# Patient Record
Sex: FEMALE | Race: WHITE | NOT HISPANIC OR LATINO | Employment: OTHER | URBAN - METROPOLITAN AREA
[De-identification: names, ages, dates, MRNs, and addresses within clinical notes are randomized per-mention and may not be internally consistent; named-entity substitution may affect disease eponyms.]

---

## 2016-12-26 ASSESSMENT — ENCOUNTER SYMPTOMS
PALPITATIONS: 1
ORTHOPNEA: 0
EXERCISE INTOLERANCE: 1
SYNCOPE: 0
HYPERTENSION: 0
POLYPHAGIA: 0
JOINT SWELLING: 1
INCREASED ENERGY: 0
NIGHT SWEATS: 1
WEIGHT GAIN: 1
FEVER: 0
DECREASED APPETITE: 1
LIGHT-HEADEDNESS: 1
FATIGUE: 1
POLYDIPSIA: 0
INSOMNIA: 1
TACHYCARDIA: 1
PANIC: 1
LEG SWELLING: 1
HALLUCINATIONS: 0
SLEEP DISTURBANCES DUE TO BREATHING: 0
ALTERED TEMPERATURE REGULATION: 1
NERVOUS/ANXIOUS: 1
BACK PAIN: 1
CLAUDICATION: 0
DECREASED CONCENTRATION: 0
WEIGHT LOSS: 1
DEPRESSION: 0
STIFFNESS: 1
NECK PAIN: 1
MUSCLE CRAMPS: 0
LEG PAIN: 1
HYPOTENSION: 0
ARTHRALGIAS: 1
CHILLS: 1
MUSCLE WEAKNESS: 1
MYALGIAS: 1

## 2017-01-02 DIAGNOSIS — G47.9 DISTURBANCE IN SLEEP BEHAVIOR: Primary | ICD-10-CM

## 2017-01-03 NOTE — TELEPHONE ENCOUNTER
Alprazolam      Last Written Prescription Date: 12/9/16  Last Fill Quantity: 40,  # refills: 0   Last Office Visit with [facility], [facility] or [facility] prescribing provider: 11/7/16

## 2017-01-04 ENCOUNTER — OFFICE VISIT (OUTPATIENT)
Dept: PULMONOLOGY | Facility: CLINIC | Age: 42
End: 2017-01-04
Attending: INTERNAL MEDICINE
Payer: COMMERCIAL

## 2017-01-04 VITALS
OXYGEN SATURATION: 99 % | RESPIRATION RATE: 16 BRPM | BODY MASS INDEX: 19.75 KG/M2 | WEIGHT: 108 LBS | DIASTOLIC BLOOD PRESSURE: 71 MMHG | SYSTOLIC BLOOD PRESSURE: 109 MMHG | HEART RATE: 85 BPM

## 2017-01-04 DIAGNOSIS — J84.9 ILD (INTERSTITIAL LUNG DISEASE) (H): Primary | ICD-10-CM

## 2017-01-04 PROCEDURE — 99212 OFFICE O/P EST SF 10 MIN: CPT | Mod: ZF

## 2017-01-04 RX ORDER — ALPRAZOLAM 0.5 MG
TABLET ORAL
Qty: 40 TABLET | Refills: 0 | Status: SHIPPED | OUTPATIENT
Start: 2017-01-04 | End: 2017-01-16

## 2017-01-04 RX ORDER — TIOTROPIUM BROMIDE 18 UG/1
CAPSULE ORAL; RESPIRATORY (INHALATION)
Qty: 30 CAPSULE | Refills: 1 | Status: SHIPPED | OUTPATIENT
Start: 2017-01-04 | End: 2017-03-10

## 2017-01-04 ASSESSMENT — PAIN SCALES - GENERAL: PAINLEVEL: MODERATE PAIN (5)

## 2017-01-04 NOTE — NURSING NOTE
Chief Complaint   Patient presents with     Interstitial Lung Disease (ILD)     Patient is axel seen for ILD consultation      Leda Leija CMA at 11:12 AM on 1/4/2017
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
## 2017-01-04 NOTE — MR AVS SNAPSHOT
After Visit Summary   1/4/2017    Ellen Loya    MRN: [id number]           Patient Information     Date Of Birth          1975        Visit Information        Provider Department      1/4/2017 11:30 AM Dawson Upton MD [facility]        Today's Diagnoses     ILD (interstitial lung disease) (H)    -  1        Follow-ups after your visit        Additional Services     PULMONARY REHAB REFERRAL                 Your next 10 appointments already scheduled     Jan 16, 2017  6:30 AM   Lab with UC LAB   [facility] ([facility])    [address] [place] [address]   [phone]            Jan 16, 2017  7:00 AM   (Arrive by 6:45 AM)   RETURN PRIMARY PULMONARY with Callie Ledesma MD   [facility] ([facility])    [address] [place] [address]   [phone]            Jan 30, 2017 10:00 AM   Lab with UC LAB   [facility] ([facility])    [address] [place] [address]   [phone]            Jan 30, 2017 10:30 AM   Six Minute Walk with UC PFL 6 MINUTE WALK 1   [facility] Pulmonary Function Testing ([facility])    [address] [place] [address]   [phone]            Jan 30, 2017 11:00 AM   (Arrive by 10:45 AM)   RETURN PRIMARY PULMONARY with Callie Ledesma MD   [facility] ([facility])    [address] [place] [address]   [phone]            Apr 05, 2017  8:30 AM   PFT VISIT with  PFL A   [facility] Function Testing ([facility])    [address] [place] [address]   [phone]            Apr 05, 2017  9:00 AM   (Arrive by 8:45 AM)   Return Interstitial Lung with Dawson Upton MD   [facility]  [facility] ([facility])    [address]  3rd Floor  [facility] [address] [phone]              Future tests that were ordered for you today     Open Future Orders        Priority Expected Expires Ordered    PULMONARY REHAB REFERRAL Routine  1/4/2018 1/4/2017            Who to contact     If you have questions or need follow up information about today's clinic visit or your schedule please contact [facility] directly at [phone].  Normal or non-critical lab and imaging results will be communicated to you by [employer], letter or phone within 4 business days after the clinic has received the results. If you do not hear from us within 7 days, please contact the clinic through [employer] or phone. If you have a critical or abnormal lab result, we will notify you by phone as soon as possible.  Submit refill requests through [employer] or call your pharmacy and they will forward the refill request to us. Please allow 3 business days for your refill to be completed.          Additional Information About Your Visit        [employer] Information     [employer] gives you secure access to your electronic health record. If you see a primary care provider, you can also send messages to your care team and make appointments. If you have questions, please call your primary care clinic.  If you do not have a primary care provider, please call [phone] and they will assist you.        Care EveryWhere ID     This is your Care EveryWhere ID. This could be used by other organizations to access your [facility] medical records  [id number]        Your Vitals Were     Pulse Respirations Pulse Oximetry Last Period          85 16 99% (LMP Unknown)         Blood Pressure from Last 3 Encounters:   01/04/17 109/71   12/29/16 108/69   12/19/16 134/90    Weight from Last 3 Encounters:   01/04/17 48.988 kg (108 lb)   12/29/16 49.17 kg (108 lb 6.4 oz)   12/19/16 54.069 kg (119 lb 3.2  oz)                 Today's Medication Changes          These changes are accurate as of: 1/4/17 12:11 PM.  If you have any questions, ask your nurse or doctor.               Start taking these medicines.        Dose/Directions    tiotropium 18 MCG capsule   Commonly known as:  SPIRIVA HANDIHALER   Used for:  ILD (interstitial lung disease) (H)   Started by:  Dawson Upton MD        Inhale contents of one capsule daily.   Quantity:  30 capsule   Refills:  1            Where to get your medicines      These medications were sent to [facility] [place] - [address]   [address] , [facility] [address]     Phone:  [phone]    - tiotropium 18 MCG capsule             Primary Care Provider Office Phone # Fax #    Ignacio Loya [phone]       [place] [id number] [facility]   [place] [place] [address]        Thank you!     Thank you for choosing [facility]  for your care. Our goal is always to provide you with excellent care. Hearing back from our patients is one way we can continue to improve our services. Please take a few minutes to complete the written survey that you may receive in the mail after your visit with us. Thank you!             Your Updated Medication List - Protect others around you: Learn how to safely use, store and throw away your medicines at www.disposemymeds.org.          This list is accurate as of: 1/4/17 12:11 PM.  Always use your most recent med list.                   Brand Name Dispense Instructions for use    * albuterol 108 (90 BASE) MCG/ACT Inhaler    albuterol    1 Inhaler    Inhale 1-2 puffs into the lungs every 4 hours as needed for shortness of breath / dyspnea       * albuterol (2.5 MG/3ML) 0.083% neb solution     60 vial    Use every 4-6 hours as needed for shortness of breath       ALPRAZolam 0.5 MG tablet    XANAX    40 tablet    TAKE 1 TABLET BY MOUTH EVERY SIX HOURS AS NEEDED FOR  ANXIETY.       digoxin 125 MCG tablet    LANOXIN    90 tablet    Take 1 tablet (125 mcg) by mouth daily       fluticasone 50 MCG/ACT spray    FLONASE    1 Bottle    Spray 2 sprays into both nostrils daily       furosemide 20 MG tablet    LASIX    90 tablet    Take 1 tablet (20 mg) by mouth daily       ibuprofen 800 MG tablet    ADVIL/MOTRIN    30 tablet    Take 1 tablet (800 mg) by mouth 3 times daily (with meals)       morphine 15 MG 12 hr tablet    MS CONTIN    60 tablet    Take 1 tablet (15 mg) by mouth 2 times daily       * order for DME     1 Device    Oxygen: Patient requires supplemental Oxygen 2 LPM via nasal canula with activity. Please provide patient with portability capability. Okay to spot check patient on oxygen device, to keep sats above 90%. Oxygen will be for a lifetime.       * order for DME     1 Device    Equipment being ordered: personal O2 oximeter.       * order for DME     1 Device    Equipment being ordered: Oxygen  Please provide patient with a home-fill system for portability.       * order for DME     2 Device    Please provide patient with 2  liquid oxygen tanks for portability. Spot check patient on liquid oxygen. Titrate oxygen to maintain saturations above 88%.       * order for DME     1 Device    Equipment being ordered: Oxygen oximeter and mask       * order for DME     1 Device    Equipment being ordered: Oximeter       * order for DME     1 Device    Equipment being ordered: Nebulizer. Verbal order from        oxyCODONE-acetaminophen  MG per tablet    PERCOCET    40 tablet    Take 1 tablet by mouth every 6 hours as needed for moderate to severe pain       tiotropium 18 MCG capsule    SPIRIVA HANDIHALER    30 capsule    Inhale contents of one capsule daily.       * Notice:  This list has 9 medication(s) that are the same as other medications prescribed for you. Read the directions carefully, and ask your doctor or other care provider to review them with you.
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
## 2017-01-05 DIAGNOSIS — R07.81 PLEURITIC CHEST PAIN: Primary | ICD-10-CM

## 2017-01-06 ENCOUNTER — TELEPHONE (OUTPATIENT)
Dept: CARDIOLOGY | Facility: CLINIC | Age: 42
End: 2017-01-06

## 2017-01-06 DIAGNOSIS — I27.20 PULMONARY HYPERTENSION (H): Primary | ICD-10-CM

## 2017-01-06 DIAGNOSIS — R12 HEART BURN: Primary | ICD-10-CM

## 2017-01-06 RX ORDER — TADALAFIL 20 MG/1
20 TABLET ORAL DAILY
Qty: 60 TABLET | Refills: 11 | Status: SHIPPED | OUTPATIENT
Start: 2017-01-06 | End: 2017-01-16

## 2017-01-06 RX ORDER — OXYCODONE AND ACETAMINOPHEN 10; 325 MG/1; MG/1
1 TABLET ORAL EVERY 6 HOURS PRN
Qty: 40 TABLET | Refills: 0 | Status: SHIPPED | OUTPATIENT
Start: 2017-01-06 | End: 2017-01-16

## 2017-01-06 NOTE — TELEPHONE ENCOUNTER
Prior Authorization Specialty Medication Request    Medication/Dose: Tyvaso  Frequency: QID    Route: Inhaled  Tyvaso (treprostinil) - Start with 3 QID with weekly increases of 1-2 breaths to a goal of 9  Diagnosis and ICD: PAH out of proportion to ILD WHO Group: 1 NYHA FC: 3   New/Renewal/Insurance Change PA: New   Previously Tried and Failed Therapies:   N/A   BCBS:   Member ID: [id number]   Group: [id number]

## 2017-01-06 NOTE — TELEPHONE ENCOUNTER
Patient calling will be out of this med this week-end, patient would like to see if another provider could ok this med, please call and advise

## 2017-01-06 NOTE — TELEPHONE ENCOUNTER
[facility] Prior Authorization Team   Phone: [phone]  Fax: [phone]    PA Initiation    Medication: Adcirca  Insurance Company: [employer]  Fax Number: [phone]    Phone Number: [phone]  Pharmacy Filling the Rx: [facility] [address]  Filling Pharmacy Phone: [phone]  Filling Pharmacy Fax: [phone]  Start Date: 1/6/2017     Urgent request has been manually faxed. PA request included clinical documentation and letter of medical necessity.

## 2017-01-06 NOTE — Clinical Note
[address] [facility]  [address] [place] [address]  Phone [phone]   Fax [phone]     2017  Re:  Ellen Loya  :  1975  ID #: [id number]    To Whom It May Concern,   I am writing in reference to a mutual patient, Ellen Loya, 1975.  It has been brought to my attention that the patient is in need of a prior authorization to initiate therapy for her Pulmonary Hypertension on the medication Adcirca (tadalafil).  Mrs. Loya has been followed at the [facility] for her Pulmonary Hypertension since 2016.  Mrs. Loya was diagnosed with Pulmonary Arterial Hypertension out of proportion to Interstitial Lung Disease by the [facility] (ICD-10 code I27.0) which was most recently re-confirmed via Right Heart Catheterization.  On 16, Mrs. Loya underwent a right heart catheterization at the [facility] which demonstrated severe pulmonary arterial hypertension, (RA 15/20, PA 60/38, mean 42, PVR 10.5, PWP 15, CI 1.6).  Mrs. Loya underwent a vasodilator challenge and did not respond with greater than 20% reduction in her mean pulmonary artery pressure.  Therefore, this right heart catheterization demonstrated that her pulmonary arterial hypertension is out of proportion to Interstitial Lung Disease.     Unfortunately, Mrs. Loya has experienced increased fatigue, shortness of breath, dyspnea and increased limitations with activity due to her Pulmonary Hypertension.  Therefore, I have prescribed and am requesting you approve Mrs. Loya to receive Adcirca, as part of her Pulmonary Hypertension care plan.  If it is necessary to deny this drug, it will lead to consistent heart failure and deterioration in regards to her Pulmonary Hypertension as well as unstable hemodynamics, which will be detrimental to her health.       Please note that Mrs. Loya is a World Health Organization Group 1  and NYHA Class 3.  If you have further questions or concerns please contact my Clinical Nurse Coordinator Naty Lau RN at [phone].  Graciously,       Dileep Falk MD   of Medicine  Pulmonary Hypertension   [facility]  Cardiovascular Division

## 2017-01-06 NOTE — TELEPHONE ENCOUNTER
Prior Authorization Specialty Medication Request    Medication/Dose: Adcirca  Frequency: Daily    Route: PO  Adcirca (tadalafil) 20 mg tablets - take 1 tab by mouth Daily for 30 days then increase to 2 tab daily Quantity 60 tablets/30 days  Diagnosis and ICD: PAH out of proportion to ILD   WHO Group: 1 NYHA FC: 3  New/Renewal/Insurance Change PA: New  Previously Tried and Failed Therapies:   N/A    BCBS:  Member ID: [id number]  Group: [id number]

## 2017-01-06 NOTE — TELEPHONE ENCOUNTER
Percocet      Last Written Prescription Date: 12/28/16  Last Fill Quantity: 40,  # refills: 0   Last Office Visit with [facility], [facility] or [facility] prescribing provider: 11/07/2016    Sandra Rodriguez [facility]- Float Dept.

## 2017-01-09 NOTE — TELEPHONE ENCOUNTER
Prior Authorization Approval    Authorization Effective Date: 1/1/2017  Authorization Expiration Date: 1/1/2018  Medication: Adcirca - APPROVED  Approved Dose/Quantity:   Reference #:     Insurance Company: [employer]  Expected CoPay: $     CoPay Card Available:      Foundation Assistance Needed:    Which Pharmacy is filling the prescription (Not needed for infusion/clinic administered): [facility] [address]      [facility] was having computer problems so could not check if the medication is processing, they were going to work on it and contact the patient.

## 2017-01-11 DIAGNOSIS — M19.90 INFLAMMATORY ARTHRITIS: Primary | ICD-10-CM

## 2017-01-12 LAB
DLCOCOR-%PRED-PRE: 29 %
DLCOCOR-PRE: 6.76 ML/MIN/MMHG
DLCOUNC-%PRED-PRE: 30 %
DLCOUNC-PRE: 7.02 ML/MIN/MMHG
DLCOUNC-PRED: 22.91 ML/MIN/MMHG
ERV-%PRED-PRE: 88 %
ERV-PRE: 1.11 L
ERV-PRED: 1.25 L
EXPTIME-PRE: 6.41 SEC
FEF2575-%PRED-PRE: 78 %
FEF2575-PRE: 2.36 L/SEC
FEF2575-PRED: 3 L/SEC
FEFMAX-%PRED-PRE: 105 %
FEFMAX-PRE: 7 L/SEC
FEFMAX-PRED: 6.65 L/SEC
FEV1-%PRED-PRE: 82 %
FEV1-PRE: 2.3 L
FEV1FEV6-PRE: 83 %
FEV1FEV6-PRED: 84 %
FEV1FVC-PRE: 83 %
FEV1FVC-PRED: 82 %
FEV1SVC-PRE: 86 %
FEV1SVC-PRED: 82 %
FIFMAX-PRE: 5.71 L/SEC
FRCPLETH-%PRED-PRE: 108 %
FRCPLETH-PRE: 2.79 L
FRCPLETH-PRED: 2.57 L
FVC-%PRED-PRE: 81 %
FVC-PRE: 2.77 L
FVC-PRED: 3.41 L
IC-%PRED-PRE: 71 %
IC-PRE: 1.55 L
IC-PRED: 2.16 L
RVPLETH-%PRED-PRE: 111 %
RVPLETH-PRE: 1.68 L
RVPLETH-PRED: 1.51 L
TLCPLETH-%PRED-PRE: 94 %
TLCPLETH-PRE: 4.34 L
TLCPLETH-PRED: 4.6 L
VA-%PRED-PRE: 81 %
VA-PRE: 3.86 L
VC-%PRED-PRE: 78 %
VC-PRE: 2.67 L
VC-PRED: 3.41 L

## 2017-01-12 RX ORDER — MORPHINE SULFATE 15 MG/1
15 TABLET, FILM COATED, EXTENDED RELEASE ORAL 2 TIMES DAILY
Qty: 60 TABLET | Refills: 0 | Status: SHIPPED | OUTPATIENT
Start: 2017-01-12 | End: 2017-02-08

## 2017-01-12 NOTE — TELEPHONE ENCOUNTER
MS Contin 15mg      Last Written Prescription Date: 12/14/2016  Last Fill Quantity: 60,  # refills: 0   Last Office Visit with G, P or [facility] prescribing provider: 10/28/2016    Amber Rushing, Pharmacy Technician  [facility]  [phone]

## 2017-01-13 ENCOUNTER — PRE VISIT (OUTPATIENT)
Dept: CARDIOLOGY | Facility: CLINIC | Age: 42
End: 2017-01-13

## 2017-01-13 ENCOUNTER — HOSPITAL ENCOUNTER (OUTPATIENT)
Dept: CARDIAC REHAB | Facility: CLINIC | Age: 42
End: 2017-01-13
Attending: INTERNAL MEDICINE
Payer: COMMERCIAL

## 2017-01-13 VITALS — HEIGHT: 62 IN | WEIGHT: 107 LBS | BODY MASS INDEX: 19.69 KG/M2

## 2017-01-13 DIAGNOSIS — I27.20 PULMONARY HYPERTENSION (H): Primary | ICD-10-CM

## 2017-01-13 DIAGNOSIS — R06.09 DYSPNEA ON EXERTION: ICD-10-CM

## 2017-01-13 PROCEDURE — 40000244 ZZH STATISTIC VISIT PULM REHAB

## 2017-01-13 PROCEDURE — G0238 OTH RESP PROC, INDIV: HCPCS

## 2017-01-13 PROCEDURE — G0237 THERAPEUTIC PROCD STRG ENDUR: HCPCS

## 2017-01-13 ASSESSMENT — PULMONARY FUNCTION TESTS
FVC: 81
FEV1: 82
FVC_PERCENT_PREDICTED: 82

## 2017-01-13 ASSESSMENT — 6 MINUTE WALK TEST (6MWT)
GENDER SELECTION: FEMALE
FEMALE CALC: 650.97
TOTAL DISTANCE WALKED: 220.98
MALE CALC: 591.86

## 2017-01-13 ASSESSMENT — DUKE ACTIVITY SCORE INDEX (DASI)
VO2_PEAK: 21.94
DASI METS SCORE: 6.27

## 2017-01-13 ASSESSMENT — ACTIVITIES OF DAILY LIVING (ADL): ADL_LIMITATIONS: STAIR CLIMBING

## 2017-01-13 NOTE — PROGRESS NOTES
01/13/17 1000   Session   Session Initial Evaluation and Exercise Prescription   Certified through this date 02/11/17   Type Initial     Assessment   Assessment Ellen is a pleasant 41 year old who presents to pulmonary rehab to increase her strengh and endurance after being diagnoised with intersitial pulmonary fibrosis. She also has a history of inflammatory arthritis, interstitial lung disease, and pulmonary hypertension and was admitted from pulmonary hypertension clinic for volume overload last month. Ellen attended rehab in the summer of 2015 briefly and understands the importance of regular exercise and attending pulmonary rehab since she was recently referred for a lung transplant.  The patient's history and clinical status including hemodynamics  were evaluated.  The patient was assessed to be stable and appropriate to begin exercise.   The patient's functional capacity and exercise prescription were determined by the completion of the 6 minute walk test.  See results below.  The patient was oriented to the program and equipment. Goals and objectives were discussed. CV response was WNL. No symptoms, complaints or pain were reported. Good prognosis for reaching above goals. Skilled therapy is necessary in order to monitor cardiopulmonary response to exercise, to provide education and behavior change counseling needed to achieve patient's goals.      General Information  Ellen A Vincenzo  1975     Treatment Diagnosis Interstitial Pulmonary Fibrosis   Onset Date 01/01/11   Hospital Location [facility] [place]   Outpatient Pulmonary Rehab Start Date 01/13/17   Primary Physician Dr. Loya   Pulmonolgist Dr. Upton   General Information Comments Past Medical History   Diagnosis Date     Varicella without mention of complication      Acute bronchitis 06/05/07     Admit. Discharged 06/05/07     ASCUS favor benign 5/15/14     neg HPV     ILD (interstitial lung disease) (H)       "seen on chest CT 5-2011; methotrexate stopped 6-2011     Lung nodule      KM nodule; EBUS 12/2014 of lymph nodes was negative; CT-guided bx 1-2015 hamartoma/chondroma     Arthritis      h/o \"seronegative rheumatoid arthritis\" since age 30, however no evidence of active arthritis by Rheum eval [id number]     Prematurity      born 6-7 weeks early     Anxiety      Pulmonary hypertension (H)      RHC 2/2016 mean PA 25        Untoward Events/Exacerbations/Hospitalizations   Untoward Events/Exacerbations/Hospitalizations Cardiac   Cardiac Edema   Edema Date 12/15/16   Sputum   Sputum Production Amount None   Tobacco History   Tobacco Former smoker   Tobacco Habit Cigarettes   Years Smoked 20   Average Packs Per Day 1/2   Quit Date or Planned Quit Date 12/14/16   Interventions Planned Check in Regularly, Offer Support to Reduce Risk of Relapse   Medications   Short-Acting Beta Agonist Prescribed, taking as prescribed   Long-Acting Anticholinergic Prescribed, taking as prescribed   Preventative Vaccinations   Influenza Vaccination No   Pneumonia Vaccination No   Pain   Patient Currently in Pain No   Falls Screen   Have you fallen two or more times in the past year? Yes   Have you fallen and had an injury in the past year? No   Referral initiated to physical therapy No   Living and Work Status   Living Arrangements house   Support System Live with an adult   Environmental Factors Pets;Plants;Air quality;No concerns   ADL Limitations Stair climbing   Initial Duke Activity Status Index (DASI) score. A measure of functional capacity. The goal is to have a pre-program raw score of 9.95 (~4 METs) or above 28.7   Initial DASI VO2 Peak (ml*kg-1*min-1) 21.94   Initial DASI MET Level 6.27   Occupation , cook   Return to Employment Not employed  (Disabled)   Physical Assessments   Incisions Not applicable   Edema +1 Trace   Left Lung Sounds normal   Right Lung Sounds normal   Pulmonary Function Test (PFTs)   PFT Results " "Available   Date Completed 01/04/17   FVC Actual 81   % Predicted FVC 82   FEV1 Actual 82   Individualized Treatment Plan   Sessions Scheduled 30   Sessions Attended 1   Type Aerobic exercise;Resistance training;Flexibility training   Oxygen Use   Supplemental Oxygen Needed Yes   Delivery Device Nasal Cannula   Liter Flow at Rest 2   Liter Flow with ADLS 2   Liter Flow During Exercise 2   Liter Flow With Sleep 2   Evaluated on Home System? Pulse/Demand   Interventions Recommended Continue to monitor SpO2 at rest and with exercise on current O2 settings   Exercise Prescription   Mode Treadmill;Nustep;Arm Ergometer/UBE;Weights   Frequency 2 days/week   Duration/Time 15-30 min   THR (85% of age predicted max heart rate)  152.15   Effort Rating (0-10) 4-6   Progression of Exercise Increase duration of exercise 3-5 mins per week   Oxygen Titration with Exercise > 88% with exercise   Comments pt is deconditioned   Exercise Assessment   6 Minute Walk Predicted - Gender Selection Female   6 Minute Walk Predicted (Female) 650.97   6 Minute Walk Predicted (Male) 591.86   6 Minute Walk Distance (Initial) 220.98 Meters   Resting HR 69   Exercise HR 83   Resting /82   Exercise /80   SpO2 98   Exercise SpO2 91   Current MET level 2.1   Exercise Tolerance poor   Normal Limits Discussed Yes   Current Symptoms at Home Dyspnea;Fatigue   Current Symptoms in Rehab Denies symptoms   Limitations Arthritis   Nutrition Management   Age 41   Height 1.575 m (5' 2.01\")   Weight 48.535 kg (107 lb)   BMI (Calculated) 19.61   Interventions Planned NA   Psychosocial   Initial Patient Health Questionnaire -9 (PHQ-9) for depression. To notify physician if pre-score >9. 11   Initial Shortness of Breath Questionnaire (SOBQ) score. The goal is to reduce the score pre to post program. 40   Intervention Planned Patient to identify 2-3 coping mechanisms to decrease stress and anxiety;Patient to attend appropriate education class;Introduce " relaxation techniques to assist with decreased anxiety   Psychosocial Comments PT reports she has anxiety which she takes medication for which is situation based.    Stages of Change   Aerobic Exercise Preparation   Physical Activity Preparation   Recommended diet Action   Stress Contemplation   Smoking Cessation Action   Oxygen Usage Maintenance   Current Home Exercise   Type of Exercise None   Recommended Home Exercise Prescription   Type of Exercise Walking   Frequency (Days per week) 2-3   Duration (minutes per session) 15-30 min   Effort Rating Recommended (0-10) Scale  4-6/10   Learning Assessment   Learner Patient   Primary Language English   Preferred Learning Style Listening;Reading;Demonstration;Pictures/Video   Barriers to Learning No barriers noted   Patient Education/Referrals   Education Recommended Activities of Daily Living;Breathing Techniques;Community Resources/Exacerbation Management;Emotional Aspects of CLD;Energy Conservation;Exercise Principles;Medication Overview;Nutrition;Panic and Anxiety   Follow-up/On-going Support   Provider follow-up needed on the following No follow-up needed   Pulmonary Rehab Goals   Pulmonary Rehab Goals 1;2   Goal 1   Goal Pt will achieve 30 minutes of continoue aerobic exercise, by using the treadmill and Nustep in addition to 10 reps of 1-2#  of 6 exercise.   Target Date 02/27/17   Progress Towards Goal Pt wants to increase her strengh and endurance so that she is strong enough to havea double lung transplant.   Goal 2   Goal Pt will move from luis action phase to maintenance phase in refererance to smoking.   Target Date 02/27/17   Progress Towards Goal Pt will remain smoke free.            I have reviewed initial evaluation outcomes, and agree with exercise prescription for respiratory therapy for this patient.    Physician Signature: _______________________  Date: ________ Time: _________

## 2017-01-16 ENCOUNTER — OFFICE VISIT (OUTPATIENT)
Dept: CARDIOLOGY | Facility: CLINIC | Age: 42
End: 2017-01-16
Attending: INTERNAL MEDICINE
Payer: COMMERCIAL

## 2017-01-16 VITALS
HEART RATE: 58 BPM | BODY MASS INDEX: 20.08 KG/M2 | HEIGHT: 62 IN | OXYGEN SATURATION: 96 % | DIASTOLIC BLOOD PRESSURE: 67 MMHG | WEIGHT: 109.1 LBS | SYSTOLIC BLOOD PRESSURE: 106 MMHG

## 2017-01-16 DIAGNOSIS — R07.81 PLEURITIC CHEST PAIN: Primary | ICD-10-CM

## 2017-01-16 DIAGNOSIS — I27.20 PULMONARY HYPERTENSION (H): Primary | ICD-10-CM

## 2017-01-16 DIAGNOSIS — G47.9 DISTURBANCE IN SLEEP BEHAVIOR: ICD-10-CM

## 2017-01-16 DIAGNOSIS — I27.20 PULMONARY HYPERTENSION (H): ICD-10-CM

## 2017-01-16 DIAGNOSIS — R06.09 DYSPNEA ON EXERTION: ICD-10-CM

## 2017-01-16 LAB
ALBUMIN SERPL-MCNC: 3.6 G/DL (ref 3.4–5)
ALP SERPL-CCNC: 95 U/L (ref 40–150)
ALT SERPL W P-5'-P-CCNC: 18 U/L (ref 0–50)
ANION GAP SERPL CALCULATED.3IONS-SCNC: 6 MMOL/L (ref 3–14)
AST SERPL W P-5'-P-CCNC: 20 U/L (ref 0–45)
BILIRUB SERPL-MCNC: 0.4 MG/DL (ref 0.2–1.3)
BUN SERPL-MCNC: 19 MG/DL (ref 7–30)
CALCIUM SERPL-MCNC: 8.8 MG/DL (ref 8.5–10.1)
CHLORIDE SERPL-SCNC: 101 MMOL/L (ref 94–109)
CO2 SERPL-SCNC: 31 MMOL/L (ref 20–32)
CREAT SERPL-MCNC: 0.77 MG/DL (ref 0.52–1.04)
ERYTHROCYTE [DISTWIDTH] IN BLOOD BY AUTOMATED COUNT: 16.3 % (ref 10–15)
GFR SERPL CREATININE-BSD FRML MDRD: 82 ML/MIN/1.7M2
GLUCOSE SERPL-MCNC: 62 MG/DL (ref 70–99)
HCT VFR BLD AUTO: 43.2 % (ref 35–47)
HGB BLD-MCNC: 13.8 G/DL (ref 11.7–15.7)
MCH RBC QN AUTO: 29.5 PG (ref 26.5–33)
MCHC RBC AUTO-ENTMCNC: 31.9 G/DL (ref 31.5–36.5)
MCV RBC AUTO: 92 FL (ref 78–100)
NT-PROBNP SERPL-MCNC: 258 PG/ML (ref 0–125)
PLATELET # BLD AUTO: 251 10E9/L (ref 150–450)
POTASSIUM SERPL-SCNC: 3.7 MMOL/L (ref 3.4–5.3)
PROT SERPL-MCNC: 7.8 G/DL (ref 6.8–8.8)
RBC # BLD AUTO: 4.68 10E12/L (ref 3.8–5.2)
SODIUM SERPL-SCNC: 138 MMOL/L (ref 133–144)
WBC # BLD AUTO: 9.1 10E9/L (ref 4–11)

## 2017-01-16 PROCEDURE — 83880 ASSAY OF NATRIURETIC PEPTIDE: CPT | Performed by: INTERNAL MEDICINE

## 2017-01-16 PROCEDURE — 99214 OFFICE O/P EST MOD 30 MIN: CPT | Mod: ZP | Performed by: INTERNAL MEDICINE

## 2017-01-16 PROCEDURE — 85027 COMPLETE CBC AUTOMATED: CPT | Performed by: INTERNAL MEDICINE

## 2017-01-16 PROCEDURE — 80053 COMPREHEN METABOLIC PANEL: CPT | Performed by: INTERNAL MEDICINE

## 2017-01-16 PROCEDURE — 36415 COLL VENOUS BLD VENIPUNCTURE: CPT | Performed by: INTERNAL MEDICINE

## 2017-01-16 PROCEDURE — 99213 OFFICE O/P EST LOW 20 MIN: CPT | Mod: ZF

## 2017-01-16 RX ORDER — ALPRAZOLAM 0.5 MG
TABLET ORAL
Qty: 40 TABLET | Refills: 0 | Status: SHIPPED | OUTPATIENT
Start: 2017-01-16 | End: 2017-01-28

## 2017-01-16 RX ORDER — TADALAFIL 20 MG/1
40 TABLET ORAL DAILY
Qty: 60 TABLET | Refills: 11 | Status: SHIPPED | OUTPATIENT
Start: 2017-01-16 | End: 2018-01-09

## 2017-01-16 RX ORDER — OXYCODONE AND ACETAMINOPHEN 10; 325 MG/1; MG/1
1 TABLET ORAL EVERY 6 HOURS PRN
Qty: 40 TABLET | Refills: 0 | Status: SHIPPED | OUTPATIENT
Start: 2017-01-16 | End: 2017-01-25

## 2017-01-16 RX ORDER — FUROSEMIDE 20 MG
40 TABLET ORAL DAILY
Qty: 180 TABLET | Refills: 3 | Status: SHIPPED | OUTPATIENT
Start: 2017-01-16 | End: 2018-01-23

## 2017-01-16 ASSESSMENT — PAIN SCALES - GENERAL: PAINLEVEL: NO PAIN (0)

## 2017-01-16 NOTE — PROGRESS NOTES
2017             Dawson Upton MD   [facility] Pulmonary    [address] [place] [address]      RE: Ellen Loya   MRN: [id number]   : 1975      Dear Dr. Upton:      We had the pleasure of seeing Ms. Ellen Loya for followup in our Pulmonary Hypertension Clinic at the [facility].  As you know, she is a 41-year-old female with pulmonary arterial hypertension in the setting of combined pulmonary fibrosis, emphysema and inflammatory arthritis.  She is currently on Adcirca therapy.      Ms. Loya returns for her 2 week followup.  Her Adcirca has been approved, and she is taking it at 20 mg daily.  She has not had any further worsening of her symptoms.  Her shortness of breath is gradually improving.  She continues to feel better.  She is on 2 L of supplemental oxygen.  She increases it as needed.  She has not had any further worsening lower extremity swelling or abdominal distention.  Her weight has been stable.  She has no chest pain or pressure.  She is able to do her basic activities at home by herself.  She can walk up to a block now without stopping.  I would currently characterize her as NYHA functional class III.  She has not had any worsening oxygenation on Adcirca therapy.      She has been taking Lasix 40 mg daily, as she has gained some weight.      REVIEW OF SYSTEMS:  A detailed 10 point review of systems was obtained as described in the History of Present Illness.  All other systems were reviewed and are negative.       MEDICATIONS:   Current Outpatient Prescriptions   Medication Sig     tadalafil, PAH, (ADCIRCA) 20 MG TABS Take 2 tablets (40 mg) by mouth daily     furosemide (LASIX) 20 MG tablet Take 2 tablets (40 mg) by mouth daily     morphine (MS CONTIN) 15 MG 12 hr tablet Take 1 tablet (15 mg) by mouth 2 times daily     tiotropium (SPIRIVA HANDIHALER) 18 MCG capsule Inhale contents of one capsule daily.     fluticasone (FLONASE) 50 MCG/ACT  spray Spray 2 sprays into both nostrils daily     albuterol (2.5 MG/3ML) 0.083% nebulizer solution Use every 4-6 hours as needed for shortness of breath     order for DME Equipment being ordered: Nebulizer. Verbal order from      digoxin (LANOXIN) 125 MCG tablet Take 1 tablet (125 mcg) by mouth daily     ibuprofen (ADVIL,MOTRIN) 800 MG tablet Take 1 tablet (800 mg) by mouth 3 times daily (with meals)     order for DME Equipment being ordered: Oximeter     order for DME Equipment being ordered: Oxygen oximeter and mask     order for DME Please provide patient with 2  liquid oxygen tanks for portability. Spot check patient on liquid oxygen. Titrate oxygen to maintain saturations above 88%.     order for DME Equipment being ordered: Oxygen    Please provide patient with a home-fill system for portability.     albuterol (ALBUTEROL) 108 (90 BASE) MCG/ACT inhaler Inhale 1-2 puffs into the lungs every 4 hours as needed for shortness of breath / dyspnea     order for DME Equipment being ordered: personal O2 oximeter.     order for DME Oxygen: Patient requires supplemental Oxygen 2 LPM via nasal canula with activity. Please provide patient with portability capability. Okay to spot check patient on oxygen device, to keep sats above 90%. Oxygen will be for a lifetime.     oxyCODONE-acetaminophen (PERCOCET)  MG per tablet Take 1 tablet by mouth every 6 hours as needed for moderate to severe pain     ALPRAZolam (XANAX) 0.5 MG tablet TAKE 1 TABLET BY MOUTH EVERY SIX HOURS AS NEEDED FOR ANXIETY.     No current facility-administered medications for this visit.     PHYSICAL EXAMINATION:  She was comfortable.  She was in no apparent distress.  Her blood pressure was 106/67, pulse rate was 58, and respiratory rate was 20.  She was saturating 96% on 2 L.  Her weight was slightly up to 109 pounds.  She had no pallor, cyanosis or jaundice.  Her neck exam revealed no JVD.  She had no lower extremity edema.  Her extremities  were warm and well perfused.  Cardiac auscultation revealed normal S1 and a loud P2.  No murmur, rub or gallop.  Auscultation of the lungs revealed equal air entry on both sides with no crepitations or wheezing.  Her abdomen was soft with normal bowel sounds; no tenderness, no rigidity, no guarding.      Her NT-proBNP is 258, and it continues to improve.  Her liver function tests and renal function tests are unremarkable.  Her CBC is within normal limits.      Ms. Ellen Loya is a pleasant, 41-year-old female with a past medical history significant for combined emphysema and pulmonary fibrosis in the setting of inflammatory arthritis and pulmonary arterial hypertension.      Pulmonary arterial hypertension:  Ms. Loya is feeling much better on Adcirca.  She has noticed improvement in her exertional shortness of breath, and also she no longer has lower extremity edema.  She is taking a higher dose of Lasix for the last week.  I will increase her Adcirca to 40 mg daily.  I recommended her to continue to take Lasix 40 mg daily.  She will continue on digoxin.        She has severe RV dysfunction.  In view of this, I think she would benefit from a second therapy.  It appears that her inhaled Tyvaso was denied by her insurance company.  We will appeal the denial.  Hopefully, this will be approved so that her right ventricular failure continues to improve, and she feels better.        She has been evaluated for a lung transplantation.  She is currently going through the workup.  She quit smoking in December.  Hopefully, she will be a candidate and can be listed in mid June.      I recommended her to return to clinic in a month.  At that time we will make sure that she is tolerating the higher dose of Adcirca, and hopefully her Tyvaso would be approved and she is initiated on that.  She will call our office in the interim if you have any further questions.  I also encouraged her to make an appointment with Dr. Martínez  with Rheumatology to reassess the need for therapy for inflammatory arthritis.  She is starting pulmonary rehab, and she feels better, which I encouraged her to continue.        It was a pleasure meeting Ms. Ellen Loya in our Pulmonary Hypertension Clinic at the [facility].  We thank you for involving us in her care.  Please do not hesitate to call us in the interim if you have any further questions.      Sincerely,   Callie Ledesma MD   [facility]ension  Heart Failure, Transplant, and Mechanical Circulatory Support Cardiology   Cardiovascular Division  [facility]   [phone]               D: 2017 11:28   T: 2017 14:07   MT: [email or web address]      Name:     ELLEN LOYA   MRN:      [id number]        Account:      [id number]   :      1975           Service Date: 2017      Document: [id number]

## 2017-01-16 NOTE — NURSING NOTE
Med Reconcile: Reviewed and verified all current medications with the patient. The updated medication list was printed and given to the patient.  Return Appointment: Patient given instructions regarding scheduling next clinic visit. Patient demonstrated understanding of this information and agreed to call with further questions or concerns.  Medication Change: Patient was educated regarding prescribed medication change, including discussion of the indication, administration, side effects, and when to report to MD or RN. Patient demonstrated understanding of this information and agreed to call with further questions or concerns.  Patient stated she understood all health information given and agreed to call with further questions or concerns.     Medication Changes:   Increase Lasix to 40 mg Daily   Increase Adcirca (tadalafil) to 40 mg Daily   Patient Instructions:   Follow up Appointment Information:   1 month follow up with Labs   Rheumatology appt

## 2017-01-16 NOTE — NURSING NOTE
"Chief Complaint   Patient presents with     Follow Up For     Return for PH F/U with Labs   NOTE- Patient stated - she is feeing \"well today\"  "

## 2017-01-16 NOTE — Clinical Note
2017      RE: Ellen Loya  [address]  [facility] [address]       2017             Dawson Upton MD   P Pulmonary    [address] [place] [address]      RE: Ellen Loya   MRN: [id number]   : 1975      Dear Dr. Upton:      We had the pleasure of seeing Ms. Ellen Loya for followup in our Pulmonary Hypertension Clinic at the [facility].  As you know, she is a 41-year-old female with pulmonary arterial hypertension in the setting of combined pulmonary fibrosis, emphysema and inflammatory arthritis.  She is currently on Adcirca therapy.      Ms. Loya returns for her 2 week followup.  Her Adcirca has been approved, and she is taking it at 20 mg daily.  She has not had any further worsening of her symptoms.  Her shortness of breath is gradually improving.  She continues to feel better.  She is on 2 L of supplemental oxygen.  She increases it as needed.  She has not had any further worsening lower extremity swelling or abdominal distention.  Her weight has been stable.  She has no chest pain or pressure.  She is able to do her basic activities at home by herself.  She can walk up to a block now without stopping.  I would currently characterize her as NYHA functional class III.  She has not had any worsening oxygenation on Adcirca therapy.      She has been taking Lasix 40 mg daily, as she has gained some weight.      REVIEW OF SYSTEMS:  A detailed 10 point review of systems was obtained as described in the History of Present Illness.  All other systems were reviewed and are negative.       MEDICATIONS:   Current Outpatient Prescriptions   Medication Sig     tadalafil, PAH, (ADCIRCA) 20 MG TABS Take 2 tablets (40 mg) by mouth daily     furosemide (LASIX) 20 MG tablet Take 2 tablets (40 mg) by mouth daily     morphine (MS CONTIN) 15 MG 12 hr tablet Take 1 tablet (15 mg) by mouth 2 times daily     tiotropium (SPIRIVA HANDIHALER) 18 MCG  capsule Inhale contents of one capsule daily.     fluticasone (FLONASE) 50 MCG/ACT spray Spray 2 sprays into both nostrils daily     albuterol (2.5 MG/3ML) 0.083% nebulizer solution Use every 4-6 hours as needed for shortness of breath     order for DME Equipment being ordered: Nebulizer. Verbal order from      digoxin (LANOXIN) 125 MCG tablet Take 1 tablet (125 mcg) by mouth daily     ibuprofen (ADVIL,MOTRIN) 800 MG tablet Take 1 tablet (800 mg) by mouth 3 times daily (with meals)     order for DME Equipment being ordered: Oximeter     order for DME Equipment being ordered: Oxygen oximeter and mask     order for DME Please provide patient with 2  liquid oxygen tanks for portability. Spot check patient on liquid oxygen. Titrate oxygen to maintain saturations above 88%.     order for DME Equipment being ordered: Oxygen    Please provide patient with a home-fill system for portability.     albuterol (ALBUTEROL) 108 (90 BASE) MCG/ACT inhaler Inhale 1-2 puffs into the lungs every 4 hours as needed for shortness of breath / dyspnea     order for DME Equipment being ordered: personal O2 oximeter.     order for DME Oxygen: Patient requires supplemental Oxygen 2 LPM via nasal canula with activity. Please provide patient with portability capability. Okay to spot check patient on oxygen device, to keep sats above 90%. Oxygen will be for a lifetime.     oxyCODONE-acetaminophen (PERCOCET)  MG per tablet Take 1 tablet by mouth every 6 hours as needed for moderate to severe pain     ALPRAZolam (XANAX) 0.5 MG tablet TAKE 1 TABLET BY MOUTH EVERY SIX HOURS AS NEEDED FOR ANXIETY.     No current facility-administered medications for this visit.     PHYSICAL EXAMINATION:  She was comfortable.  She was in no apparent distress.  Her blood pressure was 106/67, pulse rate was 58, and respiratory rate was 20.  She was saturating 96% on 2 L.  Her weight was slightly up to 109 pounds.  She had no pallor, cyanosis or jaundice.   Her neck exam revealed no JVD.  She had no lower extremity edema.  Her extremities were warm and well perfused.  Cardiac auscultation revealed normal S1 and a loud P2.  No murmur, rub or gallop.  Auscultation of the lungs revealed equal air entry on both sides with no crepitations or wheezing.  Her abdomen was soft with normal bowel sounds; no tenderness, no rigidity, no guarding.      Her NT-proBNP is 258, and it continues to improve.  Her liver function tests and renal function tests are unremarkable.  Her CBC is within normal limits.      Ms. Ellen Loya is a pleasant, 41-year-old female with a past medical history significant for combined emphysema and pulmonary fibrosis in the setting of inflammatory arthritis and pulmonary arterial hypertension.      Pulmonary arterial hypertension:  Ms. Loya is feeling much better on Adcirca.  She has noticed improvement in her exertional shortness of breath, and also she no longer has lower extremity edema.  She is taking a higher dose of Lasix for the last week.  I will increase her Adcirca to 40 mg daily.  I recommended her to continue to take Lasix 40 mg daily.  She will continue on digoxin.        She has severe RV dysfunction.  In view of this, I think she would benefit from a second therapy.  It appears that her inhaled Tyvaso was denied by her insurance company.  We will appeal the denial.  Hopefully, this will be approved so that her right ventricular failure continues to improve, and she feels better.        She has been evaluated for a lung transplantation.  She is currently going through the workup.  She quit smoking in December.  Hopefully, she will be a candidate and can be listed in mid June.      I recommended her to return to clinic in a month.  At that time we will make sure that she is tolerating the higher dose of Adcirca, and hopefully her Tyvaso would be approved and she is initiated on that.  She will call our office in the interim if you have any  further questions.  I also encouraged her to make an appointment with Dr. Martínez with Rheumatology to reassess the need for therapy for inflammatory arthritis.  She is starting pulmonary rehab, and she feels better, which I encouraged her to continue.        It was a pleasure meeting Ms. Ellen Loya in our Pulmonary Hypertension Clinic at the [facility].  We thank you for involving us in her care.  Please do not hesitate to call us in the interim if you have any further questions.      Sincerely,   Callie Ledesma MD   Center for Pulmonary Hypertension  Heart Failure, Transplant, and Mechanical Circulatory Support Cardiology   Cardiovascular Division  [facility]   [phone]               D: 2017 11:28   T: 2017 14:07   MT: [email or web address]      Name:     ELLEN LOYA   MRN:      [id number]        Account:      [id number]   :      1975           Service Date: 2017      Document: [id number]      Callie Ledesma MD

## 2017-01-16 NOTE — MR AVS SNAPSHOT
After Visit Summary   1/16/2017    Ellen Loya    MRN: [id number]           Patient Information     Date Of Birth          1975        Visit Information        Provider Department      1/16/2017 7:00 AM Callie Ledesma MD [facility]        Today's Diagnoses     Pulmonary hypertension (H)    -  1       Care Instructions    Medication Changes:  Increase Lasix to 40 mg Daily  Increase Adcirca (tadalafil) to 40 mg Daily    Patient Instructions:      Follow up Appointment Information:  1 month follow up with Labs  Rheumatology appt    We are located on the third floor of the [facility] ([facility] on the [facility].  Our address is     [address] [place] [address] [place] [address]rd [place] [address]      Thank you for allowing us to be a part of your care here at the [facility]    If you have questions or concerns please contact us at:    Naty Lau RN      Nurse Coordinator       Pulmonary Hypertension     [facility]   [phone])[phone]                ** Please note that you will NOT receive a reminder call regarding your scheduled testing, reminder calls are for provider appointments only.  If you are scheduled for testing within the [employer] system you may receive a call regarding pre-registration for billing purposes only.**     Remember to weigh yourself daily after voiding and before you consume any food or beverages and log the numbers.  If you have gained/lost 2 pounds overnight or 5 pounds in a week contact us immediately for medication adjustments or further instructions.        Follow-ups after your visit        Follow-up notes from your care team     Return in about 1 month (around 2/16/2017) for with Callie, Return PH, with, Labs.      Your next 10 appointments already scheduled     Jan 20, 2017  9:30 AM   (Arrive by 9:15 AM)   Return Visit with Clarita Martínez MD   [facility]  Rheumatology ([facility])    [address] [place] [address]   [phone]            Feb 27, 2017 10:00 AM   Lab with  LAB   [facility] ([facility])    [address] [place] [address]   [phone]            Feb 27, 2017 10:30 AM   Six Minute Walk with UC PFL 6 MINUTE WALK 1   [facility] Pulmonary Function Testing ([facility])    [address] [place] [address]   [phone]            Feb 27, 2017 11:00 AM   (Arrive by 10:45 AM)   RETURN PRIMARY PULMONARY with Callie Ledesma MD   [facility] ([facility])    [address] [place] [address]   [phone]            Apr 05, 2017  8:30 AM   PFT VISIT with  PFL A   [facility] Pulmonary Function Testing ([facility])    [address] [place] [id number] [phone]            Apr 05, 2017  9:00 AM   (Arrive by 8:45 AM)   Return Interstitial Lung with Dawson Upton MD   [facility] ([facility])    [address] [place] [id number] [phone]              Who to contact     If you have questions or need follow up information about today's clinic visit or your schedule please contact [facility] directly at [phone].  Normal or non-critical lab and imaging results will be communicated to you by MyChart, letter or phone within 4 business days after the clinic has received the results. If you do not hear from us within 7 days, please contact the clinic through MyChart or phone. If you have a critical or abnormal lab result, we will notify you by phone as soon as possible.  Submit refill requests through [employer] or call your pharmacy and they will forward the refill request to us. Please allow 3 business  "days for your refill to be completed.          Additional Information About Your Visit        [employer] Information     [employer] gives you secure access to your electronic health record. If you see a primary care provider, you can also send messages to your care team and make appointments. If you have questions, please call your primary care clinic.  If you do not have a primary care provider, please call [phone] and they will assist you.        Care EveryWhere ID     This is your Care EveryWhere ID. This could be used by other organizations to access your [facility] medical records  [id number]        Your Vitals Were     Pulse Height BMI (Body Mass Index) Pulse Oximetry Last Period       58 1.575 m (5' 2\") 19.95 kg/m2 96% (LMP Unknown)        Blood Pressure from Last 3 Encounters:   01/16/17 106/67   01/04/17 109/71   12/29/16 108/69    Weight from Last 3 Encounters:   01/16/17 49.487 kg (109 lb 1.6 oz)   01/13/17 48.535 kg (107 lb)   01/10/17 49.442 kg (109 lb)              Today, you had the following     No orders found for display         Today's Medication Changes          These changes are accurate as of: 1/16/17  7:55 AM.  If you have any questions, ask your nurse or doctor.               These medicines have changed or have updated prescriptions.        Dose/Directions    furosemide 20 MG tablet   Commonly known as:  LASIX   This may have changed:  how much to take   Used for:  Pulmonary hypertension (H)   Changed by:  Callie Ledesma MD        Dose:  40 mg   Take 2 tablets (40 mg) by mouth daily   Quantity:  180 tablet   Refills:  3       tadalafil (PAH) 20 MG Tabs   Commonly known as:  ADCIRCA   This may have changed:  how much to take   Used for:  Pulmonary hypertension (H)   Changed by:  Callie Ledesma MD        Dose:  40 mg   Take 2 tablets (40 mg) by mouth daily   Quantity:  60 tablet   Refills:  11            Where to get your medicines      These medications were sent to [facility] [place], [place] - [address]   [address] , [facility] [address]     Phone:  [phone]    - furosemide 20 MG tablet  - tadalafil (PAH) 20 MG Tabs             Primary Care Provider Office Phone # Fax #    Ignacio Loya [phone]       [facility] [address]   [place] [address]        Thank you!     Thank you for choosing [facility]  for your care. Our goal is always to provide you with excellent care. Hearing back from our patients is one way we can continue to improve our services. Please take a few minutes to complete the written survey that you may receive in the mail after your visit with us. Thank you!             Your Updated Medication List - Protect others around you: Learn how to safely use, store and throw away your medicines at www.disposemymeds.org.          This list is accurate as of: 1/16/17  7:55 AM.  Always use your most recent med list.                   Brand Name Dispense Instructions for use    * albuterol 108 (90 BASE) MCG/ACT Inhaler    albuterol    1 Inhaler    Inhale 1-2 puffs into the lungs every 4 hours as needed for shortness of breath / dyspnea       * albuterol (2.5 MG/3ML) 0.083% neb solution     60 vial    Use every 4-6 hours as needed for shortness of breath       ALPRAZolam 0.5 MG tablet    XANAX    40 tablet    TAKE 1 TABLET BY MOUTH EVERY SIX HOURS AS NEEDED FOR ANXIETY.       digoxin 125 MCG tablet    LANOXIN    90 tablet    Take 1 tablet (125 mcg) by mouth daily       fluticasone 50 MCG/ACT spray    FLONASE    1 Bottle    Spray 2 sprays into both nostrils daily       furosemide 20 MG tablet    LASIX    180 tablet    Take 2 tablets (40 mg) by mouth daily       ibuprofen 800 MG tablet    ADVIL/MOTRIN    30 tablet    Take 1 tablet (800 mg) by mouth 3 times daily (with meals)       morphine 15 MG 12 hr tablet    MS CONTIN    60 tablet    Take 1 tablet (15 mg) by mouth 2 times daily       * order  for DME     1 Device    Oxygen: Patient requires supplemental Oxygen 2 LPM via nasal canula with activity. Please provide patient with portability capability. Okay to spot check patient on oxygen device, to keep sats above 90%. Oxygen will be for a lifetime.       * order for DME     1 Device    Equipment being ordered: personal O2 oximeter.       * order for DME     1 Device    Equipment being ordered: Oxygen  Please provide patient with a home-fill system for portability.       * order for DME     2 Device    Please provide patient with 2  liquid oxygen tanks for portability. Spot check patient on liquid oxygen. Titrate oxygen to maintain saturations above 88%.       * order for DME     1 Device    Equipment being ordered: Oxygen oximeter and mask       * order for DME     1 Device    Equipment being ordered: Oximeter       * order for DME     1 Device    Equipment being ordered: Nebulizer. Verbal order from        oxyCODONE-acetaminophen  MG per tablet    PERCOCET    40 tablet    Take 1 tablet by mouth every 6 hours as needed for moderate to severe pain       tadalafil (PAH) 20 MG Tabs    ADCIRCA    60 tablet    Take 2 tablets (40 mg) by mouth daily       tiotropium 18 MCG capsule    SPIRIVA HANDIHALER    30 capsule    Inhale contents of one capsule daily.       * Notice:  This list has 9 medication(s) that are the same as other medications prescribed for you. Read the directions carefully, and ask your doctor or other care provider to review them with you.

## 2017-01-16 NOTE — PATIENT INSTRUCTIONS
Medication Changes:  Increase Lasix to 40 mg Daily  Increase Adcirca (tadalafil) to 40 mg Daily    Patient Instructions:      Follow up Appointment Information:  1 month follow up with Labs  Rheumatology appt    We are located on the third floor of the [facility] ([facility] on the [facility].  Our address is     [address] [place] [address] on 3rd [place] [address]      Thank you for allowing us to be a part of your care here at the [facility]    If you have questions or concerns please contact us at:    Naty Lau RN      Nurse Coordinator       Pulmonary Hypertension     [facility]   [phone])[phone]                ** Please note that you will NOT receive a reminder call regarding your scheduled testing, reminder calls are for provider appointments only.  If you are scheduled for testing within the [employer] system you may receive a call regarding pre-registration for billing purposes only.**     Remember to weigh yourself daily after voiding and before you consume any food or beverages and log the numbers.  If you have gained/lost 2 pounds overnight or 5 pounds in a week contact us immediately for medication adjustments or further instructions.

## 2017-01-16 NOTE — TELEPHONE ENCOUNTER
Xanax 0.5mg      Last Written Prescription Date: 01/04/2017  Last Fill Quantity: 40,  # refills: 0   Last Office Visit with G, P or  Health prescribing provider: 11/07/2016    Percocet 10-325mg      Last Written Prescription Date: 01/06/2017  Last Fill Quantity: 40,  # refills: 0   Last Office Visit with G, UMP or  Health prescribing provider: 11/07/2016    Joy Fuentes CPhT  [facility]  [phone]

## 2017-01-17 ENCOUNTER — HOSPITAL ENCOUNTER (OUTPATIENT)
Dept: CARDIAC REHAB | Facility: CLINIC | Age: 42
End: 2017-01-17
Attending: INTERNAL MEDICINE
Payer: COMMERCIAL

## 2017-01-17 PROCEDURE — G0239 OTH RESP PROC, GROUP: HCPCS | Performed by: REHABILITATION PRACTITIONER

## 2017-01-17 PROCEDURE — 40000244 ZZH STATISTIC VISIT PULM REHAB: Performed by: REHABILITATION PRACTITIONER

## 2017-01-19 ENCOUNTER — HOSPITAL ENCOUNTER (OUTPATIENT)
Dept: CARDIAC REHAB | Facility: CLINIC | Age: 42
End: 2017-01-19
Attending: INTERNAL MEDICINE
Payer: COMMERCIAL

## 2017-01-19 VITALS — WEIGHT: 107 LBS | BODY MASS INDEX: 19.57 KG/M2

## 2017-01-19 PROCEDURE — 40000244 ZZH STATISTIC VISIT PULM REHAB: Performed by: REHABILITATION PRACTITIONER

## 2017-01-19 PROCEDURE — G0239 OTH RESP PROC, GROUP: HCPCS | Performed by: REHABILITATION PRACTITIONER

## 2017-01-20 ENCOUNTER — OFFICE VISIT (OUTPATIENT)
Dept: RHEUMATOLOGY | Facility: CLINIC | Age: 42
End: 2017-01-20
Attending: INTERNAL MEDICINE
Payer: COMMERCIAL

## 2017-01-20 ENCOUNTER — HOSPITAL ENCOUNTER (OUTPATIENT)
Dept: BONE DENSITY | Facility: CLINIC | Age: 42
Discharge: HOME | End: 2017-01-20
Attending: INTERNAL MEDICINE | Admitting: INTERNAL MEDICINE
Payer: COMMERCIAL

## 2017-01-20 VITALS
WEIGHT: 111 LBS | BODY MASS INDEX: 20.43 KG/M2 | OXYGEN SATURATION: 96 % | HEIGHT: 62 IN | SYSTOLIC BLOOD PRESSURE: 105 MMHG | DIASTOLIC BLOOD PRESSURE: 71 MMHG | HEART RATE: 63 BPM

## 2017-01-20 DIAGNOSIS — J84.9 ILD (INTERSTITIAL LUNG DISEASE) (H): ICD-10-CM

## 2017-01-20 DIAGNOSIS — J84.9 ILD (INTERSTITIAL LUNG DISEASE) (H): Primary | ICD-10-CM

## 2017-01-20 LAB
CRP SERPL-MCNC: 16.8 MG/L (ref 0–8)
ERYTHROCYTE [SEDIMENTATION RATE] IN BLOOD BY WESTERGREN METHOD: 26 MM/H (ref 0–20)
MISCELLANEOUS TEST: NORMAL

## 2017-01-20 PROCEDURE — 86038 ANTINUCLEAR ANTIBODIES: CPT | Performed by: INTERNAL MEDICINE

## 2017-01-20 PROCEDURE — 90686 IIV4 VACC NO PRSV 0.5 ML IM: CPT | Mod: ZF

## 2017-01-20 PROCEDURE — 86200 CCP ANTIBODY: CPT | Performed by: INTERNAL MEDICINE

## 2017-01-20 PROCEDURE — 86140 C-REACTIVE PROTEIN: CPT | Performed by: INTERNAL MEDICINE

## 2017-01-20 PROCEDURE — 86431 RHEUMATOID FACTOR QUANT: CPT | Performed by: INTERNAL MEDICINE

## 2017-01-20 PROCEDURE — 25000128 H RX IP 250 OP 636: Mod: ZF

## 2017-01-20 PROCEDURE — 77080 DXA BONE DENSITY AXIAL: CPT

## 2017-01-20 PROCEDURE — 82595 ASSAY OF CRYOGLOBULIN: CPT | Performed by: INTERNAL MEDICINE

## 2017-01-20 PROCEDURE — 86235 NUCLEAR ANTIGEN ANTIBODY: CPT | Performed by: INTERNAL MEDICINE

## 2017-01-20 PROCEDURE — 86160 COMPLEMENT ANTIGEN: CPT | Performed by: INTERNAL MEDICINE

## 2017-01-20 PROCEDURE — 36415 COLL VENOUS BLD VENIPUNCTURE: CPT | Performed by: INTERNAL MEDICINE

## 2017-01-20 PROCEDURE — 99212 OFFICE O/P EST SF 10 MIN: CPT | Mod: 25,ZF

## 2017-01-20 PROCEDURE — G0008 ADMIN INFLUENZA VIRUS VAC: HCPCS | Mod: ZF

## 2017-01-20 PROCEDURE — 85652 RBC SED RATE AUTOMATED: CPT | Performed by: INTERNAL MEDICINE

## 2017-01-20 PROCEDURE — 86225 DNA ANTIBODY NATIVE: CPT | Performed by: INTERNAL MEDICINE

## 2017-01-20 PROCEDURE — 86039 ANTINUCLEAR ANTIBODIES (ANA): CPT | Performed by: INTERNAL MEDICINE

## 2017-01-20 ASSESSMENT — PAIN SCALES - GENERAL: PAINLEVEL: NO PAIN (0)

## 2017-01-20 NOTE — MR AVS SNAPSHOT
After Visit Summary   1/20/2017    Ellen Loya    MRN: [id number]           Patient Information     Date Of Birth          1975        Visit Information        Provider Department      1/20/2017 9:30 AM Clarita Martínez MD [facility] Rheumatology        Today's Diagnoses     ILD (interstitial lung disease) (H)    -  1       Care Instructions    Labs today  DEXA bone density  Consider prednisone for treatment of arthritis, will get back to you about test results  Flu shot today  F/u in 6 months        Follow-ups after your visit        Follow-up notes from your care team     Return in about 6 months (around 7/20/2017).      Your next 10 appointments already scheduled     Jan 24, 2017  1:00 PM   Treatment 60 with ELIZA Gutierrez   [facility] ([facility])    [address] [place] [address]   [phone]            Jan 26, 2017  1:00 PM   Treatment 60 with ELIZA Gutierrez   [facility] ([facility])    [address] [place] [address]   [phone]            Jan 31, 2017  1:00 PM   Treatment 60 with ELIZA Gutierrez   [facility] ([facility])    [address] [place] [address] [address] [place] [address]   [phone]            Feb 02, 2017  1:00 PM   Treatment 60 with ELIZA Gutierrez   [facility] ([facility])    [address] [place] [address]   [phone]            Feb 07, 2017  1:00 PM   Treatment 60 with ELIZA Gutierrez   [facility] ([facility])    [address] [place] [address]   [phone]            Feb 09, 2017  1:00 PM   Treatment 60 with ELIZA Gutierrez   [facility] ([facility])    [address] [place] [address]   [phone]            Feb 14, 2017  1:00 PM   Treatment 60 with ELIZA Gutierrez   [facility] ([facility])    [address] [place] [address]   [phone]            Feb 16, 2017  1:00 PM   Treatment 60 with Elle Hernandez, ELIZA   [facility] ([facility])    [address] [place] [address]   [phone]            Feb 21, 2017  1:00 PM   Treatment 60 with Martha Jenkins, ELIZA   [facility] ([facility])    [address] [place] [address]   [phone]            Feb 23, 2017  1:00 PM   Treatment 60 with Martha Jenkins, ELIZA   [facility] ([facility])    [address] [place] [address]   [phone]              Future tests that were ordered for you today     Open Future Orders        Priority Expected Expires Ordered    Dexa hip/pelvis/spine Routine  8/18/2017 1/20/2017    Rheumatoid factor Routine  1/20/2018 1/20/2017    Cyclic Citrullinated Peptide Antibody IgG Routine  1/20/2018 1/20/2017    Complement C3 Routine  1/20/2018 1/20/2017    Complement C4 Routine  1/20/2018 1/20/2017    CRP inflammation Routine  1/20/2018 1/20/2017    Erythrocyte sedimentation rate auto Routine  1/20/2018 1/20/2017    DNA double stranded antibodies Routine  1/20/2018 1/20/2017    MILANA Panel (RNP, SMITH, Scleroderma, SSAB, SSBB); MILANA, Antibodies, Panel Routine  1/20/2018 1/20/2017    Cryoglobulin quantitative Routine  1/20/2018 1/20/2017    Centromere Antibody IgG Routine  1/20/2018 1/20/2017    Antinuclear antibody screen by EIA Routine  1/20/2018 1/20/2017    NATALIA by IFA: Laboratory Miscellaneous Order Routine  1/20/2018 1/20/2017    Jaylene 1 Antibody IgG Routine  1/20/2018 1/20/2017            Who to contact     If you have questions or need follow up information about today's clinic visit or your schedule please contact [facility] RHEUMATOLOGY directly at [phone].  Normal or non-critical lab and imaging results will be communicated to you  "by MyChart, letter or phone within 4 business days after the clinic has received the results. If you do not hear from us within 7 days, please contact the clinic through [employer] or phone. If you have a critical or abnormal lab result, we will notify you by phone as soon as possible.  Submit refill requests through [employer] or call your pharmacy and they will forward the refill request to us. Please allow 3 business days for your refill to be completed.          Additional Information About Your Visit        [employer] Information     [employer] gives you secure access to your electronic health record. If you see a primary care provider, you can also send messages to your care team and make appointments. If you have questions, please call your primary care clinic.  If you do not have a primary care provider, please call [phone] and they will assist you.        Care EveryWhere ID     This is your Care EveryWhere ID. This could be used by other organizations to access your [facility] medical records  [id number]        Your Vitals Were     Pulse Height BMI (Body Mass Index) Pulse Oximetry Last Period       63 1.575 m (5' 2\") 20.30 kg/m2 96% (LMP Unknown)        Blood Pressure from Last 3 Encounters:   01/20/17 105/71   01/16/17 106/67   01/04/17 109/71    Weight from Last 3 Encounters:   01/20/17 50.349 kg (111 lb)   01/19/17 48.535 kg (107 lb)   01/16/17 49.487 kg (109 lb 1.6 oz)               Primary Care Provider Office Phone # Fax #    Ignacio Loya [phone]       [facility] [address] [place] [address]        Thank you!     Thank you for choosing [facility] RHEUMATOLOG[facility]  for your care. Our goal is always to provide you with excellent care. Hearing back from our patients is one way we can continue to improve our services. Please take a few minutes to complete the written survey that you may receive in the mail after your visit with us. Thank you!             Your " Updated Medication List - Protect others around you: Learn how to safely use, store and throw away your medicines at www.disposemymeds.org.          This list is accurate as of: 1/20/17 10:30 AM.  Always use your most recent med list.                   Brand Name Dispense Instructions for use    * albuterol 108 (90 BASE) MCG/ACT Inhaler    albuterol    1 Inhaler    Inhale 1-2 puffs into the lungs every 4 hours as needed for shortness of breath / dyspnea       * albuterol (2.5 MG/3ML) 0.083% neb solution     60 vial    Use every 4-6 hours as needed for shortness of breath       ALPRAZolam 0.5 MG tablet    XANAX    40 tablet    TAKE 1 TABLET BY MOUTH EVERY SIX HOURS AS NEEDED FOR ANXIETY.       digoxin 125 MCG tablet    LANOXIN    90 tablet    Take 1 tablet (125 mcg) by mouth daily       fluticasone 50 MCG/ACT spray    FLONASE    1 Bottle    Spray 2 sprays into both nostrils daily       furosemide 20 MG tablet    LASIX    180 tablet    Take 2 tablets (40 mg) by mouth daily       ibuprofen 800 MG tablet    ADVIL/MOTRIN    30 tablet    Take 1 tablet (800 mg) by mouth 3 times daily (with meals)       morphine 15 MG 12 hr tablet    MS CONTIN    60 tablet    Take 1 tablet (15 mg) by mouth 2 times daily       * order for DME     1 Device    Oxygen: Patient requires supplemental Oxygen 2 LPM via nasal canula with activity. Please provide patient with portability capability. Okay to spot check patient on oxygen device, to keep sats above 90%. Oxygen will be for a lifetime.       * order for DME     1 Device    Equipment being ordered: personal O2 oximeter.       * order for DME     1 Device    Equipment being ordered: Oxygen  Please provide patient with a home-fill system for portability.       * order for DME     2 Device    Please provide patient with 2  liquid oxygen tanks for portability. Spot check patient on liquid oxygen. Titrate oxygen to maintain saturations above 88%.       * order for DME     1 Device    Equipment  being ordered: Oxygen oximeter and mask       * order for DME     1 Device    Equipment being ordered: Oximeter       * order for DME     1 Device    Equipment being ordered: Nebulizer. Verbal order from        oxyCODONE-acetaminophen  MG per tablet    PERCOCET    40 tablet    Take 1 tablet by mouth every 6 hours as needed for moderate to severe pain       tadalafil (PAH) 20 MG Tabs    ADCIRCA    60 tablet    Take 2 tablets (40 mg) by mouth daily       tiotropium 18 MCG capsule    SPIRIVA HANDIHALER    30 capsule    Inhale contents of one capsule daily.       * Notice:  This list has 9 medication(s) that are the same as other medications prescribed for you. Read the directions carefully, and ask your doctor or other care provider to review them with you.

## 2017-01-20 NOTE — NURSING NOTE
"Chief Complaint   Patient presents with     RECHECK     Follow up for Inflammatory arthritis/ILD (interstitial lung disease) (H)       Initial /71 mmHg  Pulse 63  Ht 1.575 m (5' 2\")  Wt 50.349 kg (111 lb)  BMI 20.30 kg/m2  SpO2 96%  LMP  (LMP Unknown) Estimated body mass index is 20.3 kg/(m^2) as calculated from the following:    Height as of this encounter: 1.575 m (5' 2\").    Weight as of this encounter: 50.349 kg (111 lb).  BP completed using cuff size: regular  Emma JORGE Davissor CMA    "

## 2017-01-20 NOTE — NURSING NOTE
Ellen Loya      1.  Has the patient received the information for the influenza vaccine? YES    2.  Does the patient have any of the following contraindications?     Allergy to eggs? No     Allergic reaction to previous influenza vaccines? No     Any other problems to previous influenza vaccines? No     Paralyzed by Guillain-[place] syndrome? No     Currently pregnant? NO     Current moderate or severe illness? No     Allergy to contact lens solution? No    3.  The vaccine has been administered in the usual fashion and the patient was instructed to wait 20 minutes before leaving the building in the event of an allergic reaction: YES    Vaccination given by Emma Canchola CMA.  Recorded by Emma Canchola

## 2017-01-20 NOTE — LETTER
Patient:  Ellen Loya  :   1975  MRN:     [id number]        Ms.Jodee CLARISSA Loya  [address]  [facility] [address]        2017    Dear ,    We are writing to inform you of your test results. Rheumatology work up is negative except high inflammation markers and slightly elevated cryo (not new). Did prednisone help you with your joint pain?      Resulted Orders   Complement C3   Result Value Ref Range    Complement C3 145 76 - 169 mg/dL   Complement C4   Result Value Ref Range    Complement C4 16 15 - 50 mg/dL   CRP inflammation   Result Value Ref Range    CRP Inflammation 16.8 (H) 0.0 - 8.0 mg/L   Erythrocyte sedimentation rate auto   Result Value Ref Range    Sed Rate 26 (H) 0 - 20 mm/h   DNA double stranded antibodies   Result Value Ref Range    DNA-ds 1 <10 IU/mL      Comment:      Negative   MILANA Panel (RNP, SMITH, Scleroderma, SSAB, SSBB); MILANA, Antibodies, Panel   Result Value Ref Range    RNP Antibody IgG  0.0 - 0.9 AI     <0.2  Negative   Antibody index (AI) values reflect qualitative changes in antibody   concentration that cannot be directly associated with clinical condition or   disease state.      Smith MILANA Antibody IgG  0.0 - 0.9 AI     <0.2  Negative   Antibody index (AI) values reflect qualitative changes in antibody   concentration that cannot be directly associated with clinical condition or   disease state.      SSA (Ro) (MILANA) Antibody, IgG  0.0 - 0.9 AI     <0.2  Negative   Antibody index (AI) values reflect qualitative changes in antibody   concentration that cannot be directly associated with clinical condition or   disease state.      SSB (La) (MILANA) Antibody, IgG  0.0 - 0.9 AI     <0.2  Negative   Antibody index (AI) values reflect qualitative changes in antibody   concentration that cannot be directly associated with clinical condition or   disease state.      Scleroderma Antibody Scl-70 MILANA IgG  0.0 - 0.9 AI     <0.2  Negative   Antibody index (AI) values  reflect qualitative changes in antibody   concentration that cannot be directly associated with clinical condition or   disease state.     Cryoglobulin quantitative   Result Value Ref Range    Cryoglobulin (A) NEG %     Trace   This test was developed and its performance characteristics determined by the   [facility],  [facility] Laboratory. It has   not been cleared or approved by the FDA. The laboratory is regulated under CLIA   as qualified to perform high-complexity testing. This test is used for clinical   purposes. It should not be regarded as investigational or for research.     Centromere Antibody IgG   Result Value Ref Range    Centromere Antibody IgG  0.0 - 0.9 AI     <0.2  Negative   Antibody index (AI) values reflect qualitative changes in antibody   concentration that cannot be directly associated with clinical condition or   disease state.     Antinuclear antibody screen by EIA   Result Value Ref Range    NATALIA Screen by EIA <1.0  Interpretation:  Negative   <1.0   NATALIA by IFA: Laboratory Miscellaneous Order   Result Value Ref Range    Miscellaneous Test       Specimen Received, Reordered and sent to Performing laboratory - Report to   follow upon completion.     Jaylene 1 Antibody IgG   Result Value Ref Range    Jaylene 1 Antibody IgG  0.0 - 0.9 AI     <0.2  Negative   Antibody index (AI) values reflect qualitative changes in antibody   concentration that cannot be directly associated with clinical condition or   disease state.     Rheumatoid factor   Result Value Ref Range    Rheumatoid Factor <20 <20 IU/mL   Cyclic Citrullinated Peptide Antibody IgG   Result Value Ref Range    Cyclic Citrullinated Peptide Antibody, IgG 2 <7 U/mL      Comment:      Negative   [facility] Miscellaneous Test   Result Value Ref Range    Result       SEE NOTE  (Note)  Test name                    Result Flag  Units  RefIntvl  ------------------------------------------------------------  Anti-Nuclear  Antibody (NATALIA), IgG by IFA                               <1:40             <1:40  <1:40  INTERPRETIVE INFORMATION: NAATLIA by IFA, IgG  Anti-nuclear antibodies (NATALIA) are seen in a variety of  systemic rheumatic diseases and are determined by indirect  fluorescence assay (IFA) using HEp-2 substrate with an  IgG-specific conjugate. NATALIA titers less than or equal to  1:80 have variable relevance while titers greater than or  equal to 1:160 are considered clinically significant. These  antibodies may precede clinical disease onset; however,  healthy individuals and those with advanced age have been  reported to be positive for NATALIA. When observed, one of the  five basic patterns is reported: homogeneous,  peripheral/rim, speckled, centromere, or nucleolar. If  cytoplasmic fluorescence is observed, it is noted. IFA  methodology is subjective and h as occasionally been shown  to lack sensitivity for anti-SSA/Ro antibodies.  Negative results do not necessarily rule out the presence  of SSc. If clinical suspicion remains, consider further  testing for U3-RNP, PM/Scl, or Th/To antibodies associated  with SSc.  Performed by [facility],  [address] [place] [id number] [phone]  [email or web address], Darrion Ronquillo MD, Lab. Director      Test Name NUCLEAR ANTIBODY NATALIA BY IFA, IGG     Send Outs Misc Test Code 63945     Send Outs Misc Test Specimen Serum        Clarita Martínez MD
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
## 2017-01-20 NOTE — Clinical Note
Dr. Upton, her rheum work up is neg and i don't recommend cellcept, especially it would not help with her joint pain. You mentioned antibiotic tx for her lungs? Is that the next plan?

## 2017-01-20 NOTE — LETTER
Patient:  Ellen Loya  :   1975  MRN:     [id number]        Ms.Jodee CLARISSA Loya  [address] [facility] [address]        2017    Dear ,    We are writing to inform you of your test results. Mild bone loss, no osteoporosis.        Resulted Orders   Dexa hip/pelvis/spine    Narrative    DXA BONE MINERAL DENSITY SCAN  2017  1:29 PM    TECHNIQUE: The lumbar spine and bilateral hips were scanned with DXA  technique performed using a Trusera scanner. DXA results are  reported according to T-score. The T-score is the standard deviation  from the peak bone mass in a normal young adult patient. A T-score of  -1.0 to -2.5 correlates with osteopenia. A T-score of less than -2.5  correlates with osteoporosis.    In accordance with the ISCD (International Society of Clinical  Densitometry) the lowest BMD between the total hip and femoral neck  will be used.     INDICATION: 41-year-old premenopausal female with unspecified  interstitial pulmonary disease but no reported long-term use of  steroids.    COMPARISON: None    FINDINGS:   Lumbar spine: The T-score is -1.2 between L1 and L3. L4 was not  included due to mild degenerative sclerosis.    Hips: The right hip T-score is 0.0. The left hip T-score is -0.1. Bone  mineral density in the worst hip is 1.018 gm/cm2.       Impression    IMPRESSION: Mild osteopenia with increased fracture risk based upon  the lumbar spine measurements.    MD Clarita ALANIS MD

## 2017-01-20 NOTE — PATIENT INSTRUCTIONS
Labs today  DEXA bone density  Consider prednisone for treatment of arthritis, will get back to you about test results  Flu shot today  F/u in 6 months

## 2017-01-22 LAB
CCP AB SER IA-ACNC: 2 U/ML
DSDNA AB SER-ACNC: 1 IU/ML
RESULT: NORMAL
SEND OUTS MISC TEST CODE: NORMAL
SEND OUTS MISC TEST SPECIMEN: NORMAL
TEST NAME: NORMAL

## 2017-01-23 LAB
ANA SER QL IA: NORMAL
C3 SERPL-MCNC: 145 MG/DL (ref 76–169)
C4 SERPL-MCNC: 16 MG/DL (ref 15–50)
CENTROMERE IGG SER-ACNC: NORMAL AI (ref 0–0.9)
ENA JO1 IGG SER-ACNC: NORMAL AI (ref 0–0.9)
ENA RNP IGG SER IA-ACNC: NORMAL AI (ref 0–0.9)
ENA SCL70 IGG SER IA-ACNC: NORMAL AI (ref 0–0.9)
ENA SM IGG SER-ACNC: NORMAL AI (ref 0–0.9)
ENA SS-A IGG SER IA-ACNC: NORMAL AI (ref 0–0.9)
ENA SS-B IGG SER IA-ACNC: NORMAL AI (ref 0–0.9)
RHEUMATOID FACT SER NEPH-ACNC: <20 IU/ML (ref 0–20)

## 2017-01-24 ENCOUNTER — HOSPITAL ENCOUNTER (OUTPATIENT)
Dept: CARDIAC REHAB | Facility: CLINIC | Age: 42
End: 2017-01-24
Attending: INTERNAL MEDICINE
Payer: COMMERCIAL

## 2017-01-24 VITALS — WEIGHT: 106 LBS | BODY MASS INDEX: 19.38 KG/M2

## 2017-01-24 PROCEDURE — G0239 OTH RESP PROC, GROUP: HCPCS | Performed by: REHABILITATION PRACTITIONER

## 2017-01-24 PROCEDURE — 40000244 ZZH STATISTIC VISIT PULM REHAB: Performed by: REHABILITATION PRACTITIONER

## 2017-01-25 DIAGNOSIS — R07.81 PLEURITIC CHEST PAIN: Primary | ICD-10-CM

## 2017-01-25 NOTE — TELEPHONE ENCOUNTER
Percocet refill       Last Written Prescription Date: pharmacy fill date : 01/17/2017  Last Fill Quantity: 40,  # refills: 0   Last Office Visit with [facility], [facility] or [facility] prescribing provider: 11/07/2016    Thanks  Rosalie Bolanos [facility]   [phone]

## 2017-01-26 ENCOUNTER — TELEPHONE (OUTPATIENT)
Dept: FAMILY MEDICINE | Facility: CLINIC | Age: 42
End: 2017-01-26

## 2017-01-26 ENCOUNTER — HOSPITAL ENCOUNTER (OUTPATIENT)
Dept: CARDIAC REHAB | Facility: CLINIC | Age: 42
End: 2017-01-26
Attending: INTERNAL MEDICINE
Payer: COMMERCIAL

## 2017-01-26 VITALS — WEIGHT: 113.6 LBS | BODY MASS INDEX: 20.77 KG/M2

## 2017-01-26 PROCEDURE — 40000244 ZZH STATISTIC VISIT PULM REHAB: Performed by: REHABILITATION PRACTITIONER

## 2017-01-26 PROCEDURE — G0239 OTH RESP PROC, GROUP: HCPCS | Performed by: REHABILITATION PRACTITIONER

## 2017-01-26 RX ORDER — OXYCODONE AND ACETAMINOPHEN 10; 325 MG/1; MG/1
1 TABLET ORAL EVERY 6 HOURS PRN
Qty: 40 TABLET | Refills: 0 | Status: SHIPPED | OUTPATIENT
Start: 2017-01-26 | End: 2017-02-03

## 2017-01-26 NOTE — TELEPHONE ENCOUNTER
----- Message from Clarita Martínez MD sent at 1/22/2017 12:24 AM CST -----  Mild bone loss, no osteoporosis.

## 2017-01-26 NOTE — TELEPHONE ENCOUNTER
Dr. Loya did not order this test and patient should contact Dr. Martínez's office. Patient notified KB/RODY

## 2017-01-27 LAB — CRYOGLOB SER QL: ABNORMAL %

## 2017-01-28 DIAGNOSIS — G47.9 DISTURBANCE IN SLEEP BEHAVIOR: Primary | ICD-10-CM

## 2017-01-28 NOTE — TELEPHONE ENCOUNTER
Alprazolam       Last Written Prescription Date: 01/16/2017  Last Fill Quantity: 40,  # refills: 0   Last Office Visit with [facility], [facility] or [facility] prescribing provider: 11/07/2016    Thanks  Rosalie Bolanos [facility]   [phone]

## 2017-01-30 ENCOUNTER — TELEPHONE (OUTPATIENT)
Dept: RHEUMATOLOGY | Facility: CLINIC | Age: 42
End: 2017-01-30

## 2017-01-30 DIAGNOSIS — M06.00 SERONEGATIVE RHEUMATOID ARTHRITIS (H): Primary | ICD-10-CM

## 2017-01-30 RX ORDER — ALPRAZOLAM 0.5 MG
TABLET ORAL
Qty: 40 TABLET | Refills: 0 | Status: SHIPPED | OUTPATIENT
Start: 2017-01-30 | End: 2017-02-08

## 2017-01-30 NOTE — TELEPHONE ENCOUNTER
Pt called re: prednisone. Saw DEXA results, Dr. Martínez had told her would Rx prednisone. Would like to know if still the plan. Please advise. Can be reached at [phone]. Detailed VM fine. Message sent to rheum pool.

## 2017-01-30 NOTE — TELEPHONE ENCOUNTER
Last OFV:  1/20/17 for Seronegative Inflammatory arthritis  Notes: Prior Tx: She did not respond to Plaquenil, did not like sulfasalazine, and had a negative reaction (fevers, chills, myalgias) to methotrexate (as well as potential lung toxicity).      She was initially seen in 4/2015 but now is back with complaint of increased joint pain along with AM stiffness of 30 min (concerning for IA), is scheduled for 2D-Echo today. Defer m/o ILD to pulmonary. MMF with start dose of 500 mg po bid could be considered for ILD Tx if needed which could help with joint sx as well. Was advised to quit smoking.    Vit D level was low in 2/2015 and was repalced. Recommend vit D 2000 units qd.    No orders of the defined types were placed in this encounter.      1/20/17 DEXA report: FINDINGS:    Lumbar spine: The T-score is -1.2 between L1 and L3. L4 was not  included due to mild degenerative sclerosis.     Hips: The right hip T-score is 0.0. The left hip T-score is -0.1. Bone  mineral density in the worst hip is 1.018 gm/cm2.                                                                         IMPRESSION: Mild osteopenia with increased fracture risk based upon  the lumbar spine measurements.

## 2017-01-31 ENCOUNTER — HOSPITAL ENCOUNTER (OUTPATIENT)
Dept: CARDIAC REHAB | Facility: CLINIC | Age: 42
End: 2017-01-31
Attending: INTERNAL MEDICINE
Payer: COMMERCIAL

## 2017-01-31 VITALS — BODY MASS INDEX: 21.1 KG/M2 | WEIGHT: 115.4 LBS

## 2017-01-31 PROCEDURE — G0239 OTH RESP PROC, GROUP: HCPCS | Performed by: REHABILITATION PRACTITIONER

## 2017-01-31 PROCEDURE — 40000244 ZZH STATISTIC VISIT PULM REHAB: Performed by: REHABILITATION PRACTITIONER

## 2017-02-01 RX ORDER — PREDNISONE 5 MG/1
TABLET ORAL
Qty: 50 TABLET | Refills: 0 | Status: SHIPPED
Start: 2017-02-01 | End: 2017-04-10

## 2017-02-01 NOTE — TELEPHONE ENCOUNTER
Pt called and informed of the providers response and instructions as seen below.  Patient stated understanding and all questions were answered.  Pharmacy was verified and Rx queued up for providers approval.

## 2017-02-01 NOTE — TELEPHONE ENCOUNTER
Recommend prednisone: 4tab=20 mg qd x 5 days, 3tab=15 mg qd x 5 days, 2tab=10 mg qd x 5 days, 1 tab=5 mg qd x 5 days then stop    Till I discuss her with pulmonary and come up with long term plan.

## 2017-02-02 ENCOUNTER — HOSPITAL ENCOUNTER (OUTPATIENT)
Dept: CARDIAC REHAB | Facility: CLINIC | Age: 42
End: 2017-02-02
Attending: INTERNAL MEDICINE
Payer: COMMERCIAL

## 2017-02-02 VITALS — BODY MASS INDEX: 20.85 KG/M2 | WEIGHT: 114 LBS

## 2017-02-02 PROCEDURE — 40000244 ZZH STATISTIC VISIT PULM REHAB: Performed by: REHABILITATION PRACTITIONER

## 2017-02-02 PROCEDURE — G0239 OTH RESP PROC, GROUP: HCPCS | Performed by: REHABILITATION PRACTITIONER

## 2017-02-03 DIAGNOSIS — R07.81 PLEURITIC CHEST PAIN: Primary | ICD-10-CM

## 2017-02-03 RX ORDER — OXYCODONE AND ACETAMINOPHEN 10; 325 MG/1; MG/1
1 TABLET ORAL EVERY 6 HOURS PRN
Qty: 40 TABLET | Refills: 0 | Status: SHIPPED | OUTPATIENT
Start: 2017-02-05 | End: 2017-02-10

## 2017-02-03 NOTE — TELEPHONE ENCOUNTER
Percocet 10-325mg      Last Written Prescription Date: 01/26/2017  Last Fill Quantity: 40,  # refills: 0   Last Office Visit with [facility], P or [facility] prescribing provider: 11/07/2016    Amber Rushing, Pharmacy Technician  [facility]  [phone]

## 2017-02-08 DIAGNOSIS — R06.02 SOB (SHORTNESS OF BREATH): Primary | ICD-10-CM

## 2017-02-08 DIAGNOSIS — M19.90 INFLAMMATORY ARTHRITIS: ICD-10-CM

## 2017-02-08 DIAGNOSIS — G47.9 DISTURBANCE IN SLEEP BEHAVIOR: Primary | ICD-10-CM

## 2017-02-08 RX ORDER — MORPHINE SULFATE 15 MG/1
15 TABLET, FILM COATED, EXTENDED RELEASE ORAL 2 TIMES DAILY
Qty: 60 TABLET | Refills: 0 | Status: SHIPPED | OUTPATIENT
Start: 2017-02-08 | End: 2017-03-06

## 2017-02-08 RX ORDER — ALPRAZOLAM 0.5 MG
TABLET ORAL
Qty: 40 TABLET | Refills: 0 | Status: SHIPPED | OUTPATIENT
Start: 2017-02-08 | End: 2017-02-16

## 2017-02-08 NOTE — TELEPHONE ENCOUNTER
Alprazolam 0.5mg      Last Written Prescription Date: 1/30/2017  Last Fill Quantity: 40,  # refills: 0   Last Office Visit with [facility] or [facility] prescribing provider: 11/7/2016                                               Morphine ER 15mg      Last Written Prescription Date: 1/12/2017  Last Fill Quantity: 60,  # refills: 0   Last Office Visit with [facility], [facility] or [facility] prescribing provider: 11/7/2016                                               Please bring signed prescription to [facility] if approved.    Thank you,  Lisa Monk, [facility]

## 2017-02-09 ENCOUNTER — HOSPITAL ENCOUNTER (OUTPATIENT)
Dept: CARDIAC REHAB | Facility: CLINIC | Age: 42
End: 2017-02-09
Attending: INTERNAL MEDICINE
Payer: COMMERCIAL

## 2017-02-09 VITALS — WEIGHT: 114 LBS | BODY MASS INDEX: 20.98 KG/M2 | HEIGHT: 62 IN

## 2017-02-09 PROCEDURE — 40000244 ZZH STATISTIC VISIT PULM REHAB

## 2017-02-09 PROCEDURE — G0239 OTH RESP PROC, GROUP: HCPCS

## 2017-02-09 ASSESSMENT — 6 MINUTE WALK TEST (6MWT)
FEMALE CALC: 643.68
MALE CALC: 586.26
TOTAL DISTANCE WALKED: 220.98
GENDER SELECTION: FEMALE

## 2017-02-09 ASSESSMENT — ACTIVITIES OF DAILY LIVING (ADL): ADL_LIMITATIONS: STAIR CLIMBING

## 2017-02-09 ASSESSMENT — PULMONARY FUNCTION TESTS
FEV1: 82
FVC_PERCENT_PREDICTED: 82
FVC: 81

## 2017-02-09 ASSESSMENT — DUKE ACTIVITY SCORE INDEX (DASI)
VO2_PEAK: 21.94
DASI METS SCORE: 6.27

## 2017-02-09 NOTE — PROGRESS NOTES
Ellen Loya  41 year old   02/09/17 1600   Session   Session 30 Day Individualized Treatment Plan   Certified through this date 03/10/17   Type Reassessment   General Information   Treatment Diagnosis Interstitial Pulmonary Fibrosis   Onset Date 01/01/11   Hospital Location [facility] [place]   Outpatient Pulmonary Rehab Start Date 01/13/17   Primary Physician Dr. Loya   Pulmonolgist Dr. Upton   General Information Comments .pmh   Untoward Events/Exacerbations/Hospitalizations   Untoward Events/Exacerbations/Hospitalizations Cardiac   Cardiac Edema   Edema Date 12/15/16   Sputum   Sputum Production Amount None   Tobacco History   Tobacco Former smoker   Tobacco Habit Cigarettes   Years Smoked 20   Average Packs Per Day 1/2   Quit Date or Planned Quit Date 12/14/16   Interventions Planned Check in Regularly, Offer Support to Reduce Risk of Relapse   Interventions Completed Checked in regularly, offered support as needed   Medications   Short-Acting Beta Agonist Prescribed, taking as prescribed   Long-Acting Anticholinergic Prescribed, taking as prescribed   Pain   Patient Currently in Pain Yes   Pain Location hip   Pain Rating 3/10   Pain Comments Is on prednisone with relief for joints   Falls Screen   Have you fallen two or more times in the past year? Yes   Have you fallen and had an injury in the past year? No   Referral initiated to physical therapy No   Living and Work Status   Living Arrangements house   Support System Live with an adult   Environmental Factors Pets;Plants;Air quality;No concerns   ADL Limitations Stair climbing   Initial Duke Activity Status Index (DASI) score. A measure of functional capacity. The goal is to have a pre-program raw score of 9.95 (~4 METs) or above 28.7   Initial DASI VO2 Peak (ml*kg-1*min-1) 21.94   Initial DASI MET Level 6.27   Occupation , cook   Return to Employment Not employed  (Disabled)   Physical Assessments   Incisions Not  "applicable   Edema +1 Trace   Left Lung Sounds normal   Right Lung Sounds normal   Pulmonary Function Test (PFTs)   PFT Results Available   Date Completed 01/04/17   FVC Actual 81   % Predicted FVC 82   FEV1 Actual 82   Individualized Treatment Plan   Sessions Scheduled 30   Sessions Attended 8   Type Aerobic exercise;Resistance training;Flexibility training   Oxygen Use   Supplemental Oxygen Needed Yes   Delivery Device Nasal Cannula   Liter Flow at Rest 2   Liter Flow with ADLS 2   Liter Flow During Exercise 3   Liter Flow With Sleep 2   Evaluated on Home System? Pulse/Demand   Interventions Recommended Continue to monitor SpO2 at rest and with exercise on current O2 settings   Exercise Prescription   Mode Treadmill;Nustep;Arm Ergometer/UBE;Weights   Frequency 2 days/week   Duration/Time 30-45 min   THR (85% of age predicted max heart rate)  152.15   Effort Rating (0-10) 4-6   Progression of Exercise Increase duration of exercise 3-5 mins per week   Oxygen Titration with Exercise > 88% with exercise   Comments SaO2 has dropped to 84 during last ex. session- patient asymptomatic.   Exercise Assessment   6 Minute Walk Predicted - Gender Selection Female   6 Minute Walk Predicted (Female) 643.68   6 Minute Walk Predicted (Male) 586.26   6 Minute Walk Distance (Initial) 220.98 Meters   Resting HR 89   Exercise    Resting /66   Exercise /58   SpO2 96   Exercise SpO2 84-93   Current MET level 2.4   Exercise Tolerance poor   Normal Limits Discussed Yes   Current Symptoms at Home Dyspnea;Fatigue   Current Symptoms in Rehab Denies symptoms   Limitations Arthritis   Nutrition Management   Age 41   Height 1.575 m (5' 2.01\")   Weight 51.71 kg (114 lb)   BMI (Calculated) 20.89   Interventions Planned NA   Psychosocial   Initial Patient Health Questionnaire -9 (PHQ-9) for depression. To notify physician if pre-score >9. 11   Initial Shortness of Breath Questionnaire (SOBQ) score. The goal is to reduce the " score pre to post program. 40   Intervention Planned Patient to identify 2-3 coping mechanisms to decrease stress and anxiety;Patient to attend appropriate education class;Introduce relaxation techniques to assist with decreased anxiety   Psychosocial Comments PT reports she has anxiety which she takes medication for which is situation based.    Stages of Change   Aerobic Exercise Preparation   Physical Activity Preparation   Recommended diet Action   Stress Contemplation   Smoking Cessation Action   Oxygen Usage Maintenance   Current Home Exercise   Type of Exercise None   Recommended Home Exercise Prescription   Type of Exercise Walking   Frequency (Days per week) 2-3   Duration (minutes per session) 15-30 min   Effort Rating Recommended (0-10) Scale  4-6/10   30 Day Exercise Plan Increase daily activity and  walk 10 min 2x/day on nonPR days.   Learning Assessment   Learner Patient   Primary Language English   Preferred Learning Style Listening;Reading;Demonstration;Pictures/Video   Barriers to Learning No barriers noted   Patient Education/Referrals   Education Recommended Activities of Daily Living;Breathing Techniques;Community Resources/Exacerbation Management;Emotional Aspects of CLD;Energy Conservation;Exercise Principles;Medication Overview;Nutrition;Panic and Anxiety   Education Attended Panic and Anxiety;Community Resources/Exacerbation Management   Follow-up/On-going Support   Provider follow-up needed on the following No follow-up needed   Pulmonary Rehab Goals   Pulmonary Rehab Goals 1;2   Goal 1   Goal Pt will achieve 30 minutes of continoue aerobic exercise, by using the treadmill and Nustep in addition to 10 reps of 1-2#  of 6 exercise.   Target Date 02/27/17   Progress Towards Goal Pt wants to increase her strengh and endurance so that she is strong enough to havea double lung transplant. 2/9: Patient walking 20-25 min on TDM and 20 min on Nustep, she is lifting 3# free wts 12 reps 6 ex.   Goal 2    Goal Pt will move from luis action phase to maintenance phase in refererance to smoking.   Target Date 02/27/17   Progress Towards Goal Pt will remain smoke free. 2/9: Patient is not smoking.   Assessment   Assessment Ellen has attended 8 VT exercise sessions and has attended 2 education classes.  She has increased her endurance from 20-30 min to 30-45 min at 2.4 METS, she is using 3# free wts for U/E and L/E exercises.  Symptoms in VT have included fatigue, dyspnea, headache, hip/hand/joint discomfort.  She also has had SaO2 drop to 84% with walking on TDM during last ex. session which returns to 93% with rest; Ellen is not short of breath with the SaO2 drop and states she feels fine.  But has had episodes at home last week where she had SaO2 drop into the 40s and had bluish lips.  She was at home walking around and then again at [facility] - she says she was short of breath during those times.  She did not go to MD, but has been put on prednisone with joint relief, so she does more activity and has SaO2 decrease.  If stressed or anxious during this she uses deep breathing/pursed lip breathing/ and meditation.  She has not started home exercise program, but is much more active than previously around the house.  Skilled services necessary to monitor CV response to exercise, provide educaiton and provide support to achieve goals.   I have reviewed initial evaluation outcomes, and agree with exercise prescription for respiratory therapy for this patient

## 2017-02-09 NOTE — TELEPHONE ENCOUNTER
Ventolin        Last Written Prescription Date: 11/13/2015  Last Fill Quantity: 18, # refills: 3    Last Office Visit with [facility], [facility] or [facility] prescribing provider:  11/07/2016   Future Office Visit:       Date of Last Asthma Action Plan Letter:   There are no preventive care reminders to display for this patient.   Asthma Control Test: No flowsheet data found.    Date of Last Spirometry Test:   No results found for this or any previous visit.      Amber Rushing, Pharmacy Technician  [facility]  [phone]

## 2017-02-10 DIAGNOSIS — R07.81 PLEURITIC CHEST PAIN: Primary | ICD-10-CM

## 2017-02-10 RX ORDER — OXYCODONE AND ACETAMINOPHEN 10; 325 MG/1; MG/1
1 TABLET ORAL EVERY 6 HOURS PRN
Qty: 60 TABLET | Refills: 0 | Status: SHIPPED | OUTPATIENT
Start: 2017-02-12 | End: 2017-02-23

## 2017-02-10 NOTE — TELEPHONE ENCOUNTER
Oxycodone/APAP 10/325mg      Last Written Prescription Date: 2/5/17  Last Fill Quantity: 40,  # refills: 0   Last Office Visit with [facility], [facility] or [facility] prescribing provider: 11/7/2016                                               Please bring signed prescription to [facility] if approved.    Thank you,  Lisa Monk, [facility]

## 2017-02-14 ENCOUNTER — HOSPITAL ENCOUNTER (OUTPATIENT)
Dept: CARDIAC REHAB | Facility: CLINIC | Age: 42
End: 2017-02-14
Attending: INTERNAL MEDICINE
Payer: COMMERCIAL

## 2017-02-14 VITALS — WEIGHT: 118 LBS | BODY MASS INDEX: 21.58 KG/M2

## 2017-02-14 PROCEDURE — 40000244 ZZH STATISTIC VISIT PULM REHAB: Performed by: REHABILITATION PRACTITIONER

## 2017-02-14 PROCEDURE — G0239 OTH RESP PROC, GROUP: HCPCS | Performed by: REHABILITATION PRACTITIONER

## 2017-02-14 RX ORDER — ALBUTEROL SULFATE 90 UG/1
1-2 AEROSOL, METERED RESPIRATORY (INHALATION) EVERY 4 HOURS PRN
Qty: 1 INHALER | Refills: 8 | Status: SHIPPED | OUTPATIENT
Start: 2017-02-14 | End: 2018-01-09

## 2017-02-14 NOTE — TELEPHONE ENCOUNTER
Prescription approved per [facility] Refill Protocol............NAREN Schuler

## 2017-02-16 ENCOUNTER — HOSPITAL ENCOUNTER (OUTPATIENT)
Dept: CARDIAC REHAB | Facility: CLINIC | Age: 42
End: 2017-02-16
Attending: INTERNAL MEDICINE
Payer: COMMERCIAL

## 2017-02-16 VITALS — WEIGHT: 118 LBS | BODY MASS INDEX: 21.58 KG/M2

## 2017-02-16 DIAGNOSIS — G47.9 DISTURBANCE IN SLEEP BEHAVIOR: ICD-10-CM

## 2017-02-16 PROCEDURE — 40000244 ZZH STATISTIC VISIT PULM REHAB

## 2017-02-16 PROCEDURE — G0239 OTH RESP PROC, GROUP: HCPCS

## 2017-02-16 NOTE — TELEPHONE ENCOUNTER
Xanax 0.5mg      Last Written Prescription Date: 02/08/2017  Last Fill Quantity: 40,  # refills: 0   Last Office Visit with G, P or [facility] prescribing provider: 03/27/2015    Amber Rushing, Pharmacy Technician  [facility]  [phone]

## 2017-02-20 DIAGNOSIS — R07.81 PLEURITIC CHEST PAIN: ICD-10-CM

## 2017-02-20 RX ORDER — OXYCODONE AND ACETAMINOPHEN 10; 325 MG/1; MG/1
1 TABLET ORAL EVERY 6 HOURS PRN
Qty: 60 TABLET | Refills: 0 | OUTPATIENT
Start: 2017-02-20

## 2017-02-20 RX ORDER — ALPRAZOLAM 0.5 MG
TABLET ORAL
Qty: 40 TABLET | Refills: 0 | Status: SHIPPED | OUTPATIENT
Start: 2017-02-20 | End: 2017-02-28

## 2017-02-20 NOTE — TELEPHONE ENCOUNTER
Pt is calling stating she ran out of this yesterday, can a covering provider address this in Vincenzo's absence?

## 2017-02-20 NOTE — TELEPHONE ENCOUNTER
Percocet      Last Written Prescription Date: 2/12/16  Last Fill Quantity: 60,  # refills: 0   Last Office Visit with [facility], [facility] or [facility] prescribing provider: 1/20/17

## 2017-02-20 NOTE — TELEPHONE ENCOUNTER
Pt called to fu on RX as she is currently out of this medication, please advise if a covering provider is able to approve today?  Thank you,  Karen Littlejohn  Patient Representative

## 2017-02-21 ENCOUNTER — HOSPITAL ENCOUNTER (OUTPATIENT)
Dept: CARDIAC REHAB | Facility: CLINIC | Age: 42
End: 2017-02-21
Attending: INTERNAL MEDICINE
Payer: COMMERCIAL

## 2017-02-21 VITALS — WEIGHT: 118.8 LBS | BODY MASS INDEX: 21.72 KG/M2

## 2017-02-21 PROCEDURE — G0239 OTH RESP PROC, GROUP: HCPCS | Performed by: REHABILITATION PRACTITIONER

## 2017-02-21 PROCEDURE — 40000244 ZZH STATISTIC VISIT PULM REHAB: Performed by: REHABILITATION PRACTITIONER

## 2017-02-22 ENCOUNTER — PRE VISIT (OUTPATIENT)
Dept: CARDIOLOGY | Facility: CLINIC | Age: 42
End: 2017-02-22

## 2017-02-22 DIAGNOSIS — I27.20 PULMONARY HYPERTENSION (H): Primary | ICD-10-CM

## 2017-02-22 DIAGNOSIS — R06.09 DYSPNEA ON EXERTION: ICD-10-CM

## 2017-02-23 ENCOUNTER — HOSPITAL ENCOUNTER (OUTPATIENT)
Dept: CARDIAC REHAB | Facility: CLINIC | Age: 42
End: 2017-02-23
Attending: INTERNAL MEDICINE
Payer: COMMERCIAL

## 2017-02-23 VITALS — BODY MASS INDEX: 21.94 KG/M2 | WEIGHT: 120 LBS

## 2017-02-23 PROCEDURE — 40000244 ZZH STATISTIC VISIT PULM REHAB: Performed by: REHABILITATION PRACTITIONER

## 2017-02-23 PROCEDURE — G0239 OTH RESP PROC, GROUP: HCPCS | Performed by: REHABILITATION PRACTITIONER

## 2017-02-23 RX ORDER — OXYCODONE AND ACETAMINOPHEN 10; 325 MG/1; MG/1
1 TABLET ORAL EVERY 6 HOURS PRN
Qty: 60 TABLET | Refills: 0 | Status: SHIPPED | OUTPATIENT
Start: 2017-02-26 | End: 2017-03-10

## 2017-02-23 NOTE — TELEPHONE ENCOUNTER
Pt is calling on this, she will run out this weekend, can a covering provider address this in Vincenzo's absence? Please call pt and advise.

## 2017-02-24 NOTE — TELEPHONE ENCOUNTER
Received call from Janusz from [facility] [employer] at [phone] stating that the prior authorization for Tyvaso (treprostinil) has been approved until 2/22/2018 and a letter will be sent in the mail.

## 2017-02-26 NOTE — PROGRESS NOTES
Rheumatology work up is negative except high inflammation markers and slightly elevated cryo (not new). Did prednisone help you with your joint pain?

## 2017-02-27 ENCOUNTER — OFFICE VISIT (OUTPATIENT)
Dept: CARDIOLOGY | Facility: CLINIC | Age: 42
End: 2017-02-27
Attending: INTERNAL MEDICINE
Payer: COMMERCIAL

## 2017-02-27 VITALS
HEART RATE: 66 BPM | HEIGHT: 62 IN | DIASTOLIC BLOOD PRESSURE: 64 MMHG | BODY MASS INDEX: 22.31 KG/M2 | SYSTOLIC BLOOD PRESSURE: 107 MMHG | WEIGHT: 121.25 LBS | OXYGEN SATURATION: 99 %

## 2017-02-27 DIAGNOSIS — I27.20 PULMONARY HYPERTENSION (H): ICD-10-CM

## 2017-02-27 DIAGNOSIS — J84.9 ILD (INTERSTITIAL LUNG DISEASE) (H): ICD-10-CM

## 2017-02-27 DIAGNOSIS — R06.09 DYSPNEA ON EXERTION: ICD-10-CM

## 2017-02-27 DIAGNOSIS — I27.20 PULMONARY HYPERTENSION (H): Primary | ICD-10-CM

## 2017-02-27 LAB
6 MIN WALK (FT): 950 FT
6 MIN WALK (M): 290 M
ANION GAP SERPL CALCULATED.3IONS-SCNC: 9 MMOL/L (ref 3–14)
BUN SERPL-MCNC: 18 MG/DL (ref 7–30)
CALCIUM SERPL-MCNC: 8.7 MG/DL (ref 8.5–10.1)
CHLORIDE SERPL-SCNC: 99 MMOL/L (ref 94–109)
CO2 SERPL-SCNC: 29 MMOL/L (ref 20–32)
CREAT SERPL-MCNC: 0.83 MG/DL (ref 0.52–1.04)
ERYTHROCYTE [DISTWIDTH] IN BLOOD BY AUTOMATED COUNT: 15.3 % (ref 10–15)
GFR SERPL CREATININE-BSD FRML MDRD: 76 ML/MIN/1.7M2
GLUCOSE SERPL-MCNC: 77 MG/DL (ref 70–99)
HCT VFR BLD AUTO: 40.1 % (ref 35–47)
HGB BLD-MCNC: 12.7 G/DL (ref 11.7–15.7)
MCH RBC QN AUTO: 30.1 PG (ref 26.5–33)
MCHC RBC AUTO-ENTMCNC: 31.7 G/DL (ref 31.5–36.5)
MCV RBC AUTO: 95 FL (ref 78–100)
NT-PROBNP SERPL-MCNC: 275 PG/ML (ref 0–125)
PLATELET # BLD AUTO: 298 10E9/L (ref 150–450)
POTASSIUM SERPL-SCNC: 3.7 MMOL/L (ref 3.4–5.3)
RBC # BLD AUTO: 4.22 10E12/L (ref 3.8–5.2)
SODIUM SERPL-SCNC: 137 MMOL/L (ref 133–144)
WBC # BLD AUTO: 10.6 10E9/L (ref 4–11)

## 2017-02-27 PROCEDURE — 80048 BASIC METABOLIC PNL TOTAL CA: CPT | Performed by: INTERNAL MEDICINE

## 2017-02-27 PROCEDURE — 83880 ASSAY OF NATRIURETIC PEPTIDE: CPT | Performed by: INTERNAL MEDICINE

## 2017-02-27 PROCEDURE — 99214 OFFICE O/P EST MOD 30 MIN: CPT | Mod: GC | Performed by: INTERNAL MEDICINE

## 2017-02-27 PROCEDURE — 36415 COLL VENOUS BLD VENIPUNCTURE: CPT | Performed by: INTERNAL MEDICINE

## 2017-02-27 PROCEDURE — 85027 COMPLETE CBC AUTOMATED: CPT | Performed by: INTERNAL MEDICINE

## 2017-02-27 PROCEDURE — 99212 OFFICE O/P EST SF 10 MIN: CPT

## 2017-02-27 ASSESSMENT — PAIN SCALES - GENERAL: PAINLEVEL: NO PAIN (0)

## 2017-02-27 NOTE — PATIENT INSTRUCTIONS
Medication Changes:  Start Tyvaso (treprostinil) at Home  Increase your Oxygen to 4 LPM     Patient Instructions:      Follow up Appointment Information:  4 weeks with 6 muinute walk test and Labs    We are located on the third floor of [facility] the [facility].  Our address is     [address] [place] [address] on 3rd Floor   [place] [phone]      Thank you for allowing us to be a part of your care here at the [facility]    If you have questions or concerns please contact us at:    Naty Lau RN      Nurse Coordinator       Pulmonary Hypertension     [facility]   [phone])[phone]                ** Please note that you will NOT receive a reminder call regarding your scheduled testing, reminder calls are for provider appointments only.  If you are scheduled for testing within the [employer] system you may receive a call regarding pre-registration for billing purposes only.**     Remember to weigh yourself daily after voiding and before you consume any food or beverages and log the numbers.  If you have gained/lost 2 pounds overnight or 5 pounds in a week contact us immediately for medication adjustments or further instructions.

## 2017-02-27 NOTE — LETTER
"2017      RE: Ellen Loya  [address]  [facility] [address]       Dear Colleague,    Thank you for the opportunity to participate in the care of your patient, Ellen Loya, at the [facility] HEART [facility]. Please see a copy of my visit note below.    2017        Dawson Upton MD   [facility] Pulmonary   [address] [place] [address]       RE: Ellen Loya   MRN: [id number]   : 1975       Dear Dr. Upton:       We had the pleasure of seeing Ms. Ellen Loya for followup in our Pulmonary Hypertension Clinic at the [facility]. As you know, she is a 41-year-old female with pulmonary arterial hypertension in the setting of combined pulmonary fibrosis, emphysema and inflammatory arthritis. She is currently on Adcirca therapy.     Since her last visit, she is feeling better.  She has not had problems with fluid retention.  She does continue to have dyspnea and struggles walking further than a block.  She will have occasional chest pain when exerting herself.      PAST MEDICAL HISTORY:  Past Medical History   Diagnosis Date     Acute bronchitis 07     Admit. Discharged 07     Anxiety      Arthritis      h/o \"seronegative rheumatoid arthritis\" since age 30, however no evidence of active arthritis by Rheum eval [id number]     ASCUS favor benign 5/15/14     neg HPV     ILD (interstitial lung disease) (H)      seen on chest CT -; methotrexate stopped      Lung nodule      KM nodule; EBUS 2014 of lymph nodes was negative; CT-guided bx  hamartoma/chondroma     Prematurity      born 6-7 weeks early     Pulmonary hypertension (H)      RHC 2016 mean PA 25     Varicella without mention of complication        FAMILY HISTORY:  Family History   Problem Relation Age of Onset     Arthritis Mother      psoriatic arthritis     Lipids Father      HEART DISEASE Father      recent " angioplasty for 3 blocked arteries.     Scleroderma Paternal Uncle      Had scleroderma ILD     Other Cancer Paternal Grandmother      lung cancer     Substance Abuse Father      Substance Abuse Brother      Asthma Brother      Depression Mother      Depression Brother      DIABETES Mother      DIABETES Maternal Grandfather      adult onset     Hypertension Father      CEREBROVASCULAR DISEASE Father      Thyroid Disease Mother      Obesity Mother      Hyperlipidemia Mother      Hyperlipidemia Father      Hyperlipidemia Maternal Grandfather      Coronary Artery Disease Father      LUNG DISEASE Father          SOCIAL HISTORY:  Social History     Social History     Marital status: Single     Spouse name: N/A     Number of children: 3     Years of education: 13     Occupational History     homemaker      Social History Main Topics     Smoking status: Former Smoker     Packs/day: 0.50     Years: 25.00     Types: Cigarettes     Start date: 12/3/1992     Quit date: 12/14/2016     Smokeless tobacco: Former User     Quit date: 12/14/2016     Alcohol use No      Comment: 5 per month or less     Drug use: No     Sexual activity: Yes     Partners: Male     Birth control/ protection: Female Surgical      Comment: Had post-partum tubal ligation.     Other Topics Concern      Service No     Blood Transfusions No     Caffeine Concern No     pop: 2c/d     Occupational Exposure No     Hobby Hazards No     Sleep Concern No     Stress Concern No     Weight Concern No     Special Diet No     Back Care No     Exercise No     Bike Helmet No     Seat Belt Yes     Self-Exams Yes     Parent/Sibling W/ Cabg, Mi Or Angioplasty Before 65f 55m? 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 Bought [place] house in 2010; it was wet and full of mold.  She remodelled the house, did not wear a mask.       CURRENT MEDICATIONS:  Current Outpatient Prescriptions   Medication Sig Dispense  Refill     oxyCODONE-acetaminophen (PERCOCET)  MG per tablet Take 1 tablet by mouth every 6 hours as needed for moderate to severe pain 60 tablet 0     ALPRAZolam (XANAX) 0.5 MG tablet TAKE 1 TABLET BY MOUTH EVERY SIX HOURS AS NEEDED FOR ANXIETY. 40 tablet 0     albuterol (ALBUTEROL) 108 (90 BASE) MCG/ACT Inhaler Inhale 1-2 puffs into the lungs every 4 hours as needed for shortness of breath / dyspnea 1 Inhaler 8     morphine (MS CONTIN) 15 MG 12 hr tablet Take 1 tablet (15 mg) by mouth 2 times daily 60 tablet 0     predniSONE (DELTASONE) 5 MG tablet Take 4 tablets (20mg) daily x 5 days, then take 3 tablets (15mg) daily x5 days, then take 2 tablets (10mg) daily x 5 days, then 1 tablet (5mg) daily x5 days, then stop 50 tablet 0     tadalafil, PAH, (ADCIRCA) 20 MG TABS Take 2 tablets (40 mg) by mouth daily 60 tablet 11     furosemide (LASIX) 20 MG tablet Take 2 tablets (40 mg) by mouth daily 180 tablet 3     tiotropium (SPIRIVA HANDIHALER) 18 MCG capsule Inhale contents of one capsule daily. 30 capsule 1     fluticasone (FLONASE) 50 MCG/ACT spray Spray 2 sprays into both nostrils daily 1 Bottle 1     albuterol (2.5 MG/3ML) 0.083% nebulizer solution Use every 4-6 hours as needed for shortness of breath 60 vial 3     order for DME Equipment being ordered: Nebulizer. Verbal order from  1 Device 0     digoxin (LANOXIN) 125 MCG tablet Take 1 tablet (125 mcg) by mouth daily 90 tablet 3     ibuprofen (ADVIL,MOTRIN) 800 MG tablet Take 1 tablet (800 mg) by mouth 3 times daily (with meals) 30 tablet 5     order for DME Equipment being ordered: Oximeter 1 Device 0     order for DME Equipment being ordered: Oxygen oximeter and mask 1 Device 0     order for DME Please provide patient with 2  liquid oxygen tanks for portability. Spot check patient on liquid oxygen. Titrate oxygen to maintain saturations above 88%. 2 Device 0     order for DME Equipment being ordered: Oxygen    Please provide patient with a home-fill  "system for portability. 1 Device 99     order for DME Equipment being ordered: personal O2 oximeter. 1 Device 0     order for DME Oxygen: Patient requires supplemental Oxygen 2 LPM via nasal canula with activity. Please provide patient with portability capability. Okay to spot check patient on oxygen device, to keep sats above 90%. Oxygen will be for a lifetime. 1 Device 0       ROS:   10 point ROS negative except HPI    EXAM:  /64 (BP Location: Right arm, Patient Position: Chair, Cuff Size: Adult Regular)  Pulse 66  Ht 1.575 m (5' 2\")  Wt 55 kg (121 lb 4.1 oz)  SpO2 99%  BMI 22.18 kg/m2  General: appears comfortable, alert and articulate  Head: normocephalic, atraumatic  Eyes: anicteric sclera, EOMI  Neck: no adenopathy  Orophyarynx: moist mucosa, no lesions, dentition intact  Heart: regular, S1/S2, no murmur, gallop, rub, estimated JVP 6 cm  Lungs: Inspiratory crackles at the bases bilaterally  GI: soft, non-tender, bowel sounds present, no hepatosplenomegaly  Extremities: no clubbing, cyanosis or edema  Neurological: normal speech and affect, no gross motor deficits    Labs:  CBC RESULTS:  Lab Results   Component Value Date    WBC 10.6 02/27/2017    RBC 4.22 02/27/2017    HGB 12.7 02/27/2017    HCT 40.1 02/27/2017    MCV 95 02/27/2017    MCH 30.1 02/27/2017    MCHC 31.7 02/27/2017    RDW 15.3 (H) 02/27/2017     02/27/2017       CMP RESULTS:  Lab Results   Component Value Date     02/27/2017    POTASSIUM 3.7 02/27/2017    CHLORIDE 99 02/27/2017    CO2 29 02/27/2017    ANIONGAP 9 02/27/2017    GLC 77 02/27/2017    BUN 18 02/27/2017    CR 0.83 02/27/2017    GFRESTIMATED 76 02/27/2017    GFRESTBLACK >90   GFR Calc   02/27/2017    MARCIN 8.7 02/27/2017    BILITOTAL 0.4 01/16/2017    ALBUMIN 3.6 01/16/2017    ALKPHOS 95 01/16/2017    ALT 18 01/16/2017    AST 20 01/16/2017        INR RESULTS:  Lab Results   Component Value Date    INR 1.04 01/23/2015       No components found for: CK  Lab " Results   Component Value Date    MAG 2.4 (H) 12/19/2016     Lab Results   Component Value Date    NTBNPI 4168 (H) 11/30/2016       Echocardiogram 10/3/2016:  Paradoxical septal motion consistent with right ventricular pressure and volume overload is present.  Moderate to severe right ventricular dilation is present.  Global right ventricular function is mildly to moderately reduced.  Moderate to severe tricuspid insufficiency is present.  Right ventricular systolic pressure is 56mmHg above the right atrial pressure.  The inferior vena cava is normal.  No pericardial effusion is present.    RHC 11/30/2016  RA:15  RV: 60/16  PA: 60/38 (42)  PCWP: 15  CO/CI (TD): 2.6/1.6  PVR: 10.4    Inhaled nitric oxide  RA: 16  PA: 50/20 (35)  PCWP: 16  CO/CI (TD): 2.4/1.5    6MWT 2/27/2017: 290 meters with lowest saturation of 90% on 4L of supplemental oxygen    Assessment and Plan: Ms. 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    1.  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 Loya is still WHO FC 3.  She is on Adcirca therapy and has gotten approval for inhaled Tyvaso.  We are going to pursue dual therapy because she has severe RV dysfunction, lower cardiac output, and very high PVR.  She will start inhaled Tyvaso at 3 breathes QID for one week, then increase to 6 breathes QID for another week, and finally 9 breathes QID.  During this uptitration, she will need to monitor her O2 saturation which we discussed and she expressed understanding.  She does need to continue with 4L of supplemental oxygen as her oxygen saturations dropped with exercise.  She is also on lasix 40 mg daily and digoxin for volume control and RV dysfunction.    It was a pleasure seeing Ms. Loya at the [facility] Pulmonary Hypertension program.  Please contact us with any questions or concerns that you may have.    She will RTC in a month to make sure  that she is tolerating her Tyvaso.     Sincerely,    Carlo Frost MD, PhD  Cardiology Fellow    I have personally seen and examined the patient, and then discussed with Dr. Frost, and agree with the findings and plan in this note. I have reviewed today's vital signs, medications, labs, and imaging.     Sincerely,    Callie Ledesma MD   Center for Pulmonary Hypertension  Section of Advanced Heart Failure   Cardiovascular Division  [facility]   [phone]

## 2017-02-27 NOTE — LETTER
"2017      RE: Ellen Loya  [address]  [facility] [address]       2017        Dawson Upton MD   P Pulmonary   [address] [place] [address]       RE: Ellen Loya   MRN: [id number]   : 1975       Dear Dr. Upton:       We had the pleasure of seeing Ms. Ellen Loya for followup in our Pulmonary Hypertension Clinic at the [facility]. As you know, she is a 41-year-old female with pulmonary arterial hypertension in the setting of combined pulmonary fibrosis, emphysema and inflammatory arthritis. She is currently on Adcirca therapy.     Since her last visit, she is feeling better.  She has not had problems with fluid retention.  She does continue to have dyspnea and struggles walking further than a block.  She will have occasional chest pain when exerting herself.      PAST MEDICAL HISTORY:  Past Medical History   Diagnosis Date     Acute bronchitis 07     Admit. Discharged 07     Anxiety      Arthritis      h/o \"seronegative rheumatoid arthritis\" since age 30, however no evidence of active arthritis by Rheum eval [id number]     ASCUS favor benign 5/15/14     neg HPV     ILD (interstitial lung disease) (H)      seen on chest CT ; methotrexate stopped      Lung nodule      KM nodule; EBUS 2014 of lymph nodes was negative; CT-guided bx  hamartoma/chondroma     Prematurity      born 6-7 weeks early     Pulmonary hypertension (H)      RHC 2016 mean PA 25     Varicella without mention of complication        FAMILY HISTORY:  Family History   Problem Relation Age of Onset     Arthritis Mother      psoriatic arthritis     Lipids Father      HEART DISEASE Father      recent angioplasty for 3 blocked arteries.     Scleroderma Paternal Uncle      Had scleroderma ILD     Other Cancer Paternal Grandmother      lung cancer     Substance Abuse Father      Substance Abuse Brother      Asthma Brother      Depression " Mother      Depression Brother      DIABETES Mother      DIABETES Maternal Grandfather      adult onset     Hypertension Father      CEREBROVASCULAR DISEASE Father      Thyroid Disease Mother      Obesity Mother      Hyperlipidemia Mother      Hyperlipidemia Father      Hyperlipidemia Maternal Grandfather      Coronary Artery Disease Father      LUNG DISEASE Father          SOCIAL HISTORY:  Social History     Social History     Marital status: Single     Spouse name: N/A     Number of children: 3     Years of education: 13     Occupational History     homemaker      Social History Main Topics     Smoking status: Former Smoker     Packs/day: 0.50     Years: 25.00     Types: Cigarettes     Start date: 12/3/1992     Quit date: 12/14/2016     Smokeless tobacco: Former User     Quit date: 12/14/2016     Alcohol use No      Comment: 5 per month or less     Drug use: No     Sexual activity: Yes     Partners: Male     Birth control/ protection: Female Surgical      Comment: Had post-partum tubal ligation.     Other Topics Concern      Service No     Blood Transfusions No     Caffeine Concern No     pop: 2c/d     Occupational Exposure No     Hobby Hazards No     Sleep Concern No     Stress Concern No     Weight Concern No     Special Diet No     Back Care No     Exercise No     Bike Helmet No     Seat Belt Yes     Self-Exams Yes     Parent/Sibling W/ Cabg, Mi Or Angioplasty Before 65f 55m? 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 Bought [place] house in 2010; it was wet and full of mold.  She remodelled the house, did not wear a mask.       CURRENT MEDICATIONS:  Current Outpatient Prescriptions   Medication Sig Dispense Refill     oxyCODONE-acetaminophen (PERCOCET)  MG per tablet Take 1 tablet by mouth every 6 hours as needed for moderate to severe pain 60 tablet 0     ALPRAZolam (XANAX) 0.5 MG tablet TAKE 1 TABLET BY MOUTH EVERY SIX HOURS AS NEEDED  FOR ANXIETY. 40 tablet 0     albuterol (ALBUTEROL) 108 (90 BASE) MCG/ACT Inhaler Inhale 1-2 puffs into the lungs every 4 hours as needed for shortness of breath / dyspnea 1 Inhaler 8     morphine (MS CONTIN) 15 MG 12 hr tablet Take 1 tablet (15 mg) by mouth 2 times daily 60 tablet 0     predniSONE (DELTASONE) 5 MG tablet Take 4 tablets (20mg) daily x 5 days, then take 3 tablets (15mg) daily x5 days, then take 2 tablets (10mg) daily x 5 days, then 1 tablet (5mg) daily x5 days, then stop 50 tablet 0     tadalafil, PAH, (ADCIRCA) 20 MG TABS Take 2 tablets (40 mg) by mouth daily 60 tablet 11     furosemide (LASIX) 20 MG tablet Take 2 tablets (40 mg) by mouth daily 180 tablet 3     tiotropium (SPIRIVA HANDIHALER) 18 MCG capsule Inhale contents of one capsule daily. 30 capsule 1     fluticasone (FLONASE) 50 MCG/ACT spray Spray 2 sprays into both nostrils daily 1 Bottle 1     albuterol (2.5 MG/3ML) 0.083% nebulizer solution Use every 4-6 hours as needed for shortness of breath 60 vial 3     order for DME Equipment being ordered: Nebulizer. Verbal order from  1 Device 0     digoxin (LANOXIN) 125 MCG tablet Take 1 tablet (125 mcg) by mouth daily 90 tablet 3     ibuprofen (ADVIL,MOTRIN) 800 MG tablet Take 1 tablet (800 mg) by mouth 3 times daily (with meals) 30 tablet 5     order for DME Equipment being ordered: Oximeter 1 Device 0     order for DME Equipment being ordered: Oxygen oximeter and mask 1 Device 0     order for DME Please provide patient with 2  liquid oxygen tanks for portability. Spot check patient on liquid oxygen. Titrate oxygen to maintain saturations above 88%. 2 Device 0     order for DME Equipment being ordered: Oxygen    Please provide patient with a home-fill system for portability. 1 Device 99     order for DME Equipment being ordered: personal O2 oximeter. 1 Device 0     order for DME Oxygen: Patient requires supplemental Oxygen 2 LPM via nasal canula with activity. Please provide patient  "with portability capability. Okay to spot check patient on oxygen device, to keep sats above 90%. Oxygen will be for a lifetime. 1 Device 0       ROS:   10 point ROS negative except HPI    EXAM:  /64 (BP Location: Right arm, Patient Position: Chair, Cuff Size: Adult Regular)  Pulse 66  Ht 1.575 m (5' 2\")  Wt 55 kg (121 lb 4.1 oz)  SpO2 99%  BMI 22.18 kg/m2  General: appears comfortable, alert and articulate  Head: normocephalic, atraumatic  Eyes: anicteric sclera, EOMI  Neck: no adenopathy  Orophyarynx: moist mucosa, no lesions, dentition intact  Heart: regular, S1/S2, no murmur, gallop, rub, estimated JVP 6 cm  Lungs: Inspiratory crackles at the bases bilaterally  GI: soft, non-tender, bowel sounds present, no hepatosplenomegaly  Extremities: no clubbing, cyanosis or edema  Neurological: normal speech and affect, no gross motor deficits    Labs:  CBC RESULTS:  Lab Results   Component Value Date    WBC 10.6 02/27/2017    RBC 4.22 02/27/2017    HGB 12.7 02/27/2017    HCT 40.1 02/27/2017    MCV 95 02/27/2017    MCH 30.1 02/27/2017    MCHC 31.7 02/27/2017    RDW 15.3 (H) 02/27/2017     02/27/2017       CMP RESULTS:  Lab Results   Component Value Date     02/27/2017    POTASSIUM 3.7 02/27/2017    CHLORIDE 99 02/27/2017    CO2 29 02/27/2017    ANIONGAP 9 02/27/2017    GLC 77 02/27/2017    BUN 18 02/27/2017    CR 0.83 02/27/2017    GFRESTIMATED 76 02/27/2017    GFRESTBLACK >90   GFR Calc   02/27/2017    MARCIN 8.7 02/27/2017    BILITOTAL 0.4 01/16/2017    ALBUMIN 3.6 01/16/2017    ALKPHOS 95 01/16/2017    ALT 18 01/16/2017    AST 20 01/16/2017        INR RESULTS:  Lab Results   Component Value Date    INR 1.04 01/23/2015       No components found for: CK  Lab Results   Component Value Date    MAG 2.4 (H) 12/19/2016     Lab Results   Component Value Date    NTBNPI 4168 (H) 11/30/2016       Echocardiogram 10/3/2016:  Paradoxical septal motion consistent with right ventricular pressure and " volume overload is present.  Moderate to severe right ventricular dilation is present.  Global right ventricular function is mildly to moderately reduced.  Moderate to severe tricuspid insufficiency is present.  Right ventricular systolic pressure is 56mmHg above the right atrial pressure.  The inferior vena cava is normal.  No pericardial effusion is present.    RHC 11/30/2016  RA:15  RV: 60/16  PA: 60/38 (42)  PCWP: 15  CO/CI (TD): 2.6/1.6  PVR: 10.4    Inhaled nitric oxide  RA: 16  PA: 50/20 (35)  PCWP: 16  CO/CI (TD): 2.4/1.5    6MWT 2/27/2017: 290 meters with lowest saturation of 90% on 4L of supplemental oxygen    Assessment and Plan: Ms. 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    1.  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 Loya is still WHO FC 3.  She is on Adcirca therapy and has gotten approval for inhaled Tyvaso.  We are going to pursue dual therapy because she has severe RV dysfunction, lower cardiac output, and very high PVR.  She will start inhaled Tyvaso at 3 breathes QID for one week, then increase to 6 breathes QID for another week, and finally 9 breathes QID.  During this uptitration, she will need to monitor her O2 saturation which we discussed and she expressed understanding.  She does need to continue with 4L of supplemental oxygen as her oxygen saturations dropped with exercise.  She is also on lasix 40 mg daily and digoxin for volume control and RV dysfunction.    It was a pleasure seeing Ms. Loya at the [facility] Pulmonary Hypertension program.  Please contact us with any questions or concerns that you may have.    She will RTC in a month to make sure that she is tolerating her Tyvaso.     Sincerely,    Carlo Frost MD, PhD  Cardiology Fellow    I have personally seen and examined the patient, and then discussed with Dr. Frost, and agree with the findings and plan in this note. I  have reviewed today's vital signs, medications, labs, and imaging.     Sincerely,    Callie Ledesma MD   Center for Pulmonary Hypertension  Section of Advanced Heart Failure   Cardiovascular Division  [facility]   [phone]              Callie Ledesma MD

## 2017-02-27 NOTE — MR AVS SNAPSHOT
After Visit Summary   2/27/2017    Ellen Loya    MRN: [id number]           Patient Information     Date Of Birth          1975        Visit Information        Provider Department      2/27/2017 11:00 AM Callie Ledesma MD [facility]        Care Instructions    Medication Changes:  Start Tyvaso (treprostinil) at Home  Increase your Oxygen to 4 LPM     Patient Instructions:      Follow up Appointment Information:  4 weeks with 6 muinute walk test and Labs    We are located on the third floor of the [facility] on the [facility].  Our address is     [address] [place] [address] on [address]rd [place] [address]      Thank you for allowing us to be a part of your care here at the [facility]    If you have questions or concerns please contact us at:    Naty Lau, NAREN      Nurse Coordinator       Pulmonary Hypertension     [facility]   [phone])[phone]                ** Please note that you will NOT receive a reminder call regarding your scheduled testing, reminder calls are for provider appointments only.  If you are scheduled for testing within the [place] system you may receive a call regarding pre-registration for billing purposes only.**     Remember to weigh yourself daily after voiding and before you consume any food or beverages and log the numbers.  If you have gained/lost 2 pounds overnight or 5 pounds in a week contact us immediately for medication adjustments or further instructions.        Follow-ups after your visit        Follow-up notes from your care team     Return in about 3 weeks (around 3/20/2017) for 8AM with Callie, Return PH, with, Labs, 6MWT.      Your next 10 appointments already scheduled     Feb 28, 2017 11:00 AM CST   Treatment 60 with Asuncion Celis, [facility] ([facility])    [address] [place]  [place] [address]   [phone]            Mar 02, 2017 11:00 AM CST   Treatment 60 with ELIZA Gutierrez   [facility] ([facility])    [address] [place] [address]   [phone]            Mar 16, 2017 10:00 AM CDT   Lab with UC LAB    [facility] ([facility])    [address] [address]   [phone]            Mar 16, 2017 10:30 AM CDT   Six Minute Walk with UC PFL 6 MINUTE WALK 2   [facility] Pulmonary Function Testing ([facility])    [address] [place] [id number] [phone]            Mar 20, 2017  8:00 AM CDT   (Arrive by 7:45 AM)   RETURN PRIMARY PULMONARY with Callie Ledesma MD   [facility] ([facility])    [address] [place] [id number] [phone]            Apr 12, 2017  8:30 AM CDT   PFT VISIT with  PFL A   [facility] ([facility])    [address] [place] [id number] [phone]            Apr 12, 2017  9:00 AM CDT   (Arrive by 8:45 AM)   Return Interstitial Lung with Dawson Upton MD   [facility] ([facility])    [address] [place] [id number] [phone]            Aug 25, 2017 10:30 AM CDT   (Arrive by 10:15 AM)   Return Visit with Clarita Martínez MD   [facility]umatology ([facility])    [address] [place] [id number] [phone]              Who to contact     If you have questions or need follow up information about today's clinic visit or your schedule please contact [facility] directly at [phone].  Normal or non-critical lab and imaging results will be communicated to you by MyChart, letter or phone within 4 business days after the  "clinic has received the results. If you do not hear from us within 7 days, please contact the clinic through [employer] or phone. If you have a critical or abnormal lab result, we will notify you by phone as soon as possible.  Submit refill requests through [employer] or call your pharmacy and they will forward the refill request to us. Please allow 3 business days for your refill to be completed.          Additional Information About Your Visit        [employer] Information     [employer] gives you secure access to your electronic health record. If you see a primary care provider, you can also send messages to your care team and make appointments. If you have questions, please call your primary care clinic.  If you do not have a primary care provider, please call [phone] and they will assist you.        Care EveryWhere ID     This is your Care EveryWhere ID. This could be used by other organizations to access your [facility] medical records  [id number]        Your Vitals Were     Pulse Height Pulse Oximetry BMI (Body Mass Index)          66 1.575 m (5' 2\") 99% 22.18 kg/m2         Blood Pressure from Last 3 Encounters:   02/27/17 107/64   01/20/17 105/71   01/16/17 106/67    Weight from Last 3 Encounters:   02/27/17 55 kg (121 lb 4.1 oz)   02/23/17 54.4 kg (120 lb)   02/21/17 53.9 kg (118 lb 12.8 oz)              Today, you had the following     No orders found for display       Primary Care Provider Office Phone # Fax #    Ignacio Loya [phone]       [facility] [address] [place] [address]        Thank you!     Thank you for choosing [facility]  for your care. Our goal is always to provide you with excellent care. Hearing back from our patients is one way we can continue to improve our services. Please take a few minutes to complete the written survey that you may receive in the mail after your visit with us. Thank you!             Your Updated Medication List " - Protect others around you: Learn how to safely use, store and throw away your medicines at www.disposemymeds.org.          This list is accurate as of: 2/27/17 12:13 PM.  Always use your most recent med list.                   Brand Name Dispense Instructions for use    * albuterol (2.5 MG/3ML) 0.083% neb solution     60 vial    Use every 4-6 hours as needed for shortness of breath       * albuterol 108 (90 BASE) MCG/ACT Inhaler    albuterol    1 Inhaler    Inhale 1-2 puffs into the lungs every 4 hours as needed for shortness of breath / dyspnea       ALPRAZolam 0.5 MG tablet    XANAX    40 tablet    TAKE 1 TABLET BY MOUTH EVERY SIX HOURS AS NEEDED FOR ANXIETY.       digoxin 125 MCG tablet    LANOXIN    90 tablet    Take 1 tablet (125 mcg) by mouth daily       fluticasone 50 MCG/ACT spray    FLONASE    1 Bottle    Spray 2 sprays into both nostrils daily       furosemide 20 MG tablet    LASIX    180 tablet    Take 2 tablets (40 mg) by mouth daily       ibuprofen 800 MG tablet    ADVIL/MOTRIN    30 tablet    Take 1 tablet (800 mg) by mouth 3 times daily (with meals)       morphine 15 MG 12 hr tablet    MS CONTIN    60 tablet    Take 1 tablet (15 mg) by mouth 2 times daily       * order for DME     1 Device    Oxygen: Patient requires supplemental Oxygen 2 LPM via nasal canula with activity. Please provide patient with portability capability. Okay to spot check patient on oxygen device, to keep sats above 90%. Oxygen will be for a lifetime.       * order for DME     1 Device    Equipment being ordered: personal O2 oximeter.       * order for DME     1 Device    Equipment being ordered: Oxygen  Please provide patient with a home-fill system for portability.       * order for DME     2 Device    Please provide patient with 2  liquid oxygen tanks for portability. Spot check patient on liquid oxygen. Titrate oxygen to maintain saturations above 88%.       * order for DME     1 Device    Equipment being ordered: Oxygen  oximeter and mask       * order for DME     1 Device    Equipment being ordered: Oximeter       * order for DME     1 Device    Equipment being ordered: Nebulizer. Verbal order from        oxyCODONE-acetaminophen  MG per tablet    PERCOCET    60 tablet    Take 1 tablet by mouth every 6 hours as needed for moderate to severe pain       predniSONE 5 MG tablet    DELTASONE    50 tablet    Take 4 tablets (20mg) daily x 5 days, then take 3 tablets (15mg) daily x5 days, then take 2 tablets (10mg) daily x 5 days, then 1 tablet (5mg) daily x5 days, then stop       tadalafil (PAH) 20 MG Tabs    ADCIRCA    60 tablet    Take 2 tablets (40 mg) by mouth daily       tiotropium 18 MCG capsule    SPIRIVA HANDIHALER    30 capsule    Inhale contents of one capsule daily.       * Notice:  This list has 9 medication(s) that are the same as other medications prescribed for you. Read the directions carefully, and ask your doctor or other care provider to review them with you.

## 2017-02-27 NOTE — PROGRESS NOTES
"2017        Dawson Upton MD   [facility]   [address] [place] [address]       RE: Ellen Loya   MRN: [id number]   : 1975       Dear Dr. Upton:       We had the pleasure of seeing Ms. Ellen Loya for followup in our Pulmonary Hypertension Clinic at the [facility]. As you know, she is a 41-year-old female with pulmonary arterial hypertension in the setting of combined pulmonary fibrosis, emphysema and inflammatory arthritis. She is currently on Adcirca therapy.     Since her last visit, she is feeling better.  She has not had problems with fluid retention.  She does continue to have dyspnea and struggles walking further than a block.  She will have occasional chest pain when exerting herself.      PAST MEDICAL HISTORY:  Past Medical History   Diagnosis Date     Acute bronchitis 07     Admit. Discharged 07     Anxiety      Arthritis      h/o \"seronegative rheumatoid arthritis\" since age 30, however no evidence of active arthritis by Rheum eval [id number]     ASCUS favor benign 5/15/14     neg HPV     ILD (interstitial lung disease) (H)      seen on chest CT ; methotrexate stopped      Lung nodule      KM nodule; EBUS 2014 of lymph nodes was negative; CT-guided bx  hamartoma/chondroma     Prematurity      born 6-7 weeks early     Pulmonary hypertension (H)      RHC 2016 mean PA 25     Varicella without mention of complication        FAMILY HISTORY:  Family History   Problem Relation Age of Onset     Arthritis Mother      psoriatic arthritis     Lipids Father      HEART DISEASE Father      recent angioplasty for 3 blocked arteries.     Scleroderma Paternal Uncle      Had scleroderma ILD     Other Cancer Paternal Grandmother      lung cancer     Substance Abuse Father      Substance Abuse Brother      Asthma Brother      Depression Mother      Depression Brother      DIABETES Mother      DIABETES Maternal " Grandfather      adult onset     Hypertension Father      CEREBROVASCULAR DISEASE Father      Thyroid Disease Mother      Obesity Mother      Hyperlipidemia Mother      Hyperlipidemia Father      Hyperlipidemia Maternal Grandfather      Coronary Artery Disease Father      LUNG DISEASE Father          SOCIAL HISTORY:  Social History     Social History     Marital status: Single     Spouse name: N/A     Number of children: 3     Years of education: 13     Occupational History     homemaker      Social History Main Topics     Smoking status: Former Smoker     Packs/day: 0.50     Years: 25.00     Types: Cigarettes     Start date: 12/3/1992     Quit date: 12/14/2016     Smokeless tobacco: Former User     Quit date: 12/14/2016     Alcohol use No      Comment: 5 per month or less     Drug use: No     Sexual activity: Yes     Partners: Male     Birth control/ protection: Female Surgical      Comment: Had post-partum tubal ligation.     Other Topics Concern      Service No     Blood Transfusions No     Caffeine Concern No     pop: 2c/d     Occupational Exposure No     Hobby Hazards No     Sleep Concern No     Stress Concern No     Weight Concern No     Special Diet No     Back Care No     Exercise No     Bike Helmet No     Seat Belt Yes     Self-Exams Yes     Parent/Sibling W/ Cabg, Mi Or Angioplasty Before 65f 55m? 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 Bought [employer] house in 2010; it was wet and full of mold.  She remodelled the house, did not wear a mask.       CURRENT MEDICATIONS:  Current Outpatient Prescriptions   Medication Sig Dispense Refill     oxyCODONE-acetaminophen (PERCOCET)  MG per tablet Take 1 tablet by mouth every 6 hours as needed for moderate to severe pain 60 tablet 0     ALPRAZolam (XANAX) 0.5 MG tablet TAKE 1 TABLET BY MOUTH EVERY SIX HOURS AS NEEDED FOR ANXIETY. 40 tablet 0     albuterol (ALBUTEROL) 108 (90 BASE) MCG/ACT  Inhaler Inhale 1-2 puffs into the lungs every 4 hours as needed for shortness of breath / dyspnea 1 Inhaler 8     morphine (MS CONTIN) 15 MG 12 hr tablet Take 1 tablet (15 mg) by mouth 2 times daily 60 tablet 0     predniSONE (DELTASONE) 5 MG tablet Take 4 tablets (20mg) daily x 5 days, then take 3 tablets (15mg) daily x5 days, then take 2 tablets (10mg) daily x 5 days, then 1 tablet (5mg) daily x5 days, then stop 50 tablet 0     tadalafil, PAH, (ADCIRCA) 20 MG TABS Take 2 tablets (40 mg) by mouth daily 60 tablet 11     furosemide (LASIX) 20 MG tablet Take 2 tablets (40 mg) by mouth daily 180 tablet 3     tiotropium (SPIRIVA HANDIHALER) 18 MCG capsule Inhale contents of one capsule daily. 30 capsule 1     fluticasone (FLONASE) 50 MCG/ACT spray Spray 2 sprays into both nostrils daily 1 Bottle 1     albuterol (2.5 MG/3ML) 0.083% nebulizer solution Use every 4-6 hours as needed for shortness of breath 60 vial 3     order for DME Equipment being ordered: Nebulizer. Verbal order from  1 Device 0     digoxin (LANOXIN) 125 MCG tablet Take 1 tablet (125 mcg) by mouth daily 90 tablet 3     ibuprofen (ADVIL,MOTRIN) 800 MG tablet Take 1 tablet (800 mg) by mouth 3 times daily (with meals) 30 tablet 5     order for DME Equipment being ordered: Oximeter 1 Device 0     order for DME Equipment being ordered: Oxygen oximeter and mask 1 Device 0     order for DME Please provide patient with 2  liquid oxygen tanks for portability. Spot check patient on liquid oxygen. Titrate oxygen to maintain saturations above 88%. 2 Device 0     order for DME Equipment being ordered: Oxygen    Please provide patient with a home-fill system for portability. 1 Device 99     order for DME Equipment being ordered: personal O2 oximeter. 1 Device 0     order for DME Oxygen: Patient requires supplemental Oxygen 2 LPM via nasal canula with activity. Please provide patient with portability capability. Okay to spot check patient on oxygen device, to  "keep sats above 90%. Oxygen will be for a lifetime. 1 Device 0       ROS:   10 point ROS negative except HPI    EXAM:  /64 (BP Location: Right arm, Patient Position: Chair, Cuff Size: Adult Regular)  Pulse 66  Ht 1.575 m (5' 2\")  Wt 55 kg (121 lb 4.1 oz)  SpO2 99%  BMI 22.18 kg/m2  General: appears comfortable, alert and articulate  Head: normocephalic, atraumatic  Eyes: anicteric sclera, EOMI  Neck: no adenopathy  Orophyarynx: moist mucosa, no lesions, dentition intact  Heart: regular, S1/S2, no murmur, gallop, rub, estimated JVP 6 cm  Lungs: Inspiratory crackles at the bases bilaterally  GI: soft, non-tender, bowel sounds present, no hepatosplenomegaly  Extremities: no clubbing, cyanosis or edema  Neurological: normal speech and affect, no gross motor deficits    Labs:  CBC RESULTS:  Lab Results   Component Value Date    WBC 10.6 02/27/2017    RBC 4.22 02/27/2017    HGB 12.7 02/27/2017    HCT 40.1 02/27/2017    MCV 95 02/27/2017    MCH 30.1 02/27/2017    MCHC 31.7 02/27/2017    RDW 15.3 (H) 02/27/2017     02/27/2017       CMP RESULTS:  Lab Results   Component Value Date     02/27/2017    POTASSIUM 3.7 02/27/2017    CHLORIDE 99 02/27/2017    CO2 29 02/27/2017    ANIONGAP 9 02/27/2017    GLC 77 02/27/2017    BUN 18 02/27/2017    CR 0.83 02/27/2017    GFRESTIMATED 76 02/27/2017    GFRESTBLACK >90   GFR Calc   02/27/2017    MARCIN 8.7 02/27/2017    BILITOTAL 0.4 01/16/2017    ALBUMIN 3.6 01/16/2017    ALKPHOS 95 01/16/2017    ALT 18 01/16/2017    AST 20 01/16/2017        INR RESULTS:  Lab Results   Component Value Date    INR 1.04 01/23/2015       No components found for: CK  Lab Results   Component Value Date    MAG 2.4 (H) 12/19/2016     Lab Results   Component Value Date    NTBNPI 4168 (H) 11/30/2016       Echocardiogram 10/3/2016:  Paradoxical septal motion consistent with right ventricular pressure and volume overload is present.  Moderate to severe right ventricular dilation is " present.  Global right ventricular function is mildly to moderately reduced.  Moderate to severe tricuspid insufficiency is present.  Right ventricular systolic pressure is 56mmHg above the right atrial pressure.  The inferior vena cava is normal.  No pericardial effusion is present.    RHC 11/30/2016  RA:15  RV: 60/16  PA: 60/38 (42)  PCWP: 15  CO/CI (TD): 2.6/1.6  PVR: 10.4    Inhaled nitric oxide  RA: 16  PA: 50/20 (35)  PCWP: 16  CO/CI (TD): 2.4/1.5    6MWT 2/27/2017: 290 meters with lowest saturation of 90% on 4L of supplemental oxygen    Assessment and Plan: Ms. 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    1.  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 Loya is still WHO FC 3.  She is on Adcirca therapy and has gotten approval for inhaled Tyvaso.  We are going to pursue dual therapy because she has severe RV dysfunction, lower cardiac output, and very high PVR.  She will start inhaled Tyvaso at 3 breathes QID for one week, then increase to 6 breathes QID for another week, and finally 9 breathes QID.  During this uptitration, she will need to monitor her O2 saturation which we discussed and she expressed understanding.  She does need to continue with 4L of supplemental oxygen as her oxygen saturations dropped with exercise.  She is also on lasix 40 mg daily and digoxin for volume control and RV dysfunction.    It was a pleasure seeing Ms. Loya at the [facility] Pulmonary Hypertension program.  Please contact us with any questions or concerns that you may have.    She will RTC in a month to make sure that she is tolerating her Tyvaso.     Sincerely,    Carlo Frost MD, PhD  Cardiology Fellow    I have personally seen and examined the patient, and then discussed with Dr. Frost, and agree with the findings and plan in this note. I have reviewed today's vital signs, medications, labs, and imaging.      Sincerely,    Callie Ledesma MD   Center for Pulmonary Hypertension  Section of Advanced Heart Failure   Cardiovascular Division  [facility]   [phone]

## 2017-02-27 NOTE — NURSING NOTE
Chief Complaint   Patient presents with     Follow Up For     Return PH for 1 month follow up with labs and 6MWT prior.

## 2017-02-28 ENCOUNTER — HOSPITAL ENCOUNTER (OUTPATIENT)
Dept: CARDIAC REHAB | Facility: CLINIC | Age: 42
End: 2017-02-28
Attending: INTERNAL MEDICINE
Payer: COMMERCIAL

## 2017-02-28 DIAGNOSIS — G47.9 DISTURBANCE IN SLEEP BEHAVIOR: ICD-10-CM

## 2017-02-28 PROCEDURE — 40000244 ZZH STATISTIC VISIT PULM REHAB: Performed by: REHABILITATION PRACTITIONER

## 2017-02-28 PROCEDURE — G0239 OTH RESP PROC, GROUP: HCPCS | Performed by: REHABILITATION PRACTITIONER

## 2017-02-28 NOTE — TELEPHONE ENCOUNTER
Alprazolam 0.5mg      Last Written Prescription Date: 2/20/2017  Last Fill Quantity: 40,  # refills: 0   Last Office Visit with [facility], [facility] or [facility] prescribing provider: 11/7/2016                                             Please fax or bring signed prescription to [facility].    Thank you,  Lisa Monk [facility]

## 2017-03-01 RX ORDER — ALPRAZOLAM 0.5 MG
TABLET ORAL
Qty: 40 TABLET | Refills: 0 | Status: SHIPPED | OUTPATIENT
Start: 2017-03-01 | End: 2017-03-08

## 2017-03-06 DIAGNOSIS — M19.90 INFLAMMATORY ARTHRITIS: ICD-10-CM

## 2017-03-06 NOTE — TELEPHONE ENCOUNTER
mscontin  Last Written Prescription Date: 2/8/17  Last Fill Quantity: 60,  # refills: 0   Last Office Visit with G, P or [facility] prescribing provider: 2/27/17

## 2017-03-08 DIAGNOSIS — G47.9 DISTURBANCE IN SLEEP BEHAVIOR: ICD-10-CM

## 2017-03-09 ENCOUNTER — HOSPITAL ENCOUNTER (OUTPATIENT)
Dept: CARDIAC REHAB | Facility: CLINIC | Age: 42
End: 2017-03-09
Attending: INTERNAL MEDICINE
Payer: COMMERCIAL

## 2017-03-09 VITALS — HEIGHT: 62 IN | WEIGHT: 124 LBS | BODY MASS INDEX: 22.82 KG/M2

## 2017-03-09 PROCEDURE — G0239 OTH RESP PROC, GROUP: HCPCS | Performed by: REHABILITATION PRACTITIONER

## 2017-03-09 PROCEDURE — 40000244 ZZH STATISTIC VISIT PULM REHAB: Performed by: REHABILITATION PRACTITIONER

## 2017-03-09 RX ORDER — MORPHINE SULFATE 15 MG/1
15 TABLET, FILM COATED, EXTENDED RELEASE ORAL 2 TIMES DAILY
Qty: 60 TABLET | Refills: 0 | Status: SHIPPED | OUTPATIENT
Start: 2017-03-09 | End: 2017-04-05

## 2017-03-09 ASSESSMENT — ACTIVITIES OF DAILY LIVING (ADL): ADL_LIMITATIONS: STAIR CLIMBING

## 2017-03-09 ASSESSMENT — PULMONARY FUNCTION TESTS
FEV1: 82
FVC_PERCENT_PREDICTED: 82
FVC: 81

## 2017-03-09 ASSESSMENT — 6 MINUTE WALK TEST (6MWT)
TOTAL DISTANCE WALKED: 220.98
GENDER SELECTION: FEMALE
FEMALE CALC: 633.27
MALE CALC: 578.26

## 2017-03-09 ASSESSMENT — DUKE ACTIVITY SCORE INDEX (DASI)
VO2_PEAK: 21.94
DASI METS SCORE: 6.27

## 2017-03-09 NOTE — PROGRESS NOTES
03/09/17 1100   Session   Session 60 Day Individualized Treatment Plan   Certified through this date 04/07/17   Type Reassessment   General Information   Treatment Diagnosis Interstitial Pulmonary Fibrosis   Onset Date 01/01/11   Hospital Location [facility], [place]   Outpatient Pulmonary Rehab Start Date 01/13/17   Primary Physician Dr. Loya   Pulmonolgist Dr. Upton   General Information Comments .pmh   Untoward Events/Exacerbations/Hospitalizations   Untoward Events/Exacerbations/Hospitalizations Cardiac   Cardiac Edema   Edema Date 12/15/16   Sputum   Sputum Production Amount None   Sputum Production Color Yellow   Sputum Production Consistency Thick   Tobacco History   Tobacco Former smoker   Tobacco Habit Cigarettes   Years Smoked 20   Average Packs Per Day 1/2   Quit Date or Planned Quit Date 12/14/16   Interventions Planned Check in Regularly, Offer Support to Reduce Risk of Relapse   Interventions Completed Checked in regularly, offered support as needed   Medications   Short-Acting Beta Agonist Prescribed, taking as prescribed   Long-Acting Anticholinergic Prescribed, taking as prescribed   Pain   Patient Currently in Pain No   Pain Comments Is on prednisone with relief for joints   Falls Screen   Have you fallen two or more times in the past year? Yes   Have you fallen and had an injury in the past year? No   Referral initiated to physical therapy No   Living and Work Status   Living Arrangements house   Support System Live with an adult   Environmental Factors Pets;Plants;Air quality;No concerns   ADL Limitations Stair climbing   Initial Duke Activity Status Index (DASI) score. A measure of functional capacity. The goal is to have a pre-program raw score of 9.95 (~4 METs) or above 28.7   Initial DASI VO2 Peak (ml*kg-1*min-1) 21.94   Initial DASI MET Level 6.27   Occupation , cook   Return to Employment Not employed  (Disabled)   Physical Assessments   Incisions Not  "applicable   Edema Not assessed   Left Lung Sounds not assessed   Right Lung Sounds not assessed   Pulmonary Function Test (PFTs)   PFT Results Available   Date Completed 01/04/17   FVC Actual 81   % Predicted FVC 82   FEV1 Actual 82   Individualized Treatment Plan   Sessions Scheduled 30   Sessions Attended 14   Oxygen Use   Supplemental Oxygen Needed Yes   Delivery Device Nasal Cannula   Liter Flow at Rest 4   Liter Flow with ADLS 4   Liter Flow During Exercise 4   Liter Flow With Sleep 4   Evaluated on Home System? Continuous flow   Interventions Recommended Continue to monitor SpO2 at rest and with exercise on current O2 settings   Exercise Prescription   Mode Treadmill;Nustep;Arm Ergometer/UBE;Weights   Frequency 2 days/week   Duration/Time 30-45 min   THR (85% of age predicted max heart rate)  152.15   Effort Rating (0-10) 4-6   Progression of Exercise Increase speed on treadmill by 0.1 mph per week   Oxygen Titration with Exercise > 88% with exercise   Comments SaO2 has dropped to 84 during last ex. session- patient asymptomatic.   Exercise Assessment   6 Minute Walk Predicted - Gender Selection Female   6 Minute Walk Predicted (Female) 633.27   6 Minute Walk Predicted (Male) 578.26   6 Minute Walk Distance (Initial) 220.98 Meters   Resting HR 70   Exercise HR 88   SpO2 96   Exercise SpO2 88   Current MET level 2.4   Exercise Tolerance poor   Normal Limits Discussed Yes   Current Symptoms at Home Dyspnea;Fatigue   Current Symptoms in Rehab Denies symptoms   Limitations Arthritis   Nutrition Management   Age 41   Height 1.575 m (5' 2.01\")   Weight 56.2 kg (124 lb)   BMI (Calculated) 22.72   Interventions Planned NA   Psychosocial   Initial Patient Health Questionnaire -9 (PHQ-9) for depression. To notify physician if pre-score >9. 11   Initial Shortness of Breath Questionnaire (SOBQ) score. The goal is to reduce the score pre to post program. 40   Intervention Planned Patient to identify 2-3 coping mechanisms " to decrease stress and anxiety;Patient to attend appropriate education class;Introduce relaxation techniques to assist with decreased anxiety   Psychosocial Comments PT reports she has anxiety which she takes medication for which is situation based.    Stages of Change   Aerobic Exercise Preparation   Physical Activity Preparation   Recommended diet Action   Stress Contemplation   Smoking Cessation Action   Oxygen Usage Maintenance   Current Home Exercise   Type of Exercise None   Frequency (days per week) 0   Duration (minutes per session) 0   Recommended Home Exercise Prescription   Type of Exercise Walking   Frequency (Days per week) 2-3   Duration (minutes per session) 15-30 min   Effort Rating Recommended (0-10) Scale  4-6/10   30 Day Exercise Plan Increase daily activity and  walk 10 min 2x/day on nonPR days.   Learning Assessment   Learner Patient   Primary Language English   Preferred Learning Style Listening;Reading;Demonstration;Pictures/Video   Barriers to Learning No barriers noted   Patient Education/Referrals   Education Recommended Activities of Daily Living;Breathing Techniques;Community Resources/Exacerbation Management;Emotional Aspects of CLD;Energy Conservation;Exercise Principles;Medication Overview;Nutrition;Panic and Anxiety   Education Attended Panic and Anxiety;Community Resources/Exacerbation Management   Follow-up/On-going Support   Provider follow-up needed on the following No follow-up needed   Pulmonary Rehab Goals   Pulmonary Rehab Goals 1;2;3   Goal 1   Goal Patient will increase treadmill speed to 1.9-2 mph    Target Date 04/09/17   Progress Towards Goal Pt wants to increase her strengh and endurance so that she is strong enough to havea double lung transplant. 2/9: Patient walking 20-25 min on TDM and 20 min on Nustep, she is lifting 3# free wts 12 reps 6 ex.3/9 currently able to maintain saturations at 1.6 mph, patient would like to be able to walk increased distances with less  dyspnea   Goal 2   Goal Pt will move from the action phase to maintenance phase in refererance to smoking.   Target Date 04/14/17   Progress Towards Goal Pt will remain smoke free. 2/9: Patient is not smoking. 3/9 remains smoke free, will have 6 months without cigarette on 4/14/17   Goal 3   Goal Patient will learn techniques to reduce dyspnea while pulling weeks   Target Date 05/09/17   Progress Towards Goal 3/9 Will attend ADL education and discuss specific techniques with staff.   Assessment   Assessment Ellen continues to make slow progress in pulmonary rehabilitation over the past month. She has been instructed by her pulmonologist & cardiologist to increase oxygen flow to 4 lpm. She has started a new pulmonary medication in hopes of improvement in lung function. This was started yesterday. She is hoping to increase lung function as she feels this is limiting her exercise capacity, she feels her body could perform better if this were to improve. She would like to increase her speed on the treadmill and learn adaptions to reduce dyspnea with ADL's, especially gardening activities. She is anticipating lung transplant, with the possibility of being eligible on April 2017.      I have reviewed and agree with this patient s individual treatment plan and exercise prescription for respiratory therapy.  Please see    individual treatment plan for details of progress and plan.

## 2017-03-09 NOTE — TELEPHONE ENCOUNTER
Xanax 0.5mg      Last Written Prescription Date: 03/01/2017  Last Fill Quantity: 40,  # refills: 0   Last Office Visit with [facility] or [facility] prescribing provider: 10/28/2016    Amber Rushing, Pharmacy Technician  [facility]  [phone]

## 2017-03-09 NOTE — TELEPHONE ENCOUNTER
Patient is calling requesting another provider to address this in Dr. Loya's absence. Please advise

## 2017-03-10 DIAGNOSIS — J84.9 ILD (INTERSTITIAL LUNG DISEASE) (H): ICD-10-CM

## 2017-03-10 DIAGNOSIS — R07.81 PLEURITIC CHEST PAIN: ICD-10-CM

## 2017-03-10 RX ORDER — ALPRAZOLAM 0.5 MG
TABLET ORAL
Qty: 40 TABLET | Refills: 0 | Status: SHIPPED | OUTPATIENT
Start: 2017-03-10 | End: 2017-03-17

## 2017-03-10 RX ORDER — TIOTROPIUM BROMIDE 18 UG/1
CAPSULE ORAL; RESPIRATORY (INHALATION)
Qty: 30 CAPSULE | Refills: 1 | Status: SHIPPED | OUTPATIENT
Start: 2017-03-10 | End: 2017-07-11

## 2017-03-10 NOTE — TELEPHONE ENCOUNTER
Oxycodone/APAP 10-325mg      Last Written Prescription Date: 2/26/2017  Last Fill Quantity: 60,  # refills: 0   Last Office Visit with [facility], [facility] or [facility] prescribing provider: 1/20/2017                                             Please bring signed prescription to [facility] if approved.    Thank you,  Lisa Monk, [facility]

## 2017-03-10 NOTE — TELEPHONE ENCOUNTER
Spiriva       Last Written Prescription Date: 01/04/201  Last Fill Quantity: one box,   refills: 1  Last Office Visit with [facility], [facility] or [facility] prescribing provider  01/04/2017    Thanks  Rosalie Bolanos [facility]   [phone]

## 2017-03-13 RX ORDER — OXYCODONE AND ACETAMINOPHEN 10; 325 MG/1; MG/1
1 TABLET ORAL EVERY 6 HOURS PRN
Qty: 60 TABLET | Refills: 0 | Status: SHIPPED | OUTPATIENT
Start: 2017-03-13 | End: 2017-03-23

## 2017-03-14 ENCOUNTER — HOSPITAL ENCOUNTER (OUTPATIENT)
Dept: CARDIAC REHAB | Facility: CLINIC | Age: 42
End: 2017-03-14
Attending: INTERNAL MEDICINE
Payer: COMMERCIAL

## 2017-03-14 VITALS — BODY MASS INDEX: 22.67 KG/M2 | WEIGHT: 124 LBS

## 2017-03-14 LAB — FIO2-PRE: 21 %

## 2017-03-14 PROCEDURE — 40000244 ZZH STATISTIC VISIT PULM REHAB

## 2017-03-14 PROCEDURE — G0239 OTH RESP PROC, GROUP: HCPCS

## 2017-03-16 ENCOUNTER — HOSPITAL ENCOUNTER (OUTPATIENT)
Dept: CARDIAC REHAB | Facility: CLINIC | Age: 42
End: 2017-03-16
Attending: INTERNAL MEDICINE
Payer: COMMERCIAL

## 2017-03-16 ENCOUNTER — HOSPITAL ENCOUNTER (OUTPATIENT)
Dept: CARDIAC REHAB | Facility: CLINIC | Age: 42
Discharge: HOME | End: 2017-03-16
Attending: INTERNAL MEDICINE | Admitting: INTERNAL MEDICINE
Payer: COMMERCIAL

## 2017-03-16 VITALS — WEIGHT: 124 LBS | BODY MASS INDEX: 22.67 KG/M2

## 2017-03-16 DIAGNOSIS — R05.9 COUGH: Primary | ICD-10-CM

## 2017-03-16 PROCEDURE — 40000244 ZZH STATISTIC VISIT PULM REHAB: Performed by: REHABILITATION PRACTITIONER

## 2017-03-16 PROCEDURE — G0239 OTH RESP PROC, GROUP: HCPCS | Performed by: REHABILITATION PRACTITIONER

## 2017-03-16 PROCEDURE — 94620 ZZHC PULMONARY STRESS TEST, SIMPLE: CPT | Performed by: REHABILITATION PRACTITIONER

## 2017-03-16 RX ORDER — AZITHROMYCIN 250 MG/1
TABLET, FILM COATED ORAL
Qty: 6 TABLET | Refills: 0 | Status: SHIPPED | OUTPATIENT
Start: 2017-03-16 | End: 2017-04-10

## 2017-03-16 NOTE — PROGRESS NOTES
"SIX MINUTE WALK TEST    3/16/2017    Ellen Loya MRN# [id number]   YOB: 1975 Age: 41 year old     Height: 62\"  Weight (lbs): 124#     Supplemental oxygen during the test: Yes, flow 4-6 L/min    Oxygen Appliance: Nasal Cannula    Oximetry: earlobe probe    Gait Aid: None     Pre-test Immediate post-test 5 minutes post-test   Time 5 6 5   Blood Pressure (mm Hg) 106/62     Heart rate (bpm) 84 105 84   SpO2 (%) 96% on 4lpm 85% at 1 minutes on 4lpm    84% at 2 minutes on 4lpm, earlobe probe readjusted while patient took seated rest, reading still 84%, FlO2 increaesd to 6lpm    88% at 4 minutes on 6lpm    88% at 5 minutes on 6lpm    82% at completion of 6 minutes on 6lpm 94% on 6lpm   Rated Perceived Dyspnea (Mariel Scale)   7 -- Severe shortness of breath      Rated Perceived Exertion (Mariel Scale)  6 -- Stronger        Total distance walked in 6 minutes: 542 feet, 165 meters    Paused during test?Yes  Number of pauses: 2  Total Time stopped: 2 minutes    Stopped test before 6 minutes? No      Did the patient experience any pain or discomfort during the test? No    Comments: Patient demonstrated good knowledge and cooperation during walk procedure. She is also able to demonstrate appropriate pursed lip breathing technique during exercise. Dyspnea and desaturation limited walk distance.    Oxygen Titration Required: Yes  Resting oxygen requirement: 4 Liters on Nasal Cannula  Exercise oxygen requirement: 6 Liters on Nasal Cannula    Performing Staff: ELIZA Gutierrez          "

## 2017-03-16 NOTE — TELEPHONE ENCOUNTER
Reason for Call:  Same Day Appointment, Requested Provider:  Ignacio Loya M.D.    PCP: Ignacio Loya    Reason for visit: poss sinus inf    Duration of symptoms: 1 wk    Have you been treated for this in the past? No    Additional comments: pt is currently downstairs at [facility] for the next 15 minutes. If [facility] cant see pt now is he able to call in a rx? [facility].    Can we leave a detailed message on this number? YES    Phone number patient can be reached at:     Best Time:     Call taken on 3/16/2017 at 11:41 AM by Gardenia Mccurdy

## 2017-03-17 ENCOUNTER — PRE VISIT (OUTPATIENT)
Dept: CARDIOLOGY | Facility: CLINIC | Age: 42
End: 2017-03-17

## 2017-03-17 DIAGNOSIS — I27.20 PULMONARY HYPERTENSION (H): Primary | ICD-10-CM

## 2017-03-17 DIAGNOSIS — G47.9 DISTURBANCE IN SLEEP BEHAVIOR: ICD-10-CM

## 2017-03-17 DIAGNOSIS — R06.02 SOB (SHORTNESS OF BREATH): ICD-10-CM

## 2017-03-17 RX ORDER — ALPRAZOLAM 0.5 MG
TABLET ORAL
Qty: 40 TABLET | Refills: 0 | Status: SHIPPED | OUTPATIENT
Start: 2017-03-17 | End: 2017-03-28

## 2017-03-17 NOTE — TELEPHONE ENCOUNTER
Xanax 0.5mg      Last Written Prescription Date: 03/10/2017  Last Fill Quantity: 40,  # refills: 0   Last Office Visit with [facility], [facility] or [facility] prescribing provider: 11/07/2016    Joy Fuentes CPhT  [facility] [place]  [phone]

## 2017-03-20 ENCOUNTER — OFFICE VISIT (OUTPATIENT)
Dept: CARDIOLOGY | Facility: CLINIC | Age: 42
End: 2017-03-20
Attending: INTERNAL MEDICINE
Payer: COMMERCIAL

## 2017-03-20 VITALS
HEIGHT: 62 IN | DIASTOLIC BLOOD PRESSURE: 69 MMHG | HEART RATE: 69 BPM | SYSTOLIC BLOOD PRESSURE: 102 MMHG | OXYGEN SATURATION: 99 % | WEIGHT: 123.9 LBS | BODY MASS INDEX: 22.8 KG/M2

## 2017-03-20 DIAGNOSIS — I27.20 PULMONARY HYPERTENSION (H): Primary | ICD-10-CM

## 2017-03-20 DIAGNOSIS — I27.20 PULMONARY HYPERTENSION (H): ICD-10-CM

## 2017-03-20 DIAGNOSIS — R06.02 SOB (SHORTNESS OF BREATH): ICD-10-CM

## 2017-03-20 LAB
ANION GAP SERPL CALCULATED.3IONS-SCNC: 9 MMOL/L (ref 3–14)
BUN SERPL-MCNC: 9 MG/DL (ref 7–30)
CALCIUM SERPL-MCNC: 9 MG/DL (ref 8.5–10.1)
CHLORIDE SERPL-SCNC: 102 MMOL/L (ref 94–109)
CO2 SERPL-SCNC: 30 MMOL/L (ref 20–32)
CREAT SERPL-MCNC: 0.69 MG/DL (ref 0.52–1.04)
ERYTHROCYTE [DISTWIDTH] IN BLOOD BY AUTOMATED COUNT: 13.9 % (ref 10–15)
GFR SERPL CREATININE-BSD FRML MDRD: NORMAL ML/MIN/1.7M2
GLUCOSE SERPL-MCNC: 88 MG/DL (ref 70–99)
HCT VFR BLD AUTO: 42 % (ref 35–47)
HGB BLD-MCNC: 13.2 G/DL (ref 11.7–15.7)
MCH RBC QN AUTO: 30.5 PG (ref 26.5–33)
MCHC RBC AUTO-ENTMCNC: 31.4 G/DL (ref 31.5–36.5)
MCV RBC AUTO: 97 FL (ref 78–100)
NT-PROBNP SERPL-MCNC: 288 PG/ML (ref 0–125)
PLATELET # BLD AUTO: 393 10E9/L (ref 150–450)
POTASSIUM SERPL-SCNC: 4 MMOL/L (ref 3.4–5.3)
RBC # BLD AUTO: 4.33 10E12/L (ref 3.8–5.2)
SODIUM SERPL-SCNC: 141 MMOL/L (ref 133–144)
WBC # BLD AUTO: 10 10E9/L (ref 4–11)

## 2017-03-20 PROCEDURE — 36415 COLL VENOUS BLD VENIPUNCTURE: CPT | Performed by: INTERNAL MEDICINE

## 2017-03-20 PROCEDURE — 83880 ASSAY OF NATRIURETIC PEPTIDE: CPT | Performed by: INTERNAL MEDICINE

## 2017-03-20 PROCEDURE — 99214 OFFICE O/P EST MOD 30 MIN: CPT | Mod: GC | Performed by: INTERNAL MEDICINE

## 2017-03-20 PROCEDURE — 80048 BASIC METABOLIC PNL TOTAL CA: CPT | Performed by: INTERNAL MEDICINE

## 2017-03-20 PROCEDURE — 85027 COMPLETE CBC AUTOMATED: CPT | Performed by: INTERNAL MEDICINE

## 2017-03-20 PROCEDURE — 99213 OFFICE O/P EST LOW 20 MIN: CPT | Mod: ZF

## 2017-03-20 ASSESSMENT — PAIN SCALES - GENERAL: PAINLEVEL: NO PAIN (0)

## 2017-03-20 NOTE — NURSING NOTE
Chief Complaint   Patient presents with     Follow Up For     Return PH for 3 week follow up s/p Tyvaso initiation with labs prior.

## 2017-03-20 NOTE — NURSING NOTE
Cardiac Testing: Patient given instructions regarding  Six minute walk test. Discussed purpose, preparation, procedure and when to expect results reported back to the patient. Patient demonstrated understanding of this information and agreed to call with further questions or concerns.  Med Reconcile: Reviewed and verified all current medications with the patient. The updated medication list was printed and given to the patient.  Return Appointment: Patient given instructions regarding scheduling next clinic visit. Patient demonstrated understanding of this information and agreed to call with further questions or concerns.  Patient stated she understood all health information given and agreed to call with further questions or concerns.    Medication Changes:  No medication changes at this time. Please continue current medication regiment.    Patient Instructions:  Check-In  Time Check-In Location Estimated Length Procedure   Name        [facility]   3rd Floor 30 minutes Six Minute Walk Test**   Procedure Preparations & Instructions     This is a non-invasive procedure and does NOT require any preparation       Follow up Appointment Information:  Follow up in 2-3 weeks with six minute walk test

## 2017-03-20 NOTE — PROGRESS NOTES
"2017        Dawson Upton MD   [facility]   [address] [place] [address]       RE: Ellen Loya   MRN: [id number]   : 1975       Dear Dr. Upton:       We had the pleasure of seeing Ms. Ellen Loya for followup in our Pulmonary Hypertension Clinic at the [facility]. As you know, she is a 41-year-old female with pulmonary arterial hypertension in the setting of combined pulmonary fibrosis, emphysema and inflammatory arthritis. She is currently on Adcirca therapy.     Since her last visit, she is feeling better.  She has not had problems with fluid retention.  She does continue to have dyspnea and struggles walking further than a block.  She will have occasional chest pain when exerting herself.  She has started her inhaled Tyvaso and has noticed some increased energy but not much change in her dyspnea.  The increase of Tyvaso dose from 3 to 6 breathes a day was difficult for her and she felt more dyspneic for a few days but that has subsided.  She was also recently treated for a sinus infection for which she is now better.     PAST MEDICAL HISTORY:  Past Medical History   Diagnosis Date     Acute bronchitis 07     Admit. Discharged 07     Anxiety      Arthritis      h/o \"seronegative rheumatoid arthritis\" since age 30, however no evidence of active arthritis by Rheum eval [id number]     ASCUS favor benign 5/15/14     neg HPV     ILD (interstitial lung disease) (H)      seen on chest CT ; methotrexate stopped      Lung nodule      KM nodule; EBUS 2014 of lymph nodes was negative; CT-guided bx  hamartoma/chondroma     Prematurity      born 6-7 weeks early     Pulmonary hypertension (H)      RHC 2016 mean PA 25     Varicella without mention of complication        FAMILY HISTORY:  Family History   Problem Relation Age of Onset     Arthritis Mother      psoriatic arthritis     Lipids Father      HEART DISEASE Father      " recent angioplasty for 3 blocked arteries.     Scleroderma Paternal Uncle      Had scleroderma ILD     Other Cancer Paternal Grandmother      lung cancer     Substance Abuse Father      Substance Abuse Brother      Asthma Brother      Depression Mother      Depression Brother      DIABETES Mother      DIABETES Maternal Grandfather      adult onset     Hypertension Father      CEREBROVASCULAR DISEASE Father      Thyroid Disease Mother      Obesity Mother      Hyperlipidemia Mother      Hyperlipidemia Father      Hyperlipidemia Maternal Grandfather      Coronary Artery Disease Father      LUNG DISEASE Father          SOCIAL HISTORY:  Social History     Social History     Marital status: Single     Spouse name: N/A     Number of children: 3     Years of education: 13     Occupational History     homemaker      Social History Main Topics     Smoking status: Former Smoker     Packs/day: 0.50     Years: 25.00     Types: Cigarettes     Start date: 12/3/1992     Quit date: 12/14/2016     Smokeless tobacco: Former User     Quit date: 12/14/2016     Alcohol use No      Comment: 5 per month or less     Drug use: No     Sexual activity: Yes     Partners: Male     Birth control/ protection: Female Surgical      Comment: Had post-partum tubal ligation.     Other Topics Concern      Service No     Blood Transfusions No     Caffeine Concern No     pop: 2c/d     Occupational Exposure No     Hobby Hazards No     Sleep Concern No     Stress Concern No     Weight Concern No     Special Diet No     Back Care No     Exercise No     Bike Helmet No     Seat Belt Yes     Self-Exams Yes     Parent/Sibling W/ Cabg, Mi Or Angioplasty Before 65f 55m? 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 Bought [employer] house in 2010; it was wet and full of mold.  She remodelled the house, did not wear a mask.       CURRENT MEDICATIONS:  Current Outpatient Prescriptions   Medication Sig  Dispense Refill     ALPRAZolam (XANAX) 0.5 MG tablet TAKE 1 TABLET BY MOUTH EVERY SIX HOURS AS NEEDED FOR ANXIETY. 40 tablet 0     azithromycin (ZITHROMAX) 250 MG tablet Two tablets first day, then one tablet daily for four days. 6 tablet 0     oxyCODONE-acetaminophen (PERCOCET)  MG per tablet Take 1 tablet by mouth every 6 hours as needed for moderate to severe pain 60 tablet 0     tiotropium (SPIRIVA HANDIHALER) 18 MCG capsule Inhale contents of one capsule daily. 30 capsule 1     morphine (MS CONTIN) 15 MG 12 hr tablet Take 1 tablet (15 mg) by mouth 2 times daily 60 tablet 0     LANsoprazole (PREVACID SOLUTAB) 30 MG ODT tab Take 1 tablet (30 mg) by mouth daily 90 tablet 3     albuterol (ALBUTEROL) 108 (90 BASE) MCG/ACT Inhaler Inhale 1-2 puffs into the lungs every 4 hours as needed for shortness of breath / dyspnea 1 Inhaler 8     predniSONE (DELTASONE) 5 MG tablet Take 4 tablets (20mg) daily x 5 days, then take 3 tablets (15mg) daily x5 days, then take 2 tablets (10mg) daily x 5 days, then 1 tablet (5mg) daily x5 days, then stop 50 tablet 0     tadalafil, PAH, (ADCIRCA) 20 MG TABS Take 2 tablets (40 mg) by mouth daily 60 tablet 11     furosemide (LASIX) 20 MG tablet Take 2 tablets (40 mg) by mouth daily 180 tablet 3     fluticasone (FLONASE) 50 MCG/ACT spray Spray 2 sprays into both nostrils daily 1 Bottle 1     albuterol (2.5 MG/3ML) 0.083% nebulizer solution Use every 4-6 hours as needed for shortness of breath 60 vial 3     order for DME Equipment being ordered: Nebulizer. Verbal order from  1 Device 0     digoxin (LANOXIN) 125 MCG tablet Take 1 tablet (125 mcg) by mouth daily 90 tablet 3     ibuprofen (ADVIL,MOTRIN) 800 MG tablet Take 1 tablet (800 mg) by mouth 3 times daily (with meals) 30 tablet 5     order for DME Equipment being ordered: Oximeter 1 Device 0     order for DME Equipment being ordered: Oxygen oximeter and mask 1 Device 0     order for DME Please provide patient with 2   "liquid oxygen tanks for portability. Spot check patient on liquid oxygen. Titrate oxygen to maintain saturations above 88%. 2 Device 0     order for DME Equipment being ordered: Oxygen    Please provide patient with a home-fill system for portability. 1 Device 99     order for DME Equipment being ordered: personal O2 oximeter. 1 Device 0     order for DME Oxygen: Patient requires supplemental Oxygen 2 LPM via nasal canula with activity. Please provide patient with portability capability. Okay to spot check patient on oxygen device, to keep sats above 90%. Oxygen will be for a lifetime. 1 Device 0       ROS:   10 point ROS negative except HPI    EXAM:  /69 (BP Location: Right arm, Patient Position: Chair, Cuff Size: Adult Regular)  Pulse 69  Ht 1.575 m (5' 2\")  Wt 56.2 kg (123 lb 14.4 oz)  SpO2 99%  BMI 22.66 kg/m2  General: appears comfortable, alert and articulate  Head: normocephalic, atraumatic  Eyes: anicteric sclera, EOMI  Neck: no adenopathy  Orophyarynx: moist mucosa, no lesions, dentition intact  Heart: regular, S1/S2, no murmur, gallop, rub, estimated JVP 6 cm  Lungs: Inspiratory crackles at the bases bilaterally  GI: soft, non-tender, bowel sounds present, no hepatosplenomegaly  Extremities: no clubbing, cyanosis or edema  Neurological: normal speech and affect, no gross motor deficits    Labs:  CBC RESULTS:  Lab Results   Component Value Date    WBC 10.6 02/27/2017    RBC 4.22 02/27/2017    HGB 12.7 02/27/2017    HCT 40.1 02/27/2017    MCV 95 02/27/2017    MCH 30.1 02/27/2017    MCHC 31.7 02/27/2017    RDW 15.3 (H) 02/27/2017     02/27/2017       CMP RESULTS:  Lab Results   Component Value Date     02/27/2017    POTASSIUM 3.7 02/27/2017    CHLORIDE 99 02/27/2017    CO2 29 02/27/2017    ANIONGAP 9 02/27/2017    GLC 77 02/27/2017    BUN 18 02/27/2017    CR 0.83 02/27/2017    GFRESTIMATED 76 02/27/2017    GFRESTBLACK >90   GFR Calc   02/27/2017    MARCIN 8.7 02/27/2017    " BILITOTAL 0.4 01/16/2017    ALBUMIN 3.6 01/16/2017    ALKPHOS 95 01/16/2017    ALT 18 01/16/2017    AST 20 01/16/2017        INR RESULTS:  Lab Results   Component Value Date    INR 1.04 01/23/2015       No components found for: CK  Lab Results   Component Value Date    MAG 2.4 (H) 12/19/2016     Lab Results   Component Value Date    NTBNPI 4168 (H) 11/30/2016       Echocardiogram 10/3/2016:  Paradoxical septal motion consistent with right ventricular pressure and volume overload is present.  Moderate to severe right ventricular dilation is present.  Global right ventricular function is mildly to moderately reduced.  Moderate to severe tricuspid insufficiency is present.  Right ventricular systolic pressure is 56mmHg above the right atrial pressure.  The inferior vena cava is normal.  No pericardial effusion is present.    RHC 11/30/2016  RA:15  RV: 60/16  PA: 60/38 (42)  PCWP: 15  CO/CI (TD): 2.6/1.6  PVR: 10.4    Inhaled nitric oxide  RA: 16  PA: 50/20 (35)  PCWP: 16  CO/CI (TD): 2.4/1.5    6MWT 2/27/2017: 290 meters with lowest saturation of 90% on 4L of supplemental oxygen    6MWT 3/20/2017: 259 meters with lowest saturation of 86% of 4L of supplemental oxygen.      Assessment and Plan: Ms. 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    1.  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 Loya is still WHO FC 3.  She is on Adcirca therapy and inhaled Tyvaso.  She has tolerated the addition of inhaled Tyvaso, but we can slow uptitration as she is having more exercise-induced hypoxemia.  We will cut her back to 4 breathes QID.  She needs a repeat 6MWT to determine if she is having exercise-induced hypoxemia prior to any dose changes.  She does need to continue with 4L of supplemental oxygen as her oxygen saturations dropped with exercise.  She is also on lasix 40 mg daily and digoxin for volume control and RV dysfunction  respectively.    It was a pleasure seeing Ms. Loya at the [facility] Pulmonary Hypertension program.  Please contact us with any questions or concerns that you may have.       Sincerely,    Carlo Frost MD, PhD  Cardiology Fellow      I have personally seen and examined the patient, and then discussed with Dr. Frost, and agree with the findings and plan in this note. I have reviewed today's vital signs, medications, labs, and imaging.     Feeling worse on the higher dose of Tyvaso (6 breaths QID). More exertional hypoxia. Will cut down to 4 breaths QID and repeat 6MWT in a week.     Sincerely,    Callie Ledesma MD   Center for Pulmonary Hypertension  Section of Advanced Heart Failure   Cardiovascular Division  [facility]   [phone]

## 2017-03-20 NOTE — LETTER
"3/20/2017      RE: Ellen Loya  [address]  [facility] [address]       Dear Colleague,    Thank you for the opportunity to participate in the care of your patient, Ellen Loya, at the [facility]. Please see a copy of my visit note below.    2017        Dawson Upton MD   [facility]   [address] [place] [address]       RE: Ellen Loya   MRN: [id number]   : 1975       Dear Dr. Upton:       We had the pleasure of seeing Ms. Ellen Loya for followup in our Pulmonary Hypertension Clinic at the [facility]. As you know, she is a 41-year-old female with pulmonary arterial hypertension in the setting of combined pulmonary fibrosis, emphysema and inflammatory arthritis. She is currently on Adcirca therapy.     Since her last visit, she is feeling better.  She has not had problems with fluid retention.  She does continue to have dyspnea and struggles walking further than a block.  She will have occasional chest pain when exerting herself.  She has started her inhaled Tyvaso and has noticed some increased energy but not much change in her dyspnea.  The increase of Tyvaso dose from 3 to 6 breathes a day was difficult for her and she felt more dyspneic for a few days but that has subsided.  She was also recently treated for a sinus infection for which she is now better.     PAST MEDICAL HISTORY:  Past Medical History   Diagnosis Date     Acute bronchitis 07     Admit. Discharged 07     Anxiety      Arthritis      h/o \"seronegative rheumatoid arthritis\" since age 30, however no evidence of active arthritis by Rheum eval [id number]     ASCUS favor benign 5/15/14     neg HPV     ILD (interstitial lung disease) (H)      seen on chest CT ; methotrexate stopped      Lung nodule      KM nodule; EBUS 2014 of lymph nodes was negative; CT-guided bx  " hamartoma/chondroma     Prematurity      born 6-7 weeks early     Pulmonary hypertension (H)      RHC 2/2016 mean PA 25     Varicella without mention of complication        FAMILY HISTORY:  Family History   Problem Relation Age of Onset     Arthritis Mother      psoriatic arthritis     Lipids Father      HEART DISEASE Father      recent angioplasty for 3 blocked arteries.     Scleroderma Paternal Uncle      Had scleroderma ILD     Other Cancer Paternal Grandmother      lung cancer     Substance Abuse Father      Substance Abuse Brother      Asthma Brother      Depression Mother      Depression Brother      DIABETES Mother      DIABETES Maternal Grandfather      adult onset     Hypertension Father      CEREBROVASCULAR DISEASE Father      Thyroid Disease Mother      Obesity Mother      Hyperlipidemia Mother      Hyperlipidemia Father      Hyperlipidemia Maternal Grandfather      Coronary Artery Disease Father      LUNG DISEASE Father          SOCIAL HISTORY:  Social History     Social History     Marital status: Single     Spouse name: N/A     Number of children: 3     Years of education: 13     Occupational History     homemaker      Social History Main Topics     Smoking status: Former Smoker     Packs/day: 0.50     Years: 25.00     Types: Cigarettes     Start date: 12/3/1992     Quit date: 12/14/2016     Smokeless tobacco: Former User     Quit date: 12/14/2016     Alcohol use No      Comment: 5 per month or less     Drug use: No     Sexual activity: Yes     Partners: Male     Birth control/ protection: Female Surgical      Comment: Had post-partum tubal ligation.     Other Topics Concern      Service No     Blood Transfusions No     Caffeine Concern No     pop: 2c/d     Occupational Exposure No     Hobby Hazards No     Sleep Concern No     Stress Concern No     Weight Concern No     Special Diet No     Back Care No     Exercise No     Bike Helmet No     Seat Belt Yes     Self-Exams Yes     Parent/Sibling W/  Cabg, Mi Or Angioplasty Before 65f 55m? 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 Bought [place] house in 2010; it was wet and full of mold.  She remodelled the house, did not wear a mask.       CURRENT MEDICATIONS:  Current Outpatient Prescriptions   Medication Sig Dispense Refill     ALPRAZolam (XANAX) 0.5 MG tablet TAKE 1 TABLET BY MOUTH EVERY SIX HOURS AS NEEDED FOR ANXIETY. 40 tablet 0     azithromycin (ZITHROMAX) 250 MG tablet Two tablets first day, then one tablet daily for four days. 6 tablet 0     oxyCODONE-acetaminophen (PERCOCET)  MG per tablet Take 1 tablet by mouth every 6 hours as needed for moderate to severe pain 60 tablet 0     tiotropium (SPIRIVA HANDIHALER) 18 MCG capsule Inhale contents of one capsule daily. 30 capsule 1     morphine (MS CONTIN) 15 MG 12 hr tablet Take 1 tablet (15 mg) by mouth 2 times daily 60 tablet 0     LANsoprazole (PREVACID SOLUTAB) 30 MG ODT tab Take 1 tablet (30 mg) by mouth daily 90 tablet 3     albuterol (ALBUTEROL) 108 (90 BASE) MCG/ACT Inhaler Inhale 1-2 puffs into the lungs every 4 hours as needed for shortness of breath / dyspnea 1 Inhaler 8     predniSONE (DELTASONE) 5 MG tablet Take 4 tablets (20mg) daily x 5 days, then take 3 tablets (15mg) daily x5 days, then take 2 tablets (10mg) daily x 5 days, then 1 tablet (5mg) daily x5 days, then stop 50 tablet 0     tadalafil, PAH, (ADCIRCA) 20 MG TABS Take 2 tablets (40 mg) by mouth daily 60 tablet 11     furosemide (LASIX) 20 MG tablet Take 2 tablets (40 mg) by mouth daily 180 tablet 3     fluticasone (FLONASE) 50 MCG/ACT spray Spray 2 sprays into both nostrils daily 1 Bottle 1     albuterol (2.5 MG/3ML) 0.083% nebulizer solution Use every 4-6 hours as needed for shortness of breath 60 vial 3     order for DME Equipment being ordered: Nebulizer. Verbal order from  1 Device 0     digoxin (LANOXIN) 125 MCG tablet Take 1 tablet (125 mcg) by mouth  "daily 90 tablet 3     ibuprofen (ADVIL,MOTRIN) 800 MG tablet Take 1 tablet (800 mg) by mouth 3 times daily (with meals) 30 tablet 5     order for DME Equipment being ordered: Oximeter 1 Device 0     order for DME Equipment being ordered: Oxygen oximeter and mask 1 Device 0     order for DME Please provide patient with 2  liquid oxygen tanks for portability. Spot check patient on liquid oxygen. Titrate oxygen to maintain saturations above 88%. 2 Device 0     order for DME Equipment being ordered: Oxygen    Please provide patient with a home-fill system for portability. 1 Device 99     order for DME Equipment being ordered: personal O2 oximeter. 1 Device 0     order for DME Oxygen: Patient requires supplemental Oxygen 2 LPM via nasal canula with activity. Please provide patient with portability capability. Okay to spot check patient on oxygen device, to keep sats above 90%. Oxygen will be for a lifetime. 1 Device 0       ROS:   10 point ROS negative except HPI    EXAM:  /69 (BP Location: Right arm, Patient Position: Chair, Cuff Size: Adult Regular)  Pulse 69  Ht 1.575 m (5' 2\")  Wt 56.2 kg (123 lb 14.4 oz)  SpO2 99%  BMI 22.66 kg/m2  General: appears comfortable, alert and articulate  Head: normocephalic, atraumatic  Eyes: anicteric sclera, EOMI  Neck: no adenopathy  Orophyarynx: moist mucosa, no lesions, dentition intact  Heart: regular, S1/S2, no murmur, gallop, rub, estimated JVP 6 cm  Lungs: Inspiratory crackles at the bases bilaterally  GI: soft, non-tender, bowel sounds present, no hepatosplenomegaly  Extremities: no clubbing, cyanosis or edema  Neurological: normal speech and affect, no gross motor deficits    Labs:  CBC RESULTS:  Lab Results   Component Value Date    WBC 10.6 02/27/2017    RBC 4.22 02/27/2017    HGB 12.7 02/27/2017    HCT 40.1 02/27/2017    MCV 95 02/27/2017    MCH 30.1 02/27/2017    MCHC 31.7 02/27/2017    RDW 15.3 (H) 02/27/2017     02/27/2017       CMP RESULTS:  Lab Results "   Component Value Date     02/27/2017    POTASSIUM 3.7 02/27/2017    CHLORIDE 99 02/27/2017    CO2 29 02/27/2017    ANIONGAP 9 02/27/2017    GLC 77 02/27/2017    BUN 18 02/27/2017    CR 0.83 02/27/2017    GFRESTIMATED 76 02/27/2017    GFRESTBLACK >90   GFR Calc   02/27/2017    MARCIN 8.7 02/27/2017    BILITOTAL 0.4 01/16/2017    ALBUMIN 3.6 01/16/2017    ALKPHOS 95 01/16/2017    ALT 18 01/16/2017    AST 20 01/16/2017        INR RESULTS:  Lab Results   Component Value Date    INR 1.04 01/23/2015       No components found for: CK  Lab Results   Component Value Date    MAG 2.4 (H) 12/19/2016     Lab Results   Component Value Date    NTBNPI 4168 (H) 11/30/2016       Echocardiogram 10/3/2016:  Paradoxical septal motion consistent with right ventricular pressure and volume overload is present.  Moderate to severe right ventricular dilation is present.  Global right ventricular function is mildly to moderately reduced.  Moderate to severe tricuspid insufficiency is present.  Right ventricular systolic pressure is 56mmHg above the right atrial pressure.  The inferior vena cava is normal.  No pericardial effusion is present.    RHC 11/30/2016  RA:15  RV: 60/16  PA: 60/38 (42)  PCWP: 15  CO/CI (TD): 2.6/1.6  PVR: 10.4    Inhaled nitric oxide  RA: 16  PA: 50/20 (35)  PCWP: 16  CO/CI (TD): 2.4/1.5    6MWT 2/27/2017: 290 meters with lowest saturation of 90% on 4L of supplemental oxygen    6MWT 3/20/2017: 259 meters with lowest saturation of 86% of 4L of supplemental oxygen.      Assessment and Plan: Ms. 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    1.  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 Loya is still WHO FC 3.  She is on Adcirca therapy and inhaled Tyvaso.  She has tolerated the addition of inhaled Tyvaso, but we can slow uptitration as she is having more exercise-induced hypoxemia.  We will cut her back to 4  breathes QID.  She needs a repeat 6MWT to determine if she is having exercise-induced hypoxemia prior to any dose changes.  She does need to continue with 4L of supplemental oxygen as her oxygen saturations dropped with exercise.  She is also on lasix 40 mg daily and digoxin for volume control and RV dysfunction respectively.    It was a pleasure seeing Ms. Loya at the [facility] Pulmonary Hypertension program.  Please contact us with any questions or concerns that you may have.       Sincerely,    Carlo Frost MD, PhD  Cardiology Fellow      I have personally seen and examined the patient, and then discussed with Dr. Frost, and agree with the findings and plan in this note. I have reviewed today's vital signs, medications, labs, and imaging.     Feeling worse on the higher dose of Tyvaso (6 breaths QID). More exertional hypoxia. Will cut down to 4 breaths QID and repeat 6MWT in a week.     Sincerely,    Callie Ledesma MD   Center for Pulmonary Hypertension  Section of Advanced Heart Failure   Cardiovascular Division  [facility]   [phone]

## 2017-03-20 NOTE — MR AVS SNAPSHOT
After Visit Summary   3/20/2017    Ellen Loya    MRN: [id number]           Patient Information     Date Of Birth          1975        Visit Information        Provider Department      3/20/2017 8:30 AM Callie Ledesma MD [facility]        Care Instructions    Medication Changes:  No medication changes at this time. Please continue current medication regiment.    Patient Instructions:  Check-In  Time Check-In Location Estimated Length Procedure   Name        [facility]   3rd Floor 30 minutes Six Minute Walk Test**   Procedure Preparations & Instructions     This is a non-invasive procedure and does NOT require any preparation       Follow up Appointment Information:  Follow up in 2-3 weeks with six minute walk test    We are located on the third floor of [facility]  Our address is     [address] [place] [address] on [address] [place] [address]    Thank you for allowing us to be a part of your care here at the [facility]    If you have questions or concerns please contact us at:    Naty Lau RN, BSN    [facility] ([facility]  Nurse Coordinator     Clinic   Pulmonary Hypertension   Pulmonary Hypertension  [facility]  (P)[phone]    (P) [phone]        (F)[phone]    ** Please note that you will NOT receive a reminder call regarding your scheduled testing, reminder calls are for provider appointments only.  If you are scheduled for testing within the [facility] system you may receive a call regarding pre-registration for billing purposes only.**     Remember to weigh yourself daily after voiding and before you consume any food or beverages and log the numbers.  If you have gained/lost 2 pounds overnight or 5 pounds in a week contact us immediately for medication adjustments or  further instructions.             Follow-ups after your visit        Follow-up notes from your care team     Return in about 3 weeks (around 4/10/2017) for with Callie, Return PH, with, 6MWT, Labs.      Your next 10 appointments already scheduled     Mar 20, 2017 10:30 AM CDT   Six Minute Walk with UC PFL 6 MINUTE WALK 1   [facility] ([facility])    [address]  3rd Floor  [facility] [address] [phone]            Mar 21, 2017 11:00 AM CDT   Treatment 60 with Asuncion Celis [facility] ([facility])    [address] [place] [address]   [phone]            Mar 23, 2017 11:00 AM CDT   Treatment 60 with ELIZA Gutierrez   [facility]iac Re[facility] ([facility])    [address] [place] [address]   [phone]            Mar 28, 2017 11:00 AM CDT   Treatment 60 with MARISSA Varner   [facility] ([facility])    [address] [place] [address]   [phone]            Mar 30, 2017 11:00 AM CDT   Treatment 60 with ELIZA Gutierrez   [facility] ([facility])    [address] [place] [address]   [phone]            Apr 10, 2017  7:30 AM CDT   LAB with  LAB    Health Lab ([facility])    [address]  [address] [facility] [address]   [phone]           Patient must bring picture ID.  Patient should be prepared to give a urine specimen  Please do not eat 10-12 hours before your appointment if you are coming in fasting for labs on lipids, cholesterol, or glucose (sugar).  Pregnant women should follow their Care Team instructions. Water with medications is okay. Do not drink coffee or other fluids.   If you have concerns about taking  your medications, please ask at office or if scheduling via [facility], send a  message by clicking on Secure Messaging, Message Your Care Team.            Apr 10, 2017  8:00 AM CDT   (Arrive by 7:45 AM)   RETURN PRIMARY PULMONARY with Callie Ledesma MD   [facility] ([facility])    [address] [place] [address] [phone]            Apr 10, 2017  8:30 AM CDT   Six Minute Walk with UC PFL 6 MINUTE WALK 1   [facility] Pulmonary Function Testing ([facility])    [address] [place] [id number] [phone]            Apr 12, 2017  8:30 AM CDT   PFT VISIT with UC PFL A   [facility] Pulmonary Function Testing ([facility])    [address] [place] [address] [phone]            Apr 12, 2017  9:00 AM CDT   (Arrive by 8:45 AM)   Return Interstitial Lung with Dawson Upton MD   [facility] ([facility])    [address] [place] [id number] [phone]              Who to contact     If you have questions or need follow up information about today's clinic visit or your schedule please contact [facility] directly at [phone].  Normal or non-critical lab and imaging results will be communicated to you by AppGeekhart, letter or phone within 4 business days after the clinic has received the results. If you do not hear from us within 7 days, please contact the clinic through MyChart or phone. If you have a critical or abnormal lab result, we will notify you by phone as soon as possible.  Submit refill requests through CoSchedule or call your pharmacy and they will forward the refill request to us. Please allow 3 business days for your refill to be completed.          Additional Information About Your Visit        CoSchedule Information     CoSchedule gives you secure access to your electronic health record. If you see a primary care provider, you can  "also send messages to your care team and make appointments. If you have questions, please call your primary care clinic.  If you do not have a primary care provider, please call [phone] and they will assist you.        Care EveryWhere ID     This is your Care EveryWhere ID. This could be used by other organizations to access your [facility] medical records  [id number]        Your Vitals Were     Pulse Height Pulse Oximetry BMI (Body Mass Index)          69 1.575 m (5' 2\") 99% 22.66 kg/m2         Blood Pressure from Last 3 Encounters:   03/20/17 102/69   02/27/17 107/64   01/20/17 105/71    Weight from Last 3 Encounters:   03/20/17 56.2 kg (123 lb 14.4 oz)   03/16/17 56.2 kg (124 lb)   03/14/17 56.2 kg (124 lb)              Today, you had the following     No orders found for display       Primary Care Provider Office Phone # Fax #    Ignacio Loya [phone]       [facility] [address] [place] [address]        Thank you!     Thank you for choosing [facility]  for your care. Our goal is always to provide you with excellent care. Hearing back from our patients is one way we can continue to improve our services. Please take a few minutes to complete the written survey that you may receive in the mail after your visit with us. Thank you!             Your Updated Medication List - Protect others around you: Learn how to safely use, store and throw away your medicines at www.disposemymeds.org.          This list is accurate as of: 3/20/17  9:18 AM.  Always use your most recent med list.                   Brand Name Dispense Instructions for use    * albuterol (2.5 MG/3ML) 0.083% neb solution     60 vial    Use every 4-6 hours as needed for shortness of breath       * albuterol 108 (90 BASE) MCG/ACT Inhaler    albuterol    1 Inhaler    Inhale 1-2 puffs into the lungs every 4 hours as needed for shortness of breath / dyspnea       ALPRAZolam 0.5 MG tablet    " XANAX    40 tablet    TAKE 1 TABLET BY MOUTH EVERY SIX HOURS AS NEEDED FOR ANXIETY.       azithromycin 250 MG tablet    ZITHROMAX    6 tablet    Two tablets first day, then one tablet daily for four days.       digoxin 125 MCG tablet    LANOXIN    90 tablet    Take 1 tablet (125 mcg) by mouth daily       fluticasone 50 MCG/ACT spray    FLONASE    1 Bottle    Spray 2 sprays into both nostrils daily       furosemide 20 MG tablet    LASIX    180 tablet    Take 2 tablets (40 mg) by mouth daily       ibuprofen 800 MG tablet    ADVIL/MOTRIN    30 tablet    Take 1 tablet (800 mg) by mouth 3 times daily (with meals)       LANsoprazole 30 MG ODT tab    PREVACID SOLUTAB    90 tablet    Take 1 tablet (30 mg) by mouth daily       morphine 15 MG 12 hr tablet    MS CONTIN    60 tablet    Take 1 tablet (15 mg) by mouth 2 times daily       * order for DME     1 Device    Oxygen: Patient requires supplemental Oxygen 2 LPM via nasal canula with activity. Please provide patient with portability capability. Okay to spot check patient on oxygen device, to keep sats above 90%. Oxygen will be for a lifetime.       * order for DME     1 Device    Equipment being ordered: personal O2 oximeter.       * order for DME     1 Device    Equipment being ordered: Oxygen  Please provide patient with a home-fill system for portability.       * order for DME     2 Device    Please provide patient with 2  liquid oxygen tanks for portability. Spot check patient on liquid oxygen. Titrate oxygen to maintain saturations above 88%.       * order for DME     1 Device    Equipment being ordered: Oxygen oximeter and mask       * order for DME     1 Device    Equipment being ordered: Oximeter       * order for DME     1 Device    Equipment being ordered: Nebulizer. Verbal order from        oxyCODONE-acetaminophen  MG per tablet    PERCOCET    60 tablet    Take 1 tablet by mouth every 6 hours as needed for moderate to severe pain       predniSONE  5 MG tablet    DELTASONE    50 tablet    Take 4 tablets (20mg) daily x 5 days, then take 3 tablets (15mg) daily x5 days, then take 2 tablets (10mg) daily x 5 days, then 1 tablet (5mg) daily x5 days, then stop       tadalafil (PAH) 20 MG Tabs    ADCIRCA    60 tablet    Take 2 tablets (40 mg) by mouth daily       tiotropium 18 MCG capsule    SPIRIVA HANDIHALER    30 capsule    Inhale contents of one capsule daily.       TYVASO STARTER 0.6 MG/ML Soln neb solution   Generic drug:  treprostinil      Inhale into the lungs every 4 hours (while awake for Tyvaso)       * Notice:  This list has 9 medication(s) that are the same as other medications prescribed for you. Read the directions carefully, and ask your doctor or other care provider to review them with you.

## 2017-03-20 NOTE — LETTER
"3/20/2017      RE: Ellen Loya  [address]  [facility] [address]       2017        Dawson Upton MD   P Pulmonary   [address] [place] [address]       RE: Ellen Loya   MRN: [id number]   : 1975       Dear Dr. Upton:       We had the pleasure of seeing Ms. Ellen Loya for followup in our Pulmonary Hypertension Clinic at the [facility]. As you know, she is a 41-year-old female with pulmonary arterial hypertension in the setting of combined pulmonary fibrosis, emphysema and inflammatory arthritis. She is currently on Adcirca therapy.     Since her last visit, she is feeling better.  She has not had problems with fluid retention.  She does continue to have dyspnea and struggles walking further than a block.  She will have occasional chest pain when exerting herself.  She has started her inhaled Tyvaso and has noticed some increased energy but not much change in her dyspnea.  The increase of Tyvaso dose from 3 to 6 breathes a day was difficult for her and she felt more dyspneic for a few days but that has subsided.  She was also recently treated for a sinus infection for which she is now better.     PAST MEDICAL HISTORY:  Past Medical History   Diagnosis Date     Acute bronchitis 07     Admit. Discharged 07     Anxiety      Arthritis      h/o \"seronegative rheumatoid arthritis\" since age 30, however no evidence of active arthritis by Rheum eval [id number]     ASCUS favor benign 5/15/14     neg HPV     ILD (interstitial lung disease) (H)      seen on chest CT ; methotrexate stopped      Lung nodule      KM nodule; EBUS 2014 of lymph nodes was negative; CT-guided bx  hamartoma/chondroma     Prematurity      born 6-7 weeks early     Pulmonary hypertension (H)      RHC 2016 mean PA 25     Varicella without mention of complication        FAMILY HISTORY:  Family History   Problem Relation Age of Onset     Arthritis " Mother      psoriatic arthritis     Lipids Father      HEART DISEASE Father      recent angioplasty for 3 blocked arteries.     Scleroderma Paternal Uncle      Had scleroderma ILD     Other Cancer Paternal Grandmother      lung cancer     Substance Abuse Father      Substance Abuse Brother      Asthma Brother      Depression Mother      Depression Brother      DIABETES Mother      DIABETES Maternal Grandfather      adult onset     Hypertension Father      CEREBROVASCULAR DISEASE Father      Thyroid Disease Mother      Obesity Mother      Hyperlipidemia Mother      Hyperlipidemia Father      Hyperlipidemia Maternal Grandfather      Coronary Artery Disease Father      LUNG DISEASE Father          SOCIAL HISTORY:  Social History     Social History     Marital status: Single     Spouse name: N/A     Number of children: 3     Years of education: 13     Occupational History     homemaker      Social History Main Topics     Smoking status: Former Smoker     Packs/day: 0.50     Years: 25.00     Types: Cigarettes     Start date: 12/3/1992     Quit date: 12/14/2016     Smokeless tobacco: Former User     Quit date: 12/14/2016     Alcohol use No      Comment: 5 per month or less     Drug use: No     Sexual activity: Yes     Partners: Male     Birth control/ protection: Female Surgical      Comment: Had post-partum tubal ligation.     Other Topics Concern      Service No     Blood Transfusions No     Caffeine Concern No     pop: 2c/d     Occupational Exposure No     Hobby Hazards No     Sleep Concern No     Stress Concern No     Weight Concern No     Special Diet No     Back Care No     Exercise No     Bike Helmet No     Seat Belt Yes     Self-Exams Yes     Parent/Sibling W/ Cabg, Mi Or Angioplasty Before 65f 55m? 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 Bought [place] house in 2010; it was wet and full of mold.  She remodelled the house, did not wear a mask.        CURRENT MEDICATIONS:  Current Outpatient Prescriptions   Medication Sig Dispense Refill     ALPRAZolam (XANAX) 0.5 MG tablet TAKE 1 TABLET BY MOUTH EVERY SIX HOURS AS NEEDED FOR ANXIETY. 40 tablet 0     azithromycin (ZITHROMAX) 250 MG tablet Two tablets first day, then one tablet daily for four days. 6 tablet 0     oxyCODONE-acetaminophen (PERCOCET)  MG per tablet Take 1 tablet by mouth every 6 hours as needed for moderate to severe pain 60 tablet 0     tiotropium (SPIRIVA HANDIHALER) 18 MCG capsule Inhale contents of one capsule daily. 30 capsule 1     morphine (MS CONTIN) 15 MG 12 hr tablet Take 1 tablet (15 mg) by mouth 2 times daily 60 tablet 0     LANsoprazole (PREVACID SOLUTAB) 30 MG ODT tab Take 1 tablet (30 mg) by mouth daily 90 tablet 3     albuterol (ALBUTEROL) 108 (90 BASE) MCG/ACT Inhaler Inhale 1-2 puffs into the lungs every 4 hours as needed for shortness of breath / dyspnea 1 Inhaler 8     predniSONE (DELTASONE) 5 MG tablet Take 4 tablets (20mg) daily x 5 days, then take 3 tablets (15mg) daily x5 days, then take 2 tablets (10mg) daily x 5 days, then 1 tablet (5mg) daily x5 days, then stop 50 tablet 0     tadalafil, PAH, (ADCIRCA) 20 MG TABS Take 2 tablets (40 mg) by mouth daily 60 tablet 11     furosemide (LASIX) 20 MG tablet Take 2 tablets (40 mg) by mouth daily 180 tablet 3     fluticasone (FLONASE) 50 MCG/ACT spray Spray 2 sprays into both nostrils daily 1 Bottle 1     albuterol (2.5 MG/3ML) 0.083% nebulizer solution Use every 4-6 hours as needed for shortness of breath 60 vial 3     order for DME Equipment being ordered: Nebulizer. Verbal order from  1 Device 0     digoxin (LANOXIN) 125 MCG tablet Take 1 tablet (125 mcg) by mouth daily 90 tablet 3     ibuprofen (ADVIL,MOTRIN) 800 MG tablet Take 1 tablet (800 mg) by mouth 3 times daily (with meals) 30 tablet 5     order for DME Equipment being ordered: Oximeter 1 Device 0     order for DME Equipment being ordered: Oxygen  "oximeter and mask 1 Device 0     order for DME Please provide patient with 2  liquid oxygen tanks for portability. Spot check patient on liquid oxygen. Titrate oxygen to maintain saturations above 88%. 2 Device 0     order for DME Equipment being ordered: Oxygen    Please provide patient with a home-fill system for portability. 1 Device 99     order for DME Equipment being ordered: personal O2 oximeter. 1 Device 0     order for DME Oxygen: Patient requires supplemental Oxygen 2 LPM via nasal canula with activity. Please provide patient with portability capability. Okay to spot check patient on oxygen device, to keep sats above 90%. Oxygen will be for a lifetime. 1 Device 0       ROS:   10 point ROS negative except HPI    EXAM:  /69 (BP Location: Right arm, Patient Position: Chair, Cuff Size: Adult Regular)  Pulse 69  Ht 1.575 m (5' 2\")  Wt 56.2 kg (123 lb 14.4 oz)  SpO2 99%  BMI 22.66 kg/m2  General: appears comfortable, alert and articulate  Head: normocephalic, atraumatic  Eyes: anicteric sclera, EOMI  Neck: no adenopathy  Orophyarynx: moist mucosa, no lesions, dentition intact  Heart: regular, S1/S2, no murmur, gallop, rub, estimated JVP 6 cm  Lungs: Inspiratory crackles at the bases bilaterally  GI: soft, non-tender, bowel sounds present, no hepatosplenomegaly  Extremities: no clubbing, cyanosis or edema  Neurological: normal speech and affect, no gross motor deficits    Labs:  CBC RESULTS:  Lab Results   Component Value Date    WBC 10.6 02/27/2017    RBC 4.22 02/27/2017    HGB 12.7 02/27/2017    HCT 40.1 02/27/2017    MCV 95 02/27/2017    MCH 30.1 02/27/2017    MCHC 31.7 02/27/2017    RDW 15.3 (H) 02/27/2017     02/27/2017       CMP RESULTS:  Lab Results   Component Value Date     02/27/2017    POTASSIUM 3.7 02/27/2017    CHLORIDE 99 02/27/2017    CO2 29 02/27/2017    ANIONGAP 9 02/27/2017    GLC 77 02/27/2017    BUN 18 02/27/2017    CR 0.83 02/27/2017    GFRESTIMATED 76 02/27/2017    " GFRESTBLACK >90   GFR Calc   02/27/2017    MARCIN 8.7 02/27/2017    BILITOTAL 0.4 01/16/2017    ALBUMIN 3.6 01/16/2017    ALKPHOS 95 01/16/2017    ALT 18 01/16/2017    AST 20 01/16/2017        INR RESULTS:  Lab Results   Component Value Date    INR 1.04 01/23/2015       No components found for: CK  Lab Results   Component Value Date    MAG 2.4 (H) 12/19/2016     Lab Results   Component Value Date    NTBNPI 4168 (H) 11/30/2016       Echocardiogram 10/3/2016:  Paradoxical septal motion consistent with right ventricular pressure and volume overload is present.  Moderate to severe right ventricular dilation is present.  Global right ventricular function is mildly to moderately reduced.  Moderate to severe tricuspid insufficiency is present.  Right ventricular systolic pressure is 56mmHg above the right atrial pressure.  The inferior vena cava is normal.  No pericardial effusion is present.    RHC 11/30/2016  RA:15  RV: 60/16  PA: 60/38 (42)  PCWP: 15  CO/CI (TD): 2.6/1.6  PVR: 10.4    Inhaled nitric oxide  RA: 16  PA: 50/20 (35)  PCWP: 16  CO/CI (TD): 2.4/1.5    6MWT 2/27/2017: 290 meters with lowest saturation of 90% on 4L of supplemental oxygen    6MWT 3/20/2017: 259 meters with lowest saturation of 86% of 4L of supplemental oxygen.      Assessment and Plan: Ms. 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    1.  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 Loya is still WHO FC 3.  She is on Adcirca therapy and inhaled Tyvaso.  She has tolerated the addition of inhaled Tyvaso, but we can slow uptitration as she is having more exercise-induced hypoxemia.  We will cut her back to 4 breathes QID.  She needs a repeat 6MWT to determine if she is having exercise-induced hypoxemia prior to any dose changes.  She does need to continue with 4L of supplemental oxygen as her oxygen saturations dropped with exercise.  She is  also on lasix 40 mg daily and digoxin for volume control and RV dysfunction respectively.    It was a pleasure seeing Ms. Loya at the [facility]pertension program.  Please contact us with any questions or concerns that you may have.       Sincerely,    Carlo Frost MD, PhD  Cardiology Fellow      I have personally seen and examined the patient, and then discussed with Dr. Frost, and agree with the findings and plan in this note. I have reviewed today's vital signs, medications, labs, and imaging.     Feeling worse on the higher dose of Tyvaso (6 breaths QID). More exertional hypoxia. Will cut down to 4 breaths QID and repeat 6MWT in a week.     Sincerely,    Callie Ledesma MD   Center for Pulmonary Hypertension  Section of Advanced Heart Failure   Cardiovascular Division  [facility]   [phone]              Callie Ledesma MD

## 2017-03-21 ENCOUNTER — HOSPITAL ENCOUNTER (OUTPATIENT)
Dept: CARDIAC REHAB | Facility: CLINIC | Age: 42
End: 2017-03-21
Attending: INTERNAL MEDICINE
Payer: COMMERCIAL

## 2017-03-21 VITALS — WEIGHT: 123 LBS | BODY MASS INDEX: 22.5 KG/M2

## 2017-03-21 PROCEDURE — 40000244 ZZH STATISTIC VISIT PULM REHAB: Performed by: REHABILITATION PRACTITIONER

## 2017-03-21 PROCEDURE — G0239 OTH RESP PROC, GROUP: HCPCS | Performed by: REHABILITATION PRACTITIONER

## 2017-03-23 ENCOUNTER — HOSPITAL ENCOUNTER (OUTPATIENT)
Dept: CARDIAC REHAB | Facility: CLINIC | Age: 42
End: 2017-03-23
Attending: INTERNAL MEDICINE
Payer: COMMERCIAL

## 2017-03-23 VITALS — WEIGHT: 123.4 LBS | BODY MASS INDEX: 22.57 KG/M2

## 2017-03-23 DIAGNOSIS — R07.81 PLEURITIC CHEST PAIN: ICD-10-CM

## 2017-03-23 PROCEDURE — G0239 OTH RESP PROC, GROUP: HCPCS | Performed by: REHABILITATION PRACTITIONER

## 2017-03-23 PROCEDURE — 40000244 ZZH STATISTIC VISIT PULM REHAB: Performed by: REHABILITATION PRACTITIONER

## 2017-03-24 DIAGNOSIS — I27.20 PULMONARY HYPERTENSION (H): Primary | ICD-10-CM

## 2017-03-24 RX ORDER — OXYCODONE AND ACETAMINOPHEN 10; 325 MG/1; MG/1
1 TABLET ORAL EVERY 6 HOURS PRN
Qty: 60 TABLET | Refills: 0 | Status: SHIPPED | OUTPATIENT
Start: 2017-03-27 | End: 2017-04-06

## 2017-03-24 NOTE — TELEPHONE ENCOUNTER
Oxycodone/APAP 10-325mg      Last Written Prescription Date: 3/13/2017  Last Fill Quantity: 60,  # refills: 0   Last Office Visit with [facility], [facility] or [facility] prescribing provider: 11/7/2016                                             .Please bring signed prescription to [facility] if approved.    Thank you,  Lisa Monk, [facility]

## 2017-03-24 NOTE — PROGRESS NOTES
Date: 3/24/2017    Time of Call: 2:35 PM     Diagnosis:  PH     [ TORB ] Ordering provider: Callie Ledesma MD  Order:   Pt to decrease Tyvaso (treprostinil) to 4 breaths QID.  Pt to get a 6 muinute walk test next week, if O2 Sats are below 88% pt to stay at 4 breaths.  It pt does not desat, increase tyvaso to 5 breath QID     Order received by: Naty Lau RN     Follow-up/additional notes: Called pt and updated her and scheduled the 6 muinute walk test.  Also called [facility] and updated the Tyvaso (treprostinil) script.

## 2017-03-28 DIAGNOSIS — G47.9 DISTURBANCE IN SLEEP BEHAVIOR: ICD-10-CM

## 2017-03-28 NOTE — TELEPHONE ENCOUNTER
Alprazolam       Last Written Prescription Date: 03/17/2017  Last Fill Quantity: 40,  # refills: 0   Last Office Visit with [facility], [facility] or  [facility] prescribing provider: could not tell last office vist    Thanks  Rosalie Bolanos [facility]   [phone]

## 2017-03-29 RX ORDER — ALPRAZOLAM 0.5 MG
TABLET ORAL
Qty: 40 TABLET | Refills: 0 | Status: SHIPPED | OUTPATIENT
Start: 2017-03-29 | End: 2017-04-06

## 2017-03-30 ENCOUNTER — HOSPITAL ENCOUNTER (OUTPATIENT)
Dept: CARDIAC REHAB | Facility: CLINIC | Age: 42
End: 2017-03-30
Attending: INTERNAL MEDICINE
Payer: COMMERCIAL

## 2017-03-30 VITALS — WEIGHT: 122 LBS | BODY MASS INDEX: 22.31 KG/M2

## 2017-03-30 PROCEDURE — 40000244 ZZH STATISTIC VISIT PULM REHAB: Performed by: REHABILITATION PRACTITIONER

## 2017-03-30 PROCEDURE — G0239 OTH RESP PROC, GROUP: HCPCS | Performed by: REHABILITATION PRACTITIONER

## 2017-03-31 ENCOUNTER — CARE COORDINATION (OUTPATIENT)
Dept: CARDIOLOGY | Facility: CLINIC | Age: 42
End: 2017-03-31

## 2017-03-31 DIAGNOSIS — I27.20 PULMONARY HYPERTENSION (H): ICD-10-CM

## 2017-03-31 DIAGNOSIS — R09.81 NASAL CONGESTION: ICD-10-CM

## 2017-03-31 LAB
6 MIN WALK (FT): 780 FT
6 MIN WALK (M): 238 M

## 2017-03-31 NOTE — PROGRESS NOTES
Date: 3/31/2017    Time of Call: 1:01 PM     Diagnosis:  Pulmonary hypertension, SOB     [ TORB ] Ordering provider: Dr. Ledesma  Order: Stop Tyvaso today until clinic appointment on 4/10. Also labs and a 6MWT prior to appointment.     Order received by: Mattie Glez RN     Follow-up/additional notes: 6MWT is scheduled for 4/10. Called Ms. Loya and updated her on above plan of care. Pt states that she felt the test went well, probably better than her previous tests. She was hesitant to stop the Tyvaso but will call immediately if she is symptomatic off of this medication.   All questions and concerns were addressed. Continue to monitor closely.

## 2017-03-31 NOTE — PROGRESS NOTES
"Date: 3/31/2017    Time of Call: 2:04 PM     Diagnosis:  Worsening 6MWT, updated plan     [ TORB ] Ordering provider: Dr. Ledesma  Order: Decreased your Tyvaso dose down to 3 breaths, four times per day. Follow up as previously scheduled on 4/10 with a 6MWT.     Order received by: Mattie Glze RN     Follow-up/additional notes: Pt had called back stating hesitation with stopping her Tyvaso. Pt said \"I've been feeling the best I've felt in a long time. I am nervous to just stop my medication. I thought the test went well.\"     I educated Ms. Loya that despite her feeling great during the 6MWT, that her oxygen saturations and distance were lower than her previous tests.     Updated Dr. Ledesma of this and it was decided to decreased her Tyvaso to 3 breaths and follow up as scheduled. Pt verbalized and understanding.    Will continue to monitor closely.       "

## 2017-04-04 RX ORDER — FLUTICASONE PROPIONATE 50 MCG
2 SPRAY, SUSPENSION (ML) NASAL DAILY
Qty: 1 BOTTLE | Refills: 3 | Status: SHIPPED | OUTPATIENT
Start: 2017-04-04 | End: 2017-10-11

## 2017-04-04 NOTE — TELEPHONE ENCOUNTER
Flonase      Last Written Prescription Date: 12/19/16  Last Fill Quantity: 1 bottle,  # refills: 1   Last Office Visit with G, P or [place] prescribing provider: 11/2016                                             Rx refilled per RN protocol.  Dayami Cutler RN

## 2017-04-05 DIAGNOSIS — M19.90 INFLAMMATORY ARTHRITIS: ICD-10-CM

## 2017-04-05 NOTE — TELEPHONE ENCOUNTER
Morphine      Last Written Prescription Date: 3/9/17  Last Fill Quantity: 60,  # refills: 0   Last Office Visit with G, P or [facility] prescribing provider: 3/20/17

## 2017-04-06 DIAGNOSIS — R07.81 PLEURITIC CHEST PAIN: ICD-10-CM

## 2017-04-06 DIAGNOSIS — G47.9 DISTURBANCE IN SLEEP BEHAVIOR: ICD-10-CM

## 2017-04-06 RX ORDER — MORPHINE SULFATE 15 MG/1
15 TABLET, FILM COATED, EXTENDED RELEASE ORAL 2 TIMES DAILY
Qty: 60 TABLET | Refills: 0 | Status: SHIPPED | OUTPATIENT
Start: 2017-04-06 | End: 2017-05-03

## 2017-04-06 RX ORDER — OXYCODONE AND ACETAMINOPHEN 10; 325 MG/1; MG/1
1 TABLET ORAL EVERY 6 HOURS PRN
Qty: 60 TABLET | Refills: 0 | Status: SHIPPED | OUTPATIENT
Start: 2017-04-10 | End: 2017-04-20

## 2017-04-06 RX ORDER — ALPRAZOLAM 0.5 MG
TABLET ORAL
Qty: 40 TABLET | Refills: 0 | Status: SHIPPED | OUTPATIENT
Start: 2017-04-08 | End: 2017-04-17

## 2017-04-06 NOTE — TELEPHONE ENCOUNTER
Percocet       Last Written Prescription Date: 03/24/2017  Last Fill Quantity: 60,  # refills: 0     Alprazolam      Last Written Prescription Date: 03/29/2017  Last Fill Quantity: 40,  # refills: 0   Last Office Visit with [facility], [facility] or [facility] prescribing provider: unknown    Thanks  Rosalie Bolanos [facility]   [phone]

## 2017-04-07 ENCOUNTER — PRE VISIT (OUTPATIENT)
Dept: CARDIOLOGY | Facility: CLINIC | Age: 42
End: 2017-04-07

## 2017-04-07 DIAGNOSIS — I27.20 PULMONARY HYPERTENSION (H): ICD-10-CM

## 2017-04-10 ENCOUNTER — CARE COORDINATION (OUTPATIENT)
Dept: CARDIOLOGY | Facility: CLINIC | Age: 42
End: 2017-04-10

## 2017-04-10 ENCOUNTER — OFFICE VISIT (OUTPATIENT)
Dept: CARDIOLOGY | Facility: CLINIC | Age: 42
End: 2017-04-10
Attending: INTERNAL MEDICINE
Payer: COMMERCIAL

## 2017-04-10 VITALS
WEIGHT: 123.4 LBS | OXYGEN SATURATION: 95 % | DIASTOLIC BLOOD PRESSURE: 76 MMHG | BODY MASS INDEX: 22.71 KG/M2 | SYSTOLIC BLOOD PRESSURE: 108 MMHG | HEIGHT: 62 IN | HEART RATE: 82 BPM

## 2017-04-10 DIAGNOSIS — I27.20 PULMONARY HYPERTENSION (H): ICD-10-CM

## 2017-04-10 DIAGNOSIS — I27.20 PULMONARY HYPERTENSION (H): Primary | ICD-10-CM

## 2017-04-10 LAB
6 MIN WALK (FT): 797 FT
6 MIN WALK (M): 243 M
ANION GAP SERPL CALCULATED.3IONS-SCNC: 8 MMOL/L (ref 3–14)
BUN SERPL-MCNC: 9 MG/DL (ref 7–30)
CALCIUM SERPL-MCNC: 8.7 MG/DL (ref 8.5–10.1)
CHLORIDE SERPL-SCNC: 104 MMOL/L (ref 94–109)
CO2 SERPL-SCNC: 28 MMOL/L (ref 20–32)
CREAT SERPL-MCNC: 0.59 MG/DL (ref 0.52–1.04)
ERYTHROCYTE [DISTWIDTH] IN BLOOD BY AUTOMATED COUNT: 12.5 % (ref 10–15)
GFR SERPL CREATININE-BSD FRML MDRD: ABNORMAL ML/MIN/1.7M2
GLUCOSE SERPL-MCNC: 116 MG/DL (ref 70–99)
HCT VFR BLD AUTO: 39.6 % (ref 35–47)
HGB BLD-MCNC: 12.5 G/DL (ref 11.7–15.7)
MCH RBC QN AUTO: 30.7 PG (ref 26.5–33)
MCHC RBC AUTO-ENTMCNC: 31.6 G/DL (ref 31.5–36.5)
MCV RBC AUTO: 97 FL (ref 78–100)
NT-PROBNP SERPL-MCNC: 764 PG/ML (ref 0–125)
PLATELET # BLD AUTO: 340 10E9/L (ref 150–450)
POTASSIUM SERPL-SCNC: 3.2 MMOL/L (ref 3.4–5.3)
RBC # BLD AUTO: 4.07 10E12/L (ref 3.8–5.2)
SODIUM SERPL-SCNC: 140 MMOL/L (ref 133–144)
WBC # BLD AUTO: 7.6 10E9/L (ref 4–11)

## 2017-04-10 PROCEDURE — 99213 OFFICE O/P EST LOW 20 MIN: CPT | Mod: ZF

## 2017-04-10 PROCEDURE — 83880 ASSAY OF NATRIURETIC PEPTIDE: CPT | Performed by: INTERNAL MEDICINE

## 2017-04-10 PROCEDURE — 85027 COMPLETE CBC AUTOMATED: CPT | Performed by: INTERNAL MEDICINE

## 2017-04-10 PROCEDURE — 36415 COLL VENOUS BLD VENIPUNCTURE: CPT | Performed by: INTERNAL MEDICINE

## 2017-04-10 PROCEDURE — 99215 OFFICE O/P EST HI 40 MIN: CPT | Mod: GC | Performed by: INTERNAL MEDICINE

## 2017-04-10 PROCEDURE — 80048 BASIC METABOLIC PNL TOTAL CA: CPT | Performed by: INTERNAL MEDICINE

## 2017-04-10 RX ORDER — LIDOCAINE 40 MG/G
CREAM TOPICAL
Status: CANCELLED | OUTPATIENT
Start: 2017-04-10

## 2017-04-10 ASSESSMENT — PAIN SCALES - GENERAL: PAINLEVEL: NO PAIN (0)

## 2017-04-10 NOTE — LETTER
"4/10/2017      RE: Ellen Loya  [address]  [facility] [address]       April 10, 2017        Dawson Upton MD   P Pulmonary   [address] [place] [address]       RE: Ellen Loya   MRN: [id number]   : 1975       Dear Dr. Upton:       We had the pleasure of seeing Ms. Ellen Loya for followup in our Pulmonary Hypertension Clinic at the [facility]. As you know, she is a 41-year-old female with pulmonary arterial hypertension in the setting of combined pulmonary fibrosis, emphysema and inflammatory arthritis. She is currently on Adcirca therapy.     Since her last visit, she is feeling better.  She has not had problems with fluid retention.  She actually is having more energy and doing things she hasn't done in a long time such as weeding her garden and going on her bike.  She has checked her oxygen after these events and it goes down into the 80s.  She is still on Tyvaso 4 breathes QID.    Finally, Ellen did some some cigarettes this past week because she is afraid of lung transplantation.    PAST MEDICAL HISTORY:  Past Medical History:   Diagnosis Date     Acute bronchitis 07    Admit. Discharged 07     Anxiety      Arthritis     h/o \"seronegative rheumatoid arthritis\" since age 30, however no evidence of active arthritis by Rheum eval [id number]     ASCUS favor benign 5/15/14    neg HPV     ILD (interstitial lung disease) (H)     seen on chest CT ; methotrexate stopped      Lung nodule     KM nodule; EBUS 2014 of lymph nodes was negative; CT-guided bx  hamartoma/chondroma     Prematurity     born 6-7 weeks early     Pulmonary hypertension (H)     RHC 2016 mean PA 25     Varicella without mention of complication        FAMILY HISTORY:  Family History   Problem Relation Age of Onset     Arthritis Mother      psoriatic arthritis     Lipids Father      HEART DISEASE Father      recent angioplasty for 3 blocked arteries. "     Scleroderma Paternal Uncle      Had scleroderma ILD     Other Cancer Paternal Grandmother      lung cancer     Substance Abuse Father      Substance Abuse Brother      Asthma Brother      Depression Mother      Depression Brother      DIABETES Mother      DIABETES Maternal Grandfather      adult onset     Hypertension Father      CEREBROVASCULAR DISEASE Father      Thyroid Disease Mother      Obesity Mother      Hyperlipidemia Mother      Hyperlipidemia Father      Hyperlipidemia Maternal Grandfather      Coronary Artery Disease Father      LUNG DISEASE Father          SOCIAL HISTORY:  Social History     Social History     Marital status: Single     Spouse name: N/A     Number of children: 3     Years of education: 13     Occupational History     homemaker      Social History Main Topics     Smoking status: Former Smoker     Packs/day: 0.50     Years: 25.00     Types: Cigarettes     Start date: 12/3/1992     Quit date: 12/14/2016     Smokeless tobacco: Former User     Quit date: 12/14/2016     Alcohol use No      Comment: 5 per month or less     Drug use: No     Sexual activity: Yes     Partners: Male     Birth control/ protection: Female Surgical      Comment: Had post-partum tubal ligation.     Other Topics Concern      Service No     Blood Transfusions No     Caffeine Concern No     pop: 2c/d     Occupational Exposure No     Hobby Hazards No     Sleep Concern No     Stress Concern No     Weight Concern No     Special Diet No     Back Care No     Exercise No     Bike Helmet No     Seat Belt Yes     Self-Exams Yes     Parent/Sibling W/ Cabg, Mi Or Angioplasty Before 65f 55m? 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 Bought [employer] house in 2010; it was wet and full of mold.  She remodelled the house, did not wear a mask.       CURRENT MEDICATIONS:  Current Outpatient Prescriptions   Medication Sig Dispense Refill     morphine (MS CONTIN) 15 MG  12 hr tablet Take 1 tablet (15 mg) by mouth 2 times daily 60 tablet 0     ALPRAZolam (XANAX) 0.5 MG tablet TAKE 1 TABLET BY MOUTH EVERY SIX HOURS AS NEEDED FOR ANXIETY. 40 tablet 0     oxyCODONE-acetaminophen (PERCOCET)  MG per tablet Take 1 tablet by mouth every 6 hours as needed for moderate to severe pain 60 tablet 0     fluticasone (FLONASE) 50 MCG/ACT spray Spray 2 sprays into both nostrils daily 1 Bottle 3     treprostinil (TYVASO STARTER) 0.6 MG/ML SOLN neb solution Inhale into the lungs every 4 hours (while awake for Tyvaso)        azithromycin (ZITHROMAX) 250 MG tablet Two tablets first day, then one tablet daily for four days. 6 tablet 0     tiotropium (SPIRIVA HANDIHALER) 18 MCG capsule Inhale contents of one capsule daily. 30 capsule 1     LANsoprazole (PREVACID SOLUTAB) 30 MG ODT tab Take 1 tablet (30 mg) by mouth daily 90 tablet 3     albuterol (ALBUTEROL) 108 (90 BASE) MCG/ACT Inhaler Inhale 1-2 puffs into the lungs every 4 hours as needed for shortness of breath / dyspnea 1 Inhaler 8     predniSONE (DELTASONE) 5 MG tablet Take 4 tablets (20mg) daily x 5 days, then take 3 tablets (15mg) daily x5 days, then take 2 tablets (10mg) daily x 5 days, then 1 tablet (5mg) daily x5 days, then stop 50 tablet 0     tadalafil, PAH, (ADCIRCA) 20 MG TABS Take 2 tablets (40 mg) by mouth daily 60 tablet 11     furosemide (LASIX) 20 MG tablet Take 2 tablets (40 mg) by mouth daily 180 tablet 3     albuterol (2.5 MG/3ML) 0.083% nebulizer solution Use every 4-6 hours as needed for shortness of breath 60 vial 3     order for DME Equipment being ordered: Nebulizer. Verbal order from  1 Device 0     digoxin (LANOXIN) 125 MCG tablet Take 1 tablet (125 mcg) by mouth daily 90 tablet 3     ibuprofen (ADVIL,MOTRIN) 800 MG tablet Take 1 tablet (800 mg) by mouth 3 times daily (with meals) 30 tablet 5     order for DME Equipment being ordered: Oximeter 1 Device 0     order for DME Equipment being ordered: Oxygen  "oximeter and mask 1 Device 0     order for DME Please provide patient with 2  liquid oxygen tanks for portability. Spot check patient on liquid oxygen. Titrate oxygen to maintain saturations above 88%. 2 Device 0     order for DME Equipment being ordered: Oxygen    Please provide patient with a home-fill system for portability. 1 Device 99     order for DME Equipment being ordered: personal O2 oximeter. 1 Device 0     order for DME Oxygen: Patient requires supplemental Oxygen 2 LPM via nasal canula with activity. Please provide patient with portability capability. Okay to spot check patient on oxygen device, to keep sats above 90%. Oxygen will be for a lifetime. 1 Device 0       ROS:   10 point ROS negative except HPI    EXAM:  /76 (BP Location: Right arm, Patient Position: Chair, Cuff Size: Adult Regular)  Pulse 82  Ht 1.575 m (5' 2\")  Wt 56 kg (123 lb 6.4 oz)  SpO2 95%  BMI 22.57 kg/m2  General: appears comfortable, alert and articulate  Head: normocephalic, atraumatic  Eyes: anicteric sclera, EOMI  Neck: no adenopathy  Orophyarynx: moist mucosa, no lesions, dentition intact  Heart: regular, S1/S2, no murmur, gallop, rub, estimated JVP 6 cm  Lungs: Inspiratory crackles at the bases bilaterally  GI: soft, non-tender, bowel sounds present, no hepatosplenomegaly  Extremities: no clubbing, cyanosis or edema  Neurological: normal speech and affect, no gross motor deficits    Labs:  CBC RESULTS:  Lab Results   Component Value Date    WBC 10.0 03/20/2017    RBC 4.33 03/20/2017    HGB 13.2 03/20/2017    HCT 42.0 03/20/2017    MCV 97 03/20/2017    MCH 30.5 03/20/2017    MCHC 31.4 (L) 03/20/2017    RDW 13.9 03/20/2017     03/20/2017       CMP RESULTS:  Lab Results   Component Value Date     03/20/2017    POTASSIUM 4.0 03/20/2017    CHLORIDE 102 03/20/2017    CO2 30 03/20/2017    ANIONGAP 9 03/20/2017    GLC 88 03/20/2017    BUN 9 03/20/2017    CR 0.69 03/20/2017    GFRESTIMATED >90  Non  " American GFR Calc   03/20/2017    GFRESTBLACK >90   GFR Calc   03/20/2017    MARCIN 9.0 03/20/2017    BILITOTAL 0.4 01/16/2017    ALBUMIN 3.6 01/16/2017    ALKPHOS 95 01/16/2017    ALT 18 01/16/2017    AST 20 01/16/2017        INR RESULTS:  Lab Results   Component Value Date    INR 1.04 01/23/2015       No components found for: CK  Lab Results   Component Value Date    MAG 2.4 (H) 12/19/2016     Lab Results   Component Value Date    NTBNPI 4168 (H) 11/30/2016       Echocardiogram 10/3/2016:  Paradoxical septal motion consistent with right ventricular pressure and volume overload is present.  Moderate to severe right ventricular dilation is present.  Global right ventricular function is mildly to moderately reduced.  Moderate to severe tricuspid insufficiency is present.  Right ventricular systolic pressure is 56mmHg above the right atrial pressure.  The inferior vena cava is normal.  No pericardial effusion is present.    RHC 11/30/2016  RA:15  RV: 60/16  PA: 60/38 (42)  PCWP: 15  CO/CI (TD): 2.6/1.6  PVR: 10.4    Inhaled nitric oxide  RA: 16  PA: 50/20 (35)  PCWP: 16  CO/CI (TD): 2.4/1.5    6MWT 2/27/2017: 290 meters with lowest saturation of 90% on 4L of supplemental oxygen    6MWT 3/31/2017: 238 meters with lowest saturation of 82% of 4L of supplemental oxygen.      Assessment and Plan: Ms. 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    1.  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 Loya is still WHO FC 3.  She is on Adcirca therapy and inhaled Tyvaso.  She has had problems with worsening hypoxemia since the addition of Tyvaso but she is having symptomatic improvements.  However, our objective tests show worsening exercise capacity.  We need another 6MWT today to see if she is having desaturations and if she continues to desaturate, we will need to stop the Tyvaso.  She is also on lasix 40 mg daily and  digoxin for volume control and RV dysfunction respectively.    At this point, we are unfortunately running out of options.  If she can not tolerate inhaled Tyvaso due to worsening V/Q mismatch and hypoxemia, we will need to refer her for lung transplant as ERA are contra-indicated in ILD due to increased risk of mortality from the RISE-IIP and MANGO studies and systemic prostacyclin will worsening hypoxemia.  We also need a repeat RHC to assess her cardiac output and pulmonary pressures to help us decide if she needs lung transplantation.  She did smoke this past week and this will affect her eligibility for lung transplant with regards to timing.    It was a pleasure seeing Ms. Loya at the [facility] Pulmonary Hypertension program.  Please contact us with any questions or concerns that you may have.       Sincerely,    Carlo Frost MD, PhD  Cardiology Fellow    I have personally seen and examined the patient, and then discussed with Dr. Frost, and agree with the findings and plan in this note. I have reviewed today's vital signs, medications, labs, and imaging.     Sincerely,    Callie Ledesma MD   Center for Pulmonary Hypertension  Section of Advanced Heart Failure   Cardiovascular Division  [facility]   [phone]                Callie Ledesma MD

## 2017-04-10 NOTE — PROGRESS NOTES
"April 10, 2017        Dawson Upton MD   [facility]   [address] [place] [address]       RE: Ellen Loya   MRN: [id number]   : 1975       Dear Dr. Upton:       We had the pleasure of seeing Ms. Ellen Loya for followup in our Pulmonary Hypertension Clinic at the [facility]. As you know, she is a 41-year-old female with pulmonary arterial hypertension in the setting of combined pulmonary fibrosis, emphysema and inflammatory arthritis. She is currently on Adcirca therapy.     Since her last visit, she is feeling better.  She has not had problems with fluid retention.  She actually is having more energy and doing things she hasn't done in a long time such as weeding her garden and going on her bike.  She has checked her oxygen after these events and it goes down into the 80s.  She is still on Tyvaso 4 breathes QID.    Finally, Ellen did some some cigarettes this past week because she is afraid of lung transplantation.    PAST MEDICAL HISTORY:  Past Medical History:   Diagnosis Date     Acute bronchitis 07    Admit. Discharged 07     Anxiety      Arthritis     h/o \"seronegative rheumatoid arthritis\" since age 30, however no evidence of active arthritis by Rheum eval [id number]     ASCUS favor benign 5/15/14    neg HPV     ILD (interstitial lung disease) (H)     seen on chest CT ; methotrexate stopped      Lung nodule     KM nodule; EBUS 2014 of lymph nodes was negative; CT-guided bx  hamartoma/chondroma     Prematurity     born 6-7 weeks early     Pulmonary hypertension (H)     RHC 2016 mean PA 25     Varicella without mention of complication        FAMILY HISTORY:  Family History   Problem Relation Age of Onset     Arthritis Mother      psoriatic arthritis     Lipids Father      HEART DISEASE Father      recent angioplasty for 3 blocked arteries.     Scleroderma Paternal Uncle      Had scleroderma ILD     Other Cancer " Paternal Grandmother      lung cancer     Substance Abuse Father      Substance Abuse Brother      Asthma Brother      Depression Mother      Depression Brother      DIABETES Mother      DIABETES Maternal Grandfather      adult onset     Hypertension Father      CEREBROVASCULAR DISEASE Father      Thyroid Disease Mother      Obesity Mother      Hyperlipidemia Mother      Hyperlipidemia Father      Hyperlipidemia Maternal Grandfather      Coronary Artery Disease Father      LUNG DISEASE Father          SOCIAL HISTORY:  Social History     Social History     Marital status: Single     Spouse name: N/A     Number of children: 3     Years of education: 13     Occupational History     homemaker      Social History Main Topics     Smoking status: Former Smoker     Packs/day: 0.50     Years: 25.00     Types: Cigarettes     Start date: 12/3/1992     Quit date: 12/14/2016     Smokeless tobacco: Former User     Quit date: 12/14/2016     Alcohol use No      Comment: 5 per month or less     Drug use: No     Sexual activity: Yes     Partners: Male     Birth control/ protection: Female Surgical      Comment: Had post-partum tubal ligation.     Other Topics Concern      Service No     Blood Transfusions No     Caffeine Concern No     pop: 2c/d     Occupational Exposure No     Hobby Hazards No     Sleep Concern No     Stress Concern No     Weight Concern No     Special Diet No     Back Care No     Exercise No     Bike Helmet No     Seat Belt Yes     Self-Exams Yes     Parent/Sibling W/ Cabg, Mi Or Angioplasty Before 65f 55m? 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 Bought [employer] [place] house in 2010; it was wet and full of mold.  She remodelled the house, did not wear a mask.       CURRENT MEDICATIONS:  Current Outpatient Prescriptions   Medication Sig Dispense Refill     morphine (MS CONTIN) 15 MG 12 hr tablet Take 1 tablet (15 mg) by mouth 2 times daily 60 tablet 0      ALPRAZolam (XANAX) 0.5 MG tablet TAKE 1 TABLET BY MOUTH EVERY SIX HOURS AS NEEDED FOR ANXIETY. 40 tablet 0     oxyCODONE-acetaminophen (PERCOCET)  MG per tablet Take 1 tablet by mouth every 6 hours as needed for moderate to severe pain 60 tablet 0     fluticasone (FLONASE) 50 MCG/ACT spray Spray 2 sprays into both nostrils daily 1 Bottle 3     treprostinil (TYVASO STARTER) 0.6 MG/ML SOLN neb solution Inhale into the lungs every 4 hours (while awake for Tyvaso)        azithromycin (ZITHROMAX) 250 MG tablet Two tablets first day, then one tablet daily for four days. 6 tablet 0     tiotropium (SPIRIVA HANDIHALER) 18 MCG capsule Inhale contents of one capsule daily. 30 capsule 1     LANsoprazole (PREVACID SOLUTAB) 30 MG ODT tab Take 1 tablet (30 mg) by mouth daily 90 tablet 3     albuterol (ALBUTEROL) 108 (90 BASE) MCG/ACT Inhaler Inhale 1-2 puffs into the lungs every 4 hours as needed for shortness of breath / dyspnea 1 Inhaler 8     predniSONE (DELTASONE) 5 MG tablet Take 4 tablets (20mg) daily x 5 days, then take 3 tablets (15mg) daily x5 days, then take 2 tablets (10mg) daily x 5 days, then 1 tablet (5mg) daily x5 days, then stop 50 tablet 0     tadalafil, PAH, (ADCIRCA) 20 MG TABS Take 2 tablets (40 mg) by mouth daily 60 tablet 11     furosemide (LASIX) 20 MG tablet Take 2 tablets (40 mg) by mouth daily 180 tablet 3     albuterol (2.5 MG/3ML) 0.083% nebulizer solution Use every 4-6 hours as needed for shortness of breath 60 vial 3     order for DME Equipment being ordered: Nebulizer. Verbal order from  1 Device 0     digoxin (LANOXIN) 125 MCG tablet Take 1 tablet (125 mcg) by mouth daily 90 tablet 3     ibuprofen (ADVIL,MOTRIN) 800 MG tablet Take 1 tablet (800 mg) by mouth 3 times daily (with meals) 30 tablet 5     order for DME Equipment being ordered: Oximeter 1 Device 0     order for DME Equipment being ordered: Oxygen oximeter and mask 1 Device 0     order for DME Please provide patient with 2   "liquid oxygen tanks for portability. Spot check patient on liquid oxygen. Titrate oxygen to maintain saturations above 88%. 2 Device 0     order for DME Equipment being ordered: Oxygen    Please provide patient with a home-fill system for portability. 1 Device 99     order for DME Equipment being ordered: personal O2 oximeter. 1 Device 0     order for DME Oxygen: Patient requires supplemental Oxygen 2 LPM via nasal canula with activity. Please provide patient with portability capability. Okay to spot check patient on oxygen device, to keep sats above 90%. Oxygen will be for a lifetime. 1 Device 0       ROS:   10 point ROS negative except HPI    EXAM:  /76 (BP Location: Right arm, Patient Position: Chair, Cuff Size: Adult Regular)  Pulse 82  Ht 1.575 m (5' 2\")  Wt 56 kg (123 lb 6.4 oz)  SpO2 95%  BMI 22.57 kg/m2  General: appears comfortable, alert and articulate  Head: normocephalic, atraumatic  Eyes: anicteric sclera, EOMI  Neck: no adenopathy  Orophyarynx: moist mucosa, no lesions, dentition intact  Heart: regular, S1/S2, no murmur, gallop, rub, estimated JVP 6 cm  Lungs: Inspiratory crackles at the bases bilaterally  GI: soft, non-tender, bowel sounds present, no hepatosplenomegaly  Extremities: no clubbing, cyanosis or edema  Neurological: normal speech and affect, no gross motor deficits    Labs:  CBC RESULTS:  Lab Results   Component Value Date    WBC 10.0 03/20/2017    RBC 4.33 03/20/2017    HGB 13.2 03/20/2017    HCT 42.0 03/20/2017    MCV 97 03/20/2017    MCH 30.5 03/20/2017    MCHC 31.4 (L) 03/20/2017    RDW 13.9 03/20/2017     03/20/2017       CMP RESULTS:  Lab Results   Component Value Date     03/20/2017    POTASSIUM 4.0 03/20/2017    CHLORIDE 102 03/20/2017    CO2 30 03/20/2017    ANIONGAP 9 03/20/2017    GLC 88 03/20/2017    BUN 9 03/20/2017    CR 0.69 03/20/2017    GFRESTIMATED >90  Non  GFR Calc   03/20/2017    GFRESTBLACK >90   GFR Calc   " 03/20/2017    MARCIN 9.0 03/20/2017    BILITOTAL 0.4 01/16/2017    ALBUMIN 3.6 01/16/2017    ALKPHOS 95 01/16/2017    ALT 18 01/16/2017    AST 20 01/16/2017        INR RESULTS:  Lab Results   Component Value Date    INR 1.04 01/23/2015       No components found for: CK  Lab Results   Component Value Date    MAG 2.4 (H) 12/19/2016     Lab Results   Component Value Date    NTBNPI 4168 (H) 11/30/2016       Echocardiogram 10/3/2016:  Paradoxical septal motion consistent with right ventricular pressure and volume overload is present.  Moderate to severe right ventricular dilation is present.  Global right ventricular function is mildly to moderately reduced.  Moderate to severe tricuspid insufficiency is present.  Right ventricular systolic pressure is 56mmHg above the right atrial pressure.  The inferior vena cava is normal.  No pericardial effusion is present.    RHC 11/30/2016  RA:15  RV: 60/16  PA: 60/38 (42)  PCWP: 15  CO/CI (TD): 2.6/1.6  PVR: 10.4    Inhaled nitric oxide  RA: 16  PA: 50/20 (35)  PCWP: 16  CO/CI (TD): 2.4/1.5    6MWT 2/27/2017: 290 meters with lowest saturation of 90% on 4L of supplemental oxygen    6MWT 3/31/2017: 238 meters with lowest saturation of 82% of 4L of supplemental oxygen.      Assessment and Plan: Ms. 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    1.  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 Loya is still WHO FC 3.  She is on Adcirca therapy and inhaled Tyvaso.  She has had problems with worsening hypoxemia since the addition of Tyvaso but she is having symptomatic improvements.  However, our objective tests show worsening exercise capacity.  We need another 6MWT today to see if she is having desaturations and if she continues to desaturate, we will need to stop the Tyvaso.  She is also on lasix 40 mg daily and digoxin for volume control and RV dysfunction respectively.    At this point, we are  unfortunately running out of options.  If she can not tolerate inhaled Tyvaso due to worsening V/Q mismatch and hypoxemia, we will need to refer her for lung transplant as ERA are contra-indicated in ILD due to increased risk of mortality from the RISE-IIP and MANGO studies and systemic prostacyclin will worsening hypoxemia.  We also need a repeat RHC to assess her cardiac output and pulmonary pressures to help us decide if she needs lung transplantation.  She did smoke this past week and this will affect her eligibility for lung transplant with regards to timing.    It was a pleasure seeing Ms. Loya at the [facility] Pulmonary Hypertension program.  Please contact us with any questions or concerns that you may have.       Sincerely,    Carlo Frost MD, PhD  Cardiology Fellow    I have personally seen and examined the patient, and then discussed with Dr. Frost, and agree with the findings and plan in this note. I have reviewed today's vital signs, medications, labs, and imaging.     Sincerely,    Callie Ledesma MD   Center for Pulmonary Hypertension  Section of Advanced Heart Failure   Cardiovascular Division  [facility]   [phone]

## 2017-04-10 NOTE — NURSING NOTE
Chief Complaint   Patient presents with     Follow Up For     Return for PH F/U with Labs and 6mwt prior

## 2017-04-10 NOTE — NURSING NOTE
Cardiac Testing: Patient given instructions regarding  echocardiogram . Discussed purpose, preparation, procedure and when to expect results reported back to the patient. Patient demonstrated understanding of this information and agreed to call with further questions or concerns.  Med Reconcile: Reviewed and verified all current medications with the patient. The updated medication list was printed and given to the patient.  Return Appointment: Patient given instructions regarding scheduling next clinic visit. Patient demonstrated understanding of this information and agreed to call with further questions or concerns.  Right Heart Catheterization: Patient was instructed regarding right heart catheterization, including discussion of the procedure, preparation, intra-procedural steps, and recovery at home. Patient demonstrated understanding of this information and agreed to call with further questions or concerns.  Patient stated she understood all health information given and agreed to call with further questions or concerns.     Medication Changes:  No medication changes at this time. Please continue current medication regiment.     Patient Instructions:    Check-In  Time Check-In Location Estimated Length Procedure   Name        [facility]  waiting room 60-90 minutes Right Heart Catheterization**     Procedure Preparations & Instructions     This is an invasive procedure that DOES require preparation:    - Nothing to eat for 6 hours   - You may have clear liquids up until the time of your procedure  - A ride should be arranged for you in the instance you are unable to drive home, however you should be able to function as you normally would after the procedure     *For Patients with Diabetes: ? DO NOT take any oral diabetic medication, short-acting diabetes medications/insulin, humalog or regular insulin the morning of your test  ? Take   dose of long-acting insulin (Lantus, Levemir) the day of your test  ? Remember to   bring your glucometer and insulin with you to take after your test if needed     *For Patients on anticoagulants: ? Your Coumadin Clinic will give you instructions on medication adjustments or bridging prior to the procedure        Check-In  Time Check-In Location Estimated Length Procedure   Name        [facility]   3rd Floor 60 minutes 6 muinute walk test**   Procedure Preparations & Instructions     This is a non-invasive procedure and does NOT require any preparation        Check-In  Time Check-In Location Estimated Length Procedure   Name         60 minutes Echocardiogram (Echo)**   Procedure Preparations & Instructions     This is a non-invasive procedure and does NOT require any preparation         Follow up Appointment Information:  4 weeks

## 2017-04-10 NOTE — PATIENT INSTRUCTIONS
Medication Changes:  No medication changes at this time. Please continue current medication regiment.     Patient Instructions:    Check-In  Time Check-In Location Estimated Length Procedure   Name        [facility]  waiting room 60-90 minutes Right Heart Catheterization**     Procedure Preparations & Instructions     This is an invasive procedure that DOES require preparation:    - Nothing to eat for 6 hours   - You may have clear liquids up until the time of your procedure  - A ride should be arranged for you in the instance you are unable to drive home, however you should be able to function as you normally would after the procedure     *For Patients with Diabetes: ? DO NOT take any oral diabetic medication, short-acting diabetes medications/insulin, humalog or regular insulin the morning of your test  ? Take   dose of long-acting insulin (Lantus, Levemir) the day of your test  ? Remember to  bring your glucometer and insulin with you to take after your test if needed     *For Patients on anticoagulants: ? Your Coumadin Clinic will give you instructions on medication adjustments or bridging prior to the procedure        Check-In  Time Check-In Location Estimated Length Procedure   Name        [facility]   3rd Floor 60 minutes 6 muinute walk test**   Procedure Preparations & Instructions     This is a non-invasive procedure and does NOT require any preparation        Check-In  Time Check-In Location Estimated Length Procedure   Name         60 minutes Echocardiogram (Echo)**   Procedure Preparations & Instructions     This is a non-invasive procedure and does NOT require any preparation         Follow up Appointment Information:  4 weeks    We are located on the third floor [facility] on the [facility].  Our address is     [address] [place] [address] on [address] [phone]      Thank you for allowing us to be a part of your care here at the [facility]  [facility]    If you have questions or concerns please contact us at:    Naty Lau RN, BSN    Louis Jack (Schedule,P.A.)  Nurse Coordinator     Clinic   Pulmonary Hypertension   Pulmonary Hypertension  [facility]  (P)[phone]    (P) [phone]        (F)[phone]                ** Please note that you will NOT receive a reminder call regarding your scheduled testing, reminder calls are for provider appointments only.  If you are scheduled for testing within the [facility] system you may receive a call regarding pre-registration for billing purposes only.**     Remember to weigh yourself daily after voiding and before you consume any food or beverages and log the numbers.  If you have gained/lost 2 pounds overnight or 5 pounds in a week contact us immediately for medication adjustments or further instructions.  **Please call us immediately if you have any syncope, chest pain, edema, or decline in your functional status.

## 2017-04-10 NOTE — LETTER
"4/10/2017      RE: Ellen Loya  [address]  [facility] [address]       Dear Colleague,    Thank you for the opportunity to participate in the care of your patient, Ellen Loya, at the [facility] HEART [facility]. Please see a copy of my visit note below.    April 10, 2017        Dawson Upton MD   [facility]   [address] [place] [address]       RE: Ellen Loya   MRN: [id number]   : 1975       Dear Dr. Upton:       We had the pleasure of seeing Ms. Ellen Loya for followup in our Pulmonary Hypertension Clinic at the [facility]. As you know, she is a 41-year-old female with pulmonary arterial hypertension in the setting of combined pulmonary fibrosis, emphysema and inflammatory arthritis. She is currently on Adcirca therapy.     Since her last visit, she is feeling better.  She has not had problems with fluid retention.  She actually is having more energy and doing things she hasn't done in a long time such as weeding her garden and going on her bike.  She has checked her oxygen after these events and it goes down into the 80s.  She is still on Tyvaso 4 breathes QID.    Finally, Ellen did some some cigarettes this past week because she is afraid of lung transplantation.    PAST MEDICAL HISTORY:  Past Medical History:   Diagnosis Date     Acute bronchitis 07    Admit. Discharged 07     Anxiety      Arthritis     h/o \"seronegative rheumatoid arthritis\" since age 30, however no evidence of active arthritis by Rheum eval [id number]     ASCUS favor benign 5/15/14    neg HPV     ILD (interstitial lung disease) (H)     seen on chest CT ; methotrexate stopped      Lung nodule     KM nodule; EBUS 2014 of lymph nodes was negative; CT-guided bx - hamartoma/chondroma     Prematurity     born 6-7 weeks early     Pulmonary hypertension (H)     RHC 2016 mean PA 25     Varicella " without mention of complication        FAMILY HISTORY:  Family History   Problem Relation Age of Onset     Arthritis Mother      psoriatic arthritis     Lipids Father      HEART DISEASE Father      recent angioplasty for 3 blocked arteries.     Scleroderma Paternal Uncle      Had scleroderma ILD     Other Cancer Paternal Grandmother      lung cancer     Substance Abuse Father      Substance Abuse Brother      Asthma Brother      Depression Mother      Depression Brother      DIABETES Mother      DIABETES Maternal Grandfather      adult onset     Hypertension Father      CEREBROVASCULAR DISEASE Father      Thyroid Disease Mother      Obesity Mother      Hyperlipidemia Mother      Hyperlipidemia Father      Hyperlipidemia Maternal Grandfather      Coronary Artery Disease Father      LUNG DISEASE Father          SOCIAL HISTORY:  Social History     Social History     Marital status: Single     Spouse name: N/A     Number of children: 3     Years of education: 13     Occupational History     homemaker      Social History Main Topics     Smoking status: Former Smoker     Packs/day: 0.50     Years: 25.00     Types: Cigarettes     Start date: 12/3/1992     Quit date: 12/14/2016     Smokeless tobacco: Former User     Quit date: 12/14/2016     Alcohol use No      Comment: 5 per month or less     Drug use: No     Sexual activity: Yes     Partners: Male     Birth control/ protection: Female Surgical      Comment: Had post-partum tubal ligation.     Other Topics Concern      Service No     Blood Transfusions No     Caffeine Concern No     pop: 2c/d     Occupational Exposure No     Hobby Hazards No     Sleep Concern No     Stress Concern No     Weight Concern No     Special Diet No     Back Care No     Exercise No     Bike Helmet No     Seat Belt Yes     Self-Exams Yes     Parent/Sibling W/ Cabg, Mi Or Angioplasty Before 65f 55m? 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  Lives with her  mother-in-law. Bought Mochila in 2010; it was wet and full of mold.  She remodelled the house, did not wear a mask.       CURRENT MEDICATIONS:  Current Outpatient Prescriptions   Medication Sig Dispense Refill     morphine (MS CONTIN) 15 MG 12 hr tablet Take 1 tablet (15 mg) by mouth 2 times daily 60 tablet 0     ALPRAZolam (XANAX) 0.5 MG tablet TAKE 1 TABLET BY MOUTH EVERY SIX HOURS AS NEEDED FOR ANXIETY. 40 tablet 0     oxyCODONE-acetaminophen (PERCOCET)  MG per tablet Take 1 tablet by mouth every 6 hours as needed for moderate to severe pain 60 tablet 0     fluticasone (FLONASE) 50 MCG/ACT spray Spray 2 sprays into both nostrils daily 1 Bottle 3     treprostinil (TYVASO STARTER) 0.6 MG/ML SOLN neb solution Inhale into the lungs every 4 hours (while awake for Tyvaso)        azithromycin (ZITHROMAX) 250 MG tablet Two tablets first day, then one tablet daily for four days. 6 tablet 0     tiotropium (SPIRIVA HANDIHALER) 18 MCG capsule Inhale contents of one capsule daily. 30 capsule 1     LANsoprazole (PREVACID SOLUTAB) 30 MG ODT tab Take 1 tablet (30 mg) by mouth daily 90 tablet 3     albuterol (ALBUTEROL) 108 (90 BASE) MCG/ACT Inhaler Inhale 1-2 puffs into the lungs every 4 hours as needed for shortness of breath / dyspnea 1 Inhaler 8     predniSONE (DELTASONE) 5 MG tablet Take 4 tablets (20mg) daily x 5 days, then take 3 tablets (15mg) daily x5 days, then take 2 tablets (10mg) daily x 5 days, then 1 tablet (5mg) daily x5 days, then stop 50 tablet 0     tadalafil, PAH, (ADCIRCA) 20 MG TABS Take 2 tablets (40 mg) by mouth daily 60 tablet 11     furosemide (LASIX) 20 MG tablet Take 2 tablets (40 mg) by mouth daily 180 tablet 3     albuterol (2.5 MG/3ML) 0.083% nebulizer solution Use every 4-6 hours as needed for shortness of breath 60 vial 3     order for DME Equipment being ordered: Nebulizer. Verbal order from  1 Device 0     digoxin (LANOXIN) 125 MCG tablet Take 1 tablet (125 mcg) by mouth  "daily 90 tablet 3     ibuprofen (ADVIL,MOTRIN) 800 MG tablet Take 1 tablet (800 mg) by mouth 3 times daily (with meals) 30 tablet 5     order for DME Equipment being ordered: Oximeter 1 Device 0     order for DME Equipment being ordered: Oxygen oximeter and mask 1 Device 0     order for DME Please provide patient with 2  liquid oxygen tanks for portability. Spot check patient on liquid oxygen. Titrate oxygen to maintain saturations above 88%. 2 Device 0     order for DME Equipment being ordered: Oxygen    Please provide patient with a home-fill system for portability. 1 Device 99     order for DME Equipment being ordered: personal O2 oximeter. 1 Device 0     order for DME Oxygen: Patient requires supplemental Oxygen 2 LPM via nasal canula with activity. Please provide patient with portability capability. Okay to spot check patient on oxygen device, to keep sats above 90%. Oxygen will be for a lifetime. 1 Device 0       ROS:   10 point ROS negative except HPI    EXAM:  /76 (BP Location: Right arm, Patient Position: Chair, Cuff Size: Adult Regular)  Pulse 82  Ht 1.575 m (5' 2\")  Wt 56 kg (123 lb 6.4 oz)  SpO2 95%  BMI 22.57 kg/m2  General: appears comfortable, alert and articulate  Head: normocephalic, atraumatic  Eyes: anicteric sclera, EOMI  Neck: no adenopathy  Orophyarynx: moist mucosa, no lesions, dentition intact  Heart: regular, S1/S2, no murmur, gallop, rub, estimated JVP 6 cm  Lungs: Inspiratory crackles at the bases bilaterally  GI: soft, non-tender, bowel sounds present, no hepatosplenomegaly  Extremities: no clubbing, cyanosis or edema  Neurological: normal speech and affect, no gross motor deficits    Labs:  CBC RESULTS:  Lab Results   Component Value Date    WBC 10.0 03/20/2017    RBC 4.33 03/20/2017    HGB 13.2 03/20/2017    HCT 42.0 03/20/2017    MCV 97 03/20/2017    MCH 30.5 03/20/2017    MCHC 31.4 (L) 03/20/2017    RDW 13.9 03/20/2017     03/20/2017       CMP RESULTS:  Lab Results "   Component Value Date     03/20/2017    POTASSIUM 4.0 03/20/2017    CHLORIDE 102 03/20/2017    CO2 30 03/20/2017    ANIONGAP 9 03/20/2017    GLC 88 03/20/2017    BUN 9 03/20/2017    CR 0.69 03/20/2017    GFRESTIMATED >90  Non  GFR Calc   03/20/2017    GFRESTBLACK >90   GFR Calc   03/20/2017    MARCIN 9.0 03/20/2017    BILITOTAL 0.4 01/16/2017    ALBUMIN 3.6 01/16/2017    ALKPHOS 95 01/16/2017    ALT 18 01/16/2017    AST 20 01/16/2017        INR RESULTS:  Lab Results   Component Value Date    INR 1.04 01/23/2015       No components found for: CK  Lab Results   Component Value Date    MAG 2.4 (H) 12/19/2016     Lab Results   Component Value Date    NTBNPI 4168 (H) 11/30/2016       Echocardiogram 10/3/2016:  Paradoxical septal motion consistent with right ventricular pressure and volume overload is present.  Moderate to severe right ventricular dilation is present.  Global right ventricular function is mildly to moderately reduced.  Moderate to severe tricuspid insufficiency is present.  Right ventricular systolic pressure is 56mmHg above the right atrial pressure.  The inferior vena cava is normal.  No pericardial effusion is present.    RHC 11/30/2016  RA:15  RV: 60/16  PA: 60/38 (42)  PCWP: 15  CO/CI (TD): 2.6/1.6  PVR: 10.4    Inhaled nitric oxide  RA: 16  PA: 50/20 (35)  PCWP: 16  CO/CI (TD): 2.4/1.5    6MWT 2/27/2017: 290 meters with lowest saturation of 90% on 4L of supplemental oxygen    6MWT 3/31/2017: 238 meters with lowest saturation of 82% of 4L of supplemental oxygen.      Assessment and Plan: Ms. 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    1.  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 Loya is still WHO FC 3.  She is on Adcirca therapy and inhaled Tyvaso.  She has had problems with worsening hypoxemia since the addition of Tyvaso but she is having symptomatic improvements.   However, our objective tests show worsening exercise capacity.  We need another 6MWT today to see if she is having desaturations and if she continues to desaturate, we will need to stop the Tyvaso.  She is also on lasix 40 mg daily and digoxin for volume control and RV dysfunction respectively.    At this point, we are unfortunately running out of options.  If she can not tolerate inhaled Tyvaso due to worsening V/Q mismatch and hypoxemia, we will need to refer her for lung transplant as ERA are contra-indicated in ILD due to increased risk of mortality from the RISE-IIP and MANGO studies and systemic prostacyclin will worsening hypoxemia.  We also need a repeat RHC to assess her cardiac output and pulmonary pressures to help us decide if she needs lung transplantation.  She did smoke this past week and this will affect her eligibility for lung transplant with regards to timing.    It was a pleasure seeing Ms. Loya at the [facility] Pulmonary Hypertension program.  Please contact us with any questions or concerns that you may have.       Sincerely,    Carlo Frost MD, PhD  Cardiology Fellow    I have personally seen and examined the patient, and then discussed with Dr. Frost, and agree with the findings and plan in this note. I have reviewed today's vital signs, medications, labs, and imaging.     Sincerely,    Callie Ledesma MD   Center for Pulmonary Hypertension  Section of Advanced Heart Failure   Cardiovascular Division  [facility]   [phone]

## 2017-04-10 NOTE — MR AVS SNAPSHOT
After Visit Summary   4/10/2017    Ellen Loya    MRN: [id number]           Patient Information     Date Of Birth          1975        Visit Information        Provider Department      4/10/2017 8:00 AM Callie Ledesma MD [facility]        Today's Diagnoses     Pulmonary hypertension (H)    -  1      Care Instructions    Medication Changes:  No medication changes at this time. Please continue current medication regiment.     Patient Instructions:    Check-In  Time Check-In Location Estimated Length Procedure   Name        [facility]  waiting room 60-90 minutes Right Heart Catheterization**     Procedure Preparations & Instructions     This is an invasive procedure that DOES require preparation:    - Nothing to eat for 6 hours   - You may have clear liquids up until the time of your procedure  - A ride should be arranged for you in the instance you are unable to drive home, however you should be able to function as you normally would after the procedure     *For Patients with Diabetes: ? DO NOT take any oral diabetic medication, short-acting diabetes medications/insulin, humalog or regular insulin the morning of your test  ? Take   dose of long-acting insulin (Lantus, Levemir) the day of your test  ? Remember to  bring your glucometer and insulin with you to take after your test if needed     *For Patients on anticoagulants: ? Your Coumadin Clinic will give you instructions on medication adjustments or bridging prior to the procedure        Check-In  Time Check-In Location Estimated Length Procedure   [place]        [facility]   [facility] 60 minutes 6 muinute walk test**   Procedure Preparations & Instructions     This is a non-invasive procedure and does NOT require any preparation        Check-In  Time Check-In Location Estimated Length Procedure   Name         60 minutes Echocardiogram (Echo)**   Procedure Preparations & Instructions     This is a non-invasive procedure and does NOT require  any preparation         Follow up Appointment Information:  4 weeks    We are located on the third floor of the [facility] on the [facility].  Our address is     [address] [place] [address] [place] [address]rd [place] [address]      Thank you for allowing us to be a part of your care here at the [facility]    If you have questions or concerns please contact us at:    Naty Lau RN, BSN    Louis Jack (Schedule,P.A.)  Nurse Coordinator     Clinic   Pulmonary Hypertension   Pulmonary Hypertension  [facility]  (P)[phone]    (P) [phone]        (F)[phone]                ** Please note that you will NOT receive a reminder call regarding your scheduled testing, reminder calls are for provider appointments only.  If you are scheduled for testing within the [facility] system you may receive a call regarding pre-registration for billing purposes only.**     Remember to weigh yourself daily after voiding and before you consume any food or beverages and log the numbers.  If you have gained/lost 2 pounds overnight or 5 pounds in a week contact us immediately for medication adjustments or further instructions.  **Please call us immediately if you have any syncope, chest pain, edema, or decline in your functional status.        Follow-ups after your visit        Follow-up notes from your care team     Return in about 4 weeks (around 5/8/2017) for with Callie, Return PH, with, Labs.      Your next 10 appointments already scheduled     Apr 11, 2017 11:00 AM CDT   Treatment 60 with Asuncion Celis, [facility] ([facility])    [address] [place] [address]   [phone]            Apr 12, 2017  8:30 AM CDT   PFT VISIT with  YORDAN ROMO   [facility] ([facility]  [place])    [address] [address]   [phone]            Apr 12, 2017  9:00 AM CDT   (Arrive by 8:45 AM)   Return Interstitial Lung with Dawson Upton MD   [facility] ([facility])    [address] [place] [address]   [phone]            Apr 12, 2017 10:30 AM CDT   Ech Complete with UCECHCR1   [facility] Echo ([facility])    [address] [address]   [phone]           1.  Please bring or wear a comfortable two-piece outfit. 2.  You may eat, drink and take your normal medicines. 3.  For any questions that cannot be answered, please contact the ordering physician            Apr 13, 2017 11:00 AM CDT   Treatment 60 with ELIZA Gutierrez   [facility] ([facility])    [address] [place] [address]   [phone]            Apr 18, 2017 11:00 AM CDT   Treatment 60 with MARISSA Varner   [facility] ([facility])    [address] [place] [address]   [phone]            Apr 20, 2017 11:00 AM CDT   Treatment 60 with ELIZA Gutierrez   [facility] ([facility])    [address] [place] [address]   [phone]            Apr 25, 2017 11:00 AM CDT   Treatment 60 with MARISSA Varner   [facility] ([facility])    [address] [place] [address]   [phone]            Apr 26, 2017  7:00 AM CDT   Procedure - 2.5 hour with U2A ROOM 17   [address] [facility] ([facility])    [address]  [place] [address]               Apr 26, 2017  8:00 AM CDT   Heart Cath Right Heart Cath with UUHCVR3   [facility] ([facility])    [address] [address]   [phone]              Future tests that were ordered for you today     Open Future Orders        Priority Expected Expires Ordered    Heart Cath Right heart cath Routine  4/10/2018 4/10/2017    6 minute walk Routine  10/7/2017 4/10/2017    Echocardiogram Complete Routine  4/10/2018 4/10/2017            Who to contact     If you have questions or need follow up information about today's clinic visit or your schedule please contact [facility] directly at [phone].  Normal or non-critical lab and imaging results will be communicated to you by [employer], letter or phone within 4 business days after the clinic has received the results. If you do not hear from us within 7 days, please contact the clinic through [employer] or phone. If you have a critical or abnormal lab result, we will notify you by phone as soon as possible.  Submit refill requests through [employer] or call your pharmacy and they will forward the refill request to us. Please allow 3 business days for your refill to be completed.          Additional Information About Your Visit        [employer] Information     [employer] gives you secure access to your electronic health record. If you see a primary care provider, you can also send messages to your care team and make appointments. If you have questions, please call your primary care clinic.  If you do not have a primary care provider, please call [phone] and they will assist you.        Care EveryWhere ID     This is your Care EveryWhere ID. This could be used by other organizations to access your [facility] medical records  [id number]        Your Vitals Were     Pulse Height Pulse Oximetry BMI (Body Mass Index)          82 1.575 m (5' 2\") 95% 22.57 kg/m2         Blood Pressure from Last 3 Encounters:   04/10/17 108/76   03/20/17 102/69   02/27/17 107/64    Weight from Last 3 Encounters:   04/10/17 56 kg (123 lb 6.4 oz)   03/30/17 55.3 kg (122 lb)   03/23/17 56 kg " (123 lb 6.4 oz)                 Today's Medication Changes          These changes are accurate as of: 4/10/17  9:46 AM.  If you have any questions, ask your nurse or doctor.               Stop taking these medicines if you haven't already. Please contact your care team if you have questions.     azithromycin 250 MG tablet   Commonly known as:  ZITHROMAX   Stopped by:  Callie Ledesma MD           ibuprofen 800 MG tablet   Commonly known as:  ADVIL/MOTRIN   Stopped by:  Callie Ledesma MD           predniSONE 5 MG tablet   Commonly known as:  DELTASONE   Stopped by:  Callie Ledesma MD                    Primary Care Provider Office Phone # Fax #    Ignacio Loya [phone]       [facility] [address] [place] [address]        Thank you!     Thank you for choosing [facility]  for your care. Our goal is always to provide you with excellent care. Hearing back from our patients is one way we can continue to improve our services. Please take a few minutes to complete the written survey that you may receive in the mail after your visit with us. Thank you!             Your Updated Medication List - Protect others around you: Learn how to safely use, store and throw away your medicines at www.disposemymeds.org.          This list is accurate as of: 4/10/17  9:46 AM.  Always use your most recent med list.                   Brand Name Dispense Instructions for use    * albuterol (2.5 MG/3ML) 0.083% neb solution     60 vial    Use every 4-6 hours as needed for shortness of breath       * albuterol 108 (90 BASE) MCG/ACT Inhaler    albuterol    1 Inhaler    Inhale 1-2 puffs into the lungs every 4 hours as needed for shortness of breath / dyspnea       ALPRAZolam 0.5 MG tablet    XANAX    40 tablet    TAKE 1 TABLET BY MOUTH EVERY SIX HOURS AS NEEDED FOR ANXIETY.       digoxin 125 MCG tablet    LANOXIN    90 tablet    Take 1 tablet (125 mcg) by mouth daily        fluticasone 50 MCG/ACT spray    FLONASE    1 Bottle    Spray 2 sprays into both nostrils daily       furosemide 20 MG tablet    LASIX    180 tablet    Take 2 tablets (40 mg) by mouth daily       LANsoprazole 30 MG ODT tab    PREVACID SOLUTAB    90 tablet    Take 1 tablet (30 mg) by mouth daily       morphine 15 MG 12 hr tablet    MS CONTIN    60 tablet    Take 1 tablet (15 mg) by mouth 2 times daily       NEXIUM PO      Take 40 mg by mouth every morning (before breakfast)       * order for DME     1 Device    Oxygen: Patient requires supplemental Oxygen 2 LPM via nasal canula with activity. Please provide patient with portability capability. Okay to spot check patient on oxygen device, to keep sats above 90%. Oxygen will be for a lifetime.       * order for DME     1 Device    Equipment being ordered: personal O2 oximeter.       * order for DME     1 Device    Equipment being ordered: Oxygen  Please provide patient with a home-fill system for portability.       * order for DME     2 Device    Please provide patient with 2  liquid oxygen tanks for portability. Spot check patient on liquid oxygen. Titrate oxygen to maintain saturations above 88%.       * order for DME     1 Device    Equipment being ordered: Oxygen oximeter and mask       * order for DME     1 Device    Equipment being ordered: Oximeter       * order for DME     1 Device    Equipment being ordered: Nebulizer. Verbal order from        oxyCODONE-acetaminophen  MG per tablet    PERCOCET    60 tablet    Take 1 tablet by mouth every 6 hours as needed for moderate to severe pain       tadalafil (PAH) 20 MG Tabs    ADCIRCA    60 tablet    Take 2 tablets (40 mg) by mouth daily       tiotropium 18 MCG capsule    SPIRIVA HANDIHALER    30 capsule    Inhale contents of one capsule daily.       TYVASO STARTER 0.6 MG/ML Soln neb solution   Generic drug:  treprostinil      Inhale into the lungs every 4 hours (while awake for Tyvaso)       *  Notice:  This list has 9 medication(s) that are the same as other medications prescribed for you. Read the directions carefully, and ask your doctor or other care provider to review them with you.

## 2017-04-11 ENCOUNTER — HOSPITAL ENCOUNTER (OUTPATIENT)
Dept: CARDIAC REHAB | Facility: CLINIC | Age: 42
End: 2017-04-11
Attending: INTERNAL MEDICINE
Payer: COMMERCIAL

## 2017-04-11 VITALS — BODY MASS INDEX: 22.82 KG/M2 | HEIGHT: 62 IN | WEIGHT: 124 LBS

## 2017-04-11 LAB — FIO2-PRE: 36 %

## 2017-04-11 PROCEDURE — 40000244 ZZH STATISTIC VISIT PULM REHAB: Performed by: REHABILITATION PRACTITIONER

## 2017-04-11 PROCEDURE — G0239 OTH RESP PROC, GROUP: HCPCS | Performed by: REHABILITATION PRACTITIONER

## 2017-04-11 ASSESSMENT — 6 MINUTE WALK TEST (6MWT)
TOTAL DISTANCE WALKED: 220.98
MALE CALC: 578.26
FEMALE CALC: 633.27
GENDER SELECTION: FEMALE

## 2017-04-11 ASSESSMENT — DUKE ACTIVITY SCORE INDEX (DASI)
DASI METS SCORE: 6.27
VO2_PEAK: 21.94

## 2017-04-11 ASSESSMENT — PULMONARY FUNCTION TESTS
FEV1: 82
FVC_PERCENT_PREDICTED: 82
FVC: 81

## 2017-04-11 NOTE — PROGRESS NOTES
04/11/17 1300   Session   Session 90 Day Individualized Treatment Plan   Certified through this date 05/10/17   Type Reassessment   General Information   Treatment Diagnosis Interstitial Pulmonary Fibrosis   Onset Date 01/01/11   Hospital Location [facility]   Current Signs and Symptoms SOB   Outpatient Pulmonary Rehab Start Date 01/13/17   Primary Physician Dr. Loya   Pulmonolgist Dr. Upton   General Information Comments .pmh   Untoward Events/Exacerbations/Hospitalizations   Untoward Events/Exacerbations/Hospitalizations None   Sputum   Sputum Production Amount None   Sputum Production Color Yellow   Sputum Production Consistency Thick   Tobacco History   Tobacco Former smoker   Tobacco Habit Cigarettes   Years Smoked 20   Average Packs Per Day 1/2   Quit Date or Planned Quit Date 12/14/16   Interventions Planned Check in Regularly, Offer Support to Reduce Risk of Relapse   Interventions Completed Checked in regularly, offered support as needed   Medications   Short-Acting Beta Agonist Prescribed, taking as prescribed   Long-Acting Anticholinergic Prescribed, taking as prescribed   Pain   Patient Currently in Pain No   Pain Location hip   Pain Rating 5/10   Pain Comments Is on prednisone with relief for joints   Falls Screen   Have you fallen two or more times in the past year? Yes   Have you fallen and had an injury in the past year? No   Referral initiated to physical therapy No   Living and Work Status   Living Arrangements house   Support System Live with an adult   Environmental Factors No concerns   ADL Limitations None   Initial Duke Activity Status Index (DASI) score. A measure of functional capacity. The goal is to have a pre-program raw score of 9.95 (~4 METs) or above 28.7   Initial DASI VO2 Peak (ml*kg-1*min-1) 21.94   Initial DASI MET Level 6.27   Occupation , cook   Return to Employment Not employed  (Disabled)   Physical Assessments   Incisions Not  "applicable   Edema Not assessed   Left Lung Sounds not assessed   Right Lung Sounds not assessed   Pulmonary Function Test (PFTs)   PFT Results Available   Date Completed 01/04/17   FVC Actual 81   % Predicted FVC 82   FEV1 Actual 82   Individualized Treatment Plan   Sessions Scheduled 30   Sessions Attended 20   Type Aerobic exercise;Resistance training   Oxygen Use   Supplemental Oxygen Needed Yes   Delivery Device Nasal Cannula   Liter Flow at Rest 4   Liter Flow with ADLS 4   Liter Flow During Exercise 4   Liter Flow With Sleep 4   Evaluated on Home System? Continuous flow   Interventions Recommended Continue to monitor SpO2 at rest and with exercise on current O2 settings   Exercise Prescription   Mode Treadmill;Nustep;Arm Ergometer/UBE;Weights   Frequency 2 days/week   Duration/Time 30-45 min   THR (85% of age predicted max heart rate)  152.15   Effort Rating (0-10) 4-6   Progression of Exercise Increase speed on treadmill by 0.1 mph per week   Oxygen Titration with Exercise > 88% with exercise   Exercise Assessment   6 Minute Walk Predicted - Gender Selection Female   6 Minute Walk Predicted (Female) 633.27   6 Minute Walk Predicted (Male) 578.26   6 Minute Walk Distance (Initial) 220.98 Meters   Resting HR 72   Exercise HR 95   SpO2 95   Exercise SpO2 90   Current MET level 2.9   Exercise Tolerance fair   Normal Limits Discussed Yes   Current Symptoms at Home Dyspnea;Fatigue   Current Symptoms in Rehab Dyspnea   Limitations Arthritis   Nutrition Management   Age 41   Height 1.575 m (5' 2.01\")   Weight 56.2 kg (124 lb)   BMI (Calculated) 22.72   Interventions Planned NA   Psychosocial   Initial Patient Health Questionnaire -9 (PHQ-9) for depression. To notify physician if pre-score >9. 11   Initial Shortness of Breath Questionnaire (SOBQ) score. The goal is to reduce the score pre to post program. 40   Intervention Planned Patient to identify 2-3 coping mechanisms to decrease stress and anxiety;Patient to " attend appropriate education class;Introduce relaxation techniques to assist with decreased anxiety   Interventions Completed Attended stress management class   Psychosocial Comments PT reports she has anxiety which she takes medication for which is situation based.    Stages of Change   Aerobic Exercise Preparation   Physical Activity Preparation   Recommended diet Action   Stress Contemplation   Smoking Cessation Action   Oxygen Usage Maintenance   Current Home Exercise   Type of Exercise None   Frequency (days per week) 0   Duration (minutes per session) 0   Recommended Home Exercise Prescription   Type of Exercise Walking;Bike   Frequency (Days per week) 2-3   Duration (minutes per session) 15-30 min   Effort Rating Recommended (0-10) Scale  4-6/10   30 Day Exercise Plan Increase daily activity and  walk 10 min 2x/day on nonPR days. Start using bicycle at home as energy levels improve.    Learning Assessment   Learner Patient   Primary Language English   Preferred Learning Style Listening;Reading;Demonstration;Pictures/Video   Barriers to Learning No barriers noted   Patient Education/Referrals   Education Recommended Activities of Daily Living;Breathing Techniques;Community Resources/Exacerbation Management;Emotional Aspects of CLD;Energy Conservation;Exercise Principles;Medication Overview;Nutrition;Panic and Anxiety   Education Attended Panic and Anxiety;Community Resources/Exacerbation Management;Air Quality   Follow-up/On-going Support   Provider follow-up needed on the following No follow-up needed   Pulmonary Rehab Goals   Pulmonary Rehab Goals 1;2;3   Goal 1   Goal Patient will increase treadmill speed to 1.9-2 mph    Target Date 04/09/17   Progress Towards Goal 4/11 Patient reports that her physician would like her to be able to achieve greater distance on 6 minute walk test, she would like this too because she feels this reduces the likelihood of lung transplant, recent set back with illness, plan to  start increasing exercise workloads 0.1 mph/week and encourage walking at home as well as use of bicycle.   Goal 2   Goal Pt will move from the action phase to maintenance phase in refererance to smoking.   Target Date 04/14/17   Progress Towards Goal 4/11 Patient admits to smoking over this past weekend, states it was 'self sabotage' because she was scared from going off of medication and that may mean she will get a transplant faster. She discussed with physician and is relieved of this fear and has re-committed to not smoking.   Goal 3   Goal Patient will learn techniques to reduce dyspnea while pulling weeks   Target Date 05/09/17   Progress Towards Goal 4/11 Missed education due to illness, will give to patient 1:1. Patient did report doing some raking over weekend with minimal dyspnea   Assessment   Assessment Ellen had continued to make good progress over the past month. She had an initial setback after starting Tyvaso, with headaches and fatigue, this resolved with adjustments to her dosing. She also cam down with a cold last week and was unable to attend due to increased dyspnea and mucus production. Despite that she has been able to maintain oxygenation better with exercise after starting Tyvaso. She has zan out her bicycle for home exercise and is anticipating increasing her treadmill speed. She will continue to benefit from skilled services to work towards goals as listed above. Ellen and I are anticipating discharge from pulmonary rehabilitation within 1 month. We will wait until after her right heart cath on 4/26/17 and reassess then.      I have reviewed and agree with this patient s individual treatment plan and exercise prescription for respiratory therapy.  Please see    individual treatment plan for details of progress and plan.

## 2017-04-12 ENCOUNTER — OFFICE VISIT (OUTPATIENT)
Dept: PULMONOLOGY | Facility: CLINIC | Age: 42
End: 2017-04-12
Attending: INTERNAL MEDICINE
Payer: COMMERCIAL

## 2017-04-12 ENCOUNTER — RADIANT APPOINTMENT (OUTPATIENT)
Dept: CARDIOLOGY | Facility: CLINIC | Age: 42
End: 2017-04-12

## 2017-04-12 VITALS
BODY MASS INDEX: 23.65 KG/M2 | DIASTOLIC BLOOD PRESSURE: 67 MMHG | RESPIRATION RATE: 16 BRPM | SYSTOLIC BLOOD PRESSURE: 109 MMHG | HEART RATE: 81 BPM | HEIGHT: 62 IN | WEIGHT: 128.5 LBS

## 2017-04-12 DIAGNOSIS — I27.20 PULMONARY HYPERTENSION (H): ICD-10-CM

## 2017-04-12 DIAGNOSIS — R09.02 HYPOXIA: ICD-10-CM

## 2017-04-12 DIAGNOSIS — J84.9 ILD (INTERSTITIAL LUNG DISEASE) (H): Primary | ICD-10-CM

## 2017-04-12 DIAGNOSIS — J84.9 ILD (INTERSTITIAL LUNG DISEASE) (H): ICD-10-CM

## 2017-04-12 PROCEDURE — 99212 OFFICE O/P EST SF 10 MIN: CPT | Mod: ZF

## 2017-04-12 ASSESSMENT — PAIN SCALES - GENERAL: PAINLEVEL: NO PAIN (0)
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
## 2017-04-12 NOTE — PATIENT INSTRUCTIONS
"Patient is instructed to call if there is any changes in symptoms.    Pulmonary Rehab:  Continue     Oxygen use:  Recommended 4 liters at rest, 5 liters continuous with activity      5' 2\" 128 lbs 8 oz Body mass index is 23.5 kg/(m^2).  Goal BMI greater than 18, less than 30 to be eligible for lung transplant    Medication changes: no change    Follow-up plans: 6 months.  If you decide to change oxygen provider or oxygen type, please notify your coordinator.    Thank-you for allowing us to participate in your care.    If your condition should change please contact your transplant coordinator.     Thoracic Transplant Office phone [phone], fax [phone]  Office Hours 8:30 - 5:00       "

## 2017-04-12 NOTE — NURSING NOTE
Chief Complaint   Patient presents with     Interstitial Lung Disease (ILD)     Patient is being seen for ILD follow up      Leda Leija CMA at 9:07 AM on 4/12/2017

## 2017-04-12 NOTE — NURSING NOTE
Updated oxygen script provided to patient who plans to obtain e-tanks from her oxygen company today.  Current portable system goes to 2 liters continuous, up to 5 liters pulsed.  Recommended that patient contact coordinator if she would prefer to try a different oxygen system.  Brief discussion regarding lung transplant.  Ellen feels like she is improving on current therapy and hopeful transplant will not be necessary.  She successfully quit smoking in December, does not use nicotine products, but does have some exposure to second hand smoke.  Family member (mother) smokes outside.

## 2017-04-12 NOTE — MR AVS SNAPSHOT
"              After Visit Summary   4/12/2017    Ellen Loya    MRN: [id number]           Patient Information     Date Of Birth          1975        Visit Information        Provider Department      4/12/2017 9:00 AM Dawson Upton MD [facility]        Today's Diagnoses     ILD (interstitial lung disease) (H)    -  1    Hypoxia          Care Instructions    Patient is instructed to call if there is any changes in symptoms.    Pulmonary Rehab:  Continue     Oxygen use:  Recommended 4 liters at rest, 5 liters continuous with activity      5' 2\" 128 lbs 8 oz Body mass index is 23.5 kg/(m^2).  Goal BMI greater than 18, less than 30 to be eligible for lung transplant    Medication changes: no change    Follow-up plans: 6 months.  If you decide to change oxygen provider or oxygen type, please notify your coordinator.    Thank-you for allowing us to participate in your care.    If your condition should change please contact your transplant coordinator.     Thoracic Transplant Office phone [phone], fax [phone]  Office Hours 8:30 - 5:00             Follow-ups after your visit        Follow-up notes from your care team     Return in about 6 weeks (around 5/24/2017).      Your next 10 appointments already scheduled     Apr 13, 2017 11:00 AM CDT   Treatment 60 with ELIZA Gutierrez   [facility] ([facility])    [address] [place] [address] Dr [place] [address]   [phone]            Apr 18, 2017 11:00 AM CDT   Treatment 60 with MARISSA Varner   [facility] ([facility])    [address] [place] [address] Dr [place]as [place] [address]   [phone]            Apr 20, 2017 11:00 AM CDT   Treatment 60 with ELIZA Gutierrez   [facility] ([facility])    [address] [place] [address] Dr Rosas [place] [address]   [phone]            Apr 25, 2017 11:00 AM CDT   Treatment 60 with MARISSA Varner "   [facility] ([facility])    [address] [place] [address]   [phone]            Apr 26, 2017  7:00 AM CDT   Procedure - 2.5 hour with U2A ROOM 17   [address] [facility] ([facility])    [address] [address]               Apr 26, 2017  8:00 AM CDT   Heart Cath Right Heart Cath with [id number]   [facility]    [address] [address]   [phone]            Apr 27, 2017 11:00 AM CDT   Treatment 60 with ELIZA Gutierrez   [facility] ([facility])    [address] [place] [address]   [phone]            May 02, 2017 11:00 AM CDT   Treatment 60 with MARISSA Varner   [facility] ([facility])    [address] [place] [address] Dr Rosas [place] [address]   [phone]            May 04, 2017 11:00 AM CDT   Treatment 60 with ELIZA Gutierrez   [facility] ([facility])    [address] [place] [address] Dr Rosas [place] [address]   [phone]            May 09, 2017 11:00 AM CDT   Treatment 60 with MARISSA Varner   [facility] ([facility])    [address] [place] [address] Dr Rosas [place] [address]   [phone]              Future tests that were ordered for you today     Open Future Orders        Priority Expected Expires Ordered    General PFT Lab (Please always keep checked) Routine  4/12/2018 4/12/2017    Pulmonary Function Test Routine  4/12/2018 4/12/2017            Who to contact     If you have questions or need follow up information about today's clinic visit or your schedule please contact [facility] directly at [phone].  Normal or non-critical lab and imaging results will be communicated to you by MyChart, letter or phone within 4  "business days after the clinic has received the results. If you do not hear from us within 7 days, please contact the clinic through Rezdy or phone. If you have a critical or abnormal lab result, we will notify you by phone as soon as possible.  Submit refill requests through Rezdy or call your pharmacy and they will forward the refill request to us. Please allow 3 business days for your refill to be completed.          Additional Information About Your Visit        Rezdy Information     Rezdy gives you secure access to your electronic health record. If you see a primary care provider, you can also send messages to your care team and make appointments. If you have questions, please call your primary care clinic.  If you do not have a primary care provider, please call [phone] and they will assist you.        Care EveryWhere ID     This is your Care EveryWhere ID. This could be used by other organizations to access your [facility] medical records  [id number]        Your Vitals Were     Pulse Respirations Height BMI (Body Mass Index)          81 16 1.575 m (5' 2\") 23.5 kg/m2         Blood Pressure from Last 3 Encounters:   04/12/17 109/67   04/10/17 108/76   03/20/17 102/69    Weight from Last 3 Encounters:   04/12/17 58.3 kg (128 lb 8 oz)   04/11/17 56.2 kg (124 lb)   04/10/17 56 kg (123 lb 6.4 oz)                 Today's Medication Changes          These changes are accurate as of: 4/12/17 10:54 AM.  If you have any questions, ask your nurse or doctor.               These medicines have changed or have updated prescriptions.        Dose/Directions    * order for DME   This may have changed:  Another medication with the same name was added. Make sure you understand how and when to take each.   Used for:  Hypoxia   Changed by:  Eleanor More MD        Oxygen: Patient requires supplemental Oxygen 2 LPM via nasal canula with activity. Please provide patient with portability capability. Okay to spot check " patient on oxygen device, to keep sats above 90%. Oxygen will be for a lifetime.   Quantity:  1 Device   Refills:  0       * order for DME   This may have changed:  Another medication with the same name was added. Make sure you understand how and when to take each.   Used for:  Hypoxia   Changed by:  Ignacio Loya MD        Equipment being ordered: personal O2 oximeter.   Quantity:  1 Device   Refills:  0       * order for DME   This may have changed:  Another medication with the same name was added. Make sure you understand how and when to take each.   Used for:  ILD (interstitial lung disease) (H), Hypoxia   Changed by:  Dawson Upton MD        Equipment being ordered: Oxygen  Please provide patient with a home-fill system for portability.   Quantity:  1 Device   Refills:  99       * order for DME   This may have changed:  Another medication with the same name was added. Make sure you understand how and when to take each.   Used for:  ILD (interstitial lung disease) (H)   Changed by:  Perlman, David Morris, MD        Please provide patient with 2  liquid oxygen tanks for portability. Spot check patient on liquid oxygen. Titrate oxygen to maintain saturations above 88%.   Quantity:  2 Device   Refills:  0       * order for DME   This may have changed:  Another medication with the same name was added. Make sure you understand how and when to take each.   Used for:  ILD (interstitial lung disease) (H), Lung nodule, Fibrosis of lung (H), Pulmonary hypertension (H)   Changed by:  Ignacio Loya MD        Equipment being ordered: Oxygen oximeter and mask   Quantity:  1 Device   Refills:  0       * order for DME   This may have changed:  Another medication with the same name was added. Make sure you understand how and when to take each.   Used for:  ILD (interstitial lung disease) (H)   Changed by:  Ignacio Loya MD        Equipment being ordered: Oximeter   Quantity:  1 Device   Refills:  0       * order  for DME   This may have changed:  Another medication with the same name was added. Make sure you understand how and when to take each.   Used for:  Acute bronchitis, unspecified organism   Changed by:  Ignacio Loya MD        Equipment being ordered: Nebulizer. Verbal order from    Quantity:  1 Device   Refills:  0       * order for DME   This may have changed:  You were already taking a medication with the same name, and this prescription was added. Make sure you understand how and when to take each.   Used for:  ILD (interstitial lung disease) (H), Hypoxia   Changed by:  Dawson Upton MD        Equipment being ordered: Oxygen 4 liters continuous at rest and 5 liters continuous with activity. Needs portability.  Please provide concentrator that goes to 10 liters.   Quantity:  1 Device   Refills:  prn       * Notice:  This list has 8 medication(s) that are the same as other medications prescribed for you. Read the directions carefully, and ask your doctor or other care provider to review them with you.         Where to get your medicines      Some of these will need a paper prescription and others can be bought over the counter.  Ask your nurse if you have questions.     Bring a paper prescription for each of these medications     order for DME                Primary Care Provider Office Phone # Fax #    Ignacio Loya [phone]       [place] [address] [facility] [address] [place] [address]        Thank you!     Thank you for choosing [facility]  for your care. Our goal is always to provide you with excellent care. Hearing back from our patients is one way we can continue to improve our services. Please take a few minutes to complete the written survey that you may receive in the mail after your visit with us. Thank you!             Your Updated Medication List - Protect others around you: Learn how to safely use, store and throw away  your medicines at www.disposemymeds.org.          This list is accurate as of: 4/12/17 10:54 AM.  Always use your most recent med list.                   Brand Name Dispense Instructions for use    * albuterol (2.5 MG/3ML) 0.083% neb solution     60 vial    Use every 4-6 hours as needed for shortness of breath       * albuterol 108 (90 BASE) MCG/ACT Inhaler    albuterol    1 Inhaler    Inhale 1-2 puffs into the lungs every 4 hours as needed for shortness of breath / dyspnea       ALPRAZolam 0.5 MG tablet    XANAX    40 tablet    TAKE 1 TABLET BY MOUTH EVERY SIX HOURS AS NEEDED FOR ANXIETY.       digoxin 125 MCG tablet    LANOXIN    90 tablet    Take 1 tablet (125 mcg) by mouth daily       fluticasone 50 MCG/ACT spray    FLONASE    1 Bottle    Spray 2 sprays into both nostrils daily       furosemide 20 MG tablet    LASIX    180 tablet    Take 2 tablets (40 mg) by mouth daily       LANsoprazole 30 MG ODT tab    PREVACID SOLUTAB    90 tablet    Take 1 tablet (30 mg) by mouth daily       morphine 15 MG 12 hr tablet    MS CONTIN    60 tablet    Take 1 tablet (15 mg) by mouth 2 times daily       NEXIUM PO      Take 40 mg by mouth every morning (before breakfast)       * order for DME     1 Device    Oxygen: Patient requires supplemental Oxygen 2 LPM via nasal canula with activity. Please provide patient with portability capability. Okay to spot check patient on oxygen device, to keep sats above 90%. Oxygen will be for a lifetime.       * order for DME     1 Device    Equipment being ordered: personal O2 oximeter.       * order for DME     1 Device    Equipment being ordered: Oxygen  Please provide patient with a home-fill system for portability.       * order for DME     2 Device    Please provide patient with 2  liquid oxygen tanks for portability. Spot check patient on liquid oxygen. Titrate oxygen to maintain saturations above 88%.       * order for DME     1 Device    Equipment being ordered: Oxygen oximeter and mask        * order for DME     1 Device    Equipment being ordered: Oximeter       * order for DME     1 Device    Equipment being ordered: Nebulizer. Verbal order from        * order for DME     1 Device    Equipment being ordered: Oxygen 4 liters continuous at rest and 5 liters continuous with activity. Needs portability.  Please provide concentrator that goes to 10 liters.       oxyCODONE-acetaminophen  MG per tablet    PERCOCET    60 tablet    Take 1 tablet by mouth every 6 hours as needed for moderate to severe pain       tadalafil (PAH) 20 MG Tabs    ADCIRCA    60 tablet    Take 2 tablets (40 mg) by mouth daily       tiotropium 18 MCG capsule    SPIRIVA HANDIHALER    30 capsule    Inhale contents of one capsule daily.       TYVASO STARTER 0.6 MG/ML Soln neb solution   Generic drug:  treprostinil      Inhale into the lungs every 4 hours (while awake for Tyvaso)       * Notice:  This list has 10 medication(s) that are the same as other medications prescribed for you. Read the directions carefully, and ask your doctor or other care provider to review them with you.
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
## 2017-04-17 DIAGNOSIS — G47.9 DISTURBANCE IN SLEEP BEHAVIOR: ICD-10-CM

## 2017-04-17 RX ORDER — ALPRAZOLAM 0.5 MG
TABLET ORAL
Qty: 40 TABLET | Refills: 0 | Status: SHIPPED | OUTPATIENT
Start: 2017-04-17 | End: 2017-04-24

## 2017-04-18 ENCOUNTER — HOSPITAL ENCOUNTER (OUTPATIENT)
Dept: CARDIAC REHAB | Facility: CLINIC | Age: 42
End: 2017-04-18
Attending: INTERNAL MEDICINE
Payer: COMMERCIAL

## 2017-04-18 PROCEDURE — 40000244 ZZH STATISTIC VISIT PULM REHAB: Performed by: REHABILITATION PRACTITIONER

## 2017-04-18 PROCEDURE — G0239 OTH RESP PROC, GROUP: HCPCS | Performed by: REHABILITATION PRACTITIONER

## 2017-04-20 DIAGNOSIS — R07.81 PLEURITIC CHEST PAIN: ICD-10-CM

## 2017-04-20 RX ORDER — OXYCODONE AND ACETAMINOPHEN 10; 325 MG/1; MG/1
1 TABLET ORAL EVERY 6 HOURS PRN
Qty: 60 TABLET | Refills: 0 | Status: SHIPPED | OUTPATIENT
Start: 2017-04-20 | End: 2017-05-04

## 2017-04-20 NOTE — TELEPHONE ENCOUNTER
Percocet  Last Written Prescription Date: 04/10/2017  Last Fill Quantity: 60,  # refills: 0   Last Office Visit with [facility], [facility] or [facility] prescribing provider: 11/7/16    Sandra Rodriguze [facility]  [facility]

## 2017-04-24 DIAGNOSIS — G47.9 DISTURBANCE IN SLEEP BEHAVIOR: ICD-10-CM

## 2017-04-24 NOTE — TELEPHONE ENCOUNTER
Xanax 0.5mg      Last Written Prescription Date: 04/17/2017  Last Fill Quantity: 40,  # refills: 0   Last Office Visit with [facility], [facility] or [facility] prescribing provider: 11/07/2016    Joy Fuentes CPhT  [facility] [place]  [phone]

## 2017-04-25 LAB
FIO2-PRE: 36 %
FIO2-PRE: 36 %

## 2017-04-26 ENCOUNTER — APPOINTMENT (OUTPATIENT)
Dept: MEDSURG UNIT | Facility: CLINIC | Age: 42
End: 2017-04-26
Attending: INTERNAL MEDICINE
Payer: COMMERCIAL

## 2017-04-26 ENCOUNTER — APPOINTMENT (OUTPATIENT)
Dept: CARDIOLOGY | Facility: CLINIC | Age: 42
End: 2017-04-26
Attending: INTERNAL MEDICINE
Payer: COMMERCIAL

## 2017-04-26 ENCOUNTER — HOSPITAL ENCOUNTER (OUTPATIENT)
Facility: CLINIC | Age: 42
Discharge: HOME | End: 2017-04-26
Attending: INTERNAL MEDICINE | Admitting: INTERNAL MEDICINE
Payer: COMMERCIAL

## 2017-04-26 VITALS
BODY MASS INDEX: 22.63 KG/M2 | WEIGHT: 123 LBS | RESPIRATION RATE: 16 BRPM | TEMPERATURE: 97.8 F | DIASTOLIC BLOOD PRESSURE: 59 MMHG | HEART RATE: 90 BPM | HEIGHT: 62 IN | OXYGEN SATURATION: 100 % | SYSTOLIC BLOOD PRESSURE: 108 MMHG

## 2017-04-26 DIAGNOSIS — I27.20 PULMONARY HYPERTENSION (H): ICD-10-CM

## 2017-04-26 DIAGNOSIS — R06.09 DYSPNEA ON EXERTION: Primary | ICD-10-CM

## 2017-04-26 DIAGNOSIS — I27.20 PULMONARY HYPERTENSION (H): Primary | ICD-10-CM

## 2017-04-26 LAB
ANION GAP SERPL CALCULATED.3IONS-SCNC: 6 MMOL/L (ref 3–14)
BUN SERPL-MCNC: 8 MG/DL (ref 7–30)
CALCIUM SERPL-MCNC: 9.1 MG/DL (ref 8.5–10.1)
CHLORIDE SERPL-SCNC: 103 MMOL/L (ref 94–109)
CO2 SERPL-SCNC: 28 MMOL/L (ref 20–32)
CREAT SERPL-MCNC: 0.6 MG/DL (ref 0.52–1.04)
GFR SERPL CREATININE-BSD FRML MDRD: NORMAL ML/MIN/1.7M2
GLUCOSE SERPL-MCNC: 82 MG/DL (ref 70–99)
POTASSIUM SERPL-SCNC: 3.5 MMOL/L (ref 3.4–5.3)
SODIUM SERPL-SCNC: 138 MMOL/L (ref 133–144)

## 2017-04-26 PROCEDURE — 40000166 ZZH STATISTIC PP CARE STAGE 1

## 2017-04-26 PROCEDURE — 27210806 ZZH SHEATH CR5

## 2017-04-26 PROCEDURE — 93451 RIGHT HEART CATH: CPT

## 2017-04-26 PROCEDURE — 27210982 ZZH KIT RT HC TOTES DISP CR7

## 2017-04-26 PROCEDURE — 4A023N6 MEASUREMENT OF CARDIAC SAMPLING AND PRESSURE, RIGHT HEART, PERCUTANEOUS APPROACH: ICD-10-PCS | Performed by: INTERNAL MEDICINE

## 2017-04-26 PROCEDURE — 27210788 ZZH MANIFOLD CR3

## 2017-04-26 PROCEDURE — 80048 BASIC METABOLIC PNL TOTAL CA: CPT | Performed by: INTERNAL MEDICINE

## 2017-04-26 PROCEDURE — 27211181 ZZH BALLOON TIP PRESSURE CR5

## 2017-04-26 PROCEDURE — 93451 RIGHT HEART CATH: CPT | Mod: 26 | Performed by: INTERNAL MEDICINE

## 2017-04-26 RX ORDER — ALPRAZOLAM 0.5 MG
TABLET ORAL
Qty: 40 TABLET | Refills: 0 | Status: SHIPPED | OUTPATIENT
Start: 2017-04-26 | End: 2017-05-04

## 2017-04-26 RX ORDER — LIDOCAINE 40 MG/G
CREAM TOPICAL
Status: COMPLETED | OUTPATIENT
Start: 2017-04-26 | End: 2017-04-26

## 2017-04-26 RX ADMIN — LIDOCAINE: 40 CREAM TOPICAL at 07:16

## 2017-04-26 NOTE — PROGRESS NOTES
Pt back from CCL s/p RHC.  VSS.  Pt alert and oriented x4. Pt continues on O2 NC @ 4L, this is her baseline.  Pt c/o slight discomfort at the right neck site.  Rt neck F/D/I.  Pt's family at the bedside.  0920.  Pt tolerated po well.  D/C instructions went over with and given to pt by previous nurse, Octavia CEDILLO RN-pt has no questions.  Dr Ledesma in to see pt. 0924-Pt D/C'ed to home with her family.

## 2017-04-26 NOTE — IP AVS SNAPSHOT
[address] [address]                                       After Visit Summary   4/26/2017    Ellen Loya    MRN: [id number]           After Visit Summary Signature Page     I have received my discharge instructions, and my questions have been answered. I have discussed any challenges I see with this plan with the nurse or doctor.    ..........................................................................................................................................  Patient/Patient Representative Signature      ..........................................................................................................................................  Patient Representative Print Name and Relationship to Patient    ..................................................               ................................................  Date                                            Time    ..........................................................................................................................................  Reviewed by Signature/Title    ...................................................              ..............................................  Date                                                            Time

## 2017-04-26 NOTE — IP AVS SNAPSHOT
MRN:[id number]                      After Visit Summary   4/26/2017    Ellen Loya    MRN: [id number]           Visit Information        Department      4/26/2017  6:51 AM [address] [facility]          Review of your medicines      UNREVIEWED medicines. Ask your doctor about these medicines        Dose / Directions    * albuterol (2.5 MG/3ML) 0.083% neb solution   Used for:  Pleuritic chest pain, Acute bronchitis, unspecified organism        Use every 4-6 hours as needed for shortness of breath   Quantity:  60 vial   Refills:  3       * albuterol 108 (90 BASE) MCG/ACT Inhaler   Commonly known as:  albuterol   Used for:  SOB (shortness of breath)        Dose:  1-2 puff   Inhale 1-2 puffs into the lungs every 4 hours as needed for shortness of breath / dyspnea   Quantity:  1 Inhaler   Refills:  8       ALPRAZolam 0.5 MG tablet   Commonly known as:  XANAX   Used for:  Disturbance in sleep behavior        TAKE 1 TABLET BY MOUTH EVERY SIX HOURS AS NEEDED FOR ANXIETY.   Quantity:  40 tablet   Refills:  0       digoxin 125 MCG tablet   Commonly known as:  LANOXIN   Used for:  Pulmonary hypertension (H)        Dose:  125 mcg   Take 1 tablet (125 mcg) by mouth daily   Quantity:  90 tablet   Refills:  3       fluticasone 50 MCG/ACT spray   Commonly known as:  FLONASE   Used for:  Nasal congestion        Dose:  2 spray   Spray 2 sprays into both nostrils daily   Quantity:  1 Bottle   Refills:  3       furosemide 20 MG tablet   Commonly known as:  LASIX   Used for:  Pulmonary hypertension (H)        Dose:  40 mg   Take 2 tablets (40 mg) by mouth daily   Quantity:  180 tablet   Refills:  3       LANsoprazole 30 MG ODT tab   Commonly known as:  PREVACID SOLUTAB   Used for:  Heart burn        Dose:  30 mg   Take 1 tablet (30 mg) by mouth daily   Quantity:  90 tablet   Refills:  3       morphine 15 MG 12 hr tablet   Commonly known as:  MS CONTIN   Used for:  Inflammatory arthritis        Dose:  15 mg   Take  1 tablet (15 mg) by mouth 2 times daily   Quantity:  60 tablet   Refills:  0       NEXIUM PO   Used for:  Pulmonary hypertension (H)        Dose:  40 mg   Take 40 mg by mouth every morning (before breakfast)   Refills:  0       oxyCODONE-acetaminophen  MG per tablet   Commonly known as:  PERCOCET   Used for:  Pleuritic chest pain        Dose:  1 tablet   Take 1 tablet by mouth every 6 hours as needed for moderate to severe pain   Quantity:  60 tablet   Refills:  0       tadalafil (PAH) 20 MG Tabs   Commonly known as:  ADCIRCA   Used for:  Pulmonary hypertension (H)        Dose:  40 mg   Take 2 tablets (40 mg) by mouth daily   Quantity:  60 tablet   Refills:  11       tiotropium 18 MCG capsule   Commonly known as:  SPIRIVA HANDIHALER   Used for:  ILD (interstitial lung disease) (H)        Inhale contents of one capsule daily.   Quantity:  30 capsule   Refills:  1       TYVASO STARTER 0.6 MG/ML Soln neb solution   Generic drug:  treprostinil        Inhale into the lungs every 4 hours (while awake for Tyvaso)   Refills:  0       * Notice:  This list has 2 medication(s) that are the same as other medications prescribed for you. Read the directions carefully, and ask your doctor or other care provider to review them with you.      CONTINUE these medicines which have NOT CHANGED        Dose / Directions    * order for DME   Used for:  Hypoxia        Oxygen: Patient requires supplemental Oxygen 2 LPM via nasal canula with activity. Please provide patient with portability capability. Okay to spot check patient on oxygen device, to keep sats above 90%. Oxygen will be for a lifetime.   Quantity:  1 Device   Refills:  0       * order for DME   Used for:  Hypoxia        Equipment being ordered: personal O2 oximeter.   Quantity:  1 Device   Refills:  0       * order for DME   Used for:  ILD (interstitial lung disease) (H), Hypoxia        Equipment being ordered: Oxygen  Please provide patient with a home-fill system for  portability.   Quantity:  1 Device   Refills:  99       * order for DME   Used for:  ILD (interstitial lung disease) (H)        Please provide patient with 2  liquid oxygen tanks for portability. Spot check patient on liquid oxygen. Titrate oxygen to maintain saturations above 88%.   Quantity:  2 Device   Refills:  0       * order for DME   Used for:  ILD (interstitial lung disease) (H), Lung nodule, Fibrosis of lung (H), Pulmonary hypertension (H)        Equipment being ordered: Oxygen oximeter and mask   Quantity:  1 Device   Refills:  0       * order for DME   Used for:  ILD (interstitial lung disease) (H)        Equipment being ordered: Oximeter   Quantity:  1 Device   Refills:  0       * order for DME   Used for:  Acute bronchitis, unspecified organism        Equipment being ordered: Nebulizer. Verbal order from    Quantity:  1 Device   Refills:  0       * order for DME   Used for:  ILD (interstitial lung disease) (H), Hypoxia        Equipment being ordered: Oxygen 4 liters continuous at rest and 5 liters continuous with activity. Needs portability.  Please provide concentrator that goes to 10 liters.   Quantity:  1 Device   Refills:  prn       * Notice:  This list has 8 medication(s) that are the same as other medications prescribed for you. Read the directions carefully, and ask your doctor or other care provider to review them with you.             Protect others around you: Learn how to safely use, store and throw away your medicines at www.disposemymeds.org.         Follow-ups after your visit        Your next 10 appointments already scheduled     Apr 26, 2017  8:00 AM CDT   Heart Cath Right Heart [facility] with [id number]   [facility] ([facility])    [address] [address]   [phone]            May 02, 2017 11:00 AM CDT   Treatment 60 with MARISSA Varner   [facility] ([facility]    [address] [place] [address]   [phone]            May 04, 2017 11:00 AM CDT   Treatment 60 with ELIZA Gutierrez   [facility]iac [facility]hab ([facility])    [address] [place] [address]   [phone]            May 09, 2017 11:00 AM CDT   Treatment 60 with MARISSA Varner   [facility] ([facility])    [address] [place] [address]   [phone]            May 11, 2017 11:00 AM CDT   Treatment 60 with ELIZA Gutierrez   [facility] ([facility])    [address] [place] [address]   [phone]            May 22, 2017  8:00 AM CDT   Lab with  LAB   [facility] Lab ([facility])    [address] [address]   [phone]            May 22, 2017  8:30 AM CDT   (Arrive by 8:15 AM)   RETURN PRIMARY PULMONARY with Callie Ledesma MD   [facility] ([facility])    [address] [facility] [address] [phone]            Aug 25, 2017 10:30 AM CDT   (Arrive by 10:15 AM)   Return Visit with Clarita Martínez MD   [facility] Rheumatology ([facility])    [address] [facility] [address] [phone]               Care Instructions        Further instructions from your care team       [facility]                        Interventional Cardiology  Discharge Instructions   Post Right Heart Cath      AFTER YOU GO HOME:    DO drink plenty of fluids    DO resume your regular diet and medications unless otherwise instructed by your Primary Physician    Do Not scrub the procedure site vigorously    No lotion or powder to the puncture site for 3 days    CALL YOUR PRIMARY PHYSICIAN IF: You may resume all normal activity.  Monitor neck site for bleeding, swelling, or voice changes.  If you notice bleeding or swelling immediately apply pressure to the site and call number below to speak with Cardiology Fellow.  If you experience any changes in your breathing you should call your doctor immediately or come to the closest Emergency Department.  Do not drive yourself.    ADDITIONAL INSTRUCTIONS: Medications: You are to resume all home medications including anticoagulation therapy unless otherwise advised by your primary cardiologist or nurse coordinator.    Follow Up: Per your primary cardiology team    If you have any questions or concerns regarding your procedure site please call [phone] at anytime and ask for Cardiology Fellow on call.  They are available 24 hours a day.  You may also contact the Cardiology Clinic after hours number at [phone].                                                       Telephone Numbers [phone] Monday-Friday 8:00 am to 4:30 pm    [phone] After 4:30 pm Monday-Friday, Weekends & Holidays  Ask for Interventional Cardiologist on call. Someone is on call 24 hours/day   [facility] toll free number [phone] Monday-Friday 8:00 am to 4:30 pm   [facility] Emergency Dept [phone]                    Additional Information About Your Visit        [employer] Information     [employer] gives you secure access to your electronic health record. If you see a primary care provider, you can also send messages to your care team and make appointments. If you have questions, please call your primary care clinic.  If you do not have a primary care provider, please call [phone] and they will assist you.        Care EveryWhere ID     This is your Care EveryWhere ID. This could be used by other organizations to access your [facility] medical records  [id number]         Primary Care Provider Office Phone # Fax #    Ignacio Loya [phone]      Thank you!     Thank you for choosing [facility] for your care. Our goal is always to provide you  with excellent care. Hearing back from our patients is one way we can continue to improve our services. Please take a few minutes to complete the written survey that you may receive in the mail after you visit with us. Thank you!             Medication List: This is a list of all your medications and when to take them. Check marks below indicate your daily home schedule. Keep this list as a reference.      Medications           Morning Afternoon Evening Bedtime As Needed    * albuterol (2.5 MG/3ML) 0.083% neb solution   Use every 4-6 hours as needed for shortness of breath                                * albuterol 108 (90 BASE) MCG/ACT Inhaler   Commonly known as:  albuterol   Inhale 1-2 puffs into the lungs every 4 hours as needed for shortness of breath / dyspnea                                ALPRAZolam 0.5 MG tablet   Commonly known as:  XANAX   TAKE 1 TABLET BY MOUTH EVERY SIX HOURS AS NEEDED FOR ANXIETY.                                digoxin 125 MCG tablet   Commonly known as:  LANOXIN   Take 1 tablet (125 mcg) by mouth daily                                fluticasone 50 MCG/ACT spray   Commonly known as:  FLONASE   Spray 2 sprays into both nostrils daily                                furosemide 20 MG tablet   Commonly known as:  LASIX   Take 2 tablets (40 mg) by mouth daily                                LANsoprazole 30 MG ODT tab   Commonly known as:  PREVACID SOLUTAB   Take 1 tablet (30 mg) by mouth daily                                morphine 15 MG 12 hr tablet   Commonly known as:  MS CONTIN   Take 1 tablet (15 mg) by mouth 2 times daily                                NEXIUM PO   Take 40 mg by mouth every morning (before breakfast)                                * order for DME   Oxygen: Patient requires supplemental Oxygen 2 LPM via nasal canula with activity. Please provide patient with portability capability. Okay to spot check patient on oxygen device, to keep sats above 90%. Oxygen will be for a  lifetime.                                * order for DME   Equipment being ordered: personal O2 oximeter.                                * order for DME   Equipment being ordered: Oxygen  Please provide patient with a home-fill system for portability.                                * order for DME   Please provide patient with 2  liquid oxygen tanks for portability. Spot check patient on liquid oxygen. Titrate oxygen to maintain saturations above 88%.                                * order for DME   Equipment being ordered: Oxygen oximeter and mask                                * order for DME   Equipment being ordered: Oximeter                                * order for DME   Equipment being ordered: Nebulizer. Verbal order from                                 * order for DME   Equipment being ordered: Oxygen 4 liters continuous at rest and 5 liters continuous with activity. Needs portability.  Please provide concentrator that goes to 10 liters.                                oxyCODONE-acetaminophen  MG per tablet   Commonly known as:  PERCOCET   Take 1 tablet by mouth every 6 hours as needed for moderate to severe pain                                tadalafil (PAH) 20 MG Tabs   Commonly known as:  ADCIRCA   Take 2 tablets (40 mg) by mouth daily                                tiotropium 18 MCG capsule   Commonly known as:  SPIRIVA HANDIHALER   Inhale contents of one capsule daily.                                TYVASO STARTER 0.6 MG/ML Soln neb solution   Inhale into the lungs every 4 hours (while awake for Tyvaso)   Generic drug:  treprostinil                                * Notice:  This list has 10 medication(s) that are the same as other medications prescribed for you. Read the directions carefully, and ask your doctor or other care provider to review them with you.

## 2017-04-26 NOTE — PROGRESS NOTES
0720  Prep is complete for procedure.  H&P is current.  No labs were ordered.  Awaiting consent.  Ellen is here for RHC.  She is on O2 at 4L/NC.  Denies any pain now.  She does take pain medication for generalized joint pain along with low back pain and lung pain.  Family members are here with her.  CTRN

## 2017-04-26 NOTE — DISCHARGE INSTRUCTIONS
[facility]                        Interventional Cardiology  Discharge Instructions   Post Right Heart Cath      AFTER YOU GO HOME:    DO drink plenty of fluids    DO resume your regular diet and medications unless otherwise instructed by your Primary Physician    Do Not scrub the procedure site vigorously    No lotion or powder to the puncture site for 3 days    CALL YOUR PRIMARY PHYSICIAN IF: You may resume all normal activity.  Monitor neck site for bleeding, swelling, or voice changes. If you notice bleeding or swelling immediately apply pressure to the site and call number below to speak with Cardiology Fellow.  If you experience any changes in your breathing you should call your doctor immediately or come to the closest Emergency Department.  Do not drive yourself.    ADDITIONAL INSTRUCTIONS: Medications: You are to resume all home medications including anticoagulation therapy unless otherwise advised by your primary cardiologist or nurse coordinator.    Follow Up: Per your primary cardiology team    If you have any questions or concerns regarding your procedure site please call [phone] at anytime and ask for Cardiology Fellow on call.  They are available 24 hours a day.  You may also contact the Cardiology Clinic after hours number at [phone].                                                       Telephone Numbers [phone] Monday-Friday 8:00 am to 4:30 pm    [phone] After 4:30 pm Monday-Friday, Weekends & Holidays  Ask for Interventional Cardiologist on call. Someone is on call 24 hours/day   [facility] toll free number [phone] Monday-Friday 8:00 am to 4:30 pm   [facility] Emergency Dept [phone]

## 2017-04-26 NOTE — OR NURSING
D: Pt arrived in cath lab for Right Heart Catheterization  I: Right heart catheterization completed per MD.  7fr sheath removed from RIJ  site, manual pressure applied until hemostasis achieved.  A: RIJ site clean, dry and intact. Soft, no hematoma.  P: Pt to transfer to  for discharge.  Pt educated on watching neck site for bleeding, hematoma, pressure on airway and voice changes.

## 2017-04-26 NOTE — PROCEDURES
PRELIMINARY CARDIAC CATH REPORT:     PROCEDURES PERFORMED:   Right Heart Catheterization    PHYSICIANS:  Attending Physician: Dr. Ledesma  Cardiology Fellow: Elinor Villareal    INDICATION:  Ellen Loya is a 41 year old female with Group 3 pulmonary hypertension in the setting of pulmonary fibrosis, emphysema and inflammatory arthritis who follows with Dr. Ledesma for her PH. She has been on inhaled Tyvaso and Adcirca. She has had increased oxygen requirements and presents today for RHC as part of her evaluation.    DESCRIPTION:  1. Consent obtained with discussion of risks.  All questions were answered.  2. Sterile prep and procedure.  3. Location with Sheaths:   Rt IJ  7 Fr 10 cm [short]  4. Access: Local anesthetic with lidocaine.  A standard 18 guage needle with ultrasound guidance was used to establish vascular access using a modified Seldinger technique.  5. Diagnostic Catheters:   7 Fr Lima Geovanny  6. Estimated blood loss: < 5 ml    MEDICATIONS:  The procedure was performed under no conscious sedation.   Heart rate, BP, respiration, oxygen saturation and patient responses were monitored throughout the procedure with the assistance of the RN under my supervision.    Procedures:    HEMODYNAMICS:  BSA 1.6  1. HR 55 bpm  2. /82/61 mmHg  3. RA 4/8/4   4. RV 60/8  5. PA 60/20/32   6. PCW 10   7. PA sat 66.4% (on oxygen), 62.3% (off oxygen)  8. PCW sat --%  9. Hgb 13.1 g/dL   10. Hernán CO 4.8   11. Hernán CI 3.1   12. TD CO 5.1   13. TD CI 3.2   14. PVR 4.58     Sheath Removal:  The 7 Fr RIJ sheath was manually removed in the cardiac catheterization laboratory.    Contrast: Isovue, 0 ml     Fluoroscopy Time: 0.5 min    COMPLICATIONS:  1. None    SUMMARY:   >> Normal right sided filling pressures.  >> Normal left sided filling pressures.  >> Mild pulmonary hypertension (group 3)  >> Normal cardiac output, 4.8 L/min with index 3.1 L/min/m2    PLAN:   >> Results discussed with Dr. Ledesma. At this time no changes  planned for the PH therapy. With normal filling pressures, stable to mildly improved pulmonary pressures and normal cardiac output, recent hypoxemia issues may be more related to a ventilatory issue.   >>. Discharge today per protocol    The attending interventional cardiologist was present and supervised all critical aspects the procedure.    Findings discussed with Dr. Ledesma.    See CVIS report for final draft.    Elinor Villareal   Cardiology Fellow    Dr. Ledesma   Cardiology Staff

## 2017-04-30 LAB
DLCOCOR-%PRED-PRE: 25 %
DLCOCOR-PRE: 5.77 ML/MIN/MMHG
DLCOUNC-%PRED-PRE: 24 %
DLCOUNC-PRE: 5.61 ML/MIN/MMHG
DLCOUNC-PRED: 22.88 ML/MIN/MMHG
ERV-%PRED-PRE: 59 %
ERV-PRE: 0.63 L
ERV-PRED: 1.06 L
EXPTIME-PRE: 6.2 SEC
FEF2575-%PRED-PRE: 89 %
FEF2575-PRE: 2.68 L/SEC
FEF2575-PRED: 2.99 L/SEC
FEFMAX-%PRED-PRE: 104 %
FEFMAX-PRE: 6.97 L/SEC
FEFMAX-PRED: 6.65 L/SEC
FEV1-%PRED-PRE: 71 %
FEV1-PRE: 1.98 L
FEV1FEV6-PRE: 89 %
FEV1FEV6-PRED: 84 %
FEV1FVC-PRE: 89 %
FEV1FVC-PRED: 82 %
FEV1SVC-PRE: 86 %
FEV1SVC-PRED: 82 %
FIFMAX-PRE: 6.1 L/SEC
FVC-%PRED-PRE: 65 %
FVC-PRE: 2.23 L
FVC-PRED: 3.4 L
IC-%PRED-PRE: 71 %
IC-PRE: 1.69 L
IC-PRED: 2.35 L
VA-%PRED-PRE: 76 %
VA-PRE: 3.6 L
VC-%PRED-PRE: 67 %
VC-PRE: 2.31 L
VC-PRED: 3.41 L

## 2017-05-02 ENCOUNTER — TELEPHONE (OUTPATIENT)
Dept: CARDIOLOGY | Facility: CLINIC | Age: 42
End: 2017-05-02

## 2017-05-02 NOTE — TELEPHONE ENCOUNTER
Type of call: Medication Question      Medication Question  Medication: Tyvaso    Notes: Ellen states that Dr. Gaffney had mentioned that at some point he wanted her to stop the Tyvaso medication.  She was following up on this and wondering when she should stop.  Patients call back number:   This message will be routed to the PH coordinator.

## 2017-05-02 NOTE — TELEPHONE ENCOUNTER
Date: 5/2/2017    Time of Call: 2:57 PM     Diagnosis:  PH,     [ TORB ] Ordering provider: Callie Ledesma MD   Order: Pt to stay at 4 breath of Tyvaso (treprostinil) QID     Order received by: Naty Lau RN      Follow-up/additional notes: Called pt and updated her on the above order.  All questions and concerns answered.

## 2017-05-03 DIAGNOSIS — M19.90 INFLAMMATORY ARTHRITIS: ICD-10-CM

## 2017-05-03 NOTE — TELEPHONE ENCOUNTER
Morphine      Last Written Prescription Date: 4/6/17  Last Fill Quantity: 60,  # refills: 0   Last Office Visit with G, P or [facility] prescribing provider: 4/10/17

## 2017-05-04 ENCOUNTER — HOSPITAL ENCOUNTER (OUTPATIENT)
Dept: CARDIAC REHAB | Facility: CLINIC | Age: 42
End: 2017-05-04
Attending: INTERNAL MEDICINE
Payer: COMMERCIAL

## 2017-05-04 VITALS — WEIGHT: 118 LBS | BODY MASS INDEX: 21.58 KG/M2

## 2017-05-04 DIAGNOSIS — G47.9 DISTURBANCE IN SLEEP BEHAVIOR: ICD-10-CM

## 2017-05-04 DIAGNOSIS — M19.90 INFLAMMATORY ARTHRITIS: ICD-10-CM

## 2017-05-04 DIAGNOSIS — R07.81 PLEURITIC CHEST PAIN: ICD-10-CM

## 2017-05-04 PROCEDURE — G0239 OTH RESP PROC, GROUP: HCPCS | Performed by: REHABILITATION PRACTITIONER

## 2017-05-04 PROCEDURE — 40000244 ZZH STATISTIC VISIT PULM REHAB: Performed by: REHABILITATION PRACTITIONER

## 2017-05-04 RX ORDER — MORPHINE SULFATE 15 MG/1
15 TABLET, FILM COATED, EXTENDED RELEASE ORAL 2 TIMES DAILY
Qty: 60 TABLET | Refills: 0 | Status: SHIPPED | OUTPATIENT
Start: 2017-05-04 | End: 2017-05-31

## 2017-05-04 RX ORDER — OXYCODONE AND ACETAMINOPHEN 10; 325 MG/1; MG/1
1 TABLET ORAL EVERY 6 HOURS PRN
Qty: 60 TABLET | Refills: 0 | Status: SHIPPED | OUTPATIENT
Start: 2017-05-08 | End: 2017-05-18

## 2017-05-04 RX ORDER — MORPHINE SULFATE 15 MG/1
15 TABLET, FILM COATED, EXTENDED RELEASE ORAL 2 TIMES DAILY
Qty: 60 TABLET | Refills: 0 | OUTPATIENT
Start: 2017-05-04

## 2017-05-04 RX ORDER — ALPRAZOLAM 0.5 MG
TABLET ORAL
Qty: 40 TABLET | Refills: 0 | Status: SHIPPED | OUTPATIENT
Start: 2017-05-06 | End: 2017-05-15

## 2017-05-04 NOTE — TELEPHONE ENCOUNTER
Xanax 0.5mg      Last Written Prescription Date: 04/26/2017  Last Fill Quantity: 40,  # refills: 0   Last Office Visit with G, P or [facility] prescribing provider: 04/12/2017    Amber Rushing, Pharmacy Technician  [facility]  [phone]

## 2017-05-04 NOTE — TELEPHONE ENCOUNTER
Percocet       Last Written Prescription Date: 04/20/2017  Last Fill Quantity: 60,  # refills: 0     Morphine      Last Written Prescription Date: 05/04/2017  Last Fill Quantity: 60,  # refills: 0   Last Office Visit with [facility], [facility] or [facility] prescribing provider: 11/07/2016    Thanks[facility]les Bolanos [facility]   [phone]

## 2017-05-09 ENCOUNTER — CARE COORDINATION (OUTPATIENT)
Dept: PULMONOLOGY | Facility: CLINIC | Age: 42
End: 2017-05-09

## 2017-05-09 ENCOUNTER — HOSPITAL ENCOUNTER (OUTPATIENT)
Dept: CARDIAC REHAB | Facility: CLINIC | Age: 42
End: 2017-05-09
Attending: INTERNAL MEDICINE
Payer: COMMERCIAL

## 2017-05-09 ENCOUNTER — PRE VISIT (OUTPATIENT)
Dept: CARDIOLOGY | Facility: CLINIC | Age: 42
End: 2017-05-09

## 2017-05-09 VITALS — BODY MASS INDEX: 22.26 KG/M2 | WEIGHT: 121 LBS | HEIGHT: 62 IN

## 2017-05-09 DIAGNOSIS — R09.02 HYPOXIA: ICD-10-CM

## 2017-05-09 DIAGNOSIS — I27.20 PULMONARY HYPERTENSION (H): Primary | ICD-10-CM

## 2017-05-09 DIAGNOSIS — R06.09 DYSPNEA ON EXERTION: ICD-10-CM

## 2017-05-09 PROCEDURE — G0239 OTH RESP PROC, GROUP: HCPCS

## 2017-05-09 PROCEDURE — 40000244 ZZH STATISTIC VISIT PULM REHAB

## 2017-05-09 ASSESSMENT — PULMONARY FUNCTION TESTS
FVC: 81
FEV1: 82
FVC_PERCENT_PREDICTED: 82

## 2017-05-09 ASSESSMENT — DUKE ACTIVITY SCORE INDEX (DASI)
VO2_PEAK: 21.94
DASI METS SCORE: 6.27

## 2017-05-09 ASSESSMENT — 6 MINUTE WALK TEST (6MWT)
TOTAL DISTANCE WALKED: 220.98
GENDER SELECTION: FEMALE
FEMALE CALC: 636.4
MALE CALC: 580.66

## 2017-05-09 NOTE — PROGRESS NOTES
Need new order sent to [employer], currently using tanks on pulse dose, wants to continue. She stated she is getting new tanks today but needs a new order to get larger concentrator. Spoke with [employer], order and last visit note faxed.

## 2017-05-09 NOTE — PROGRESS NOTES
05/09/17 1100   Session   Session 120 Day Individualized Treatment Plan   Certified through this date 06/07/17   Type Reassessment   General Information   Treatment Diagnosis Interstitial Pulmonary Fibrosis   Onset Date 01/01/11   Hospital Location [facility], [place]   Outpatient Pulmonary Rehab Start Date 01/13/17   Primary Physician Dr. Loya   Pulmonolgist Dr. Upton   General Information Comments .pmh   Untoward Events/Exacerbations/Hospitalizations   Untoward Events/Exacerbations/Hospitalizations None   Sputum   Sputum Production Amount None   Tobacco History   Tobacco Former smoker   Tobacco Habit Cigarettes   Years Smoked 20   Average Packs Per Day 1/2   Quit Date or Planned Quit Date 12/14/16  (had 1-2 cigarettes in March/April)   Interventions Planned Check in Regularly, Offer Support to Reduce Risk of Relapse   Interventions Completed Checked in regularly, offered support as needed   Medications   Short-Acting Beta Agonist Prescribed, taking as prescribed   Long-Acting Anticholinergic Prescribed, taking as prescribed   Pain   Patient Currently in Pain No   Pain Location hip   Pain Rating 5/10   Pain Comments Is on prednisone with relief for joints   Falls Screen   Have you fallen two or more times in the past year? Yes   Have you fallen and had an injury in the past year? No   Referral initiated to physical therapy No   Living and Work Status   Living Arrangements house   Support System Live with an adult   Environmental Factors No concerns   ADL Limitations None   Initial Duke Activity Status Index (DASI) score. A measure of functional capacity. The goal is to have a pre-program raw score of 9.95 (~4 METs) or above 28.7   Initial DASI VO2 Peak (ml*kg-1*min-1) 21.94   Initial DASI MET Level 6.27   Occupation , cook   Return to Employment Not employed  (Disabled)   Physical Assessments   Incisions Not applicable   Edema Not assessed   Left Lung Sounds not assessed  "  Right Lung Sounds not assessed   Pulmonary Function Test (PFTs)   PFT Results Available   Date Completed 01/04/17   FVC Actual 81   % Predicted FVC 82   FEV1 Actual 82   Individualized Treatment Plan   Sessions Scheduled 36   Sessions Attended 22   Type Aerobic exercise;Resistance training   Oxygen Use   Supplemental Oxygen Needed Yes   Delivery Device Nasal Cannula   Liter Flow at Rest 4   Liter Flow with ADLS 4   Liter Flow During Exercise 4-6   Liter Flow With Sleep 4   Evaluated on Home System? Continuous flow;Pulse/Demand   Interventions Recommended Continue to monitor SpO2 at rest and with exercise on current O2 settings;Titrate O2 with exercise   Exercise Prescription   Mode Treadmill;Nustep;Weights   Frequency 2 days/week   Duration/Time 30-45 min   THR (85% of age predicted max heart rate)  152.15   Effort Rating (0-10) 4-6   Progression of Exercise Increase speed on treadmill by 0.1 mph per week; up to speed of 1.8 mph. Try increasing exercise endurance up to 40 minutes of continuous aerobic exercise.   Oxygen Titration with Exercise > 88% with exercise   Exercise Assessment   6 Minute Walk Predicted - Gender Selection Female   6 Minute Walk Predicted (Female) 636.4   6 Minute Walk Predicted (Male) 580.66   6 Minute Walk Distance (Initial) 220.98 Meters   Resting HR 70   Exercise HR 80   Resting /70   SpO2 92  (4 lpm)   Exercise SpO2 99  (6lpm)   Current MET level 2.6   Exercise Tolerance fair;poor   Normal Limits Discussed Yes   Current Symptoms at Home Dyspnea;Fatigue   Current Symptoms in Rehab Dyspnea   Limitations Arthritis   Nutrition Management   Age 41   Height 1.575 m (5' 2.01\")   Weight 54.9 kg (121 lb)   BMI (Calculated) 22.17   Assessment Normal   Interventions Planned NA   Psychosocial   Initial Patient Health Questionnaire -9 (PHQ-9) for depression. To notify physician if pre-score >9. 11   Initial Shortness of Breath Questionnaire (SOBQ) score. The goal is to reduce the score pre to " post program. 40   Intervention Planned Patient to identify 2-3 coping mechanisms to decrease stress and anxiety;Patient to attend appropriate education class;Introduce relaxation techniques to assist with decreased anxiety   Interventions Completed Attended stress management class   Psychosocial Comments PT reports she has anxiety which she takes medication for which is situation based.    Stages of Change   Aerobic Exercise Preparation   Physical Activity Preparation   Recommended diet Action   Stress Contemplation   Smoking Cessation Action   Oxygen Usage Maintenance   Current Home Exercise   Type of Exercise None   Frequency (days per week) 0   Duration (minutes per session) 0   Recommended Home Exercise Prescription   Type of Exercise Walking;Bike   Frequency (Days per week) 2-3   Duration (minutes per session) 15-30 min   Effort Rating Recommended (0-10) Scale  4-6/10   30 Day Exercise Plan Increase daily activity and  walk 10 min 2x/day on nonPR days. Start using bicycle at home as energy levels improve.    Learning Assessment   Learner Patient   Primary Language English   Preferred Learning Style Listening;Reading;Demonstration;Pictures/Video   Barriers to Learning No barriers noted   Patient Education/Referrals   Education Recommended Activities of Daily Living;Breathing Techniques;Community Resources/Exacerbation Management;Emotional Aspects of CLD;Energy Conservation;Exercise Principles;Medication Overview;Nutrition;Panic and Anxiety   Education Attended Panic and Anxiety;Community Resources/Exacerbation Management;Air Quality;Exercise Principles   Follow-up/On-going Support   Provider follow-up needed on the following No follow-up needed   Pulmonary Rehab Goals   Pulmonary Rehab Goals 1;2;3;4   Goal 1   Goal Patient will increase treadmill speed to 1.9-2 mph    Target Date 06/09/17   Progress Towards Goal 4/11 Patient reports that her physician would like her to be able to achieve greater distance on 6  minute walk test, she would like this too because she feels this reduces the likelihood of lung transplant, recent set back with illness, plan to start increasing exercise workloads 0.1 mph/week and encourage walking at home as well as use of bicycle. 5/9 another setback over past month, will first attempt to resume 25-30 minutes ambulation then increase speed by 0.1 mph per week   Goal 2   Goal Pt will move from the action phase to maintenance phase in refererance to smoking.   Target Date 09/09/17   Progress Towards Goal 4/11 Patient admits to smoking over this past weekend, states it was 'self sabotage' because she was scared from going off of medication and that may mean she will get a transplant faster. She discussed with physician and is relieved of this fear and has re-committed to not smoking. 5/9 Patient has remianed smoke free over past month   Goal 3   Goal Patient will be able to complete home chores, including light housework, and shopping with dyspnea rated 4-5/10 OMNI scale   Target Date 06/09/17   Progress Towards Goal 5/9 goal adjusted today. patient will attend pulmonary rehab and initiate a home exercise program to increase activity tolerance and incoporate breathing techniques to reduce dyspnea. home oxygen is assessing which system will fit patient needs best to reduce desaturations, this will be completed next week.   Goal 4   Goal Patient will seek emotional support/resources as she learns more about the lung transplant process.   Target Date 06/20/17   Progress Towards Goal 5/9 Patient was encouraged to attend the education classes offered at [facility] for lung transplant patients, which includes information and support emotionally. Also given resources for behavioral health locally for assitance.    Assessment   Assessment Ellen, unfortunately, has suffered a setback in her rehab progress over the past month. He right heart cath at the end of April showed an improvement in her right heart  pressures, but unfortunately she is requiring more oxygen to maintian saturations. Her physician suspect this may indicate a decrease in her lung function. This will be tested again next month. They continue to titrate her Tyvaso. Ellen continues to be reluctant to proceed with lung transplant and has shared these feelings with her physicians. Today I have encouraged her to use the resources available to her through [facility] transplant center as well as locally to assist with her deicision making process. She requests to come an additional month to try to build her exercise tolerance back up to levels prior to right heart cath. She feels she has lost muscle and endurence since the beginning of May, she was not able to attend for 2 weeks due to medical procedures and testing. She will continue to benefit from skilled services for exercise training and behavioral change to achieve goals as listed above.      I have reviewed and agree with this patient s individual treatment plan and exercise prescription for respiratory therapy.  Please see    individual treatment plan for details of progress and plan.

## 2017-05-15 DIAGNOSIS — G47.9 DISTURBANCE IN SLEEP BEHAVIOR: ICD-10-CM

## 2017-05-15 RX ORDER — ALPRAZOLAM 0.5 MG
TABLET ORAL
Qty: 40 TABLET | Refills: 0 | Status: SHIPPED | OUTPATIENT
Start: 2017-05-15 | End: 2017-05-24

## 2017-05-15 NOTE — TELEPHONE ENCOUNTER
Xanax 0.5mg      Last Written Prescription Date: 05/04/2017  Last Fill Quantity: 40,  # refills: 0   Last Office Visit with [facility], [facility] or [facility] prescribing provider: 11/07/2016    Joy Fuentes CPhT  [facility] [place]  [phone]

## 2017-05-16 ENCOUNTER — HOSPITAL ENCOUNTER (OUTPATIENT)
Dept: CARDIAC REHAB | Facility: CLINIC | Age: 42
End: 2017-05-16
Attending: INTERNAL MEDICINE
Payer: COMMERCIAL

## 2017-05-16 VITALS — BODY MASS INDEX: 21.76 KG/M2 | WEIGHT: 119 LBS

## 2017-05-16 PROCEDURE — G0239 OTH RESP PROC, GROUP: HCPCS

## 2017-05-16 PROCEDURE — 40000244 ZZH STATISTIC VISIT PULM REHAB

## 2017-05-18 ENCOUNTER — HOSPITAL ENCOUNTER (OUTPATIENT)
Dept: CARDIAC REHAB | Facility: CLINIC | Age: 42
End: 2017-05-18
Attending: INTERNAL MEDICINE
Payer: COMMERCIAL

## 2017-05-18 VITALS — WEIGHT: 122 LBS | BODY MASS INDEX: 22.31 KG/M2

## 2017-05-18 DIAGNOSIS — R07.81 PLEURITIC CHEST PAIN: ICD-10-CM

## 2017-05-18 PROCEDURE — 40000244 ZZH STATISTIC VISIT PULM REHAB: Performed by: REHABILITATION PRACTITIONER

## 2017-05-18 PROCEDURE — G0239 OTH RESP PROC, GROUP: HCPCS | Performed by: REHABILITATION PRACTITIONER

## 2017-05-18 NOTE — TELEPHONE ENCOUNTER
Percocet 10-325mg      Last Written Prescription Date: 05/08/2017  Last Fill Quantity: 60,  # refills: 0   Last Office Visit with G, P or [facility] prescribing provider: 4/12/2017    Amber Rushing, Pharmacy Technician  [facility]  [phone]

## 2017-05-19 RX ORDER — OXYCODONE AND ACETAMINOPHEN 10; 325 MG/1; MG/1
1 TABLET ORAL EVERY 6 HOURS PRN
Qty: 60 TABLET | Refills: 0 | Status: SHIPPED | OUTPATIENT
Start: 2017-05-19 | End: 2017-06-03

## 2017-05-22 ENCOUNTER — OFFICE VISIT (OUTPATIENT)
Dept: CARDIOLOGY | Facility: CLINIC | Age: 42
End: 2017-05-22
Attending: INTERNAL MEDICINE
Payer: COMMERCIAL

## 2017-05-22 VITALS
DIASTOLIC BLOOD PRESSURE: 56 MMHG | HEIGHT: 62 IN | BODY MASS INDEX: 23.65 KG/M2 | SYSTOLIC BLOOD PRESSURE: 104 MMHG | WEIGHT: 128.5 LBS | OXYGEN SATURATION: 97 % | HEART RATE: 52 BPM

## 2017-05-22 DIAGNOSIS — I27.20 PULMONARY HYPERTENSION (H): ICD-10-CM

## 2017-05-22 DIAGNOSIS — R06.09 DYSPNEA ON EXERTION: Primary | ICD-10-CM

## 2017-05-22 DIAGNOSIS — J84.9 ILD (INTERSTITIAL LUNG DISEASE) (H): Primary | ICD-10-CM

## 2017-05-22 DIAGNOSIS — R06.09 DYSPNEA ON EXERTION: ICD-10-CM

## 2017-05-22 LAB
6 MIN WALK (FT): 500 FT
6 MIN WALK (M): 152 M
ANION GAP SERPL CALCULATED.3IONS-SCNC: 8 MMOL/L (ref 3–14)
BUN SERPL-MCNC: 11 MG/DL (ref 7–30)
CALCIUM SERPL-MCNC: 9.1 MG/DL (ref 8.5–10.1)
CHLORIDE SERPL-SCNC: 102 MMOL/L (ref 94–109)
CO2 SERPL-SCNC: 29 MMOL/L (ref 20–32)
CREAT SERPL-MCNC: 0.62 MG/DL (ref 0.52–1.04)
ERYTHROCYTE [DISTWIDTH] IN BLOOD BY AUTOMATED COUNT: 12.3 % (ref 10–15)
FIO2-PRE: 44 %
GFR SERPL CREATININE-BSD FRML MDRD: ABNORMAL ML/MIN/1.7M2
GLUCOSE SERPL-MCNC: 120 MG/DL (ref 70–99)
HCT VFR BLD AUTO: 41.5 % (ref 35–47)
HGB BLD-MCNC: 13.1 G/DL (ref 11.7–15.7)
MCH RBC QN AUTO: 29.1 PG (ref 26.5–33)
MCHC RBC AUTO-ENTMCNC: 31.6 G/DL (ref 31.5–36.5)
MCV RBC AUTO: 92 FL (ref 78–100)
NT-PROBNP SERPL-MCNC: 1204 PG/ML (ref 0–125)
PLATELET # BLD AUTO: 381 10E9/L (ref 150–450)
POTASSIUM SERPL-SCNC: 4.2 MMOL/L (ref 3.4–5.3)
RBC # BLD AUTO: 4.5 10E12/L (ref 3.8–5.2)
SODIUM SERPL-SCNC: 138 MMOL/L (ref 133–144)
WBC # BLD AUTO: 13.2 10E9/L (ref 4–11)

## 2017-05-22 PROCEDURE — 85027 COMPLETE CBC AUTOMATED: CPT | Performed by: INTERNAL MEDICINE

## 2017-05-22 PROCEDURE — 80048 BASIC METABOLIC PNL TOTAL CA: CPT | Performed by: INTERNAL MEDICINE

## 2017-05-22 PROCEDURE — 36415 COLL VENOUS BLD VENIPUNCTURE: CPT | Performed by: INTERNAL MEDICINE

## 2017-05-22 PROCEDURE — 83880 ASSAY OF NATRIURETIC PEPTIDE: CPT | Performed by: INTERNAL MEDICINE

## 2017-05-22 PROCEDURE — 99212 OFFICE O/P EST SF 10 MIN: CPT | Mod: ZF

## 2017-05-22 PROCEDURE — 99215 OFFICE O/P EST HI 40 MIN: CPT | Mod: GC | Performed by: INTERNAL MEDICINE

## 2017-05-22 ASSESSMENT — PAIN SCALES - GENERAL: PAINLEVEL: NO PAIN (0)

## 2017-05-22 NOTE — PATIENT INSTRUCTIONS
Medication Changes:  No medication changes at this time. Please continue current medication regiment.     Patient Instructions:  Check-In  Time Check-In Location Estimated Length Procedure   Name     TODAY, 5/22   [facility]   3rd Floor 30 minutes Six Minute Walk Test**   Procedure Preparations & Instructions     This is a non-invasive procedure and does NOT require any preparation       PAH Support Group Information provided. It is one Saturday a month.     We will speak with Dr. Upton's RN regarding titrating up your oxygen (and using a different device).    Results:  Component      Latest Ref Rng & Units 5/22/2017   Sodium      133 - 144 mmol/L 138   Potassium      3.4 - 5.3 mmol/L 4.2   Chloride      94 - 109 mmol/L 102   Carbon Dioxide      20 - 32 mmol/L 29   Anion Gap      3 - 14 mmol/L 8   Glucose      70 - 99 mg/dL 120 (H)   Urea Nitrogen      7 - 30 mg/dL 11   Creatinine      0.52 - 1.04 mg/dL 0.62   GFR Estimate      >60 mL/min/1.7m2 >90 . . .   GFR Estimate If Black      >60 mL/min/1.7m2 >90 . . .   Calcium      8.5 - 10.1 mg/dL 9.1   WBC      4.0 - 11.0 10e9/L 13.2 (H)   RBC Count      3.8 - 5.2 10e12/L 4.50   Hemoglobin      11.7 - 15.7 g/dL 13.1   Hematocrit      35.0 - 47.0 % 41.5   MCV      78 - 100 fl 92   MCH      26.5 - 33.0 pg 29.1   MCHC      31.5 - 36.5 g/dL 31.6   RDW      10.0 - 15.0 % 12.3   Platelet Count      150 - 450 10e9/L 381   N-Terminal Pro Bnp      0 - 125 pg/mL 1204 (H)       Follow up Appointment Information:    8 weeks with labs, 6MWT   [address]  3rd Floor Routine Clinic   Follow Up Visit Callie Ledesma MD   Appointment Preparations & Instructions  We ask that you plan accordingly due to traffic and parking that may delay you. We look forward to seeing you!       We are located on the third floor of the [facility] ([facility]) on the [facility].  Our address is     [address] [place] [address] on [address] [place]  [address]    Thank you for allowing us to be a part of your care here at the [facility]    If you have questions or concerns please contact us at:    Naty Lau, RN, BSN    Louis Jack (Schedule,P.A.)  Nurse Coordinator     Clinic   Pulmonary Hypertension   Pulmonary Hypertension  [facility]  (P)[phone]    (P) [phone]        (F)[phone]    ** Please note that you will NOT receive a reminder call regarding your scheduled testing, reminder calls are for provider appointments only.  If you are scheduled for testing within the [facility] system you may receive a call regarding pre-registration for billing purposes only.**     Remember to weigh yourself daily after voiding and before you consume any food or beverages and log the numbers.  If you have gained/lost 2 pounds overnight or 5 pounds in a week contact us immediately for medication adjustments or further instructions.

## 2017-05-22 NOTE — NURSING NOTE
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     1. 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 Loya is still WHO FC 3. She is on Adcirca therapy and inhaled Tyvaso. She had improvements in her hemodynamics on repeat RHC but she continues to be hypoxemic and has worsening exercise capacity. We did another 6MWT today and he she had significant desaturations and only walked 152 meters. We will stop her inhaled Tyvaso. She needs to be on higher      At this point, we are unfortunately running out of options. We did encourage her to complete the lung transplant work-up. Using an ERA in ILD is suboptimal due to increased risk of mortality from the RISE-IIP and MANGO studies and systemic prostacyclin will worsening hypoxemia.     Assessment: Pt had a routine clinic follow up with Dr. Ledesma. 6MWT showed worsening results. Based on the above results, Dr. Ledesma ordered a Chest CT-PE Protocol and to stop taking Tyvaso.     Intervention: Writer called Ellen and updated her on above results. Ellen stated that she will do her chest CT PE protocol test in [place] tomorrow after her Pulmonary Rehab.     Response: All questions and concerns were addressed and support was provided as needed.     Continue to monitor closely.

## 2017-05-22 NOTE — LETTER
"2017      RE: Ellen Loya  [address]  [address]  [facility] [id number]       April 10, 2017        Dawson Upton MD   [facility]   [address] [place] [address]       RE: Ellen Loya   MRN: [id number]   : 1975       Dear Dr. Upton:       We had the pleasure of seeing Ms. Ellen Loya for followup in our Pulmonary Hypertension Clinic at the [facility]. As you know, she is a 41-year-old female with pulmonary arterial hypertension in the setting of combined pulmonary fibrosis, emphysema and inflammatory arthritis. She is currently on Adcirca therapy.     Since her last visit, she is feeling better.  She has not had problems with fluid retention.  She actually is having more energy and less problems with chest pain.  However, she had to cut back her Tyvaso to twice a day because she       PAST MEDICAL HISTORY:  Past Medical History:   Diagnosis Date     Acute bronchitis 07    Admit. Discharged 07     Anxiety      Arthritis     h/o \"seronegative rheumatoid arthritis\" since age 30, however no evidence of active arthritis by Rheum eval [id number]     ASCUS favor benign 5/15/14    neg HPV     ILD (interstitial lung disease) (H)     seen on chest CT ; methotrexate stopped      Lung nodule     KM nodule; EBUS 2014 of lymph nodes was negative; CT-guided bx  hamartoma/chondroma     Prematurity     born 6-7 weeks early     Pulmonary hypertension (H)     RHC 2016 mean PA 25     Varicella without mention of complication        FAMILY HISTORY:  Family History   Problem Relation Age of Onset     Arthritis Mother      psoriatic arthritis     Depression Mother      DIABETES Mother      Thyroid Disease Mother      Obesity Mother      Hyperlipidemia Mother      Lipids Father      HEART DISEASE Father      recent angioplasty for 3 blocked arteries.     Substance Abuse Father      Hypertension Father      CEREBROVASCULAR DISEASE Father  "     Hyperlipidemia Father      Coronary Artery Disease Father      LUNG DISEASE Father      Scleroderma Paternal Uncle      Had scleroderma ILD     Other Cancer Paternal Grandmother      lung cancer     Substance Abuse Brother      Depression Brother      Asthma Brother      DIABETES Maternal Grandfather      adult onset     Hyperlipidemia Maternal Grandfather          SOCIAL HISTORY:  Social History     Social History     Marital status: Single     Spouse name: N/A     Number of children: 3     Years of education: 13     Occupational History     homemaker      Social History Main Topics     Smoking status: Former Smoker     Packs/day: 0.50     Years: 25.00     Types: Cigarettes     Start date: 12/3/1992     Quit date: 12/14/2016     Smokeless tobacco: Former User     Quit date: 12/14/2016     Alcohol use No      Comment: 5 per month or less     Drug use: No     Sexual activity: Yes     Partners: Male     Birth control/ protection: Female Surgical      Comment: Had post-partum tubal ligation.     Other Topics Concern      Service No     Blood Transfusions No     Caffeine Concern No     pop: 2c/d     Occupational Exposure No     Hobby Hazards No     Sleep Concern No     Stress Concern No     Weight Concern No     Special Diet No     Back Care No     Exercise No     Bike Helmet No     Seat Belt Yes     Self-Exams Yes     Parent/Sibling W/ Cabg, Mi Or Angioplasty Before 65f 55m? 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 Bought [employer] house in 2010; it was wet and full of mold.  She remodelled the house, did not wear a mask.       CURRENT MEDICATIONS:  Current Outpatient Prescriptions   Medication Sig Dispense Refill     oxyCODONE-acetaminophen (PERCOCET)  MG per tablet Take 1 tablet by mouth every 6 hours as needed for moderate to severe pain 60 tablet 0     ALPRAZolam (XANAX) 0.5 MG tablet TAKE 1 TABLET BY MOUTH EVERY SIX HOURS AS NEEDED FOR  ANXIETY. 40 tablet 0     order for DME Oxygen: Patient requires supplemental Oxygen 4 LPM via nasal canula at rest and 6 LPM with activity. Please provide patient with portability capability. Okay to spot check patient on oxygen device, to keep sats above 90%. Please provider with home concentrator that can go up to 10 LPM. Oxygen will be for a lifetime. 1 Device 0     morphine (MS CONTIN) 15 MG 12 hr tablet Take 1 tablet (15 mg) by mouth 2 times daily 60 tablet 0     order for DME Equipment being ordered: Oxygen 4 liters continuous at rest and 5 liters continuous with activity. Needs portability.  Please provide concentrator that goes to 10 liters. 1 Device prn     Esomeprazole Magnesium (NEXIUM PO) Take 40 mg by mouth every morning (before breakfast)       fluticasone (FLONASE) 50 MCG/ACT spray Spray 2 sprays into both nostrils daily 1 Bottle 3     treprostinil (TYVASO STARTER) 0.6 MG/ML SOLN neb solution Inhale into the lungs every 4 hours (while awake for Tyvaso)        tiotropium (SPIRIVA HANDIHALER) 18 MCG capsule Inhale contents of one capsule daily. 30 capsule 1     albuterol (ALBUTEROL) 108 (90 BASE) MCG/ACT Inhaler Inhale 1-2 puffs into the lungs every 4 hours as needed for shortness of breath / dyspnea 1 Inhaler 8     tadalafil, PAH, (ADCIRCA) 20 MG TABS Take 2 tablets (40 mg) by mouth daily 60 tablet 11     furosemide (LASIX) 20 MG tablet Take 2 tablets (40 mg) by mouth daily 180 tablet 3     albuterol (2.5 MG/3ML) 0.083% nebulizer solution Use every 4-6 hours as needed for shortness of breath 60 vial 3     order for DME Equipment being ordered: Nebulizer. Verbal order from  1 Device 0     digoxin (LANOXIN) 125 MCG tablet Take 1 tablet (125 mcg) by mouth daily 90 tablet 3     order for DME Equipment being ordered: Oximeter 1 Device 0     order for DME Equipment being ordered: Oxygen oximeter and mask 1 Device 0     order for DME Please provide patient with 2  liquid oxygen tanks for  "portability. Spot check patient on liquid oxygen. Titrate oxygen to maintain saturations above 88%. 2 Device 0     order for DME Equipment being ordered: Oxygen    Please provide patient with a home-fill system for portability. 1 Device 99     order for DME Equipment being ordered: personal O2 oximeter. 1 Device 0       ROS:   10 point ROS negative except HPI    EXAM:  /56 (BP Location: Right arm, Patient Position: Chair, Cuff Size: Adult Regular)  Pulse 52  Ht 1.575 m (5' 2\")  Wt 58.3 kg (128 lb 8 oz)  SpO2 97%  BMI 23.5 kg/m2  General: appears comfortable, alert and articulate  Head: normocephalic, atraumatic  Eyes: anicteric sclera, EOMI  Neck: no adenopathy  Orophyarynx: moist mucosa, no lesions, dentition intact  Heart: regular, S1/S2, no murmur, gallop, rub, estimated JVP 6 cm  Lungs: Inspiratory crackles at the bases bilaterally  GI: soft, non-tender, bowel sounds present, no hepatosplenomegaly  Extremities: no clubbing, cyanosis or edema  Neurological: normal speech and affect, no gross motor deficits    Labs:  CBC RESULTS:  Lab Results   Component Value Date    WBC 13.2 (H) 05/22/2017    RBC 4.50 05/22/2017    HGB 13.1 05/22/2017    HCT 41.5 05/22/2017    MCV 92 05/22/2017    MCH 29.1 05/22/2017    MCHC 31.6 05/22/2017    RDW 12.3 05/22/2017     05/22/2017       CMP RESULTS:  Lab Results   Component Value Date     05/22/2017    POTASSIUM 4.2 05/22/2017    CHLORIDE 102 05/22/2017    CO2 29 05/22/2017    ANIONGAP 8 05/22/2017     (H) 05/22/2017    BUN 11 05/22/2017    CR 0.62 05/22/2017    GFRESTIMATED >90  Non  GFR Calc   05/22/2017    GFRESTBLACK >90   GFR Calc   05/22/2017    MARCIN 9.1 05/22/2017    BILITOTAL 0.4 01/16/2017    ALBUMIN 3.6 01/16/2017    ALKPHOS 95 01/16/2017    ALT 18 01/16/2017    AST 20 01/16/2017        INR RESULTS:  Lab Results   Component Value Date    INR 1.04 01/23/2015       No components found for: CK  Lab Results "   Component Value Date    MAG 2.4 (H) 12/19/2016     Lab Results   Component Value Date    NTBNPI 4168 (H) 11/30/2016       Echocardiogram 4/12/2017:  Global and regional left ventricular function is normal with an EF of 55-60%. Traced at 55%.  Global right ventricular function is mildly reduced with mild right ventricular dilation is present. [TAPSE 1.8cm; S' 9cm/s]  Pulmonary hypertension with right ventricular systolic pressure 43mmHg above the right atrial pressure.  The inferior vena cava is normal. Estimated mean right atrial pressure is <3 mmHg.  No pericardial effusion is present.  Compared to the previous study dated 10/3/16, there may be interval improvement in RV size.    RHC 11/30/2016  RA:15  RV: 60/16  PA: 60/38 (42)  PCWP: 15  CO/CI (TD): 2.6/1.6  PVR: 10.4    Inhaled nitric oxide  RA: 16  PA: 50/20 (35)  PCWP: 16  CO/CI (TD): 2.4/1.5    RHC 4/26/2017  RA:4  RV: 60/8  PA: 60/20 (32)  PCWP: 10  CO/CI (TD): 5.1/3.1  PVR: 4.3    6MWT 2/27/2017: 290 meters with lowest saturation of 90% on 4L of supplemental oxygen    6MWT 3/31/2017: 238 meters with lowest saturation of 82% of 4L of supplemental oxygen.      6MWT 5/22/2017: 152 meters with lowest saturation of 83% of 6L of supplemental oxygen.    Assessment and Plan: Ms. 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    1.  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 Loya is still WHO FC 3.  She is on Adcirca therapy and inhaled Tyvaso.  She had improvements in her hemodynamics on repeat RHC but she continues to be hypoxemic and has worsening exercise capacity.  We did another 6MWT today and he she had significant desaturations and only walked 152 meters.  We will stop her inhaled Tyvaso.  She needs to be on higher oxygen at home so we will work with her to get this figured out.    At this point, we are unfortunately running out of options.  We did encourage her to  complete the lung transplant work-up.  Using an ERA in ILD is suboptimal due to increased risk of mortality from the RISE-IIP and MANGO studies and systemic prostacyclin will also likely worsen hypoxemia.      It was a pleasure seeing Ms. Loya at the [facility] Pulmonary Hypertension program.  Please contact us with any questions or concerns that you may have.       Sincerely,    Carlo Frost MD, PhD  Cardiology Fellow    I have personally seen and examined the patient, and then discussed with Dr. Frost, and agree with the findings and plan in this note. I have reviewed today's vital signs, medications, labs, and imaging.     Significant decrease in her six minute walk distance with desaturation. This is very concerning. CT chest showed no evidence of pulmonary embolism. ? Increase in ILD. Will D/W Dr. Utpon. Will stop Tyvaso as it could cause worsening V/Q mismatch. Increase her supplemental oxygen to 6-8 liters with activity for now. RTC in 4 weeks with repeat walk test off of Tyvaso. Call sooner if she has worsening symptoms. She is currently being evaluated for lung transplant. She has not completed the six month abstinence on smoking.     Sincerely,    Callie Ledesma MD   Center for Pulmonary Hypertension  Section of Advanced Heart Failure   Cardiovascular Division  [facility]   [phone]                Callie Ledesma MD

## 2017-05-22 NOTE — PROGRESS NOTES
"April 10, 2017        Dawson Upton MD   [facility]   [address] [place] [address]       RE: Ellen Loya   MRN: [id number]   : 1975       Dear Dr. Upton:       We had the pleasure of seeing Ms. Ellen Loya for followup in our Pulmonary Hypertension Clinic at the [facility]. As you know, she is a 41-year-old female with pulmonary arterial hypertension in the setting of combined pulmonary fibrosis, emphysema and inflammatory arthritis. She is currently on Adcirca therapy.     Since her last visit, she is feeling better.  She has not had problems with fluid retention.  She actually is having more energy and less problems with chest pain.  However, she had to cut back her Tyvaso to twice a day because she       PAST MEDICAL HISTORY:  Past Medical History:   Diagnosis Date     Acute bronchitis 07    Admit. Discharged 07     Anxiety      Arthritis     h/o \"seronegative rheumatoid arthritis\" since age 30, however no evidence of active arthritis by Rheum eval [id number]     ASCUS favor benign 5/15/14    neg HPV     ILD (interstitial lung disease) (H)     seen on chest CT ; methotrexate stopped      Lung nodule     KM nodule; EBUS 2014 of lymph nodes was negative; CT-guided bx  hamartoma/chondroma     Prematurity     born 6-7 weeks early     Pulmonary hypertension (H)     RHC 2016 mean PA 25     Varicella without mention of complication        FAMILY HISTORY:  Family History   Problem Relation Age of Onset     Arthritis Mother      psoriatic arthritis     Depression Mother      DIABETES Mother      Thyroid Disease Mother      Obesity Mother      Hyperlipidemia Mother      Lipids Father      HEART DISEASE Father      recent angioplasty for 3 blocked arteries.     Substance Abuse Father      Hypertension Father      CEREBROVASCULAR DISEASE Father      Hyperlipidemia Father      Coronary Artery Disease Father      LUNG DISEASE Father  "     Scleroderma Paternal Uncle      Had scleroderma ILD     Other Cancer Paternal Grandmother      lung cancer     Substance Abuse Brother      Depression Brother      Asthma Brother      DIABETES Maternal Grandfather      adult onset     Hyperlipidemia Maternal Grandfather          SOCIAL HISTORY:  Social History     Social History     Marital status: Single     Spouse name: N/A     Number of children: 3     Years of education: 13     Occupational History     homemaker      Social History Main Topics     Smoking status: Former Smoker     Packs/day: 0.50     Years: 25.00     Types: Cigarettes     Start date: 12/3/1992     Quit date: 12/14/2016     Smokeless tobacco: Former User     Quit date: 12/14/2016     Alcohol use No      Comment: 5 per month or less     Drug use: No     Sexual activity: Yes     Partners: Male     Birth control/ protection: Female Surgical      Comment: Had post-partum tubal ligation.     Other Topics Concern      Service No     Blood Transfusions No     Caffeine Concern No     pop: 2c/d     Occupational Exposure No     Hobby Hazards No     Sleep Concern No     Stress Concern No     Weight Concern No     Special Diet No     Back Care No     Exercise No     Bike Helmet No     Seat Belt Yes     Self-Exams Yes     Parent/Sibling W/ Cabg, Mi Or Angioplasty Before 65f 55m? 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 Bought [employer] house in 2010; it was wet and full of mold.  She remodelled the house, did not wear a mask.       CURRENT MEDICATIONS:  Current Outpatient Prescriptions   Medication Sig Dispense Refill     oxyCODONE-acetaminophen (PERCOCET)  MG per tablet Take 1 tablet by mouth every 6 hours as needed for moderate to severe pain 60 tablet 0     ALPRAZolam (XANAX) 0.5 MG tablet TAKE 1 TABLET BY MOUTH EVERY SIX HOURS AS NEEDED FOR ANXIETY. 40 tablet 0     order for DME Oxygen: Patient requires supplemental Oxygen 4 LPM  via nasal canula at rest and 6 LPM with activity. Please provide patient with portability capability. Okay to spot check patient on oxygen device, to keep sats above 90%. Please provider with home concentrator that can go up to 10 LPM. Oxygen will be for a lifetime. 1 Device 0     morphine (MS CONTIN) 15 MG 12 hr tablet Take 1 tablet (15 mg) by mouth 2 times daily 60 tablet 0     order for DME Equipment being ordered: Oxygen 4 liters continuous at rest and 5 liters continuous with activity. Needs portability.  Please provide concentrator that goes to 10 liters. 1 Device prn     Esomeprazole Magnesium (NEXIUM PO) Take 40 mg by mouth every morning (before breakfast)       fluticasone (FLONASE) 50 MCG/ACT spray Spray 2 sprays into both nostrils daily 1 Bottle 3     treprostinil (TYVASO STARTER) 0.6 MG/ML SOLN neb solution Inhale into the lungs every 4 hours (while awake for Tyvaso)        tiotropium (SPIRIVA HANDIHALER) 18 MCG capsule Inhale contents of one capsule daily. 30 capsule 1     albuterol (ALBUTEROL) 108 (90 BASE) MCG/ACT Inhaler Inhale 1-2 puffs into the lungs every 4 hours as needed for shortness of breath / dyspnea 1 Inhaler 8     tadalafil, PAH, (ADCIRCA) 20 MG TABS Take 2 tablets (40 mg) by mouth daily 60 tablet 11     furosemide (LASIX) 20 MG tablet Take 2 tablets (40 mg) by mouth daily 180 tablet 3     albuterol (2.5 MG/3ML) 0.083% nebulizer solution Use every 4-6 hours as needed for shortness of breath 60 vial 3     order for DME Equipment being ordered: Nebulizer. Verbal order from  1 Device 0     digoxin (LANOXIN) 125 MCG tablet Take 1 tablet (125 mcg) by mouth daily 90 tablet 3     order for DME Equipment being ordered: Oximeter 1 Device 0     order for DME Equipment being ordered: Oxygen oximeter and mask 1 Device 0     order for DME Please provide patient with 2  liquid oxygen tanks for portability. Spot check patient on liquid oxygen. Titrate oxygen to maintain saturations above 88%. 2  "Device 0     order for DME Equipment being ordered: Oxygen    Please provide patient with a home-fill system for portability. 1 Device 99     order for DME Equipment being ordered: personal O2 oximeter. 1 Device 0       ROS:   10 point ROS negative except HPI    EXAM:  /56 (BP Location: Right arm, Patient Position: Chair, Cuff Size: Adult Regular)  Pulse 52  Ht 1.575 m (5' 2\")  Wt 58.3 kg (128 lb 8 oz)  SpO2 97%  BMI 23.5 kg/m2  General: appears comfortable, alert and articulate  Head: normocephalic, atraumatic  Eyes: anicteric sclera, EOMI  Neck: no adenopathy  Orophyarynx: moist mucosa, no lesions, dentition intact  Heart: regular, S1/S2, no murmur, gallop, rub, estimated JVP 6 cm  Lungs: Inspiratory crackles at the bases bilaterally  GI: soft, non-tender, bowel sounds present, no hepatosplenomegaly  Extremities: no clubbing, cyanosis or edema  Neurological: normal speech and affect, no gross motor deficits    Labs:  CBC RESULTS:  Lab Results   Component Value Date    WBC 13.2 (H) 05/22/2017    RBC 4.50 05/22/2017    HGB 13.1 05/22/2017    HCT 41.5 05/22/2017    MCV 92 05/22/2017    MCH 29.1 05/22/2017    MCHC 31.6 05/22/2017    RDW 12.3 05/22/2017     05/22/2017       CMP RESULTS:  Lab Results   Component Value Date     05/22/2017    POTASSIUM 4.2 05/22/2017    CHLORIDE 102 05/22/2017    CO2 29 05/22/2017    ANIONGAP 8 05/22/2017     (H) 05/22/2017    BUN 11 05/22/2017    CR 0.62 05/22/2017    GFRESTIMATED >90  Non  GFR Calc   05/22/2017    GFRESTBLACK >90   GFR Calc   05/22/2017    MARCIN 9.1 05/22/2017    BILITOTAL 0.4 01/16/2017    ALBUMIN 3.6 01/16/2017    ALKPHOS 95 01/16/2017    ALT 18 01/16/2017    AST 20 01/16/2017        INR RESULTS:  Lab Results   Component Value Date    INR 1.04 01/23/2015       No components found for: CK  Lab Results   Component Value Date    MAG 2.4 (H) 12/19/2016     Lab Results   Component Value Date    NTBNPI 4168 (H) " 11/30/2016       Echocardiogram 4/12/2017:  Global and regional left ventricular function is normal with an EF of 55-60%. Traced at 55%.  Global right ventricular function is mildly reduced with mild right ventricular dilation is present. [TAPSE 1.8cm; S' 9cm/s]  Pulmonary hypertension with right ventricular systolic pressure 43mmHg above the right atrial pressure.  The inferior vena cava is normal. Estimated mean right atrial pressure is <3 mmHg.  No pericardial effusion is present.  Compared to the previous study dated 10/3/16, there may be interval improvement in RV size.    RHC 11/30/2016  RA:15  RV: 60/16  PA: 60/38 (42)  PCWP: 15  CO/CI (TD): 2.6/1.6  PVR: 10.4    Inhaled nitric oxide  RA: 16  PA: 50/20 (35)  PCWP: 16  CO/CI (TD): 2.4/1.5    RHC 4/26/2017  RA:4  RV: 60/8  PA: 60/20 (32)  PCWP: 10  CO/CI (TD): 5.1/3.1  PVR: 4.3    6MWT 2/27/2017: 290 meters with lowest saturation of 90% on 4L of supplemental oxygen    6MWT 3/31/2017: 238 meters with lowest saturation of 82% of 4L of supplemental oxygen.      6MWT 5/22/2017: 152 meters with lowest saturation of 83% of 6L of supplemental oxygen.    Assessment and Plan: Ms. 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    1.  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 Loya is still WHO FC 3.  She is on Adcirca therapy and inhaled Tyvaso.  She had improvements in her hemodynamics on repeat RHC but she continues to be hypoxemic and has worsening exercise capacity.  We did another 6MWT today and he she had significant desaturations and only walked 152 meters.  We will stop her inhaled Tyvaso.  She needs to be on higher oxygen at home so we will work with her to get this figured out.    At this point, we are unfortunately running out of options.  We did encourage her to complete the lung transplant work-up.  Using an ERA in ILD is suboptimal due to increased risk of mortality  from the RISE-IIP and MANGO studies and systemic prostacyclin will also likely worsen hypoxemia.      It was a pleasure seeing Ms. Loya at the [facility] Pulmonary Hypertension program.  Please contact us with any questions or concerns that you may have.       Sincerely,    Carlo Frost MD, PhD  Cardiology Fellow    I have personally seen and examined the patient, and then discussed with Dr. Frost, and agree with the findings and plan in this note. I have reviewed today's vital signs, medications, labs, and imaging.     Significant decrease in her six minute walk distance with desaturation. This is very concerning. CT chest showed no evidence of pulmonary embolism. ? Increase in ILD. Will D/W Dr. Upton. Will stop Tyvaso as it could cause worsening V/Q mismatch. Increase her supplemental oxygen to 6-8 liters with activity for now. RTC in 4 weeks with repeat walk test off of Tyvaso. Call sooner if she has worsening symptoms. She is currently being evaluated for lung transplant. She has not completed the six month abstinence on smoking.     Sincerely,    Callie Ledesma MD   Center for Pulmonary Hypertension  Section of Advanced Heart Failure   Cardiovascular Division  [facility]   [phone]

## 2017-05-22 NOTE — NURSING NOTE
Chief Complaint   Patient presents with     Follow Up For     Return for PH F/U with Labs prior and 6MWT after appt

## 2017-05-22 NOTE — LETTER
"2017      RE: Ellen Loya  [address]  [facility] [address]       Dear Colleague,    Thank you for the opportunity to participate in the care of your patient, Ellen Loya, at the [facility] HEART [facility] at [facility]. Please see a copy of my visit note below.    April 10, 2017        Dawson Upton MD   [facility]   [address] [place] [address]       RE: Ellen Loya   MRN: [id number]   : 1975       Dear Dr. Upton:       We had the pleasure of seeing Ms. Ellen Loya for followup in our Pulmonary Hypertension Clinic at the [facility]. As you know, she is a 41-year-old female with pulmonary arterial hypertension in the setting of combined pulmonary fibrosis, emphysema and inflammatory arthritis. She is currently on Adcirca therapy.     Since her last visit, she is feeling better.  She has not had problems with fluid retention.  She actually is having more energy and less problems with chest pain.  However, she had to cut back her Tyvaso to twice a day because she       PAST MEDICAL HISTORY:  Past Medical History:   Diagnosis Date     Acute bronchitis 07    Admit. Discharged 07     Anxiety      Arthritis     h/o \"seronegative rheumatoid arthritis\" since age 30, however no evidence of active arthritis by Rheum eval [id number]     ASCUS favor benign 5/15/14    neg HPV     ILD (interstitial lung disease) (H)     seen on chest CT ; methotrexate stopped      Lung nodule     KM nodule; EBUS 2014 of lymph nodes was negative; CT-guided bx  hamartoma/chondroma     Prematurity     born 6-7 weeks early     Pulmonary hypertension (H)     RHC 2016 mean PA 25     Varicella without mention of complication        FAMILY HISTORY:  Family History   Problem Relation Age of Onset     Arthritis Mother      psoriatic arthritis     Depression Mother      DIABETES Mother      Thyroid Disease Mother  "     Obesity Mother      Hyperlipidemia Mother      Lipids Father      HEART DISEASE Father      recent angioplasty for 3 blocked arteries.     Substance Abuse Father      Hypertension Father      CEREBROVASCULAR DISEASE Father      Hyperlipidemia Father      Coronary Artery Disease Father      LUNG DISEASE Father      Scleroderma Paternal Uncle      Had scleroderma ILD     Other Cancer Paternal Grandmother      lung cancer     Substance Abuse Brother      Depression Brother      Asthma Brother      DIABETES Maternal Grandfather      adult onset     Hyperlipidemia Maternal Grandfather          SOCIAL HISTORY:  Social History     Social History     Marital status: Single     Spouse name: N/A     Number of children: 3     Years of education: 13     Occupational History     homemaker      Social History Main Topics     Smoking status: Former Smoker     Packs/day: 0.50     Years: 25.00     Types: Cigarettes     Start date: 12/3/1992     Quit date: 12/14/2016     Smokeless tobacco: Former User     Quit date: 12/14/2016     Alcohol use No      Comment: 5 per month or less     Drug use: No     Sexual activity: Yes     Partners: Male     Birth control/ protection: Female Surgical      Comment: Had post-partum tubal ligation.     Other Topics Concern      Service No     Blood Transfusions No     Caffeine Concern No     pop: 2c/d     Occupational Exposure No     Hobby Hazards No     Sleep Concern No     Stress Concern No     Weight Concern No     Special Diet No     Back Care No     Exercise No     Bike Helmet No     Seat Belt Yes     Self-Exams Yes     Parent/Sibling W/ Cabg, Mi Or Angioplasty Before 65f 55m? 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 Bought [employer] house in 2010; it was wet and full of mold.  She remodelled the house, did not wear a mask.       CURRENT MEDICATIONS:  Current Outpatient Prescriptions   Medication Sig Dispense Refill      oxyCODONE-acetaminophen (PERCOCET)  MG per tablet Take 1 tablet by mouth every 6 hours as needed for moderate to severe pain 60 tablet 0     ALPRAZolam (XANAX) 0.5 MG tablet TAKE 1 TABLET BY MOUTH EVERY SIX HOURS AS NEEDED FOR ANXIETY. 40 tablet 0     order for DME Oxygen: Patient requires supplemental Oxygen 4 LPM via nasal canula at rest and 6 LPM with activity. Please provide patient with portability capability. Okay to spot check patient on oxygen device, to keep sats above 90%. Please provider with home concentrator that can go up to 10 LPM. Oxygen will be for a lifetime. 1 Device 0     morphine (MS CONTIN) 15 MG 12 hr tablet Take 1 tablet (15 mg) by mouth 2 times daily 60 tablet 0     order for DME Equipment being ordered: Oxygen 4 liters continuous at rest and 5 liters continuous with activity. Needs portability.  Please provide concentrator that goes to 10 liters. 1 Device prn     Esomeprazole Magnesium (NEXIUM PO) Take 40 mg by mouth every morning (before breakfast)       fluticasone (FLONASE) 50 MCG/ACT spray Spray 2 sprays into both nostrils daily 1 Bottle 3     treprostinil (TYVASO STARTER) 0.6 MG/ML SOLN neb solution Inhale into the lungs every 4 hours (while awake for Tyvaso)        tiotropium (SPIRIVA HANDIHALER) 18 MCG capsule Inhale contents of one capsule daily. 30 capsule 1     albuterol (ALBUTEROL) 108 (90 BASE) MCG/ACT Inhaler Inhale 1-2 puffs into the lungs every 4 hours as needed for shortness of breath / dyspnea 1 Inhaler 8     tadalafil, PAH, (ADCIRCA) 20 MG TABS Take 2 tablets (40 mg) by mouth daily 60 tablet 11     furosemide (LASIX) 20 MG tablet Take 2 tablets (40 mg) by mouth daily 180 tablet 3     albuterol (2.5 MG/3ML) 0.083% nebulizer solution Use every 4-6 hours as needed for shortness of breath 60 vial 3     order for DME Equipment being ordered: Nebulizer. Verbal order from  1 Device 0     digoxin (LANOXIN) 125 MCG tablet Take 1 tablet (125 mcg) by mouth daily 90  "tablet 3     order for DME Equipment being ordered: Oximeter 1 Device 0     order for DME Equipment being ordered: Oxygen oximeter and mask 1 Device 0     order for DME Please provide patient with 2  liquid oxygen tanks for portability. Spot check patient on liquid oxygen. Titrate oxygen to maintain saturations above 88%. 2 Device 0     order for DME Equipment being ordered: Oxygen    Please provide patient with a home-fill system for portability. 1 Device 99     order for DME Equipment being ordered: personal O2 oximeter. 1 Device 0       ROS:   10 point ROS negative except HPI    EXAM:  /56 (BP Location: Right arm, Patient Position: Chair, Cuff Size: Adult Regular)  Pulse 52  Ht 1.575 m (5' 2\")  Wt 58.3 kg (128 lb 8 oz)  SpO2 97%  BMI 23.5 kg/m2  General: appears comfortable, alert and articulate  Head: normocephalic, atraumatic  Eyes: anicteric sclera, EOMI  Neck: no adenopathy  Orophyarynx: moist mucosa, no lesions, dentition intact  Heart: regular, S1/S2, no murmur, gallop, rub, estimated JVP 6 cm  Lungs: Inspiratory crackles at the bases bilaterally  GI: soft, non-tender, bowel sounds present, no hepatosplenomegaly  Extremities: no clubbing, cyanosis or edema  Neurological: normal speech and affect, no gross motor deficits    Labs:  CBC RESULTS:  Lab Results   Component Value Date    WBC 13.2 (H) 05/22/2017    RBC 4.50 05/22/2017    HGB 13.1 05/22/2017    HCT 41.5 05/22/2017    MCV 92 05/22/2017    MCH 29.1 05/22/2017    MCHC 31.6 05/22/2017    RDW 12.3 05/22/2017     05/22/2017       CMP RESULTS:  Lab Results   Component Value Date     05/22/2017    POTASSIUM 4.2 05/22/2017    CHLORIDE 102 05/22/2017    CO2 29 05/22/2017    ANIONGAP 8 05/22/2017     (H) 05/22/2017    BUN 11 05/22/2017    CR 0.62 05/22/2017    GFRESTIMATED >90  Non  GFR Calc   05/22/2017    GFRESTBLACK >90   GFR Calc   05/22/2017    MARCIN 9.1 05/22/2017    BILITOTAL 0.4 01/16/2017    " ALBUMIN 3.6 01/16/2017    ALKPHOS 95 01/16/2017    ALT 18 01/16/2017    AST 20 01/16/2017        INR RESULTS:  Lab Results   Component Value Date    INR 1.04 01/23/2015       No components found for: CK  Lab Results   Component Value Date    MAG 2.4 (H) 12/19/2016     Lab Results   Component Value Date    NTBNPI 4168 (H) 11/30/2016       Echocardiogram 4/12/2017:  Global and regional left ventricular function is normal with an EF of 55-60%. Traced at 55%.  Global right ventricular function is mildly reduced with mild right ventricular dilation is present. [TAPSE 1.8cm; S' 9cm/s]  Pulmonary hypertension with right ventricular systolic pressure 43mmHg above the right atrial pressure.  The inferior vena cava is normal. Estimated mean right atrial pressure is <3 mmHg.  No pericardial effusion is present.  Compared to the previous study dated 10/3/16, there may be interval improvement in RV size.    RHC 11/30/2016  RA:15  RV: 60/16  PA: 60/38 (42)  PCWP: 15  CO/CI (TD): 2.6/1.6  PVR: 10.4    Inhaled nitric oxide  RA: 16  PA: 50/20 (35)  PCWP: 16  CO/CI (TD): 2.4/1.5    RHC 4/26/2017  RA:4  RV: 60/8  PA: 60/20 (32)  PCWP: 10  CO/CI (TD): 5.1/3.1  PVR: 4.3    6MWT 2/27/2017: 290 meters with lowest saturation of 90% on 4L of supplemental oxygen    6MWT 3/31/2017: 238 meters with lowest saturation of 82% of 4L of supplemental oxygen.      6MWT 5/22/2017: 152 meters with lowest saturation of 83% of 6L of supplemental oxygen.    Assessment and Plan: Ms. 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    1.  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 Loya is still WHO FC 3.  She is on Adcirca therapy and inhaled Tyvaso.  She had improvements in her hemodynamics on repeat RHC but she continues to be hypoxemic and has worsening exercise capacity.  We did another 6MWT today and he she had significant desaturations and only walked 152 meters.   We will stop her inhaled Tyvaso.  She needs to be on higher oxygen at home so we will work with her to get this figured out.    At this point, we are unfortunately running out of options.  We did encourage her to complete the lung transplant work-up.  Using an ERA in ILD is suboptimal due to increased risk of mortality from the RISE-IIP and MANGO studies and systemic prostacyclin will also likely worsen hypoxemia.      It was a pleasure seeing Ms. Loya at the [facility] Pulmonary Hypertension program.  Please contact us with any questions or concerns that you may have.       Sincerely,    Carlo Frost MD, PhD  Cardiology Fellow    I have personally seen and examined the patient, and then discussed with Dr. Frost, and agree with the findings and plan in this note. I have reviewed today's vital signs, medications, labs, and imaging.     Significant decrease in her six minute walk distance with desaturation. This is very concerning. CT chest showed no evidence of pulmonary embolism. ? Increase in ILD. Will D/W Dr. Upton. Will stop Tyvaso as it could cause worsening V/Q mismatch. Increase her supplemental oxygen to 6-8 liters with activity for now. RTC in 4 weeks with repeat walk test off of Tyvaso. Call sooner if she has worsening symptoms. She is currently being evaluated for lung transplant. She has not completed the six month abstinence on smoking.     Sincerely,    Callie Ledesma MD   Center for Pulmonary Hypertension  Section of Advanced Heart Failure   Cardiovascular Division  [facility]   [phone]

## 2017-05-22 NOTE — NURSING NOTE
Cardiac Testing: Patient given instructions regarding: six minute walk test. Discussed purpose, preparation, procedure and when to expect results reported back to the patient. Patient demonstrated understanding of this information and agreed to call with further questions or concerns.    Diet: Patient instructed regarding a heart healthy diet, including discussion of reduced fat and sodium intake. Patient demonstrated understanding of this information and agreed to call with further questions or concerns.    Labs: Patient was given results of the laboratory testing obtained today. Patient demonstrated understanding of this information and agreed to call with further questions or concerns.     Med Reconcile: Reviewed and verified all current medications with the patient. The updated medication list was printed and given to the patient.    Return Appointment: Patient given instructions regarding scheduling next clinic visit. Patient demonstrated understanding of this information and agreed to call with further questions or concerns.    Patient stated she understood all health information given and agreed to call with further questions or concerns.     Medication Changes:  No medication changes at this time. Please continue current medication regiment.     Patient Instructions:  Check-In  Time Check-In Location Estimated Length Procedure   Name     TODAY, 5/22   [facility]   3rd Floor 30 minutes Six Minute Walk Test**   Procedure Preparations & Instructions     This is a non-invasive procedure and does NOT require any preparation       PAH Support Group Information provided. It is one Saturday a month.   We will speak with Dr. Upton's RN regarding titrating up your oxygen (and using a different device).    Results:  Component      Latest Ref Rng & Units 5/22/2017   Sodium      133 - 144 mmol/L 138   Potassium      3.4 - 5.3 mmol/L 4.2   Chloride      94 - 109 mmol/L 102   Carbon Dioxide      20 - 32 mmol/L 29   Anion Gap       3 - 14 mmol/L 8   Glucose      70 - 99 mg/dL 120 (H)   Urea Nitrogen      7 - 30 mg/dL 11   Creatinine      0.52 - 1.04 mg/dL 0.62   GFR Estimate      >60 mL/min/1.7m2 >90 . . .   GFR Estimate If Black      >60 mL/min/1.7m2 >90 . . .   Calcium      8.5 - 10.1 mg/dL 9.1   WBC      4.0 - 11.0 10e9/L 13.2 (H)   RBC Count      3.8 - 5.2 10e12/L 4.50   Hemoglobin      11.7 - 15.7 g/dL 13.1   Hematocrit      35.0 - 47.0 % 41.5   MCV      78 - 100 fl 92   MCH      26.5 - 33.0 pg 29.1   MCHC      31.5 - 36.5 g/dL 31.6   RDW      10.0 - 15.0 % 12.3   Platelet Count      150 - 450 10e9/L 381   N-Terminal Pro Bnp      0 - 125 pg/mL 1204 (H)       Follow up Appointment Information:    8 weeks with labs, 6MWT   [address]  3rd Floor Routine Clinic   Follow Up Visit Callie Ledesma MD   Appointment Preparations & Instructions  We ask that you plan accordingly due to traffic and parking that may delay you. We look forward to seeing you!       We are located on the third floor of [place] [facility] [place] [facility].  Our address is     [address] [place] [address] on [address] [place] [phone]    Thank you for allowing us to be a part of your care here at the [facility]    If you have questions or concerns please contact us at:    Naty Lau RN, BSN    [facility] ([facility]  Nurse Coordinator     Clinic   Pulmonary Hypertension   Pulmonary Hypertension  [facility]  (P)[phone]    (P) [phone]        (F)[phone]    ** Please note that you will NOT receive a reminder call regarding your scheduled testing, reminder calls are for provider appointments only.  If you are scheduled for testing within the [facility] system you may receive a call regarding pre-registration for billing purposes only.**     Remember to weigh yourself daily  after voiding and before you consume any food or beverages and log the numbers.  If you have gained/lost 2 pounds overnight or 5 pounds in a week contact us immediately for medication adjustments or further instructions.

## 2017-05-22 NOTE — MR AVS SNAPSHOT
After Visit Summary   5/22/2017    Ellen Loya    MRN: [id number]           Patient Information     Date Of Birth          1975        Visit Information        Provider Department      5/22/2017 8:30 AM Callie Ledesma MD [facility]        Today's Diagnoses     Dyspnea on exertion    -  1    Pulmonary hypertension (H)          Care Instructions    Medication Changes:  No medication changes at this time. Please continue current medication regiment.     Patient Instructions:  Check-In  Time Check-In Location Estimated Length Procedure   Name     TODAY, 5/22   [facility]   3rd Floor 30 minutes Six Minute Walk Test**   Procedure Preparations & Instructions     This is a non-invasive procedure and does NOT require any preparation       PAH Support Group Information provided. It is one Saturday a month.     We will speak with Dr. Upton's RN regarding titrating up your oxygen (and using a different device).    Results:  Component      Latest Ref Rng & Units 5/22/2017   Sodium      133 - 144 mmol/L 138   Potassium      3.4 - 5.3 mmol/L 4.2   Chloride      94 - 109 mmol/L 102   Carbon Dioxide      20 - 32 mmol/L 29   Anion Gap      3 - 14 mmol/L 8   Glucose      70 - 99 mg/dL 120 (H)   Urea Nitrogen      7 - 30 mg/dL 11   Creatinine      0.52 - 1.04 mg/dL 0.62   GFR Estimate      >60 mL/min/1.7m2 >90 . . .   GFR Estimate If Black      >60 mL/min/1.7m2 >90 . . .   Calcium      8.5 - 10.1 mg/dL 9.1   WBC      4.0 - 11.0 10e9/L 13.2 (H)   RBC Count      3.8 - 5.2 10e12/L 4.50   Hemoglobin      11.7 - 15.7 g/dL 13.1   Hematocrit      35.0 - 47.0 % 41.5   MCV      78 - 100 fl 92   MCH      26.5 - 33.0 pg 29.1   MCHC      31.5 - 36.5 g/dL 31.6   RDW      10.0 - 15.0 % 12.3   Platelet Count      150 - 450 10e9/L 381   N-Terminal Pro Bnp      0 - 125 pg/mL 1204 (H)       Follow up Appointment Information:    8 weeks with labs, 6MWT   [facility]  [address] [place] [address]  3rd Floor Routine Clinic   Follow Up Visit  Callie Ledesma MD   Appointment Preparations & Instructions  We ask that you plan accordingly due to traffic and parking that may delay you. We look forward to seeing you!       We are located on the third floor of the Clinic and Surgery Center ([facility]) on the [facility].  Our address is     [address] [place] [address] [place] [address] [place] [address]    Thank you for allowing us to be a part of your care here at the [facility]    If you have questions or concerns please contact us at:    Naty Lau RN, BSN    Louis Jack (Schedule,P.A.)  Nurse Coordinator     Clinic   Pulmonary Hypertension   Pulmonary Hypertension  [facility]  [phone])[phone]    (P) [phone]        (F)[phone]    ** Please note that you will NOT receive a reminder call regarding your scheduled testing, reminder calls are for provider appointments only.  If you are scheduled for testing within the [employer] system you may receive a call regarding pre-registration for billing purposes only.**     Remember to weigh yourself daily after voiding and before you consume any food or beverages and log the numbers.  If you have gained/lost 2 pounds overnight or 5 pounds in a week contact us immediately for medication adjustments or further instructions.          Follow-ups after your visit        Follow-up notes from your care team     Return in about 8 weeks (around 7/17/2017) for with Dr. Ledesma, Return Pulmonary Hypertension, with, Labs, 6MWT.      Your next 10 appointments already scheduled     May 22, 2017  9:30 AM CDT   Six Minute Walk with UC PFL 6 MINUTE WALK 1   [facility] ([facility])    [address] [place] [address]  3rd [facility] [address]   [phone]            May 22, 2017 11:00 AM CDT   Six Minute Walk with UC PFL 6 MINUTE WALK  1   [facility] Pulmonary Function Testing ([facility])    [address]  3rd Floor  [facility] [address] [phone]            May 23, 2017 11:00 AM CDT   Pulmonary Treatment with [facility] ([facility])    [address] [place] [address]   [phone]            May 25, 2017 11:00 AM CDT   Pulmonary Treatment with ELIZA Gutierrez   [facility] ([facility])    [address] [place] [address]   [phone]            May 30, 2017 11:00 AM CDT   Pulmonary Treatment with [facility] ([facility])    [address] [place] [address]   [phone]            Jun 01, 2017 11:00 AM CDT   Pulmonary Treatment with [facility] ([facility])    [address] [place] [address]   [phone]            Jun 06, 2017 11:00 AM CDT   Pulmonary Treatment with [facility] ([facility])    [address] [place] [address]   [phone]            Jun 08, 2017 11:00 AM CDT   Pulmonary Treatment with ELIZA Gutierrez   [facility] ([facility])    [address] [place] [address]   [phone]            Jul 17, 2017  9:00 AM CDT   Lab with  LAB    Health Lab ([facility])    [address]  1st Floor  [facility] [id number] [phone]            Jul 17, 2017  9:30 AM CDT   (Arrive by 9:15 AM)   RETURN PRIMARY PULMONARY with Callie Ledesma MD   [facility] ([facility])    [address]  3rd Floor  [facility] [id number] [phone]              Future tests that were ordered for you today     Open Future Orders        Priority  "Expected Expires Ordered    6 minute walk Routine  11/18/2017 5/22/2017            Who to contact     If you have questions or need follow up information about today's clinic visit or your schedule please contact [facility] directly at [phone].  Normal or non-critical lab and imaging results will be communicated to you by Navuthart, letter or phone within 4 business days after the clinic has received the results. If you do not hear from us within 7 days, please contact the clinic through Nav[employer]art or phone. If you have a critical or abnormal lab result, we will notify you by phone as soon as possible.  Submit refill requests through [employer] or call your pharmacy and they will forward the refill request to us. Please allow 3 business days for your refill to be completed.          Additional Information About Your Visit        [employer]uth[employer]ene[employer]il Information     [employer] gives you secure access to your electronic health record. If you see a primary care provider, you can also send messages to your care team and make appointments. If you have questions, please call your primary care clinic.  If you do not have a primary care provider, please call [phone] and they will assist you.        Care EveryWhere ID     This is your Care EveryWhere ID. This could be used by other organizations to access your [facility] medical records  [id number]        Your Vitals Were     Pulse Height Pulse Oximetry BMI (Body Mass Index)          52 1.575 m (5' 2\") 97% 23.5 kg/m2         Blood Pressure from Last 3 Encounters:   05/22/17 104/56   04/26/17 108/59   04/12/17 109/67    Weight from Last 3 Encounters:   05/22/17 58.3 kg (128 lb 8 oz)   05/18/17 55.3 kg (122 lb)   05/16/17 54 kg (119 lb)               Primary Care Provider Office Phone # Fax #    Ignacio Loya [phone]       [facility] [address] [place] [address]        Thank you!     Thank you for choosing [facility]  " for your care. Our goal is always to provide you with excellent care. Hearing back from our patients is one way we can continue to improve our services. Please take a few minutes to complete the written survey that you may receive in the mail after your visit with us. Thank you!             Your Updated Medication List - Protect others around you: Learn how to safely use, store and throw away your medicines at www.disposemymeds.org.          This list is accurate as of: 5/22/17  9:29 AM.  Always use your most recent med list.                   Brand Name Dispense Instructions for use    * albuterol (2.5 MG/3ML) 0.083% neb solution     60 vial    Use every 4-6 hours as needed for shortness of breath       * albuterol 108 (90 BASE) MCG/ACT Inhaler    albuterol    1 Inhaler    Inhale 1-2 puffs into the lungs every 4 hours as needed for shortness of breath / dyspnea       ALPRAZolam 0.5 MG tablet    XANAX    40 tablet    TAKE 1 TABLET BY MOUTH EVERY SIX HOURS AS NEEDED FOR ANXIETY.       digoxin 125 MCG tablet    LANOXIN    90 tablet    Take 1 tablet (125 mcg) by mouth daily       fluticasone 50 MCG/ACT spray    FLONASE    1 Bottle    Spray 2 sprays into both nostrils daily       furosemide 20 MG tablet    LASIX    180 tablet    Take 2 tablets (40 mg) by mouth daily       morphine 15 MG 12 hr tablet    MS CONTIN    60 tablet    Take 1 tablet (15 mg) by mouth 2 times daily       NEXIUM PO      Take 40 mg by mouth every morning (before breakfast)       * order for DME     1 Device    Equipment being ordered: personal O2 oximeter.       * order for DME     1 Device    Equipment being ordered: Oxygen  Please provide patient with a home-fill system for portability.       * order for DME     2 Device    Please provide patient with 2  liquid oxygen tanks for portability. Spot check patient on liquid oxygen. Titrate oxygen to maintain saturations above 88%.       * order for DME     1 Device    Equipment being ordered: Oxygen  oximeter and mask       * order for DME     1 Device    Equipment being ordered: Oximeter       * order for DME     1 Device    Equipment being ordered: Nebulizer. Verbal order from        * order for DME     1 Device    Equipment being ordered: Oxygen 4 liters continuous at rest and 5 liters continuous with activity. Needs portability.  Please provide concentrator that goes to 10 liters.       * order for DME     1 Device    Oxygen: Patient requires supplemental Oxygen 4 LPM via nasal canula at rest and 6 LPM with activity. Please provide patient with portability capability. Okay to spot check patient on oxygen device, to keep sats above 90%. Please provider with home concentrator that can go up to 10 LPM. Oxygen will be for a lifetime.       oxyCODONE-acetaminophen  MG per tablet    PERCOCET    60 tablet    Take 1 tablet by mouth every 6 hours as needed for moderate to severe pain       tadalafil (PAH) 20 MG Tabs    ADCIRCA    60 tablet    Take 2 tablets (40 mg) by mouth daily       tiotropium 18 MCG capsule    SPIRIVA HANDIHALER    30 capsule    Inhale contents of one capsule daily.       TYVASO STARTER 0.6 MG/ML Soln neb solution   Generic drug:  treprostinil      Inhale into the lungs every 4 hours (while awake for Tyvaso)       * Notice:  This list has 10 medication(s) that are the same as other medications prescribed for you. Read the directions carefully, and ask your doctor or other care provider to review them with you.

## 2017-05-23 ENCOUNTER — HOSPITAL ENCOUNTER (OUTPATIENT)
Dept: CT IMAGING | Facility: CLINIC | Age: 42
Discharge: HOME | End: 2017-05-23
Attending: INTERNAL MEDICINE | Admitting: INTERNAL MEDICINE
Payer: COMMERCIAL

## 2017-05-23 DIAGNOSIS — I27.20 PULMONARY HYPERTENSION (H): ICD-10-CM

## 2017-05-23 DIAGNOSIS — R06.09 DYSPNEA ON EXERTION: ICD-10-CM

## 2017-05-23 PROCEDURE — 25000125 ZZHC RX 250: Performed by: INTERNAL MEDICINE

## 2017-05-23 PROCEDURE — 25000128 H RX IP 250 OP 636: Performed by: INTERNAL MEDICINE

## 2017-05-23 PROCEDURE — 71260 CT THORAX DX C+: CPT

## 2017-05-23 RX ORDER — IOPAMIDOL 755 MG/ML
500 INJECTION, SOLUTION INTRAVASCULAR ONCE
Status: COMPLETED | OUTPATIENT
Start: 2017-05-23 | End: 2017-05-23

## 2017-05-23 RX ADMIN — IOPAMIDOL 70 ML: 755 INJECTION, SOLUTION INTRAVENOUS at 09:55

## 2017-05-23 RX ADMIN — SODIUM CHLORIDE 70 ML: 9 INJECTION, SOLUTION INTRAVENOUS at 09:55

## 2017-05-24 DIAGNOSIS — G47.9 DISTURBANCE IN SLEEP BEHAVIOR: ICD-10-CM

## 2017-05-24 NOTE — TELEPHONE ENCOUNTER
alprazolam      Last Written Prescription Date: 05/15/2017  Last Fill Quantity: 40,  # refills: 0   Last Office Visit with [facility], [facility] or [facility] prescribing provider: 11/07/2016    Thank You,  Pavan Stanley, Pharmacy [facility]

## 2017-05-25 ENCOUNTER — HOSPITAL ENCOUNTER (OUTPATIENT)
Dept: CARDIAC REHAB | Facility: CLINIC | Age: 42
End: 2017-05-25
Attending: INTERNAL MEDICINE
Payer: COMMERCIAL

## 2017-05-25 VITALS — WEIGHT: 119.8 LBS | BODY MASS INDEX: 21.91 KG/M2

## 2017-05-25 PROCEDURE — 40000244 ZZH STATISTIC VISIT PULM REHAB: Performed by: REHABILITATION PRACTITIONER

## 2017-05-25 PROCEDURE — G0238 OTH RESP PROC, INDIV: HCPCS | Performed by: REHABILITATION PRACTITIONER

## 2017-05-25 RX ORDER — ALPRAZOLAM 0.5 MG
TABLET ORAL
Qty: 40 TABLET | Refills: 0 | Status: SHIPPED | OUTPATIENT
Start: 2017-05-25 | End: 2017-06-03

## 2017-05-30 ENCOUNTER — HOSPITAL ENCOUNTER (OUTPATIENT)
Dept: CARDIAC REHAB | Facility: CLINIC | Age: 42
End: 2017-05-30
Attending: INTERNAL MEDICINE
Payer: COMMERCIAL

## 2017-05-30 VITALS — WEIGHT: 118 LBS | BODY MASS INDEX: 21.58 KG/M2

## 2017-05-30 PROCEDURE — G0238 OTH RESP PROC, INDIV: HCPCS

## 2017-05-30 PROCEDURE — 40000244 ZZH STATISTIC VISIT PULM REHAB

## 2017-05-31 DIAGNOSIS — M19.90 INFLAMMATORY ARTHRITIS: ICD-10-CM

## 2017-05-31 RX ORDER — MORPHINE SULFATE 15 MG/1
15 TABLET, FILM COATED, EXTENDED RELEASE ORAL 2 TIMES DAILY
Qty: 60 TABLET | Refills: 0 | Status: SHIPPED | OUTPATIENT
Start: 2017-05-31 | End: 2017-06-28

## 2017-05-31 NOTE — TELEPHONE ENCOUNTER
MS Contin       Last Written Prescription Date: 5/4/17  Last Fill Quantity: 60,  # refills: 0   Last Office Visit with G, P or [facility] prescribing provider: 5/22/17

## 2017-06-02 ENCOUNTER — CARE COORDINATION (OUTPATIENT)
Dept: CARDIOLOGY | Facility: CLINIC | Age: 42
End: 2017-06-02

## 2017-06-02 DIAGNOSIS — R09.02 HYPOXIA: ICD-10-CM

## 2017-06-02 DIAGNOSIS — J84.9 ILD (INTERSTITIAL LUNG DISEASE) (H): ICD-10-CM

## 2017-06-02 NOTE — PROGRESS NOTES
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 Loya is still WHO FC 3.  She is on Adcirca therapy and inhaled Tyvaso.  She had improvements in her hemodynamics on repeat RHC but she continues to be hypoxemic and has worsening exercise capacity.  We did another 6MWT today and he she had significant desaturations and only walked 152 meters.  We will stop her inhaled Tyvaso.  She needs to be on higher oxygen at home so we will work with her to get this figured out.    Assessment: Called Ellen to see how things were going now that her Tyvaso has stopped. Pt reports \"feeling great. I no longer have blue lips and I don't get so short of breath.\" Pt reports not using Tyvaso anymore since her last clinic appointment with Dr. Ledesma. Also, pt has been using 4L of oxygen at rest and 6-8L of oxygen with activity, and this seems to be adequate for her.     Intervention: Informed Ellen that she should be using 8L per Dr. Ledesma as discussed at her last clinic appointment. [facility] has been called and fax the new order for her oxygen concentrator or other device to have to capabilities to go up to 8L continuous per MD recommendations.     Response: Ellen verbalized and understanding of the information provided above. All questions and concerns were addressed.    Continue to monitor closely.    "

## 2017-06-03 ENCOUNTER — TELEPHONE (OUTPATIENT)
Dept: FAMILY MEDICINE | Facility: CLINIC | Age: 42
End: 2017-06-03

## 2017-06-03 DIAGNOSIS — G47.9 DISTURBANCE IN SLEEP BEHAVIOR: ICD-10-CM

## 2017-06-03 DIAGNOSIS — R07.81 PLEURITIC CHEST PAIN: ICD-10-CM

## 2017-06-03 NOTE — TELEPHONE ENCOUNTER
Percocet      Last Written Prescription Date: 5/19/17  Last Fill Quantity: 60,  # refills: 0   Last Office Visit with [facility] or [facility] prescribing provider: 4/10/17                                             alprazolam      Last Written Prescription Date: 5/25/17  Last Fill Quantity: 40,  # refills: 0   Last Office Visit with [facility], [facility] or [facility] prescribing provider: 4/10/17

## 2017-06-05 ENCOUNTER — PRE VISIT (OUTPATIENT)
Dept: CARDIOLOGY | Facility: CLINIC | Age: 42
End: 2017-06-05

## 2017-06-05 DIAGNOSIS — Z53.9 ERRONEOUS ENCOUNTER--DISREGARD: ICD-10-CM

## 2017-06-05 DIAGNOSIS — I27.20 PULMONARY HYPERTENSION (H): Primary | ICD-10-CM

## 2017-06-05 RX ORDER — ALPRAZOLAM 0.5 MG
TABLET ORAL
Qty: 40 TABLET | Refills: 0 | Status: SHIPPED | OUTPATIENT
Start: 2017-06-05 | End: 2017-06-13

## 2017-06-05 RX ORDER — OXYCODONE AND ACETAMINOPHEN 10; 325 MG/1; MG/1
1 TABLET ORAL EVERY 6 HOURS PRN
Qty: 60 TABLET | Refills: 0 | Status: SHIPPED | OUTPATIENT
Start: 2017-06-05 | End: 2017-06-16

## 2017-06-06 ENCOUNTER — HOSPITAL ENCOUNTER (OUTPATIENT)
Dept: CARDIAC REHAB | Facility: CLINIC | Age: 42
End: 2017-06-06
Attending: INTERNAL MEDICINE
Payer: COMMERCIAL

## 2017-06-06 ENCOUNTER — HOSPITAL ENCOUNTER (OUTPATIENT)
Dept: CARDIAC REHAB | Facility: CLINIC | Age: 42
Discharge: HOME | End: 2017-06-06
Attending: INTERNAL MEDICINE | Admitting: INTERNAL MEDICINE
Payer: COMMERCIAL

## 2017-06-06 VITALS — WEIGHT: 118.4 LBS | HEIGHT: 62 IN | BODY MASS INDEX: 21.79 KG/M2

## 2017-06-06 DIAGNOSIS — I27.20 PULMONARY HYPERTENSION (H): ICD-10-CM

## 2017-06-06 PROCEDURE — G0239 OTH RESP PROC, GROUP: HCPCS | Performed by: REHABILITATION PRACTITIONER

## 2017-06-06 PROCEDURE — 40000244 ZZH STATISTIC VISIT PULM REHAB: Performed by: REHABILITATION PRACTITIONER

## 2017-06-06 PROCEDURE — 94620 ZZHC PULMONARY STRESS TEST, SIMPLE: CPT | Performed by: REHABILITATION PRACTITIONER

## 2017-06-06 ASSESSMENT — PULMONARY FUNCTION TESTS
FVC_PERCENT_PREDICTED: 82
FEV1: 82
FVC: 81

## 2017-06-06 ASSESSMENT — 6 MINUTE WALK TEST (6MWT)
MALE CALC: 582.74
TOTAL DISTANCE WALKED: 220.98
GENDER SELECTION: FEMALE
FEMALE CALC: 639.1

## 2017-06-06 ASSESSMENT — DUKE ACTIVITY SCORE INDEX (DASI)
DASI METS SCORE: 6.27
VO2_PEAK: 21.94

## 2017-06-06 NOTE — PROGRESS NOTES
06/06/17 1200   Session   Session 150 Day Individualized Treatment Plan   Certified through this date 07/05/17   Type Reassessment   General Information   Treatment Diagnosis Interstitial Pulmonary Fibrosis   Onset Date 01/01/11   Hospital Location [facility]   Outpatient Pulmonary Rehab Start Date 01/13/17   Primary Physician Dr. Loya   Pulmonolgist Dr. Upton   General Information Comments .pmh   Untoward Events/Exacerbations/Hospitalizations   Untoward Events/Exacerbations/Hospitalizations None   Cardiac Edema   Edema Date 12/15/16   Sputum   Sputum Production Amount None   Sputum Production Color Yellow   Sputum Production Consistency Thick   Tobacco History   Tobacco Former smoker   Tobacco Habit Cigarettes   Years Smoked 20   Average Packs Per Day 1/2   Quit Date or Planned Quit Date 12/14/16  (had 1-2 cigarettes in March/April)   Interventions Planned Check in Regularly, Offer Support to Reduce Risk of Relapse   Interventions Completed Checked in regularly, offered support as needed   Medications   Short-Acting Beta Agonist Prescribed, taking as prescribed   Long-Acting Anticholinergic Prescribed, taking as prescribed   Pain   Patient Currently in Pain No   Falls Screen   Have you fallen two or more times in the past year? Yes   Have you fallen and had an injury in the past year? No   Referral initiated to physical therapy No   Living and Work Status   Living Arrangements house   Support System Live with an adult   Environmental Factors No concerns   ADL Limitations None   Initial Duke Activity Status Index (DASI) score. A measure of functional capacity. The goal is to have a pre-program raw score of 9.95 (~4 METs) or above 28.7   Initial DASI VO2 Peak (ml*kg-1*min-1) 21.94   Initial DASI MET Level 6.27   Occupation , cook   Return to Employment Not employed  (Disabled)   Physical Assessments   Incisions Not applicable   Edema Not assessed   Left Lung Sounds not  "assessed   Right Lung Sounds not assessed   Pulmonary Function Test (PFTs)   PFT Results Available   Date Completed 01/04/17   FVC Actual 81   % Predicted FVC 82   FEV1 Actual 82   Individualized Treatment Plan   Sessions Scheduled 36   Sessions Attended 28   Oxygen Use   Supplemental Oxygen Needed Yes   Delivery Device Nasal Cannula   Liter Flow at Rest 4   Liter Flow with ADLS 4   Liter Flow During Exercise 4-6   Liter Flow With Sleep 4   Evaluated on Home System? Continuous flow;Pulse/Demand   Interventions Recommended Continue to monitor SpO2 at rest and with exercise on current O2 settings;Titrate O2 with exercise   Exercise Prescription   Mode Treadmill;Nustep;Weights   Frequency 2 days/week   Duration/Time 30-45 min   THR (85% of age predicted max heart rate)  152.15   Effort Rating (0-10) 4-6   Progression of Exercise Increase speed on treadmill by 0.1 mph per week; up to speed of 1.8 mph. Try increasing exercise endurance up to 40 minutes of continuous aerobic exercise.   Oxygen Titration with Exercise > 88% with exercise   Comments SaO2 has dropped to 84 during last ex. session- patient asymptomatic.   Exercise Assessment   6 Minute Walk Predicted - Gender Selection Female   6 Minute Walk Predicted (Female) 639.1   6 Minute Walk Predicted (Male) 582.74   6 Minute Walk Distance (Initial) 220.98 Meters   Resting HR 56   Exercise HR 80   SpO2 98  (4 lpm)   Exercise SpO2 92  (6lpm)   Current MET level 2.6   Exercise Tolerance fair;poor   Normal Limits Discussed Yes   Current Symptoms at Home Dyspnea;Fatigue   Current Symptoms in Rehab Dyspnea   Limitations Arthritis   Nutrition Management   Age 41   Height 1.575 m (5' 2.01\")   Weight 53.7 kg (118 lb 6.4 oz)   BMI (Calculated) 21.7   Assessment Normal   Interventions Planned NA   Psychosocial   Initial Patient Health Questionnaire -9 (PHQ-9) for depression. To notify physician if pre-score >9. 11   Initial Shortness of Breath Questionnaire (SOBQ) score. The " goal is to reduce the score pre to post program. 40   Intervention Planned Patient to identify 2-3 coping mechanisms to decrease stress and anxiety;Patient to attend appropriate education class;Introduce relaxation techniques to assist with decreased anxiety   Interventions Completed Attended stress management class   Psychosocial Comments PT reports she has anxiety which she takes medication for which is situation based.    Stages of Change   Aerobic Exercise Action   Physical Activity Preparation   Recommended diet Action   Stress Contemplation   Smoking Cessation Relapse   Oxygen Usage Maintenance   Current Home Exercise   Type of Exercise None   Frequency (days per week) 0   Duration (minutes per session) 0   Recommended Home Exercise Prescription   Type of Exercise Walking;Bike   Frequency (Days per week) 2-3   Duration (minutes per session) 15-30 min   Effort Rating Recommended (0-10) Scale  4-6/10   30 Day Exercise Plan Increase daily activity and  walk 10 min 2x/day on nonPR days. Start using bicycle at home as energy levels improve.    Learning Assessment   Learner Patient   Primary Language English   Preferred Learning Style Listening;Reading;Demonstration;Pictures/Video   Barriers to Learning No barriers noted   Patient Education/Referrals   Education Recommended Activities of Daily Living;Breathing Techniques;Community Resources/Exacerbation Management;Emotional Aspects of CLD;Energy Conservation;Exercise Principles;Medication Overview;Nutrition;Panic and Anxiety   Education Attended Panic and Anxiety;Community Resources/Exacerbation Management;Air Quality;Exercise Principles   Follow-up/On-going Support   Provider follow-up needed on the following No follow-up needed   Pulmonary Rehab Goals   Pulmonary Rehab Goals 1;2;3;4   Goal 1   Goal Patient will increase treadmill speed to 1.9-2 mph    Target Date 06/09/17   Progress Towards Goal 4/11 Patient reports that her physician would like her to be able to  achieve greater distance on 6 minute walk test, she would like this too because she feels this reduces the likelihood of lung transplant, recent set back with illness, plan to start increasing exercise workloads 0.1 mph/week and encourage walking at home as well as use of bicycle. 5/9 another setback over past month, will first attempt to resume 25-30 minutes ambulation then increase speed by 0.1 mph per week; 6/6 continues to work on increasing time back to 30 minutes, then will work on increasing treadmill speed   Goal 2   Goal Pt will move from the action phase to maintenance phase in refererance to smoking.   Target Date 09/09/17   Progress Towards Goal 4/11 Patient admits to smoking over this past weekend, states it was 'self sabotage' because she was scared from going off of medication and that may mean she will get a transplant faster. She discussed with physician and is relieved of this fear and has re-committed to not smoking. 5/9 Patient has remianed smoke free over past month; 6/6 Patient has admitted to smoking often over the past month, prior to today's exercise session she set today 6/6/17, as her quit date, has purchased nicotine replacement to assist with cravings   Goal 3   Goal Patient will be able to complete home chores, including light housework, and shopping with dyspnea rated 4-5/10 OMNI scale   Target Date 06/09/17   Progress Towards Goal 5/9 goal adjusted today. patient will attend pulmonary rehab and initiate a home exercise program to increase activity tolerance and incoporate breathing techniques to reduce dyspnea. home oxygen is assessing which system will fit patient needs best to reduce desaturations, this will be completed next week. 6/6 Tolerance of home activities continues to be variable with occasional dyspnea but often able to complete without dyspnea   Goal 4   Goal Patient will seek emotional support/resources as she learns more about the lung transplant process.   Target Date  06/20/17   Progress Towards Goal 5/9 Patient was encouraged to attend the education classes offered at [facility] for lung transplant patients, which includes information and support emotionally. Also given resources for behavioral health locally for assitance. 6/6 Patient has decided against lung transplant and has informed her medical team and family of her decision   Assessment   Assessment Ellen continues to make slow progress in her pulmonary rehabilitation, she has been able to increase her treadmill time by 5 minutes over the past month. She continues to struggle with desaturations at times on the treadmill even at 6 lpm. She has another pulmonary follow-up coming up and we will reassess goals again after that appointment. I anticipate discharge from pulmonary rehab within the next month as she is reaching limitations of insurance. Information on the WEL program has been given to Ellen as well as a scholarship form today.  Ellen, again today admits she has been smoking regularly over the past month, she states this is because she does not want a lung transplant. She has informed her family and medical team that she does not want to proceed with the transplant. She independently set today as her quit date, she has nicotine replacement available should she need help. We discussed that she does not need to self sabotage because of the lung transplant, that she is allowed to make her own informed medical decisions and they would be respected. She agrees that smoking is not the way to advocate for her medical wishes and that she felt better and more energetic when she was smoke free. We will continue to give support and assist with behavior change as she continues her smoking cessation goal.      I have reviewed and agree with this patient s individual treatment plan and exercise prescription for respiratory therapy.  Please see    individual treatment plan for details of progress and plan.

## 2017-06-06 NOTE — PROGRESS NOTES
SIX MINUTE WALK TEST  6/6/2017    Ellen Loya MRN# [id number]   YOB: 1975 Age: 41 year old     Weight (kg): 53.7 kg (actual weight)    Supplemental oxygen during the test: Yes, flow 6 to 8 L/min    Oxygen Appliance: Nasal Cannula    Oximetry: earlobe probe    Gait Aid: None     Pre-test Immediate post-test 5 minutes post-test   Time 5 6 5   Heart rate (bpm) 56 81 55   SpO2 (%) 98% on 4lpm 95% on 6lpm at 1 minute  93% on 6lpm at 2 minutes  89% on 6lpm at 3 minutes  86% on 6lpm at 3.5 minutes; test paused and FiO2 increased to 8lpm  90% on 8lpm at 4 minutes  87% on 8lpm at 5 minutes  85% on 8lpm at 6 minutes; recovered to above 90% with 25 second standing rest 97% at 6lpm   Rated Perceived Dyspnea (Mariel Scale) 0 -- Nothing at all   0 -- Nothing at all   0 -- Nothing at all     Rated Perceived Exertion (Mariel Scale) 0 -- (Nothing at all) 2 -- Weak (light)  0 -- (Nothing at all)       Total distance walked in 6 minutes: 665 feet, 202.7 meters    Paused during test?Yes  Number of pauses: 1, for FiO2 titration  Total Time stopped: 50 seconds    Stopped test before 6 minutes? No    Did the patient experience any pain or discomfort during the test? No    Oxygen Titration Required: Yes  Resting oxygen requirement: 4 Liters on Nasal Cannula  Exercise oxygen requirement: 6-8 Liters on Nasal Cannula    Patient demonstrated good knowledge and cooperation during walk test.     Performing Staff: ELIZA Gutierrez

## 2017-06-09 DIAGNOSIS — J84.9 ILD (INTERSTITIAL LUNG DISEASE) (H): Primary | ICD-10-CM

## 2017-06-13 ENCOUNTER — HOSPITAL ENCOUNTER (OUTPATIENT)
Dept: CARDIAC REHAB | Facility: CLINIC | Age: 42
End: 2017-06-13
Attending: INTERNAL MEDICINE
Payer: COMMERCIAL

## 2017-06-13 VITALS — BODY MASS INDEX: 21.76 KG/M2 | WEIGHT: 119 LBS

## 2017-06-13 DIAGNOSIS — G47.9 DISTURBANCE IN SLEEP BEHAVIOR: ICD-10-CM

## 2017-06-13 PROCEDURE — 40000244 ZZH STATISTIC VISIT PULM REHAB

## 2017-06-13 PROCEDURE — G0239 OTH RESP PROC, GROUP: HCPCS

## 2017-06-13 NOTE — TELEPHONE ENCOUNTER
alprazolam  Last Written Prescription Date: 6/5/17  Last Fill Quantity: 40,  # refills: 0   Last Office Visit with [facility], [facility] or [facility] prescribing provider: 5/22/17    [facility]  [facility]  [phone]

## 2017-06-14 RX ORDER — ALPRAZOLAM 0.5 MG
TABLET ORAL
Qty: 40 TABLET | Refills: 0 | Status: SHIPPED | OUTPATIENT
Start: 2017-06-14 | End: 2017-06-22

## 2017-06-16 DIAGNOSIS — R07.81 PLEURITIC CHEST PAIN: ICD-10-CM

## 2017-06-16 NOTE — TELEPHONE ENCOUNTER
percocet  Last Written Prescription Date: 6/5/17  Last Fill Quantity: 60,  # refills: 0   Last Office Visit with [facility], [facility] or [facility] prescribing provider: 5/22/17      María Subramanian  [facility].  [facility]  [phone]

## 2017-06-19 RX ORDER — OXYCODONE AND ACETAMINOPHEN 10; 325 MG/1; MG/1
1 TABLET ORAL EVERY 6 HOURS PRN
Qty: 60 TABLET | Refills: 0 | Status: SHIPPED | OUTPATIENT
Start: 2017-06-19 | End: 2017-06-29

## 2017-06-21 ENCOUNTER — OFFICE VISIT (OUTPATIENT)
Dept: PULMONOLOGY | Facility: CLINIC | Age: 42
End: 2017-06-21
Attending: INTERNAL MEDICINE
Payer: COMMERCIAL

## 2017-06-21 VITALS
RESPIRATION RATE: 18 BRPM | DIASTOLIC BLOOD PRESSURE: 71 MMHG | HEIGHT: 62 IN | TEMPERATURE: 97.9 F | BODY MASS INDEX: 21.9 KG/M2 | HEART RATE: 52 BPM | WEIGHT: 119 LBS | OXYGEN SATURATION: 93 % | SYSTOLIC BLOOD PRESSURE: 122 MMHG

## 2017-06-21 DIAGNOSIS — J84.9 ILD (INTERSTITIAL LUNG DISEASE) (H): ICD-10-CM

## 2017-06-21 DIAGNOSIS — J84.9 ILD (INTERSTITIAL LUNG DISEASE) (H): Primary | ICD-10-CM

## 2017-06-21 LAB
ALBUMIN SERPL-MCNC: 3.4 G/DL (ref 3.4–5)
ALP SERPL-CCNC: 70 U/L (ref 40–150)
ALT SERPL W P-5'-P-CCNC: 12 U/L (ref 0–50)
ANION GAP SERPL CALCULATED.3IONS-SCNC: 5 MMOL/L (ref 3–14)
AST SERPL W P-5'-P-CCNC: 19 U/L (ref 0–45)
BASOPHILS # BLD AUTO: 0.1 10E9/L (ref 0–0.2)
BASOPHILS NFR BLD AUTO: 0.7 %
BILIRUB SERPL-MCNC: 0.4 MG/DL (ref 0.2–1.3)
BUN SERPL-MCNC: 6 MG/DL (ref 7–30)
CALCIUM SERPL-MCNC: 9 MG/DL (ref 8.5–10.1)
CHLORIDE SERPL-SCNC: 102 MMOL/L (ref 94–109)
CO2 SERPL-SCNC: 30 MMOL/L (ref 20–32)
CREAT SERPL-MCNC: 0.67 MG/DL (ref 0.52–1.04)
DIFFERENTIAL METHOD BLD: ABNORMAL
EOSINOPHIL # BLD AUTO: 0.4 10E9/L (ref 0–0.7)
EOSINOPHIL NFR BLD AUTO: 4 %
ERYTHROCYTE [DISTWIDTH] IN BLOOD BY AUTOMATED COUNT: 13.5 % (ref 10–15)
GFR SERPL CREATININE-BSD FRML MDRD: ABNORMAL ML/MIN/1.7M2
GLUCOSE SERPL-MCNC: 77 MG/DL (ref 70–99)
HCT VFR BLD AUTO: 40.7 % (ref 35–47)
HGB BLD-MCNC: 12.6 G/DL (ref 11.7–15.7)
IMM GRANULOCYTES # BLD: 0.1 10E9/L (ref 0–0.4)
IMM GRANULOCYTES NFR BLD: 0.5 %
LYMPHOCYTES # BLD AUTO: 2.1 10E9/L (ref 0.8–5.3)
LYMPHOCYTES NFR BLD AUTO: 20.2 %
MCH RBC QN AUTO: 28.1 PG (ref 26.5–33)
MCHC RBC AUTO-ENTMCNC: 31 G/DL (ref 31.5–36.5)
MCV RBC AUTO: 91 FL (ref 78–100)
MONOCYTES # BLD AUTO: 0.5 10E9/L (ref 0–1.3)
MONOCYTES NFR BLD AUTO: 4.9 %
NEUTROPHILS # BLD AUTO: 7.4 10E9/L (ref 1.6–8.3)
NEUTROPHILS NFR BLD AUTO: 69.7 %
NRBC # BLD AUTO: 0 10*3/UL
NRBC BLD AUTO-RTO: 0 /100
PLATELET # BLD AUTO: 334 10E9/L (ref 150–450)
POTASSIUM SERPL-SCNC: 3.7 MMOL/L (ref 3.4–5.3)
PROT SERPL-MCNC: 7.8 G/DL (ref 6.8–8.8)
RBC # BLD AUTO: 4.49 10E12/L (ref 3.8–5.2)
SODIUM SERPL-SCNC: 138 MMOL/L (ref 133–144)
WBC # BLD AUTO: 10.6 10E9/L (ref 4–11)

## 2017-06-21 PROCEDURE — 85025 COMPLETE CBC W/AUTO DIFF WBC: CPT | Performed by: INTERNAL MEDICINE

## 2017-06-21 PROCEDURE — 80053 COMPREHEN METABOLIC PANEL: CPT | Performed by: INTERNAL MEDICINE

## 2017-06-21 PROCEDURE — 36415 COLL VENOUS BLD VENIPUNCTURE: CPT | Performed by: INTERNAL MEDICINE

## 2017-06-21 PROCEDURE — 99212 OFFICE O/P EST SF 10 MIN: CPT | Mod: ZF

## 2017-06-21 RX ORDER — FLUTICASONE PROPIONATE 220 UG/1
2 AEROSOL, METERED RESPIRATORY (INHALATION) 2 TIMES DAILY
Qty: 1 INHALER | Refills: 3 | Status: SHIPPED | OUTPATIENT
Start: 2017-06-21 | End: 2017-11-20

## 2017-06-21 RX ORDER — MYCOPHENOLATE MOFETIL 250 MG/1
500 CAPSULE ORAL 2 TIMES DAILY
Qty: 60 CAPSULE | Refills: 3 | Status: SHIPPED | OUTPATIENT
Start: 2017-06-21 | End: 2017-09-02

## 2017-06-21 ASSESSMENT — PAIN SCALES - GENERAL: PAINLEVEL: NO PAIN (0)
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
## 2017-06-21 NOTE — NURSING NOTE
Chief Complaint   Patient presents with     Interstitial Lung Disease (ILD)     Follow up visit on Ellen and her ILD     Stan Le CMA at 8:28 AM on 6/21/2017
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
## 2017-06-21 NOTE — PATIENT INSTRUCTIONS
"Pulmonary Rehab:  Continue pulmonary rehab    Oxygen use:  Using 3-4 at rest, 6-8 with activity      5' 2\" 119 lbs 0 oz Body mass index is 21.77 kg/(m^2).  Goal BMI greater than 18, less than 30 to be eligible for lung transplant    Medication changes: start flovent inhaler, continue Spiriva.  Start Cellcept (mycophenolate mofetil) after lab results are reviewed and confirmed.  Labs should be repeated monthly.  Cellcept dose may be increased after repeat labs are reviewed in 1 month  Try saline rinses for sinus symptoms    Stop nicotine gum on behalf of lung transplant consideration.  Contact your coordinator if you want to attend a lung transplant support group.    Follow-up plans: 3 months.    Thank-you for allowing us to participate in your care.    If your condition should change please contact your transplant coordinator.     Thoracic Transplant Office phone [phone], fax [phone]  Office Hours 8:30 - 5:00       "

## 2017-06-22 ENCOUNTER — TELEPHONE (OUTPATIENT)
Dept: TRANSPLANT | Facility: CLINIC | Age: 42
End: 2017-06-22

## 2017-06-22 DIAGNOSIS — G47.9 DISTURBANCE IN SLEEP BEHAVIOR: ICD-10-CM

## 2017-06-22 NOTE — TELEPHONE ENCOUNTER
Contacted Ellen to confirm that her labs were reviewed with Dr. Upton.  They are normal and he would like her to start the Cellcept as prescribed.  Reminded Ellen that she should repeat labs in 1 month, order is in Epic.  Requested phone call at that time to discuss increasing Cellcept dose.   Ellen asked about side effects.  Confirmed that most common complaint is GI symptoms but that it is usually fairly well tolerated.  Requested phone call with any symptoms or questions.  She agreed.

## 2017-06-23 RX ORDER — ALPRAZOLAM 0.5 MG
TABLET ORAL
Qty: 40 TABLET | Refills: 0 | Status: SHIPPED | OUTPATIENT
Start: 2017-06-23 | End: 2017-06-29

## 2017-06-23 NOTE — TELEPHONE ENCOUNTER
Alprazolam      Last Written Prescription Date: 6/14/17  Last Fill Quantity: 40,  # refills: 0   Last Office Visit with [facility], [facility] or [facility] prescribing provider: 6/13/17

## 2017-06-28 ENCOUNTER — OFFICE VISIT (OUTPATIENT)
Dept: FAMILY MEDICINE | Facility: CLINIC | Age: 42
End: 2017-06-28
Payer: COMMERCIAL

## 2017-06-28 VITALS
WEIGHT: 120 LBS | HEART RATE: 74 BPM | DIASTOLIC BLOOD PRESSURE: 60 MMHG | TEMPERATURE: 96.7 F | RESPIRATION RATE: 24 BRPM | SYSTOLIC BLOOD PRESSURE: 120 MMHG | OXYGEN SATURATION: 100 % | BODY MASS INDEX: 21.95 KG/M2

## 2017-06-28 DIAGNOSIS — J20.9 ACUTE BRONCHITIS, UNSPECIFIED ORGANISM: Primary | ICD-10-CM

## 2017-06-28 DIAGNOSIS — M19.90 INFLAMMATORY ARTHRITIS: ICD-10-CM

## 2017-06-28 DIAGNOSIS — N76.0 VAGINITIS AND VULVOVAGINITIS: ICD-10-CM

## 2017-06-28 DIAGNOSIS — I27.20 PULMONARY HYPERTENSION (H): ICD-10-CM

## 2017-06-28 DIAGNOSIS — J84.10 FIBROSIS OF LUNG (H): ICD-10-CM

## 2017-06-28 PROCEDURE — 99214 OFFICE O/P EST MOD 30 MIN: CPT | Performed by: FAMILY MEDICINE

## 2017-06-28 RX ORDER — FLUCONAZOLE 150 MG/1
150 TABLET ORAL ONCE
Qty: 1 TABLET | Refills: 1 | Status: SHIPPED | OUTPATIENT
Start: 2017-06-28 | End: 2017-06-28

## 2017-06-28 RX ORDER — MORPHINE SULFATE 15 MG/1
15 TABLET, FILM COATED, EXTENDED RELEASE ORAL 2 TIMES DAILY
Qty: 60 TABLET | Refills: 0 | Status: SHIPPED | OUTPATIENT
Start: 2017-06-28 | End: 2017-07-26
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
## 2017-06-28 NOTE — NURSING NOTE
"Chief Complaint   Patient presents with     Cough       Initial /60  Pulse 74  Temp 96.7  F (35.9  C) (Temporal)  Resp 24  Wt 120 lb (54.4 kg)  SpO2 100%  BMI 21.95 kg/m2 Estimated body mass index is 21.95 kg/(m^2) as calculated from the following:    Height as of 6/21/17: 5' 2\" (1.575 m).    Weight as of this encounter: 120 lb (54.4 kg).  Medication Reconciliation: complete    "

## 2017-06-28 NOTE — MR AVS SNAPSHOT
After Visit Summary   6/28/2017    Ellen Loya    MRN: [id number]           Patient Information     Date Of Birth          1975        Visit Information        Provider Department      6/28/2017 9:00 AM Ignacio Loya MD [facility]        Today's Diagnoses     Acute bronchitis, unspecified organism    -  1    Pulmonary hypertension (H)        Fibrosis of lung (H)        Vaginitis and vulvovaginitis        Inflammatory arthritis           Follow-ups after your visit        Your next 10 appointments already scheduled     Jun 29, 2017 11:00 AM CDT   Pulmonary Treatment with LEIZA Gutierrez   [facility] ([facility])    [address] [place] [address]   [phone]            Jul 17, 2017  9:00 AM CDT   Lab with  LAB   [facility] ([facility])    [address] [place] [id number]   [phone]            Jul 17, 2017  9:30 AM CDT   (Arrive by 9:15 AM)   RETURN PRIMARY PULMONARY with PURA Mace [facility] ([facility])    [address] [place] [address]   [phone]            Aug 25, 2017 10:30 AM CDT   (Arrive by 10:15 AM)   Return Visit with Clarita Martínez MD   [facility] ([facility])    [address] [place] [address]   [phone]            Sep 20, 2017 10:30 AM CDT   PFT VISIT with  PFL [facility] Pulmonary Function Testing ([facility])    [address] [place] [address]   [phone]            Sep 20, 2017 11:00 AM CDT   (Arrive by 10:45 AM)   Return Interstitial Lung with Dawson Upton MD   [facility] ([facility])    [address] [place] [address]   [phone]               Who to contact     If you have questions or need follow up information about today's clinic visit or your schedule please contact [facility] directly at [phone].  Normal or non-critical lab and imaging results will be communicated to you by Tellpehart, letter or phone within 4 business days after the clinic has received the results. If you do not hear from us within 7 days, please contact the clinic through Tellpehart or phone. If you have a critical or abnormal lab result, we will notify you by phone as soon as possible.  Submit refill requests through Medicago or call your pharmacy and they will forward the refill request to us. Please allow 3 business days for your refill to be completed.          Additional Information About Your Visit        Medicago Information     Medicago gives you secure access to your electronic health record. If you see a primary care provider, you can also send messages to your care team and make appointments. If you have questions, please call your primary care clinic.  If you do not have a primary care provider, please call [phone] and they will assist you.        Care EveryWhere ID     This is your Care EveryWhere ID. This could be used by other organizations to access your [facility] medical records  [id number]        Your Vitals Were     Pulse Temperature Respirations Pulse Oximetry BMI (Body Mass Index)       74 96.7  F (35.9  C) (Temporal) 24 100% 21.95 kg/m2        Blood Pressure from Last 3 Encounters:   06/28/17 120/60   06/21/17 122/71   05/22/17 104/56    Weight from Last 3 Encounters:   06/28/17 120 lb (54.4 kg)   06/21/17 119 lb (54 kg)   06/13/17 119 lb (54 kg)              Today, you had the following     No orders found for display         Today's Medication Changes          These changes are accurate as of: 6/28/17 10:44 AM.  If you have any questions, ask your nurse or doctor.               Start taking these medicines.        Dose/Directions     amoxicillin-clavulanate 875-125 MG per tablet   Commonly known as:  AUGMENTIN   Used for:  Acute bronchitis, unspecified organism   Started by:  Ignacio Loya MD        Dose:  1 tablet   Take 1 tablet by mouth 2 times daily   Quantity:  20 tablet   Refills:  0       fluconazole 150 MG tablet   Commonly known as:  DIFLUCAN   Used for:  Vaginitis and vulvovaginitis   Started by:  Ignacio Loya MD        Dose:  150 mg   Take 1 tablet (150 mg) by mouth once for 1 dose   Quantity:  1 tablet   Refills:  1            Where to get your medicines      These medications were sent to [facility] [place] [facility] [address]   [address] [place] [facility] [address]     Phone:  [phone]     amoxicillin-clavulanate 875-125 MG per tablet    fluconazole 150 MG tablet         Some of these will need a paper prescription and others can be bought over the counter.  Ask your nurse if you have questions.     Bring a paper prescription for each of these medications     morphine 15 MG 12 hr tablet                Primary Care Provider Office Phone # Fax #    Ignacio Loya [phone]       [place] [id number] [place] [address]        Equal Access to Services     KERRY SPENCER AH: Hadii aad ku hadasho Soomaali, waaxda luqadaha, qaybta kaalmada adeegyada, waxay alex haymackenzien gianna hannah. So [facility] [phone].    ATENCIÓN: Si habla español, tiene a bassett disposición servicios gratuitos de asistencia lingüística. Llame al [phone].    We comply with applicable federal civil rights laws and [place] laws. We do not discriminate on the basis of race, color, national origin, age, disability sex, sexual orientation or gender identity.            Thank you!     Thank you for choosing [facility]  for your care. Our goal is always to provide you with excellent care. Hearing back from our patients is one way we can continue to improve our  services. Please take a few minutes to complete the written survey that you may receive in the mail after your visit with us. Thank you!             Your Updated Medication List - Protect others around you: Learn how to safely use, store and throw away your medicines at www.disposemymeds.org.          This list is accurate as of: 6/28/17 10:44 AM.  Always use your most recent med list.                   Brand Name Dispense Instructions for use Diagnosis    * albuterol (2.5 MG/3ML) 0.083% neb solution     60 vial    Use every 4-6 hours as needed for shortness of breath    Pleuritic chest pain, Acute bronchitis, unspecified organism       * albuterol 108 (90 BASE) MCG/ACT Inhaler    albuterol    1 Inhaler    Inhale 1-2 puffs into the lungs every 4 hours as needed for shortness of breath / dyspnea    SOB (shortness of breath)       ALPRAZolam 0.5 MG tablet    XANAX    40 tablet    TAKE 1 TABLET BY MOUTH EVERY SIX HOURS AS NEEDED FOR ANXIETY.    Disturbance in sleep behavior       amoxicillin-clavulanate 875-125 MG per tablet    AUGMENTIN    20 tablet    Take 1 tablet by mouth 2 times daily    Acute bronchitis, unspecified organism       digoxin 125 MCG tablet    LANOXIN    90 tablet    Take 1 tablet (125 mcg) by mouth daily    Pulmonary hypertension (H)       fluconazole 150 MG tablet    DIFLUCAN    1 tablet    Take 1 tablet (150 mg) by mouth once for 1 dose    Vaginitis and vulvovaginitis       fluticasone 220 MCG/ACT Inhaler    FLOVENT HFA    1 Inhaler    Inhale 2 puffs into the lungs 2 times daily    ILD (interstitial lung disease) (H)       fluticasone 50 MCG/ACT spray    FLONASE    1 Bottle    Spray 2 sprays into both nostrils daily    Nasal congestion       furosemide 20 MG tablet    LASIX    180 tablet    Take 2 tablets (40 mg) by mouth daily    Pulmonary hypertension (H)       morphine 15 MG 12 hr tablet    MS CONTIN    60 tablet    Take 1 tablet (15 mg) by mouth 2 times daily    Inflammatory arthritis        mycophenolate capsule     60 capsule    Take 2 capsules (500 mg) by mouth 2 times daily    ILD (interstitial lung disease) (H)       NEXIUM PO      Take 40 mg by mouth every morning (before breakfast)    Pulmonary hypertension (H)       * order for DME     1 Device    Equipment being ordered: personal O2 oximeter.    Hypoxia       * order for DME     1 Device    Equipment being ordered: Oxygen  Please provide patient with a home-fill system for portability.    ILD (interstitial lung disease) (H), Hypoxia       * order for DME     2 Device    Please provide patient with 2  liquid oxygen tanks for portability. Spot check patient on liquid oxygen. Titrate oxygen to maintain saturations above 88%.    ILD (interstitial lung disease) (H)       * order for DME     1 Device    Equipment being ordered: Oxygen oximeter and mask    ILD (interstitial lung disease) (H), Lung nodule, Fibrosis of lung (H), Pulmonary hypertension (H)       * order for DME     1 Device    Equipment being ordered: Oximeter    ILD (interstitial lung disease) (H)       * order for DME     1 Device    Equipment being ordered: Nebulizer. Verbal order from     Acute bronchitis, unspecified organism       * order for DME     1 Device    Oxygen: Patient requires supplemental Oxygen 4 LPM via nasal canula at rest and 6 LPM with activity. Please provide patient with portability capability. Okay to spot check patient on oxygen device, to keep sats above 90%. Please provider with home concentrator that can go up to 10 LPM. Oxygen will be for a lifetime.    Hypoxia       * order for DME     1 Device    Equipment being ordered: Oxygen 8 liters continuous at rest and 8 liters continuous with activity. Needs portability.  Please provide concentrator that goes to 10 liters.    ILD (interstitial lung disease) (H), Hypoxia       * order for DME     1 Device    Please provide patient with updated oxygen equipment.  Patient requires 3 to 4 LPM continuous flow  while driving or sitting and 8 LPM continuous flow with all other activity.    ILD (interstitial lung disease) (H)       oxyCODONE-acetaminophen  MG per tablet    PERCOCET    60 tablet    Take 1 tablet by mouth every 6 hours as needed for moderate to severe pain    Pleuritic chest pain       tadalafil (PAH) 20 MG Tabs    ADCIRCA    60 tablet    Take 2 tablets (40 mg) by mouth daily    Pulmonary hypertension (H)       tiotropium 18 MCG capsule    SPIRIVA HANDIHALER    30 capsule    Inhale contents of one capsule daily.    ILD (interstitial lung disease) (H)       * Notice:  This list has 11 medication(s) that are the same as other medications prescribed for you. Read the directions carefully, and ask your doctor or other care provider to review them with you.

## 2017-06-29 DIAGNOSIS — G47.9 DISTURBANCE IN SLEEP BEHAVIOR: ICD-10-CM

## 2017-06-29 DIAGNOSIS — R07.81 PLEURITIC CHEST PAIN: ICD-10-CM

## 2017-06-29 RX ORDER — OXYCODONE AND ACETAMINOPHEN 10; 325 MG/1; MG/1
1 TABLET ORAL EVERY 6 HOURS PRN
Qty: 60 TABLET | Refills: 0 | Status: SHIPPED | OUTPATIENT
Start: 2017-07-03 | End: 2017-07-13

## 2017-06-29 RX ORDER — ALPRAZOLAM 0.5 MG
TABLET ORAL
Qty: 40 TABLET | Refills: 0 | Status: SHIPPED | OUTPATIENT
Start: 2017-07-03 | End: 2017-07-11

## 2017-06-29 NOTE — TELEPHONE ENCOUNTER
Drug:Alprazolam   Last Fill Date: 06/23/2017  Last Fill Quantity: 40    Percocet       Last Written Prescription Date: 06/19/2017  Last Fill Quantity: 60,  # refills: 0   Last Office Visit with [facility], [facility] or [facility] prescribing provider: 06/28/2017    Thanks  Rosalie Bolanos [facility]   [phone]

## 2017-07-11 DIAGNOSIS — G47.9 DISTURBANCE IN SLEEP BEHAVIOR: ICD-10-CM

## 2017-07-11 DIAGNOSIS — J84.9 ILD (INTERSTITIAL LUNG DISEASE) (H): ICD-10-CM

## 2017-07-11 NOTE — TELEPHONE ENCOUNTER
sprivia  Last Written Prescription Date:  3/10/17  Last Fill Quantity: 30, # refills: 1    Last Office Visit with [facility], [facility] or  Health prescribing provider:  6/28/17   Future Office Visit:       Date of Last Asthma Action Plan Letter:   There are no preventive care reminders to display for this patient.   Asthma Control Test: No flowsheet data found.    Date of Last Spirometry Test:   No results found for this or any previous visit.    alprazolam  Last Written Prescription Date: 6/29/17  Last Fill Quantity: 40,  # refills: 0   Last Office Visit with [facility], [facility] or  Health prescribing provider: 6/28/17

## 2017-07-12 RX ORDER — ALPRAZOLAM 0.5 MG
TABLET ORAL
Qty: 40 TABLET | Refills: 0 | Status: SHIPPED | OUTPATIENT
Start: 2017-07-12 | End: 2017-07-20

## 2017-07-12 RX ORDER — TIOTROPIUM BROMIDE 18 UG/1
CAPSULE ORAL; RESPIRATORY (INHALATION)
Qty: 30 CAPSULE | Refills: 1 | Status: SHIPPED | OUTPATIENT
Start: 2017-07-12 | End: 2018-09-10

## 2017-07-13 DIAGNOSIS — R07.81 PLEURITIC CHEST PAIN: ICD-10-CM

## 2017-07-13 RX ORDER — OXYCODONE AND ACETAMINOPHEN 10; 325 MG/1; MG/1
1 TABLET ORAL EVERY 6 HOURS PRN
Qty: 60 TABLET | Refills: 0 | Status: SHIPPED | OUTPATIENT
Start: 2017-07-13 | End: 2017-07-27

## 2017-07-13 NOTE — TELEPHONE ENCOUNTER
Percocet      Last Written Prescription Date: 6-29-17  Last Fill Quantity: 60,  # refills: 0   Last Office Visit with [facility], [facility] or [facility] prescribing provider: 6-28-17    Ramón Fung  Ph[facility]  On Behalf of [facility]

## 2017-07-13 NOTE — ADDENDUM NOTE
Encounter addended by: Martha Jenkins EP on: 7/13/2017  7:44 AM<BR>     Actions taken: Sign clinical note, Episode resolved

## 2017-07-13 NOTE — PROGRESS NOTES
Pulmonary rehabiltiation Discharge Summary    Reason for discharge:    Patient/family request discontinuation of services.    Progress towards goals:  Goals partially met.  Barriers to achieving goals:   discharge from facility.    Recommendation(s):    Patient with recent illness requests to hold exercise until sometime next month. Will discharge today and request new order with ready to return. At that time will perform a complete re-evaluation and assessment of patient needs and goals.

## 2017-07-20 DIAGNOSIS — G47.9 DISTURBANCE IN SLEEP BEHAVIOR: ICD-10-CM

## 2017-07-20 NOTE — TELEPHONE ENCOUNTER
xanax      Last Written Prescription Date: 07/1282017  Last Fill Quantity: 40,  # refills: 0   Last Office Visit with [facility], [facility] or [facility] prescribing provider: 06/28/2017    Thank You,  Pavan Stanley, Pharmacy [facility] [place]

## 2017-07-21 ENCOUNTER — PRE VISIT (OUTPATIENT)
Dept: CARDIOLOGY | Facility: CLINIC | Age: 42
End: 2017-07-21

## 2017-07-21 ENCOUNTER — HEALTH MAINTENANCE LETTER (OUTPATIENT)
Age: 42
End: 2017-07-21

## 2017-07-21 ENCOUNTER — OFFICE VISIT (OUTPATIENT)
Dept: CARDIOLOGY | Facility: CLINIC | Age: 42
End: 2017-07-21
Attending: INTERNAL MEDICINE
Payer: COMMERCIAL

## 2017-07-21 VITALS
HEIGHT: 62 IN | HEART RATE: 54 BPM | DIASTOLIC BLOOD PRESSURE: 72 MMHG | WEIGHT: 119.3 LBS | BODY MASS INDEX: 21.95 KG/M2 | SYSTOLIC BLOOD PRESSURE: 119 MMHG | OXYGEN SATURATION: 100 %

## 2017-07-21 DIAGNOSIS — R06.09 DYSPNEA ON EXERTION: ICD-10-CM

## 2017-07-21 DIAGNOSIS — I27.20 PULMONARY HYPERTENSION (H): ICD-10-CM

## 2017-07-21 DIAGNOSIS — J84.9 ILD (INTERSTITIAL LUNG DISEASE) (H): ICD-10-CM

## 2017-07-21 DIAGNOSIS — I27.20 PULMONARY HYPERTENSION (H): Primary | ICD-10-CM

## 2017-07-21 LAB
6 MIN WALK (FT): 1120 FT
6 MIN WALK (M): 341 M
ALBUMIN SERPL-MCNC: 3.5 G/DL (ref 3.4–5)
ALP SERPL-CCNC: 69 U/L (ref 40–150)
ALT SERPL W P-5'-P-CCNC: 10 U/L (ref 0–50)
ANION GAP SERPL CALCULATED.3IONS-SCNC: 6 MMOL/L (ref 3–14)
AST SERPL W P-5'-P-CCNC: 11 U/L (ref 0–45)
BILIRUB SERPL-MCNC: 0.4 MG/DL (ref 0.2–1.3)
BUN SERPL-MCNC: 10 MG/DL (ref 7–30)
CALCIUM SERPL-MCNC: 9.2 MG/DL (ref 8.5–10.1)
CHLORIDE SERPL-SCNC: 100 MMOL/L (ref 94–109)
CO2 SERPL-SCNC: 29 MMOL/L (ref 20–32)
CREAT SERPL-MCNC: 0.68 MG/DL (ref 0.52–1.04)
ERYTHROCYTE [DISTWIDTH] IN BLOOD BY AUTOMATED COUNT: 15.2 % (ref 10–15)
GFR SERPL CREATININE-BSD FRML MDRD: NORMAL ML/MIN/1.7M2
GLUCOSE SERPL-MCNC: 75 MG/DL (ref 70–99)
HCT VFR BLD AUTO: 41.2 % (ref 35–47)
HGB BLD-MCNC: 13.2 G/DL (ref 11.7–15.7)
MCH RBC QN AUTO: 28.6 PG (ref 26.5–33)
MCHC RBC AUTO-ENTMCNC: 32 G/DL (ref 31.5–36.5)
MCV RBC AUTO: 89 FL (ref 78–100)
NT-PROBNP SERPL-MCNC: 254 PG/ML (ref 0–125)
PLATELET # BLD AUTO: 328 10E9/L (ref 150–450)
POTASSIUM SERPL-SCNC: 4.1 MMOL/L (ref 3.4–5.3)
PROT SERPL-MCNC: 8 G/DL (ref 6.8–8.8)
RBC # BLD AUTO: 4.62 10E12/L (ref 3.8–5.2)
SODIUM SERPL-SCNC: 135 MMOL/L (ref 133–144)
WBC # BLD AUTO: 9.1 10E9/L (ref 4–11)

## 2017-07-21 PROCEDURE — 85027 COMPLETE CBC AUTOMATED: CPT | Performed by: INTERNAL MEDICINE

## 2017-07-21 PROCEDURE — 99212 OFFICE O/P EST SF 10 MIN: CPT | Mod: ZF

## 2017-07-21 PROCEDURE — 36415 COLL VENOUS BLD VENIPUNCTURE: CPT | Performed by: INTERNAL MEDICINE

## 2017-07-21 PROCEDURE — 83880 ASSAY OF NATRIURETIC PEPTIDE: CPT | Performed by: INTERNAL MEDICINE

## 2017-07-21 PROCEDURE — 99215 OFFICE O/P EST HI 40 MIN: CPT | Mod: ZP | Performed by: INTERNAL MEDICINE

## 2017-07-21 RX ORDER — ALPRAZOLAM 0.5 MG
TABLET ORAL
Qty: 40 TABLET | Refills: 0 | Status: SHIPPED | OUTPATIENT
Start: 2017-07-21 | End: 2017-07-27

## 2017-07-21 ASSESSMENT — PAIN SCALES - GENERAL: PAINLEVEL: NO PAIN (0)

## 2017-07-21 NOTE — NURSING NOTE
Chief Complaint   Patient presents with     Follow Up For     Return for PH F/U with Labs prior.     Vitals were taken and medications were reconciled.    Justine Carroll CMA     9:20 AM

## 2017-07-21 NOTE — LETTER
7/21/2017      RE: Ellen Loya  [address]  [facility] [address]         Dear Dr. Xiong and Dr. Martínez:      We had the pleasure seeing Ms. Ellen Loya for followup in our pulmonary hypertension Clinic.  As you know, she is a 41-year-old female with polymyositis, interstitial lung disease, and pulmonary arterial hypertension.  She is currently on Adcirca.  She recently came off of her Tyvaso, as she had worsening symptoms due to VQ mismatch and hypoxia.    Ms. Loya has been off of Tyvaso for the last 2-1/2 months.  She states that she feels significantly better.  Her exercise capacity has improved, and she is able to do more.  Her shortness of breath has significantly improved.  She is using her oxygen more religiously at 3 liters at rest and 6 liters with exercise.  She has been watching her diet very closely.  She has not had any worsening lower extremity swelling or abdominal distention.  Her weight has been stable.  She has not had any chest pain or pressure.  She has not had any lightheadedness, dizziness, or syncope.  She has been taking her Adcirca 40 mg daily.  She is not smoking but is chewing gum.  She knows that this is a contraindication for transplant; however, she states that she is not interested interested in lung transplant at present.     REVIEW OF SYSTEMS:  A detailed 10-point review of system was obtained and as described in history of present illness.  All other systems reviewed are negative.    PAST MEDICAL HISTORY:    1.  Polymyositis and dermatomyositis.  2.  Interstitial lung disease.  3.  Pulmonary arterial hypertension.  4.   Arthritis.      CURRENT MEDICATIONS:  Adcirca 40 mg daily.  Digoxin 125 mcg daily.  Lasix 40 mg daily.  She was on CellCept which she discontinued.  Alprazolam for anxiety as needed.  MS Contin 15 mg twice a day for pain.  Flovent inhaler twice a day.  As-needed Spiriva inhaler daily.  As-needed albuterol inhaler.    PHYSICAL EXAMINATION:    She was  comfortable.  She was in no apparent distress. Her blood pressure was 119/72, pulse rate was 54, and respiratory rate was 18.  She was saturating 100% on 3 liters of nasal cannula.  Her weight was stable at 119 pounds.  She was 5 feet 2 inches tall.  Her BMI was 21.8.  he had no pallor, cyanosis, or jaundice.  Neck exam not reveal any JVD.  She had no lower extremity edema.  Her carotids were 2+.  Cardiac auscultation revealed normal S1 and a loud P2.  She had no murmur, rub, or gallop.  Auscultation of the lungs revealed  equal air entry on both sides with bilateral inspiratory crepitations.  Her abdomen was soft with normal bowel sounds.  She had no tenderness, rigidity, or guarding.    DIAGNOSTICS:      CBC RESULTS:   Recent Labs   Lab Test  07/21/17 0913   WBC  9.1   RBC  4.62   HGB  13.2   HCT  41.2   MCV  89   MCH  28.6   MCHC  32.0   RDW  15.2*   PLT  328     Recent Labs   Lab Test  07/21/17 0913 06/21/17   0929   NA  135  138   POTASSIUM  4.1  3.7   CHLORIDE  100  102   CO2  29  30   ANIONGAP  6  5   GLC  75  77   BUN  10  6*   CR  0.68  0.67   MARCIN  9.2  9.0     Liver Function Studies -   Recent Labs   Lab Test  07/21/17 0913   PROTTOTAL  8.0   ALBUMIN  3.5   BILITOTAL  0.4   ALKPHOS  69   AST  11   ALT  10     NT-proBNP was significantly improved to 54.    She had a 6-minute walk test today where she walked for 341 meters which is significantly improved when compared to her last 6-minute walk test.  On 8 liters of oxygen, her lowest oxygen saturation was 88% briefly.    Her last echocardiogram from April showed mild RV dilatation with mildly reduced RV function.  Her TAPSE was 18 cm and S' was 9 cm.  She had normal left ventricular size and function.    Her last right heart catheterization from 04/2017 showed an RA pressure of 4, PA pressure of 60/20, with a mean of 32.  Thermodilution cardiac output was 5.1 with an index of 3.1, and PVR of 4.6 ANGLIN.  This was on Adcirca 40 mg daily and inhaled Tyvaso  3 breaths 4 times a day.      Assessment and plan:    Ellen Loya is a pleasant 41-year-old female with past medical history significant for polymyositis, interstitial lung disease, and pulmonary arterial hypertension who is currently on Adcirca monotherapy 40 mg daily.  She did not tolerate Tyvaso due to worsening VQ mismatch and hypoxia.    Ms Loya is feeling significantly better off of Tyvaso.  She is currently functional class 3.  She has no evidence of decompensated heart failure.  I believe that she is feeling better due to a combination of things which include discontinuing Tyvaso, watching her diet closely for salt and fluids, and also using oxygen more religiously.  I did not make any changes.  I recommended her to continue the Adcirca 40 mg daily, digoxin, and Lasix 40 mg daily.  Even with 8 liters of oxygen, she desaturated to 88%.  She is currently using 6 liters with exertion, and I have encouraged her to use 8 liters as it is important for her not to be hypoxic.    She was started on CellCept for her interstitial lung disease by Dr. Xiong.  She did not tolerate it.  She has self-discontinued.  She is scheduled to see Dr. Xiong next week.    She will return to clinic in 3 months.  At that time, she will have a repeat 6-minute walk test and labs.  She will call us in the interim for any worsening symptoms.  She is currently not interested in the lung transplantation.    Sincerely,  Callie Ledesma MD   [facility]ension  Heart Failure, Transplant, and Mechanical Circulatory Support Cardiology   Cardiovascular Division  [facility]   [phone]        Callie Ledesma MD    D:  07/27/2017 10:47 T:  07/27/2017 11:04  Document:  [id number] TT\SA    Callie Ledesma MD

## 2017-07-21 NOTE — TELEPHONE ENCOUNTER
Return Pulmonary Hypertension Patient Form       Patient Name: Ellen Loya Age: 41F [birth year] MRN: [id number]   Todays Provider: Callie    Appt: 7/21/2017             PH Medications: Adcirca, Tyvaso                 Todays Testing: Labs                  Patient Update: Pt was last seen on 5/22/17 by Dr. Ledesma, at that time we requested a 8 week follow up with 6mwt and labs.  We also gave her the information for the PHA support group and spoke to Dr. Upton regarding her Oxygen.    Pt has a history of pulmonary arterial hypertension in the setting of combined pulmonary fibrosis, emphysema and inflammatory arthritis.     Pt needs 6MWT today before she leaves.

## 2017-07-21 NOTE — LETTER
7/21/2017      RE: Ellen Loya  [address]  [facility] [address]       Dear Colleague,    Thank you for the opportunity to participate in the care of your patient, Ellen Loya, at the [facility]. Please see a copy of my visit note below.      Dear Dr. Xiong and Dr. Martínez:      We had the pleasure seeing Ms. Ellen Loya for followup in our pulmonary hypertension Clinic.  As you know, she is a 41-year-old female with polymyositis, interstitial lung disease, and pulmonary arterial hypertension.  She is currently on Adcirca.  She recently came off of her Tyvaso, as she had worsening symptoms due to VQ mismatch and hypoxia.    Ms. Loya has been off of Tyvaso for the last 2-1/2 months.  She states that she feels significantly better.  Her exercise capacity has improved, and she is able to do more.  Her shortness of breath has significantly improved.  She is using her oxygen more religiously at 3 liters at rest and 6 liters with exercise.  She has been watching her diet very closely.  She has not had any worsening lower extremity swelling or abdominal distention.  Her weight has been stable.  She has not had any chest pain or pressure.  She has not had any lightheadedness, dizziness, or syncope.  She has been taking her Adcirca 40 mg daily.  She is not smoking but is chewing gum.  She knows that this is a contraindication for transplant; however, she states that she is not interested interested in lung transplant at present.     REVIEW OF SYSTEMS:  A detailed 10-point review of system was obtained and as described in history of present illness.  All other systems reviewed are negative.    PAST MEDICAL HISTORY:    1.  Polymyositis and dermatomyositis.  2.  Interstitial lung disease.  3.  Pulmonary arterial hypertension.  4.   Arthritis.      CURRENT MEDICATIONS:  Adcirca 40 mg daily.  Digoxin 125 mcg daily.  Lasix 40 mg daily.  She was on  CellCept which she discontinued.  Alprazolam for anxiety as needed.  MS Contin 15 mg twice a day for pain.  Flovent inhaler twice a day.  As-needed Spiriva inhaler daily.  As-needed albuterol inhaler.    PHYSICAL EXAMINATION:    She was comfortable.  She was in no apparent distress. Her blood pressure was 119/72, pulse rate was 54, and respiratory rate was 18.  She was saturating 100% on 3 liters of nasal cannula.  Her weight was stable at 119 pounds.  She was 5 feet 2 inches tall.  Her BMI was 21.8.  he had no pallor, cyanosis, or jaundice.  Neck exam not reveal any JVD.  She had no lower extremity edema.  Her carotids were 2+.  Cardiac auscultation revealed normal S1 and a loud P2.  She had no murmur, rub, or gallop.  Auscultation of the lungs revealed  equal air entry on both sides with bilateral inspiratory crepitations.  Her abdomen was soft with normal bowel sounds.  She had no tenderness, rigidity, or guarding.    DIAGNOSTICS:      CBC RESULTS:   Recent Labs   Lab Test  07/21/17   0913   WBC  9.1   RBC  4.62   HGB  13.2   HCT  41.2   MCV  89   MCH  28.6   MCHC  32.0   RDW  15.2*   PLT  328     Recent Labs   Lab Test  07/21/17   0913  06/21/17   0929   NA  135  138   POTASSIUM  4.1  3.7   CHLORIDE  100  102   CO2  29  30   ANIONGAP  6  5   GLC  75  77   BUN  10  6*   CR  0.68  0.67   MARCIN  9.2  9.0     Liver Function Studies -   Recent Labs   Lab Test  07/21/17   0913   PROTTOTAL  8.0   ALBUMIN  3.5   BILITOTAL  0.4   ALKPHOS  69   AST  11   ALT  10     NT-proBNP was significantly improved to 54.    She had a 6-minute walk test today where she walked for 341 meters which is significantly improved when compared to her last 6-minute walk test.  On 8 liters of oxygen, her lowest oxygen saturation was 88% briefly.    Her last echocardiogram from April showed mild RV dilatation with mildly reduced RV function.  Her TAPSE was 18 cm and S' was 9 cm.  She had normal left ventricular size and function.    Her last right  heart catheterization from 04/2017 showed an RA pressure of 4, PA pressure of 60/20, with a mean of 32.  Thermodilution cardiac output was 5.1 with an index of 3.1, and PVR of 4.6 ANGLIN.  This was on Adcirca 40 mg daily and inhaled Tyvaso 3 breaths 4 times a day.      Assessment and plan:    Ellen Loya is a pleasant 41-year-old female with past medical history significant for polymyositis, interstitial lung disease, and pulmonary arterial hypertension who is currently on Adcirca monotherapy 40 mg daily.  She did not tolerate Tyvaso due to worsening VQ mismatch and hypoxia.    Ms Loya is feeling significantly better off of Tyvaso.  She is currently functional class 3.  She has no evidence of decompensated heart failure.  I believe that she is feeling better due to a combination of things which include discontinuing Tyvaso, watching her diet closely for salt and fluids, and also using oxygen more religiously.  I did not make any changes.  I recommended her to continue the Adcirca 40 mg daily, digoxin, and Lasix 40 mg daily.  Even with 8 liters of oxygen, she desaturated to 88%.  She is currently using 6 liters with exertion, and I have encouraged her to use 8 liters as it is important for her not to be hypoxic.    She was started on CellCept for her interstitial lung disease by Dr. Xiong.  She did not tolerate it.  She has self-discontinued.  She is scheduled to see Dr. Xiong next week.    She will return to clinic in 3 months.  At that time, she will have a repeat 6-minute walk test and labs.  She will call us in the interim for any worsening symptoms.  She is currently not interested in the lung transplantation.    Sincerely,  Callie Ledesma MD   Center for Pulmonary Hypertension  Heart Failure, Transplant, and Mechanical Circulatory Support Cardiology   Cardiovascular Division  [facility]   [phone]

## 2017-07-21 NOTE — PATIENT INSTRUCTIONS
Medication Changes:  No medication changes at this time. Please continue current medication regiment.     Patient Instructions:      Follow up Appointment Information:  3 months with Labs and 6 muinute walk test    We are located on the third floor of [facility] on the [facility].  Our address is     [address] [place] [address] on 3rd Floor   [place] [phone]      Thank you for allowing us to be a part of your care here at the [facility]    If you have questions or concerns please contact us at:    Naty Lau RN, BSN    Louis Jack ([facility]  Nurse Coordinator     Clinic   Pulmonary Hypertension   Pulmonary Hypertension  [facility]  (P)[phone]    (P) [phone]        (F)[phone]                ** Please note that you will NOT receive a reminder call regarding your scheduled testing, reminder calls are for provider appointments only.  If you are scheduled for testing within the [employer] system you may receive a call regarding pre-registration for billing purposes only.**     Remember to weigh yourself daily after voiding and before you consume any food or beverages and log the numbers.  If you have gained/lost 2 pounds overnight or 5 pounds in a week contact us immediately for medication adjustments or further instructions.  **Please call us immediately if you have any syncope, chest pain, edema, or decline in your functional status.

## 2017-07-21 NOTE — NURSING NOTE
Med Reconcile: Reviewed and verified all current medications with the patient. The updated medication list was printed and given to the patient.  Return Appointment: Patient given instructions regarding scheduling next clinic visit. Patient demonstrated understanding of this information and agreed to call with further questions or concerns.  Medication Change: Patient was educated regarding prescribed medication change, including discussion of the indication, administration, side effects, and when to report to MD or RN. Patient demonstrated understanding of this information and agreed to call with further questions or concerns.  Patient stated she understood all health information given and agreed to call with further questions or concerns.     Medication Changes:  No medication changes at this time. Please continue current medication regiment.     Patient Instructions:      Follow up Appointment Information:  3 months with Labs and 6 muinute walk test

## 2017-07-23 LAB — FIO2-PRE: 52 %

## 2017-07-25 ENCOUNTER — CARE COORDINATION (OUTPATIENT)
Dept: PULMONOLOGY | Facility: CLINIC | Age: 42
End: 2017-07-25

## 2017-07-25 NOTE — PROGRESS NOTES
Requested by Dr. Upton to contact patient regarding lab results.  CBC and CMP within normal range.  Patient informed of results.  Patient stated that she stopped taking Cellcept and will not be taking it anymore because she cannot tolerate the side effects.  Patient encouraged to continue Cellcept and offered reduced dose for a period of time, but she is quite adamant about not restarting. Will notify Alexsandra Mckenna.

## 2017-07-26 ENCOUNTER — CARE COORDINATION (OUTPATIENT)
Dept: PULMONOLOGY | Facility: CLINIC | Age: 42
End: 2017-07-26

## 2017-07-26 DIAGNOSIS — M19.90 INFLAMMATORY ARTHRITIS: ICD-10-CM

## 2017-07-26 DIAGNOSIS — J84.9 ILD (INTERSTITIAL LUNG DISEASE) (H): Primary | ICD-10-CM

## 2017-07-26 RX ORDER — AZATHIOPRINE 50 MG/1
TABLET ORAL
Qty: 90 TABLET | Refills: 3 | Status: SHIPPED | OUTPATIENT
Start: 2017-07-26 | End: 2018-05-16

## 2017-07-26 NOTE — PROGRESS NOTES
Contacted patient after discussing patient's decision to stop Cellcept with Dr. Upton.  Dr. Upton would like patient to start on Imuran 50 mg daily for two weeks, then 100 mg daily for 2 weeks, then maintenance dose of 150 mg daily.  Patient will need to have labs drawn and results okayed prior to starting prescription.  TMPT, CBC and CMP ordered.  Patient given information and while she was asking questions about Imuran call was dropped.  Attempted to call patient back on mobile phone numerous times but mailbox was not set up.  Left message on patient's home phone requesting a call back.

## 2017-07-26 NOTE — TELEPHONE ENCOUNTER
Morphine       Last Written Prescription Date: 06/28/2017  Last Fill Quantity: 60,  # refills: 0   Last Office Visit with [facility], [facility] or  Health prescribing provider: 06/28/2017    Thank You,  Pavan Stanley, Pharmacy [facility]

## 2017-07-27 DIAGNOSIS — G47.9 DISTURBANCE IN SLEEP BEHAVIOR: ICD-10-CM

## 2017-07-27 DIAGNOSIS — R07.81 PLEURITIC CHEST PAIN: ICD-10-CM

## 2017-07-27 RX ORDER — MORPHINE SULFATE 15 MG/1
15 TABLET, FILM COATED, EXTENDED RELEASE ORAL 2 TIMES DAILY
Qty: 60 TABLET | Refills: 0 | Status: SHIPPED | OUTPATIENT
Start: 2017-07-27 | End: 2017-08-23

## 2017-07-27 RX ORDER — ALPRAZOLAM 0.5 MG
TABLET ORAL
Qty: 40 TABLET | Refills: 0 | Status: SHIPPED | OUTPATIENT
Start: 2017-07-31 | End: 2017-08-08

## 2017-07-27 RX ORDER — OXYCODONE AND ACETAMINOPHEN 10; 325 MG/1; MG/1
1 TABLET ORAL EVERY 6 HOURS PRN
Qty: 60 TABLET | Refills: 0 | Status: SHIPPED | OUTPATIENT
Start: 2017-07-31 | End: 2017-08-10

## 2017-07-27 NOTE — PROGRESS NOTES
Dear Dr. Xiong and Dr. Martínez:      We had the pleasure seeing Ms. Ellen Loya for followup in our pulmonary hypertension Clinic.  As you know, she is a 41-year-old female with polymyositis, interstitial lung disease, and pulmonary arterial hypertension.  She is currently on Adcirca.  She recently came off of her Tyvaso, as she had worsening symptoms due to VQ mismatch and hypoxia.    Ms. Loya has been off of Tyvaso for the last 2-1/2 months.  She states that she feels significantly better.  Her exercise capacity has improved, and she is able to do more.  Her shortness of breath has significantly improved.  She is using her oxygen more religiously at 3 liters at rest and 6 liters with exercise.  She has been watching her diet very closely.  She has not had any worsening lower extremity swelling or abdominal distention.  Her weight has been stable.  She has not had any chest pain or pressure.  She has not had any lightheadedness, dizziness, or syncope.  She has been taking her Adcirca 40 mg daily.  She is not smoking but is chewing gum.  She knows that this is a contraindication for transplant; however, she states that she is not interested interested in lung transplant at present.     REVIEW OF SYSTEMS:  A detailed 10-point review of system was obtained and as described in history of present illness.  All other systems reviewed are negative.    PAST MEDICAL HISTORY:    1.  Polymyositis and dermatomyositis.  2.  Interstitial lung disease.  3.  Pulmonary arterial hypertension.  4.   Arthritis.      CURRENT MEDICATIONS:  Adcirca 40 mg daily.  Digoxin 125 mcg daily.  Lasix 40 mg daily.  She was on CellCept which she discontinued.  Alprazolam for anxiety as needed.  MS Contin 15 mg twice a day for pain.  Flovent inhaler twice a day.  As-needed Spiriva inhaler daily.  As-needed albuterol inhaler.    PHYSICAL EXAMINATION:    She was comfortable.  She was in no apparent distress. Her blood pressure was 119/72, pulse  rate was 54, and respiratory rate was 18.  She was saturating 100% on 3 liters of nasal cannula.  Her weight was stable at 119 pounds.  She was 5 feet 2 inches tall.  Her BMI was 21.8.  he had no pallor, cyanosis, or jaundice.  Neck exam not reveal any JVD.  She had no lower extremity edema.  Her carotids were 2+.  Cardiac auscultation revealed normal S1 and a loud P2.  She had no murmur, rub, or gallop.  Auscultation of the lungs revealed  equal air entry on both sides with bilateral inspiratory crepitations.  Her abdomen was soft with normal bowel sounds.  She had no tenderness, rigidity, or guarding.    DIAGNOSTICS:      CBC RESULTS:   Recent Labs   Lab Test  07/21/17 0913   WBC  9.1   RBC  4.62   HGB  13.2   HCT  41.2   MCV  89   MCH  28.6   MCHC  32.0   RDW  15.2*   PLT  328     Recent Labs   Lab Test  07/21/17 0913 06/21/17   0929   NA  135  138   POTASSIUM  4.1  3.7   CHLORIDE  100  102   CO2  29  30   ANIONGAP  6  5   GLC  75  77   BUN  10  6*   CR  0.68  0.67   MARCIN  9.2  9.0     Liver Function Studies -   Recent Labs   Lab Test  07/21/17 0913   PROTTOTAL  8.0   ALBUMIN  3.5   BILITOTAL  0.4   ALKPHOS  69   AST  11   ALT  10     NT-proBNP was significantly improved to 54.    She had a 6-minute walk test today where she walked for 341 meters which is significantly improved when compared to her last 6-minute walk test.  On 8 liters of oxygen, her lowest oxygen saturation was 88% briefly.    Her last echocardiogram from April showed mild RV dilatation with mildly reduced RV function.  Her TAPSE was 18 cm and S' was 9 cm.  She had normal left ventricular size and function.    Her last right heart catheterization from 04/2017 showed an RA pressure of 4, PA pressure of 60/20, with a mean of 32.  Thermodilution cardiac output was 5.1 with an index of 3.1, and PVR of 4.6 ANGLIN.  This was on Adcirca 40 mg daily and inhaled Tyvaso 3 breaths 4 times a day.      Assessment and plan:    Ellen Loya is a pleasant  41-year-old female with past medical history significant for polymyositis, interstitial lung disease, and pulmonary arterial hypertension who is currently on Adcirca monotherapy 40 mg daily.  She did not tolerate Tyvaso due to worsening VQ mismatch and hypoxia.    Ms Loya is feeling significantly better off of Tyvaso.  She is currently functional class 3.  She has no evidence of decompensated heart failure.  I believe that she is feeling better due to a combination of things which include discontinuing Tyvaso, watching her diet closely for salt and fluids, and also using oxygen more religiously.  I did not make any changes.  I recommended her to continue the Adcirca 40 mg daily, digoxin, and Lasix 40 mg daily.  Even with 8 liters of oxygen, she desaturated to 88%.  She is currently using 6 liters with exertion, and I have encouraged her to use 8 liters as it is important for her not to be hypoxic.    She was started on CellCept for her interstitial lung disease by Dr. Xiong.  She did not tolerate it.  She has self-discontinued.  She is scheduled to see Dr. Xiong next week.    She will return to clinic in 3 months.  At that time, she will have a repeat 6-minute walk test and labs.  She will call us in the interim for any worsening symptoms.  She is currently not interested in the lung transplantation.    Sincerely,  Callie Ledesma MD   Center for Pulmonary Hypertension  Heart Failure, Transplant, and Mechanical Circulatory Support Cardiology   Cardiovascular Division  [facility]   [phone]        Callie Ledesma MD    D:  07/27/2017 10:47 T:  07/27/2017 11:04  Document:  [id number] TT\SA

## 2017-07-27 NOTE — TELEPHONE ENCOUNTER
Prescriptions walked to [facility] pharmacy.    Claribel Sol CMA ([facility])

## 2017-08-08 DIAGNOSIS — G47.9 DISTURBANCE IN SLEEP BEHAVIOR: ICD-10-CM

## 2017-08-08 NOTE — TELEPHONE ENCOUNTER
alprazolam      Last Written Prescription Date: 07/27/2017  Last Fill Quantity: 40,  # refills: 0   Last Office Visit with [facility], [facility] or [facility] prescribing provider: 06/28/2017    Thank You,  Pavan Stanley, Pharmacy [facility] [place]

## 2017-08-09 RX ORDER — ALPRAZOLAM 0.5 MG
TABLET ORAL
Qty: 40 TABLET | Refills: 0 | Status: SHIPPED | OUTPATIENT
Start: 2017-08-09 | End: 2017-08-16

## 2017-08-10 DIAGNOSIS — R07.81 PLEURITIC CHEST PAIN: ICD-10-CM

## 2017-08-10 RX ORDER — OXYCODONE AND ACETAMINOPHEN 10; 325 MG/1; MG/1
1 TABLET ORAL EVERY 6 HOURS PRN
Qty: 60 TABLET | Refills: 0 | Status: SHIPPED | OUTPATIENT
Start: 2017-08-10 | End: 2017-08-24

## 2017-08-10 NOTE — TELEPHONE ENCOUNTER
Routing refill request to provider for review/approval because:  Drug not on the [facility] refill protocol     Porsha Martinez RN

## 2017-08-10 NOTE — TELEPHONE ENCOUNTER
percocet      Last Written Prescription Date: 07/27/2017  Last Fill Quantity: 60,  # refills: 0   Last Office Visit with [facility], [facility] or [facility] prescribing provider: 06/28/2017      Thank You,  Pavan Stanley, Pharmacy [facility] [place]

## 2017-08-10 NOTE — TELEPHONE ENCOUNTER
Oxycodone-acetaminophen (percocet)  MG per tablet      Last Written Prescription Date:  8/10/17  Last Fill Quantity: 60,   # refills: 0  Last Office Visit with [facility], [facility] or [facility] prescribing provider: 6/28/17  Future Office visit:       Routing refill request to provider for review/approval because:  Drug not on the [facility], [facility] or [facility] refill protocol or controlled substance

## 2017-08-11 RX ORDER — OXYCODONE AND ACETAMINOPHEN 10; 325 MG/1; MG/1
1 TABLET ORAL EVERY 6 HOURS PRN
Qty: 60 TABLET | Refills: 0 | OUTPATIENT
Start: 2017-08-11

## 2017-08-16 ENCOUNTER — TELEPHONE (OUTPATIENT)
Dept: FAMILY MEDICINE | Facility: CLINIC | Age: 42
End: 2017-08-16

## 2017-08-16 DIAGNOSIS — G47.9 DISTURBANCE IN SLEEP BEHAVIOR: ICD-10-CM

## 2017-08-16 NOTE — TELEPHONE ENCOUNTER
Alprazolam       Last Written Prescription Date: 08/09/2017  Last Fill Quantity: 40,  # refills: 0   Last Office Visit with [facility], [facility] or [facility] prescribing provider: 06/28/2017    Thanks  Rosalie Bolanos [facility]   [phone]

## 2017-08-18 RX ORDER — ALPRAZOLAM 0.5 MG
TABLET ORAL
Qty: 40 TABLET | Refills: 0 | Status: SHIPPED | OUTPATIENT
Start: 2017-08-18 | End: 2017-08-25

## 2017-08-18 NOTE — TELEPHONE ENCOUNTER
Patient called to fu on medication refill, please advise.  Thank you,  Karen Littlejohn  Patient Representative

## 2017-08-23 DIAGNOSIS — M19.90 INFLAMMATORY ARTHRITIS: ICD-10-CM

## 2017-08-23 RX ORDER — MORPHINE SULFATE 15 MG/1
15 TABLET, FILM COATED, EXTENDED RELEASE ORAL 2 TIMES DAILY
Qty: 60 TABLET | Refills: 0 | Status: SHIPPED | OUTPATIENT
Start: 2017-08-23 | End: 2017-09-20

## 2017-08-23 NOTE — TELEPHONE ENCOUNTER
Ms contin 15mg      Last Written Prescription Date: 07/27/2017  Last Fill Quantity: 60,  # refills: 0   Last Office Visit with [facility], [facility] or [facility] prescribing provider: 06/2/2017    Thank You,  Pavan Stanley, Pharmacy [facility] [place]

## 2017-08-24 DIAGNOSIS — R07.81 PLEURITIC CHEST PAIN: ICD-10-CM

## 2017-08-24 NOTE — TELEPHONE ENCOUNTER
Percocet 10-325mg      Last Written Prescription Date: 08/10/2017  Last Fill Quantity: 60,  # refills: 0   Last Office Visit with [facility], [facility] or [facility] prescribing provider: 06/28/2017    Joy Fuentes CPhT  [facility] [place]  [phone]

## 2017-08-25 ENCOUNTER — TELEPHONE (OUTPATIENT)
Dept: FAMILY MEDICINE | Facility: CLINIC | Age: 42
End: 2017-08-25

## 2017-08-25 DIAGNOSIS — G47.9 DISTURBANCE IN SLEEP BEHAVIOR: ICD-10-CM

## 2017-08-25 RX ORDER — OXYCODONE AND ACETAMINOPHEN 10; 325 MG/1; MG/1
1 TABLET ORAL EVERY 6 HOURS PRN
Qty: 60 TABLET | Refills: 0 | Status: SHIPPED | OUTPATIENT
Start: 2017-08-25 | End: 2017-09-06

## 2017-08-25 NOTE — TELEPHONE ENCOUNTER
Alprazolam 0.5mg      Last Written Prescription Date: 8/18/17  Last Fill Quantity: 40,  # refills: 0   Last Office Visit with FMG, [facility] or [facility] prescribing provider: 6/28/17    [facility].  [facility]

## 2017-08-28 NOTE — TELEPHONE ENCOUNTER
Patient calling to follow up on RX, patient was advised of 3 business days.  Patient states she's out today.  Thank you,  Karen Littlejohn  Patient Representative

## 2017-08-29 RX ORDER — ALPRAZOLAM 0.5 MG
TABLET ORAL
Qty: 40 TABLET | Refills: 0 | Status: SHIPPED | OUTPATIENT
Start: 2017-08-29 | End: 2017-09-06

## 2017-09-02 ENCOUNTER — OFFICE VISIT (OUTPATIENT)
Dept: URGENT CARE | Facility: RETAIL CLINIC | Age: 42
End: 2017-09-02
Payer: COMMERCIAL

## 2017-09-02 VITALS
TEMPERATURE: 98.4 F | HEART RATE: 96 BPM | DIASTOLIC BLOOD PRESSURE: 66 MMHG | OXYGEN SATURATION: 97 % | SYSTOLIC BLOOD PRESSURE: 117 MMHG

## 2017-09-02 DIAGNOSIS — R06.02 SOB (SHORTNESS OF BREATH): ICD-10-CM

## 2017-09-02 DIAGNOSIS — R05.9 COUGH: ICD-10-CM

## 2017-09-02 DIAGNOSIS — R09.81 NASAL CONGESTION: Primary | ICD-10-CM

## 2017-09-02 DIAGNOSIS — J01.90 ACUTE SINUSITIS WITH COEXISTING CONDITION REQUIRING PROPHYLACTIC TREATMENT: ICD-10-CM

## 2017-09-02 PROCEDURE — 99213 OFFICE O/P EST LOW 20 MIN: CPT | Performed by: NURSE PRACTITIONER

## 2017-09-02 RX ORDER — FLUCONAZOLE 150 MG/1
150 TABLET ORAL ONCE
Qty: 2 TABLET | Refills: 0 | Status: SHIPPED | OUTPATIENT
Start: 2017-09-02 | End: 2017-09-02

## 2017-09-02 RX ORDER — CEFDINIR 300 MG/1
300 CAPSULE ORAL 2 TIMES DAILY
Qty: 28 CAPSULE | Refills: 0 | Status: SHIPPED | OUTPATIENT
Start: 2017-09-02 | End: 2017-09-16

## 2017-09-02 NOTE — MR AVS SNAPSHOT
After Visit Summary   9/2/2017    Ellen Loya    MRN: [id number]           Patient Information     Date Of Birth          1975        Visit Information        Provider Department      9/2/2017 2:50 PM Nikunj Goncalves APRN CNP [facility]        Today's Diagnoses     Nasal congestion    -  1    Cough        Acute sinusitis with coexisting condition requiring prophylactic treatment        SOB (shortness of breath)           Follow-ups after your visit        Your next 10 appointments already scheduled     Sep 20, 2017 10:30 AM CDT   PFT VISIT with UC PFL [facility]monary Function Testing ([facility])    [address] [place] [id number]   [phone]            Sep 20, 2017 11:00 AM CDT   (Arrive by 10:45 AM)   Return Interstitial Lung with Dawson Upton MD   [facility] ([facility])    [address] [place] [address]   [phone]            Oct 23, 2017  9:30 AM CDT   Lab with UC LAB   [facility] ([facility])    [address] [place] [address]   [phone]            Oct 23, 2017 10:00 AM CDT   Six Minute Walk with UC PFL 6 MINUTE WALK 1   [facility] Function Testing ([facility])    [address] [place] [address]   [phone]            Oct 23, 2017 10:30 AM CDT   (Arrive by 10:15 AM)   RETURN PRIMARY PULMONARY with PURA Mace CNP   [facility] ([facility])    [address] [place] [address]   [phone]              Who to contact     You can reach your care team any time of the day by calling [phone].  Notification of test results:  If you have an abnormal lab result, we will notify you by phone as soon as possible.          Additional Information About Your Visit        MyChart Information     [employer] gives you secure access to your electronic health record. If you see a primary care provider, you can also send messages to your care team and make appointments. If you have questions, please call your primary care clinic.  If you do not have a primary care provider, please call [phone] and they will assist you.        Care EveryWhere ID     This is your Care EveryWhere ID. This could be used by other organizations to access your [facility] medical records  [id number]        Your Vitals Were     Pulse Temperature Pulse Oximetry             96 98.4  F (36.9  C) (Oral) 97%          Blood Pressure from Last 3 Encounters:   09/02/17 117/66   07/21/17 119/72   06/28/17 120/60    Weight from Last 3 Encounters:   07/21/17 119 lb 4.8 oz (54.1 kg)   06/28/17 120 lb (54.4 kg)   06/21/17 119 lb (54 kg)              Today, you had the following     No orders found for display         Today's Medication Changes          These changes are accurate as of: 9/2/17  3:49 PM.  If you have any questions, ask your nurse or doctor.               Start taking these medicines.        Dose/Directions    cefdinir 300 MG capsule   Commonly known as:  OMNICEF   Used for:  Acute sinusitis with coexisting condition requiring prophylactic treatment   Started by:  Nikunj Goncalves APRN CNP        Dose:  300 mg   Take 1 capsule (300 mg) by mouth 2 times daily for 14 days   Quantity:  28 capsule   Refills:  0       fluconazole 150 MG tablet   Commonly known as:  DIFLUCAN   Used for:  Acute sinusitis with coexisting condition requiring prophylactic treatment   Started by:  Nikunj Goncalves APRN CNP        Dose:  150 mg   Take 1 tablet (150 mg) by mouth once for 1 dose May repeat x1 as directed.   Quantity:  2 tablet   Refills:  0            Where to get your medicines      These medications were sent to [facility] [address]    [address] [place] [address]     Phone:  [phone]     cefdinir 300 MG capsule    fluconazole 150 MG tablet                Primary Care Provider Office Phone # Fax #    Ignacio Loya [phone]       [place] [address] [place] [address]        Equal Access to Services     [facility]: Hadii aad ku hadasho Soomaali, waaxda luqadaha, qaybta kaalmada adeegyada, waxay idiin hayaan adeeg kharash la'aan . So [facility] [phone].    ATENCIÓN: Si habla español, tiene a bassett disposición servicios gratuitos de asistencia lingüística. Enmanuelame al [phone].    We comply with applicable federal civil rights laws and [place] laws. We do not discriminate on the basis of race, color, national origin, age, disability sex, sexual orientation or gender identity.            Thank you!     Thank you for choosing [facility]  for your care. Our goal is always to provide you with excellent care. Hearing back from our patients is one way we can continue to improve our services. Please take a few minutes to complete the written survey that you may receive in the mail after your visit with us. Thank you!             Your Updated Medication List - Protect others around you: Learn how to safely use, store and throw away your medicines at www.disposemymeds.org.          This list is accurate as of: 9/2/17  3:49 PM.  Always use your most recent med list.                   Brand Name Dispense Instructions for use Diagnosis    * albuterol (2.5 MG/3ML) 0.083% neb solution     60 vial    Use every 4-6 hours as needed for shortness of breath    Pleuritic chest pain, Acute bronchitis, unspecified organism       * albuterol 108 (90 BASE) MCG/ACT Inhaler    PROAIR HFA    1 Inhaler    Inhale 1-2 puffs into the lungs every 4 hours as needed for shortness of breath / dyspnea    SOB (shortness of breath)       ALPRAZolam 0.5 MG tablet    XANAX    40 tablet    TAKE 1  TABLET BY MOUTH EVERY SIX HOURS AS NEEDED FOR ANXIETY.    Disturbance in sleep behavior       azaTHIOprine 50 MG tablet    IMURAN    90 tablet    Take one tablet daily for two weeks, then increase to two tabs daily for two weeks, then 3 tablets daily for maintenance dose.    ILD (interstitial lung disease) (H)       cefdinir 300 MG capsule    OMNICEF    28 capsule    Take 1 capsule (300 mg) by mouth 2 times daily for 14 days    Acute sinusitis with coexisting condition requiring prophylactic treatment       digoxin 125 MCG tablet    LANOXIN    90 tablet    Take 1 tablet (125 mcg) by mouth daily    Pulmonary hypertension (H)       fluconazole 150 MG tablet    DIFLUCAN    2 tablet    Take 1 tablet (150 mg) by mouth once for 1 dose May repeat x1 as directed.    Acute sinusitis with coexisting condition requiring prophylactic treatment       fluticasone 220 MCG/ACT Inhaler    FLOVENT HFA    1 Inhaler    Inhale 2 puffs into the lungs 2 times daily    ILD (interstitial lung disease) (H)       fluticasone 50 MCG/ACT spray    FLONASE    1 Bottle    Spray 2 sprays into both nostrils daily    Nasal congestion       furosemide 20 MG tablet    LASIX    180 tablet    Take 2 tablets (40 mg) by mouth daily    Pulmonary hypertension (H)       morphine 15 MG 12 hr tablet    MS CONTIN    60 tablet    Take 1 tablet (15 mg) by mouth 2 times daily    Inflammatory arthritis       NEXIUM PO      Take 40 mg by mouth every morning (before breakfast)    Pulmonary hypertension (H)       * order for DME     1 Device    Equipment being ordered: personal O2 oximeter.    Hypoxia       * order for DME     1 Device    Equipment being ordered: Oxygen  Please provide patient with a home-fill system for portability.    ILD (interstitial lung disease) (H), Hypoxia       * order for DME     2 Device    Please provide patient with 2  liquid oxygen tanks for portability. Spot check patient on liquid oxygen. Titrate oxygen to maintain saturations above  88%.    ILD (interstitial lung disease) (H)       * order for DME     1 Device    Equipment being ordered: Oxygen oximeter and mask    ILD (interstitial lung disease) (H), Lung nodule, Fibrosis of lung (H), Pulmonary hypertension (H)       * order for DME     1 Device    Equipment being ordered: Oximeter    ILD (interstitial lung disease) (H)       * order for DME     1 Device    Equipment being ordered: Nebulizer. Verbal order from     Acute bronchitis, unspecified organism       * order for DME     1 Device    Oxygen: Patient requires supplemental Oxygen 4 LPM via nasal canula at rest and 6 LPM with activity. Please provide patient with portability capability. Okay to spot check patient on oxygen device, to keep sats above 90%. Please provider with home concentrator that can go up to 10 LPM. Oxygen will be for a lifetime.    Hypoxia       * order for DME     1 Device    Equipment being ordered: Oxygen 8 liters continuous at rest and 8 liters continuous with activity. Needs portability.  Please provide concentrator that goes to 10 liters.    ILD (interstitial lung disease) (H), Hypoxia       * order for DME     1 Device    Please provide patient with updated oxygen equipment.  Patient requires 3 to 4 LPM continuous flow while driving or sitting and 8 LPM continuous flow with all other activity.    ILD (interstitial lung disease) (H)       oxyCODONE-acetaminophen  MG per tablet    PERCOCET    60 tablet    Take 1 tablet by mouth every 6 hours as needed for moderate to severe pain    Pleuritic chest pain       tadalafil (PAH) 20 MG Tabs    ADCIRCA    60 tablet    Take 2 tablets (40 mg) by mouth daily    Pulmonary hypertension (H)       tiotropium 18 MCG capsule    SPIRIVA HANDIHALER    30 capsule    Inhale contents of one capsule daily.    ILD (interstitial lung disease) (H)       * Notice:  This list has 11 medication(s) that are the same as other medications prescribed for you. Read the directions  carefully, and ask your doctor or other care provider to review them with you.
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
## 2017-09-02 NOTE — NURSING NOTE
"Chief Complaint   Patient presents with     Sinus Problem     x about a week     Cough     x 2 days       Initial /66  Pulse 96  Temp 98.4  F (36.9  C) (Oral)  SpO2 97% Estimated body mass index is 21.82 kg/(m^2) as calculated from the following:    Height as of 7/21/17: 5' 2\" (1.575 m).    Weight as of 7/21/17: 119 lb 4.8 oz (54.1 kg).  Medication Reconciliation: complete   Martha Seaman      "

## 2017-09-06 DIAGNOSIS — G47.9 DISTURBANCE IN SLEEP BEHAVIOR: ICD-10-CM

## 2017-09-06 DIAGNOSIS — R07.81 PLEURITIC CHEST PAIN: ICD-10-CM

## 2017-09-06 NOTE — TELEPHONE ENCOUNTER
alprazolam      Last Written Prescription Date: 08/29/2017  Last Fill Quantity: 40,  # refills: 0   Last Office Visit with FMG, [facility] or  Health prescribing provider: 06/28/2017    percocet      Last Written Prescription Date: 08/25/2017  Last Fill Quantity: 60,  # refills: 0   Last Office Visit with G, P or  Health prescribing provider: 06/28/2017    Thank You,  Pavan Stanley, Pharmacy [facility] [place]

## 2017-09-07 RX ORDER — ALPRAZOLAM 0.5 MG
TABLET ORAL
Qty: 40 TABLET | Refills: 0 | Status: SHIPPED | OUTPATIENT
Start: 2017-09-07 | End: 2017-09-14

## 2017-09-07 RX ORDER — OXYCODONE AND ACETAMINOPHEN 10; 325 MG/1; MG/1
1 TABLET ORAL EVERY 6 HOURS PRN
Qty: 60 TABLET | Refills: 0 | Status: SHIPPED | OUTPATIENT
Start: 2017-09-07 | End: 2017-09-20

## 2017-09-13 DIAGNOSIS — J84.9 ILD (INTERSTITIAL LUNG DISEASE) (H): Primary | ICD-10-CM

## 2017-09-14 DIAGNOSIS — G47.9 DISTURBANCE IN SLEEP BEHAVIOR: ICD-10-CM

## 2017-09-14 RX ORDER — ALPRAZOLAM 0.5 MG
TABLET ORAL
Qty: 40 TABLET | Refills: 0 | Status: SHIPPED | OUTPATIENT
Start: 2017-09-16 | End: 2017-09-25

## 2017-09-14 NOTE — TELEPHONE ENCOUNTER
Alprazolam       Last Written Prescription Date: 09/07/2017  Last Fill Quantity: 40,  # refills: 0   Last Office Visit with [facility], [facility] or [facility] prescribing provider: 06/28/2017    Thanks  Rosalie Bolanos [facility]   [phone]

## 2017-09-20 DIAGNOSIS — R07.81 PLEURITIC CHEST PAIN: ICD-10-CM

## 2017-09-20 DIAGNOSIS — M19.90 INFLAMMATORY ARTHRITIS: ICD-10-CM

## 2017-09-20 RX ORDER — MORPHINE SULFATE 15 MG/1
15 TABLET, FILM COATED, EXTENDED RELEASE ORAL 2 TIMES DAILY
Qty: 60 TABLET | Refills: 0 | Status: SHIPPED | OUTPATIENT
Start: 2017-09-20 | End: 2017-10-17

## 2017-09-20 NOTE — TELEPHONE ENCOUNTER
Percocet 10-325mg      Last Written Prescription Date: 09/09/2017  Last Fill Quantity: 60,  # refills: 0   Last Office Visit with G, P or [facility] prescribing provider: 07/21/2017    Amber Rushing, Pharmacy Technician  [facility]  [phone]

## 2017-09-21 RX ORDER — OXYCODONE AND ACETAMINOPHEN 10; 325 MG/1; MG/1
1 TABLET ORAL EVERY 6 HOURS PRN
Qty: 60 TABLET | Refills: 0 | Status: SHIPPED | OUTPATIENT
Start: 2017-09-21 | End: 2017-10-05

## 2017-09-25 DIAGNOSIS — G47.9 DISTURBANCE IN SLEEP BEHAVIOR: ICD-10-CM

## 2017-09-25 RX ORDER — ALPRAZOLAM 0.5 MG
TABLET ORAL
Qty: 40 TABLET | Refills: 0 | Status: SHIPPED | OUTPATIENT
Start: 2017-09-25 | End: 2017-10-03

## 2017-09-25 NOTE — TELEPHONE ENCOUNTER
alprazolam  Last Written Prescription Date: 9/14/17  Last Fill Quantity: 40,  # refills: 0   Last Office Visit with [facility], [facility] or [facility] prescribing provider: *9/2/17    [facility].  [facility]  [phone]

## 2017-09-26 ENCOUNTER — HOSPITAL ENCOUNTER (OUTPATIENT)
Dept: CARDIAC REHAB | Facility: CLINIC | Age: 42
End: 2017-09-26
Attending: INTERNAL MEDICINE
Payer: COMMERCIAL

## 2017-09-26 VITALS — HEIGHT: 62 IN | WEIGHT: 119.05 LBS | BODY MASS INDEX: 21.91 KG/M2

## 2017-09-26 DIAGNOSIS — J84.9 ILD (INTERSTITIAL LUNG DISEASE) (H): ICD-10-CM

## 2017-09-26 PROCEDURE — 40000244 ZZH STATISTIC VISIT PULM REHAB: Performed by: REHABILITATION PRACTITIONER

## 2017-09-26 PROCEDURE — G0238 OTH RESP PROC, INDIV: HCPCS | Performed by: REHABILITATION PRACTITIONER

## 2017-09-26 ASSESSMENT — DUKE ACTIVITY SCORE INDEX (DASI)
VO2_PEAK: 24.2
DASI METS SCORE: 6.91

## 2017-09-26 ASSESSMENT — 6 MINUTE WALK TEST (6MWT)
TOTAL DISTANCE WALKED: 1032
FEMALE CALC: 638.38
GENDER SELECTION: FEMALE
MALE CALC: 582.07

## 2017-09-26 NOTE — PROGRESS NOTES
09/26/17 1300   Session   Session Initial Evaluation and Exercise Prescription   Certified through this date 10/25/17   Type Initial   General Information   Treatment Diagnosis Interstitial Lung Disease   Secondary Treatment Diagnosis Pulmonary Hypertension   Classification of COPD NA   Hospital Location Not hospitalized   Current Signs and Symptoms SOB;Anxiety   Outpatient Pulmonary Rehab Start Date 09/26/17   Primary Physician Dr. Loya   Pulmonolgist Dr. Osman   Medical History/Comorbidities   Medical History/Comorbidities Hypertension   Untoward Events/Exacerbations/Hospitalizations   Untoward Events/Exacerbations/Hospitalizations None   Sputum   Sputum Production Color Clear   Sputum Production Consistency Frothy   Tobacco History   Tobacco Current smoker, some days   Tobacco Habit Pipe   Years Smoked 25   Average Packs Per Day less than 0.25 ppd   Interventions Planned Tobacco Cessation Aides;Identify Support System;Develop Action Plan   Medications   Long-Acting Beta Agonist Not prescribed   Short-Acting Beta Agonist Prescribed, taking as prescribed   Long-Acting Anticholinergic Prescribed, taking as prescribed   Short-Acting Anticholinergic Not prescribed   Inhaled Corticosteroid Not prescribed   Oral Corticosteroid Not prescribed   Medications Reconciled By Patient;Medical record   Preventative Vaccinations   Influenza Vaccination Yes   Pneumonia Vaccination Yes   Pain   Patient Currently in Pain No   Falls Screen   Have you fallen two or more times in the past year? No   Have you fallen and had an injury in the past year? No   Referral initiated to physical therapy No   Living and Work Status   Living Arrangements house;other relative   Support System Live with an adult   ADL Limitations None   Initial Duke Activity Status Index (DASI) score. A measure of functional capacity. The goal is to have a pre-program raw score of 9.95 (~4 METs) or above 33.95   Initial DASI VO2 Peak (ml*kg-1*min-1) 24.2  "  Initial DASI MET Level 6.91   Physical Assessments   Incisions Not applicable   Edema None   Individualized Treatment Plan   Sessions Scheduled 24   Sessions Attended 1   Type Aerobic exercise   Oxygen Use   Supplemental Oxygen Needed Yes   Delivery Device Nasal Cannula   Liter Flow at Rest 4   Liter Flow with ADLS 6-8   Liter Flow During Exercise 8   Liter Flow With Sleep 4   Evaluated on Home System? No;Continuous flow   Interventions Recommended (Offered 1:1 with [facility] oxygen, patient is considerin)   Exercise Prescription   Mode Treadmill;Weights;Nustep   Frequency 2 days/week   Duration/Time 30-45 min;Intermittent bouts   THR (85% of age predicted max heart rate)  152.15   Effort Rating (0-10) 4-6   Progression of Exercise Will plan to increase exercise time to 30-40 minutes continuous aerobic exercise, then increase by up to 0.5 METs per week   Oxygen Titration with Exercise > 88% with exercise   Exercise Assessment   6 Minute Walk Predicted - Gender Selection Female   6 Minute Walk Predicted (Female) 638.38   6 Minute Walk Distance (Initial) 1032 Meters   Resting HR 72   Exercise    SpO2 98   Exercise SpO2 90   Current MET level 2.3   Exercise Tolerance fair   Normal Limits Discussed Yes   Current Symptoms at Home Anxiety;Dyspnea   Current Symptoms in Rehab Dyspnea   Nutrition Management   Age 41   Height 1.575 m (5' 2\")   Weight 54 kg (119 lb 0.8 oz)   BMI (Calculated) 21.82   Assessment Normal   Psychosocial   Initial Patient Health Questionnaire -9 (PHQ-9) for depression. To notify physician if pre-score >9. 4   Initial [facility] Survey score. A quality of life measure. To re evaluate if total score is > 25. Also consider PHQ-9 and DASI to determine if patient should be referred back to physician. 19   Initial Shortness of Breath Questionnaire (SOBQ) score. The goal is to reduce the score pre to post program. 30   Intervention Planned Patient to identify 2-3 coping mechanisms to " decrease stress and anxiety;Refer to [facility];Develop and implement coping techniques;Introduce relaxation techniques to assist with decreased anxiety;Use breathing techniques to control dyspnea cycle   Stages of Change   Aerobic Exercise Preparation   Physical Activity Preparation   Recommended diet NA   Stress Action   Smoking Cessation Preparation   Oxygen Usage Maintenance   Current Home Exercise   Type of Exercise None   Recommended Home Exercise Prescription   Type of Exercise Walking   Frequency (Days per week) 3-4   Duration (minutes per session) 15-30 min;Intermittent   Effort Rating Recommended (0-10) Scale  4-6/10   30 Day Exercise Plan initiate home exercise program   Learning Assessment   Learner Patient   Primary Language English   Preferred Learning Style Listening;Reading   Barriers to Learning No barriers noted   Patient Education/Referrals   Education Recommended Home Oxygen Equipment;Patient already attended in the past   Pulmonary Rehab Goals   Pulmonary Rehab Goals 1;2   Goal 1   Goal Patient will increase muscle mass to be stronger heading into winter season   Target Date 10/26/17   Progress Towards Goal 9/26 Patient will increase resistance training up to 5# over the next month. She wishes to reciver muscle mass to aid in strength and endurance as well as be able to deal with any illness over the winter months   Goal 2   Goal Patient will increase her walk test results by 20%   Target Date 10/26/17   Progress Towards Goal 9/26 Patient will increase her walk distance by 200 feet over next month. Equal to 2.1 mph on treadmill   Assessment   Assessment Ellen is a pleasant 42 yo female, well known to the pulmonary rehab staff from previous rehab sessions. She decided to quit rehab earlier this year as she was becoming more active at home. Since that point she has had pneumonia and started smoking occasionally. She reports a loss in muscle mass and activity endurance  since pneumonia and expresses today a desire to be successful with smoking cessation again.   The patient's history and clinical status including hemodynamics were evaluated.  The patient was assessed to be stable and appropriate to begin exercise. The patient's functional capacity and exercise prescription were determined by the completion of the 6 minute walk test, see results above. The patient was oriented to the program and equipment. Cardiopulmonary response was WNL. No symptoms, complaints or pain were reported. Fair prognosis for reaching above goals. Skilled therapy is necessary in order to monitor cardiopulmonary response to exercise, to provide education and behavior change counseling needed to achieve patient's goals. Goals and objectives were discussed, all goals created in conjunction with patient.  Plan to progress to 40-45 minutes of aerobic exercise prior to discharge from pulmonary rehab, initiate muscle conditioning as appropriate and provide education and behavior change counseling.   Supervising MD: Dr. Willett   Time spent with patient: 65 minutes, including 45 minutes with assessments and goal formulation and 20 minutes with vital assessment, 6 minute walk test and therapeutic rest break after testing.         I have reviewed initial evaluation outcomes, and agree with exercise prescription for respiratory therapy for this patient

## 2017-09-28 ENCOUNTER — HOSPITAL ENCOUNTER (OUTPATIENT)
Dept: CARDIAC REHAB | Facility: CLINIC | Age: 42
End: 2017-09-28
Attending: INTERNAL MEDICINE
Payer: COMMERCIAL

## 2017-09-28 VITALS — WEIGHT: 119 LBS | BODY MASS INDEX: 21.77 KG/M2

## 2017-09-28 PROCEDURE — 40000244 ZZH STATISTIC VISIT PULM REHAB: Performed by: REHABILITATION PRACTITIONER

## 2017-09-28 PROCEDURE — G0239 OTH RESP PROC, GROUP: HCPCS | Performed by: REHABILITATION PRACTITIONER

## 2017-09-30 LAB
DLCOUNC-%PRED-PRE: 21 %
DLCOUNC-PRE: 4.87 ML/MIN/MMHG
DLCOUNC-PRED: 22.86 ML/MIN/MMHG
ERV-%PRED-PRE: 81 %
ERV-PRE: 0.92 L
ERV-PRED: 1.13 L
EXPTIME-PRE: 7.69 SEC
FEF2575-%PRED-PRE: 78 %
FEF2575-PRE: 2.34 L/SEC
FEF2575-PRED: 2.98 L/SEC
FEFMAX-%PRED-PRE: 105 %
FEFMAX-PRE: 7.01 L/SEC
FEFMAX-PRED: 6.64 L/SEC
FEV1-%PRED-PRE: 75 %
FEV1-PRE: 2.1 L
FEV1FEV6-PRE: 86 %
FEV1FEV6-PRED: 84 %
FEV1FVC-PRE: 86 %
FEV1FVC-PRED: 82 %
FEV1SVC-PRE: 90 %
FEV1SVC-PRED: 82 %
FIFMAX-PRE: 5.54 L/SEC
FVC-%PRED-PRE: 72 %
FVC-PRE: 2.45 L
FVC-PRED: 3.4 L
IC-%PRED-PRE: 62 %
IC-PRE: 1.42 L
IC-PRED: 2.27 L
VA-%PRED-PRE: 72 %
VA-PRE: 3.42 L
VC-%PRED-PRE: 68 %
VC-PRE: 2.34 L
VC-PRED: 3.4 L

## 2017-10-03 ENCOUNTER — HOSPITAL ENCOUNTER (OUTPATIENT)
Dept: CARDIAC REHAB | Facility: CLINIC | Age: 42
End: 2017-10-03
Attending: INTERNAL MEDICINE
Payer: COMMERCIAL

## 2017-10-03 VITALS — WEIGHT: 113 LBS | BODY MASS INDEX: 20.67 KG/M2

## 2017-10-03 DIAGNOSIS — G47.9 DISTURBANCE IN SLEEP BEHAVIOR: ICD-10-CM

## 2017-10-03 PROCEDURE — 40000244 ZZH STATISTIC VISIT PULM REHAB

## 2017-10-03 PROCEDURE — G0239 OTH RESP PROC, GROUP: HCPCS

## 2017-10-03 NOTE — TELEPHONE ENCOUNTER
xanax      Last Written Prescription Date: 09/25/2017  Last Fill Quantity: 40,  # refills: 0   Last Office Visit with [facility], [facility] or [facility] prescribing provider: 06/28/2017    Thank You,  Pavan Stanley, Pharmacy [facility] [place]

## 2017-10-04 RX ORDER — ALPRAZOLAM 0.5 MG
TABLET ORAL
Qty: 40 TABLET | Refills: 0 | Status: SHIPPED | OUTPATIENT
Start: 2017-10-04 | End: 2017-10-12

## 2017-10-05 DIAGNOSIS — R07.81 PLEURITIC CHEST PAIN: ICD-10-CM

## 2017-10-05 RX ORDER — OXYCODONE AND ACETAMINOPHEN 10; 325 MG/1; MG/1
1 TABLET ORAL EVERY 6 HOURS PRN
Qty: 60 TABLET | Refills: 0 | Status: SHIPPED | OUTPATIENT
Start: 2017-10-07 | End: 2017-10-19

## 2017-10-05 NOTE — TELEPHONE ENCOUNTER
Percocet       Last Written Prescription Date: 09/21/2017  Last Fill Quantity: 60,  # refills: 0   Last Office Visit with [facility], [facility] or [facility] prescribing provider: 06/28/2017    Thanks  Rosalie Bolanos [facility]   [phone]

## 2017-10-10 ENCOUNTER — HOSPITAL ENCOUNTER (OUTPATIENT)
Dept: CARDIAC REHAB | Facility: CLINIC | Age: 42
End: 2017-10-10
Attending: INTERNAL MEDICINE
Payer: COMMERCIAL

## 2017-10-10 VITALS — WEIGHT: 116.8 LBS | BODY MASS INDEX: 21.36 KG/M2

## 2017-10-10 PROCEDURE — 40000244 ZZH STATISTIC VISIT PULM REHAB: Performed by: REHABILITATION PRACTITIONER

## 2017-10-10 PROCEDURE — G0239 OTH RESP PROC, GROUP: HCPCS | Performed by: REHABILITATION PRACTITIONER

## 2017-10-11 ENCOUNTER — OFFICE VISIT (OUTPATIENT)
Dept: FAMILY MEDICINE | Facility: CLINIC | Age: 42
End: 2017-10-11
Payer: COMMERCIAL

## 2017-10-11 VITALS
SYSTOLIC BLOOD PRESSURE: 116 MMHG | DIASTOLIC BLOOD PRESSURE: 62 MMHG | HEART RATE: 101 BPM | OXYGEN SATURATION: 95 % | BODY MASS INDEX: 22.41 KG/M2 | WEIGHT: 121.8 LBS | HEIGHT: 62 IN | TEMPERATURE: 97.8 F

## 2017-10-11 DIAGNOSIS — R09.81 NASAL CONGESTION: ICD-10-CM

## 2017-10-11 DIAGNOSIS — J84.10 FIBROSIS OF LUNG (H): ICD-10-CM

## 2017-10-11 DIAGNOSIS — J20.9 ACUTE BRONCHITIS, UNSPECIFIED ORGANISM: Primary | ICD-10-CM

## 2017-10-11 PROCEDURE — 99214 OFFICE O/P EST MOD 30 MIN: CPT | Performed by: FAMILY MEDICINE

## 2017-10-11 RX ORDER — FLUTICASONE PROPIONATE 50 MCG
2 SPRAY, SUSPENSION (ML) NASAL DAILY
Qty: 1 BOTTLE | Refills: 3 | Status: SHIPPED | OUTPATIENT
Start: 2017-10-11 | End: 2018-05-21

## 2017-10-11 RX ORDER — AZITHROMYCIN 250 MG/1
TABLET, FILM COATED ORAL
Qty: 6 TABLET | Refills: 0 | Status: SHIPPED | OUTPATIENT
Start: 2017-10-11 | End: 2018-05-16

## 2017-10-11 NOTE — NURSING NOTE
"Chief Complaint   Patient presents with     Cough     cough        Initial There were no vitals taken for this visit. Estimated body mass index is 21.36 kg/(m^2) as calculated from the following:    Height as of 9/26/17: 5' 2\" (1.575 m).    Weight as of 10/10/17: 116 lb 12.8 oz (53 kg).  Medication Reconciliation: complete    "

## 2017-10-11 NOTE — MR AVS SNAPSHOT
After Visit Summary   10/11/2017    Ellen Loya    MRN: [id number]           Patient Information     Date Of Birth          1975        Visit Information        Provider Department      10/11/2017 9:00 AM Ignacio Loya MD [employer]        Today's Diagnoses     Acute bronchitis, unspecified organism    -  1    Nasal congestion        Fibrosis of lung (H)           Follow-ups after your visit        Your next 10 appointments already scheduled     Oct 12, 2017 11:00 AM CDT   Pulmonary Treatment with MARISSA Varner   [facility] ([facility])    [address] [place] [address]   [phone]            Oct 17, 2017 11:00 AM CDT   Pulmonary Treatment with ELIZA Gutierrez   [facility] ([facility])    [address] [place] [address]   [phone]            Oct 19, 2017 11:00 AM CDT   Pulmonary Treatment with ELIZA Gutierrez   [facility] ([facility])    [address] [place] [address]   [phone]            Oct 24, 2017 11:00 AM CDT   Pulmonary Treatment with ELIZA Gutierrez   [facility] ([facility])    [address] [place] [address]   [phone]            Oct 25, 2017 10:45 AM CDT   Lab with UC LAB   [facility] ([facility])    [address] [place] [address]  [address]st [facility] [address]   [phone]            Oct 25, 2017 11:00 AM CDT   Six Minute Walk with UC PFL 6 MINUTE WALK 1   [facility] Pulmonary Function Testing ([facility])    [address] [place] [address]  3rd Floor  [facility] [address]   [phone]            Oct 25, 2017 11:30 AM CDT   (Arrive by 11:15 AM)   RETURN PRIMARY PULMONARY with PURA Mace [facility] ([facility])    [address] [place] [address]  Se  3rd Floor  [facility] [address]   [phone]            Oct 26, 2017 11:00 AM CDT   Pulmonary Treatment with ELIZA Gutierrez   [facility] ([facility])    [address] [place] [address]   [phone]            Oct 31, 2017 11:00 AM CDT   Pulmonary Treatment with ELIZA Gutierrez   [facility] ([facility])    [address] [place] [address]   [phone]            Nov 01, 2017  3:00 PM CDT   PFT VISIT with  PFL B   [facility] Pulmonary Function Testing ([facility])    [address]  3rd [facility] [address]   [phone]              Who to contact     If you have questions or need follow up information about today's clinic visit or your schedule please contact [facility] directly at [phone].  Normal or non-critical lab and imaging results will be communicated to you by [employer], letter or phone within 4 business days after the clinic has received the results. If you do not hear from us within 7 days, please contact the clinic through [employer] or phone. If you have a critical or abnormal lab result, we will notify you by phone as soon as possible.  Submit refill requests through [employer] or call your pharmacy and they will forward the refill request to us. Please allow 3 business days for your refill to be completed.          Additional Information About Your Visit        [employer] Information     [employer] gives you secure access to your electronic health record. If you see a primary care provider, you can also send messages to your care team and make appointments. If you have questions, please call your primary care clinic.  If you do not have a primary care provider, please call [phone] and they will assist you.        Care EveryWhere ID     This is your Care EveryWhere ID. This could be used by other organizations to access your [place]  "medical records  [id number]        Your Vitals Were     Pulse Temperature Height Last Period Pulse Oximetry BMI (Body Mass Index)    101 97.8  F (36.6  C) (Tympanic) 5' 2\" (1.575 m) 10/01/2017 (Approximate) 95% 22.28 kg/m2       Blood Pressure from Last 3 Encounters:   10/11/17 116/62   09/02/17 117/66   07/21/17 119/72    Weight from Last 3 Encounters:   10/11/17 121 lb 12.8 oz (55.2 kg)   10/10/17 116 lb 12.8 oz (53 kg)   10/03/17 113 lb (51.3 kg)              Today, you had the following     No orders found for display         Today's Medication Changes          These changes are accurate as of: 10/11/17  9:28 AM.  If you have any questions, ask your nurse or doctor.               Start taking these medicines.        Dose/Directions    azithromycin 250 MG tablet   Commonly known as:  ZITHROMAX   Used for:  Acute bronchitis, unspecified organism   Started by:  Ignacio Loya MD        Two tablets first day, then one tablet daily for four days.   Quantity:  6 tablet   Refills:  0            Where to get your medicines      These medications were sent to [facility] [address]   [address] [place] [address] [address] [facility] [address]     Phone:  [phone]     fluticasone 50 MCG/ACT spray         Some of these will need a paper prescription and others can be bought over the counter.  Ask your nurse if you have questions.     Bring a paper prescription for each of these medications     azithromycin 250 MG tablet                Primary Care Provider Office Phone # Fax #    Ignacio Loya [phone]       [address]   [facility] [place] [address]        Equal Access to Services     [facility] AH: Hadii gilma alanizo Sorojas, waaxda luqadaha, qaybta kaalmada adeegyada, justyn manueln gianna hannah. So [facility] [phone].    ATENCIÓN: Si habla español, tiene a bassett disposición servicios gratuitos de asistencia lingüística. Llame al " [phone].    We comply with applicable federal civil rights laws and [place] laws. We do not discriminate on the basis of race, color, national origin, age, disability, sex, sexual orientation, or gender identity.            Thank you!     Thank you for choosing [facility]  for your care. Our goal is always to provide you with excellent care. Hearing back from our patients is one way we can continue to improve our services. Please take a few minutes to complete the written survey that you may receive in the mail after your visit with us. Thank you!             Your Updated Medication List - Protect others around you: Learn how to safely use, store and throw away your medicines at www.disposemymeds.org.          This list is accurate as of: 10/11/17  9:28 AM.  Always use your most recent med list.                   Brand Name Dispense Instructions for use Diagnosis    * albuterol (2.5 MG/3ML) 0.083% neb solution     60 vial    Use every 4-6 hours as needed for shortness of breath    Pleuritic chest pain, Acute bronchitis, unspecified organism       * albuterol 108 (90 BASE) MCG/ACT Inhaler    PROAIR HFA    1 Inhaler    Inhale 1-2 puffs into the lungs every 4 hours as needed for shortness of breath / dyspnea    SOB (shortness of breath)       ALPRAZolam 0.5 MG tablet    XANAX    40 tablet    TAKE 1 TABLET BY MOUTH EVERY SIX HOURS AS NEEDED FOR ANXIETY.    Disturbance in sleep behavior       azaTHIOprine 50 MG tablet    IMURAN    90 tablet    Take one tablet daily for two weeks, then increase to two tabs daily for two weeks, then 3 tablets daily for maintenance dose.    ILD (interstitial lung disease) (H)       azithromycin 250 MG tablet    ZITHROMAX    6 tablet    Two tablets first day, then one tablet daily for four days.    Acute bronchitis, unspecified organism       digoxin 125 MCG tablet    LANOXIN    90 tablet    Take 1 tablet (125 mcg) by mouth daily    Pulmonary hypertension        fluticasone 220 MCG/ACT Inhaler    FLOVENT HFA    1 Inhaler    Inhale 2 puffs into the lungs 2 times daily    ILD (interstitial lung disease) (H)       fluticasone 50 MCG/ACT spray    FLONASE    1 Bottle    Spray 2 sprays into both nostrils daily    Nasal congestion       furosemide 20 MG tablet    LASIX    180 tablet    Take 2 tablets (40 mg) by mouth daily    Pulmonary hypertension       morphine 15 MG 12 hr tablet    MS CONTIN    60 tablet    Take 1 tablet (15 mg) by mouth 2 times daily    Inflammatory arthritis       NEXIUM PO      Take 40 mg by mouth every morning (before breakfast)    Pulmonary hypertension       * order for DME     1 Device    Equipment being ordered: personal O2 oximeter.    Hypoxia       * order for DME     1 Device    Equipment being ordered: Oxygen  Please provide patient with a home-fill system for portability.    ILD (interstitial lung disease) (H), Hypoxia       * order for DME     2 Device    Please provide patient with 2  liquid oxygen tanks for portability. Spot check patient on liquid oxygen. Titrate oxygen to maintain saturations above 88%.    ILD (interstitial lung disease) (H)       * order for DME     1 Device    Equipment being ordered: Oxygen oximeter and mask    ILD (interstitial lung disease) (H), Lung nodule, Fibrosis of lung (H), Pulmonary hypertension       * order for DME     1 Device    Equipment being ordered: Oximeter    ILD (interstitial lung disease) (H)       * order for DME     1 Device    Equipment being ordered: Nebulizer. Verbal order from     Acute bronchitis, unspecified organism       * order for DME     1 Device    Oxygen: Patient requires supplemental Oxygen 4 LPM via nasal canula at rest and 6 LPM with activity. Please provide patient with portability capability. Okay to spot check patient on oxygen device, to keep sats above 90%. Please provider with home concentrator that can go up to 10 LPM. Oxygen will be for a lifetime.    Hypoxia       *  order for DME     1 Device    Equipment being ordered: Oxygen 8 liters continuous at rest and 8 liters continuous with activity. Needs portability.  Please provide concentrator that goes to 10 liters.    ILD (interstitial lung disease) (H), Hypoxia       * order for DME     1 Device    Please provide patient with updated oxygen equipment.  Patient requires 3 to 4 LPM continuous flow while driving or sitting and 8 LPM continuous flow with all other activity.    ILD (interstitial lung disease) (H)       oxyCODONE-acetaminophen  MG per tablet    PERCOCET    60 tablet    Take 1 tablet by mouth every 6 hours as needed for moderate to severe pain    Pleuritic chest pain       tadalafil (PAH) 20 MG Tabs    ADCIRCA    60 tablet    Take 2 tablets (40 mg) by mouth daily    Pulmonary hypertension       tiotropium 18 MCG capsule    SPIRIVA HANDIHALER    30 capsule    Inhale contents of one capsule daily.    ILD (interstitial lung disease) (H)       * Notice:  This list has 11 medication(s) that are the same as other medications prescribed for you. Read the directions carefully, and ask your doctor or other care provider to review them with you.
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
## 2017-10-12 ENCOUNTER — HOSPITAL ENCOUNTER (OUTPATIENT)
Dept: CARDIAC REHAB | Facility: CLINIC | Age: 42
End: 2017-10-12
Attending: INTERNAL MEDICINE
Payer: COMMERCIAL

## 2017-10-12 DIAGNOSIS — G47.9 DISTURBANCE IN SLEEP BEHAVIOR: ICD-10-CM

## 2017-10-12 PROCEDURE — 40000244 ZZH STATISTIC VISIT PULM REHAB

## 2017-10-12 PROCEDURE — G0239 OTH RESP PROC, GROUP: HCPCS

## 2017-10-12 RX ORDER — ALPRAZOLAM 0.5 MG
TABLET ORAL
Qty: 40 TABLET | Refills: 0 | Status: SHIPPED | OUTPATIENT
Start: 2017-10-12 | End: 2017-10-23

## 2017-10-14 DIAGNOSIS — I27.20 PULMONARY HYPERTENSION (H): ICD-10-CM

## 2017-10-17 ENCOUNTER — HOSPITAL ENCOUNTER (OUTPATIENT)
Dept: CARDIAC REHAB | Facility: CLINIC | Age: 42
End: 2017-10-17
Attending: INTERNAL MEDICINE
Payer: COMMERCIAL

## 2017-10-17 VITALS — BODY MASS INDEX: 21.11 KG/M2 | WEIGHT: 115.4 LBS

## 2017-10-17 DIAGNOSIS — M19.90 INFLAMMATORY ARTHRITIS: ICD-10-CM

## 2017-10-17 PROCEDURE — 40000244 ZZH STATISTIC VISIT PULM REHAB: Performed by: REHABILITATION PRACTITIONER

## 2017-10-17 PROCEDURE — G0239 OTH RESP PROC, GROUP: HCPCS | Performed by: REHABILITATION PRACTITIONER

## 2017-10-17 NOTE — TELEPHONE ENCOUNTER
MS Contin 15mg      Last Written Prescription Date: 09/21/2017  Last Fill Quantity: 60,  # refills: 0   Last Office Visit with G, P or [facility] prescribing provider: 10/11/2017    Amber Rushing, Pharmacy Technician  [facility]  [phone]

## 2017-10-18 RX ORDER — MORPHINE SULFATE 15 MG/1
15 TABLET, FILM COATED, EXTENDED RELEASE ORAL 2 TIMES DAILY
Qty: 60 TABLET | Refills: 0 | Status: SHIPPED | OUTPATIENT
Start: 2017-10-18 | End: 2017-11-15

## 2017-10-19 DIAGNOSIS — R07.81 PLEURITIC CHEST PAIN: ICD-10-CM

## 2017-10-19 RX ORDER — OXYCODONE AND ACETAMINOPHEN 10; 325 MG/1; MG/1
1 TABLET ORAL EVERY 6 HOURS PRN
Qty: 60 TABLET | Refills: 0 | Status: SHIPPED | OUTPATIENT
Start: 2017-10-21 | End: 2017-11-01

## 2017-10-19 NOTE — TELEPHONE ENCOUNTER
percocet      Last Written Prescription Date: 10/05/2017  Last Fill Quantity: 60,  # refills: 0   Last Office Visit with [facility], [facility] or [facility] prescribing provider: 10/11/2017    Thank You,  Pavan Stanley, Pharmacy [facility] [place]

## 2017-10-23 ENCOUNTER — TELEPHONE (OUTPATIENT)
Dept: CARDIOLOGY | Facility: CLINIC | Age: 42
End: 2017-10-23

## 2017-10-23 DIAGNOSIS — G47.9 DISTURBANCE IN SLEEP BEHAVIOR: ICD-10-CM

## 2017-10-23 RX ORDER — DIGOXIN 125 MCG
125 TABLET ORAL DAILY
Qty: 90 TABLET | Refills: 3 | Status: SHIPPED | OUTPATIENT
Start: 2017-10-23 | End: 2018-11-27

## 2017-10-23 NOTE — TELEPHONE ENCOUNTER
Xanax 0.5mg      Last Written Prescription Date: 10/12/2017  Last Fill Quantity: 40,  # refills: 0   Last Office Visit with [facility], [facility] or [facility] prescribing provider: 10/11/2017    Joy Fuentes CPhT  [facility] [place]  [phone]

## 2017-10-23 NOTE — TELEPHONE ENCOUNTER
Ellen needs a refill of digoxin (LANOXIN) 125 MCG tablet.  He has already received a few pills from the pharmacy but is now completely out.  She will be seeing Gayatri Altamirano this Wednesday.     [facility] [place], [place] - [address] [place] [address]       Addendum - Prescription Sent - MAMADOU Lau RN

## 2017-10-24 ENCOUNTER — HOSPITAL ENCOUNTER (OUTPATIENT)
Dept: CARDIAC REHAB | Facility: CLINIC | Age: 42
End: 2017-10-24
Attending: INTERNAL MEDICINE
Payer: COMMERCIAL

## 2017-10-24 VITALS — WEIGHT: 116 LBS | BODY MASS INDEX: 21.35 KG/M2 | HEIGHT: 62 IN

## 2017-10-24 PROCEDURE — 40000244 ZZH STATISTIC VISIT PULM REHAB: Performed by: REHABILITATION PRACTITIONER

## 2017-10-24 PROCEDURE — G0238 OTH RESP PROC, INDIV: HCPCS | Performed by: REHABILITATION PRACTITIONER

## 2017-10-24 ASSESSMENT — 6 MINUTE WALK TEST (6MWT)
GENDER SELECTION: FEMALE
FEMALE CALC: 641.6
TOTAL DISTANCE WALKED: 1032
MALE CALC: 584.66

## 2017-10-24 ASSESSMENT — DUKE ACTIVITY SCORE INDEX (DASI)
VO2_PEAK: 24.2
DASI METS SCORE: 6.91

## 2017-10-24 NOTE — PROGRESS NOTES
10/24/17 1100   Session   Session 30 Day Individualized Treatment Plan   Certified through this date 11/22/17   Type Reassessment   General Information   Treatment Diagnosis Interstitial Lung Disease   Secondary Treatment Diagnosis Pulmonary Hypertension   Classification of COPD NA   Hospital Location Not hospitalized   Outpatient Pulmonary Rehab Start Date 09/26/17   Primary Physician Dr. Loya   Pulmonolgist Dr. Osman   Medical History/Comorbidities   Medical History/Comorbidities Hypertension   Untoward Events/Exacerbations/Hospitalizations   Untoward Events/Exacerbations/Hospitalizations None   Sputum   Sputum Production Color Clear   Sputum Production Consistency Frothy   Tobacco History   Tobacco Current smoker, some days   Tobacco Habit Pipe   Years Smoked 25   Average Packs Per Day less than 0.25 ppd   Interventions Planned Tobacco Cessation Aides;Identify Support System;Develop Action Plan   Medications   Long-Acting Beta Agonist Not prescribed   Short-Acting Beta Agonist Prescribed, taking as prescribed   Long-Acting Anticholinergic Prescribed, taking as prescribed   Short-Acting Anticholinergic Not prescribed   Inhaled Corticosteroid Not prescribed   Oral Corticosteroid Not prescribed   Medications Reconciled By Patient;Medical record   Preventative Vaccinations   Influenza Vaccination Yes   Pneumonia Vaccination Yes   Pain   Patient Currently in Pain No   Falls Screen   Have you fallen two or more times in the past year? No   Have you fallen and had an injury in the past year? No   Referral initiated to physical therapy No   Living and Work Status   Living Arrangements house;other relative   Support System Live with an adult   ADL Limitations None   Initial Duke Activity Status Index (DASI) score. A measure of functional capacity. The goal is to have a pre-program raw score of 9.95 (~4 METs) or above 33.95   Initial DASI VO2 Peak (ml*kg-1*min-1) 24.2   Initial DASI MET Level 6.91   Physical Assessments  "  Incisions Not applicable   Edema None   Individualized Treatment Plan   Sessions Scheduled 24   Sessions Attended 7   Type Aerobic exercise;Resistance training   Oxygen Use   Supplemental Oxygen Needed Yes   Delivery Device Nasal Cannula   Liter Flow at Rest 4   Liter Flow with ADLS 6-8   Liter Flow During Exercise 8   Liter Flow With Sleep 4   Evaluated on Home System? No;Continuous flow   Interventions Recommended (Offered 1:1 with [place] home oxygen, patient is considerin)   Exercise Prescription   Mode Treadmill;Weights;Nustep   Frequency 2 days/week   Duration/Time 30-45 min;Intermittent bouts   THR (85% of age predicted max heart rate)  152.15   Effort Rating (0-10) 4-6   Progression of Exercise Will plan to increase by up to 0.5 METs per week   Oxygen Titration with Exercise > 88% with exercise   Exercise Assessment   6 Minute Walk Predicted - Gender Selection Female   6 Minute Walk Predicted (Female) 641.6   6 Minute Walk Predicted (Male) 584.66   6 Minute Walk Distance (Initial) 1032 Meters   Resting HR 82   Exercise HR 97   SpO2 94  (6 lpm)   Exercise SpO2 91  (8 lpm)   Current MET level 2.6   Exercise Tolerance fair   Normal Limits Discussed Yes   Current Symptoms at Home Anxiety;Dyspnea   Current Symptoms in Rehab Dyspnea   Nutrition Management   Age 41   Height 1.575 m (5' 2.01\")   Weight 52.6 kg (116 lb)   BMI (Calculated) 21.26   Assessment Normal   Psychosocial   Initial Patient Health Questionnaire -9 (PHQ-9) for depression. To notify physician if pre-score >9. 4   Initial [facility] Survey score. A quality of life measure. To re evaluate if total score is > 25. Also consider PHQ-9 and DASI to determine if patient should be referred back to physician. 19   Initial Shortness of Breath Questionnaire (SOBQ) score. The goal is to reduce the score pre to post program. 30   Intervention Planned Patient to identify 2-3 coping mechanisms to decrease stress and anxiety;Refer to [facility];Develop and implement coping techniques;Introduce relaxation techniques to assist with decreased anxiety;Use breathing techniques to control dyspnea cycle   Stages of Change   Aerobic Exercise Preparation   Physical Activity Preparation   Recommended diet NA   Stress Action   Smoking Cessation Preparation   Oxygen Usage Maintenance   Current Home Exercise   Type of Exercise None   Recommended Home Exercise Prescription   Type of Exercise Walking   Frequency (Days per week) 3-4   Duration (minutes per session) 15-30 min;Intermittent   Effort Rating Recommended (0-10) Scale  4-6/10   30 Day Exercise Plan initiate home exercise program   Learning Assessment   Learner Patient   Primary Language English   Preferred Learning Style Listening;Reading   Barriers to Learning No barriers noted   Patient Education/Referrals   Education Recommended Home Oxygen Equipment;Patient already attended in the past   Pulmonary Rehab Goals   Pulmonary Rehab Goals 1;2;3   Goal 1   Goal Patient will increase muscle mass to be stronger heading into winter season   Target Date 11/07/17   Progress Towards Goal 9/26 Patient will increase resistance training up to 5# over the next month. She wishes to reciver muscle mass to aid in strength and endurance as well as be able to deal with any illness over the winter months 10/24 Patient has been able to increase resistance training to 3# weights, this currently is challenging to patient, anticipate able to increase to 5# weights within 2 weeks   Goal 2   Goal Patient will increase her walk test results by 20%   Target Date 10/26/17   Progress Towards Goal 9/26 Patient will increase her walk distance by 200 feet over next month. Equal to 2.1 mph on treadmill 10/24 repeat 6 minute walk test being performed tomorrow   Goal 3   Goal Patient will start using a home activity monitor assess how active she is during her day   Target Date 11/07/17   Progress Towards Goal 10/24 new goal  today, patient will explore whether her phone has an activity monitor, will look at pedometer option if her phone does not have an activity monitor.   Assessment   Assessment Ellen has made fair progress in pulmonary rehab over the past month. She has been able to increase from 30 minutes at a peak of 2.5 METs initially to 45 minutes at a peak of 2.6 METs currently, requiring up to 8 lpm of supplemental O2 during exercise. She has initiated resistance training as well. She continues to work hard in pulmonary rehab but admits today that she is not as active as she should be at home and has not access to home exercise as we are heading into the winter season. We have initiated a new goal (#3 above) today to first assess her baseline activity level, then we will move to increase it to at least a moderate activity level if she is not reaching that already. She will continue to benefit from skilled services to monitor SpO2 during activity and titrate oxygen appropriately and to work patient towards achieving goals as listed above.

## 2017-10-25 ENCOUNTER — PRE VISIT (OUTPATIENT)
Dept: CARDIOLOGY | Facility: CLINIC | Age: 42
End: 2017-10-25

## 2017-10-25 ENCOUNTER — OFFICE VISIT (OUTPATIENT)
Dept: CARDIOLOGY | Facility: CLINIC | Age: 42
End: 2017-10-25
Attending: NURSE PRACTITIONER
Payer: COMMERCIAL

## 2017-10-25 VITALS
HEART RATE: 59 BPM | OXYGEN SATURATION: 98 % | BODY MASS INDEX: 21.23 KG/M2 | SYSTOLIC BLOOD PRESSURE: 121 MMHG | DIASTOLIC BLOOD PRESSURE: 72 MMHG | HEIGHT: 62 IN | WEIGHT: 115.4 LBS

## 2017-10-25 DIAGNOSIS — I27.20 PULMONARY HYPERTENSION (H): Primary | ICD-10-CM

## 2017-10-25 DIAGNOSIS — R06.09 DYSPNEA ON EXERTION: ICD-10-CM

## 2017-10-25 DIAGNOSIS — I27.20 PULMONARY HYPERTENSION (H): ICD-10-CM

## 2017-10-25 DIAGNOSIS — J84.9 ILD (INTERSTITIAL LUNG DISEASE) (H): ICD-10-CM

## 2017-10-25 LAB
6 MIN WALK (FT): 1140 FT
6 MIN WALK (M): 347 M
ANION GAP SERPL CALCULATED.3IONS-SCNC: 7 MMOL/L (ref 3–14)
BUN SERPL-MCNC: 9 MG/DL (ref 7–30)
CALCIUM SERPL-MCNC: 8.8 MG/DL (ref 8.5–10.1)
CHLORIDE SERPL-SCNC: 100 MMOL/L (ref 94–109)
CO2 SERPL-SCNC: 27 MMOL/L (ref 20–32)
CREAT SERPL-MCNC: 0.62 MG/DL (ref 0.52–1.04)
ERYTHROCYTE [DISTWIDTH] IN BLOOD BY AUTOMATED COUNT: 14.2 % (ref 10–15)
GFR SERPL CREATININE-BSD FRML MDRD: >90 ML/MIN/1.7M2
GLUCOSE SERPL-MCNC: 62 MG/DL (ref 70–99)
HCT VFR BLD AUTO: 40.3 % (ref 35–47)
HGB BLD-MCNC: 12.4 G/DL (ref 11.7–15.7)
MCH RBC QN AUTO: 28 PG (ref 26.5–33)
MCHC RBC AUTO-ENTMCNC: 30.8 G/DL (ref 31.5–36.5)
MCV RBC AUTO: 91 FL (ref 78–100)
NT-PROBNP SERPL-MCNC: 402 PG/ML (ref 0–125)
PLATELET # BLD AUTO: 382 10E9/L (ref 150–450)
POTASSIUM SERPL-SCNC: 3.9 MMOL/L (ref 3.4–5.3)
RBC # BLD AUTO: 4.43 10E12/L (ref 3.8–5.2)
SODIUM SERPL-SCNC: 134 MMOL/L (ref 133–144)
WBC # BLD AUTO: 12 10E9/L (ref 4–11)

## 2017-10-25 PROCEDURE — 85027 COMPLETE CBC AUTOMATED: CPT | Performed by: NURSE PRACTITIONER

## 2017-10-25 PROCEDURE — 36415 COLL VENOUS BLD VENIPUNCTURE: CPT | Performed by: NURSE PRACTITIONER

## 2017-10-25 PROCEDURE — 99212 OFFICE O/P EST SF 10 MIN: CPT | Mod: ZF

## 2017-10-25 PROCEDURE — 99214 OFFICE O/P EST MOD 30 MIN: CPT | Mod: ZP | Performed by: NURSE PRACTITIONER

## 2017-10-25 PROCEDURE — 83880 ASSAY OF NATRIURETIC PEPTIDE: CPT | Performed by: NURSE PRACTITIONER

## 2017-10-25 PROCEDURE — 80048 BASIC METABOLIC PNL TOTAL CA: CPT | Performed by: NURSE PRACTITIONER

## 2017-10-25 RX ORDER — ALPRAZOLAM 0.5 MG
TABLET ORAL
Qty: 40 TABLET | Refills: 0 | Status: SHIPPED | OUTPATIENT
Start: 2017-10-25 | End: 2017-11-01

## 2017-10-25 ASSESSMENT — PAIN SCALES - GENERAL: PAINLEVEL: NO PAIN (0)

## 2017-10-25 NOTE — PROGRESS NOTES
"October 25, 2017      Ms. Ellen Loya is a pleasant 41 year old female with a past medical history of polymyositis, ILD, and pulmonary arterial hypertension.  She is currently on Adcirca monotherapy 40 mg daily, and she did not tolerate Tyvaso due to worsening VQ mismatch and hypoxia.    Today, she is accompanied by: no one; here alone    Current Symptoms  1. Any ER visits or hospital admissions since last appointment: No    2. Shortness of breath: Yes: with exertion, climbing stairs.  Wear 8L with activity, 3-4L at rest and will momentarily take the oxygen off to conserve it.   3. Dizziness or lightheadedness: No  4. Any syncopal episodes or falls: No  5. Chest pain or chest tightness: No, now resolved. Had previously experienced \"crushing\" pressure with exertion.    6. Nausea, vomiting, diarrhea, constipation: No  7. Swelling in the legs: No  8. Bloating in the abdomen: No  9. PND; Waking up at night coughing, choking or SOB: No  10. Any medication intolerances: No  11. Reported headaches: No  12. Jaw pain or bone/joint pain: No  13. On IV Prostacyclin: No; current dose: N/A    Overall - states she feels \"great right now across the board\".    PAST MEDICAL HISTORY:  Past Medical History:   Diagnosis Date     Acute bronchitis 06/05/07    Admit. Discharged 06/05/07     Anxiety      Arthritis     h/o \"seronegative rheumatoid arthritis\" since age 30, however no evidence of active arthritis by Rheum eval [id number]     ASCUS favor benign 5/15/14    neg HPV     ILD (interstitial lung disease) (H)     seen on chest CT 5-2011; methotrexate stopped 6-2011     Lung nodule     KM nodule; EBUS 12/2014 of lymph nodes was negative; CT-guided bx 1-2015 hamartoma/chondroma     Prematurity     born 6-7 weeks early     Pulmonary hypertension     RHC 2/2016 mean PA 25     Varicella without mention of complication          FAMILY HISTORY:  Family History   Problem Relation Age of Onset     Arthritis Mother      psoriatic arthritis "     Depression Mother      DIABETES Mother      Thyroid Disease Mother      Obesity Mother      Hyperlipidemia Mother      Lipids Father      HEART DISEASE Father      recent angioplasty for 3 blocked arteries.     Substance Abuse Father      Hypertension Father      CEREBROVASCULAR DISEASE Father      Hyperlipidemia Father      Coronary Artery Disease Father      LUNG DISEASE Father      Scleroderma Paternal Uncle      Had scleroderma ILD     Other Cancer Paternal Grandmother      lung cancer     Substance Abuse Brother      Depression Brother      Asthma Brother      DIABETES Maternal Grandfather      adult onset     Hyperlipidemia Maternal Grandfather          SOCIAL HISTORY:  Social History   Substance Use Topics     Smoking status: Former Smoker     Packs/day: 0.50     Years: 25.00     Types: Cigarettes     Start date: 12/3/1992     Quit date: 12/14/2016     Smokeless tobacco: Former User     Quit date: 12/14/2016     Alcohol use No      Comment: 5 per month or less         CURRENT MEDICATIONS:  Current Outpatient Prescriptions   Medication Sig Dispense Refill     ALPRAZolam (XANAX) 0.5 MG tablet TAKE 1 TABLET BY MOUTH EVERY SIX HOURS AS NEEDED FOR ANXIETY. 40 tablet 0     digoxin (LANOXIN) 125 MCG tablet Take 1 tablet (125 mcg) by mouth daily 90 tablet 3     oxyCODONE-acetaminophen (PERCOCET)  MG per tablet Take 1 tablet by mouth every 6 hours as needed for moderate to severe pain 60 tablet 0     morphine (MS CONTIN) 15 MG 12 hr tablet Take 1 tablet (15 mg) by mouth 2 times daily 60 tablet 0     fluticasone (FLONASE) 50 MCG/ACT spray Spray 2 sprays into both nostrils daily 1 Bottle 3     tiotropium (SPIRIVA HANDIHALER) 18 MCG capsule Inhale contents of one capsule daily. 30 capsule 1     fluticasone (FLOVENT HFA) 220 MCG/ACT Inhaler Inhale 2 puffs into the lungs 2 times daily 1 Inhaler 3     order for DME Please provide patient with updated oxygen equipment.  Patient requires 3 to 4 LPM continuous flow  while driving or sitting and 8 LPM continuous flow with all other activity. 1 Device 0     order for DME Equipment being ordered: Oxygen 8 liters continuous at rest and 8 liters continuous with activity. Needs portability.  Please provide concentrator that goes to 10 liters. 1 Device prn     order for DME Oxygen: Patient requires supplemental Oxygen 4 LPM via nasal canula at rest and 6 LPM with activity. Please provide patient with portability capability. Okay to spot check patient on oxygen device, to keep sats above 90%. Please provider with home concentrator that can go up to 10 LPM. Oxygen will be for a lifetime. 1 Device 0     Esomeprazole Magnesium (NEXIUM PO) Take 40 mg by mouth every morning (before breakfast)       albuterol (ALBUTEROL) 108 (90 BASE) MCG/ACT Inhaler Inhale 1-2 puffs into the lungs every 4 hours as needed for shortness of breath / dyspnea 1 Inhaler 8     tadalafil, PAH, (ADCIRCA) 20 MG TABS Take 2 tablets (40 mg) by mouth daily 60 tablet 11     furosemide (LASIX) 20 MG tablet Take 2 tablets (40 mg) by mouth daily 180 tablet 3     albuterol (2.5 MG/3ML) 0.083% nebulizer solution Use every 4-6 hours as needed for shortness of breath 60 vial 3     order for DME Equipment being ordered: Nebulizer. Verbal order from  1 Device 0     order for DME Equipment being ordered: Oximeter 1 Device 0     order for DME Equipment being ordered: Oxygen oximeter and mask 1 Device 0     order for DME Equipment being ordered: Oxygen    Please provide patient with a home-fill system for portability. 1 Device 99     order for DME Equipment being ordered: personal O2 oximeter. 1 Device 0     azithromycin (ZITHROMAX) 250 MG tablet Two tablets first day, then one tablet daily for four days. (Patient not taking: Reported on 10/25/2017) 6 tablet 0     azaTHIOprine (IMURAN) 50 MG tablet Take one tablet daily for two weeks, then increase to two tabs daily for two weeks, then 3 tablets daily for maintenance dose.  "(Patient not taking: Reported on 10/11/2017) 90 tablet 3     order for DME Please provide patient with 2  liquid oxygen tanks for portability. Spot check patient on liquid oxygen. Titrate oxygen to maintain saturations above 88%. 2 Device 0         ROS:   10 point ROS of systems including Constitutional, Eyes, Respiratory, Cardiovascular, Gastroenterology, Genitourinary, Integumentary, Muscularskeletal, Psychiatric were all negative except for pertinent positives noted in my HPI.    EXAM:  /72 (BP Location: Left arm, Patient Position: Chair, Cuff Size: Adult Regular)  Pulse 59  Ht 1.575 m (5' 2\")  Wt 52.3 kg (115 lb 6.4 oz)  LMP 10/01/2017 (Approximate)  SpO2 98%  BMI 21.11 kg/m2  General: appears comfortable, alert and articulate  Head: normocephalic, atraumatic  Eyes: anicteric sclera, EOMI  Neck: no adenopathy  Orophyarynx: moist mucosa, no lesions, dentition intact  Heart: regular, S1/S2, no murmur, gallop, rub, estimated JVP elevated 6 cm H2O  Lungs: bilateral lower lung rales; no wheezing  Abdomen: soft, non-tender, bowel sounds present, no hepatosplenomegaly  Extremities: positive clubbing, no cyanosis or edema  Neurological: normal speech and affect, no gross motor deficits      Weight  Wt Readings from Last 10 Encounters:   10/25/17 52.3 kg (115 lb 6.4 oz)   10/24/17 52.6 kg (116 lb)   10/17/17 52.3 kg (115 lb 6.4 oz)   10/11/17 55.2 kg (121 lb 12.8 oz)   10/10/17 53 kg (116 lb 12.8 oz)   10/03/17 51.3 kg (113 lb)   09/28/17 54 kg (119 lb)   09/26/17 54 kg (119 lb 0.8 oz)   07/21/17 54.1 kg (119 lb 4.8 oz)   06/28/17 54.4 kg (120 lb)       Labs:    CBC RESULTS:  Lab Results   Component Value Date    WBC 9.1 07/21/2017    RBC 4.62 07/21/2017    HGB 13.2 07/21/2017    HCT 41.2 07/21/2017    MCV 89 07/21/2017    MCH 28.6 07/21/2017    MCHC 32.0 07/21/2017    RDW 15.2 (H) 07/21/2017     07/21/2017       CMP RESULTS:  Lab Results   Component Value Date     10/25/2017    POTASSIUM 3.9 " 10/25/2017    CHLORIDE 100 10/25/2017    CO2 27 10/25/2017    ANIONGAP 7 10/25/2017    GLC 62 (L) 10/25/2017    BUN 9 10/25/2017    CR 0.62 10/25/2017    GFRESTIMATED >90 10/25/2017    GFRESTBLACK >90 10/25/2017    MARCIN 8.8 10/25/2017    BILITOTAL 0.4 07/21/2017    ALBUMIN 3.5 07/21/2017    ALKPHOS 69 07/21/2017    ALT 10 07/21/2017    AST 11 07/21/2017        INR RESULTS:  Lab Results   Component Value Date    INR 1.04 01/23/2015       BNP RESULTS:  Lab Results   Component Value Date    NTBNPI 4168 (H) 11/30/2016     No results found for: BNP      Recent Tests:    Echocardiogram (4/12/2017):  Interpretation Summary  Global and regional left ventricular function is normal with an EF of 55-60%.  Traced at 55%.  Global right ventricular function is mildly reduced with mild right  ventricular dilation is present. [TAPSE 1.8cm; S' 9cm/s]  Pulmonary hypertension with right ventricular systolic pressure 43mmHg above  the right atrial pressure.  The inferior vena cava is normal. Estimated mean right atrial pressure is <3  mmHg.  No pericardial effusion is present.  Compared to the previous study dated 10/3/16, there may be interval  improvement in RV size.      RHC (4/26/2017):  RA-4/8, 4  RV-59/2/8  PA-60/20, 32  PAW-11/17, 10  PVR-4.58  CO/CI-5.0/3.2  [place] CO/CI-4.8/3.1      PFT (6/21/2017):   PFT Latest Ref Rng & Units 6/21/2017   FVC L 2.45   FEV1 L 2.10   FVC% % 72   FEV1% % 75       6MWT (7/21/2017):  341 meters, on 8.5L oxygen, desaturation to 88%    6MWT (10/25/2017):  347 meters, on 8.5L oxygen, desaturation to 90% - felt she could have walked further      Assessment and Plan:   Ms. Ellen Loya is a 41 year old female with pulmonary hypertension and ILD.  WHO Group I/III, NYHA Functional Class II/III.    Ms. Ellen Loya returns to clinic for follow-up and reports feeling the best she has felt in a long time.  Minimal symptoms noted.  Clinically, her vital signs are stable, she's afebrile and her weight is  down secondary to being sick recently.  She has slightly elevated JVD, no abdominal bloating or lower leg edema. Her labs are unremarkable other than a slightly elevated BNP.  We discussed her taking an additional 20 mg Lasix on days her JVD is noticeable, she has lower leg swelling, feelings of being bloated or her weight has changed by 2 pounds.  She verbalized understanding.    She also completed a six minute walk test today and despite feeling she did not walk as well as she could have due to people in the hallway, she still walked 6 meters further than her last walk.  She will return to clinic in three months or sooner if her condition should change.    It was a pleasure seeing Ms. Ellen Loya at the [facility].       Sincerely,  Gayatri Altamirano, PURA, CNP.

## 2017-10-25 NOTE — PATIENT INSTRUCTIONS
Medication Changes:   1. Take an extra Lasix tablet (20 mg) on days you noticed a change in your weight, lower leg swelling, abdominal bloating, or your neck veins look distended.    Patient Instructions:  1. Continue staying active, eating a low sodium, low fat diet.    Follow up Appointment Information:  1. Follow up in three months with Dr. Ledesma and labs    Results:  1. Your labs and 6 minute walk looked great  We are located on the third [facility] the [facility].  Our address is     [address] [place] [address] on [address] [place] [address]      Thank you for allowing us to be a part of your care here at the [facility]    If you have questions or concerns please contact us at:    Naty Lau RN, BSN    Louis Jack (Schedule,P.A.)  Nurse Coordinator     Clinic   Pulmonary Hypertension   Pulmonary Hypertension  [facility]  (P)[phone]    (P) [phone]        (F)[phone]                ** Please note that you will NOT receive a reminder call regarding your scheduled testing, reminder calls are for provider appointments only.  If you are scheduled for testing within the [facility] system you may receive a call regarding pre-registration for billing purposes only.**     Remember to weigh yourself daily after voiding and before you consume any food or beverages and log the numbers.  If you have gained/lost 2 pounds overnight or 5 pounds in a week contact us immediately for medication adjustments or further instructions.  **Please call us immediately if you have any syncope, chest pain, edema, or decline in your functional status.    Support Group:  Pulmonary Hypertension Association  Https://www.phassociation.org/  **Look at the Events Tab** They even have Support Groups that you can call into    [facility]  Support Group  First Saturday of the Month from 1-3 PM   Location: [address] [place] [address] [facility] [address]  Leader: Cosmo Ann  Phone: [phone]  Email: [email or web address]

## 2017-10-25 NOTE — NURSING NOTE
Chief Complaint   Patient presents with     Follow Up For     Return for PH F/U with Labs and 6MWT prior     Vitals were taken and medications were reconciled.    Justine Carroll CMA     11:45 AM

## 2017-10-25 NOTE — TELEPHONE ENCOUNTER
Return Pulmonary Hypertension Patient Form       Patient Name: Ellen Loya Age: 41F [birth year] MRN:  [id number]   Todays Provider: PURA Romero CNP  Appt: 10/25/2017             PH Medications: Adcirca 40 mg Daily                 Todays Testing: Lab and 6MWT                 Patient Update: Pt was last seen on 7/21/17 by Dr. Ledesma, at that time we requested she follow up in 3 months.  Since then she has not called with any complaints.    Pt has a history of pulmonary arterial hypertension in the setting of combined pulmonary fibrosis, emphysema and inflammatory arthritis.

## 2017-10-25 NOTE — MR AVS SNAPSHOT
After Visit Summary   10/25/2017    Ellen Loya    MRN: [id number]           Patient Information     Date Of Birth          1975        Visit Information        Provider Department      10/25/2017 11:30 AM Gayatri Altamirano APRN CNP [facility]        Care Instructions    Medication Changes:   1. Take an extra Lasix tablet (20 mg) on days you noticed a change in your weight, lower leg swelling, abdominal bloating, or your neck veins look distended.    Patient Instructions:  1. Continue staying active, eating a low sodium, low fat diet.    Follow up Appointment Information:  1. Follow up in three months with Dr. Ledesma and labs    Results:  1. Your labs and 6 minute walk looked great  We are located on the third floor of the [facility] ([facility] on the [facility].  Our address is     [address] [place] [address] on [address] [place] [address]      Thank you for allowing us to be a part of your care here at the [facility]    If you have questions or concerns please contact us at:    Naty Lau RN, BSN    Louis Jack (Schedule,P.A.)  Nurse Coordinator     Clinic   Pulmonary Hypertension   Pulmonary Hypertension  [facility]  (P)[phone]    (P) [phone]        (F)[phone]                ** Please note that you will NOT receive a reminder call regarding your scheduled testing, reminder calls are for provider appointments only.  If you are scheduled for testing within the [facility] system you may receive a call regarding pre-registration for billing purposes only.**     Remember to weigh yourself daily after voiding and before you consume any food or beverages and log the numbers.  If you have gained/lost 2 pounds overnight or 5 pounds in a week contact us immediately for medication adjustments or further  instructions.  **Please call us immediately if you have any syncope, chest pain, edema, or decline in your functional status.    Support Group:  Pulmonary Hypertension Association  Https://www.phassociation.org/  **Look at the Events Tab** They even have Support Groups that you can call into    [facility] Support Group  First Saturday of the Month from 1-3 PM   Location: [address] [place] [address]  Leader: Cosmo Ann  Phone: [phone]  Email: [email or web address]              Follow-ups after your visit        Follow-up notes from your care team     Return in about 3 months (around 1/25/2018) for with Callie, Return PH, with, Labs.      Your next 10 appointments already scheduled     Oct 26, 2017 11:00 AM CDT   Pulmonary Treatment with ELIZA Gutierrez   [facility] ([facility])    [address] [place] [address]   [phone]            Oct 31, 2017 11:00 AM CDT   Pulmonary Treatment with ELIZA Gutierrez   [facility] ([facility])    [address] [place] [address]   [phone]            Nov 01, 2017  3:00 PM CDT   PFT VISIT with  PFL B   [facility] ([facility])    [address] [place] [address]   [phone]            Nov 01, 2017  3:30 PM CDT   (Arrive by 3:15 PM)   Return Interstitial Lung with Dawson Upton MD   [facility] ([facility])    [address] [place] [address]   [phone]            Nov 02, 2017 11:00 AM CDT   Pulmonary Treatment with ELIZA Gutierrez   [facility] ([facility])    [address] [place] [address]   [phone]            Feb 05, 2018 10:30 AM CST   Lab with  LAB   [facility] ([facility])    [address] [place] [address]  "[facility] [id number] [phone]            Feb 05, 2018 11:00 AM CST   (Arrive by 10:45 AM)   RETURN PRIMARY PULMONARY with Callie Ledesma MD   [facility] ([facility])    [address]  [address] [facility] [id number] [phone]              Who to contact     If you have questions or need follow up information about today's clinic visit or your schedule please contact [facility] directly at [phone].  Normal or non-critical lab and imaging results will be communicated to you by XOXO Kitchenhart, letter or phone within 4 business days after the clinic has received the results. If you do not hear from us within 7 days, please contact the clinic through [email or web address] or phone. If you have a critical or abnormal lab result, we will notify you by phone as soon as possible.  Submit refill requests through [employer] or call your pharmacy and they will forward the refill request to us. Please allow 3 business days for your refill to be completed.          Additional Information About Your Visit        [employer] Information     [employer] gives you secure access to your electronic health record. If you see a primary care provider, you can also send messages to your care team and make appointments. If you have questions, please call your primary care clinic.  If you do not have a primary care provider, please call [phone] and they will assist you.        Care EveryWhere ID     This is your Care EveryWhere ID. This could be used by other organizations to access your [facility] medical records  [id number]        Your Vitals Were     Pulse Height Last Period Pulse Oximetry BMI (Body Mass Index)       59 1.575 m (5' 2\") 10/01/2017 (Approximate) 98% 21.11 kg/m2        Blood Pressure from Last 3 Encounters:   10/25/17 121/72   10/11/17 116/62   09/02/17 117/66    Weight from Last 3 Encounters:   10/25/17 52.3 kg (115 lb 6.4 oz)   10/24/17 52.6 kg (116 lb) "   10/17/17 52.3 kg (115 lb 6.4 oz)              Today, you had the following     No orders found for display       Primary Care Provider Office Phone # Fax #    Ignacio Loya [phone]       [facility] [address] [place] [address]        Equal Access to Services     [facility]: Hadii aad ku hadasho Soomaali, waaxda luqadaha, qaybta kaalmada adeegyada, waxay idiin hayaan adeeg kharash la'aan . So [facility] [phone].    ATENCIÓN: Si habla español, tiene a bassett disposición servicios gratuitos de asistencia lingüística. Llame al [phone].    We comply with applicable federal civil rights laws and [place] laws. We do not discriminate on the basis of race, color, national origin, age, disability, sex, sexual orientation, or gender identity.            Thank you!     Thank you for choosing [facility]  for your care. Our goal is always to provide you with excellent care. Hearing back from our patients is one way we can continue to improve our services. Please take a few minutes to complete the written survey that you may receive in the mail after your visit with us. Thank you!             Your Updated Medication List - Protect others around you: Learn how to safely use, store and throw away your medicines at www.disposemymeds.org.          This list is accurate as of: 10/25/17 12:15 PM.  Always use your most recent med list.                   Brand Name Dispense Instructions for use Diagnosis    * albuterol (2.5 MG/3ML) 0.083% neb solution     60 vial    Use every 4-6 hours as needed for shortness of breath    Pleuritic chest pain, Acute bronchitis, unspecified organism       * albuterol 108 (90 BASE) MCG/ACT Inhaler    PROAIR HFA    1 Inhaler    Inhale 1-2 puffs into the lungs every 4 hours as needed for shortness of breath / dyspnea    SOB (shortness of breath)       ALPRAZolam 0.5 MG tablet    XANAX    40 tablet    TAKE 1 TABLET BY MOUTH EVERY SIX HOURS  AS NEEDED FOR ANXIETY.    Disturbance in sleep behavior       azaTHIOprine 50 MG tablet    IMURAN    90 tablet    Take one tablet daily for two weeks, then increase to two tabs daily for two weeks, then 3 tablets daily for maintenance dose.    ILD (interstitial lung disease) (H)       azithromycin 250 MG tablet    ZITHROMAX    6 tablet    Two tablets first day, then one tablet daily for four days.    Acute bronchitis, unspecified organism       digoxin 125 MCG tablet    LANOXIN    90 tablet    Take 1 tablet (125 mcg) by mouth daily    Pulmonary hypertension       fluticasone 220 MCG/ACT Inhaler    FLOVENT HFA    1 Inhaler    Inhale 2 puffs into the lungs 2 times daily    ILD (interstitial lung disease) (H)       fluticasone 50 MCG/ACT spray    FLONASE    1 Bottle    Spray 2 sprays into both nostrils daily    Nasal congestion       furosemide 20 MG tablet    LASIX    180 tablet    Take 2 tablets (40 mg) by mouth daily    Pulmonary hypertension       morphine 15 MG 12 hr tablet    MS CONTIN    60 tablet    Take 1 tablet (15 mg) by mouth 2 times daily    Inflammatory arthritis       NEXIUM PO      Take 40 mg by mouth every morning (before breakfast)    Pulmonary hypertension       * order for DME     1 Device    Equipment being ordered: personal O2 oximeter.    Hypoxia       * order for DME     1 Device    Equipment being ordered: Oxygen  Please provide patient with a home-fill system for portability.    ILD (interstitial lung disease) (H), Hypoxia       * order for DME     2 Device    Please provide patient with 2  liquid oxygen tanks for portability. Spot check patient on liquid oxygen. Titrate oxygen to maintain saturations above 88%.    ILD (interstitial lung disease) (H)       * order for DME     1 Device    Equipment being ordered: Oxygen oximeter and mask    ILD (interstitial lung disease) (H), Lung nodule, Fibrosis of lung (H), Pulmonary hypertension       * order for DME     1 Device    Equipment being  ordered: Oximeter    ILD (interstitial lung disease) (H)       * order for DME     1 Device    Equipment being ordered: Nebulizer. Verbal order from     Acute bronchitis, unspecified organism       * order for DME     1 Device    Oxygen: Patient requires supplemental Oxygen 4 LPM via nasal canula at rest and 6 LPM with activity. Please provide patient with portability capability. Okay to spot check patient on oxygen device, to keep sats above 90%. Please provider with home concentrator that can go up to 10 LPM. Oxygen will be for a lifetime.    Hypoxia       * order for DME     1 Device    Equipment being ordered: Oxygen 8 liters continuous at rest and 8 liters continuous with activity. Needs portability.  Please provide concentrator that goes to 10 liters.    ILD (interstitial lung disease) (H), Hypoxia       * order for DME     1 Device    Please provide patient with updated oxygen equipment.  Patient requires 3 to 4 LPM continuous flow while driving or sitting and 8 LPM continuous flow with all other activity.    ILD (interstitial lung disease) (H)       oxyCODONE-acetaminophen  MG per tablet    PERCOCET    60 tablet    Take 1 tablet by mouth every 6 hours as needed for moderate to severe pain    Pleuritic chest pain       tadalafil (PAH) 20 MG Tabs    ADCIRCA    60 tablet    Take 2 tablets (40 mg) by mouth daily    Pulmonary hypertension       tiotropium 18 MCG capsule    SPIRIVA HANDIHALER    30 capsule    Inhale contents of one capsule daily.    ILD (interstitial lung disease) (H)       * Notice:  This list has 11 medication(s) that are the same as other medications prescribed for you. Read the directions carefully, and ask your doctor or other care provider to review them with you.

## 2017-10-25 NOTE — LETTER
"10/25/2017    RE: Ellen Loya  [address]  [facility] [address]       Dear Colleague,    Thank you for the opportunity to participate in the care of your patient, Ellen Loya, at the [facility]. Please see a copy of my visit note below.    October 25, 2017      Ms. Ellen Loya is a pleasant 41 year old female with a past medical history of polymyositis, ILD, and pulmonary arterial hypertension.  She is currently on Adcirca monotherapy 40 mg daily, and she did not tolerate Tyvaso due to worsening VQ mismatch and hypoxia.    Today, she is accompanied by: no one; here alone    Current Symptoms  1. Any ER visits or hospital admissions since last appointment: No    2. Shortness of breath: Yes: with exertion, climbing stairs.  Wear 8L with activity, 3-4L at rest and will momentarily take the oxygen off to conserve it.   3. Dizziness or lightheadedness: No  4. Any syncopal episodes or falls: No  5. Chest pain or chest tightness: No, now resolved. Had previously experienced \"crushing\" pressure with exertion.    6. Nausea, vomiting, diarrhea, constipation: No  7. Swelling in the legs: No  8. Bloating in the abdomen: No  9. PND; Waking up at night coughing, choking or SOB: No  10. Any medication intolerances: No  11. Reported headaches: No  12. Jaw pain or bone/joint pain: No  13. On IV Prostacyclin: No; current dose: N/A    Overall - states she feels \"great right now across the board\".    PAST MEDICAL HISTORY:  Past Medical History:   Diagnosis Date     Acute bronchitis 06/05/07    Admit. Discharged 06/05/07     Anxiety      Arthritis     h/o \"seronegative rheumatoid arthritis\" since age 30, however no evidence of active arthritis by Rheum eval [id number]     ASCUS favor benign 5/15/14    neg HPV     ILD (interstitial lung disease) (H)     seen on chest CT 5-2011; methotrexate stopped 6-2011     Lung nodule     KM nodule; EBUS 12/2014 of lymph " nodes was negative; CT-guided bx 1-2015 hamartoma/chondroma     Prematurity     born 6-7 weeks early     Pulmonary hypertension     RHC 2/2016 mean PA 25     Varicella without mention of complication          FAMILY HISTORY:  Family History   Problem Relation Age of Onset     Arthritis Mother      psoriatic arthritis     Depression Mother      DIABETES Mother      Thyroid Disease Mother      Obesity Mother      Hyperlipidemia Mother      Lipids Father      HEART DISEASE Father      recent angioplasty for 3 blocked arteries.     Substance Abuse Father      Hypertension Father      CEREBROVASCULAR DISEASE Father      Hyperlipidemia Father      Coronary Artery Disease Father      LUNG DISEASE Father      Scleroderma Paternal Uncle      Had scleroderma ILD     Other Cancer Paternal Grandmother      lung cancer     Substance Abuse Brother      Depression Brother      Asthma Brother      DIABETES Maternal Grandfather      adult onset     Hyperlipidemia Maternal Grandfather          SOCIAL HISTORY:  Social History   Substance Use Topics     Smoking status: Former Smoker     Packs/day: 0.50     Years: 25.00     Types: Cigarettes     Start date: 12/3/1992     Quit date: 12/14/2016     Smokeless tobacco: Former User     Quit date: 12/14/2016     Alcohol use No      Comment: 5 per month or less         CURRENT MEDICATIONS:  Current Outpatient Prescriptions   Medication Sig Dispense Refill     ALPRAZolam (XANAX) 0.5 MG tablet TAKE 1 TABLET BY MOUTH EVERY SIX HOURS AS NEEDED FOR ANXIETY. 40 tablet 0     digoxin (LANOXIN) 125 MCG tablet Take 1 tablet (125 mcg) by mouth daily 90 tablet 3     oxyCODONE-acetaminophen (PERCOCET)  MG per tablet Take 1 tablet by mouth every 6 hours as needed for moderate to severe pain 60 tablet 0     morphine (MS CONTIN) 15 MG 12 hr tablet Take 1 tablet (15 mg) by mouth 2 times daily 60 tablet 0     fluticasone (FLONASE) 50 MCG/ACT spray Spray 2 sprays into both nostrils daily 1 Bottle 3      tiotropium (SPIRIVA HANDIHALER) 18 MCG capsule Inhale contents of one capsule daily. 30 capsule 1     fluticasone (FLOVENT HFA) 220 MCG/ACT Inhaler Inhale 2 puffs into the lungs 2 times daily 1 Inhaler 3     order for DME Please provide patient with updated oxygen equipment.  Patient requires 3 to 4 LPM continuous flow while driving or sitting and 8 LPM continuous flow with all other activity. 1 Device 0     order for DME Equipment being ordered: Oxygen 8 liters continuous at rest and 8 liters continuous with activity. Needs portability.  Please provide concentrator that goes to 10 liters. 1 Device prn     order for DME Oxygen: Patient requires supplemental Oxygen 4 LPM via nasal canula at rest and 6 LPM with activity. Please provide patient with portability capability. Okay to spot check patient on oxygen device, to keep sats above 90%. Please provider with home concentrator that can go up to 10 LPM. Oxygen will be for a lifetime. 1 Device 0     Esomeprazole Magnesium (NEXIUM PO) Take 40 mg by mouth every morning (before breakfast)       albuterol (ALBUTEROL) 108 (90 BASE) MCG/ACT Inhaler Inhale 1-2 puffs into the lungs every 4 hours as needed for shortness of breath / dyspnea 1 Inhaler 8     tadalafil, PAH, (ADCIRCA) 20 MG TABS Take 2 tablets (40 mg) by mouth daily 60 tablet 11     furosemide (LASIX) 20 MG tablet Take 2 tablets (40 mg) by mouth daily 180 tablet 3     albuterol (2.5 MG/3ML) 0.083% nebulizer solution Use every 4-6 hours as needed for shortness of breath 60 vial 3     order for DME Equipment being ordered: Nebulizer. Verbal order from  1 Device 0     order for DME Equipment being ordered: Oximeter 1 Device 0     order for DME Equipment being ordered: Oxygen oximeter and mask 1 Device 0     order for DME Equipment being ordered: Oxygen    Please provide patient with a home-fill system for portability. 1 Device 99     order for DME Equipment being ordered: personal O2 oximeter. 1 Device 0      "azithromycin (ZITHROMAX) 250 MG tablet Two tablets first day, then one tablet daily for four days. (Patient not taking: Reported on 10/25/2017) 6 tablet 0     azaTHIOprine (IMURAN) 50 MG tablet Take one tablet daily for two weeks, then increase to two tabs daily for two weeks, then 3 tablets daily for maintenance dose. (Patient not taking: Reported on 10/11/2017) 90 tablet 3     order for DME Please provide patient with 2  liquid oxygen tanks for portability. Spot check patient on liquid oxygen. Titrate oxygen to maintain saturations above 88%. 2 Device 0         ROS:   10 point ROS of systems including Constitutional, Eyes, Respiratory, Cardiovascular, Gastroenterology, Genitourinary, Integumentary, Muscularskeletal, Psychiatric were all negative except for pertinent positives noted in my HPI.    EXAM:  /72 (BP Location: Left arm, Patient Position: Chair, Cuff Size: Adult Regular)  Pulse 59  Ht 1.575 m (5' 2\")  Wt 52.3 kg (115 lb 6.4 oz)  LMP 10/01/2017 (Approximate)  SpO2 98%  BMI 21.11 kg/m2  General: appears comfortable, alert and articulate  Head: normocephalic, atraumatic  Eyes: anicteric sclera, EOMI  Neck: no adenopathy  Orophyarynx: moist mucosa, no lesions, dentition intact  Heart: regular, S1/S2, no murmur, gallop, rub, estimated JVP elevated 6 cm H2O  Lungs: bilateral lower lung rales; no wheezing  Abdomen: soft, non-tender, bowel sounds present, no hepatosplenomegaly  Extremities: positive clubbing, no cyanosis or edema  Neurological: normal speech and affect, no gross motor deficits      Weight  Wt Readings from Last 10 Encounters:   10/25/17 52.3 kg (115 lb 6.4 oz)   10/24/17 52.6 kg (116 lb)   10/17/17 52.3 kg (115 lb 6.4 oz)   10/11/17 55.2 kg (121 lb 12.8 oz)   10/10/17 53 kg (116 lb 12.8 oz)   10/03/17 51.3 kg (113 lb)   09/28/17 54 kg (119 lb)   09/26/17 54 kg (119 lb 0.8 oz)   07/21/17 54.1 kg (119 lb 4.8 oz)   06/28/17 54.4 kg (120 lb)       Labs:    CBC RESULTS:  Lab Results "   Component Value Date    WBC 9.1 07/21/2017    RBC 4.62 07/21/2017    HGB 13.2 07/21/2017    HCT 41.2 07/21/2017    MCV 89 07/21/2017    MCH 28.6 07/21/2017    MCHC 32.0 07/21/2017    RDW 15.2 (H) 07/21/2017     07/21/2017       CMP RESULTS:  Lab Results   Component Value Date     10/25/2017    POTASSIUM 3.9 10/25/2017    CHLORIDE 100 10/25/2017    CO2 27 10/25/2017    ANIONGAP 7 10/25/2017    GLC 62 (L) 10/25/2017    BUN 9 10/25/2017    CR 0.62 10/25/2017    GFRESTIMATED >90 10/25/2017    GFRESTBLACK >90 10/25/2017    MARCIN 8.8 10/25/2017    BILITOTAL 0.4 07/21/2017    ALBUMIN 3.5 07/21/2017    ALKPHOS 69 07/21/2017    ALT 10 07/21/2017    AST 11 07/21/2017        INR RESULTS:  Lab Results   Component Value Date    INR 1.04 01/23/2015       BNP RESULTS:  Lab Results   Component Value Date    NTBNPI 4168 (H) 11/30/2016     No results found for: BNP      Recent Tests:    Echocardiogram (4/12/2017):  Interpretation Summary  Global and regional left ventricular function is normal with an EF of 55-60%.  Traced at 55%.  Global right ventricular function is mildly reduced with mild right  ventricular dilation is present. [TAPSE 1.8cm; S' 9cm/s]  Pulmonary hypertension with right ventricular systolic pressure 43mmHg above  the right atrial pressure.  The inferior vena cava is normal. Estimated mean right atrial pressure is <3  mmHg.  No pericardial effusion is present.  Compared to the previous study dated 10/3/16, there may be interval  improvement in RV size.      RHC (4/26/2017):  RA-4/8, 4  RV-59/2/8  PA-60/20, 32  PAW-11/17, 10  PVR-4.58  CO/CI-5.0/3.2  [place] CO/CI-4.8/3.1      PFT (6/21/2017):   PFT Latest Ref Rng & Units 6/21/2017   FVC L 2.45   FEV1 L 2.10   FVC% % 72   FEV1% % 75       6MWT (7/21/2017):  341 meters, on 8.5L oxygen, desaturation to 88%    6MWT (10/25/2017):  347 meters, on 8.5L oxygen, desaturation to 90% - felt she could have walked further      Assessment and Plan:   Ms. Ellen ROMO  Vincenzo is a 41 year old female with pulmonary hypertension and ILD.  WHO Group I/III, NYHA Functional Class II/III.    Ms. Ellen Loya returns to clinic for follow-up and reports feeling the best she has felt in a long time.  Minimal symptoms noted.  Clinically, her vital signs are stable, she's afebrile and her weight is down secondary to being sick recently.  She has slightly elevated JVD, no abdominal bloating or lower leg edema. Her labs are unremarkable other than a slightly elevated BNP.  We discussed her taking an additional 20 mg Lasix on days her JVD is noticeable, she has lower leg swelling, feelings of being bloated or her weight has changed by 2 pounds.  She verbalized understanding.    She also completed a six minute walk test today and despite feeling she did not walk as well as she could have due to people in the hallway, she still walked 6 meters further than her last walk.  She will return to clinic in three months or sooner if her condition should change.    It was a pleasure seeing Ms. Ellen Loya at the [facility].     Sincerely,    Gayatri Altamirano, APRN, CNP.

## 2017-10-31 ENCOUNTER — HOSPITAL ENCOUNTER (OUTPATIENT)
Dept: CARDIAC REHAB | Facility: CLINIC | Age: 42
End: 2017-10-31
Attending: INTERNAL MEDICINE
Payer: COMMERCIAL

## 2017-10-31 VITALS — BODY MASS INDEX: 21.03 KG/M2 | WEIGHT: 115 LBS

## 2017-10-31 PROCEDURE — G0239 OTH RESP PROC, GROUP: HCPCS | Performed by: REHABILITATION PRACTITIONER

## 2017-10-31 PROCEDURE — 40000244 ZZH STATISTIC VISIT PULM REHAB: Performed by: REHABILITATION PRACTITIONER

## 2017-11-01 DIAGNOSIS — G47.9 DISTURBANCE IN SLEEP BEHAVIOR: ICD-10-CM

## 2017-11-01 DIAGNOSIS — R07.81 PLEURITIC CHEST PAIN: ICD-10-CM

## 2017-11-01 NOTE — TELEPHONE ENCOUNTER
Xanax 0.5mg      Last Written Prescription Date: 10/25/2017  Last Fill Quantity: 40,  # refills: 0   [place] with [facility] or  Health prescribing provider: 10/11/2017    Percocet 10-325mg      Last Written Prescription Date: 10/19/2017  Last Fill Quantity: 60,  # refills: 0   Last Office Visit with [facility] [facility] or  Health prescribing provider: 10/11/2017    Joy Fuentes CPhT  [facility] [place]  [phone]

## 2017-11-02 ENCOUNTER — HOSPITAL ENCOUNTER (OUTPATIENT)
Dept: CARDIAC REHAB | Facility: CLINIC | Age: 42
End: 2017-11-02
Attending: INTERNAL MEDICINE
Payer: COMMERCIAL

## 2017-11-02 VITALS — WEIGHT: 115.4 LBS | BODY MASS INDEX: 21.11 KG/M2

## 2017-11-02 PROCEDURE — G0239 OTH RESP PROC, GROUP: HCPCS | Performed by: REHABILITATION PRACTITIONER

## 2017-11-02 PROCEDURE — 40000244 ZZH STATISTIC VISIT PULM REHAB: Performed by: REHABILITATION PRACTITIONER

## 2017-11-02 RX ORDER — OXYCODONE AND ACETAMINOPHEN 10; 325 MG/1; MG/1
1 TABLET ORAL EVERY 6 HOURS PRN
Qty: 60 TABLET | Refills: 0 | Status: SHIPPED | OUTPATIENT
Start: 2017-11-02 | End: 2017-11-15

## 2017-11-02 RX ORDER — ALPRAZOLAM 0.5 MG
TABLET ORAL
Qty: 40 TABLET | Refills: 0 | Status: SHIPPED | OUTPATIENT
Start: 2017-11-02 | End: 2017-11-09

## 2017-11-07 LAB — FIO2-PRE: 50 %

## 2017-11-09 ENCOUNTER — TELEPHONE (OUTPATIENT)
Dept: FAMILY MEDICINE | Facility: CLINIC | Age: 42
End: 2017-11-09

## 2017-11-09 DIAGNOSIS — G47.9 DISTURBANCE IN SLEEP BEHAVIOR: ICD-10-CM

## 2017-11-09 NOTE — TELEPHONE ENCOUNTER
Xanax 0.5mg      Last Written Prescription Date: 11/2/2017  Last Fill Quantity: 40,  # refills: 0   Last Office Visit with [facility] or [facility] prescribing provider: 10/11/2017    Amber Rushing, Pharmacy Technician  [facility]  [phone]

## 2017-11-13 RX ORDER — ALPRAZOLAM 0.5 MG
TABLET ORAL
Qty: 40 TABLET | Refills: 0 | Status: SHIPPED | OUTPATIENT
Start: 2017-11-13 | End: 2017-11-20

## 2017-11-13 NOTE — TELEPHONE ENCOUNTER
Routing refill request to provider for review/approval because:  Drug not on the [facility] refill protocol     Luciana López RN  [facility]

## 2017-11-13 NOTE — TELEPHONE ENCOUNTER
Patient calling to follow up on medication, patient states she ran out yesterday, please advise if this can be filled today, please send to  pharmacy.  Thank you,  Karen Littlejohn  Patient Representative

## 2017-11-14 ENCOUNTER — HOSPITAL ENCOUNTER (OUTPATIENT)
Dept: CARDIAC REHAB | Facility: CLINIC | Age: 42
End: 2017-11-14
Attending: INTERNAL MEDICINE
Payer: COMMERCIAL

## 2017-11-14 VITALS — BODY MASS INDEX: 21.18 KG/M2 | WEIGHT: 115.8 LBS

## 2017-11-14 PROCEDURE — G0239 OTH RESP PROC, GROUP: HCPCS | Performed by: REHABILITATION PRACTITIONER

## 2017-11-14 PROCEDURE — 40000244 ZZH STATISTIC VISIT PULM REHAB: Performed by: REHABILITATION PRACTITIONER

## 2017-11-15 DIAGNOSIS — R07.81 PLEURITIC CHEST PAIN: ICD-10-CM

## 2017-11-15 DIAGNOSIS — M19.90 INFLAMMATORY ARTHRITIS: ICD-10-CM

## 2017-11-15 RX ORDER — MORPHINE SULFATE 15 MG/1
15 TABLET, FILM COATED, EXTENDED RELEASE ORAL 2 TIMES DAILY
Qty: 60 TABLET | Refills: 0 | Status: SHIPPED | OUTPATIENT
Start: 2017-11-15 | End: 2017-12-13

## 2017-11-16 ENCOUNTER — HOSPITAL ENCOUNTER (OUTPATIENT)
Dept: CARDIAC REHAB | Facility: CLINIC | Age: 42
End: 2017-11-16
Attending: INTERNAL MEDICINE
Payer: COMMERCIAL

## 2017-11-16 VITALS — BODY MASS INDEX: 20.85 KG/M2 | WEIGHT: 114 LBS

## 2017-11-16 PROCEDURE — G0238 OTH RESP PROC, INDIV: HCPCS | Performed by: REHABILITATION PRACTITIONER

## 2017-11-16 PROCEDURE — 40000244 ZZH STATISTIC VISIT PULM REHAB: Performed by: REHABILITATION PRACTITIONER

## 2017-11-16 RX ORDER — OXYCODONE AND ACETAMINOPHEN 10; 325 MG/1; MG/1
1 TABLET ORAL EVERY 6 HOURS PRN
Qty: 60 TABLET | Refills: 0 | Status: SHIPPED | OUTPATIENT
Start: 2017-11-17 | End: 2017-11-29

## 2017-11-20 DIAGNOSIS — J84.9 ILD (INTERSTITIAL LUNG DISEASE) (H): ICD-10-CM

## 2017-11-20 DIAGNOSIS — G47.9 DISTURBANCE IN SLEEP BEHAVIOR: ICD-10-CM

## 2017-11-20 RX ORDER — FLUTICASONE PROPIONATE 220 UG/1
2 AEROSOL, METERED RESPIRATORY (INHALATION) 2 TIMES DAILY
Qty: 1 INHALER | Refills: 3 | Status: SHIPPED | OUTPATIENT
Start: 2017-11-20 | End: 2018-04-06

## 2017-11-22 RX ORDER — ALPRAZOLAM 0.5 MG
TABLET ORAL
Qty: 40 TABLET | Refills: 0 | Status: SHIPPED | OUTPATIENT
Start: 2017-11-22 | End: 2017-11-29

## 2017-11-22 NOTE — TELEPHONE ENCOUNTER
ALPRAZolam (XANAX) 0.5 MG tablet 40 tablet 0 11/13/2017  No   Sig: TAKE 1 TABLET BY MOUTH EVERY SIX HOURS AS NEEDED FOR ANXIETY.   Class: Local Print   Notes to Pharmacy: [facility] 11/2/17, 40/10ds   Order: [id number]     Last fill, see above. Please address refill request in PCP absence.  Guadalupe Gibson, CMA

## 2017-11-22 NOTE — TELEPHONE ENCOUNTER
Patient calling to check on medication refill. Patient will be out tomorrow.    Jammie Antoine  Reception/ Scheduling

## 2017-11-28 ENCOUNTER — HOSPITAL ENCOUNTER (OUTPATIENT)
Dept: CARDIAC REHAB | Facility: CLINIC | Age: 42
End: 2017-11-28
Attending: INTERNAL MEDICINE
Payer: COMMERCIAL

## 2017-11-28 VITALS — WEIGHT: 115.2 LBS | HEIGHT: 62 IN | BODY MASS INDEX: 21.2 KG/M2

## 2017-11-28 PROCEDURE — 40000244 ZZH STATISTIC VISIT PULM REHAB: Performed by: REHABILITATION PRACTITIONER

## 2017-11-28 PROCEDURE — G0238 OTH RESP PROC, INDIV: HCPCS | Performed by: REHABILITATION PRACTITIONER

## 2017-11-28 ASSESSMENT — DUKE ACTIVITY SCORE INDEX (DASI)
DASI METS SCORE: 6.91
VO2_PEAK: 24.2

## 2017-11-28 ASSESSMENT — 6 MINUTE WALK TEST (6MWT)
MALE CALC: 585.3
TOTAL DISTANCE WALKED: 1032
FEMALE CALC: 642.43
GENDER SELECTION: FEMALE

## 2017-11-28 NOTE — PROGRESS NOTES
11/28/17 1100   Session   Session 60 Day Individualized Treatment Plan   Certified through this date 12/27/17   Type Reassessment   General Information   Treatment Diagnosis Interstitial Lung Disease   Secondary Treatment Diagnosis Pulmonary Hypertension   Classification of COPD NA   Hospital Location Not hospitalized   Outpatient Pulmonary Rehab Start Date 09/26/17   Primary Physician Dr. Loya   Pulmonolgist Dr. Osman   Medical History/Comorbidities   Medical History/Comorbidities Hypertension   Untoward Events/Exacerbations/Hospitalizations   Untoward Events/Exacerbations/Hospitalizations None   Sputum   Sputum Production Color Clear   Sputum Production Consistency Frothy   Tobacco History   Tobacco Current smoker, some days   Tobacco Habit Pipe   Years Smoked 25   Average Packs Per Day less than 0.25 ppd   Interventions Planned Tobacco Cessation Aides;Identify Support System;Develop Action Plan   Medications   Long-Acting Beta Agonist Not prescribed   Short-Acting Beta Agonist Prescribed, taking as prescribed   Long-Acting Anticholinergic Prescribed, taking as prescribed   Short-Acting Anticholinergic Not prescribed   Inhaled Corticosteroid Not prescribed   Oral Corticosteroid Not prescribed   Medications Reconciled By Patient;Medical record   Pain   Patient Currently in Pain No   Fall Risk Screen   Fall screen completed by Pulmonary Rehab   Have you fallen 2 or more times in the past year? No   Have you fallen and had an injury in the past year? No   Is patient a fall risk? No   Living and Work Status   Living Arrangements house;other relative   Support System Live with an adult   ADL Limitations None   Initial Duke Activity Status Index (DASI) score. A measure of functional capacity. The goal is to have a pre-program raw score of 9.95 (~4 METs) or above 33.95   Initial DASI VO2 Peak (ml*kg-1*min-1) 24.2   Initial DASI MET Level 6.91   Physical Assessments   Incisions Not applicable   Edema None  "  Individualized Treatment Plan   Sessions Scheduled 24   Sessions Attended 12   Type Aerobic exercise;Resistance training   Oxygen Use   Supplemental Oxygen Needed Yes   Delivery Device Nasal Cannula   Liter Flow at Rest 4   Liter Flow with ADLS 6-8   Liter Flow During Exercise 8   Liter Flow With Sleep 4   Evaluated on Home System? No;Continuous flow   Interventions Recommended (Offered 1:1 with [place] home oxygen, patient is considerin)   Exercise Prescription   Mode Treadmill;Weights;Nustep   Frequency 2 days/week   Duration/Time 30-45 min;Intermittent bouts   THR (85% of age predicted max heart rate)  152.15   Effort Rating (0-10) 4-6   Progression of Exercise Will plan to increase by u 0.25 METs per week   Oxygen Titration with Exercise > 88% with exercise   Exercise Assessment   6 Minute Walk Predicted - Gender Selection Female   6 Minute Walk Predicted (Female) 642.43   6 Minute Walk Predicted (Male) 585.3   6 Minute Walk Distance (Initial) 1032 Meters   Resting    SpO2 92  (4 lpm)   Exercise SpO2 92  (8 lpm)   Current MET level 2.7   Exercise Tolerance fair   Normal Limits Discussed Yes   Current Symptoms at Home Anxiety;Dyspnea   Current Symptoms in Rehab Dyspnea   Nutrition Management   Age 41   Height 1.575 m (5' 2.01\")   Weight 52.3 kg (115 lb 3.2 oz)   BMI (Calculated) 21.11   Assessment Normal   Psychosocial   Initial Patient Health Questionnaire -9 (PHQ-9) for depression. To notify physician if pre-score >9. 4   Initial [facility] Survey score. A quality of life measure. To re evaluate if total score is > 25. Also consider PHQ-9 and DASI to determine if patient should be referred back to physician. 19   Initial Shortness of Breath Questionnaire (SOBQ) score. The goal is to reduce the score pre to post program. 30   Intervention Planned Patient to identify 2-3 coping mechanisms to decrease stress and anxiety;Refer to [facility]plian[facility];Develop and implement coping " techniques;Introduce relaxation techniques to assist with decreased anxiety;Use breathing techniques to control dyspnea cycle   Stages of Change   Aerobic Exercise Preparation   Physical Activity Preparation   Recommended diet NA   Stress Action   Smoking Cessation Preparation   Oxygen Usage Maintenance   Current Home Exercise   Type of Exercise None   Recommended Home Exercise Prescription   Type of Exercise Walking   Frequency (Days per week) 3-4   Duration (minutes per session) 15-30 min;Intermittent   Effort Rating Recommended (0-10) Scale  4-6/10   30 Day Exercise Plan initiate home exercise program   Learning Assessment   Learner Patient   Primary Language English   Preferred Learning Style Listening;Reading   Barriers to Learning No barriers noted   Patient Education/Referrals   Education Recommended Home Oxygen Equipment;Patient already attended in the past   Pulmonary Rehab Goals   Pulmonary Rehab Goals 1;2;3   Goal 1   Goal Patient will increase muscle mass to be stronger heading into winter season; will aim for increasing to 8# upper extremity and 5# ankle weights   Target Date 11/07/17   Progress Towards Goal 9/26 Patient will increase resistance training up to 5# over the next month. She wishes to reciver muscle mass to aid in strength and endurance as well as be able to deal with any illness over the winter months 10/24 Patient has been able to increase resistance training to 3# weights, this currently is challenging to patient, anticipate able to increase to 5# weights within 2 weeks; 11/28 Has increased to 5# upper extremity and 3# ankle weights   Goal 2   Goal Patient will increase her walk test results by 20%   Target Date 12/20/17   Progress Towards Goal 9/26 Patient will increase her walk distance by 200 feet over next month. Equal to 2.1 mph on treadmill 10/24 repeat 6 minute walk test being performed tomorrow 11/28 Patient increased walk by 20 feet (about a 2% increase) she states other things  than her breathing limited walk (busy track, new equipment to ring with), she is able to walk 2.1-2.2 on treadmill for 30 minutes in rehab, retesting may be performed in December   Goal 3   Goal Patient will start using a home activity monitor assess how active she is during her day   Target Date 11/07/17   Progress Towards Goal 10/24 new goal today, patient will explore whether her phone has an activity monitor, will look at pedometer option if her phone does not have an activity monitor. 11/28 Patient will be getting new phone at salvador time and may ask for fitbit type activity tracker. Will locate a pedometer for patient to use in the meantime.    Assessment   Assessment Ellen's progress over the past month has plateaued. She has been dealing with some ongoing illness, sinus vs.common cold. She states her dyspnea has increased over the past two weeks and she occasionally is desaturating at home. Today she maintained SpO2 90-91% on 8 lpm with exercise. We will continue to work on increasing her exercise workloads with the mind of reaching goals as listed above. She will continue to benefit from skilled services to increase exercise workloads in a monitored setting for this patient who continues to need close monitoring and high flow oxygen.      I have reviewed and agree with this patient s individual treatment plan and exercise prescription for respiratory therapy.  Please see  individual treatment plan for details of progress and plan.

## 2017-11-29 DIAGNOSIS — G47.9 DISTURBANCE IN SLEEP BEHAVIOR: ICD-10-CM

## 2017-11-29 DIAGNOSIS — R07.81 PLEURITIC CHEST PAIN: ICD-10-CM

## 2017-11-30 ENCOUNTER — HOSPITAL ENCOUNTER (OUTPATIENT)
Dept: CARDIAC REHAB | Facility: CLINIC | Age: 42
End: 2017-11-30
Attending: INTERNAL MEDICINE
Payer: COMMERCIAL

## 2017-11-30 VITALS — WEIGHT: 113.4 LBS | BODY MASS INDEX: 20.74 KG/M2

## 2017-11-30 PROCEDURE — G0238 OTH RESP PROC, INDIV: HCPCS | Performed by: REHABILITATION PRACTITIONER

## 2017-11-30 PROCEDURE — 40000244 ZZH STATISTIC VISIT PULM REHAB: Performed by: REHABILITATION PRACTITIONER

## 2017-12-01 RX ORDER — ALPRAZOLAM 0.5 MG
TABLET ORAL
Qty: 40 TABLET | Refills: 0 | Status: SHIPPED | OUTPATIENT
Start: 2017-12-01 | End: 2017-12-08

## 2017-12-01 RX ORDER — OXYCODONE AND ACETAMINOPHEN 10; 325 MG/1; MG/1
1 TABLET ORAL EVERY 6 HOURS PRN
Qty: 60 TABLET | Refills: 0 | Status: SHIPPED | OUTPATIENT
Start: 2017-12-01 | End: 2017-12-13

## 2017-12-01 NOTE — TELEPHONE ENCOUNTER
Ellen is calling stating she will be out today and wanted a message sent back letting you know she will be out today. I told her technically today is the 2nd day and Monday is the 3rd day. Please call back and advise when this has been addressed.

## 2017-12-08 DIAGNOSIS — G47.9 DISTURBANCE IN SLEEP BEHAVIOR: ICD-10-CM

## 2017-12-11 RX ORDER — ALPRAZOLAM 0.5 MG
TABLET ORAL
Qty: 40 TABLET | Refills: 0 | Status: SHIPPED | OUTPATIENT
Start: 2017-12-11 | End: 2017-12-19

## 2017-12-13 DIAGNOSIS — R07.81 PLEURITIC CHEST PAIN: ICD-10-CM

## 2017-12-13 DIAGNOSIS — M19.90 INFLAMMATORY ARTHRITIS: ICD-10-CM

## 2017-12-15 RX ORDER — OXYCODONE AND ACETAMINOPHEN 10; 325 MG/1; MG/1
1 TABLET ORAL EVERY 6 HOURS PRN
Qty: 60 TABLET | Refills: 0 | Status: SHIPPED | OUTPATIENT
Start: 2017-12-15 | End: 2017-12-26

## 2017-12-15 RX ORDER — MORPHINE SULFATE 15 MG/1
15 TABLET, FILM COATED, EXTENDED RELEASE ORAL 2 TIMES DAILY
Qty: 60 TABLET | Refills: 0 | Status: SHIPPED | OUTPATIENT
Start: 2017-12-15 | End: 2018-01-11

## 2017-12-19 ENCOUNTER — HOSPITAL ENCOUNTER (OUTPATIENT)
Dept: CARDIAC REHAB | Facility: CLINIC | Age: 42
End: 2017-12-19
Attending: INTERNAL MEDICINE
Payer: COMMERCIAL

## 2017-12-19 VITALS — WEIGHT: 114 LBS | BODY MASS INDEX: 20.85 KG/M2

## 2017-12-19 DIAGNOSIS — G47.9 DISTURBANCE IN SLEEP BEHAVIOR: ICD-10-CM

## 2017-12-19 PROCEDURE — G0239 OTH RESP PROC, GROUP: HCPCS | Performed by: REHABILITATION PRACTITIONER

## 2017-12-19 PROCEDURE — 40000244 ZZH STATISTIC VISIT PULM REHAB: Performed by: REHABILITATION PRACTITIONER

## 2017-12-20 RX ORDER — ALPRAZOLAM 0.5 MG
TABLET ORAL
Qty: 40 TABLET | Refills: 0 | Status: SHIPPED | OUTPATIENT
Start: 2017-12-20 | End: 2017-12-26

## 2017-12-21 ENCOUNTER — HOSPITAL ENCOUNTER (OUTPATIENT)
Dept: CARDIAC REHAB | Facility: CLINIC | Age: 42
End: 2017-12-21
Attending: INTERNAL MEDICINE
Payer: COMMERCIAL

## 2017-12-21 VITALS — BODY MASS INDEX: 21.21 KG/M2 | WEIGHT: 116 LBS

## 2017-12-21 PROCEDURE — 40000244 ZZH STATISTIC VISIT PULM REHAB: Performed by: REHABILITATION PRACTITIONER

## 2017-12-21 PROCEDURE — G0239 OTH RESP PROC, GROUP: HCPCS | Performed by: REHABILITATION PRACTITIONER

## 2017-12-26 DIAGNOSIS — R07.81 PLEURITIC CHEST PAIN: ICD-10-CM

## 2017-12-26 DIAGNOSIS — G47.9 DISTURBANCE IN SLEEP BEHAVIOR: ICD-10-CM

## 2017-12-27 RX ORDER — OXYCODONE AND ACETAMINOPHEN 10; 325 MG/1; MG/1
1 TABLET ORAL EVERY 6 HOURS PRN
Qty: 60 TABLET | Refills: 0 | Status: SHIPPED | OUTPATIENT
Start: 2017-12-27 | End: 2018-01-09

## 2017-12-27 RX ORDER — ALPRAZOLAM 0.5 MG
TABLET ORAL
Qty: 40 TABLET | Refills: 0 | Status: SHIPPED | OUTPATIENT
Start: 2017-12-27 | End: 2018-01-04

## 2018-01-02 ENCOUNTER — HOSPITAL ENCOUNTER (OUTPATIENT)
Dept: CARDIAC REHAB | Facility: CLINIC | Age: 43
End: 2018-01-02
Attending: INTERNAL MEDICINE
Payer: COMMERCIAL

## 2018-01-02 VITALS — HEIGHT: 62 IN | BODY MASS INDEX: 21.53 KG/M2 | WEIGHT: 117 LBS

## 2018-01-02 PROCEDURE — G0239 OTH RESP PROC, GROUP: HCPCS

## 2018-01-02 PROCEDURE — 40000244 ZZH STATISTIC VISIT PULM REHAB

## 2018-01-02 ASSESSMENT — 6 MINUTE WALK TEST (6MWT)
TOTAL DISTANCE WALKED: 1032
FEMALE CALC: 640.56
MALE CALC: 583.86
GENDER SELECTION: FEMALE

## 2018-01-02 ASSESSMENT — DUKE ACTIVITY SCORE INDEX (DASI)
DASI METS SCORE: 6.91
VO2_PEAK: 24.2

## 2018-01-02 NOTE — PROGRESS NOTES
01/02/18 1100   Session   Session 90 Day Individualized Treatment Plan   Certified through this date 01/31/18   Type Reassessment   General Information  Ellen Loya  1975     Treatment Diagnosis Interstitial Lung Disease   Secondary Treatment Diagnosis Pulmonary Hypertension   Classification of COPD NA   Hospital Location Not hospitalized   Outpatient Pulmonary Rehab Start Date 09/26/17   Primary Physician Dr. Loya   Pulmonolgist Dr. Osman   Medical History/Comorbidities   Medical History/Comorbidities Hypertension   Untoward Events/Exacerbations/Hospitalizations   Untoward Events/Exacerbations/Hospitalizations None   Sputum   Sputum Production Color Clear   Sputum Production Consistency Frothy   Tobacco History   Tobacco Current smoker, some days   Tobacco Habit Pipe   Years Smoked 25   Average Packs Per Day less than 0.25 ppd   Interventions Planned Tobacco Cessation Aides;Identify Support System;Develop Action Plan   Medications   Long-Acting Beta Agonist Not prescribed   Short-Acting Beta Agonist Prescribed, taking as prescribed   Long-Acting Anticholinergic Prescribed, taking as prescribed   Short-Acting Anticholinergic Not prescribed   Inhaled Corticosteroid Not prescribed   Oral Corticosteroid Not prescribed   Medications Reconciled By Patient;Medical record   Preventative Vaccinations   Influenza Vaccination Yes   Pneumonia Vaccination Yes   Pain   Patient Currently in Pain Yes   Pain Location knee, both   Pain Rating 5/10   Pain Description (Pain)   Fall Risk Screen   Fall screen completed by Pulmonary Rehab   Have you fallen 2 or more times in the past year? No   Have you fallen and had an injury in the past year? No   Is patient a fall risk? No   Living and Work Status   Living Arrangements house;other relative   Support System Live with an adult   ADL Limitations None   Initial Duke Activity Status Index (DASI) score. A measure of functional capacity. The goal is to have a pre-program raw  "score of 9.95 (~4 METs) or above 33.95   Initial DASI VO2 Peak (ml*kg-1*min-1) 24.2   Initial DASI MET Level 6.91   Physical Assessments   Incisions Not applicable   Edema None   Individualized Treatment Plan   Sessions Scheduled 24   Sessions Attended 16   Type Aerobic exercise;Resistance training;Flexibility training   Oxygen Use   Supplemental Oxygen Needed Yes   Delivery Device Nasal Cannula   Liter Flow at Rest 4   Liter Flow with ADLS 6-8   Liter Flow During Exercise 8   Liter Flow With Sleep 4   Evaluated on Home System? No;Continuous flow   Interventions Recommended (Offered 1:1 with [facility] oxygen, patient is considerin)   Exercise Prescription   Mode Treadmill;Weights;Nustep   Frequency 2 days/week   Duration/Time 30-45 min;Intermittent bouts   THR (85% of age predicted max heart rate)  152.15   Effort Rating (0-10) 4-6   Progression of Exercise Will plan to increase by u 0.25 METs per week   Oxygen Titration with Exercise > 88% with exercise   Exercise Assessment   6 Minute Walk Predicted - Gender Selection Female   6 Minute Walk Predicted (Female) 640.56   6 Minute Walk Predicted (Male) 583.86   6 Minute Walk Distance (Initial) 1032 Meters   Resting    SpO2 92  (4 lpm)   Exercise SpO2 92  (8 lpm)   Current MET level 2.7   Exercise Tolerance fair   Normal Limits Discussed Yes   Current Symptoms at Home Anxiety;Dyspnea   Current Symptoms in Rehab Dyspnea   Nutrition Management   Age 41   Height 1.575 m (5' 2.01\")   Weight 53.1 kg (117 lb)   BMI (Calculated) 21.44   Assessment Normal   Psychosocial   Initial Patient Health Questionnaire -9 (PHQ-9) for depression. To notify physician if pre-score >9. 4   Initial [facility] COOP Survey score. A quality of life measure. To re evaluate if total score is > 25. Also consider PHQ-9 and DASI to determine if patient should be referred back to physician. 19   Initial Shortness of Breath Questionnaire (SOBQ) score. The goal is to reduce the score pre to " post program. 30   Intervention Planned Patient to identify 2-3 coping mechanisms to decrease stress and anxiety;Refer to [facility];Develop and implement coping techniques;Introduce relaxation techniques to assist with decreased anxiety;Use breathing techniques to control dyspnea cycle   Stages of Change   Aerobic Exercise Preparation   Physical Activity Preparation   Recommended diet NA   Stress Action   Smoking Cessation Preparation   Oxygen Usage Maintenance   Current Home Exercise   Type of Exercise None   Recommended Home Exercise Prescription   Type of Exercise Walking   Frequency (Days per week) 3-4   Duration (minutes per session) 15-30 min;Intermittent   Effort Rating Recommended (0-10) Scale  4-6/10   30 Day Exercise Plan initiate home exercise program   Learning Assessment   Learner Patient   Primary Language English   Preferred Learning Style Listening;Reading   Barriers to Learning No barriers noted   Patient Education/Referrals   Education Recommended Home Oxygen Equipment;Patient already attended in the past   Follow-up/On-going Support   Provider follow-up needed on the following No follow-up needed   Pulmonary Rehab Goals   Pulmonary Rehab Goals 1;2;3   Goal 1   Goal Patient will increase muscle mass to be stronger heading into winter season; will aim for increasing to 8# upper extremity and 5# ankle weights   Target Date 01/31/18   Progress Towards Goal 9/26 Patient will increase resistance training up to 5# over the next month. She wishes to reciver muscle mass to aid in strength and endurance as well as be able to deal with any illness over the winter months 10/24 Patient has been able to increase resistance training to 3# weights, this currently is challenging to patient, anticipate able to increase to 5# weights within 2 weeks; 11/28 Has increased to 5# upper extremity and 3# ankle weights  1/2/18 Pt remains at 5# for upper and 3# weights for lower body.  Pt would  like to increase to 8# next week   Goal 2   Goal Patient will increase her walk test results by 20%   Target Date 01/31/18   Progress Towards Goal 9/26 Patient will increase her walk distance by 200 feet over next month. Equal to 2.1 mph on treadmill 10/24 repeat 6 minute walk test being performed tomorrow 11/28 Patient increased walk by 20 feet (about a 2% increase) she states other things than her breathing limited walk (busy track, new equipment to ring with), she is able to walk 2.1-2.2 on treadmill for 30 minutes in rehab, retesting may be performed in December 1/2/18 On 12/21 Ellen was able to walk 30 minutes at 2.3mph    Goal 3   Goal Patient will start using a home activity monitor assess how active she is during her day   Target Date 02/16/18   Progress Towards Goal 10/24 new goal today, patient will explore whether her phone has an activity monitor, will look at pedometer option if her phone does not have an activity monitor. 11/28 Patient will be getting new phone at salvador time and may ask for fitbit type activity tracker. Will locate a pedometer for patient to use in the meantime. 1/2/18 Pt has been tracking her steps 2x a week and will try to increase that to 3x a week  on average over the next 30 days   Assessment   Assessment Ellen's progress over the past month has plateau, she has not been here in the past 2 weeks due to the weather, children being ill and coming down with a cold which was reflected in today workloads and 02 saturation.  Her dyspnea has increased and states that she just needs to get here no matter what.  Ellen will work on tracking her steps even if she she has to walk around her house more and would like to increase her her upper body weights to 8# next week in addition to resuming her pervious workloads.  We will continue to work on increasing her exercise workloads with the mind of reaching goals as listed above. She will continue to benefit from skilled services to  increase exercise workloads in a monitored setting for this patient who continues to need close monitoring and high flow oxygen.     I have reviewed and agree with this patient s individual treatment plan and exercise prescription for respiratory therapy.  Please see  individual treatment plan for details of progress and plan.

## 2018-01-04 DIAGNOSIS — G47.9 DISTURBANCE IN SLEEP BEHAVIOR: ICD-10-CM

## 2018-01-05 ENCOUNTER — TELEPHONE (OUTPATIENT)
Dept: CARDIOLOGY | Facility: CLINIC | Age: 43
End: 2018-01-05

## 2018-01-05 RX ORDER — ALPRAZOLAM 0.5 MG
TABLET ORAL
Qty: 40 TABLET | Refills: 0 | Status: SHIPPED | OUTPATIENT
Start: 2018-01-08 | End: 2018-01-12

## 2018-01-05 NOTE — TELEPHONE ENCOUNTER
Prior Authorization Specialty Medication Request    Medication/Dose: Adcirca 20 MG  Frequency: Daily    Route: PO  Take 2 tablets (40 MG) by mouth daily. Quantity 60 tablets/30 days  Diagnosis and ICD: PAH out of proportion to ILD.   WHO Group: 1 NYHA FC: 3  New/Renewal/Insurance Change PA: Renewal  Previously Tried and Failed Therapies:   Adcirca Initiated: 12/2016  Tyvaso Initiated: 3/8/17 (stopped)

## 2018-01-05 NOTE — TELEPHONE ENCOUNTER
PA Initiation    Medication: Adcirca 20 MG  Insurance Company: [employer] - Phone [phone] Fax [phone]  Pharmacy Filling the Rx: [facility] [address]   Filling Pharmacy Phone: [phone]  Filling Pharmacy Fax: [phone]  Start Date: 1/5/2018    Urgent PA renewal request has been faxed to [facility] PA Dept for review. Letter of medical necessity and clinical documentation were included with the request.

## 2018-01-08 DIAGNOSIS — J20.9 ACUTE BRONCHITIS, UNSPECIFIED ORGANISM: ICD-10-CM

## 2018-01-08 RX ORDER — AMOXICILLIN 875 MG
875 TABLET ORAL 2 TIMES DAILY
Qty: 20 TABLET | Refills: 0 | Status: SHIPPED | OUTPATIENT
Start: 2018-01-08 | End: 2018-05-16

## 2018-01-08 RX ORDER — AZITHROMYCIN 250 MG/1
TABLET, FILM COATED ORAL
Qty: 6 TABLET | Refills: 0 | Status: CANCELLED | OUTPATIENT
Start: 2018-01-08

## 2018-01-08 NOTE — TELEPHONE ENCOUNTER
Reason for Call:  Medication or medication refill:    Do you use a [facility]?  Name of the pharmacy and phone number for the current request:  [facility]- [phone]    Name of the medication requested: antibiotic for chest congestion    Other request: Patient called and states that she has had chest congestion since last week and is wondering if Dr. Loya would be able to call in an antibiotic to [facility] pharmacy for her. She said that Dr. Loya has seen her for this before and because of her health this could turn into pneumonia if not treated soon enough. She said that Dr. Loya is aware of this. Please advise.     Can we leave a detailed message on this number? YES    Phone number patient can be reached at: Home number on file [phone] (home)    Best Time: any     Call taken on 1/8/2018 at 7:53 AM by Herminio Graff

## 2018-01-09 DIAGNOSIS — I27.20 PULMONARY HYPERTENSION (H): ICD-10-CM

## 2018-01-09 DIAGNOSIS — R07.81 PLEURITIC CHEST PAIN: ICD-10-CM

## 2018-01-09 NOTE — TELEPHONE ENCOUNTER
Drug: Adcirca  Last Fill Date: 12/20/2017  Quantity: 60         no exercise/no sports/gym/no heavy lifting/no weight bearing

## 2018-01-10 RX ORDER — OXYCODONE AND ACETAMINOPHEN 10; 325 MG/1; MG/1
1 TABLET ORAL EVERY 6 HOURS PRN
Qty: 60 TABLET | Refills: 0 | Status: SHIPPED | OUTPATIENT
Start: 2018-01-12 | End: 2018-01-23

## 2018-01-11 DIAGNOSIS — M19.90 INFLAMMATORY ARTHRITIS: ICD-10-CM

## 2018-01-11 RX ORDER — TADALAFIL 20 MG/1
40 TABLET ORAL DAILY
Qty: 60 TABLET | Refills: 11 | Status: SHIPPED | OUTPATIENT
Start: 2018-01-11 | End: 2019-01-15

## 2018-01-11 RX ORDER — MORPHINE SULFATE 15 MG/1
15 TABLET, FILM COATED, EXTENDED RELEASE ORAL 2 TIMES DAILY
Qty: 60 TABLET | Refills: 0 | Status: SHIPPED | OUTPATIENT
Start: 2018-01-11 | End: 2018-02-08

## 2018-01-12 DIAGNOSIS — G47.9 DISTURBANCE IN SLEEP BEHAVIOR: ICD-10-CM

## 2018-01-12 DIAGNOSIS — N76.0 VAGINITIS AND VULVOVAGINITIS: Primary | ICD-10-CM

## 2018-01-15 RX ORDER — ALPRAZOLAM 0.5 MG
TABLET ORAL
Qty: 40 TABLET | Refills: 0 | Status: SHIPPED | OUTPATIENT
Start: 2018-01-16 | End: 2018-01-23

## 2018-01-15 RX ORDER — FLUCONAZOLE 150 MG/1
150 TABLET ORAL ONCE
Qty: 1 TABLET | Refills: 1 | Status: SHIPPED | OUTPATIENT
Start: 2018-01-15 | End: 2018-01-15

## 2018-01-18 ENCOUNTER — HOSPITAL ENCOUNTER (OUTPATIENT)
Dept: CARDIAC REHAB | Facility: CLINIC | Age: 43
End: 2018-01-18
Attending: INTERNAL MEDICINE
Payer: COMMERCIAL

## 2018-01-18 VITALS — BODY MASS INDEX: 21.39 KG/M2 | WEIGHT: 117 LBS

## 2018-01-18 PROCEDURE — 40000116 ZZH STATISTIC OP CR VISIT: Performed by: REHABILITATION PRACTITIONER

## 2018-01-18 PROCEDURE — 40000244 ZZH STATISTIC VISIT PULM REHAB: Performed by: REHABILITATION PRACTITIONER

## 2018-01-18 PROCEDURE — 93798 PHYS/QHP OP CAR RHAB W/ECG: CPT | Performed by: REHABILITATION PRACTITIONER

## 2018-01-18 PROCEDURE — G0239 OTH RESP PROC, GROUP: HCPCS | Performed by: REHABILITATION PRACTITIONER

## 2018-01-23 DIAGNOSIS — I27.20 PULMONARY HYPERTENSION (H): ICD-10-CM

## 2018-01-23 DIAGNOSIS — G47.9 DISTURBANCE IN SLEEP BEHAVIOR: ICD-10-CM

## 2018-01-23 DIAGNOSIS — R07.81 PLEURITIC CHEST PAIN: ICD-10-CM

## 2018-01-24 RX ORDER — FUROSEMIDE 20 MG
TABLET ORAL
Qty: 180 TABLET | Refills: 3 | Status: SHIPPED | OUTPATIENT
Start: 2018-01-24 | End: 2019-02-04

## 2018-01-24 NOTE — TELEPHONE ENCOUNTER
Percocet 10/325 MG      Last Written Prescription Date:  1/12/18  Last Fill Quantity: 60,   # refills: 0  Last Office Visit: 10/11/17  Future Office visit:       Routing refill request to provider for review/approval because:  Drug not on the [facility], [facility] or [facility] refill protocol or controlled substance  Alprazolam 0.5 MG       Last Written Prescription Date:  1/16/18  Last Fill Quantity: 40,   # refills: 0  Last Office Visit: 10/11/17  Future Office visit:       Routing refill request to provider for review/approval because:  Drug not on the [facility], [facility] or [facility] refill protocol or controlled substance

## 2018-01-25 RX ORDER — OXYCODONE AND ACETAMINOPHEN 10; 325 MG/1; MG/1
1 TABLET ORAL EVERY 6 HOURS PRN
Qty: 60 TABLET | Refills: 0 | Status: SHIPPED | OUTPATIENT
Start: 2018-01-25 | End: 2018-02-05

## 2018-01-25 RX ORDER — ALPRAZOLAM 0.5 MG
TABLET ORAL
Qty: 40 TABLET | Refills: 0 | Status: SHIPPED | OUTPATIENT
Start: 2018-01-25 | End: 2018-02-01

## 2018-01-30 ENCOUNTER — HOSPITAL ENCOUNTER (OUTPATIENT)
Dept: CARDIAC REHAB | Facility: CLINIC | Age: 43
End: 2018-01-30
Attending: INTERNAL MEDICINE
Payer: COMMERCIAL

## 2018-01-30 ENCOUNTER — PRE VISIT (OUTPATIENT)
Dept: CARDIOLOGY | Facility: CLINIC | Age: 43
End: 2018-01-30

## 2018-01-30 VITALS — HEIGHT: 62 IN | WEIGHT: 114.8 LBS | BODY MASS INDEX: 21.12 KG/M2

## 2018-01-30 DIAGNOSIS — R06.02 SOB (SHORTNESS OF BREATH): Primary | ICD-10-CM

## 2018-01-30 DIAGNOSIS — I27.20 PULMONARY HYPERTENSION (H): ICD-10-CM

## 2018-01-30 PROCEDURE — G0239 OTH RESP PROC, GROUP: HCPCS | Performed by: REHABILITATION PRACTITIONER

## 2018-01-30 PROCEDURE — 40000244 ZZH STATISTIC VISIT PULM REHAB: Performed by: REHABILITATION PRACTITIONER

## 2018-01-30 ASSESSMENT — 6 MINUTE WALK TEST (6MWT)
TOTAL DISTANCE WALKED: 1032
FEMALE CALC: 642.85
MALE CALC: 585.62
GENDER SELECTION: FEMALE

## 2018-01-30 ASSESSMENT — DUKE ACTIVITY SCORE INDEX (DASI)
DASI METS SCORE: 6.91
VO2_PEAK: 24.2

## 2018-01-30 NOTE — PROGRESS NOTES
01/30/18 1100   Session   Session 120 Day Individualized Treatment Plan   Certified through this date 02/28/18   Type Reassessment   General Information   Treatment Diagnosis Interstitial Lung Disease   Secondary Treatment Diagnosis Pulmonary Hypertension   Classification of COPD NA   Hospital Location Not hospitalized   Outpatient Pulmonary Rehab Start Date 09/26/17   Primary Physician Dr. Loya   Pulmonolgist Dr. Osman   Medical History/Comorbidities   Medical History/Comorbidities Hypertension   Untoward Events/Exacerbations/Hospitalizations   Untoward Events/Exacerbations/Hospitalizations None   Tobacco History   Tobacco Current smoker, some days   Tobacco Habit Pipe   Years Smoked 25   Average Packs Per Day less than 0.25 ppd   Interventions Planned Tobacco Cessation Aides;Identify Support System;Develop Action Plan   Medications   Long-Acting Beta Agonist Not prescribed   Short-Acting Beta Agonist Prescribed, taking as prescribed   Long-Acting Anticholinergic Prescribed, taking as prescribed   Short-Acting Anticholinergic Not prescribed   Inhaled Corticosteroid Not prescribed   Oral Corticosteroid Not prescribed   Medications Reconciled By Patient;Medical record   Pain   Patient Currently in Pain Yes   Pain Location knee, both   Pain Rating 4/10   Pain Description (Pain)   Fall Risk Screen   Fall screen completed by Pulmonary Rehab   Have you fallen 2 or more times in the past year? No   Have you fallen and had an injury in the past year? No   Is patient a fall risk? No   Living and Work Status   Living Arrangements house;other relative   Support System Live with an adult   ADL Limitations None   Initial Duke Activity Status Index (DASI) score. A measure of functional capacity. The goal is to have a pre-program raw score of 9.95 (~4 METs) or above 33.95   Initial DASI VO2 Peak (ml*kg-1*min-1) 24.2   Initial DASI MET Level 6.91   Physical Assessments   Incisions Not applicable   Edema None   Individualized  "Treatment Plan   Sessions Scheduled 24   Sessions Attended 18   Type Aerobic exercise;Resistance training   Oxygen Use   Supplemental Oxygen Needed Yes   Delivery Device Nasal Cannula   Liter Flow at Rest 4   Liter Flow with ADLS 6-8   Liter Flow During Exercise 8-10   Liter Flow With Sleep 4   Evaluated on Home System? No;Continuous flow   Interventions Recommended (Offered 1:1 with [place] home oxygen, patient is considerin)   Exercise Prescription   Mode Treadmill;Weights;Nustep   Frequency 2 days/week   Duration/Time 30-45 min;Intermittent bouts   THR (85% of age predicted max heart rate)  152.15   Effort Rating (0-10) 4-6   Progression of Exercise Will plan to increase by u 0.25 METs per week   Oxygen Titration with Exercise > 88% with exercise   Exercise Assessment   6 Minute Walk Predicted - Gender Selection Female   6 Minute Walk Predicted (Female) 642.85   6 Minute Walk Predicted (Male) 585.62   6 Minute Walk Distance (Initial) 1032 Meters   Resting HR 92   Exercise    SpO2 97  (6 lpm)   Exercise SpO2 96  (10 lpm)   Current MET level 2.8   Exercise Tolerance fair   Normal Limits Discussed Yes   Current Symptoms at Home Anxiety;Dyspnea   Current Symptoms in Rehab Dyspnea   Nutrition Management   Age 41   Height 1.575 m (5' 2.01\")   Weight 52.1 kg (114 lb 12.8 oz)   BMI (Calculated) 21.04   Assessment Normal   Psychosocial   Initial Patient Health Questionnaire -9 (PHQ-9) for depression. To notify physician if pre-score >9. 4   Initial [facility] Survey score. A quality of life measure. To re evaluate if total score is > 25. Also consider PHQ-9 and DASI to determine if patient should be referred back to physician. 19   Initial Shortness of Breath Questionnaire (SOBQ) score. The goal is to reduce the score pre to post program. 30   Intervention Planned Patient to identify 2-3 coping mechanisms to decrease stress and anxiety;Refer to [facility];Develop and implement coping " techniques;Introduce relaxation techniques to assist with decreased anxiety;Use breathing techniques to control dyspnea cycle   Stages of Change   Aerobic Exercise Preparation   Physical Activity Preparation   Recommended diet NA   Stress Action   Smoking Cessation Preparation   Oxygen Usage Maintenance   Current Home Exercise   Type of Exercise None   Recommended Home Exercise Prescription   Type of Exercise Walking   Frequency (Days per week) 3-4   Duration (minutes per session) 15-30 min;Intermittent   Effort Rating Recommended (0-10) Scale  4-6/10   30 Day Exercise Plan initiate home exercise program   Learning Assessment   Learner Patient   Primary Language English   Preferred Learning Style Listening;Reading   Barriers to Learning No barriers noted   Patient Education/Referrals   Education Recommended Home Oxygen Equipment;Patient already attended in the past   Follow-up/On-going Support   Provider follow-up needed on the following No follow-up needed   Pulmonary Rehab Goals   Pulmonary Rehab Goals 1;2;3   Goal 1   Goal Patient will increase muscle mass to be stronger heading into winter season; will aim for increasing to 8# upper extremity and 5# ankle weights   Target Date 01/31/18   Progress Towards Goal 9/26 Patient will increase resistance training up to 5# over the next month. She wishes to reciver muscle mass to aid in strength and endurance as well as be able to deal with any illness over the winter months 10/24 Patient has been able to increase resistance training to 3# weights, this currently is challenging to patient, anticipate able to increase to 5# weights within 2 weeks; 11/28 Has increased to 5# upper extremity and 3# ankle weights  1/2/18 Pt remains at 5# for upper and 3# weights for lower body.  Pt would like to increase to 8# next week 1/30 set back with recent bout of illnesses, will work on increasing to 8# in two weeks   Goal 2   Goal Patient will increase her walk test results by 20%    Target Date 01/31/18   Progress Towards Goal 9/26 Patient will increase her walk distance by 200 feet over next month. Equal to 2.1 mph on treadmill 10/24 repeat 6 minute walk test being performed tomorrow 11/28 Patient increased walk by 20 feet (about a 2% increase) she states other things than her breathing limited walk (busy track, new equipment to ring with), she is able to walk 2.1-2.2 on treadmill for 30 minutes in rehab, retesting may be performed in December 1/2/18 On 12/21 Ellen was able to walk 30 minutes at 2.3mph 1/30 has pulmonology appointment next week and will discuss performing another 6 minutes walk test   Goal 3   Goal Patient will start using a home activity monitor assess how active she is during her day   Target Date 02/16/18   Progress Towards Goal 10/24 new goal today, patient will explore whether her phone has an activity monitor, will look at pedometer option if her phone does not have an activity monitor. 11/28 Patient will be getting new phone at [place]ador time and may ask for fitbit type activity tracker. Will locate a pedometer for patient to use in the meantime. 1/2/18 Pt has been tracking her steps 2x a week and will try to increase that to 3x a week  on average over the next 30 days   Assessment   Assessment Ellen's progress over the past month has been limited by intermittent illness. She has lost some strength with these illnesses and today we have decided on a plan to recover this strength and then continue working towards her goals above. We will continue to work on increasing her exercise workloads with the mind of reaching goals as listed above. She will continue to benefit from skilled services to increase exercise workloads in a monitored setting for this patient who continues to need close monitoring and high flow oxygen.      I have reviewed and agree with this patient s individual treatment plan and exercise prescription for respiratory therapy.  Please see  individual  treatment plan for details of progress and plan.

## 2018-02-01 DIAGNOSIS — G47.9 DISTURBANCE IN SLEEP BEHAVIOR: ICD-10-CM

## 2018-02-01 NOTE — TELEPHONE ENCOUNTER
Alprazolam       Last Written Prescription Date:  1/25/18  Last Fill Quantity: 40,   # refills: 0  Last Office Visit: 10/11/17  Future Office visit:       Routing refill request to provider for review/approval because:  Drug not on the [facility], P or [facility] refill protocol or controlled substance  Was refilled please refuse.

## 2018-02-02 RX ORDER — ALPRAZOLAM 0.5 MG
TABLET ORAL
Qty: 40 TABLET | Refills: 0 | Status: SHIPPED | OUTPATIENT
Start: 2018-02-04 | End: 2018-02-12

## 2018-02-05 DIAGNOSIS — R07.81 PLEURITIC CHEST PAIN: ICD-10-CM

## 2018-02-05 NOTE — TELEPHONE ENCOUNTER
Percocet 10-325mg      Last Written Prescription Date:  1/25/18  Last Fill Quantity: 60,   # refills: 0  Last Office Visit: 10/11/17  Future Office visit:       Routing refill request to provider for review/approval because:  Drug not on the [facility], [facility] or [facility] refill protocol or controlled substance

## 2018-02-07 RX ORDER — OXYCODONE AND ACETAMINOPHEN 10; 325 MG/1; MG/1
1 TABLET ORAL EVERY 6 HOURS PRN
Qty: 60 TABLET | Refills: 0 | Status: SHIPPED | OUTPATIENT
Start: 2018-02-07 | End: 2018-02-19

## 2018-02-08 DIAGNOSIS — M19.90 INFLAMMATORY ARTHRITIS: ICD-10-CM

## 2018-02-09 RX ORDER — MORPHINE SULFATE 15 MG/1
15 TABLET, FILM COATED, EXTENDED RELEASE ORAL 2 TIMES DAILY
Qty: 60 TABLET | Refills: 0 | Status: SHIPPED | OUTPATIENT
Start: 2018-02-09 | End: 2018-03-05

## 2018-02-09 NOTE — TELEPHONE ENCOUNTER
MS Contin 15 MG       Last Written Prescription Date:  1/11/18  Last Fill Quantity: 60,   # refills: 0  Last Office Visit: 10/11/17  Future Office visit:       Routing refill request to provider for review/approval because:  Drug not on the [facility], [facility] or [facility] refill protocol or controlled substance

## 2018-02-09 NOTE — TELEPHONE ENCOUNTER
Patient called requested status. Was advised that it was brought to the pharmacy.     Thank you  Herminio Graff  Patient Representative

## 2018-02-12 DIAGNOSIS — G47.9 DISTURBANCE IN SLEEP BEHAVIOR: ICD-10-CM

## 2018-02-12 RX ORDER — ALPRAZOLAM 0.5 MG
TABLET ORAL
Qty: 40 TABLET | Refills: 0 | Status: SHIPPED | OUTPATIENT
Start: 2018-02-13 | End: 2018-02-21

## 2018-02-19 DIAGNOSIS — R07.81 PLEURITIC CHEST PAIN: ICD-10-CM

## 2018-02-19 RX ORDER — OXYCODONE AND ACETAMINOPHEN 10; 325 MG/1; MG/1
1 TABLET ORAL EVERY 6 HOURS PRN
Qty: 60 TABLET | Refills: 0 | Status: SHIPPED | OUTPATIENT
Start: 2018-02-19 | End: 2018-03-05

## 2018-02-19 NOTE — TELEPHONE ENCOUNTER
Percocet       Last Written Prescription Date:  2/7/18  Last Fill Quantity: 60,   # refills: 0  Last Office Visit: 10/11/17  Future Office visit:       Routing refill request to provider for review/approval because:  Drug not on the [facility], P or [facility] refill protocol or controlled substance

## 2018-02-21 DIAGNOSIS — G47.9 DISTURBANCE IN SLEEP BEHAVIOR: ICD-10-CM

## 2018-02-21 NOTE — TELEPHONE ENCOUNTER
Alprazolam       Last Written Prescription Date:  2/13/18  Last Fill Quantity: 40,   # refills: 0  Last Office Visit: 10/11/17  Future Office visit:       Routing refill request to provider for review/approval because:  Drug not on the [facility], [facility] or [facility] refill protocol or controlled substance

## 2018-02-22 RX ORDER — ALPRAZOLAM 0.5 MG
TABLET ORAL
Qty: 40 TABLET | Refills: 0 | Status: SHIPPED | OUTPATIENT
Start: 2018-02-22 | End: 2018-03-01

## 2018-02-23 NOTE — TELEPHONE ENCOUNTER
Prior Authorization Approval    Authorization Effective Date: 12/5/2017  Authorization Expiration Date: 1/5/2019  Medication: Adcirca 20 MG - Approved  Approved Dose/Quantity: 60 Tabs/30 Days  Reference #: [id number]  Insurance Company: [employer] - Phone [phone] Fax [phone]  Expected CoPay:       CoPay Card Available:      Foundation Assistance Needed:    Which Pharmacy is filling the prescription (Not needed for infusion/clinic administered): [facility] [address]   Pharmacy Notified: Yes  Patient Notified: Yes

## 2018-03-01 DIAGNOSIS — G47.9 DISTURBANCE IN SLEEP BEHAVIOR: ICD-10-CM

## 2018-03-02 RX ORDER — ALPRAZOLAM 0.5 MG
TABLET ORAL
Qty: 40 TABLET | Refills: 0 | Status: SHIPPED | OUTPATIENT
Start: 2018-03-04 | End: 2018-03-12

## 2018-03-02 NOTE — TELEPHONE ENCOUNTER
Alprazolam       Last Written Prescription Date:  2/22/18  Last Fill Quantity: 40,   # refills: 0  Last Office Visit: 10/11/17  Future Office visit:       Routing refill request to provider for review/approval because:  Drug not on the [facility], [facility] or [facility] refill protocol or controlled substance

## 2018-03-05 DIAGNOSIS — M19.90 INFLAMMATORY ARTHRITIS: ICD-10-CM

## 2018-03-05 DIAGNOSIS — R07.81 PLEURITIC CHEST PAIN: ICD-10-CM

## 2018-03-06 NOTE — TELEPHONE ENCOUNTER
Percocet       Last Written Prescription Date:  2/19/18  Last Fill Quantity: 60,   # refills: 0  Last Office Visit: 10/11/17  Future Office visit:       Routing refill request to provider for review/approval because:  Drug not on the [facility], [facility] or [facility] refill protocol or controlled substance  Morphine       Last Written Prescription Date:  2/9/18  Last Fill Quantity: 60,   # refills: 0  Last Office Visit: 10/11/17  Future Office visit:       Routing refill request to provider for review/approval because:  Drug not on the [facility], [facility] or [facility] refill protocol or controlled substance

## 2018-03-07 RX ORDER — OXYCODONE AND ACETAMINOPHEN 10; 325 MG/1; MG/1
1 TABLET ORAL EVERY 6 HOURS PRN
Qty: 60 TABLET | Refills: 0 | Status: SHIPPED | OUTPATIENT
Start: 2018-03-07 | End: 2018-03-19

## 2018-03-07 RX ORDER — MORPHINE SULFATE 15 MG/1
15 TABLET, FILM COATED, EXTENDED RELEASE ORAL 2 TIMES DAILY
Qty: 60 TABLET | Refills: 0 | Status: SHIPPED | OUTPATIENT
Start: 2018-03-07 | End: 2018-04-05

## 2018-03-08 NOTE — ADDENDUM NOTE
Encounter addended by: Martha Jenkins EP on: 3/8/2018 10:54 AM<BR>     Actions taken: Sign clinical note, Episode resolved

## 2018-03-08 NOTE — PROGRESS NOTES
Pulmonary Rehab Discharge Summary    Reason for discharge:    Patient/family request discontinuation of services.    Progress towards goals:  Goals partially met.  Barriers to achieving goals:   discharge from facility.    Recommendation(s):    No further therapy is recommended.  Patient will request another order when she feels she needs additional rehab.

## 2018-03-12 DIAGNOSIS — G47.9 DISTURBANCE IN SLEEP BEHAVIOR: ICD-10-CM

## 2018-03-12 NOTE — TELEPHONE ENCOUNTER
Xanax       Last Written Prescription Date:  3/4/18  Last Fill Quantity: 40,   # refills: 0  Last Office Visit: 10/11/17  Future Office visit:       Routing refill request to provider for review/approval because:  Drug not on the [facility], [facility] or [facility] refill protocol or controlled substance

## 2018-03-14 RX ORDER — ALPRAZOLAM 0.5 MG
TABLET ORAL
Qty: 40 TABLET | Refills: 0 | Status: SHIPPED | OUTPATIENT
Start: 2018-03-14 | End: 2018-03-22

## 2018-03-19 DIAGNOSIS — R07.81 PLEURITIC CHEST PAIN: ICD-10-CM

## 2018-03-20 RX ORDER — OXYCODONE AND ACETAMINOPHEN 10; 325 MG/1; MG/1
1 TABLET ORAL EVERY 6 HOURS PRN
Qty: 60 TABLET | Refills: 0 | Status: SHIPPED | OUTPATIENT
Start: 2018-03-20 | End: 2018-04-02

## 2018-03-22 DIAGNOSIS — G47.9 DISTURBANCE IN SLEEP BEHAVIOR: ICD-10-CM

## 2018-03-23 NOTE — TELEPHONE ENCOUNTER
Alprazolam   0.5 MG     Last Written Prescription Date:  3/14/18  Last Fill Quantity: 40,   # refills: 0  Last Office Visit: 10/11/17  Future Office visit:       Routing refill request to provider for review/approval because:  Drug not on the [facility], [facility] or [facility] refill protocol or controlled substance

## 2018-03-23 NOTE — TELEPHONE ENCOUNTER
Patient states that Dr. Loya refills her Xanax every 10 days and she takes four a day.  She will be out tomorrow.  She is requesting a refill.  She was advised that this was refused.    Thank You,  Brittany Waddell  Patient Representative, [facility]

## 2018-03-26 RX ORDER — ALPRAZOLAM 0.5 MG
TABLET ORAL
Qty: 40 TABLET | Refills: 0 | Status: SHIPPED | OUTPATIENT
Start: 2018-03-26 | End: 2018-04-04

## 2018-03-26 NOTE — TELEPHONE ENCOUNTER
Routing refill request to provider for review/approval because:  Drug not on the [facility] refill protocol   Pt states that she takes 1 tablet every 6 hours- see previous messages.   Martha Pollock, RN, BSN

## 2018-04-02 DIAGNOSIS — R07.81 PLEURITIC CHEST PAIN: ICD-10-CM

## 2018-04-02 RX ORDER — OXYCODONE AND ACETAMINOPHEN 10; 325 MG/1; MG/1
1 TABLET ORAL EVERY 6 HOURS PRN
Qty: 60 TABLET | Refills: 0 | Status: SHIPPED | OUTPATIENT
Start: 2018-04-02 | End: 2018-04-16

## 2018-04-02 NOTE — TELEPHONE ENCOUNTER
Percocet    MG     Last Written Prescription Date:  3-20-18  Last Fill Quantity: 60,   # refills: 0  Last Office Visit: 10/11/17  Future Office visit:       Routing refill request to provider for review/approval because:  Drug not on the [facility], [facility] or [facility] refill protocol or controlled substance

## 2018-04-04 DIAGNOSIS — G47.9 DISTURBANCE IN SLEEP BEHAVIOR: ICD-10-CM

## 2018-04-04 RX ORDER — ALPRAZOLAM 0.5 MG
TABLET ORAL
Qty: 40 TABLET | Refills: 0 | Status: SHIPPED | OUTPATIENT
Start: 2018-04-04 | End: 2018-04-12

## 2018-04-04 NOTE — TELEPHONE ENCOUNTER
alprazolam      Last Written Prescription Date:  3/26/18  Last Fill Quantity: 40,   # refills: 0  Last Office Visit: 10/11/17  Future Office visit:       Routing refill request to provider for review/approval because:  Drug not on the [facility], [facility] or [facility] refill protocol or controlled substance

## 2018-04-05 DIAGNOSIS — M19.90 INFLAMMATORY ARTHRITIS: ICD-10-CM

## 2018-04-05 RX ORDER — MORPHINE SULFATE 15 MG/1
15 TABLET, FILM COATED, EXTENDED RELEASE ORAL 2 TIMES DAILY
Qty: 60 TABLET | Refills: 0 | Status: SHIPPED | OUTPATIENT
Start: 2018-04-05 | End: 2018-05-03

## 2018-04-05 NOTE — TELEPHONE ENCOUNTER
MS Contin 15mg      Last Written Prescription Date:  3/7/18  Last Fill Quantity: 60,   # refills: 0  Last Office Visit: 10/11/17  Future Office visit:       Routing refill request to provider for review/approval because:  Drug not on the [facility], [facility] or [facility] refill protocol or controlled substance'

## 2018-04-12 DIAGNOSIS — G47.9 DISTURBANCE IN SLEEP BEHAVIOR: ICD-10-CM

## 2018-04-12 NOTE — TELEPHONE ENCOUNTER
Alprazolam  0.5 MG     Last Written Prescription Date:  4/4/17  Last Fill Quantity: 40,   # refills: 0  Last Office Visit: 10/11/17  Future Office visit:       Routing refill request to provider for review/approval because:  Drug not on the [facility], [facility] or [facility] refill protocol or controlled substance

## 2018-04-13 RX ORDER — ALPRAZOLAM 0.5 MG
TABLET ORAL
Qty: 40 TABLET | Refills: 0 | Status: SHIPPED | OUTPATIENT
Start: 2018-04-14 | End: 2018-04-23

## 2018-04-16 DIAGNOSIS — R07.81 PLEURITIC CHEST PAIN: ICD-10-CM

## 2018-04-16 RX ORDER — OXYCODONE AND ACETAMINOPHEN 10; 325 MG/1; MG/1
1 TABLET ORAL EVERY 6 HOURS PRN
Qty: 60 TABLET | Refills: 0 | Status: SHIPPED | OUTPATIENT
Start: 2018-04-16 | End: 2018-04-30

## 2018-04-23 DIAGNOSIS — G47.9 DISTURBANCE IN SLEEP BEHAVIOR: ICD-10-CM

## 2018-04-23 RX ORDER — ALPRAZOLAM 0.5 MG
TABLET ORAL
Qty: 40 TABLET | Refills: 0 | Status: SHIPPED | OUTPATIENT
Start: 2018-04-23 | End: 2018-05-01

## 2018-04-30 DIAGNOSIS — R07.81 PLEURITIC CHEST PAIN: ICD-10-CM

## 2018-04-30 NOTE — TELEPHONE ENCOUNTER
Oxycodone       Last Written Prescription Date:  4/16/18  Last Fill Quantity: 15,   # refills: 0  Last Office Visit: 10/25/147  Future Office visit:       Routing refill request to provider for review/approval because:  Drug not on the [facility], [facility] or [facility] refill protocol or controlled substance    Sravani Ramos MA

## 2018-05-01 DIAGNOSIS — G47.9 DISTURBANCE IN SLEEP BEHAVIOR: ICD-10-CM

## 2018-05-02 RX ORDER — OXYCODONE AND ACETAMINOPHEN 10; 325 MG/1; MG/1
1 TABLET ORAL EVERY 6 HOURS PRN
Qty: 60 TABLET | Refills: 0 | Status: SHIPPED | OUTPATIENT
Start: 2018-05-02 | End: 2018-05-14

## 2018-05-02 RX ORDER — ALPRAZOLAM 0.5 MG
TABLET ORAL
Qty: 40 TABLET | Refills: 0 | Status: SHIPPED | OUTPATIENT
Start: 2018-05-02 | End: 2018-05-10

## 2018-05-02 NOTE — TELEPHONE ENCOUNTER
xanax      Last Written Prescription Date:  4/23/18  Last Fill Quantity: 40,   # refills: 0  Last Office Visit: 10/11/17  Future Office visit:       Routing refill request to provider for review/approval because:  Drug not on the [facility], [facility] or [facility] refill protocol or controlled substance

## 2018-05-03 DIAGNOSIS — M19.90 INFLAMMATORY ARTHRITIS: ICD-10-CM

## 2018-05-04 RX ORDER — MORPHINE SULFATE 15 MG/1
15 TABLET, FILM COATED, EXTENDED RELEASE ORAL 2 TIMES DAILY
Qty: 60 TABLET | Refills: 0 | Status: SHIPPED | OUTPATIENT
Start: 2018-05-04 | End: 2018-06-01

## 2018-05-10 DIAGNOSIS — G47.9 DISTURBANCE IN SLEEP BEHAVIOR: ICD-10-CM

## 2018-05-10 RX ORDER — ALPRAZOLAM 0.5 MG
TABLET ORAL
Qty: 40 TABLET | Refills: 0 | Status: SHIPPED | OUTPATIENT
Start: 2018-05-10 | End: 2018-05-21

## 2018-05-10 NOTE — TELEPHONE ENCOUNTER
Alprazolam       Last Written Prescription Date:  5/2/18  Last Fill Quantity: 40,   # refills: 0  Last Office Visit: 10/11/17  Future Office visit:       Routing refill request to provider for review/approval because:  Drug not on the [facility], [facility] or [facility] refill protocol or controlled substance

## 2018-05-14 DIAGNOSIS — R07.81 PLEURITIC CHEST PAIN: ICD-10-CM

## 2018-05-14 RX ORDER — OXYCODONE AND ACETAMINOPHEN 10; 325 MG/1; MG/1
1 TABLET ORAL EVERY 6 HOURS PRN
Qty: 60 TABLET | Refills: 0 | Status: SHIPPED | OUTPATIENT
Start: 2018-05-16 | End: 2018-05-24

## 2018-05-14 NOTE — TELEPHONE ENCOUNTER
Percocet       Last Written Prescription Date:  5/2/18  Last Fill Quantity: 60,   # refills: 0  Last Office Visit: 10/11/17  Future Office visit:       Routing refill request to provider for review/approval because:  Drug not on the [facility], [facility] or [facility] refill protocol or controlled substance

## 2018-05-16 ENCOUNTER — OFFICE VISIT (OUTPATIENT)
Dept: FAMILY MEDICINE | Facility: CLINIC | Age: 43
End: 2018-05-16
Payer: COMMERCIAL

## 2018-05-16 VITALS
HEART RATE: 88 BPM | SYSTOLIC BLOOD PRESSURE: 100 MMHG | TEMPERATURE: 97.8 F | HEIGHT: 62 IN | BODY MASS INDEX: 19.96 KG/M2 | DIASTOLIC BLOOD PRESSURE: 60 MMHG | OXYGEN SATURATION: 100 % | WEIGHT: 108.5 LBS

## 2018-05-16 DIAGNOSIS — J84.10 FIBROSIS OF LUNG (H): ICD-10-CM

## 2018-05-16 DIAGNOSIS — J20.9 ACUTE BRONCHITIS, UNSPECIFIED ORGANISM: Primary | ICD-10-CM

## 2018-05-16 DIAGNOSIS — J01.01 ACUTE RECURRENT MAXILLARY SINUSITIS: ICD-10-CM

## 2018-05-16 DIAGNOSIS — J84.9 ILD (INTERSTITIAL LUNG DISEASE) (H): ICD-10-CM

## 2018-05-16 PROCEDURE — 99214 OFFICE O/P EST MOD 30 MIN: CPT | Performed by: FAMILY MEDICINE

## 2018-05-16 RX ORDER — CEFDINIR 300 MG/1
300 CAPSULE ORAL 2 TIMES DAILY
Qty: 20 CAPSULE | Refills: 0 | Status: SHIPPED | OUTPATIENT
Start: 2018-05-16 | End: 2018-09-10

## 2018-05-16 RX ORDER — AZITHROMYCIN 250 MG/1
TABLET, FILM COATED ORAL
Qty: 6 TABLET | Refills: 0 | Status: SHIPPED | OUTPATIENT
Start: 2018-05-16 | End: 2018-05-16

## 2018-05-16 NOTE — MR AVS SNAPSHOT
After Visit Summary   5/16/2018    Ellen Loya    MRN: [id number]           Patient Information     Date Of Birth          1975        Visit Information        Provider Department      5/16/2018 9:00 AM Ignacio Loya MD [facility]        Today's Diagnoses     Acute bronchitis, unspecified organism    -  1    Acute recurrent maxillary sinusitis        Fibrosis of lung (H)           Follow-ups after your visit        Your next 10 appointments already scheduled     Sep 10, 2018 10:00 AM CDT   Lab with  LAB   [facility] ([facility])    [address]  1st Floor  [facility] [id number] [phone]            Sep 10, 2018 10:30 AM CDT   (Arrive by 10:15 AM)   RETURN PRIMARY PULMONARY with Callie Ledesma MD   [facility] ([facility])    [address] [place] [address]  [address]  [facility] [id number] [phone]              Who to contact     If you have questions or need follow up information about today's clinic visit or your schedule please contact [facility] directly at [phone].  Normal or non-critical lab and imaging results will be communicated to you by [employer], letter or phone within 4 business days after the clinic has received the results. If you do not hear from us within 7 days, please contact the clinic through [employer] or phone. If you have a critical or abnormal lab result, we will notify you by phone as soon as possible.  Submit refill requests through [employer] or call your pharmacy and they will forward the refill request to us. Please allow 3 business days for your refill to be completed.          Additional Information About Your Visit        Bee [employer] Information     [employer] gives you secure access to your electronic health record. If you see a primary care provider, you can also send messages to your care team and make appointments. If you have questions, please  "call your primary care clinic.  If you do not have a primary care provider, please call [phone] and they will assist you.        Care EveryWhere ID     This is your Care EveryWhere ID. This could be used by other organizations to access your [facility] medical records  [id number]        Your Vitals Were     Pulse Temperature Height Last Period Pulse Oximetry BMI (Body Mass Index)    88 97.8  F (36.6  C) (Temporal) 5' 2.01\" (1.575 m) 05/07/2018 (Approximate) 100% 19.84 kg/m2       Blood Pressure from Last 3 Encounters:   05/16/18 100/60   10/25/17 121/72   10/11/17 116/62    Weight from Last 3 Encounters:   05/16/18 108 lb 8 oz (49.2 kg)   01/30/18 114 lb 12.8 oz (52.1 kg)   01/18/18 117 lb (53.1 kg)              Today, you had the following     No orders found for display         Today's Medication Changes          These changes are accurate as of 5/16/18  9:35 AM.  If you have any questions, ask your nurse or doctor.               Start taking these medicines.        Dose/Directions    azithromycin 250 MG tablet   Commonly known as:  ZITHROMAX   Used for:  Acute recurrent maxillary sinusitis, Acute bronchitis, unspecified organism   Started by:  Ignacio Loya MD        Two tablets first day, then one tablet daily for four days.   Quantity:  6 tablet   Refills:  0            Where to get your medicines      These medications were sent to [facility] [address]   [address] [place] [address] [facility] [address]     Phone:  [phone]     azithromycin 250 MG tablet                Primary Care Provider Office Phone # Fax #    Ignacio Loya [phone]       [address] [facility] [id number]        Equal Access to Services     GIRISH SPENCER : Germania Salinas, wacarlineda lujeancarlos, qaybta kaalmada tomas, justyn hannah. So [facility] [phone].    ATENCIÓN: Si habla español, tiene a bassett disposición servicios " joleen de asistencia lingüística. Tate taylor [phone].    We comply with applicable federal civil rights laws and [place] laws. We do not discriminate on the basis of race, color, national origin, age, disability, sex, sexual orientation, or gender identity.            Thank you!     Thank you for choosing [facility]  for your care. Our goal is always to provide you with excellent care. Hearing back from our patients is one way we can continue to improve our services. Please take a few minutes to complete the written survey that you may receive in the mail after your visit with us. Thank you!             Your Updated Medication List - Protect others around you: Learn how to safely use, store and throw away your medicines at www.disposemymeds.org.          This list is accurate as of 5/16/18  9:35 AM.  Always use your most recent med list.                   Brand Name Dispense Instructions for use Diagnosis    * albuterol (2.5 MG/3ML) 0.083% neb solution     60 vial    Use every 4-6 hours as needed for shortness of breath    Pleuritic chest pain, Acute bronchitis, unspecified organism       * VENTOLIN  (90 Base) MCG/ACT Inhaler   Generic drug:  albuterol     18 g    INHALE ONE TO TWO PUFFS INTO THE LUNGS EVERY 4 HOURS AS NEEDED FOR SHORTNESS OF BREATH / DYSPNEA    SOB (shortness of breath)       ALPRAZolam 0.5 MG tablet    XANAX    40 tablet    TAKE 1 TABLET BY MOUTH EVERY SIX HOURS AS NEEDED FOR ANXIETY.    Disturbance in sleep behavior       azithromycin 250 MG tablet    ZITHROMAX    6 tablet    Two tablets first day, then one tablet daily for four days.    Acute recurrent maxillary sinusitis, Acute bronchitis, unspecified organism       digoxin 125 MCG tablet    LANOXIN    90 tablet    Take 1 tablet (125 mcg) by mouth daily    Pulmonary hypertension       FLOVENT  MCG/ACT Inhaler   Generic drug:  fluticasone     12 g    INHALE 2 PUFFS INTO THE LUNGS TWO TIMES A DAY    ILD  (interstitial lung disease) (H)       fluticasone 50 MCG/ACT spray    FLONASE    1 Bottle    Spray 2 sprays into both nostrils daily    Nasal congestion       furosemide 20 MG tablet    LASIX    180 tablet    TAKE TWO TABLETS BY MOUTH EVERY DAY    Pulmonary hypertension       morphine 15 MG 12 hr tablet    MS CONTIN    60 tablet    Take 1 tablet (15 mg) by mouth 2 times daily    Inflammatory arthritis       NEXIUM PO      Take 40 mg by mouth every morning (before breakfast)    Pulmonary hypertension       order for DME     1 Device    Equipment being ordered: personal O2 oximeter.    Hypoxia       * order for DME     1 Device    Equipment being ordered: Oxygen  Please provide patient with a home-fill system for portability.    ILD (interstitial lung disease) (H), Hypoxia       * order for DME     2 Device    Please provide patient with 2  liquid oxygen tanks for portability. Spot check patient on liquid oxygen. Titrate oxygen to maintain saturations above 88%.    ILD (interstitial lung disease) (H)       * order for DME     1 Device    Equipment being ordered: Oxygen oximeter and mask    ILD (interstitial lung disease) (H), Lung nodule, Fibrosis of lung (H), Pulmonary hypertension       * order for DME     1 Device    Equipment being ordered: Oximeter    ILD (interstitial lung disease) (H)       * order for DME     1 Device    Equipment being ordered: Nebulizer. Verbal order from     Acute bronchitis, unspecified organism       * order for DME     1 Device    Oxygen: Patient requires supplemental Oxygen 4 LPM via nasal canula at rest and 6 LPM with activity. Please provide patient with portability capability. Okay to spot check patient on oxygen device, to keep sats above 90%. Please provider with home concentrator that can go up to 10 LPM. Oxygen will be for a lifetime.    Hypoxia       * order for DME     1 Device    Equipment being ordered: Oxygen 8 liters continuous at rest and 8 liters continuous with  activity. Needs portability.  Please provide concentrator that goes to 10 liters.    ILD (interstitial lung disease) (H), Hypoxia       * order for DME     1 Device    Please provide patient with updated oxygen equipment.  Patient requires 3 to 4 LPM continuous flow while driving or sitting and 8 LPM continuous flow with all other activity.    ILD (interstitial lung disease) (H)       oxyCODONE-acetaminophen  MG per tablet    PERCOCET    60 tablet    Take 1 tablet by mouth every 6 hours as needed for moderate to severe pain    Pleuritic chest pain       tadalafil (PAH) 20 MG Tabs    ADCIRCA    60 tablet    Take 2 tablets (40 mg) by mouth daily    Pulmonary hypertension       tiotropium 18 MCG capsule    SPIRIVA HANDIHALER    30 capsule    Inhale contents of one capsule daily.    ILD (interstitial lung disease) (H)       * Notice:  This list has 10 medication(s) that are the same as other medications prescribed for you. Read the directions carefully, and ask your doctor or other care provider to review them with you.
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
## 2018-05-21 DIAGNOSIS — G47.9 DISTURBANCE IN SLEEP BEHAVIOR: ICD-10-CM

## 2018-05-21 RX ORDER — ALPRAZOLAM 0.5 MG
TABLET ORAL
Qty: 40 TABLET | Refills: 0 | Status: SHIPPED | OUTPATIENT
Start: 2018-05-21 | End: 2018-05-30

## 2018-05-21 NOTE — TELEPHONE ENCOUNTER
Last Written Prescription Date:  5/10/18  Last Fill Quantity: 40,  # refills: 0   Last office visit: 5/16/2018 with prescribing provider:  5/16/18   Future Office Visit:      Requested Prescriptions   Pending Prescriptions Disp Refills     ALPRAZolam (XANAX) 0.5 MG tablet 40 tablet 0     Sig: TAKE 1 TABLET BY MOUTH EVERY SIX HOURS AS NEEDED FOR ANXIETY.    There is no refill protocol information for this order

## 2018-05-24 DIAGNOSIS — R07.81 PLEURITIC CHEST PAIN: ICD-10-CM

## 2018-05-25 RX ORDER — OXYCODONE AND ACETAMINOPHEN 10; 325 MG/1; MG/1
1 TABLET ORAL EVERY 6 HOURS PRN
Qty: 60 TABLET | Refills: 0 | Status: SHIPPED | OUTPATIENT
Start: 2018-05-30 | End: 2018-06-11

## 2018-05-25 NOTE — TELEPHONE ENCOUNTER
Percocet    MG     Last Written Prescription Date:  5/16/18  Last Fill Quantity: 60,   # refills: 0  Last Office Visit: 5/16/18  Future Office visit:       Routing refill request to provider for review/approval because:  Drug not on the [facility], [facility] or [facility] refill protocol or controlled substance

## 2018-05-30 DIAGNOSIS — G47.9 DISTURBANCE IN SLEEP BEHAVIOR: ICD-10-CM

## 2018-05-31 RX ORDER — ALPRAZOLAM 0.5 MG
TABLET ORAL
Qty: 40 TABLET | Refills: 0 | Status: SHIPPED | OUTPATIENT
Start: 2018-05-31 | End: 2018-06-08

## 2018-05-31 NOTE — TELEPHONE ENCOUNTER
Xanax 0.5 MG       Last Written Prescription Date:  5/21/18  Last Fill Quantity: 40,   # refills: 0  Last Office Visit: 5/16/18  Future Office visit:       Routing refill request to provider for review/approval because:  Drug not on the [facility], [facility] or [facility] refill protocol or controlled substance

## 2018-06-01 DIAGNOSIS — M19.90 INFLAMMATORY ARTHRITIS: ICD-10-CM

## 2018-06-01 RX ORDER — MORPHINE SULFATE 15 MG/1
15 TABLET, FILM COATED, EXTENDED RELEASE ORAL 2 TIMES DAILY
Qty: 60 TABLET | Refills: 0 | Status: SHIPPED | OUTPATIENT
Start: 2018-06-01 | End: 2018-06-28

## 2018-06-01 NOTE — TELEPHONE ENCOUNTER
Morphine       Last Written Prescription Date:  5/4/18  Last Fill Quantity: 60,   # refills: 0  Last Office Visit: 5/16/18  Future Office visit:       Routing refill request to provider for review/approval because:  Drug not on the [facility], [facility] or [facility] refill protocol or controlled substance

## 2018-06-01 NOTE — TELEPHONE ENCOUNTER
Requested Prescriptions   Pending Prescriptions Disp Refills     morphine (MS CONTIN) 15 MG 12 hr tablet 60 tablet 0     Sig: Take 1 tablet (15 mg) by mouth 2 times daily    There is no refill protocol information for this order        Last Written Prescription Date:  06/01/2018  Last Fill Quantity: 60,  # refills: 0   Last office visit: 5/16/2018 with prescribing provider:  05/16/2018   Future Office Visit:

## 2018-06-01 NOTE — TELEPHONE ENCOUNTER
Reason for Call:  Medication or medication refill:    Do you use a [facility]?  Name of the pharmacy and phone number for the current request:  [facility]- [phone]    Name of the medication requested: Morphine     Other request: Pt calling to check the status of this . She states she requested this from the pharmacy on Wednesday. I do not see a refill request for this med.     Can we leave a detailed message on this number? YES    Phone number patient can be reached at: Home number on file [phone] (home)    Best Time: any     Call taken on 6/1/2018 at 2:15 PM by Sandra Leary

## 2018-06-08 DIAGNOSIS — G47.9 DISTURBANCE IN SLEEP BEHAVIOR: ICD-10-CM

## 2018-06-08 RX ORDER — MORPHINE SULFATE 15 MG/1
15 TABLET, FILM COATED, EXTENDED RELEASE ORAL 2 TIMES DAILY
Qty: 60 TABLET | Refills: 0 | OUTPATIENT
Start: 2018-06-08

## 2018-06-08 RX ORDER — ALPRAZOLAM 0.5 MG
TABLET ORAL
Qty: 40 TABLET | Refills: 0 | Status: SHIPPED | OUTPATIENT
Start: 2018-06-09 | End: 2018-06-18

## 2018-06-08 NOTE — TELEPHONE ENCOUNTER
Xanax  0.5 MG      Last Written Prescription Date:  5/31/18  Last Fill Quantity: 40,   # refills: 0  Last Office Visit: 5/16/18  Future Office visit:       Routing refill request to provider for review/approval because:  Drug not on the [facility], [facility] or [facility] refill protocol or controlled substance

## 2018-06-11 DIAGNOSIS — R07.81 PLEURITIC CHEST PAIN: ICD-10-CM

## 2018-06-11 RX ORDER — OXYCODONE AND ACETAMINOPHEN 10; 325 MG/1; MG/1
1 TABLET ORAL EVERY 6 HOURS PRN
Qty: 60 TABLET | Refills: 0 | Status: SHIPPED | OUTPATIENT
Start: 2018-06-13 | End: 2018-06-25

## 2018-06-11 NOTE — TELEPHONE ENCOUNTER
Requested Prescriptions   Pending Prescriptions Disp Refills     oxyCODONE-acetaminophen (PERCOCET)  MG per tablet 60 tablet 0     Sig: Take 1 tablet by mouth every 6 hours as needed for moderate to severe pain    There is no refill protocol information for this order        Last Written Prescription Date:  05/30/2018  Last Fill Quantity: 60,  # refills: 0   Last office visit: 5/16/2018 with prescribing provider:  05/16/2018   Future Office Visit:

## 2018-06-18 DIAGNOSIS — G47.9 DISTURBANCE IN SLEEP BEHAVIOR: ICD-10-CM

## 2018-06-18 RX ORDER — ALPRAZOLAM 0.5 MG
TABLET ORAL
Qty: 40 TABLET | Refills: 0 | Status: SHIPPED | OUTPATIENT
Start: 2018-06-18 | End: 2018-06-27

## 2018-06-18 NOTE — TELEPHONE ENCOUNTER
Xanax       Last Written Prescription Date:  6/9/18  Last Fill Quantity: 40,   # refills: 0  Last Office Visit: 5/16/18  Future Office visit:       Routing refill request to provider for review/approval because:  Drug not on the [facility], [facility] or [facility] refill protocol or controlled substance

## 2018-06-19 ENCOUNTER — MEDICAL CORRESPONDENCE (OUTPATIENT)
Dept: HEALTH INFORMATION MANAGEMENT | Facility: CLINIC | Age: 43
End: 2018-06-19

## 2018-06-25 DIAGNOSIS — R07.81 PLEURITIC CHEST PAIN: ICD-10-CM

## 2018-06-25 NOTE — TELEPHONE ENCOUNTER
Requested Prescriptions   Pending Prescriptions Disp Refills     oxyCODONE-acetaminophen (PERCOCET)  MG per tablet 60 tablet 0     Sig: Take 1 tablet by mouth every 6 hours as needed for moderate to severe pain    There is no refill protocol information for this order        Last Written Prescription Date:  06/13/2018  Last Fill Quantity: 60,  # refills: 0   Last office visit: 5/16/2018 with prescribing provider:  05/16/2018   Future Office Visit:

## 2018-06-27 DIAGNOSIS — G47.9 DISTURBANCE IN SLEEP BEHAVIOR: ICD-10-CM

## 2018-06-27 RX ORDER — OXYCODONE AND ACETAMINOPHEN 10; 325 MG/1; MG/1
1 TABLET ORAL EVERY 6 HOURS PRN
Qty: 60 TABLET | Refills: 0 | Status: SHIPPED | OUTPATIENT
Start: 2018-06-27 | End: 2018-07-09

## 2018-06-27 RX ORDER — ALPRAZOLAM 0.5 MG
TABLET ORAL
Qty: 40 TABLET | Refills: 0 | Status: SHIPPED | OUTPATIENT
Start: 2018-06-27 | End: 2018-07-05

## 2018-06-27 NOTE — TELEPHONE ENCOUNTER
Xanax 0.5 MG       Last Written Prescription Date:  6/18/18  Last Fill Quantity: 40,   # refills: 0  Last Office Visit: 5/16/18  Future Office visit:       Routing refill request to provider for review/approval because:  Drug not on the [facility], [facility] or [facility] refill protocol or controlled substance

## 2018-06-27 NOTE — TELEPHONE ENCOUNTER
Ellen calls to inquire about the status of this refill request and is hoping to have it filled today.  She is out of this med as of this morning.

## 2018-06-28 DIAGNOSIS — M19.90 INFLAMMATORY ARTHRITIS: ICD-10-CM

## 2018-06-28 RX ORDER — MORPHINE SULFATE 15 MG/1
15 TABLET, FILM COATED, EXTENDED RELEASE ORAL 2 TIMES DAILY
Qty: 60 TABLET | Refills: 0 | Status: SHIPPED | OUTPATIENT
Start: 2018-06-28 | End: 2018-07-25

## 2018-07-05 DIAGNOSIS — G47.9 DISTURBANCE IN SLEEP BEHAVIOR: ICD-10-CM

## 2018-07-05 NOTE — TELEPHONE ENCOUNTER
Xanax 0.5 MG       Last Written Prescription Date:  6-27-18  Last Fill Quantity: 40,   # refills: 0  Last Office Visit: 5/16/18  Future Office visit:       Routing refill request to provider for review/approval because:  Drug not on the [facility], [facility] or [facility] refill protocol or controlled substance

## 2018-07-06 ENCOUNTER — OFFICE VISIT (OUTPATIENT)
Dept: OBGYN | Facility: OTHER | Age: 43
End: 2018-07-06
Payer: COMMERCIAL

## 2018-07-06 VITALS
HEART RATE: 84 BPM | SYSTOLIC BLOOD PRESSURE: 100 MMHG | BODY MASS INDEX: 19.75 KG/M2 | RESPIRATION RATE: 12 BRPM | WEIGHT: 108 LBS | TEMPERATURE: 97.6 F | DIASTOLIC BLOOD PRESSURE: 60 MMHG

## 2018-07-06 DIAGNOSIS — R82.90 NONSPECIFIC FINDING ON EXAMINATION OF URINE: ICD-10-CM

## 2018-07-06 DIAGNOSIS — R39.9 UTI SYMPTOMS: Primary | ICD-10-CM

## 2018-07-06 DIAGNOSIS — Z12.4 ENCOUNTER FOR SCREENING FOR CERVICAL CANCER: ICD-10-CM

## 2018-07-06 DIAGNOSIS — R87.610 PAPANICOLAOU SMEAR OF CERVIX WITH ATYPICAL SQUAMOUS CELLS OF UNDETERMINED SIGNIFICANCE (ASC-US): ICD-10-CM

## 2018-07-06 LAB
ALBUMIN UR-MCNC: 30 MG/DL
APPEARANCE UR: ABNORMAL
BILIRUB UR QL STRIP: NEGATIVE
COLOR UR AUTO: YELLOW
GLUCOSE UR STRIP-MCNC: NEGATIVE MG/DL
HGB UR QL STRIP: ABNORMAL
KETONES UR STRIP-MCNC: NEGATIVE MG/DL
LEUKOCYTE ESTERASE UR QL STRIP: ABNORMAL
NITRATE UR QL: NEGATIVE
NON-SQ EPI CELLS #/AREA URNS LPF: ABNORMAL /LPF
PH UR STRIP: 5.5 PH (ref 5–7)
RBC #/AREA URNS AUTO: ABNORMAL /HPF
SOURCE: ABNORMAL
SP GR UR STRIP: <=1.005 (ref 1–1.03)
UROBILINOGEN UR STRIP-ACNC: 0.2 EU/DL (ref 0.2–1)
WBC #/AREA URNS AUTO: >100 /HPF
WBC CLUMPS #/AREA URNS HPF: PRESENT /HPF

## 2018-07-06 PROCEDURE — 88175 CYTOPATH C/V AUTO FLUID REDO: CPT | Performed by: OBSTETRICS & GYNECOLOGY

## 2018-07-06 PROCEDURE — 87088 URINE BACTERIA CULTURE: CPT | Performed by: OBSTETRICS & GYNECOLOGY

## 2018-07-06 PROCEDURE — 87186 SC STD MICRODIL/AGAR DIL: CPT | Performed by: OBSTETRICS & GYNECOLOGY

## 2018-07-06 PROCEDURE — 99214 OFFICE O/P EST MOD 30 MIN: CPT | Performed by: OBSTETRICS & GYNECOLOGY

## 2018-07-06 PROCEDURE — 87086 URINE CULTURE/COLONY COUNT: CPT | Performed by: OBSTETRICS & GYNECOLOGY

## 2018-07-06 PROCEDURE — G0476 HPV COMBO ASSAY CA SCREEN: HCPCS | Performed by: OBSTETRICS & GYNECOLOGY

## 2018-07-06 PROCEDURE — 81001 URINALYSIS AUTO W/SCOPE: CPT | Performed by: OBSTETRICS & GYNECOLOGY

## 2018-07-06 RX ORDER — SULFAMETHOXAZOLE/TRIMETHOPRIM 800-160 MG
1 TABLET ORAL 2 TIMES DAILY
Qty: 14 TABLET | Refills: 0 | Status: SHIPPED | OUTPATIENT
Start: 2018-07-06 | End: 2018-09-10

## 2018-07-06 RX ORDER — ALPRAZOLAM 0.5 MG
TABLET ORAL
Qty: 40 TABLET | Refills: 0 | Status: SHIPPED | OUTPATIENT
Start: 2018-07-06 | End: 2018-07-16

## 2018-07-06 ASSESSMENT — PAIN SCALES - GENERAL: PAINLEVEL: MODERATE PAIN (4)
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
## 2018-07-06 NOTE — MR AVS SNAPSHOT
After Visit Summary   7/6/2018    Ellen Loya    MRN: [id number]           Patient Information     Date Of Birth          1975        Visit Information        Provider Department      7/6/2018 2:15 PM Enrike Mckeon MD [facility]        Today's Diagnoses     UTI symptoms    -  1    Nonspecific finding on examination of urine           Follow-ups after your visit        Follow-up notes from your care team     Return in about 2 weeks (around 7/20/2018) for well woman exam.      Your next 10 appointments already scheduled     Jul 20, 2018 11:00 AM CDT   [facility]    [address] [address] [id number] [phone]            Sep 10, 2018 10:00 AM CDT   Lab with  LAB   [facility] ([facility])    [address]  1st Floor  [place] [id number] [phone]            Sep 10, 2018 10:30 AM CDT   (Arrive by 10:15 AM)   RETURN PRIMARY PULMONARY with Callie Ledesma MD   [facility] ([facility])    [address]  [address]  [place] [id number] [phone]              Who to contact     If you have questions or need follow up information about today's clinic visit or your schedule please contact [facility] directly at [phone].  Normal or non-critical lab and imaging results will be communicated to you by MyChart, letter or phone within 4 business days after the clinic has received the results. If you do not hear from us within 7 days, please contact the clinic through MyChart or phone. If you have a critical or abnormal lab result, we will notify you by phone as soon as possible.  Submit refill requests through [employer] or call your pharmacy and they will forward the refill request to us. Please allow 3 business days for your refill to be completed.          Additional  Information About Your Visit        [employer] Information     [employer] gives you secure access to your electronic health record. If you see a primary care provider, you can also send messages to your care team and make appointments. If you have questions, please call your primary care clinic.  If you do not have a primary care provider, please call [phone] and they will assist you.        Care EveryWhere ID     This is your Care EveryWhere ID. This could be used by other organizations to access your [facility] medical records  [id number]        Your Vitals Were     Pulse Temperature Respirations Last Period BMI (Body Mass Index)       84 97.6  F (36.4  C) (Temporal) 12 06/25/2018 (Approximate) 19.75 kg/m2        Blood Pressure from Last 3 Encounters:   07/06/18 100/60   05/16/18 100/60   10/25/17 121/72    Weight from Last 3 Encounters:   07/06/18 108 lb (49 kg)   05/16/18 108 lb 8 oz (49.2 kg)   01/30/18 114 lb 12.8 oz (52.1 kg)              We Performed the Following     UA reflex to Microscopic and Culture     Urine Culture Aerobic Bacterial     Urine Microscopic          Today's Medication Changes          These changes are accurate as of 7/6/18  2:47 PM.  If you have any questions, ask your nurse or doctor.               Start taking these medicines.        Dose/Directions    sulfamethoxazole-trimethoprim 800-160 MG per tablet   Commonly known as:  BACTRIM DS/SEPTRA DS   Used for:  UTI symptoms   Started by:  Enrike Mckeon MD        Dose:  1 tablet   Take 1 tablet by mouth 2 times daily   Quantity:  14 tablet   Refills:  0            Where to get your medicines      Some of these will need a paper prescription and others can be bought over the counter.  Ask your nurse if you have questions.     Bring a paper prescription for each of these medications     sulfamethoxazole-trimethoprim 800-160 MG per tablet                Primary Care Provider Office Phone # Fax #    Ignacio Loya [phone]  [phone]       [address] [facility] [address]        Equal Access to Services     GIRISH SPENCER : Hadii gilma hines hadzeinabo Sopauletteali, waaxda luqadaha, qaybta kaalmada tomas, justyn sandrain hayaaflavia leyprem mroe radha hannah. So [facility] [phone].    ATENCIÓN: Si habla español, tiene a bassett disposición servicios gratuitos de asistencia lingüística. Llame al [phone].    We comply with applicable federal civil rights laws and [place] laws. We do not discriminate on the basis of race, color, national origin, age, disability, sex, sexual orientation, or gender identity.            Thank you!     Thank you for choosing [facility]  for your care. Our goal is always to provide you with excellent care. Hearing back from our patients is one way we can continue to improve our services. Please take a few minutes to complete the written survey that you may receive in the mail after your visit with us. Thank you!             Your Updated Medication List - Protect others around you: Learn how to safely use, store and throw away your medicines at www.disposemymeds.org.          This list is accurate as of 7/6/18  2:47 PM.  Always use your most recent med list.                   Brand Name Dispense Instructions for use Diagnosis    * albuterol (2.5 MG/3ML) 0.083% neb solution     60 vial    Use every 4-6 hours as needed for shortness of breath    Pleuritic chest pain, Acute bronchitis, unspecified organism       * VENTOLIN  (90 Base) MCG/ACT Inhaler   Generic drug:  albuterol     18 g    INHALE ONE TO TWO PUFFS INTO THE LUNGS EVERY 4 HOURS AS NEEDED FOR SHORTNESS OF BREATH / DYSPNEA    SOB (shortness of breath)       ALPRAZolam 0.5 MG tablet    XANAX    40 tablet    TAKE 1 TABLET BY MOUTH EVERY SIX HOURS AS NEEDED FOR ANXIETY.    Disturbance in sleep behavior       cefdinir 300 MG capsule    OMNICEF    20 capsule    Take 1 capsule (300 mg) by mouth 2 times daily    Acute recurrent maxillary  sinusitis, Acute bronchitis, unspecified organism       digoxin 125 MCG tablet    LANOXIN    90 tablet    Take 1 tablet (125 mcg) by mouth daily    Pulmonary hypertension       FLOVENT  MCG/ACT Inhaler   Generic drug:  fluticasone     12 g    INHALE 2 PUFFS INTO THE LUNGS TWO TIMES A DAY    ILD (interstitial lung disease) (H)       fluticasone 50 MCG/ACT spray    FLONASE    16 g    USE 2 SPRAYS IN EACH NOSTRIL DAILY    Nasal congestion       furosemide 20 MG tablet    LASIX    180 tablet    TAKE TWO TABLETS BY MOUTH EVERY DAY    Pulmonary hypertension       morphine 15 MG 12 hr tablet    MS CONTIN    60 tablet    Take 1 tablet (15 mg) by mouth 2 times daily    Inflammatory arthritis       NEXIUM PO      Take 40 mg by mouth every morning (before breakfast)    Pulmonary hypertension       order for DME     1 Device    Equipment being ordered: personal O2 oximeter.    Hypoxia       * order for DME     1 Device    Equipment being ordered: Oxygen  Please provide patient with a home-fill system for portability.    ILD (interstitial lung disease) (H), Hypoxia       * order for DME     2 Device    Please provide patient with 2  liquid oxygen tanks for portability. Spot check patient on liquid oxygen. Titrate oxygen to maintain saturations above 88%.    ILD (interstitial lung disease) (H)       * order for DME     1 Device    Equipment being ordered: Oxygen oximeter and mask    ILD (interstitial lung disease) (H), Lung nodule, Fibrosis of lung (H), Pulmonary hypertension       * order for DME     1 Device    Equipment being ordered: Oximeter    ILD (interstitial lung disease) (H)       * order for DME     1 Device    Equipment being ordered: Nebulizer. Verbal order from     Acute bronchitis, unspecified organism       * order for DME     1 Device    Oxygen: Patient requires supplemental Oxygen 4 LPM via nasal canula at rest and 6 LPM with activity. Please provide patient with portability capability. Okay to  spot check patient on oxygen device, to keep sats above 90%. Please provider with home concentrator that can go up to 10 LPM. Oxygen will be for a lifetime.    Hypoxia       * order for DME     1 Device    Equipment being ordered: Oxygen 8 liters continuous at rest and 8 liters continuous with activity. Needs portability.  Please provide concentrator that goes to 10 liters.    ILD (interstitial lung disease) (H), Hypoxia       * order for DME     1 Device    Please provide patient with updated oxygen equipment.  Patient requires 3 to 4 LPM continuous flow while driving or sitting and 8 LPM continuous flow with all other activity.    ILD (interstitial lung disease) (H)       oxyCODONE-acetaminophen  MG per tablet    PERCOCET    60 tablet    Take 1 tablet by mouth every 6 hours as needed for moderate to severe pain    Pleuritic chest pain       sulfamethoxazole-trimethoprim 800-160 MG per tablet    BACTRIM DS/SEPTRA DS    14 tablet    Take 1 tablet by mouth 2 times daily    UTI symptoms       tadalafil (PAH) 20 MG Tabs    ADCIRCA    60 tablet    Take 2 tablets (40 mg) by mouth daily    Pulmonary hypertension       tiotropium 18 MCG capsule    SPIRIVA HANDIHALER    30 capsule    Inhale contents of one capsule daily.    ILD (interstitial lung disease) (H)       * Notice:  This list has 10 medication(s) that are the same as other medications prescribed for you. Read the directions carefully, and ask your doctor or other care provider to review them with you.

## 2018-07-08 LAB
BACTERIA SPEC CULT: ABNORMAL
SPECIMEN SOURCE: ABNORMAL

## 2018-07-09 DIAGNOSIS — R07.81 PLEURITIC CHEST PAIN: ICD-10-CM

## 2018-07-09 NOTE — TELEPHONE ENCOUNTER
Percocet      Last Written Prescription Date:  6/27/2018  Last Fill Quantity: 60,   # refills: 0  Last Office Visit: 5/16/2018  Future Office visit:    Next 5 appointments (look out 90 days)     Jul 20, 2018 11:00 AM CDT   SHORT with Enrike Mckeon MD   [facility]    [address] [id number] [phone]                   Routing refill request to provider for review/approval because:  Drug not on the [facility], [facility] or  Health refill protocol or controlled substance

## 2018-07-10 LAB
COPATH REPORT: NORMAL
PAP: NORMAL

## 2018-07-11 ENCOUNTER — TELEPHONE (OUTPATIENT)
Dept: FAMILY MEDICINE | Facility: CLINIC | Age: 43
End: 2018-07-11

## 2018-07-11 RX ORDER — OXYCODONE AND ACETAMINOPHEN 10; 325 MG/1; MG/1
1 TABLET ORAL EVERY 6 HOURS PRN
Qty: 60 TABLET | Refills: 0 | Status: SHIPPED | OUTPATIENT
Start: 2018-07-11 | End: 2018-07-23

## 2018-07-11 NOTE — TELEPHONE ENCOUNTER
Patient is calling wondering if this has been addressed. Please call her back when able.  Thank you,  Ivis Fagan   for [facility]

## 2018-07-11 NOTE — TELEPHONE ENCOUNTER
Prior Authorization Retail Medication Request    Medication/Dose: call- PA   ICD code (if different than what is on RX):    Previously Tried and Failed:    Rationale:      Insurance Name:  [employer]  Insurance ID:  [id number]      Pharmacy Information (if different than what is on RX)  Name:    Phone:

## 2018-07-11 NOTE — TELEPHONE ENCOUNTER
Pt calling to check the status of the PA for her Percocet. Please call.  Thank you,  Sandra Leary- Pt Rep.

## 2018-07-11 NOTE — TELEPHONE ENCOUNTER
Ellen calls stating she spoke with her insurance company and has a number to call to have this PA done today for her Percocet.   Please call [employer] [phone]

## 2018-07-12 LAB
FINAL DIAGNOSIS: NORMAL
HPV HR 12 DNA CVX QL NAA+PROBE: NEGATIVE
HPV16 DNA SPEC QL NAA+PROBE: NEGATIVE
HPV18 DNA SPEC QL NAA+PROBE: NEGATIVE
SPECIMEN DESCRIPTION: NORMAL
SPECIMEN SOURCE CVX/VAG CYTO: NORMAL

## 2018-07-12 NOTE — TELEPHONE ENCOUNTER
Central Prior Authorization Team   Phone: [phone]    PA Initiation    Medication: call- PA   Insurance Company: [employer] - Phone [phone] Fax [phone]  Pharmacy Filling the Rx: [place], [place] - [address] [place] [address]   Filling Pharmacy Phone: [phone]  Filling Pharmacy Fax:    Start Date: 7/12/2018

## 2018-07-12 NOTE — TELEPHONE ENCOUNTER
Prior Authorization Approval    Authorization Effective Date: 6/12/2018  Authorization Expiration Date: 7/12/2019  Medication: call- PA   Approved Dose/Quantity:    Reference #:     Insurance Company: [employer] - Phone [phone] Fax [phone]  Expected CoPay:       CoPay Card Available:      Foundation Assistance Needed:    Which Pharmacy is filling the prescription (Not needed for infusion/clinic administered): [facility] [address]   Pharmacy Notified: Yes  Patient Notified: Yes

## 2018-07-16 DIAGNOSIS — G47.9 DISTURBANCE IN SLEEP BEHAVIOR: ICD-10-CM

## 2018-07-16 RX ORDER — ALPRAZOLAM 0.5 MG
TABLET ORAL
Qty: 40 TABLET | Refills: 0 | Status: SHIPPED | OUTPATIENT
Start: 2018-07-16 | End: 2018-07-25

## 2018-07-16 NOTE — TELEPHONE ENCOUNTER
Requested Prescriptions   Pending Prescriptions Disp Refills     ALPRAZolam (XANAX) 0.5 MG tablet 40 tablet 0     Sig: TAKE 1 TABLET BY MOUTH EVERY SIX HOURS AS NEEDED FOR ANXIETY.    There is no refill protocol information for this order        Last Written Prescription Date:  07/06/2018  Last Fill Quantity: 40,  # refills: 0   Last office visit: 5/16/2018 with prescribing provider:  05/16/2018   Future Office Visit:   Next 5 appointments (look out 90 days)     Jul 20, 2018 11:00 AM CDT   [facility] MD   [facility]    [address] [address] [address] [phone]

## 2018-07-23 DIAGNOSIS — R07.81 PLEURITIC CHEST PAIN: ICD-10-CM

## 2018-07-23 NOTE — TELEPHONE ENCOUNTER
Percocet      Last Written Prescription Date:  7/11/2018  Last Fill Quantity: 60,   # refills: 0  Last Office Visit: 5/16/2018  Future Office visit:       Routing refill request to provider for review/approval because:  Drug not on the [facility], [facility] or [facility] refill protocol or controlled substance

## 2018-07-25 ENCOUNTER — TELEPHONE (OUTPATIENT)
Dept: OBGYN | Facility: OTHER | Age: 43
End: 2018-07-25

## 2018-07-25 DIAGNOSIS — M19.90 INFLAMMATORY ARTHRITIS: ICD-10-CM

## 2018-07-25 DIAGNOSIS — G47.9 DISTURBANCE IN SLEEP BEHAVIOR: ICD-10-CM

## 2018-07-25 RX ORDER — OXYCODONE AND ACETAMINOPHEN 10; 325 MG/1; MG/1
1 TABLET ORAL EVERY 6 HOURS PRN
Qty: 60 TABLET | Refills: 0 | Status: SHIPPED | OUTPATIENT
Start: 2018-07-25 | End: 2018-08-06

## 2018-07-25 NOTE — TELEPHONE ENCOUNTER
Notes Recorded by Dea Park MD on 7/25/2018 at 2:31 PM  Repeat cotest in 3 years. Dea park    Relayed above information to pt.  Pt verbalized understanding and agreed to plan.    Kristina Narayanan RN

## 2018-07-26 NOTE — TELEPHONE ENCOUNTER
Alprazolam 0.5 MG       Last Written Prescription Date:  7/16/18  Last Fill Quantity: 40,   # refills: 0  Last Office Visit: 5/16/18  Future Office visit:       Routing refill request to provider for review/approval because:  Drug not on the [facility], [facility] or [facility] refill protocol or controlled substance    Morphine ER 15 MG       Last Written Prescription Date:  6-28-18  Last Fill Quantity: 60,   # refills: 0  Last Office Visit: 5/16/18  Future Office visit:       Routing refill request to provider for review/approval because:  Drug not on the [facility], [facility] or [facility] refill protocol or controlled substance

## 2018-07-27 ENCOUNTER — TELEPHONE (OUTPATIENT)
Dept: FAMILY MEDICINE | Facility: CLINIC | Age: 43
End: 2018-07-27

## 2018-07-27 RX ORDER — MORPHINE SULFATE 15 MG/1
15 TABLET, FILM COATED, EXTENDED RELEASE ORAL 2 TIMES DAILY
Qty: 60 TABLET | Refills: 0 | Status: SHIPPED | OUTPATIENT
Start: 2018-07-27 | End: 2018-08-23

## 2018-07-27 RX ORDER — ALPRAZOLAM 0.5 MG
TABLET ORAL
Qty: 40 TABLET | Refills: 0 | Status: SHIPPED | OUTPATIENT
Start: 2018-07-27 | End: 2018-08-04

## 2018-07-27 NOTE — TELEPHONE ENCOUNTER
Prior Authorization Approval    Authorization Effective Date: 6/27/2018  Authorization Expiration Date: 7/27/2019  Medication: Morphine needs PA  Approved Dose/Quantity:    Reference #:     Insurance Company: [employer] - Phone [phone] Fax [phone]  Expected CoPay:       CoPay Card Available:      Foundation Assistance Needed:    Which Pharmacy is filling the prescription (Not needed for infusion/clinic administered): [facility] [address]   Pharmacy Notified: Yes  Patient Notified: Yes

## 2018-07-27 NOTE — TELEPHONE ENCOUNTER
Central Prior Authorization Team   Phone: [phone]      PA Initiation    Medication: Morphine needs PA  Insurance Company: Maintenance Assistant - Phone [phone] Fax [phone]  Pharmacy Filling the Rx: [address]   Filling Pharmacy Phone: [phone]  Filling Pharmacy Fax:    Start Date: 7/27/2018

## 2018-07-27 NOTE — TELEPHONE ENCOUNTER
Prior Authorization needed for Morphine ER.   Insurance states Cumulative Morphine Equivalent has been exceeded. Prior Auth Will need to be done. States Maximum 90mg / per day of Morphine Cumulative.    Prior Authorization Retail Medication Request    Medication/Dose: Morphine needs PA  ICD code (if different than what is on RX):    Previously Tried and Failed:    Rationale:      Insurance Name:  [facility]  Insurance ID:  [id number]  Insurance states to call [phone] for authorization      Thanks   Rosalie Bolanos [facility]   [phone]

## 2018-07-31 ENCOUNTER — TELEPHONE (OUTPATIENT)
Dept: OBGYN | Facility: OTHER | Age: 43
End: 2018-07-31

## 2018-07-31 DIAGNOSIS — R35.0 URINARY FREQUENCY: Primary | ICD-10-CM

## 2018-08-04 DIAGNOSIS — G47.9 DISTURBANCE IN SLEEP BEHAVIOR: ICD-10-CM

## 2018-08-06 DIAGNOSIS — R07.81 PLEURITIC CHEST PAIN: ICD-10-CM

## 2018-08-06 RX ORDER — ALPRAZOLAM 0.5 MG
TABLET ORAL
Qty: 40 TABLET | Refills: 0 | Status: SHIPPED | OUTPATIENT
Start: 2018-08-06 | End: 2018-08-15

## 2018-08-07 NOTE — TELEPHONE ENCOUNTER
Percocet  MG       Last Written Prescription Date:  7/25/18  Last Fill Quantity: 60,   # refills: 0  Last Office Visit: 5/16/18  Future Office visit:       Routing refill request to provider for review/approval because:  Drug not on the [facility], [facility] or [facility] refill protocol or controlled substance

## 2018-08-08 RX ORDER — OXYCODONE AND ACETAMINOPHEN 10; 325 MG/1; MG/1
1 TABLET ORAL EVERY 6 HOURS PRN
Qty: 60 TABLET | Refills: 0 | Status: SHIPPED | OUTPATIENT
Start: 2018-08-08 | End: 2018-08-20

## 2018-08-15 DIAGNOSIS — G47.9 DISTURBANCE IN SLEEP BEHAVIOR: ICD-10-CM

## 2018-08-15 NOTE — TELEPHONE ENCOUNTER
XANAX     0.5MG  Last Written Prescription Date:  08/06/2018  Last Fill Quantity: 40,   # refills: 0  Last Office Visit: 05/16/2018  Future Office visit:       Routing refill request to provider for review/approval because:  Drug not on the [facility], [facility] or [facility] refill protocol or controlled substance

## 2018-08-16 RX ORDER — ALPRAZOLAM 0.5 MG
TABLET ORAL
Qty: 40 TABLET | Refills: 0 | Status: SHIPPED | OUTPATIENT
Start: 2018-08-16 | End: 2018-08-24

## 2018-08-20 ENCOUNTER — TELEPHONE (OUTPATIENT)
Dept: FAMILY MEDICINE | Facility: CLINIC | Age: 43
End: 2018-08-20

## 2018-08-20 NOTE — TELEPHONE ENCOUNTER
Reason for Call:  Other prescription    Detailed comments: Ellen is wondering if she could try a new pain killer without Tylenol in it.      Ellen also states she would like something of for a sinus infection.    [facility]     Phone Number Patient can be reached at: Home number on file [phone] (home)    Best Time: any    Can we leave a detailed message on this number? YES    Call taken on 8/20/2018 at 3:15 PM by Shereen Quinteros

## 2018-08-23 DIAGNOSIS — M19.90 INFLAMMATORY ARTHRITIS: ICD-10-CM

## 2018-08-23 RX ORDER — MORPHINE SULFATE 15 MG/1
15 TABLET, FILM COATED, EXTENDED RELEASE ORAL 2 TIMES DAILY
Qty: 60 TABLET | Refills: 0 | Status: SHIPPED | OUTPATIENT
Start: 2018-08-23 | End: 2018-09-20

## 2018-08-23 NOTE — TELEPHONE ENCOUNTER
MS Contin 15 MG       Last Written Prescription Date:  7/27/18  Last Fill Quantity: 60,   # refills: 0  Last Office Visit: 5/16/18  Future Office visit:       Routing refill request to provider for review/approval because:  Drug not on the [facility], [facility] or [facility] refill protocol or controlled substance

## 2018-08-24 DIAGNOSIS — G47.9 DISTURBANCE IN SLEEP BEHAVIOR: ICD-10-CM

## 2018-08-24 NOTE — TELEPHONE ENCOUNTER
Xanax      Last Written Prescription Date:  8/16/2018  Last Fill Quantity: 40,   # refills: 0  Last Office Visit: 5/16/2018  Future Office visit:       Routing refill request to provider for review/approval because:  Drug not on the [facility], [facility] or [facility] refill protocol or controlled substance

## 2018-08-27 RX ORDER — ALPRAZOLAM 0.5 MG
TABLET ORAL
Qty: 40 TABLET | Refills: 0 | Status: SHIPPED | OUTPATIENT
Start: 2018-08-27 | End: 2018-09-04

## 2018-09-04 DIAGNOSIS — G47.9 DISTURBANCE IN SLEEP BEHAVIOR: ICD-10-CM

## 2018-09-04 DIAGNOSIS — R07.81 PLEURITIC CHEST PAIN: ICD-10-CM

## 2018-09-05 RX ORDER — OXYCODONE AND ACETAMINOPHEN 10; 325 MG/1; MG/1
1 TABLET ORAL EVERY 6 HOURS PRN
Qty: 60 TABLET | Refills: 0 | Status: SHIPPED | OUTPATIENT
Start: 2018-09-05 | End: 2018-09-17

## 2018-09-05 RX ORDER — ALPRAZOLAM 0.5 MG
TABLET ORAL
Qty: 40 TABLET | Refills: 0 | Status: SHIPPED | OUTPATIENT
Start: 2018-09-05 | End: 2018-09-14

## 2018-09-10 ENCOUNTER — OFFICE VISIT (OUTPATIENT)
Dept: CARDIOLOGY | Facility: CLINIC | Age: 43
End: 2018-09-10
Attending: INTERNAL MEDICINE
Payer: COMMERCIAL

## 2018-09-10 VITALS
DIASTOLIC BLOOD PRESSURE: 81 MMHG | WEIGHT: 107.9 LBS | SYSTOLIC BLOOD PRESSURE: 124 MMHG | HEART RATE: 67 BPM | HEIGHT: 62 IN | OXYGEN SATURATION: 100 % | BODY MASS INDEX: 19.85 KG/M2

## 2018-09-10 DIAGNOSIS — I27.0 PRIMARY PULMONARY HYPERTENSION (H): Primary | ICD-10-CM

## 2018-09-10 DIAGNOSIS — R06.02 SOB (SHORTNESS OF BREATH): ICD-10-CM

## 2018-09-10 DIAGNOSIS — I27.20 PULMONARY HYPERTENSION (H): ICD-10-CM

## 2018-09-10 LAB
ANION GAP SERPL CALCULATED.3IONS-SCNC: 7 MMOL/L (ref 3–14)
BUN SERPL-MCNC: 12 MG/DL (ref 7–30)
CALCIUM SERPL-MCNC: 8.6 MG/DL (ref 8.5–10.1)
CHLORIDE SERPL-SCNC: 99 MMOL/L (ref 94–109)
CO2 SERPL-SCNC: 29 MMOL/L (ref 20–32)
CREAT SERPL-MCNC: 0.8 MG/DL (ref 0.52–1.04)
ERYTHROCYTE [DISTWIDTH] IN BLOOD BY AUTOMATED COUNT: 15.7 % (ref 10–15)
GFR SERPL CREATININE-BSD FRML MDRD: 78 ML/MIN/1.7M2
GLUCOSE SERPL-MCNC: 65 MG/DL (ref 70–99)
HCT VFR BLD AUTO: 39.8 % (ref 35–47)
HGB BLD-MCNC: 11.9 G/DL (ref 11.7–15.7)
MCH RBC QN AUTO: 27 PG (ref 26.5–33)
MCHC RBC AUTO-ENTMCNC: 29.9 G/DL (ref 31.5–36.5)
MCV RBC AUTO: 91 FL (ref 78–100)
NT-PROBNP SERPL-MCNC: 503 PG/ML (ref 0–125)
PLATELET # BLD AUTO: 367 10E9/L (ref 150–450)
POTASSIUM SERPL-SCNC: 3.4 MMOL/L (ref 3.4–5.3)
RBC # BLD AUTO: 4.4 10E12/L (ref 3.8–5.2)
SODIUM SERPL-SCNC: 135 MMOL/L (ref 133–144)
WBC # BLD AUTO: 9.2 10E9/L (ref 4–11)

## 2018-09-10 PROCEDURE — 80048 BASIC METABOLIC PNL TOTAL CA: CPT | Performed by: INTERNAL MEDICINE

## 2018-09-10 PROCEDURE — 36415 COLL VENOUS BLD VENIPUNCTURE: CPT | Performed by: INTERNAL MEDICINE

## 2018-09-10 PROCEDURE — 99215 OFFICE O/P EST HI 40 MIN: CPT | Mod: ZP | Performed by: INTERNAL MEDICINE

## 2018-09-10 PROCEDURE — 85027 COMPLETE CBC AUTOMATED: CPT | Performed by: INTERNAL MEDICINE

## 2018-09-10 PROCEDURE — 83880 ASSAY OF NATRIURETIC PEPTIDE: CPT | Performed by: INTERNAL MEDICINE

## 2018-09-10 PROCEDURE — G0463 HOSPITAL OUTPT CLINIC VISIT: HCPCS | Mod: 25,ZF

## 2018-09-10 ASSESSMENT — PAIN SCALES - GENERAL: PAINLEVEL: NO PAIN (0)

## 2018-09-10 NOTE — LETTER
9/10/2018      RE: Ellen Loya  [address]  [facility] [address]       September 10th, 2018      Dear Dr. Xiong and Dr. Martínez:      We had the pleasure seeing Ms. Ellen Loya for followup in our pulmonary hypertension Clinic.  As you know, she is a 42-year-old female with polymyositis, interstitial lung disease, and pulmonary arterial hypertension.  She is currently on Adcirca.  She did not tolerate inhaled Tyvaso due to worsening VQ mismatch.  She returns today for follow-up appointment.    Ms. Loya was last seen in our clinic in September of last year.  She had missed an appointment earlier this year.  Earlier this year.  Overall she states that she has been doing okay.  She has not noticed any deterioration in her exertional shortness of breath.  She has no symptoms at rest.  She can do minimal activities at home without any limitations.  She is on 6 L of oxygen with exertion.  She has difficulty breathing with more than usual activities.  I would currently classify her as NYHA functional class III.  She has not had any lower extreme swelling or abdominal distention or radiation.  She has no lightheadedness dizziness or syncope.  She has not had any chest pain or chest pressure.  She has not had any hospitalizations or ER visits.  She is compliant in taking her medications.  Unfortunately she continues to smoke.  She smokes one fourth of a pack daily.    She has not seen our ILD colleagues in the last year.  She has self discontinued Imuran and CellCept as she was getting sick on it.    REVIEW OF SYSTEMS:  A detailed 10-point review of system was obtained and as described in history of present illness.  All other systems reviewed are negative.    PAST MEDICAL HISTORY:    1.  Polymyositis and dermatomyositis.  2.  Interstitial lung disease.  3.  Pulmonary arterial hypertension.  4.   Arthritis.  Current Outpatient Prescriptions   Medication Sig     albuterol (2.5 MG/3ML) 0.083% nebulizer solution  "Use every 4-6 hours as needed for shortness of breath     ALPRAZolam (XANAX) 0.5 MG tablet TAKE 1 TABLET BY MOUTH EVERY SIX HOURS AS NEEDED FOR ANXIETY.     digoxin (LANOXIN) 125 MCG tablet Take 1 tablet (125 mcg) by mouth daily     Esomeprazole Magnesium (NEXIUM PO) Take 40 mg by mouth every morning (before breakfast)     FLOVENT  MCG/ACT Inhaler INHALE 2 PUFFS INTO THE LUNGS TWO TIMES A DAY     fluticasone (FLONASE) 50 MCG/ACT spray USE 2 SPRAYS IN EACH NOSTRIL DAILY     furosemide (LASIX) 20 MG tablet TAKE TWO TABLETS BY MOUTH EVERY DAY     morphine (MS CONTIN) 15 MG 12 hr tablet Take 1 tablet (15 mg) by mouth 2 times daily     order for DME Oxygen: Patient requires supplemental Oxygen 4 LPM via nasal canula at rest and 6 LPM with activity. Please provide patient with portability capability. Okay to spot check patient on oxygen device, to keep sats above 90%. Please provider with home concentrator that can go up to 10 LPM. Oxygen will be for a lifetime.     order for DME Please provide patient with 2  liquid oxygen tanks for portability. Spot check patient on liquid oxygen. Titrate oxygen to maintain saturations above 88%.     oxyCODONE-acetaminophen (PERCOCET)  MG per tablet Take 1 tablet by mouth every 6 hours as needed for moderate to severe pain     tadalafil, PAH, (ADCIRCA) 20 MG TABS Take 2 tablets (40 mg) by mouth daily     VENTOLIN  (90 BASE) MCG/ACT Inhaler INHALE ONE TO TWO PUFFS INTO THE LUNGS EVERY 4 HOURS AS NEEDED FOR SHORTNESS OF BREATH / DYSPNEA     No current facility-administered medications for this visit.        PHYSICAL EXAMINATION:    She was comfortable.  She was in no apparent distress.   /81 (BP Location: Left arm, Patient Position: Chair, Cuff Size: Adult Regular)  Pulse 67  Ht 1.575 m (5' 2\")  Wt 48.9 kg (107 lb 14.4 oz)  SpO2 100%  BMI 19.74 kg/m2  He had no pallor, cyanosis, or jaundice.  Neck exam did not reveal any JVD.  She had no lower extremity " edema.  Her carotids were 2+.    Cardiac auscultation revealed normal S1 and a loud P2.  She had no murmur, rub, or gallop.    Auscultation of the lungs revealed  equal air entry on both sides with bilateral inspiratory crepitations.    Her abdomen was soft with normal bowel sounds.  She had no tenderness, rigidity, or guarding.  She had no focal neurological deficit.     DIAGNOSTICS:    CBC RESULTS:   Recent Labs   Lab Test  09/10/18   1010   WBC  9.2   RBC  4.40   HGB  11.9   HCT  39.8   MCV  91   MCH  27.0   MCHC  29.9*   RDW  15.7*   PLT  367     Recent Labs   Lab Test  09/10/18   1010  10/25/17   1102   NA  135  134   POTASSIUM  3.4  3.9   CHLORIDE  99  100   CO2  29  27   ANIONGAP  7  7   GLC  65*  62*   BUN  12  9   CR  0.80  0.62   MARCIN  8.6  8.8       NT-proBNP  was stable at 503.    She had a 6-minute walk test today where she walked for 341 meters which is significantly improved when compared to her last 6-minute walk test.  On 8 liters of oxygen, her lowest oxygen saturation was 88% briefly.    Her last echocardiogram from April 2017 showed mild RV dilatation with mildly reduced RV function.  Her TAPSE was 18 cm and S' was 9 cm.  She had normal left ventricular size and function.    Her last right heart catheterization from 04/2017 showed an RA pressure of 4, PA pressure of 60/20, with a mean of 32.  Thermodilution cardiac output was 5.1 with an index of 3.1, and PVR of 4.6 ANGLIN.  This was on Adcirca 40 mg daily and inhaled Tyvaso 3 breaths 4 times a day.      Assessment and plan:    Ellen Loya is a pleasant 41-year-old female with past medical history significant for polymyositis, interstitial lung disease, and pulmonary arterial hypertension who is currently on Adcirca monotherapy 40 mg daily.  She did not tolerate Tyvaso due to worsening VQ mismatch and hypoxia.    She is currently functional class III. No evidence of right heart failure clinically.  Her NT proBNP stable.  Her oxygen requirements are  stable at 6 L/min.  We will repeat echocardiogram to follow up on her RV size and function. We will also repeat a 6 minute walk test to have an objective assessment of her exercise capacity.  We will also repeat a full pulmonary function tests to follow-up on her interstitial lung disease.    I have recommended her to continue the Adcirca digoxin and Lasix for now.  Her renal function and potassium are unremarkable.    We will refer her back to our ILD clinic.  Dr. Upton has moved to [facility].  She will need to establish care with a new provider here.    She will return to clinic in 3 months with our NP in 6 months with me.  We will repeat labs in 6 minute walk test during her repeat visits. She will call us in the interim for any worsening symptoms.  She is currently not interested in the lung transplantation.      Sincerely,  Callie Ledesma MD   [facility]  Heart Failure, Transplant, and Mechanical Circulatory Support Cardiology   Cardiovascular Division  [facility]   [phone]            Callie Ledesma MD

## 2018-09-10 NOTE — MR AVS SNAPSHOT
After Visit Summary   9/10/2018    Ellen Loya    MRN: [id number]           Patient Information     Date Of Birth          1975        Visit Information        Provider Department      9/10/2018 10:30 AM Callie Ledesma MD [facility]        Today's Diagnoses     Primary pulmonary hypertension (H)    -  1    SOB (shortness of breath)          Care Instructions    Medication Changes:   No medication changes at this time. Please continue current medication regiment.    Patient Instructions:  Continue staying active and eat a heart healthy diet.  Please keep current list of medications with you at all times.    Echocardiogram and 6 muinute walk test with Full PFTs - Soon    Follow up Appointment Information:  3 month follow up with Gayatri FRENCH, CNP Labs and 6 muinute walk test    6 month follow up with Callie Ledesma MD, labs and 6 muinute walk test    Results:  Component      Latest Ref Rng & Units 9/10/2018   Sodium      133 - 144 mmol/L 135   Potassium      3.4 - 5.3 mmol/L 3.4   Chloride      94 - 109 mmol/L 99   Carbon Dioxide      20 - 32 mmol/L 29   Anion Gap      3 - 14 mmol/L 7   Glucose      70 - 99 mg/dL 65 (L)   Urea Nitrogen      7 - 30 mg/dL 12   Creatinine      0.52 - 1.04 mg/dL 0.80   GFR Estimate      >60 mL/min/1.7m2 78   GFR Estimate If Black      >60 mL/min/1.7m2 >90   Calcium      8.5 - 10.1 mg/dL 8.6   WBC      4.0 - 11.0 10e9/L 9.2   RBC Count      3.8 - 5.2 10e12/L 4.40   Hemoglobin      11.7 - 15.7 g/dL 11.9   Hematocrit      35.0 - 47.0 % 39.8   MCV      78 - 100 fl 91   MCH      26.5 - 33.0 pg 27.0   MCHC      31.5 - 36.5 g/dL 29.9 (L)   RDW      10.0 - 15.0 % 15.7 (H)   Platelet Count      150 - 450 10e9/L 367   N-Terminal Pro Bnp      0 - 125 pg/mL 503 (H)     We are located on the third floor of the [facility] ([facility] [place] [place]) on the [facility].  Our address is     [address] [place] [address] on 3rd  [place] [address]      Thank you for allowing us to be a part of your care here at the [facility]    If you have questions or concerns please contact us at:    Naty Lau, RN, BSN    [facility] (Schedule,P.A.)  Nurse Coordinator     Clinic   Pulmonary Hypertension   Pulmonary Hypertension  [facility]  [phone])[phone]    (P) [phone]        (F)[phone]                ** Please note that you will NOT receive a reminder call regarding your scheduled testing, reminder calls are for provider appointments only.  If you are scheduled for testing within the [employer] system you may receive a call regarding pre-registration for billing purposes only.**     Remember to weigh yourself daily after voiding and before you consume any food or beverages and log the numbers.  If you have gained/lost 2 pounds overnight or 5 pounds in a week contact us immediately for medication adjustments or further instructions.  **Please call us immediately if you have any syncope, chest pain, edema, or decline in your functional status.    Support Group:  Pulmonary Hypertension Association  [email or web address].phassociation.org/  **Look at the Events Tab** They even have Support Groups that you can call into    [facility] PH Support Group  First Saturday of the Month from 1-3 PM   Location: [address] [place] [address]  Leader: Cosmo Ann  Phone: [phone]  Email: [email or web address]          Follow-ups after your visit        Additional Services     Follow-Up with Cardiac Advanced Practice Provider       W/ Labs, PFT & 6mwt prior            Follow-Up with Pulmonary Hypertension Clinic       W/ Labs & 6mwt prior                  Your next 10 appointments already scheduled     Sep 18, 2018  7:00 AM CDT   Ech Complete with [id number]   [facility] ([facility])    [address] [place] [address]  [address] [address]   [phone]           1.  Please bring or wear a comfortable two-piece outfit. 2.  You may eat, drink and take your normal medicines. 3.  For any questions that cannot be answered, please contact the ordering physician 4.  Please do not wear perfumes or scented lotions on the day of your exam.            Sep 18, 2018  8:00 AM CDT   Six Minute Walk with UC PFL 6 MINUTE WALK 1   [facility] ([facility])    [address] [place] [address]   [phone]            Dec 10, 2018  8:00 AM CST   Lab with UC LAB    [facility] ([facility])    [address] [place] [address]   [phone]            Dec 10, 2018  8:30 AM CST   Six Minute Walk with UC PFL 6 MINUTE WALK 1   [facility] ([facility])    [address] [place] [address]   [phone]            Dec 10, 2018  9:00 AM CST   (Arrive by 8:45 AM)   RETURN PRIMARY PULMONARY with PURA Mace CNP   [facility] ([facility])    [address] [place] [address]  [address] [place] [address]   [phone]            Mar 11, 2019  9:00 AM CDT   Lab with UC LAB    Health Lab ([facility])    [address] [place] [address]   [phone]            Mar 11, 2019  9:30 AM CDT   Six Minute Walk with UC PFL 6 MINUTE WALK 1   [facility] ([facility])    [address] [place] [address]   [phone]            Mar 11, 2019 10:00 AM CDT   (Arrive by 9:45 AM)   RETURN PRIMARY PULMONARY with Callie Ledesma MD   [facility] ([facility])    [address] [place] [address]   [phone]               Future tests that were ordered for you today     Open Future Orders        Priority Expected Expires Ordered    Comprehensive metabolic panel Routine 3/4/2019 9/10/2019 9/10/2018    N terminal pro BNP outpatient Routine 3/4/2019 9/10/2019 9/10/2018    CBC with platelets Routine 3/4/2019 9/10/2019 9/10/2018    6 minute walk test Routine 3/4/2019 9/10/2019 9/10/2018    Follow-Up with Pulmonary Hypertension Clinic Routine 3/4/2019 9/10/2019 9/10/2018    6 minute walk test Routine 12/10/2018 9/10/2019 9/10/2018    General PFT Lab (Please always keep checked) Routine 12/10/2018 9/10/2019 9/10/2018    Comprehensive metabolic panel Routine 12/10/2018 9/10/2019 9/10/2018    N terminal pro BNP outpatient Routine 12/10/2018 9/10/2019 9/10/2018    CBC with platelets Routine 12/10/2018 9/10/2019 9/10/2018    Follow-Up with Cardiac Advanced Practice Provider Routine 12/10/2018 9/10/2019 9/10/2018    6 minute walk test Routine  9/10/2019 9/10/2018    Echocardiogram Routine  9/10/2019 9/10/2018            Who to contact     If you have questions or need follow up information about today's clinic visit or your schedule please contact [facility] directly at [phone].  Normal or non-critical lab and imaging results will be communicated to you by [employer], letter or phone within 4 business days after the clinic has received the results. If you do not hear from us within 7 days, please contact the clinic through [employer] or phone. If you have a critical or abnormal lab result, we will notify you by phone as soon as possible.  Submit refill requests through [employer] or call your pharmacy and they will forward the refill request to us. Please allow 3 business days for your refill to be completed.          Additional Information About Your Visit        [employer] Information     [employer] gives you secure access to your electronic health record. If you see a primary care provider, you can also send messages to your care team and  "make appointments. If you have questions, please call your primary care clinic.  If you do not have a primary care provider, please call [phone] and they will assist you.        Care EveryWhere ID     This is your Care EveryWhere ID. This could be used by other organizations to access your [facility] medical records  [id number]        Your Vitals Were     Pulse Height Pulse Oximetry BMI (Body Mass Index)          67 1.575 m (5' 2\") 100% 19.74 kg/m2         Blood Pressure from Last 3 Encounters:   09/10/18 124/81   07/06/18 100/60   05/16/18 100/60    Weight from Last 3 Encounters:   09/10/18 48.9 kg (107 lb 14.4 oz)   07/06/18 49 kg (108 lb)   05/16/18 49.2 kg (108 lb 8 oz)                 Today's Medication Changes          These changes are accurate as of 9/10/18 11:31 AM.  If you have any questions, ask your nurse or doctor.               These medicines have changed or have updated prescriptions.        Dose/Directions    * order for DME   This may have changed:  Another medication with the same name was removed. Continue taking this medication, and follow the directions you see here.   Used for:  ILD (interstitial lung disease) (H)   Changed by:  Callie Ledesma MD        Please provide patient with 2  liquid oxygen tanks for portability. Spot check patient on liquid oxygen. Titrate oxygen to maintain saturations above 88%.   Quantity:  2 Device   Refills:  0       * order for DME   This may have changed:  Another medication with the same name was removed. Continue taking this medication, and follow the directions you see here.   Used for:  Hypoxia   Changed by:  Callie Ledesma MD        Oxygen: Patient requires supplemental Oxygen 4 LPM via nasal canula at rest and 6 LPM with activity. Please provide patient with portability capability. Okay to spot check patient on oxygen device, to keep sats above 90%. Please provider with home concentrator that can go up to 10 LPM. Oxygen will be for a " lifetime.   Quantity:  1 Device   Refills:  0       * Notice:  This list has 2 medication(s) that are the same as other medications prescribed for you. Read the directions carefully, and ask your doctor or other care provider to review them with you.      Stop taking these medicines if you haven't already. Please contact your care team if you have questions.     cefdinir 300 MG capsule   Commonly known as:  OMNICEF   Stopped by:  Callie Ledesma MD           order for DME   Stopped by:  Callie Ledesma MD           sulfamethoxazole-trimethoprim 800-160 MG per tablet   Commonly known as:  BACTRIM DS/SEPTRA DS   Stopped by:  Callie Ledesma MD                    Primary Care Provider Office Phone # Fax #    Ignacio Loya [phone]       [address] [facility] [address]        Equal Access to Services     [facility] : Hadrosi brady Sorojas, waaxda luqadaha, qaybta kaalmada tomas, justyn garcia . So [facility] [phone].    ATENCIÓN: Si habla español, tiene a bassett disposición servicios gratuitos de asistencia lingüística. [facility] [phone].    We comply with applicable federal civil rights laws and [place] laws. We do not discriminate on the basis of race, color, national origin, age, disability, sex, sexual orientation, or gender identity.            Thank you!     Thank you for choosing [facility]  for your care. Our goal is always to provide you with excellent care. Hearing back from our patients is one way we can continue to improve our services. Please take a few minutes to complete the written survey that you may receive in the mail after your visit with us. Thank you!             Your Updated Medication List - Protect others around you: Learn how to safely use, store and throw away your medicines at www.disposemymeds.org.          This list is accurate as of 9/10/18 11:31 AM.  Always use your most recent med  list.                   Brand Name Dispense Instructions for use Diagnosis    * albuterol (2.5 MG/3ML) 0.083% neb solution     60 vial    Use every 4-6 hours as needed for shortness of breath    Pleuritic chest pain, Acute bronchitis, unspecified organism       * VENTOLIN  (90 Base) MCG/ACT inhaler   Generic drug:  albuterol     18 g    INHALE ONE TO TWO PUFFS INTO THE LUNGS EVERY 4 HOURS AS NEEDED FOR SHORTNESS OF BREATH / DYSPNEA    SOB (shortness of breath)       ALPRAZolam 0.5 MG tablet    XANAX    40 tablet    TAKE 1 TABLET BY MOUTH EVERY SIX HOURS AS NEEDED FOR ANXIETY.    Disturbance in sleep behavior       digoxin 125 MCG tablet    LANOXIN    90 tablet    Take 1 tablet (125 mcg) by mouth daily    Pulmonary hypertension       FLOVENT  MCG/ACT Inhaler   Generic drug:  fluticasone     12 g    INHALE 2 PUFFS INTO THE LUNGS TWO TIMES A DAY    ILD (interstitial lung disease) (H)       fluticasone 50 MCG/ACT spray    FLONASE    16 g    USE 2 SPRAYS IN EACH NOSTRIL DAILY    Nasal congestion       furosemide 20 MG tablet    LASIX    180 tablet    TAKE TWO TABLETS BY MOUTH EVERY DAY    Pulmonary hypertension       morphine 15 MG 12 hr tablet    MS CONTIN    60 tablet    Take 1 tablet (15 mg) by mouth 2 times daily    Inflammatory arthritis       NEXIUM PO      Take 40 mg by mouth every morning (before breakfast)    Pulmonary hypertension       * order for DME     2 Device    Please provide patient with 2  liquid oxygen tanks for portability. Spot check patient on liquid oxygen. Titrate oxygen to maintain saturations above 88%.    ILD (interstitial lung disease) (H)       * order for DME     1 Device    Oxygen: Patient requires supplemental Oxygen 4 LPM via nasal canula at rest and 6 LPM with activity. Please provide patient with portability capability. Okay to spot check patient on oxygen device, to keep sats above 90%. Please provider with home concentrator that can go up to 10 LPM. Oxygen will be for a  lifetime.    Hypoxia       oxyCODONE-acetaminophen  MG per tablet    PERCOCET    60 tablet    Take 1 tablet by mouth every 6 hours as needed for moderate to severe pain    Pleuritic chest pain       tadalafil (PAH) 20 MG Tabs    ADCIRCA    60 tablet    Take 2 tablets (40 mg) by mouth daily    Pulmonary hypertension       * Notice:  This list has 4 medication(s) that are the same as other medications prescribed for you. Read the directions carefully, and ask your doctor or other care provider to review them with you.

## 2018-09-10 NOTE — NURSING NOTE
Chief Complaint   Patient presents with     Follow Up For     Return for PH F/U with labs prior     Vitals were taken and medications were reconciled.   Alisson Atkinson RMA  10:25 AM

## 2018-09-10 NOTE — NURSING NOTE
Procedures and/or Testing: Patient given instructions regarding  Echocardiogram,  6 minute walk test, . Discussed purpose, preparation, procedure and when to expect results reported back to the patient. Patient demonstrated understanding of this information and agreed to call with further questions or concerns.  Med Reconcile: Reviewed and verified all current medications with the patient. The updated medication list was printed and given to the patient.  Diet: Patient instructed regarding a heart healthy diet, including discussion of reduced fat and sodium intake. Patient demonstrated understanding of this information and agreed to call with further questions or concerns.  Return Appointment: Patient given instructions regarding scheduling next clinic visit. Patient demonstrated understanding of this information and agreed to call with further questions or concerns.  Patient stated she understood all health information given and agreed to call with further questions or concerns.    Medication Changes:   No medication changes at this time. Please continue current medication regiment.    Patient Instructions:  Continue staying active and eat a heart healthy diet.  Please keep current list of medications with you at all times.    Echocardiogram and 6 muinute walk test with Full PFTs - Soon    Follow up Appointment Information:  3 month follow up with Gayatri FRENCH CNP Labs and 6 muinute walk test    6 month follow up with Callie Ledesma MD, labs and 6 muinute walk test    Results:  Component      Latest Ref Rng & Units 9/10/2018   Sodium      133 - 144 mmol/L 135   Potassium      3.4 - 5.3 mmol/L 3.4   Chloride      94 - 109 mmol/L 99   Carbon Dioxide      20 - 32 mmol/L 29   Anion Gap      3 - 14 mmol/L 7   Glucose      70 - 99 mg/dL 65 (L)   Urea Nitrogen      7 - 30 mg/dL 12   Creatinine      0.52 - 1.04 mg/dL 0.80   GFR Estimate      >60 mL/min/1.7m2 78   GFR Estimate If Black      >60  mL/min/1.7m2 >90   Calcium      8.5 - 10.1 mg/dL 8.6   WBC      4.0 - 11.0 10e9/L 9.2   RBC Count      3.8 - 5.2 10e12/L 4.40   Hemoglobin      11.7 - 15.7 g/dL 11.9   Hematocrit      35.0 - 47.0 % 39.8   MCV      78 - 100 fl 91   MCH      26.5 - 33.0 pg 27.0   MCHC      31.5 - 36.5 g/dL 29.9 (L)   RDW      10.0 - 15.0 % 15.7 (H)   Platelet Count      150 - 450 10e9/L 367   N-Terminal Pro Bnp      0 - 125 pg/mL 503 (H)

## 2018-09-10 NOTE — PATIENT INSTRUCTIONS
Medication Changes:   No medication changes at this time. Please continue current medication regiment.    Patient Instructions:  Continue staying active and eat a heart healthy diet.  Please keep current list of medications with you at all times.    Echocardiogram and 6 muinute walk test with Full PFTs - Soon    Follow up Appointment Information:  3 month follow up with Gayatri FRENCH, CNP Labs and 6 muinute walk test    6 month follow up with Callie Ledesma MD, labs and 6 muinute walk test    Results:  Component      Latest Ref Rng & Units 9/10/2018   Sodium      133 - 144 mmol/L 135   Potassium      3.4 - 5.3 mmol/L 3.4   Chloride      94 - 109 mmol/L 99   Carbon Dioxide      20 - 32 mmol/L 29   Anion Gap      3 - 14 mmol/L 7   Glucose      70 - 99 mg/dL 65 (L)   Urea Nitrogen      7 - 30 mg/dL 12   Creatinine      0.52 - 1.04 mg/dL 0.80   GFR Estimate      >60 mL/min/1.7m2 78   GFR Estimate If Black      >60 mL/min/1.7m2 >90   Calcium      8.5 - 10.1 mg/dL 8.6   WBC      4.0 - 11.0 10e9/L 9.2   RBC Count      3.8 - 5.2 10e12/L 4.40   Hemoglobin      11.7 - 15.7 g/dL 11.9   Hematocrit      35.0 - 47.0 % 39.8   MCV      78 - 100 fl 91   MCH      26.5 - 33.0 pg 27.0   MCHC      31.5 - 36.5 g/dL 29.9 (L)   RDW      10.0 - 15.0 % 15.7 (H)   Platelet Count      150 - 450 10e9/L 367   N-Terminal Pro Bnp      0 - 125 pg/mL 503 (H)     We are located on the third floor of the Clinic and Surgery Center ([facility]) on the [facility].  Our address is     [address] [place] [address] on [address]rd [place] [address]      Thank you for allowing us to be a part of your care here at the [facility]    If you have questions or concerns please contact us at:    Naty Lau, RN, BSN    [facility] Jack (Schedule,P.A.)  Nurse Coordinator     Clinic   Pulmonary Hypertension   Pulmonary Hypertension  [facility]  [facility]  (P)[phone]    (P) [phone]        (F)[phone]                ** Please note that you will NOT receive a reminder call regarding your scheduled testing, reminder calls are for provider appointments only.  If you are scheduled for testing within the [employer] system you may receive a call regarding pre-registration for billing purposes only.**     Remember to weigh yourself daily after voiding and before you consume any food or beverages and log the numbers.  If you have gained/lost 2 pounds overnight or 5 pounds in a week contact us immediately for medication adjustments or further instructions.  **Please call us immediately if you have any syncope, chest pain, edema, or decline in your functional status.    Support Group:  Pulmonary Hypertension Association  Https://www.phassociation.org/  **Look at the Events Tab** They even have Support Groups that you can call into    [facility] PH Support Group  First Saturday of the Month from 1-3 PM   Location: [address] [place] [address]  Leader: Cosmo Ann  Phone: [phone]  Email: [email or web address]

## 2018-09-10 NOTE — LETTER
9/10/2018      RE: Ellen Loya  [address]  [facility] [address]       Dear Colleague,    Thank you for the opportunity to participate in the care of your patient, Ellen Loya, at the [facility]. Please see a copy of my visit note below.    September 10th, 2018      Dear Dr. Xiong and Dr. Martínez:      We had the pleasure seeing Ms. Ellen Loya for followup in our pulmonary hypertension Clinic.  As you know, she is a 42-year-old female with polymyositis, interstitial lung disease, and pulmonary arterial hypertension.  She is currently on Adcirca.  She did not tolerate inhaled Tyvaso due to worsening VQ mismatch.  She returns today for follow-up appointment.    Ms. Loya was last seen in our clinic in September of last year.  She had missed an appointment earlier this year.  Earlier this year.  Overall she states that she has been doing okay.  She has not noticed any deterioration in her exertional shortness of breath.  She has no symptoms at rest.  She can do minimal activities at home without any limitations.  She is on 6 L of oxygen with exertion.  She has difficulty breathing with more than usual activities.  I would currently classify her as NYHA functional class III.  She has not had any lower extreme swelling or abdominal distention or radiation.  She has no lightheadedness dizziness or syncope.  She has not had any chest pain or chest pressure.  She has not had any hospitalizations or ER visits.  She is compliant in taking her medications.  Unfortunately she continues to smoke.  She smokes one fourth of a pack daily.    She has not seen our ILD colleagues in the last year.  She has self discontinued Imuran and CellCept as she was getting sick on it.    REVIEW OF SYSTEMS:  A detailed 10-point review of system was obtained and as described in history of present illness.  All other systems reviewed are negative.    PAST MEDICAL  HISTORY:    1.  Polymyositis and dermatomyositis.  2.  Interstitial lung disease.  3.  Pulmonary arterial hypertension.  4.   Arthritis.  Current Outpatient Prescriptions   Medication Sig     albuterol (2.5 MG/3ML) 0.083% nebulizer solution Use every 4-6 hours as needed for shortness of breath     ALPRAZolam (XANAX) 0.5 MG tablet TAKE 1 TABLET BY MOUTH EVERY SIX HOURS AS NEEDED FOR ANXIETY.     digoxin (LANOXIN) 125 MCG tablet Take 1 tablet (125 mcg) by mouth daily     Esomeprazole Magnesium (NEXIUM PO) Take 40 mg by mouth every morning (before breakfast)     FLOVENT  MCG/ACT Inhaler INHALE 2 PUFFS INTO THE LUNGS TWO TIMES A DAY     fluticasone (FLONASE) 50 MCG/ACT spray USE 2 SPRAYS IN EACH NOSTRIL DAILY     furosemide (LASIX) 20 MG tablet TAKE TWO TABLETS BY MOUTH EVERY DAY     morphine (MS CONTIN) 15 MG 12 hr tablet Take 1 tablet (15 mg) by mouth 2 times daily     order for DME Oxygen: Patient requires supplemental Oxygen 4 LPM via nasal canula at rest and 6 LPM with activity. Please provide patient with portability capability. Okay to spot check patient on oxygen device, to keep sats above 90%. Please provider with home concentrator that can go up to 10 LPM. Oxygen will be for a lifetime.     order for DME Please provide patient with 2  liquid oxygen tanks for portability. Spot check patient on liquid oxygen. Titrate oxygen to maintain saturations above 88%.     oxyCODONE-acetaminophen (PERCOCET)  MG per tablet Take 1 tablet by mouth every 6 hours as needed for moderate to severe pain     tadalafil, PAH, (ADCIRCA) 20 MG TABS Take 2 tablets (40 mg) by mouth daily     VENTOLIN  (90 BASE) MCG/ACT Inhaler INHALE ONE TO TWO PUFFS INTO THE LUNGS EVERY 4 HOURS AS NEEDED FOR SHORTNESS OF BREATH / DYSPNEA     No current facility-administered medications for this visit.        PHYSICAL EXAMINATION:    She was comfortable.  She was in no apparent distress.   /81 (BP Location: Left arm, Patient  "Position: Chair, Cuff Size: Adult Regular)  Pulse 67  Ht 1.575 m (5' 2\")  Wt 48.9 kg (107 lb 14.4 oz)  SpO2 100%  BMI 19.74 kg/m2  He had no pallor, cyanosis, or jaundice.  Neck exam did not reveal any JVD.  She had no lower extremity edema.  Her carotids were 2+.    Cardiac auscultation revealed normal S1 and a loud P2.  She had no murmur, rub, or gallop.    Auscultation of the lungs revealed  equal air entry on both sides with bilateral inspiratory crepitations.    Her abdomen was soft with normal bowel sounds.  She had no tenderness, rigidity, or guarding.  She had no focal neurological deficit.     DIAGNOSTICS:    CBC RESULTS:   Recent Labs   Lab Test  09/10/18   1010   WBC  9.2   RBC  4.40   HGB  11.9   HCT  39.8   MCV  91   MCH  27.0   MCHC  29.9*   RDW  15.7*   PLT  367     Recent Labs   Lab Test  09/10/18   1010  10/25/17   1102   NA  135  134   POTASSIUM  3.4  3.9   CHLORIDE  99  100   CO2  29  27   ANIONGAP  7  7   GLC  65*  62*   BUN  12  9   CR  0.80  0.62   MARCIN  8.6  8.8       NT-proBNP  was stable at 503.    She had a 6-minute walk test today where she walked for 341 meters which is significantly improved when compared to her last 6-minute walk test.  On 8 liters of oxygen, her lowest oxygen saturation was 88% briefly.    Her last echocardiogram from April 2017 showed mild RV dilatation with mildly reduced RV function.  Her TAPSE was 18 cm and S' was 9 cm.  She had normal left ventricular size and function.    Her last right heart catheterization from 04/2017 showed an RA pressure of 4, PA pressure of 60/20, with a mean of 32.  Thermodilution cardiac output was 5.1 with an index of 3.1, and PVR of 4.6 ANGLIN.  This was on Adcirca 40 mg daily and inhaled Tyvaso 3 breaths 4 times a day.      Assessment and plan:    Ellen Loya is a pleasant 41-year-old female with past medical history significant for polymyositis, interstitial lung disease, and pulmonary arterial hypertension who is currently on " Adcirca monotherapy 40 mg daily.  She did not tolerate Tyvaso due to worsening VQ mismatch and hypoxia.    She is currently functional class III. No evidence of right heart failure clinically.  Her NT proBNP stable.  Her oxygen requirements are stable at 6 L/min.  We will repeat echocardiogram to follow up on her RV size and function. We will also repeat a 6 minute walk test to have an objective assessment of her exercise capacity.  We will also repeat a full pulmonary function tests to follow-up on her interstitial lung disease.    I have recommended her to continue the Adcirca digoxin and Lasix for now.  Her renal function and potassium are unremarkable.    We will refer her back to our ILD clinic.  Dr. Upton has moved to [facility].  She will need to establish care with a new provider here.    She will return to clinic in 3 months with our NP in 6 months with me.  We will repeat labs in 6 minute walk test during her repeat visits. She will call us in the interim for any worsening symptoms.  She is currently not interested in the lung transplantation.      Sincerely,  Callie Ledesma MD   [facility]pertension  Heart Failure, Transplant, and Mechanical Circulatory Support Cardiology   Cardiovascular Division  [facility]   [phone]

## 2018-09-10 NOTE — PROGRESS NOTES
September 10th, 2018      Dear Dr. Xiong and Dr. Martínez:      We had the pleasure seeing Ms. Ellen Loya for followup in our pulmonary hypertension Clinic.  As you know, she is a 42-year-old female with polymyositis, interstitial lung disease, and pulmonary arterial hypertension.  She is currently on Adcirca.  She did not tolerate inhaled Tyvaso due to worsening VQ mismatch.  She returns today for follow-up appointment.    Ms. Loya was last seen in our clinic in September of last year.  She had missed an appointment earlier this year.  Earlier this year.  Overall she states that she has been doing okay.  She has not noticed any deterioration in her exertional shortness of breath.  She has no symptoms at rest.  She can do minimal activities at home without any limitations.  She is on 6 L of oxygen with exertion.  She has difficulty breathing with more than usual activities.  I would currently classify her as NYHA functional class III.  She has not had any lower extreme swelling or abdominal distention or radiation.  She has no lightheadedness dizziness or syncope.  She has not had any chest pain or chest pressure.  She has not had any hospitalizations or ER visits.  She is compliant in taking her medications.  Unfortunately she continues to smoke.  She smokes one fourth of a pack daily.    She has not seen our ILD colleagues in the last year.  She has self discontinued Imuran and CellCept as she was getting sick on it.    REVIEW OF SYSTEMS:  A detailed 10-point review of system was obtained and as described in history of present illness.  All other systems reviewed are negative.    PAST MEDICAL HISTORY:    1.  Polymyositis and dermatomyositis.  2.  Interstitial lung disease.  3.  Pulmonary arterial hypertension.  4.   Arthritis.  Current Outpatient Prescriptions   Medication Sig     albuterol (2.5 MG/3ML) 0.083% nebulizer solution Use every 4-6 hours as needed for shortness of breath     ALPRAZolam (XANAX) 0.5  "MG tablet TAKE 1 TABLET BY MOUTH EVERY SIX HOURS AS NEEDED FOR ANXIETY.     digoxin (LANOXIN) 125 MCG tablet Take 1 tablet (125 mcg) by mouth daily     Esomeprazole Magnesium (NEXIUM PO) Take 40 mg by mouth every morning (before breakfast)     FLOVENT  MCG/ACT Inhaler INHALE 2 PUFFS INTO THE LUNGS TWO TIMES A DAY     fluticasone (FLONASE) 50 MCG/ACT spray USE 2 SPRAYS IN EACH NOSTRIL DAILY     furosemide (LASIX) 20 MG tablet TAKE TWO TABLETS BY MOUTH EVERY DAY     morphine (MS CONTIN) 15 MG 12 hr tablet Take 1 tablet (15 mg) by mouth 2 times daily     order for DME Oxygen: Patient requires supplemental Oxygen 4 LPM via nasal canula at rest and 6 LPM with activity. Please provide patient with portability capability. Okay to spot check patient on oxygen device, to keep sats above 90%. Please provider with home concentrator that can go up to 10 LPM. Oxygen will be for a lifetime.     order for DME Please provide patient with 2  liquid oxygen tanks for portability. Spot check patient on liquid oxygen. Titrate oxygen to maintain saturations above 88%.     oxyCODONE-acetaminophen (PERCOCET)  MG per tablet Take 1 tablet by mouth every 6 hours as needed for moderate to severe pain     tadalafil, PAH, (ADCIRCA) 20 MG TABS Take 2 tablets (40 mg) by mouth daily     VENTOLIN  (90 BASE) MCG/ACT Inhaler INHALE ONE TO TWO PUFFS INTO THE LUNGS EVERY 4 HOURS AS NEEDED FOR SHORTNESS OF BREATH / DYSPNEA     No current facility-administered medications for this visit.        PHYSICAL EXAMINATION:    She was comfortable.  She was in no apparent distress.   /81 (BP Location: Left arm, Patient Position: Chair, Cuff Size: Adult Regular)  Pulse 67  Ht 1.575 m (5' 2\")  Wt 48.9 kg (107 lb 14.4 oz)  SpO2 100%  BMI 19.74 kg/m2  He had no pallor, cyanosis, or jaundice.  Neck exam did not reveal any JVD.  She had no lower extremity edema.  Her carotids were 2+.    Cardiac auscultation revealed normal S1 and a loud " P2.  She had no murmur, rub, or gallop.    Auscultation of the lungs revealed  equal air entry on both sides with bilateral inspiratory crepitations.    Her abdomen was soft with normal bowel sounds.  She had no tenderness, rigidity, or guarding.  She had no focal neurological deficit.     DIAGNOSTICS:    CBC RESULTS:   Recent Labs   Lab Test  09/10/18   1010   WBC  9.2   RBC  4.40   HGB  11.9   HCT  39.8   MCV  91   MCH  27.0   MCHC  29.9*   RDW  15.7*   PLT  367     Recent Labs   Lab Test  09/10/18   1010  10/25/17   1102   NA  135  134   POTASSIUM  3.4  3.9   CHLORIDE  99  100   CO2  29  27   ANIONGAP  7  7   GLC  65*  62*   BUN  12  9   CR  0.80  0.62   MARCIN  8.6  8.8       NT-proBNP  was stable at 503.    She had a 6-minute walk test today where she walked for 341 meters which is significantly improved when compared to her last 6-minute walk test.  On 8 liters of oxygen, her lowest oxygen saturation was 88% briefly.    Her last echocardiogram from April 2017 showed mild RV dilatation with mildly reduced RV function.  Her TAPSE was 18 cm and S' was 9 cm.  She had normal left ventricular size and function.    Her last right heart catheterization from 04/2017 showed an RA pressure of 4, PA pressure of 60/20, with a mean of 32.  Thermodilution cardiac output was 5.1 with an index of 3.1, and PVR of 4.6 ANGLIN.  This was on Adcirca 40 mg daily and inhaled Tyvaso 3 breaths 4 times a day.      Assessment and plan:    Ellen Loya is a pleasant 41-year-old female with past medical history significant for polymyositis, interstitial lung disease, and pulmonary arterial hypertension who is currently on Adcirca monotherapy 40 mg daily.  She did not tolerate Tyvaso due to worsening VQ mismatch and hypoxia.    She is currently functional class III. No evidence of right heart failure clinically.  Her NT proBNP stable.  Her oxygen requirements are stable at 6 L/min.  We will repeat echocardiogram to follow up on her RV size and  function. We will also repeat a 6 minute walk test to have an objective assessment of her exercise capacity.  We will also repeat a full pulmonary function tests to follow-up on her interstitial lung disease.    I have recommended her to continue the Adcirca digoxin and Lasix for now.  Her renal function and potassium are unremarkable.    We will refer her back to our ILD clinic.  Dr. Upton has moved to [facility].  She will need to establish care with a new provider here.    She will return to clinic in 3 months with our NP in 6 months with me.  We will repeat labs in 6 minute walk test during her repeat visits. She will call us in the interim for any worsening symptoms.  She is currently not interested in the lung transplantation.      Sincerely,  Callie Ledesma MD   Center for Pulmonary Hypertension  Heart Failure, Transplant, and Mechanical Circulatory Support Cardiology   Cardiovascular Division  [facility]   [phone]

## 2018-09-14 DIAGNOSIS — G47.9 DISTURBANCE IN SLEEP BEHAVIOR: ICD-10-CM

## 2018-09-14 RX ORDER — ALPRAZOLAM 0.5 MG
TABLET ORAL
Qty: 40 TABLET | Refills: 0 | Status: SHIPPED | OUTPATIENT
Start: 2018-09-14 | End: 2018-09-21

## 2018-09-14 NOTE — TELEPHONE ENCOUNTER
Prescription has been brought to the pharmacy.  No further action is needed as of right now.     Aye Villalba, CMA

## 2018-09-14 NOTE — TELEPHONE ENCOUNTER
Alprazolam      Last Written Prescription Date:  9/05/2018  Last Fill Quantity: 40,   # refills: 0  Last Office Visit: 5/16/2018  Future Office visit:       Routing refill request to provider for review/approval because:  Drug not on the [facility], P or [facility] refill protocol or controlled substance

## 2018-09-17 DIAGNOSIS — R07.81 PLEURITIC CHEST PAIN: ICD-10-CM

## 2018-09-18 NOTE — TELEPHONE ENCOUNTER
Percocet       Last Written Prescription Date:  9-5-18  Last Fill Quantity: 60,   # refills: 0  Last Office Visit: 5/16/18  Future Office visit:       Routing refill request to provider for review/approval because:  Drug not on the [facility], P or [facility] refill protocol or controlled substance

## 2018-09-19 RX ORDER — OXYCODONE AND ACETAMINOPHEN 10; 325 MG/1; MG/1
1 TABLET ORAL EVERY 6 HOURS PRN
Qty: 60 TABLET | Refills: 0 | Status: SHIPPED | OUTPATIENT
Start: 2018-09-19 | End: 2018-10-01

## 2018-09-20 DIAGNOSIS — M19.90 INFLAMMATORY ARTHRITIS: ICD-10-CM

## 2018-09-20 NOTE — TELEPHONE ENCOUNTER
Morphine       Last Written Prescription Date:  8/23/18  Last Fill Quantity: 60,   # refills: 0  Last Office Visit: 5/16/18  Future Office visit:       Routing refill request to provider for review/approval because:  Drug not on the [facility], P or [facility] refill protocol or controlled substance

## 2018-09-21 DIAGNOSIS — G47.9 DISTURBANCE IN SLEEP BEHAVIOR: ICD-10-CM

## 2018-09-21 RX ORDER — MORPHINE SULFATE 15 MG/1
15 TABLET, FILM COATED, EXTENDED RELEASE ORAL 2 TIMES DAILY
Qty: 60 TABLET | Refills: 0 | Status: SHIPPED | OUTPATIENT
Start: 2018-09-21 | End: 2018-10-18

## 2018-09-21 NOTE — TELEPHONE ENCOUNTER
Alprazolam       Last Written Prescription Date:  09/14/2018  Last Fill Quantity: 40,   # refills: 0  Last Office Visit: 05/16/2018-[facility]   Future Office visit:       Routing refill request to provider for review/approval because:  Drug not on the [facility], [facility] or [facility] refill protocol or controlled substance  Rebecca Tobias MA

## 2018-09-21 NOTE — TELEPHONE ENCOUNTER
I have walked the script to the pharmacy. Farida De CMA ([facility])

## 2018-09-24 RX ORDER — ALPRAZOLAM 0.5 MG
TABLET ORAL
Qty: 40 TABLET | Refills: 0 | Status: SHIPPED | OUTPATIENT
Start: 2018-09-24 | End: 2018-10-03

## 2018-09-24 NOTE — TELEPHONE ENCOUNTER
Patient calling to follow-up on RX, states she is now out.  Thank you,  Karen Littlejohn  Patient Representative

## 2018-09-28 ENCOUNTER — RADIANT APPOINTMENT (OUTPATIENT)
Dept: CARDIOLOGY | Facility: CLINIC | Age: 43
End: 2018-09-28
Payer: COMMERCIAL

## 2018-09-28 DIAGNOSIS — R06.02 SOB (SHORTNESS OF BREATH): ICD-10-CM

## 2018-09-28 DIAGNOSIS — I27.20 PULMONARY HYPERTENSION (H): Primary | ICD-10-CM

## 2018-09-28 DIAGNOSIS — I27.0 PRIMARY PULMONARY HYPERTENSION (H): ICD-10-CM

## 2018-09-28 DIAGNOSIS — J84.9 ILD (INTERSTITIAL LUNG DISEASE) (H): ICD-10-CM

## 2018-09-28 LAB
6 MIN WALK (FT): 1120 FT
6 MIN WALK (M): 341 M

## 2018-10-01 DIAGNOSIS — R07.81 PLEURITIC CHEST PAIN: ICD-10-CM

## 2018-10-01 NOTE — TELEPHONE ENCOUNTER
Percocet  MG        Last Written Prescription Date:  9-19-18  Last Fill Quantity: 60,   # refills: 0  Last Office Visit: 5/16/18  Future Office visit:       Routing refill request to provider for review/approval because:  Drug not on the [facility], [facility] or [facility] refill protocol or controlled substance

## 2018-10-03 ENCOUNTER — TELEPHONE (OUTPATIENT)
Dept: FAMILY MEDICINE | Facility: CLINIC | Age: 43
End: 2018-10-03

## 2018-10-03 DIAGNOSIS — G47.9 DISTURBANCE IN SLEEP BEHAVIOR: ICD-10-CM

## 2018-10-03 RX ORDER — OXYCODONE AND ACETAMINOPHEN 10; 325 MG/1; MG/1
1 TABLET ORAL EVERY 6 HOURS PRN
Qty: 60 TABLET | Refills: 0 | Status: SHIPPED | OUTPATIENT
Start: 2018-10-06 | End: 2018-10-15

## 2018-10-03 NOTE — TELEPHONE ENCOUNTER
Alprazolam    0.5 MG    Last Written Prescription Date:  9-24-18  Last Fill Quantity: 40,   # refills: 0  Last Office Visit: 5/16/18  Future Office visit:       Routing refill request to provider for review/approval because:  Drug not on the [facility], [facility] or [facility] refill protocol or controlled substance

## 2018-10-04 RX ORDER — ALPRAZOLAM 0.5 MG
TABLET ORAL
Qty: 40 TABLET | Refills: 0 | Status: SHIPPED | OUTPATIENT
Start: 2018-10-04 | End: 2018-10-12

## 2018-10-04 NOTE — TELEPHONE ENCOUNTER
pt percocet last  date was incorrect - the pharmacy put last  date as 9/22/18,  Correct  date was 9/20/18.  dated start date as 10/6/18 - pt will be out before that date. Can pharmacy 10/4/18 ? If ok please call pharmacy with Verbal ok or send new order     Thank you

## 2018-10-10 LAB
DLCOUNC-%PRED-PRE: 27 %
DLCOUNC-PRE: 6.16 ML/MIN/MMHG
DLCOUNC-PRED: 22.72 ML/MIN/MMHG
ERV-%PRED-PRE: 79 %
ERV-PRE: 0.98 L
ERV-PRED: 1.23 L
EXPTIME-PRE: 6.44 SEC
FEF2575-%PRED-PRE: 75 %
FEF2575-PRE: 2.2 L/SEC
FEF2575-PRED: 2.92 L/SEC
FEFMAX-%PRED-PRE: 89 %
FEFMAX-PRE: 5.95 L/SEC
FEFMAX-PRED: 6.62 L/SEC
FEV1-%PRED-PRE: 81 %
FEV1-PRE: 2.26 L
FEV1FEV6-PRE: 82 %
FEV1FEV6-PRED: 83 %
FEV1FVC-PRE: 82 %
FEV1FVC-PRED: 82 %
FEV1SVC-PRE: 79 %
FEV1SVC-PRED: 82 %
FIFMAX-PRE: 6.01 L/SEC
FRCPLETH-%PRED-PRE: 112 %
FRCPLETH-PRE: 2.9 L
FRCPLETH-PRED: 2.57 L
FVC-%PRED-PRE: 82 %
FVC-PRE: 2.76 L
FVC-PRED: 3.37 L
IC-%PRED-PRE: 87 %
IC-PRE: 1.88 L
IC-PRED: 2.15 L
RVPLETH-%PRED-PRE: 124 %
RVPLETH-PRE: 1.92 L
RVPLETH-PRED: 1.54 L
TLCPLETH-%PRED-PRE: 103 %
TLCPLETH-PRE: 4.78 L
TLCPLETH-PRED: 4.6 L
VA-%PRED-PRE: 83 %
VA-PRE: 3.96 L
VC-%PRED-PRE: 84 %
VC-PRE: 2.86 L
VC-PRED: 3.38 L

## 2018-10-12 DIAGNOSIS — G47.9 DISTURBANCE IN SLEEP BEHAVIOR: ICD-10-CM

## 2018-10-12 RX ORDER — ALPRAZOLAM 0.5 MG
TABLET ORAL
Qty: 40 TABLET | Refills: 0 | Status: SHIPPED | OUTPATIENT
Start: 2018-10-12 | End: 2018-10-22

## 2018-10-15 DIAGNOSIS — R07.81 PLEURITIC CHEST PAIN: ICD-10-CM

## 2018-10-15 NOTE — TELEPHONE ENCOUNTER
Percocet  MG        Last Written Prescription Date:  10/6/18  Last Fill Quantity: 60,   # refills: 0  Last Office Visit: 5/16/18  Future Office visit:       Routing refill request to provider for review/approval because:  Drug not on the [facility], [facility] or [facility] refill protocol or controlled substance

## 2018-10-17 RX ORDER — OXYCODONE AND ACETAMINOPHEN 10; 325 MG/1; MG/1
1 TABLET ORAL EVERY 6 HOURS PRN
Qty: 60 TABLET | Refills: 0 | Status: SHIPPED | OUTPATIENT
Start: 2018-10-17 | End: 2018-10-29

## 2018-10-18 DIAGNOSIS — M19.90 INFLAMMATORY ARTHRITIS: ICD-10-CM

## 2018-10-18 RX ORDER — MORPHINE SULFATE 15 MG/1
15 TABLET, FILM COATED, EXTENDED RELEASE ORAL 2 TIMES DAILY
Qty: 60 TABLET | Refills: 0 | Status: SHIPPED | OUTPATIENT
Start: 2018-10-18 | End: 2018-11-15

## 2018-10-18 NOTE — TELEPHONE ENCOUNTER
Morphine  15 MG      Last Written Prescription Date:  9-21-18  Last Fill Quantity: 60,   # refills: 0  Last Office Visit: 5/16/18  Future Office visit:       Routing refill request to provider for review/approval because:  Drug not on the G, P or [facility] refill protocol or controlled substance

## 2018-10-22 DIAGNOSIS — G47.9 DISTURBANCE IN SLEEP BEHAVIOR: ICD-10-CM

## 2018-10-22 RX ORDER — ALPRAZOLAM 0.5 MG
TABLET ORAL
Qty: 40 TABLET | Refills: 0 | Status: SHIPPED | OUTPATIENT
Start: 2018-10-22 | End: 2018-10-31

## 2018-10-22 NOTE — TELEPHONE ENCOUNTER
Alprazolam  0.5 MG      Last Written Prescription Date:  10/12/18  Last Fill Quantity: 40,   # refills: 0  Last Office Visit: 5/16/18  Future Office visit:       Routing refill request to provider for review/approval because:  Drug not on the [facility], [facility] or [facility] refill protocol or controlled substance

## 2018-10-29 DIAGNOSIS — R07.81 PLEURITIC CHEST PAIN: ICD-10-CM

## 2018-10-29 NOTE — TELEPHONE ENCOUNTER
Percocet      Last Written Prescription Date:  10/17/2018  Last Fill Quantity: 60,   # refills: 0  Last Office Visit: 5/16/2018  Future Office visit:       Routing refill request to provider for review/approval because:  Drug not on the [facility], P or [facility] refill protocol or controlled substance

## 2018-10-31 DIAGNOSIS — G47.9 DISTURBANCE IN SLEEP BEHAVIOR: ICD-10-CM

## 2018-10-31 RX ORDER — OXYCODONE AND ACETAMINOPHEN 10; 325 MG/1; MG/1
1 TABLET ORAL EVERY 6 HOURS PRN
Qty: 60 TABLET | Refills: 0 | Status: SHIPPED | OUTPATIENT
Start: 2018-10-31 | End: 2018-11-12

## 2018-10-31 RX ORDER — ALPRAZOLAM 0.5 MG
TABLET ORAL
Qty: 40 TABLET | Refills: 0 | Status: SHIPPED | OUTPATIENT
Start: 2018-10-31 | End: 2018-11-08

## 2018-10-31 NOTE — TELEPHONE ENCOUNTER
Requested Prescriptions   Pending Prescriptions Disp Refills     ALPRAZolam (XANAX) 0.5 MG tablet 40 tablet 0     Sig: TAKE 1 TABLET BY MOUTH EVERY SIX HOURS AS NEEDED FOR ANXIETY.    There is no refill protocol information for this order        ALPRAZolam (XANAX) 0.5 MG tablet      Last Written Prescription Date:  10/22/2018  Last Fill Quantity: 40,   # refills: 0  Last Office Visit: 05/16/2018  Future Office visit:       Routing refill request to provider for review/approval because:  Drug not on the [facility], P or [facility] refill protocol or controlled substance  Anusha Noe RN, BSN

## 2018-11-08 DIAGNOSIS — G47.9 DISTURBANCE IN SLEEP BEHAVIOR: ICD-10-CM

## 2018-11-08 NOTE — TELEPHONE ENCOUNTER
Xanax      Last Written Prescription Date:  10/31/2018  Last Fill Quantity: 40,   # refills: 0  Last Office Visit: 05/16/2018  Future Office visit:       Routing refill request to provider for review/approval because:  Drug not on the [facility], [facility] or [facility] refill protocol or controlled substance  Rebecca Tobias MA

## 2018-11-09 RX ORDER — ALPRAZOLAM 0.5 MG
TABLET ORAL
Qty: 40 TABLET | Refills: 0 | Status: SHIPPED | OUTPATIENT
Start: 2018-11-10 | End: 2018-11-19

## 2018-11-12 DIAGNOSIS — R07.81 PLEURITIC CHEST PAIN: ICD-10-CM

## 2018-11-12 NOTE — TELEPHONE ENCOUNTER
Percocet      Last Written Prescription Date:  10/31/2018  Last Fill Quantity: 60,   # refills: 0  Last Office Visit: 5/16/2018  Future Office visit:       Routing refill request to provider for review/approval because:  Drug not on the [facility], P or [facility] refill protocol or controlled substance

## 2018-11-13 RX ORDER — OXYCODONE AND ACETAMINOPHEN 10; 325 MG/1; MG/1
1 TABLET ORAL EVERY 6 HOURS PRN
Qty: 60 TABLET | Refills: 0 | Status: SHIPPED | OUTPATIENT
Start: 2018-11-15 | End: 2018-11-26

## 2018-11-14 DIAGNOSIS — R05.9 COUGH: Primary | ICD-10-CM

## 2018-11-14 RX ORDER — AZITHROMYCIN 250 MG/1
TABLET, FILM COATED ORAL
Qty: 6 TABLET | Refills: 0 | Status: SHIPPED | OUTPATIENT
Start: 2018-11-14 | End: 2018-12-17

## 2018-11-14 NOTE — TELEPHONE ENCOUNTER
Rx sent to PCP for approval waiting on authorization from PCP will be sent to [facility]. Please inform patient when returns call.  Sravani Ramos MA

## 2018-11-14 NOTE — TELEPHONE ENCOUNTER
Reason for Call:  Medication or medication refill:    Do you use a [facility]?  Name of the pharmacy and phone number for the current request:  [facility]- [phone]    Name of the medication requested: Zithromax     Other request: Pt has cough/ wheezing/ sinus sx. She states she gets this every year. Would like Rx called in over the phone. She states you have done that for her before.     Can we leave a detailed message on this number? YES    Phone number patient can be reached at: Home number on file [phone] (home)    Best Time: any     Call taken on 11/14/2018 at 8:24 AM by Sandra Leary

## 2018-11-15 DIAGNOSIS — M19.90 INFLAMMATORY ARTHRITIS: ICD-10-CM

## 2018-11-15 RX ORDER — MORPHINE SULFATE 15 MG/1
15 TABLET, FILM COATED, EXTENDED RELEASE ORAL 2 TIMES DAILY
Qty: 60 TABLET | Refills: 0 | Status: SHIPPED | OUTPATIENT
Start: 2018-11-15 | End: 2018-12-17

## 2018-11-15 NOTE — TELEPHONE ENCOUNTER
MS Contin 15 MG       Last Written Prescription Date:  10/18/18  Last Fill Quantity: 60,   # refills: 0  Last Office Visit: 5/16/18  Future Office visit:       Routing refill request to provider for review/approval because:  Drug not on the [facility], [facility] or [facility] refill protocol or controlled substance

## 2018-11-19 DIAGNOSIS — G47.9 DISTURBANCE IN SLEEP BEHAVIOR: ICD-10-CM

## 2018-11-19 NOTE — TELEPHONE ENCOUNTER
Xanax       Last Written Prescription Date:  11/10/2018  Last Fill Quantity: 40,   # refills: 0  Last Office Visit: 05/16/2018  Future Office visit:       Routing refill request to provider for review/approval because:  Drug not on the [facility], P or [facility] refill protocol or controlled substance    Routing to covering provider to address in absence of Dr. Loya out of office until 11/29/2018  Rebecca Tobias MA

## 2018-11-20 RX ORDER — ALPRAZOLAM 0.5 MG
TABLET ORAL
Qty: 40 TABLET | Refills: 0 | Status: SHIPPED | OUTPATIENT
Start: 2018-11-20 | End: 2018-11-29

## 2018-11-26 DIAGNOSIS — R07.81 PLEURITIC CHEST PAIN: ICD-10-CM

## 2018-11-27 ENCOUNTER — TELEPHONE (OUTPATIENT)
Dept: CARDIOLOGY | Facility: CLINIC | Age: 43
End: 2018-11-27

## 2018-11-27 DIAGNOSIS — I27.20 PULMONARY HYPERTENSION (H): ICD-10-CM

## 2018-11-27 RX ORDER — DIGOXIN 125 MCG
125 TABLET ORAL DAILY
Qty: 90 TABLET | Refills: 3 | Status: SHIPPED | OUTPATIENT
Start: 2018-11-27 | End: 2019-12-03

## 2018-11-27 NOTE — TELEPHONE ENCOUNTER
Health Call Center    Phone Message    May a detailed message be left on voicemail: yes    Reason for Call: Medication Refill Request    Has the patient contacted the pharmacy for the refill? Yes   Name of medication being requested: digoxin (LANOXIN)  Provider who prescribed the medication: Callie Meadows  Pharmacy: [address]  Date medication is needed: As soon as possible       Action Taken: Message routed to:  [facility] ([facility] Cardiology      Addendum: Medications Sent. Naty Lau RN on 11/27/2018 at 10:57 AM

## 2018-11-28 RX ORDER — OXYCODONE AND ACETAMINOPHEN 10; 325 MG/1; MG/1
1 TABLET ORAL EVERY 6 HOURS PRN
Qty: 60 TABLET | Refills: 0 | Status: SHIPPED | OUTPATIENT
Start: 2018-11-29 | End: 2018-12-10

## 2018-11-29 DIAGNOSIS — G47.9 DISTURBANCE IN SLEEP BEHAVIOR: ICD-10-CM

## 2018-11-29 RX ORDER — ALPRAZOLAM 0.5 MG
TABLET ORAL
Qty: 40 TABLET | Refills: 0 | Status: SHIPPED | OUTPATIENT
Start: 2018-11-29 | End: 2018-12-07

## 2018-11-29 NOTE — TELEPHONE ENCOUNTER
Requested Prescriptions   Pending Prescriptions Disp Refills     ALPRAZolam (XANAX) 0.5 MG tablet 40 tablet 0     Sig: TAKE 1 TABLET BY MOUTH EVERY SIX HOURS AS NEEDED FOR ANXIETY.    There is no refill protocol information for this order        ALPRAZolam (XANAX) 0.5 MG tablet      Last Written Prescription Date:  11/20/2018  Last Fill Quantity: 40,   # refills: 0  Last Office Visit: 09/10/2018  Future Office visit:       Routing refill request to provider for review/approval because:  Drug not on the [facility], P or [facility] refill protocol or controlled substance  Anusha Noe RN, BSN

## 2018-12-07 DIAGNOSIS — G47.9 DISTURBANCE IN SLEEP BEHAVIOR: ICD-10-CM

## 2018-12-10 ENCOUNTER — TELEPHONE (OUTPATIENT)
Dept: FAMILY MEDICINE | Facility: CLINIC | Age: 43
End: 2018-12-10

## 2018-12-10 ENCOUNTER — PATIENT OUTREACH (OUTPATIENT)
Dept: CARE COORDINATION | Facility: CLINIC | Age: 43
End: 2018-12-10

## 2018-12-10 DIAGNOSIS — R07.81 PLEURITIC CHEST PAIN: ICD-10-CM

## 2018-12-10 NOTE — TELEPHONE ENCOUNTER
alprazolam  Last Written Prescription Date:  11/29/2018  Last Fill Quantity: 40,  # refills: 0   Last office visit: 5/16/2018 with prescribing provider:      Future Office Visit:      Requested Prescriptions   Pending Prescriptions Disp Refills     ALPRAZolam (XANAX) 0.5 MG tablet 40 tablet 0     Sig: TAKE 1 TABLET BY MOUTH EVERY SIX HOURS AS NEEDED FOR ANXIETY.    There is no refill protocol information for this order          Routing refill request to provider for review/approval because:  Drug not on the [facility] refill protocol           Tracee Loya RN on 12/10/2018 at 5:10 PM

## 2018-12-10 NOTE — TELEPHONE ENCOUNTER
Pt is calling to check the status of this refill. She is out.   Thank you,  Sandra Leary- Pt Rep.

## 2018-12-11 RX ORDER — ALPRAZOLAM 0.5 MG
TABLET ORAL
Qty: 40 TABLET | Refills: 0 | Status: SHIPPED | OUTPATIENT
Start: 2018-12-11 | End: 2018-12-20

## 2018-12-11 NOTE — TELEPHONE ENCOUNTER
Percocet       Last Written Prescription Date:  11/29/18  Last Fill Quantity: 60,   # refills: 0  Last Office Visit: 5/16/18  Future Office visit:       Routing refill request to provider for review/approval because:  Drug not on the [facility], P or [facility] refill protocol or controlled substance

## 2018-12-13 ENCOUNTER — TELEPHONE (OUTPATIENT)
Dept: FAMILY MEDICINE | Facility: CLINIC | Age: 43
End: 2018-12-13

## 2018-12-13 DIAGNOSIS — M19.90 INFLAMMATORY ARTHRITIS: ICD-10-CM

## 2018-12-13 RX ORDER — OXYCODONE AND ACETAMINOPHEN 10; 325 MG/1; MG/1
1 TABLET ORAL EVERY 6 HOURS PRN
Qty: 60 TABLET | Refills: 0 | Status: SHIPPED | OUTPATIENT
Start: 2018-12-13 | End: 2018-12-24

## 2018-12-13 NOTE — TELEPHONE ENCOUNTER
Pt is calling to check the status if this refill. She is hoping to pick this up by Noon today. Please call when done at [phone].    Thank you,  Sandra Leary- Pt Rep.

## 2018-12-13 NOTE — TELEPHONE ENCOUNTER
Morphine      Last Written Prescription Date:  11/15/18  Last Fill Quantity: 60,   # refills: 0  Last Office Visit: 5/16/18  Future Office visit:       Routing refill request to provider for review/approval because:  Drug not on the G, P or [facility] refill protocol or controlled substance

## 2018-12-17 ENCOUNTER — HOSPITAL ENCOUNTER (OUTPATIENT)
Facility: CLINIC | Age: 43
End: 2018-12-17

## 2018-12-17 ENCOUNTER — OFFICE VISIT (OUTPATIENT)
Dept: CARDIOLOGY | Facility: CLINIC | Age: 43
End: 2018-12-17
Attending: INTERNAL MEDICINE
Payer: COMMERCIAL

## 2018-12-17 VITALS
HEART RATE: 76 BPM | OXYGEN SATURATION: 97 % | BODY MASS INDEX: 20.92 KG/M2 | HEIGHT: 62 IN | DIASTOLIC BLOOD PRESSURE: 84 MMHG | WEIGHT: 113.7 LBS | SYSTOLIC BLOOD PRESSURE: 124 MMHG

## 2018-12-17 DIAGNOSIS — R06.02 SOB (SHORTNESS OF BREATH): ICD-10-CM

## 2018-12-17 DIAGNOSIS — I27.0 PRIMARY PULMONARY HYPERTENSION (H): ICD-10-CM

## 2018-12-17 LAB
6 MIN WALK (FT): 1330 FT
6 MIN WALK (M): 405 M
ALBUMIN SERPL-MCNC: 3.5 G/DL (ref 3.4–5)
ALP SERPL-CCNC: 67 U/L (ref 40–150)
ALT SERPL W P-5'-P-CCNC: 14 U/L (ref 0–50)
ANION GAP SERPL CALCULATED.3IONS-SCNC: 7 MMOL/L (ref 3–14)
AST SERPL W P-5'-P-CCNC: 17 U/L (ref 0–45)
BILIRUB SERPL-MCNC: 0.2 MG/DL (ref 0.2–1.3)
BUN SERPL-MCNC: 14 MG/DL (ref 7–30)
CALCIUM SERPL-MCNC: 8.7 MG/DL (ref 8.5–10.1)
CHLORIDE SERPL-SCNC: 104 MMOL/L (ref 94–109)
CO2 SERPL-SCNC: 29 MMOL/L (ref 20–32)
CREAT SERPL-MCNC: 0.75 MG/DL (ref 0.52–1.04)
ERYTHROCYTE [DISTWIDTH] IN BLOOD BY AUTOMATED COUNT: 15.8 % (ref 10–15)
GFR SERPL CREATININE-BSD FRML MDRD: 85 ML/MIN/1.7M2
GLUCOSE SERPL-MCNC: 95 MG/DL (ref 70–99)
HCT VFR BLD AUTO: 38.1 % (ref 35–47)
HGB BLD-MCNC: 11.8 G/DL (ref 11.7–15.7)
MCH RBC QN AUTO: 26.6 PG (ref 26.5–33)
MCHC RBC AUTO-ENTMCNC: 31 G/DL (ref 31.5–36.5)
MCV RBC AUTO: 86 FL (ref 78–100)
NT-PROBNP SERPL-MCNC: 361 PG/ML (ref 0–125)
PLATELET # BLD AUTO: 406 10E9/L (ref 150–450)
POTASSIUM SERPL-SCNC: 3.6 MMOL/L (ref 3.4–5.3)
PROT SERPL-MCNC: 8.2 G/DL (ref 6.8–8.8)
RBC # BLD AUTO: 4.44 10E12/L (ref 3.8–5.2)
SODIUM SERPL-SCNC: 139 MMOL/L (ref 133–144)
WBC # BLD AUTO: 8.6 10E9/L (ref 4–11)

## 2018-12-17 PROCEDURE — 83880 ASSAY OF NATRIURETIC PEPTIDE: CPT | Performed by: INTERNAL MEDICINE

## 2018-12-17 PROCEDURE — 85027 COMPLETE CBC AUTOMATED: CPT | Performed by: INTERNAL MEDICINE

## 2018-12-17 PROCEDURE — G0463 HOSPITAL OUTPT CLINIC VISIT: HCPCS | Mod: ZF

## 2018-12-17 PROCEDURE — 80053 COMPREHEN METABOLIC PANEL: CPT | Performed by: INTERNAL MEDICINE

## 2018-12-17 PROCEDURE — 36415 COLL VENOUS BLD VENIPUNCTURE: CPT | Performed by: INTERNAL MEDICINE

## 2018-12-17 PROCEDURE — 99214 OFFICE O/P EST MOD 30 MIN: CPT | Mod: ZP | Performed by: NURSE PRACTITIONER

## 2018-12-17 RX ORDER — MORPHINE SULFATE 15 MG/1
15 TABLET, FILM COATED, EXTENDED RELEASE ORAL 2 TIMES DAILY
Qty: 60 TABLET | Refills: 0 | Status: SHIPPED | OUTPATIENT
Start: 2018-12-17 | End: 2019-01-14

## 2018-12-17 ASSESSMENT — MIFFLIN-ST. JEOR: SCORE: 1123.99

## 2018-12-17 ASSESSMENT — PAIN SCALES - GENERAL: PAINLEVEL: NO PAIN (0)

## 2018-12-17 NOTE — PATIENT INSTRUCTIONS
Medication Changes:   1. No medication changes    Patient Instructions:  1. Continue staying active and eat a heart healthy diet.    2. Please keep current list of medications with you at all times.    3. Remember to weigh yourself daily after voiding and before you consume any food or beverages and log the numbers.  If you have gained/lost 2 pounds overnight or 5 pounds in a week contact us immediately for medication adjustments or further instructions.    4. **Please call us immediately if you have any syncope (fainting or passing out), chest pain, edema (swelling or weight gain), or decline in your functional status (general decline in how you are feeling).    Follow up Appointment Information:  1. Follow up in three months with Dr. Ledesma, labs, and a right heart catheterization    Results:  Results for TYREL SQUIRES (MRN [id number]) as of 12/17/2018 13:10   Ref. Range 12/17/2018 11:55   Sodium Latest Ref Range: 133 - 144 mmol/L 139   Potassium Latest Ref Range: 3.4 - 5.3 mmol/L 3.6   Chloride Latest Ref Range: 94 - 109 mmol/L 104   Carbon Dioxide Latest Ref Range: 20 - 32 mmol/L 29   Urea Nitrogen Latest Ref Range: 7 - 30 mg/dL 14   Creatinine Latest Ref Range: 0.52 - 1.04 mg/dL 0.75   GFR Estimate Latest Ref Range: >60 mL/min/1.7m2 85   GFR Estimate If Black Latest Ref Range: >60 mL/min/1.7m2 >90   Calcium Latest Ref Range: 8.5 - 10.1 mg/dL 8.7   Anion Gap Latest Ref Range: 3 - 14 mmol/L 7   Albumin Latest Ref Range: 3.4 - 5.0 g/dL 3.5   Protein Total Latest Ref Range: 6.8 - 8.8 g/dL 8.2   Bilirubin Total Latest Ref Range: 0.2 - 1.3 mg/dL 0.2   Alkaline Phosphatase Latest Ref Range: 40 - 150 U/L 67   ALT Latest Ref Range: 0 - 50 U/L 14   AST Latest Ref Range: 0 - 45 U/L 17   N-Terminal Pro Bnp Latest Ref Range: 0 - 125 pg/mL 361 (H)   Glucose Latest Ref Range: 70 - 99 mg/dL 95   WBC Latest Ref Range: 4.0 - 11.0 10e9/L 8.6   Hemoglobin Latest Ref Range: 11.7 - 15.7 g/dL 11.8   Hematocrit Latest Ref Range:  35.0 - 47.0 % 38.1   Platelet Count Latest Ref Range: 150 - 450 10e9/L 406   RBC Count Latest Ref Range: 3.8 - 5.2 10e12/L 4.44   MCV Latest Ref Range: 78 - 100 fl 86   MCH Latest Ref Range: 26.5 - 33.0 pg 26.6   MCHC Latest Ref Range: 31.5 - 36.5 g/dL 31.0 (L)   RDW Latest Ref Range: 10.0 - 15.0 % 15.8 (H)     We are located on the third floor of the [facility] on the [facility].  Our address is     [address] [place] [address] on [address]rd [place] [address]      Thank you for allowing us to be a part of your care here at the [facility]    If you have questions or concerns please contact us at:    Naty Lau RN, BSN    Louis Jack (Schedule,Prior Auth)  Nurse Coordinator     Clinic   Pulmonary Hypertension   Pulmonary Hypertension  [facility]  (P)[phone]    (P) [phone]        (F)[phone]                ** Please note that you will NOT receive a reminder call regarding your scheduled testing, reminder calls are for provider appointments only.  If you are scheduled for testing within the [employer] system you may receive a call regarding pre-registration for billing purposes only.**     Support Group:  Pulmonary Hypertension Association  Https://www.phassociation.org/  **Look at the Events Tab** They even have Support Groups that you can call into    [facility] PH Support Group  Second Saturday of the Month from 1-3 PM   Location: [address] [place] [id number]  Leader: Cosmo Ann  Phone: [phone]  Email: [email or web address]

## 2018-12-17 NOTE — LETTER
"12/17/2018      RE: Ellen Loya  [address]  [facility] [address]       Dear Colleague,    Thank you for the opportunity to participate in the care of your patient, Ellen Loya, at the [facility] at [facility]. Please see a copy of my visit note below.        December 17, 2018      Ms. Ellen Loya is a pleasant 43 year old female with a past medical history of polymyositis, ILD, and pulmonary arterial hypertension.  She is currently on Adcirca monotherapy 40 mg daily, and she did not tolerate Tyvaso due to worsening VQ mismatch and hypoxia.    Today, she is accompanied by:  Two sons    Current Symptoms  1. Any ER visits or hospital admissions since last appointment: No    2. Shortness of breath: Yes: with exertion; has started noticing more shortness of breath with stairs.  At times she tries to focus on her breath and that seems to help.     3. Dizziness or lightheadedness: No (as long as she has the oxygen on)  4. Any syncopal episodes or falls: No  5. Chest pain or chest tightness: No  6. Nausea, vomiting, diarrhea, constipation: No  7. Swelling in the legs: No (once in a while)  8. Bloating in the abdomen: No  9. PND; Waking up at night coughing, choking or SOB: No  10. Any medication intolerances: No  11. Reported headaches: No  12. Jaw pain or bone/joint pain: Yes:  Chronic joint pain  13. On IV Prostacyclin: No; current dose: N/A    14. Current level of activity: activity as tolerated      PAST MEDICAL HISTORY:  Past Medical History:   Diagnosis Date     Acute bronchitis 06/05/07    Admit. Discharged 06/05/07     Anxiety      Arthritis     h/o \"seronegative rheumatoid arthritis\" since age 30, however no evidence of active arthritis by Rheum eval [id number]     ASCUS of cervix with negative high risk HPV 05/15/2014    neg HPV     ILD (interstitial lung disease) (H)     seen on chest CT 5-2011; methotrexate stopped 6-2011     Lung nodule     KM " nodule; EBUS 2014 of lymph nodes was negative; CT-guided bx - hamartoma/chondroma     Prematurity     born 6-7 weeks early     Pulmonary hypertension (H)     RHC 2016 mean PA 25     Varicella without mention of complication          FAMILY HISTORY:  Family History   Problem Relation Age of Onset     Arthritis Mother         psoriatic arthritis     Depression Mother      Diabetes Mother      Thyroid Disease Mother      Obesity Mother      Hyperlipidemia Mother      Lipids Father      Heart Disease Father         recent angioplasty for 3 blocked arteries.     Substance Abuse Father      Hypertension Father      Cerebrovascular Disease Father      Hyperlipidemia Father      Coronary Artery Disease Father      LUNG DISEASE Father      Scleroderma Paternal Uncle         Had scleroderma ILD     Other Cancer Paternal Grandmother         lung cancer     Substance Abuse Brother      Depression Brother      Asthma Brother      Diabetes Maternal Grandfather         adult onset     Hyperlipidemia Maternal Grandfather          SOCIAL HISTORY:  Social History     Tobacco Use     Smoking status: Current Every Day Smoker     Packs/day: 0.50     Years: 25.00     Pack years: 12.50     Types: Cigarettes     Start date: 12/3/1992     Last attempt to quit: 2016     Years since quittin.0     Smokeless tobacco: Former User     Quit date: 2016   Substance Use Topics     Alcohol use: No     Alcohol/week: 0.0 oz     Comment: 5 per month or less     Drug use: No         CURRENT MEDICATIONS:  Current Outpatient Medications   Medication Sig Dispense Refill     ALPRAZolam (XANAX) 0.5 MG tablet TAKE 1 TABLET BY MOUTH EVERY SIX HOURS AS NEEDED FOR ANXIETY. 40 tablet 0     digoxin (LANOXIN) 125 MCG tablet Take 1 tablet (125 mcg) by mouth daily 90 tablet 3     Esomeprazole Magnesium (NEXIUM PO) Take 40 mg by mouth every morning (before breakfast)       FLOVENT  MCG/ACT Inhaler INHALE 2 PUFFS INTO THE LUNGS TWO TIMES  "A DAY 12 g 3     fluticasone (FLONASE) 50 MCG/ACT spray USE 2 SPRAYS IN EACH NOSTRIL DAILY 16 g 11     furosemide (LASIX) 20 MG tablet TAKE TWO TABLETS BY MOUTH EVERY  tablet 3     morphine (MS CONTIN) 15 MG CR tablet Take 1 tablet (15 mg) by mouth 2 times daily 60 tablet 0     order for DME Oxygen: Patient requires supplemental Oxygen 4 LPM via nasal canula at rest and 6 LPM with activity. Please provide patient with portability capability. Okay to spot check patient on oxygen device, to keep sats above 90%. Please provider with home concentrator that can go up to 10 LPM. Oxygen will be for a lifetime. 1 Device 0     order for DME Please provide patient with 2  liquid oxygen tanks for portability. Spot check patient on liquid oxygen. Titrate oxygen to maintain saturations above 88%. 2 Device 0     oxyCODONE-acetaminophen (PERCOCET)  MG per tablet Take 1 tablet by mouth every 6 hours as needed for moderate to severe pain 60 tablet 0     tadalafil, PAH, (ADCIRCA) 20 MG TABS Take 2 tablets (40 mg) by mouth daily 60 tablet 11     VENTOLIN  (90 BASE) MCG/ACT Inhaler INHALE ONE TO TWO PUFFS INTO THE LUNGS EVERY 4 HOURS AS NEEDED FOR SHORTNESS OF BREATH / DYSPNEA 18 g 9     albuterol (2.5 MG/3ML) 0.083% nebulizer solution Use every 4-6 hours as needed for shortness of breath 60 vial 3     azithromycin (ZITHROMAX) 250 MG tablet Two tablets first day, then one tablet daily for four days. (Patient not taking: Reported on 12/17/2018) 6 tablet 0     EXAM:  /84 (BP Location: Left arm, Patient Position: Chair, Cuff Size: Adult Regular)   Pulse 76   Ht 1.575 m (5' 2\")   Wt 51.6 kg (113 lb 11.2 oz)   SpO2 97%   BMI 20.80 kg/m     General: appears comfortable, alert and articulate  Head: normocephalic, atraumatic  Eyes: anicteric sclera, EOMI  Neck: no adenopathy  Orophyarynx: moist mucosa, no lesions, dentition intact  Heart: regular, S1/S2, no murmur, gallop, rub, estimated JVP non elevated  Lungs: " clear, no rales or wheezing  Abdomen: soft, non-tender, bowel sounds present, no hepatosplenomegaly  Extremities: no clubbing, cyanosis or edema  Neurological: normal speech and affect, no gross motor deficits      Weight  Wt Readings from Last 10 Encounters:   12/17/18 51.6 kg (113 lb 11.2 oz)   09/10/18 48.9 kg (107 lb 14.4 oz)   07/06/18 49 kg (108 lb)   05/16/18 49.2 kg (108 lb 8 oz)   01/30/18 52.1 kg (114 lb 12.8 oz)   01/18/18 53.1 kg (117 lb)   01/02/18 53.1 kg (117 lb)   12/21/17 52.6 kg (116 lb)   12/19/17 51.7 kg (114 lb)   11/30/17 51.4 kg (113 lb 6.4 oz)         Labs:    CBC RESULTS:  Lab Results   Component Value Date    WBC 8.6 12/17/2018    RBC 4.44 12/17/2018    HGB 11.8 12/17/2018    HCT 38.1 12/17/2018    MCV 86 12/17/2018    MCH 26.6 12/17/2018    MCHC 31.0 (L) 12/17/2018    RDW 15.8 (H) 12/17/2018     12/17/2018       CMP RESULTS:  Lab Results   Component Value Date     12/17/2018    POTASSIUM 3.6 12/17/2018    CHLORIDE 104 12/17/2018    CO2 29 12/17/2018    ANIONGAP 7 12/17/2018    GLC 95 12/17/2018    BUN 14 12/17/2018    CR 0.75 12/17/2018    GFRESTIMATED 85 12/17/2018    GFRESTBLACK >90 12/17/2018    MARCIN 8.7 12/17/2018    BILITOTAL 0.2 12/17/2018    ALBUMIN 3.5 12/17/2018    ALKPHOS 67 12/17/2018    ALT 14 12/17/2018    AST 17 12/17/2018        INR RESULTS:  Lab Results   Component Value Date    INR 1.04 01/23/2015       BNP RESULTS:  Lab Results   Component Value Date    NTBNPI 4,168 (H) 11/30/2016     No results found for: BNP      Recent Tests:    Echocardiogram (9/28/2018):  Interpretation Summary  Left ventricular function, chamber size, wall motion, and wall thickness are  normal.The EF is 60-65%.  Left ventricular diastolic function is normal.  Right ventricular size is decreased compared to 4/12/17.  Pulmonary artery systolic pressure cannot be assessed.  Pulmonary acceleration time (PAT) is 77 ms, which is abnormal (<130 ms)  suggesting increased pulmonary vascular  resistance.     Left Ventricle  Left ventricular function, chamber size, wall motion, and wall thickness are  normal.The EF is 60-65%. Left ventricular diastolic function is normal.     Right Ventricle  The right ventricle is normal size. Global right ventricular function is  normal. There is moderate right ventricular hypertrophy.     Atria  Both atria appear normal.        Mitral Valve  The mitral valve is normal.     Aortic Valve  Aortic valve is normal in structure and function.     Tricuspid Valve  Trace to mild tricuspid insufficiency is present. The peak velocity of the  tricuspid regurgitant jet is not obtainable. Pulmonary artery systolic  pressure cannot be assessed.     Pulmonic Valve  The pulmonic valve is normal. Pulmonary acceleration time (PAT) is 77 ms,  which is abnormal (<130 ms) suggesting increased pulmonary vascular  resistance.     Vessels  The aorta root is normal. The inferior vena cava was normal in size with  preserved respiratory variability.     Pericardium  No pericardial effusion is present.        Compared to Previous Study  Right ventricular size is decreased compared to 4/12/17.      RHC (4/26/2017):      PFT (9/28/2018):   PFT Latest Ref Rng & Units 9/28/2018   FVC L 2.76   FEV1 L 2.26   FVC% % 82   FEV1% % 81     Assessment and Plan:   Ms. Ellen Loya is a 43 year old female with pulmonary hypertension and ILD.  WHO Group III, NYHA Functional Class III.    #PH Plan:  -No medication changes today. 6 MWT was improved and no new symptomatic changes. VS and weights stable, labs improved (BNP).  -Return to clinic in three months with repeat 6 minute walk test, labs, right heart catheterization, and follow up with Dr. Ledesma.    It was a pleasure seeing Ms. Ellen Loya at the [facility].       Sincerely,  Gayatri Altamirano, APRN, CNP.

## 2018-12-17 NOTE — NURSING NOTE
Vitals completed successfully and medication reconciled. Madisyn Durham MA  Chief Complaint   Patient presents with     Follow Up     Return for PH F/U with labs, 6mwt

## 2018-12-17 NOTE — PROGRESS NOTES
"December 17, 2018      Ms. Ellen Loya is a pleasant 43 year old female with a past medical history of polymyositis, ILD, and pulmonary arterial hypertension.  She is currently on Adcirca monotherapy 40 mg daily, and she did not tolerate Tyvaso due to worsening VQ mismatch and hypoxia.    Today, she is accompanied by:  Two sons    Current Symptoms  1. Any ER visits or hospital admissions since last appointment: No    2. Shortness of breath: Yes: with exertion; has started noticing more shortness of breath with stairs.  At times she tries to focus on her breath and that seems to help.     3. Dizziness or lightheadedness: No (as long as she has the oxygen on)  4. Any syncopal episodes or falls: No  5. Chest pain or chest tightness: No  6. Nausea, vomiting, diarrhea, constipation: No  7. Swelling in the legs: No (once in a while)  8. Bloating in the abdomen: No  9. PND; Waking up at night coughing, choking or SOB: No  10. Any medication intolerances: No  11. Reported headaches: No  12. Jaw pain or bone/joint pain: Yes:  Chronic joint pain  13. On IV Prostacyclin: No; current dose: N/A    14. Current level of activity: activity as tolerated      PAST MEDICAL HISTORY:  Past Medical History:   Diagnosis Date     Acute bronchitis 06/05/07    Admit. Discharged 06/05/07     Anxiety      Arthritis     h/o \"seronegative rheumatoid arthritis\" since age 30, however no evidence of active arthritis by Rheum eval [id number]     ASCUS of cervix with negative high risk HPV 05/15/2014    neg HPV     ILD (interstitial lung disease) (H)     seen on chest CT 5-2011; methotrexate stopped 6-2011     Lung nodule     KM nodule; EBUS 12/2014 of lymph nodes was negative; CT-guided bx 1-2015 hamartoma/chondroma     Prematurity     born 6-7 weeks early     Pulmonary hypertension (H)     RHC 2/2016 mean PA 25     Varicella without mention of complication          FAMILY HISTORY:  Family History   Problem Relation Age of Onset     Arthritis " Mother         psoriatic arthritis     Depression Mother      Diabetes Mother      Thyroid Disease Mother      Obesity Mother      Hyperlipidemia Mother      Lipids Father      Heart Disease Father         recent angioplasty for 3 blocked arteries.     Substance Abuse Father      Hypertension Father      Cerebrovascular Disease Father      Hyperlipidemia Father      Coronary Artery Disease Father      LUNG DISEASE Father      Scleroderma Paternal Uncle         Had scleroderma ILD     Other Cancer Paternal Grandmother         lung cancer     Substance Abuse Brother      Depression Brother      Asthma Brother      Diabetes Maternal Grandfather         adult onset     Hyperlipidemia Maternal Grandfather          SOCIAL HISTORY:  Social History     Tobacco Use     Smoking status: Current Every Day Smoker     Packs/day: 0.50     Years: 25.00     Pack years: 12.50     Types: Cigarettes     Start date: 12/3/1992     Last attempt to quit: 2016     Years since quittin.0     Smokeless tobacco: Former User     Quit date: 2016   Substance Use Topics     Alcohol use: No     Alcohol/week: 0.0 oz     Comment: 5 per month or less     Drug use: No         CURRENT MEDICATIONS:  Current Outpatient Medications   Medication Sig Dispense Refill     ALPRAZolam (XANAX) 0.5 MG tablet TAKE 1 TABLET BY MOUTH EVERY SIX HOURS AS NEEDED FOR ANXIETY. 40 tablet 0     digoxin (LANOXIN) 125 MCG tablet Take 1 tablet (125 mcg) by mouth daily 90 tablet 3     Esomeprazole Magnesium (NEXIUM PO) Take 40 mg by mouth every morning (before breakfast)       FLOVENT  MCG/ACT Inhaler INHALE 2 PUFFS INTO THE LUNGS TWO TIMES A DAY 12 g 3     fluticasone (FLONASE) 50 MCG/ACT spray USE 2 SPRAYS IN EACH NOSTRIL DAILY 16 g 11     furosemide (LASIX) 20 MG tablet TAKE TWO TABLETS BY MOUTH EVERY  tablet 3     morphine (MS CONTIN) 15 MG CR tablet Take 1 tablet (15 mg) by mouth 2 times daily 60 tablet 0     order for DME Oxygen: Patient  "requires supplemental Oxygen 4 LPM via nasal canula at rest and 6 LPM with activity. Please provide patient with portability capability. Okay to spot check patient on oxygen device, to keep sats above 90%. Please provider with home concentrator that can go up to 10 LPM. Oxygen will be for a lifetime. 1 Device 0     order for DME Please provide patient with 2  liquid oxygen tanks for portability. Spot check patient on liquid oxygen. Titrate oxygen to maintain saturations above 88%. 2 Device 0     oxyCODONE-acetaminophen (PERCOCET)  MG per tablet Take 1 tablet by mouth every 6 hours as needed for moderate to severe pain 60 tablet 0     tadalafil, PAH, (ADCIRCA) 20 MG TABS Take 2 tablets (40 mg) by mouth daily 60 tablet 11     VENTOLIN  (90 BASE) MCG/ACT Inhaler INHALE ONE TO TWO PUFFS INTO THE LUNGS EVERY 4 HOURS AS NEEDED FOR SHORTNESS OF BREATH / DYSPNEA 18 g 9     albuterol (2.5 MG/3ML) 0.083% nebulizer solution Use every 4-6 hours as needed for shortness of breath 60 vial 3     azithromycin (ZITHROMAX) 250 MG tablet Two tablets first day, then one tablet daily for four days. (Patient not taking: Reported on 12/17/2018) 6 tablet 0         ROS:   10 point ROS of systems including Constitutional, Eyes, Respiratory, Cardiovascular, Gastroenterology, Genitourinary, Integumentary, Muscularskeletal, Psychiatric were all negative except for pertinent positives noted in my HPI.    EXAM:  /84 (BP Location: Left arm, Patient Position: Chair, Cuff Size: Adult Regular)   Pulse 76   Ht 1.575 m (5' 2\")   Wt 51.6 kg (113 lb 11.2 oz)   SpO2 97%   BMI 20.80 kg/m    General: appears comfortable, alert and articulate  Head: normocephalic, atraumatic  Eyes: anicteric sclera, EOMI  Neck: no adenopathy  Orophyarynx: moist mucosa, no lesions, dentition intact  Heart: regular, S1/S2, no murmur, gallop, rub, estimated JVP non elevated  Lungs: clear, no rales or wheezing  Abdomen: soft, non-tender, bowel sounds " present, no hepatosplenomegaly  Extremities: no clubbing, cyanosis or edema  Neurological: normal speech and affect, no gross motor deficits      Weight  Wt Readings from Last 10 Encounters:   12/17/18 51.6 kg (113 lb 11.2 oz)   09/10/18 48.9 kg (107 lb 14.4 oz)   07/06/18 49 kg (108 lb)   05/16/18 49.2 kg (108 lb 8 oz)   01/30/18 52.1 kg (114 lb 12.8 oz)   01/18/18 53.1 kg (117 lb)   01/02/18 53.1 kg (117 lb)   12/21/17 52.6 kg (116 lb)   12/19/17 51.7 kg (114 lb)   11/30/17 51.4 kg (113 lb 6.4 oz)         Labs:    CBC RESULTS:  Lab Results   Component Value Date    WBC 8.6 12/17/2018    RBC 4.44 12/17/2018    HGB 11.8 12/17/2018    HCT 38.1 12/17/2018    MCV 86 12/17/2018    MCH 26.6 12/17/2018    MCHC 31.0 (L) 12/17/2018    RDW 15.8 (H) 12/17/2018     12/17/2018       CMP RESULTS:  Lab Results   Component Value Date     12/17/2018    POTASSIUM 3.6 12/17/2018    CHLORIDE 104 12/17/2018    CO2 29 12/17/2018    ANIONGAP 7 12/17/2018    GLC 95 12/17/2018    BUN 14 12/17/2018    CR 0.75 12/17/2018    GFRESTIMATED 85 12/17/2018    GFRESTBLACK >90 12/17/2018    MARCIN 8.7 12/17/2018    BILITOTAL 0.2 12/17/2018    ALBUMIN 3.5 12/17/2018    ALKPHOS 67 12/17/2018    ALT 14 12/17/2018    AST 17 12/17/2018        INR RESULTS:  Lab Results   Component Value Date    INR 1.04 01/23/2015       BNP RESULTS:  Lab Results   Component Value Date    NTBNPI 4,168 (H) 11/30/2016     No results found for: BNP      Recent Tests:      Echocardiogram (9/28/2018):  Interpretation Summary  Left ventricular function, chamber size, wall motion, and wall thickness are  normal.The EF is 60-65%.  Left ventricular diastolic function is normal.  Right ventricular size is decreased compared to 4/12/17.  Pulmonary artery systolic pressure cannot be assessed.  Pulmonary acceleration time (PAT) is 77 ms, which is abnormal (<130 ms)  suggesting increased pulmonary vascular resistance.     Left Ventricle  Left ventricular function, chamber  size, wall motion, and wall thickness are  normal.The EF is 60-65%. Left ventricular diastolic function is normal.     Right Ventricle  The right ventricle is normal size. Global right ventricular function is  normal. There is moderate right ventricular hypertrophy.     Atria  Both atria appear normal.        Mitral Valve  The mitral valve is normal.     Aortic Valve  Aortic valve is normal in structure and function.     Tricuspid Valve  Trace to mild tricuspid insufficiency is present. The peak velocity of the  tricuspid regurgitant jet is not obtainable. Pulmonary artery systolic  pressure cannot be assessed.     Pulmonic Valve  The pulmonic valve is normal. Pulmonary acceleration time (PAT) is 77 ms,  which is abnormal (<130 ms) suggesting increased pulmonary vascular  resistance.     Vessels  The aorta root is normal. The inferior vena cava was normal in size with  preserved respiratory variability.     Pericardium  No pericardial effusion is present.        Compared to Previous Study  Right ventricular size is decreased compared to 4/12/17.      RHC (4/26/2017):          PFT (9/28/2018):   PFT Latest Ref Rng & Units 9/28/2018   FVC L 2.76   FEV1 L 2.26   FVC% % 82   FEV1% % 81         Assessment and Plan:   Ms. Ellen Loya is a 43 year old female with pulmonary hypertension and ILD.  WHO Group III, NYHA Functional Class III.    #PH Plan:  -No medication changes today. 6 MWT was improved and no new symptomatic changes. VS and weights stable, labs improved (BNP).  -Return to clinic in three months with repeat 6 minute walk test, labs, right heart catheterization, and follow up with Dr. Ledesma.    It was a pleasure seeing Ms. Ellen Loya at the [facility] Pulmonary Hypertension Clinic.       Sincerely,  Gayatri Altamirano, APRN, CNP.

## 2018-12-19 DIAGNOSIS — G47.9 DISTURBANCE IN SLEEP BEHAVIOR: ICD-10-CM

## 2018-12-20 RX ORDER — ALPRAZOLAM 0.5 MG
TABLET ORAL
Qty: 40 TABLET | Refills: 0 | Status: SHIPPED | OUTPATIENT
Start: 2018-12-21 | End: 2018-12-29

## 2018-12-20 NOTE — TELEPHONE ENCOUNTER
Xanax       Last Written Prescription Date:  12/11/2018  Last Fill Quantity: 40,   # refills: 0  Last Office Visit: 5/16/2018  Future Office visit:       Routing refill request to provider for review/approval because:  Drug not on the [facility], P or [facility] refill protocol or controlled substance

## 2018-12-24 DIAGNOSIS — R07.81 PLEURITIC CHEST PAIN: ICD-10-CM

## 2018-12-27 RX ORDER — OXYCODONE AND ACETAMINOPHEN 10; 325 MG/1; MG/1
1 TABLET ORAL EVERY 6 HOURS PRN
Qty: 60 TABLET | Refills: 0 | Status: SHIPPED | OUTPATIENT
Start: 2018-12-27 | End: 2019-01-09

## 2018-12-28 LAB — FIO2-PRE: 52 %

## 2018-12-29 DIAGNOSIS — G47.9 DISTURBANCE IN SLEEP BEHAVIOR: ICD-10-CM

## 2018-12-31 RX ORDER — ALPRAZOLAM 0.5 MG
TABLET ORAL
Qty: 40 TABLET | Refills: 0 | Status: SHIPPED | OUTPATIENT
Start: 2018-12-31 | End: 2019-01-09

## 2018-12-31 NOTE — TELEPHONE ENCOUNTER
Ellen calls, reports she is out of medication.  Chito schuster a team member will approve her Xanax today.  Please advise.

## 2019-01-05 DIAGNOSIS — J84.9 ILD (INTERSTITIAL LUNG DISEASE) (H): ICD-10-CM

## 2019-01-07 DIAGNOSIS — R07.81 PLEURITIC CHEST PAIN: ICD-10-CM

## 2019-01-07 NOTE — TELEPHONE ENCOUNTER
Percocet  MG       Last Written Prescription Date:  12/27/18  Last Fill Quantity: 60,   # refills: 0  Last Office Visit: 5/16/18/  Future Office visit:       Routing refill request to provider for review/approval because:  Drug not on the [facility], [facility] or [facility] refill protocol or controlled substance

## 2019-01-09 DIAGNOSIS — G47.9 DISTURBANCE IN SLEEP BEHAVIOR: ICD-10-CM

## 2019-01-09 RX ORDER — ALPRAZOLAM 0.5 MG
TABLET ORAL
Qty: 40 TABLET | Refills: 0 | Status: SHIPPED | OUTPATIENT
Start: 2019-01-09 | End: 2019-01-17

## 2019-01-09 RX ORDER — OXYCODONE AND ACETAMINOPHEN 10; 325 MG/1; MG/1
1 TABLET ORAL EVERY 6 HOURS PRN
Qty: 60 TABLET | Refills: 0 | Status: SHIPPED | OUTPATIENT
Start: 2019-01-09 | End: 2019-01-21

## 2019-01-14 DIAGNOSIS — M19.90 INFLAMMATORY ARTHRITIS: ICD-10-CM

## 2019-01-14 DIAGNOSIS — I27.20 PULMONARY HYPERTENSION (H): Primary | ICD-10-CM

## 2019-01-14 RX ORDER — MORPHINE SULFATE 15 MG/1
15 TABLET, FILM COATED, EXTENDED RELEASE ORAL 2 TIMES DAILY
Qty: 60 TABLET | Refills: 0 | Status: SHIPPED | OUTPATIENT
Start: 2019-01-14 | End: 2019-02-11

## 2019-01-14 NOTE — TELEPHONE ENCOUNTER
[facility] Call Center    Phone Message    May a detailed message be left on voicemail: yes    Reason for Call: Other: Pt calling to let Dr. Ledesma know that the pharmacy just needs a verbal for her medication. Please give the pharmacy a call to approve.      Action Taken: Message routed to:  [facility] ([facility] Cardiology

## 2019-01-14 NOTE — TELEPHONE ENCOUNTER
Morphine 15 MG       Last Written Prescription Date:  12/17/18  Last Fill Quantity: 60,   # refills: 0  Last Office Visit: 5/16/18  Future Office visit:       Routing refill request to provider for review/approval because:  Drug not on the [facility], [facility] or [facility] refill protocol or controlled substance

## 2019-01-14 NOTE — TELEPHONE ENCOUNTER
[facility] Call Center    Phone Message    May a detailed message be left on voicemail: yes    Reason for Call: Other: [facility] called to follow up on the status of this refill request. They are needing to fill the Tadalafil today.     Action Taken: Message routed to:  [facility] Cardiology

## 2019-01-15 ENCOUNTER — TELEPHONE (OUTPATIENT)
Dept: CARDIOLOGY | Facility: CLINIC | Age: 44
End: 2019-01-15

## 2019-01-15 RX ORDER — TADALAFIL 20 MG/1
40 TABLET ORAL DAILY
Qty: 60 TABLET | Refills: 10 | Status: SHIPPED | OUTPATIENT
Start: 2019-01-15 | End: 2019-12-09

## 2019-01-15 NOTE — TELEPHONE ENCOUNTER
Medication Refill double check:    Last office visit was on 12/17/19 with Gayatri Altamirano CNP.    Follow up was recommended for 3 months.    Any additional encounters with changes to requested med? no    Authorizing provider is: Dr. Callie Norwood was approved.     Additional orders/notes:       Jada Mcallister RN on 1/15/2019 at 9:53 AM

## 2019-01-15 NOTE — TELEPHONE ENCOUNTER
PA Initiation    Medication: tadalafil  Insurance Company: [employer] - Phone [phone] Fax [phone]  Pharmacy Filling the Rx: [facility] - [place], [place] - [facility] [address]  Filling Pharmacy Phone: [phone]  Filling Pharmacy Fax: [phone]  Start Date: 1/15/2019    [facility] Authorization Team   Phone: [phone]  Fax: [phone]     Lower abdominal pain

## 2019-01-16 RX ORDER — FLUTICASONE PROPIONATE 220 UG/1
AEROSOL, METERED RESPIRATORY (INHALATION)
Qty: 12 G | Refills: 3 | Status: SHIPPED | OUTPATIENT
Start: 2019-01-16 | End: 2019-10-17

## 2019-01-16 NOTE — TELEPHONE ENCOUNTER
Per [facility]  Damaris    Documentation (chart notes) are required indicating that therapy has been effective with use of this medication.  They should be faxed to: [phone]    If questions arise, call back # is [phone]

## 2019-01-17 DIAGNOSIS — G47.9 DISTURBANCE IN SLEEP BEHAVIOR: ICD-10-CM

## 2019-01-17 NOTE — TELEPHONE ENCOUNTER
Prior Authorization Approval    Authorization Effective Date:    Authorization Expiration Date:    Medication: tadalafil  Approved Dose/Quantity: 90  Reference #:     Insurance Company: [employer] [facility] - Phone [phone] Fax [phone]  Expected CoPay:       CoPay Card Available:      Foundation Assistance Needed:    Which Pharmacy is filling the prescription (Not needed for infusion/clinic administered): [facility] - [place] [address]  Pharmacy Notified: Yes  Patient Notified: Tommie      [facility] Prior Authorization Team   Phone: [phone]  Fax: [phone]

## 2019-01-18 RX ORDER — ALPRAZOLAM 0.5 MG
TABLET ORAL
Qty: 40 TABLET | Refills: 0 | Status: SHIPPED | OUTPATIENT
Start: 2019-01-18 | End: 2019-01-26

## 2019-01-26 ENCOUNTER — TELEPHONE (OUTPATIENT)
Dept: FAMILY MEDICINE | Facility: CLINIC | Age: 44
End: 2019-01-26

## 2019-01-26 DIAGNOSIS — G47.9 DISTURBANCE IN SLEEP BEHAVIOR: ICD-10-CM

## 2019-01-28 RX ORDER — ALPRAZOLAM 0.5 MG
TABLET ORAL
Qty: 40 TABLET | Refills: 0 | Status: SHIPPED | OUTPATIENT
Start: 2019-01-28 | End: 2019-02-06

## 2019-01-28 NOTE — TELEPHONE ENCOUNTER
Rx was placed at the [facility]. Pt needs to contact the pharmacy for the status of refill.

## 2019-01-28 NOTE — TELEPHONE ENCOUNTER
Patient calling to check on the status of this refill. Patient states she will be out and would like to get this today due to the weather tomorrow.

## 2019-01-28 NOTE — TELEPHONE ENCOUNTER
alprazolam  Last Written Prescription Date:  1/18/2019  Last Fill Quantity: 40,  # refills: 0   Last office visit: 5/16/2018 with prescribing provider:      Future Office Visit:      Requested Prescriptions   Pending Prescriptions Disp Refills     ALPRAZolam (XANAX) 0.5 MG tablet 40 tablet 0     Sig: TAKE 1 TABLET BY MOUTH EVERY SIX HOURS AS NEEDED FOR ANXIETY.    There is no refill protocol information for this order          Routing refill request to provider for review/approval because:  Drug not on the [facility] refill protocol           Tracee Loya RN on 1/28/2019 at 9:14 AM

## 2019-02-04 DIAGNOSIS — R07.81 PLEURITIC CHEST PAIN: ICD-10-CM

## 2019-02-04 DIAGNOSIS — I27.20 PULMONARY HYPERTENSION (H): ICD-10-CM

## 2019-02-05 DIAGNOSIS — G47.9 DISTURBANCE IN SLEEP BEHAVIOR: ICD-10-CM

## 2019-02-06 RX ORDER — OXYCODONE AND ACETAMINOPHEN 10; 325 MG/1; MG/1
1 TABLET ORAL EVERY 6 HOURS PRN
Qty: 60 TABLET | Refills: 0 | Status: SHIPPED | OUTPATIENT
Start: 2019-02-06 | End: 2019-02-18

## 2019-02-06 RX ORDER — ALPRAZOLAM 0.5 MG
TABLET ORAL
Qty: 40 TABLET | Refills: 0 | Status: SHIPPED | OUTPATIENT
Start: 2019-02-06 | End: 2019-02-13

## 2019-02-08 RX ORDER — FUROSEMIDE 20 MG
40 TABLET ORAL DAILY
Qty: 180 TABLET | Refills: 3 | Status: SHIPPED | OUTPATIENT
Start: 2019-02-08 | End: 2020-03-16

## 2019-02-11 DIAGNOSIS — M19.90 INFLAMMATORY ARTHRITIS: ICD-10-CM

## 2019-02-12 RX ORDER — MORPHINE SULFATE 15 MG/1
15 TABLET, FILM COATED, EXTENDED RELEASE ORAL 2 TIMES DAILY
Qty: 60 TABLET | Refills: 0 | Status: SHIPPED | OUTPATIENT
Start: 2019-02-12 | End: 2019-03-13

## 2019-02-12 NOTE — TELEPHONE ENCOUNTER
morphine  Last Written Prescription Date:  1/14/2019  Last Fill Quantity: 60,  # refills: 0   Last office visit: 5/16/2018 with prescribing provider:      Future Office Visit:      Requested Prescriptions   Pending Prescriptions Disp Refills     morphine (MS CONTIN) 15 MG CR tablet 60 tablet 0     Sig: Take 1 tablet (15 mg) by mouth 2 times daily    There is no refill protocol information for this order          Routing refill request to provider for review/approval because:  Drug not on the [facility] refill protocol           Tracee Loya RN on 2/12/2019 at 3:01 PM

## 2019-02-13 DIAGNOSIS — G47.9 DISTURBANCE IN SLEEP BEHAVIOR: ICD-10-CM

## 2019-02-14 NOTE — TELEPHONE ENCOUNTER
Requested Prescriptions   Pending Prescriptions Disp Refills     ALPRAZolam (XANAX) 0.5 MG tablet 40 tablet 0     Sig: TAKE 1 TABLET BY MOUTH EVERY SIX HOURS AS NEEDED FOR ANXIETY.    There is no refill protocol information for this order        Last Written Prescription Date:  2/6/19  Last Fill Quantity: 40,  # refills: 0   Last office visit: 5/16/2018 with prescribing provider:     Future Office Visit:      Routing refill request to provider for review/approval because:  Drug not on the [facility] refill protocol   ILIR MerinoN, RN

## 2019-02-15 RX ORDER — ALPRAZOLAM 0.5 MG
TABLET ORAL
Qty: 40 TABLET | Refills: 0 | Status: SHIPPED | OUTPATIENT
Start: 2019-02-15 | End: 2019-02-23

## 2019-02-18 NOTE — TELEPHONE ENCOUNTER
Signed original RX delivered to [facility]. Mariah,

## 2019-02-23 DIAGNOSIS — G47.9 DISTURBANCE IN SLEEP BEHAVIOR: ICD-10-CM

## 2019-02-25 RX ORDER — ALPRAZOLAM 0.5 MG
TABLET ORAL
Qty: 40 TABLET | Refills: 0 | Status: SHIPPED | OUTPATIENT
Start: 2019-02-25 | End: 2019-03-06

## 2019-02-25 NOTE — TELEPHONE ENCOUNTER
Requested Prescriptions   Pending Prescriptions Disp Refills     ALPRAZolam (XANAX) 0.5 MG tablet 40 tablet 0     Sig: TAKE 1 TABLET BY MOUTH EVERY SIX HOURS AS NEEDED FOR ANXIETY.    There is no refill protocol information for this order        Last Written Prescription Date:  2/15/19  Last Fill Quantity: 40,  # refills: 0   Last office visit: 5/16/2018 with prescribing provider:     Future Office Visit:      Routing refill request to provider for review/approval because:  Drug not on the [facility] refill protocol     Olimpia Oakley RN

## 2019-03-04 ENCOUNTER — TELEPHONE (OUTPATIENT)
Dept: FAMILY MEDICINE | Facility: CLINIC | Age: 44
End: 2019-03-04

## 2019-03-04 DIAGNOSIS — R07.81 PLEURITIC CHEST PAIN: ICD-10-CM

## 2019-03-04 NOTE — TELEPHONE ENCOUNTER
Reason for Call:  Other prescription    Detailed comments: Received an anonymous call that pt has been selling her prescription medications.     Phone Number Patient can be reached at:     Best Time:     Can we leave a detailed message on this number? YES    Call taken on 3/4/2019 at 12:21 PM by Gardenia Mccurdy

## 2019-03-04 NOTE — TELEPHONE ENCOUNTER
Huddled with Dr. Loya patient needs to be seen before gets any prescriptions filled.  pharmacy is aware of this and they have documented on their end so patient will be aware.  Sravani Ramos MA

## 2019-03-05 NOTE — TELEPHONE ENCOUNTER
Percocet  MG       Last Written Prescription Date:  2/20/19  Last Fill Quantity: 60,   # refills: 0  Last Office Visit: 5/16/18  Future Office visit:    Next 5 appointments (look out 90 days)    Mar 13, 2019 10:40 AM CDT  Office Visit with Ignacio Loya MD  [facility] ([facility]) [address] [place] [id number]  [phone]           Routing refill request to provider for review/approval because:  Drug not on the [facility], [facility] or  [facility] refill protocol or controlled substance

## 2019-03-06 DIAGNOSIS — G47.9 DISTURBANCE IN SLEEP BEHAVIOR: ICD-10-CM

## 2019-03-06 RX ORDER — ALPRAZOLAM 0.5 MG
TABLET ORAL
Qty: 40 TABLET | Refills: 0 | Status: SHIPPED | OUTPATIENT
Start: 2019-03-06 | End: 2019-03-18

## 2019-03-06 RX ORDER — OXYCODONE AND ACETAMINOPHEN 10; 325 MG/1; MG/1
1 TABLET ORAL EVERY 6 HOURS PRN
Qty: 60 TABLET | Refills: 0 | Status: SHIPPED | OUTPATIENT
Start: 2019-03-06 | End: 2019-03-20

## 2019-03-06 NOTE — TELEPHONE ENCOUNTER
Reason for Call:  Medication or medication refill:    Do you use a [facility]?  Name of the pharmacy and phone number for the current request:  [facility]- [phone]    Name of the medication requested: Alprazolam     Other request: Pt will be out tomorrow. States she called this refill in to the pharmacy on Monday.     Can we leave a detailed message on this number? YES    Phone number patient can be reached at: Home number on file [phone] (home)    Best Time: any     Call taken on 3/6/2019 at 2:49 PM by Sandra Leary

## 2019-03-08 RX ORDER — ALPRAZOLAM 0.5 MG
TABLET ORAL
Qty: 40 TABLET | Refills: 0 | OUTPATIENT
Start: 2019-03-08

## 2019-03-08 NOTE — TELEPHONE ENCOUNTER
Requested Prescriptions   Pending Prescriptions Disp Refills     ALPRAZolam (XANAX) 0.5 MG tablet 40 tablet 0     Sig: TAKE 1 TABLET BY MOUTH EVERY SIX HOURS AS NEEDED FOR ANXIETY.    There is no refill protocol information for this order        Last Written Prescription Date:  3/6/19  Last Fill Quantity: 40,  # refills: 0   Last office visit: 5/16/2018 with prescribing provider:     Future Office Visit:   Next 5 appointments (look out 90 days)    Mar 13, 2019 10:40 AM CDT  [facility] Visit with [facility] [address] [place] [id number] [phone]      DENIED  DUPLICATE    Olimpia Oakley RN

## 2019-03-11 ENCOUNTER — OFFICE VISIT (OUTPATIENT)
Dept: CARDIOLOGY | Facility: CLINIC | Age: 44
End: 2019-03-11
Attending: INTERNAL MEDICINE
Payer: COMMERCIAL

## 2019-03-11 VITALS
OXYGEN SATURATION: 100 % | HEART RATE: 70 BPM | BODY MASS INDEX: 21.51 KG/M2 | WEIGHT: 116.9 LBS | DIASTOLIC BLOOD PRESSURE: 82 MMHG | HEIGHT: 62 IN | SYSTOLIC BLOOD PRESSURE: 120 MMHG

## 2019-03-11 DIAGNOSIS — I27.0 PRIMARY PULMONARY HYPERTENSION (H): ICD-10-CM

## 2019-03-11 DIAGNOSIS — I27.20 PULMONARY HYPERTENSION (H): Primary | ICD-10-CM

## 2019-03-11 DIAGNOSIS — E87.6 HYPOKALEMIA: ICD-10-CM

## 2019-03-11 DIAGNOSIS — I27.0 PRIMARY PULMONARY HYPERTENSION (H): Primary | ICD-10-CM

## 2019-03-11 DIAGNOSIS — R06.02 SOB (SHORTNESS OF BREATH): ICD-10-CM

## 2019-03-11 LAB
6 MIN WALK (FT): 1103 FT
6 MIN WALK (M): 336 M
ALBUMIN SERPL-MCNC: 3.3 G/DL (ref 3.4–5)
ALP SERPL-CCNC: 65 U/L (ref 40–150)
ALT SERPL W P-5'-P-CCNC: 12 U/L (ref 0–50)
ANION GAP SERPL CALCULATED.3IONS-SCNC: 5 MMOL/L (ref 3–14)
AST SERPL W P-5'-P-CCNC: 14 U/L (ref 0–45)
BILIRUB SERPL-MCNC: 0.2 MG/DL (ref 0.2–1.3)
BUN SERPL-MCNC: 11 MG/DL (ref 7–30)
CALCIUM SERPL-MCNC: 8.3 MG/DL (ref 8.5–10.1)
CHLORIDE SERPL-SCNC: 103 MMOL/L (ref 94–109)
CO2 SERPL-SCNC: 30 MMOL/L (ref 20–32)
CREAT SERPL-MCNC: 0.77 MG/DL (ref 0.52–1.04)
ERYTHROCYTE [DISTWIDTH] IN BLOOD BY AUTOMATED COUNT: 15.3 % (ref 10–15)
GFR SERPL CREATININE-BSD FRML MDRD: >90 ML/MIN/{1.73_M2}
GLUCOSE SERPL-MCNC: 71 MG/DL (ref 70–99)
HCT VFR BLD AUTO: 36.1 % (ref 35–47)
HGB BLD-MCNC: 11 G/DL (ref 11.7–15.7)
MCH RBC QN AUTO: 26.8 PG (ref 26.5–33)
MCHC RBC AUTO-ENTMCNC: 30.5 G/DL (ref 31.5–36.5)
MCV RBC AUTO: 88 FL (ref 78–100)
NT-PROBNP SERPL-MCNC: 158 PG/ML (ref 0–125)
PLATELET # BLD AUTO: 363 10E9/L (ref 150–450)
POTASSIUM SERPL-SCNC: 3 MMOL/L (ref 3.4–5.3)
PROT SERPL-MCNC: 7.6 G/DL (ref 6.8–8.8)
RBC # BLD AUTO: 4.11 10E12/L (ref 3.8–5.2)
SODIUM SERPL-SCNC: 137 MMOL/L (ref 133–144)
WBC # BLD AUTO: 8.2 10E9/L (ref 4–11)

## 2019-03-11 PROCEDURE — 83880 ASSAY OF NATRIURETIC PEPTIDE: CPT | Performed by: INTERNAL MEDICINE

## 2019-03-11 PROCEDURE — 36415 COLL VENOUS BLD VENIPUNCTURE: CPT | Performed by: INTERNAL MEDICINE

## 2019-03-11 PROCEDURE — G0463 HOSPITAL OUTPT CLINIC VISIT: HCPCS | Mod: ZF

## 2019-03-11 PROCEDURE — 80053 COMPREHEN METABOLIC PANEL: CPT | Performed by: INTERNAL MEDICINE

## 2019-03-11 PROCEDURE — 99215 OFFICE O/P EST HI 40 MIN: CPT | Mod: ZP | Performed by: INTERNAL MEDICINE

## 2019-03-11 PROCEDURE — 85027 COMPLETE CBC AUTOMATED: CPT | Performed by: INTERNAL MEDICINE

## 2019-03-11 RX ORDER — LIDOCAINE 40 MG/G
CREAM TOPICAL
Status: CANCELLED | OUTPATIENT
Start: 2019-03-11

## 2019-03-11 ASSESSMENT — MIFFLIN-ST. JEOR: SCORE: 1130.56

## 2019-03-11 ASSESSMENT — PAIN SCALES - GENERAL: PAINLEVEL: NO PAIN (0)

## 2019-03-11 NOTE — PROGRESS NOTES
Service Date: 2019      Ignacio Loya MD   [address] [address]      RE: Ellen Loya   MRN: [id number]   : 1975      Dear Dr. Loya:      We had the pleasure of seeing Ms. Ellen Loya in our Pulmonary Hypertension Clinic.  As you know, she is a very pleasant 43-year-old female with polymyositis, interstitial lung disease and pulmonary arterial hypertension.  She is currently on Adcirca.  She did not tolerate Tyvaso due to worsening V/Q mismatch.  She returns today for followup.      Ms. Loya is overall doing okay.  She has not noticed any worsening exertional shortness of breath.  She can do her activities of daily living without any limitation.  She can walk on a flat surface for a long distance.  She is still having difficulty climbing stairs and overexerting.  I would currently characterize her as NYHA functional class II.  She has not had any worsening lower extremity swelling or abdominal distention.  She has not had any lightheadedness, dizziness or syncope.  She uses 2-3 liters at rest and 6-8 liters with exertion.  She states that if she does not use her oxygen with exertion, her lips would turn blue.  No PND or orthopnea.  No lower extremity swelling or abdominal distention.  She has not had any hospitalizations or ER visit.      She forgets to take her Adcirca a couple of times a month.      She has not been seen in our ILD clinic.  We recommended her to follow up with our ILD clinic last summer.  However, she has not made an appointment with them.  She has an appointment to see Dr. Caleb Arteaga locally at [facility] in the next couple of weeks.      REVIEW OF SYSTEMS:  A detailed 10-point review of systems was obtained as described in the History of Present Illness.  All other systems reviewed and are negative.      PAST MEDICAL HISTORY:   1.  Polymyositis and dermatomyositis.   2.  Interstitial lung  disease.   3.  Pulmonary arterial hypertension.   4.  Arthritis.      MEDICATIONS:    Current Outpatient Medications   Medication Sig     ALPRAZolam (XANAX) 0.5 MG tablet TAKE 1 TABLET BY MOUTH EVERY SIX HOURS AS NEEDED FOR ANXIETY.     digoxin (LANOXIN) 125 MCG tablet Take 1 tablet (125 mcg) by mouth daily     Esomeprazole Magnesium (NEXIUM PO) Take 40 mg by mouth every morning (before breakfast)     FLOVENT  MCG/ACT inhaler INHALE 2 PUFFS INTO THE LUNGS TWO TIMES A DAY     fluticasone (FLONASE) 50 MCG/ACT spray USE 2 SPRAYS IN EACH NOSTRIL DAILY     furosemide (LASIX) 20 MG tablet Take 2 tablets (40 mg) by mouth daily     morphine (MS CONTIN) 15 MG CR tablet Take 1 tablet (15 mg) by mouth 2 times daily     order for DME Oxygen: Patient requires supplemental Oxygen 4 LPM via nasal canula at rest and 6 LPM with activity. Please provide patient with portability capability. Okay to spot check patient on oxygen device, to keep sats above 90%. Please provider with home concentrator that can go up to 10 LPM. Oxygen will be for a lifetime.     order for DME Please provide patient with 2  liquid oxygen tanks for portability. Spot check patient on liquid oxygen. Titrate oxygen to maintain saturations above 88%.     oxyCODONE-acetaminophen (PERCOCET)  MG per tablet Take 1 tablet by mouth every 6 hours as needed for moderate to severe pain     tadalafil, PAH, (ADCIRCA) 20 MG TABS Take 2 tablets (40 mg) by mouth daily     VENTOLIN  (90 BASE) MCG/ACT Inhaler INHALE ONE TO TWO PUFFS INTO THE LUNGS EVERY 4 HOURS AS NEEDED FOR SHORTNESS OF BREATH / DYSPNEA     No current facility-administered medications for this visit.      REVIEW OF SYSTEMS:  A detailed 10-point review of systems was obtained as described in the History of Present Illness.  All other systems reviewed and are negative.      PHYSICAL EXAMINATION:  She was comfortable.  She was in no apparent distress.  Her blood pressure was 120/82.  Pulse rate  was 70, respiratory rate was 18.  She was saturating 100% on room air.  Her weight was stable at 116 pounds.  She had no pallor, cyanosis or jaundice.  Her neck exam revealed no jugular venous distention.  Her carotids were 2+ bilaterally.  Cardiac auscultation revealed normal S1, normal S2.  No murmur, rub or gallop.  Auscultation of the lungs revealed equal air entry on both sides.  Bilateral basal crepitations.  Her abdomen was soft with normal bowel sounds, no tenderness, no rigidity, no guarding.  She had no focal neurological deficit.  She had no lower extremity edema.      Her NT-proBNP was only mildly elevated at 158.      CBC RESULTS:   Recent Labs   Lab Test 03/11/19 0921   WBC 8.2   RBC 4.11   HGB 11.0*   HCT 36.1   MCV 88   MCH 26.8   MCHC 30.5*   RDW 15.3*        Recent Labs   Lab Test 03/11/19 0921 12/17/18  1155    139   POTASSIUM 3.0* 3.6   CHLORIDE 103 104   CO2 30 29   ANIONGAP 5 7   GLC 71 95   BUN 11 14   CR 0.77 0.75   MARCIN 8.3* 8.7     She had a 6-minute walk distance.  She walked only for 336 meters.  This is lower when compared to her previous 6-minute walk distance.  On 8 liters of oxygen, her lowest oxygen saturation during her 6-minute walk distance was 96%.      Her last echocardiogram was from 09/2018.  This showed normal right ventricular size and normal right ventricular function.  She had moderate right ventricular hypertrophy.  Her interatrial septum was not flattened.  Her LV function was normal.  She had only mild tricuspid regurgitation.  Her right ventricular systolic pressure could not be estimated.  She had no pericardial effusion.  Her IVC was normal in size and function.      She has not had a right heart catheterization in the last 2 years.  Her last right heart catheterization was 2 years ago.      Her most recent PFTs showed FEV1 of 81%, FVC of 82%, FEV1/FVC of 82% predicted and a TLC of 103% predicted and a DLCO of 27% predicted.      ASSESSMENT AND PLAN:       Ms. Ellen Loya is a very pleasant 43-year-old female with polymyositis, interstitial lung disease and pulmonary arterial hypertension, who is currently on Adcirca monotherapy.  She returns today for followup.  Overall, she is doing okay.  She has no evidence of decompensated right heart failure.  Her NT-proBNP is actually better.  However, her 6-minute walk distance is lower than before.  She does not have any significant desaturation during her 6-minute walk distance.      Since her 6-minute walk distance is lower, I have recommended her to have a repeat right heart catheterization as it has been more than 2 years.  We will also repeat an echocardiogram.  She has not had a CT scan of the chest in 2 years.  Her most recent PFTs showed significant improvement.  We will also repeat a high-resolution CT scan. We will decide on need for escalation of her PH therapy based on her repeat testing.     Her potassium level is low.  She is reluctant to take supplemental potassium.  I have encouraged her to increase potassium-rich diet.  I recommended her to take 1 banana a day and more avocados.  We will repeat chemistry in 2 weeks.  If her repeat potassium continues to be low, she may need supplemental potassium therapy. I did not change any therapy for her now.  She will continue on digoxin, Lasix, oxygen and Adcirca.     She has not seen anyone for her interstitial lung disease in a while.  I have encouraged her to follow up with our ILD Clinic.  She is scheduled to see Dr. Caleb Arteaga locally.  We will await his recommendations as well. She has also not seen our Rheumatology colleagues in a long time. I have encouraged her to follow with Dr. Martínez.       She will return to clinic in 3 months if her repeat right heart catheterization is ok. It was a pleasure meeting Ms. Ellen Loya in our Pulmonary Hypertension Clinic. We thank you for allowing us to participate in her care.      Sincerely,   Callie  MD Callie   [facility] for [facility]monary [facility]pertension  Heart Failure, Transplant, and Mechanical Circulatory Support Cardiology   Cardiovascular Division  [facility]   [phone]    Addendum:     Repeat echo shows normal RV size and function  Repeat hemodynamic assessment shows normal right sided filling pressures, borderline elevated PA pressure, and preserved cardiac output.   No need for further escalation of PH-specific therapies. Will continue Adcirca 40 mg daily.     Sincerely,  Callie Ledesma MD   [facility] for Pulmonary Hypertension  Heart Failure, Transplant, and Mechanical Circulatory Support Cardiology   Cardiovascular Division  [facility]   [phone]                 cc:   Clarita Martínez MD   [facility]      Caleb Arteaga MD   [facility]         CALLIE LEDESMA MD             D: 2019   T: 2019   MT: al      Name:     TYREL SQUIRES   MRN:      [id number]        Account:      [id number]   :      1975           Service Date: 2019      Document: [id number]

## 2019-03-11 NOTE — LETTER
3/11/2019      RE: Ellne Loya  [address]  [facility] [address]       Dear Colleague,    Thank you for the opportunity to participate in the care of your patient, Ellen Loya, at the [facility]. Please see a copy of my visit note below.    Service Date: 2019      Ignacio Loya MD   [facility]   [address] [place] [address]      RE: Ellen Loya   MRN: [id number]   : 1975      Dear Dr. Loya:      We had the pleasure of seeing Ms. Ellen Loya in our Pulmonary Hypertension Clinic.  As you know, she is a very pleasant 43-year-old female with polymyositis, interstitial lung disease and pulmonary arterial hypertension.  She is currently on Adcirca.  She did not tolerate Tyvaso due to worsening V/Q mismatch.  She returns today for followup.      Ms. Loya is overall doing okay.  She has not noticed any worsening exertional shortness of breath.  She can do her activities of daily living without any limitation.  She can walk on a flat surface for a long distance.  She is still having difficulty climbing stairs and overexerting.  I would currently characterize her as NYHA functional class II.  She has not had any worsening lower extremity swelling or abdominal distention.  She has not had any lightheadedness, dizziness or syncope.  She uses 2-3 liters at rest and 6-8 liters with exertion.  She states that if she does not use her oxygen with exertion, her lips would turn blue.  No PND or orthopnea.  No lower extremity swelling or abdominal distention.  She has not had any hospitalizations or ER visit.      She forgets to take her Adcirca a couple of times a month.      She has not been seen in our ILD clinic.  We recommended her to follow up with our ILD clinic last summer.  However, she has not made an appointment with them.  She has an appointment to see Dr. Caleb Arteaga locally at [facility] in the next couple of weeks.      REVIEW OF SYSTEMS:  A detailed 10-point review of systems was obtained as described in the History of Present Illness.  All other systems reviewed and are negative.      PAST MEDICAL HISTORY:   1.  Polymyositis and dermatomyositis.   2.  Interstitial lung disease.   3.  Pulmonary arterial hypertension.   4.  Arthritis.      MEDICATIONS:    Current Outpatient Medications   Medication Sig     ALPRAZolam (XANAX) 0.5 MG tablet TAKE 1 TABLET BY MOUTH EVERY SIX HOURS AS NEEDED FOR ANXIETY.     digoxin (LANOXIN) 125 MCG tablet Take 1 tablet (125 mcg) by mouth daily     Esomeprazole Magnesium (NEXIUM PO) Take 40 mg by mouth every morning (before breakfast)     FLOVENT  MCG/ACT inhaler INHALE 2 PUFFS INTO THE LUNGS TWO TIMES A DAY     fluticasone (FLONASE) 50 MCG/ACT spray USE 2 SPRAYS IN EACH NOSTRIL DAILY     furosemide (LASIX) 20 MG tablet Take 2 tablets (40 mg) by mouth daily     morphine (MS CONTIN) 15 MG CR tablet Take 1 tablet (15 mg) by mouth 2 times daily     order for DME Oxygen: Patient requires supplemental Oxygen 4 LPM via nasal canula at rest and 6 LPM with activity. Please provide patient with portability capability. Okay to spot check patient on oxygen device, to keep sats above 90%. Please provider with home concentrator that can go up to 10 LPM. Oxygen will be for a lifetime.     order for DME Please provide patient with 2  liquid oxygen tanks for portability. Spot check patient on liquid oxygen. Titrate oxygen to maintain saturations above 88%.     oxyCODONE-acetaminophen (PERCOCET)  MG per tablet Take 1 tablet by mouth every 6 hours as needed for moderate to severe pain     tadalafil, PAH, (ADCIRCA) 20 MG TABS Take 2 tablets (40 mg) by mouth daily     VENTOLIN  (90 BASE) MCG/ACT Inhaler INHALE ONE TO TWO PUFFS INTO THE LUNGS EVERY 4 HOURS AS NEEDED FOR SHORTNESS OF BREATH / DYSPNEA     No current facility-administered  medications for this visit.      REVIEW OF SYSTEMS:  A detailed 10-point review of systems was obtained as described in the History of Present Illness.  All other systems reviewed and are negative.      PHYSICAL EXAMINATION:  She was comfortable.  She was in no apparent distress.  Her blood pressure was 120/82.  Pulse rate was 70, respiratory rate was 18.  She was saturating 100% on room air.  Her weight was stable at 116 pounds.  She had no pallor, cyanosis or jaundice.  Her neck exam revealed no jugular venous distention.  Her carotids were 2+ bilaterally.  Cardiac auscultation revealed normal S1, normal S2.  No murmur, rub or gallop.  Auscultation of the lungs revealed equal air entry on both sides.  Bilateral basal crepitations.  Her abdomen was soft with normal bowel sounds, no tenderness, no rigidity, no guarding.  She had no focal neurological deficit.  She had no lower extremity edema.      Her NT-proBNP was only mildly elevated at 158.      CBC RESULTS:   Recent Labs   Lab Test 03/11/19  0921   WBC 8.2   RBC 4.11   HGB 11.0*   HCT 36.1   MCV 88   MCH 26.8   MCHC 30.5*   RDW 15.3*        Recent Labs   Lab Test 03/11/19  0921 12/17/18  1155    139   POTASSIUM 3.0* 3.6   CHLORIDE 103 104   CO2 30 29   ANIONGAP 5 7   GLC 71 95   BUN 11 14   CR 0.77 0.75   MARCIN 8.3* 8.7     She had a 6-minute walk distance.  She walked only for 336 meters.  This is lower when compared to her previous 6-minute walk distance.  On 8 liters of oxygen, her lowest oxygen saturation during her 6-minute walk distance was 96%.      Her last echocardiogram was from 09/2018.  This showed normal right ventricular size and normal right ventricular function.  She had moderate right ventricular hypertrophy.  Her interatrial septum was not flattened.  Her LV function was normal.  She had only mild tricuspid regurgitation.  Her right ventricular systolic pressure could not be estimated.  She had no pericardial effusion.  Her IVC was  normal in size and function.      She has not had a right heart catheterization in the last 2 years.  Her last right heart catheterization was 2 years ago.      Her most recent PFTs showed FEV1 of 81%, FVC of 82%, FEV1/FVC of 82% predicted and a TLC of 103% predicted and a DLCO of 27% predicted.      ASSESSMENT AND PLAN:      Ms. Ellen Loya is a very pleasant 43-year-old female with polymyositis, interstitial lung disease and pulmonary arterial hypertension, who is currently on Adcirca monotherapy.  She returns today for followup.  Overall, she is doing okay.  She has no evidence of decompensated right heart failure.  Her NT-proBNP is actually better.  However, her 6-minute walk distance is lower than before.  She does not have any significant desaturation during her 6-minute walk distance.      Since her 6-minute walk distance is lower, I have recommended her to have a repeat right heart catheterization as it has been more than 2 years.  We will also repeat an echocardiogram.  She has not had a CT scan of the chest in 2 years.  Her most recent PFTs showed significant improvement.  We will also repeat a high-resolution CT scan. We will decide on need for escalation of her PH therapy based on her repeat testing.     Her potassium level is low.  She is reluctant to take supplemental potassium.  I have encouraged her to increase potassium-rich diet.  I recommended her to take 1 banana a day and more avocados.  We will repeat chemistry in 2 weeks.  If her repeat potassium continues to be low, she may need supplemental potassium therapy. I did not change any therapy for her now.  She will continue on digoxin, Lasix, oxygen and Adcirca.     She has not seen anyone for her interstitial lung disease in a while.  I have encouraged her to follow up with our ILD Clinic.  She is scheduled to see Dr. Caleb Arteaga locally.  We will await his recommendations as well. She has also not seen our Rheumatology colleagues in a long  time. I have encouraged her to follow with Dr. Martínez.       She will return to clinic in 3 months if her repeat right heart catheterization is ok. It was a pleasure meeting Ms. Ellen Squires in our Pulmonary Hypertension Clinic. We thank you for allowing us to participate in her care.      Callie Ledesma MD       cc:   Clarita Martínez MD   [facility]      Caleb Arteaga MD   [facility]        D: 2019   T: 2019   MT: al      Name:     ELLEN SQUIRES   MRN:      [id number]        Account:      [id number]   :      1975           Service Date: 2019      Document: [id number]

## 2019-03-11 NOTE — NURSING NOTE
Procedures and/or Testing: Patient given instructions regarding  Echocardiogram,  Chest CT,. Discussed purpose, preparation, procedure and when to expect results reported back to the patient. Patient demonstrated understanding of this information and agreed to call with further questions or concerns.    Right Heart Catheterization: Patient was instructed regarding right heart catheterization, including discussion of the procedure, preparation, intra-procedural steps, and recovery at home. Patient demonstrated understanding of this information and agreed to call with further questions or concerns.    Med Reconcile: Reviewed and verified all current medications with the patient. The updated medication list was printed and given to the patient.    Labs: Patient was given results of the laboratory testing obtained today. Patient demonstrated understanding of this information and agreed to call with further questions or concerns.     Diet: Patient instructed regarding a heart healthy diet, including discussion of reduced fat and sodium intake. Patient demonstrated understanding of this information and agreed to call with further questions or concerns.    Return Appointment: Patient given instructions regarding scheduling next clinic visit. Patient demonstrated understanding of this information and agreed to call with further questions or concerns.    Patient stated she understood all health information given and agreed to call with further questions or concerns.    Medication Changes:   -No medication changes at this time. Please continue current medication regiment.    Patient Instructions:  1. Continue staying active and eat a heart healthy diet.    2. Please keep current list of medications with you at all times.    3. Remember to weigh yourself daily after voiding and before you consume any food or beverages and log the numbers.  If you have gained/lost 2 pounds overnight or 5 pounds in a week contact us immediately  for medication adjustments or further instructions.    4. **Please call us immediately if you have any syncope (fainting or passing out), chest pain, edema (swelling or weight gain), or decline in your functional status (general decline in how you are feeling).    Check-In  Time Check-In Location Estimated Length Procedure   Name   4/17 @  12:00pm   [facility]  waiting room 60-90 minutes Right Heart Catheterization**     Procedure Preparations & Instructions     This is an invasive procedure that DOES require preparation:    - Nothing to eat for 6 hours   - You may have clear liquids up until the time of your procedure  - A ride should be arranged for you in the instance you are unable to drive home, however you should be able to function as you normally would after the procedure       Check-In  Time Check-In Location Estimated Length Procedure   Name     4/17/19 @ 9:30am    20 minutes CT Scan**   Procedure Preparations & Instructions     This is a non-invasive procedure and does NOT require any preparation         Check-In  Time Check-In Location Estimated Length Procedure   Name   4/17/19 @ 10:30am      60 minutes Echocardiogram (Echo)**   Procedure Preparations & Instructions     This is a non-invasive procedure and does NOT require any preparation       Follow up Appointment Information:    -Consume foods high in Potassium (Greens, mushrooms, blackberries, Melons, peas, Bananas, etc)  -Have potassium re-checked week of 3/25/19.  Please make lab appt.  -follow up with Gayatri Altamirano CNP in 3 months with labs & 6mwt prior    Results:  Component      Latest Ref Rng & Units 3/11/2019   Sodium      133 - 144 mmol/L 137   Potassium      3.4 - 5.3 mmol/L 3.0 (L)   Chloride      94 - 109 mmol/L 103   Carbon Dioxide      20 - 32 mmol/L 30   Anion Gap      3 - 14 mmol/L 5   Glucose      70 - 99 mg/dL 71   Urea Nitrogen      7 - 30 mg/dL 11   Creatinine      0.52 - 1.04 mg/dL 0.77   GFR Estimate      >60 mL/min/1.73:m2 >90    GFR Estimate If Black      >60 mL/min/1.73:m2 >90   Calcium      8.5 - 10.1 mg/dL 8.3 (L)   Bilirubin Total      0.2 - 1.3 mg/dL 0.2   Albumin      3.4 - 5.0 g/dL 3.3 (L)   Protein Total      6.8 - 8.8 g/dL 7.6   Alkaline Phosphatase      40 - 150 U/L 65   ALT      0 - 50 U/L 12   AST      0 - 45 U/L 14   WBC      4.0 - 11.0 10e9/L 8.2   RBC Count      3.8 - 5.2 10e12/L 4.11   Hemoglobin      11.7 - 15.7 g/dL 11.0 (L)   Hematocrit      35.0 - 47.0 % 36.1   MCV      78 - 100 fl 88   MCH      26.5 - 33.0 pg 26.8   MCHC      31.5 - 36.5 g/dL 30.5 (L)   RDW      10.0 - 15.0 % 15.3 (H)   Platelet Count      150 - 450 10e9/L 363   N-Terminal Pro Bnp      0 - 125 pg/mL 158 (H)     **patient is supposed to have potassium re-checked in 2 weeks, and if low still she is willing to start a supplement. Jada Mcallister RN on 3/11/2019 at 11:36 AM

## 2019-03-11 NOTE — LETTER
3/11/2019      RE: Ellen Loya  [address]  [facility] [address]       Service Date: 2019      Ignacio Loya MD   [facility]   [address] [place] [address]      RE: Ellen Loya   MRN: [id number]   : 1975      Dear Dr. Loya:      We had the pleasure of seeing Ms. Ellen Loya in our Pulmonary Hypertension Clinic.  As you know, she is a very pleasant 43-year-old female with polymyositis, interstitial lung disease and pulmonary arterial hypertension.  She is currently on Adcirca.  She did not tolerate Tyvaso due to worsening V/Q mismatch.  She returns today for followup.      Ms. Loya is overall doing okay.  She has not noticed any worsening exertional shortness of breath.  She can do her activities of daily living without any limitation.  She can walk on a flat surface for a long distance.  She is still having difficulty climbing stairs and overexerting.  I would currently characterize her as NYHA functional class II.  She has not had any worsening lower extremity swelling or abdominal distention.  She has not had any lightheadedness, dizziness or syncope.  She uses 2-3 liters at rest and 6-8 liters with exertion.  She states that if she does not use her oxygen with exertion, her lips would turn blue.  No PND or orthopnea.  No lower extremity swelling or abdominal distention.  She has not had any hospitalizations or ER visit.      She forgets to take her Adcirca a couple of times a month.      She has not been seen in our ILD clinic.  We recommended her to follow up with our ILD clinic last summer.  However, she has not made an appointment with them.  She has an appointment to see Dr. Caleb Arteaga locally at [facility] in the next couple of weeks.      REVIEW OF SYSTEMS:  A detailed 10-point review of systems was obtained as described in the History of Present Illness.  All other systems reviewed and are negative.      PAST  MEDICAL HISTORY:   1.  Polymyositis and dermatomyositis.   2.  Interstitial lung disease.   3.  Pulmonary arterial hypertension.   4.  Arthritis.      MEDICATIONS:    Current Outpatient Medications   Medication Sig     ALPRAZolam (XANAX) 0.5 MG tablet TAKE 1 TABLET BY MOUTH EVERY SIX HOURS AS NEEDED FOR ANXIETY.     digoxin (LANOXIN) 125 MCG tablet Take 1 tablet (125 mcg) by mouth daily     Esomeprazole Magnesium (NEXIUM PO) Take 40 mg by mouth every morning (before breakfast)     FLOVENT  MCG/ACT inhaler INHALE 2 PUFFS INTO THE LUNGS TWO TIMES A DAY     fluticasone (FLONASE) 50 MCG/ACT spray USE 2 SPRAYS IN EACH NOSTRIL DAILY     furosemide (LASIX) 20 MG tablet Take 2 tablets (40 mg) by mouth daily     morphine (MS CONTIN) 15 MG CR tablet Take 1 tablet (15 mg) by mouth 2 times daily     order for DME Oxygen: Patient requires supplemental Oxygen 4 LPM via nasal canula at rest and 6 LPM with activity. Please provide patient with portability capability. Okay to spot check patient on oxygen device, to keep sats above 90%. Please provider with home concentrator that can go up to 10 LPM. Oxygen will be for a lifetime.     order for DME Please provide patient with 2  liquid oxygen tanks for portability. Spot check patient on liquid oxygen. Titrate oxygen to maintain saturations above 88%.     oxyCODONE-acetaminophen (PERCOCET)  MG per tablet Take 1 tablet by mouth every 6 hours as needed for moderate to severe pain     tadalafil, PAH, (ADCIRCA) 20 MG TABS Take 2 tablets (40 mg) by mouth daily     VENTOLIN  (90 BASE) MCG/ACT Inhaler INHALE ONE TO TWO PUFFS INTO THE LUNGS EVERY 4 HOURS AS NEEDED FOR SHORTNESS OF BREATH / DYSPNEA     No current facility-administered medications for this visit.      REVIEW OF SYSTEMS:  A detailed 10-point review of systems was obtained as described in the History of Present Illness.  All other systems reviewed and are negative.      PHYSICAL EXAMINATION:  She was  comfortable.  She was in no apparent distress.  Her blood pressure was 120/82.  Pulse rate was 70, respiratory rate was 18.  She was saturating 100% on room air.  Her weight was stable at 116 pounds.  She had no pallor, cyanosis or jaundice.  Her neck exam revealed no jugular venous distention.  Her carotids were 2+ bilaterally.  Cardiac auscultation revealed normal S1, normal S2.  No murmur, rub or gallop.  Auscultation of the lungs revealed equal air entry on both sides.  Bilateral basal crepitations.  Her abdomen was soft with normal bowel sounds, no tenderness, no rigidity, no guarding.  She had no focal neurological deficit.  She had no lower extremity edema.      Her NT-proBNP was only mildly elevated at 158.      CBC RESULTS:   Recent Labs   Lab Test 03/11/19 0921   WBC 8.2   RBC 4.11   HGB 11.0*   HCT 36.1   MCV 88   MCH 26.8   MCHC 30.5*   RDW 15.3*        Recent Labs   Lab Test 03/11/19 0921 12/17/18  1155    139   POTASSIUM 3.0* 3.6   CHLORIDE 103 104   CO2 30 29   ANIONGAP 5 7   GLC 71 95   BUN 11 14   CR 0.77 0.75   MARCIN 8.3* 8.7     She had a 6-minute walk distance.  She walked only for 336 meters.  This is lower when compared to her previous 6-minute walk distance.  On 8 liters of oxygen, her lowest oxygen saturation during her 6-minute walk distance was 96%.      Her last echocardiogram was from 09/2018.  This showed normal right ventricular size and normal right ventricular function.  She had moderate right ventricular hypertrophy.  Her interatrial septum was not flattened.  Her LV function was normal.  She had only mild tricuspid regurgitation.  Her right ventricular systolic pressure could not be estimated.  She had no pericardial effusion.  Her IVC was normal in size and function.      She has not had a right heart catheterization in the last 2 years.  Her last right heart catheterization was 2 years ago.      Her most recent PFTs showed FEV1 of 81%, FVC of 82%, FEV1/FVC of 82%  predicted and a TLC of 103% predicted and a DLCO of 27% predicted.      ASSESSMENT AND PLAN:      Ms. Ellen Loya is a very pleasant 43-year-old female with polymyositis, interstitial lung disease and pulmonary arterial hypertension, who is currently on Adcirca monotherapy.  She returns today for followup.  Overall, she is doing okay.  She has no evidence of decompensated right heart failure.  Her NT-proBNP is actually better.  However, her 6-minute walk distance is lower than before.  She does not have any significant desaturation during her 6-minute walk distance.      Since her 6-minute walk distance is lower, I have recommended her to have a repeat right heart catheterization as it has been more than 2 years.  We will also repeat an echocardiogram.  She has not had a CT scan of the chest in 2 years.  Her most recent PFTs showed significant improvement.  We will also repeat a high-resolution CT scan. We will decide on need for escalation of her PH therapy based on her repeat testing.     Her potassium level is low.  She is reluctant to take supplemental potassium.  I have encouraged her to increase potassium-rich diet.  I recommended her to take 1 banana a day and more avocados.  We will repeat chemistry in 2 weeks.  If her repeat potassium continues to be low, she may need supplemental potassium therapy. I did not change any therapy for her now.  She will continue on digoxin, Lasix, oxygen and Adcirca.     She has not seen anyone for her interstitial lung disease in a while.  I have encouraged her to follow up with our ILD Clinic.  She is scheduled to see Dr. Caleb Arteaga locally.  We will await his recommendations as well. She has also not seen our Rheumatology colleagues in a long time. I have encouraged her to follow with Dr. Martínez.       She will return to clinic in 3 months if her repeat right heart catheterization is ok. It was a pleasure meeting Ms. Ellen Loya in our Pulmonary Hypertension Clinic.  We thank you for allowing us to participate in her care.      Sincerely,   Clalie Ledesma MD   [facility]  Heart Failure, Transplant, and Mechanical Circulatory Support Cardiology   Cardiovascular Division  [facility]   [phone]         cc:   Clarita Martínez MD   [facility]      Caleb Arteaga MD   [facility]         CALLIE LEDESMA MD             D: 2019   T: 2019   MT: al      Name:     TYREL SQIURES   MRN:      [id number]        Account:      [id number]   :      1975           Service Date: 2019      Document: [id number]        Callie Ledesma MD

## 2019-03-11 NOTE — PATIENT INSTRUCTIONS
Medication Changes:   -No medication changes at this time. Please continue current medication regiment.    Patient Instructions:  1. Continue staying active and eat a heart healthy diet.    2. Please keep current list of medications with you at all times.    3. Remember to weigh yourself daily after voiding and before you consume any food or beverages and log the numbers.  If you have gained/lost 2 pounds overnight or 5 pounds in a week contact us immediately for medication adjustments or further instructions.    4. **Please call us immediately if you have any syncope (fainting or passing out), chest pain, edema (swelling or weight gain), or decline in your functional status (general decline in how you are feeling).    Check-In  Time Check-In Location Estimated Length Procedure   Name   4/17 @  12:00pm   [facility]  waiting room 60-90 minutes Right Heart Catheterization**     Procedure Preparations & Instructions     This is an invasive procedure that DOES require preparation:    - Nothing to eat for 6 hours   - You may have clear liquids up until the time of your procedure  - A ride should be arranged for you in the instance you are unable to drive home, however you should be able to function as you normally would after the procedure       Check-In  Time Check-In Location Estimated Length Procedure   Name     4/17/19 @ 9:30am    20 minutes CT Scan**   Procedure Preparations & Instructions     This is a non-invasive procedure and does NOT require any preparation         Check-In  Time Check-In Location Estimated Length Procedure   Name   4/17/19 @ 10:30am      60 minutes Echocardiogram (Echo)**   Procedure Preparations & Instructions     This is a non-invasive procedure and does NOT require any preparation       Follow up Appointment Information:    -Consume foods high in Potassium (Greens, mushrooms, blackberries, Melons, peas, Bananas, etc)  -Have potassium re-checked week of 3/25/19.  Please make lab appt.  -follow up  with Gayatri Altamirano CNP in 3 months with labs & 6mwt prior    Results:  Component      Latest Ref Rng & Units 3/11/2019   Sodium      133 - 144 mmol/L 137   Potassium      3.4 - 5.3 mmol/L 3.0 (L)   Chloride      94 - 109 mmol/L 103   Carbon Dioxide      20 - 32 mmol/L 30   Anion Gap      3 - 14 mmol/L 5   Glucose      70 - 99 mg/dL 71   Urea Nitrogen      7 - 30 mg/dL 11   Creatinine      0.52 - 1.04 mg/dL 0.77   GFR Estimate      >60 mL/min/1.73:m2 >90   GFR Estimate If Black      >60 mL/min/1.73:m2 >90   Calcium      8.5 - 10.1 mg/dL 8.3 (L)   Bilirubin Total      0.2 - 1.3 mg/dL 0.2   Albumin      3.4 - 5.0 g/dL 3.3 (L)   Protein Total      6.8 - 8.8 g/dL 7.6   Alkaline Phosphatase      40 - 150 U/L 65   ALT      0 - 50 U/L 12   AST      0 - 45 U/L 14   WBC      4.0 - 11.0 10e9/L 8.2   RBC Count      3.8 - 5.2 10e12/L 4.11   Hemoglobin      11.7 - 15.7 g/dL 11.0 (L)   Hematocrit      35.0 - 47.0 % 36.1   MCV      78 - 100 fl 88   MCH      26.5 - 33.0 pg 26.8   MCHC      31.5 - 36.5 g/dL 30.5 (L)   RDW      10.0 - 15.0 % 15.3 (H)   Platelet Count      150 - 450 10e9/L 363   N-Terminal Pro Bnp      0 - 125 pg/mL 158 (H)     We are located on the third floor of the [facility] ([facility].  Our address is     [address] [place] [address] [place] [address] [place] [address]      Thank you for allowing us to be a part of your care here at the [facility]    If you have questions or concerns please contact us at:    Naty Lau, RN, BSN    Louis Jack (Schedule,Prior Auth)  Nurse Coordinator     Clinic   Pulmonary Hypertension   Pulmonary Hypertension  [facility]  [phone])[phone]    (P) [phone]        (F)[phone]                ** Please note that you will NOT receive a reminder call regarding your scheduled testing, reminder calls are for  provider appointments only.  If you are scheduled for testing within the [employer] system you may receive a call regarding pre-registration for billing purposes only.**     Support Group:  Pulmonary Hypertension Association  Https://www.phassociation.org/  **Look at the Events Tab** They even have Support Groups that you can call into    [facility] PH Support Group  Second Saturday of the Month from 1-3 PM   Location: [address] [place] [address]  Leader: Vale Canales  Phone: [phone] or [phone]  Email: [email or web address]

## 2019-03-13 ENCOUNTER — OFFICE VISIT (OUTPATIENT)
Dept: FAMILY MEDICINE | Facility: CLINIC | Age: 44
End: 2019-03-13
Payer: COMMERCIAL

## 2019-03-13 VITALS
BODY MASS INDEX: 21.66 KG/M2 | WEIGHT: 116.5 LBS | OXYGEN SATURATION: 100 % | HEART RATE: 78 BPM | RESPIRATION RATE: 18 BRPM | TEMPERATURE: 97 F | DIASTOLIC BLOOD PRESSURE: 78 MMHG | SYSTOLIC BLOOD PRESSURE: 124 MMHG

## 2019-03-13 DIAGNOSIS — J01.01 ACUTE RECURRENT MAXILLARY SINUSITIS: ICD-10-CM

## 2019-03-13 DIAGNOSIS — J84.9 ILD (INTERSTITIAL LUNG DISEASE) (H): Primary | ICD-10-CM

## 2019-03-13 DIAGNOSIS — M19.90 INFLAMMATORY ARTHRITIS: ICD-10-CM

## 2019-03-13 PROCEDURE — 99000 SPECIMEN HANDLING OFFICE-LAB: CPT | Performed by: FAMILY MEDICINE

## 2019-03-13 PROCEDURE — 80307 DRUG TEST PRSMV CHEM ANLYZR: CPT | Mod: 90 | Performed by: FAMILY MEDICINE

## 2019-03-13 PROCEDURE — 99214 OFFICE O/P EST MOD 30 MIN: CPT | Performed by: FAMILY MEDICINE

## 2019-03-13 RX ORDER — LEVOFLOXACIN 500 MG/1
500 TABLET, FILM COATED ORAL DAILY
Qty: 10 TABLET | Refills: 0 | Status: ON HOLD | OUTPATIENT
Start: 2019-03-13 | End: 2019-04-17

## 2019-03-13 RX ORDER — MORPHINE SULFATE 15 MG/1
15 TABLET, FILM COATED, EXTENDED RELEASE ORAL 2 TIMES DAILY
Qty: 60 TABLET | Refills: 0 | Status: SHIPPED | OUTPATIENT
Start: 2019-03-13 | End: 2019-04-10

## 2019-03-13 NOTE — LETTER
[facility]  03/13/19    Patient: Ellen Loya  YOB: 1975  Medical Record Number: [id number]                                                                  Opioid / Opioid Plus Controlled Substance Agreement    I understand that my care provider has prescribed an opioid (narcotic) controlled substance to help manage my condition(s). I am taking this medicine to help me function or work. I know this is strong medicine, and that it can cause serious side effects. Opioid medicine can be sedating, addicting and may cause a dependency on the drug. They can affect my ability to drive or think, and cause depression. They need to be taken exactly as prescribed. Combining opioids with certain medicines or chemicals (such as cocaine, sedatives and tranquilizers, sleeping pills, meth) can be dangerous or even fatal. Also, if I stop opioids suddenly, I may have severe withdrawal symptoms. Last, I understand that opioids do not work for all types of pain nor for all patients. If not helpful, I may be asked to stop them.        The risks, benefits, and side effects of these medicine(s) were explained to me. I agree that:    1. I will take part in other treatments as advised by my care team. This may be psychiatry or counseling, physical therapy, behavioral therapy, group treatment or a referral to a pain clinic. I will reduce or stop my medicine when my care team tells me to do so.  2. I will take my medicines as prescribed. I will not change the dose or schedule unless my care team tells me to. There will be no refills if I  run out early.   I may be contactedwithout warning and asked to complete a urine drug test or pill count at any time.   3. I will keep all my appointments, and understand this is part of the monitoring of opioids. My care team may require an office visit for EVERY opioid/controlled substance refill. If I miss appointments or don t follow instructions, my care  team may stop my medicine.  4. I will not ask other providers to prescribe controlled substances, and I will not accept controlled substances from other people. If I need another prescribed controlled substance for a new reason, I will tell my care team within 1 business day.  5. I will use one pharmacy to fill all of my controlled substance prescriptions, and it is up to me to make sure that I do not run out of my medicines on weekends or holidays. If my care team is willing to refill my opioid prescription without a visit, I must request refills only during office hours, refills may take up to 3 days to process, and it may take up to 5 to 7 days for my medicine to be mailed and ready at my pharmacy. Prescriptions will not be mailed anywhere except my pharmacy.        [id number]  Rev 12/18         Registration to scan to EHR                             Page 1 of 2               Controlled Substance Agreement Opioid        [facility]  03/13/19  Patient: Ellen Loya  YOB: 1975  Medical Record Number: [id number]                                                                  6. I am responsible for my prescriptions. If the medicine/prescription is lost or stolen, it will not be replaced. I also agree not to share controlled substance medicines with anyone.  7. I agree to not use ANY illegal or recreational drugs. This includes marijuana, cocaine, bath salts or other drugs. I agree not to use alcohol unless my care team says I may.          I agree to give urine samples whenever asked. If I don t give a urine sample, the care team may stop my medicine.    8. If I enroll in the [place] Medical Marijuana program, I will tell my care team. I will also sign an agreement to share my medical records with my care team.   9. I will bring in my list of medicines (or my medicine bottles) each time I come to the clinic.   10. I will tell my care team right away if I become pregnant  or have a new medical problem treated outside of my regular clinic.  11. I understand that this medicine can affect my thinking and judgment. It may be unsafe for me to drive, use machinery and do dangerous tasks. I will not do any of these things until I know how the medicine affects me. If my dose changes, I will wait to see how it affects me. I will contact my care team if I have concerns about medicine side effects.    I understand that if I do not follow any of the conditions above, my prescriptions or treatment may be stopped.      I agree that my provider, clinic care team, and pharmacy may work with any city, state or federal law enforcement agency that investigates the misuse, sale, or other diversion of my controlled medicine. I will allow my provider to discuss my care with or share a copy of this agreement with any other treating provider, pharmacy or emergency room where I receive care. I agree to give up (waive) any right of privacy or confidentiality with respect to these consents.     I have read this agreement and have asked questions about anything I did not understand.      ________________________________________________________________________  Patient signature - Date/Time -  Ellen Loya                                      ________________________________________________________________________  Witness signature                                                            ________________________________________________________________________  Provider signature - Ignacio Loya MD      [id number]  Rev 12/18         Registration to scan to EHR                         Page 2 of 2                   Controlled Substance Agreement Opioid           Page 1 of 2  Opioid Pain Medicines (also known as Narcotics)  What You Need to Know    What are opioids?   Opioids are pain medicines that must be prescribed by a doctor.  They are also known as narcotics.    Examples are:     morphine (MS Contin,  Delphine)    oxycodone (Oxycontin)    oxycodone and acetaminophen (Percocet)    hydrocodone and acetaminophen (Vicodin, Norco)     fentanyl patch (Duragesic)     hydromorphone (Dilaudid)     methadone     What do opioids do well?   Opioids are best for short-term pain after a surgery or injury. They also work well for cancer pain. Unlike other pain medicines, they do not cause liver or kidney failure or ulcers. They may help some people with long-lasting (chronic) pain.     What do opioids NOT do well?   Opioids never get rid of pain entirely, and they do not work well for most patients with chronic pain. Opioids do not reduce swelling, one of the causes of pain. They also don t work well for nerve pain.                           For informational purposes only.  Not to replace the advice of your care provider.  Copyright [address] [facility]. All right reserved. [employer] 593668-Gsw 02/18.      Page 2 of 2    Risks and side effects   Talk to your doctor before you start or decide to keep taking one of these medicines. Side effects include:    Lowering your breathing rate enough to cause death    Overdose, including death, especially if taking higher than prescribed doses    Long-term opioid use    Worse depression symptoms; less pleasure in things you usually enjoy    Feeling tired or sluggish    Slower thoughts or cloudy thinking    Being more sensitive to pain over time; pain is harder to control    Trouble sleeping or restless sleep    Changes in hormone levels (for example, less testosterone)    Changes in sex drive or ability to have sex    Constipation    Unsafe driving    Itching and sweating    Feeling dizzy    Nausea, vomiting and dry mouth    What else should I know about opioids?  When someone takes opioids for too long or too often, they become dependent. This means that if you stop or reduce the medicine too quickly, you will have withdrawal symptoms.    Dependence is not the same as addiction.  Addiction is when people keep using a substance that harms their body, their mind or their relations with others. If you have a history of drug or alcohol abuse, taking opioids can cause a relapse.    Over time, opioids don t work as well. Most people will need higher and higher doses. The higher the dose, the more serious the side effects. We don t know the long-term effects of opioids.      Prescribed opioids aren't the best way to manage chronic pain    Other ways to manage pain include:      Ibuprofen or acetaminophen.  You should always try this first.      Treat health problems that may be causing pain.      acupuncture or massage, deep breathing, meditation, visual imagery, aromatherapy.      Use heat or ice at the pain site      Physical therapy and exercise      Stop smoking      See a counselor or therapist                                                  People who have used opioids for a long time may have a lower quality of life, worse depression, higher levels of pain and more visits to doctors.    Never share your opioids with others. Be sure to store opioids in a secure place, locked if possible.Young children can easily swallow them and overdose.     You can overdose on opioids.  Signs of overdose include decrease or loss of consciousness, slowed breathing, trouble waking and blue lips.  If someone is worried about overdose, they should call 911.    If you are at risk for overdose, you may get naloxone (Narcan, a medicine that reverses the effects of opioids.  If you overdose, a friend or family member can give you Narcan while waiting for the ambulance.  They need to know the signs of overdose and how to give Narcan.    While you're taking opioids:    Don't use alcohol or street drugs. Taking them together can cause death.    Don't take any of these medicines unless your doctor says its okay.  Taking these with opioids can cause death.    Benzodiazepines (such as lorazepam         or  diazepam)    Muscle relaxers (such as cyclobenzaprine)    sleeping pills    other opioids    Safe disposal of opioids  Find your area drug take-back program, your pharmacy mail-back program, buy a special disposal bag (such as Deterra) from your pharmacy or flush them down the toilet.  Use the guidelines at:  www.fda.gov/drugs/resourcesforyou
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
## 2019-03-14 LAB — FIO2-PRE: 50 %

## 2019-03-15 DIAGNOSIS — G47.9 DISTURBANCE IN SLEEP BEHAVIOR: ICD-10-CM

## 2019-03-16 LAB — PAIN DRUG SCR UR W RPTD MEDS: NORMAL

## 2019-03-18 ENCOUNTER — HOSPITAL ENCOUNTER (OUTPATIENT)
Dept: RESPIRATORY THERAPY | Facility: CLINIC | Age: 44
Discharge: HOME | End: 2019-03-18
Attending: NURSE PRACTITIONER | Admitting: NURSE PRACTITIONER
Payer: COMMERCIAL

## 2019-03-18 DIAGNOSIS — R07.81 PLEURITIC CHEST PAIN: ICD-10-CM

## 2019-03-18 PROCEDURE — 94726 PLETHYSMOGRAPHY LUNG VOLUMES: CPT

## 2019-03-18 PROCEDURE — 94729 DIFFUSING CAPACITY: CPT

## 2019-03-18 PROCEDURE — 94060 EVALUATION OF WHEEZING: CPT | Mod: 26

## 2019-03-18 PROCEDURE — 94726 PLETHYSMOGRAPHY LUNG VOLUMES: CPT | Mod: 26

## 2019-03-18 PROCEDURE — 94060 EVALUATION OF WHEEZING: CPT

## 2019-03-18 PROCEDURE — 94729 DIFFUSING CAPACITY: CPT | Mod: 26

## 2019-03-18 PROCEDURE — 25000125 ZZHC RX 250

## 2019-03-18 RX ORDER — ALPRAZOLAM 0.5 MG
TABLET ORAL
Qty: 40 TABLET | Refills: 0 | Status: SHIPPED | OUTPATIENT
Start: 2019-03-18 | End: 2019-03-26

## 2019-03-18 RX ORDER — ALBUTEROL SULFATE 0.83 MG/ML
2.5 SOLUTION RESPIRATORY (INHALATION) ONCE
Status: COMPLETED | OUTPATIENT
Start: 2019-03-18 | End: 2019-03-18

## 2019-03-18 RX ADMIN — ALBUTEROL SULFATE 2.5 MG: 2.5 SOLUTION RESPIRATORY (INHALATION) at 10:44

## 2019-03-18 NOTE — TELEPHONE ENCOUNTER
alprazolam      Last Written Prescription Date:  03/06/19  Last Fill Quantity: 40,   # refills: 0  Last Office Visit: 03/13/19  Future Office visit:       Routing refill request to provider for review/approval because:  Drug not on the [facility], P or [facility] refill protocol or controlled substance

## 2019-03-18 NOTE — TELEPHONE ENCOUNTER
Pt is out. She is hoping to pick this up today as she will be here for a pulmonary function test at 10 AM.  Thank you,  Sandra Leary- Pt Rep.

## 2019-03-18 NOTE — DISCHARGE INSTRUCTIONS
Thank you for completing pulmonary function testing today.  All results will be scanned into your epic results for your doctor to review.  Please resume taking all your current prescribed medications and diet as directed by your provider.   If you have not heard from your provider about your testing within two weeks and do not have a follow-up appointment scheduled with them please contact your provider about any questions you have concerning your testing.   Thank you  [facility]

## 2019-03-18 NOTE — PROGRESS NOTES
Pre-Bronch    Post-Bronch    Actual Pred %Pred  Actual %Pred %Chng  ---- SPIROMETRY ----                FVC (L) 2.89 3.37 85   2.91 86 +0  FEV1 (L) 2.43 2.75 88   2.45 88 +0  FEV1/FVC (%) 84 82     84   +0  FEV1/SVC (%) 79 82            FEV1/FEV6 (%) 84 83     84   +0  FEF Max (L/sec) 6.90 6.62 104   7.11 107 +3  FEF 25-75% (L/sec) 2.63 2.92 90   2.52 86 -4  FIVC (L) 2.78       2.91   +4  FIF Max (L/sec) 5.96 5.27 113   6.22 117 +4  Expiratory Time (sec) 6.71       6.69   +0  ----LUNG MECHANICS                 MVV (L/min)   97            MEP (cmH2O)                MIP (cmH2O)                ---- LUNG VOLUMES ----                SVC (L) 3.06 3.37 90          IC (L) 2.07 2.22 92          ERV (L) 0.99 1.15 86          FRC(Pleth) (L) 2.65 2.57 103          RV (Pleth) (L) 1.66 1.54 107          TLC (Pleth) (L) 4.72 4.60 102          RV/TLC (Pleth) (%) 35 34            ---- DIFFUSION ----                DLCOunc (ml/min/mmHg) 5.10 20.19 25          DLCOcor (ml/min/mmHg) 5.56 20.19 27          DL/VA (ml/min/mmHg/L) 1.30 4.46            VA (L) 4.27 4.52 94          IVC (L) 3.03              Hgb (gm/dL) 11.0 12-18

## 2019-03-19 LAB
DLCOCOR-%PRED-PRE: 27 %
DLCOCOR-PRE: 5.56 ML/MIN/MMHG
DLCOUNC-%PRED-PRE: 25 %
DLCOUNC-PRE: 5.1 ML/MIN/MMHG
DLCOUNC-PRED: 20.19 ML/MIN/MMHG
ERV-%PRED-PRE: 86 %
ERV-PRE: 0.99 L
ERV-PRED: 1.15 L
EXPTIME-PRE: 6.71 SEC
FEF2575-%PRED-POST: 86 %
FEF2575-%PRED-PRE: 90 %
FEF2575-POST: 2.52 L/SEC
FEF2575-PRE: 2.63 L/SEC
FEF2575-PRED: 2.92 L/SEC
FEFMAX-%PRED-PRE: 104 %
FEFMAX-PRE: 6.9 L/SEC
FEFMAX-PRED: 6.62 L/SEC
FEV1-%PRED-PRE: 88 %
FEV1-PRE: 2.43 L
FEV1FEV6-PRE: 84 %
FEV1FEV6-PRED: 83 %
FEV1FVC-PRE: 84 %
FEV1FVC-PRED: 82 %
FEV1SVC-PRE: 79 %
FEV1SVC-PRED: 82 %
FIFMAX-PRE: 5.96 L/SEC
FRCPLETH-%PRED-PRE: 103 %
FRCPLETH-PRE: 2.65 L
FRCPLETH-PRED: 2.57 L
FVC-%PRED-PRE: 85 %
FVC-PRE: 2.89 L
FVC-PRED: 3.37 L
GAW-%PRED-PRE: 91 %
GAW-PRE: 0.94 L/S/CMH2O
GAW-PRED: 1.03 L/S/CMH2O
IC-%PRED-PRE: 92 %
IC-PRE: 2.07 L
IC-PRED: 2.22 L
RVPLETH-%PRED-PRE: 107 %
RVPLETH-PRE: 1.66 L
RVPLETH-PRED: 1.54 L
SGAW-%PRED-PRE: 288 %
SGAW-PRE: 0.29 1/CMH2O*S
SGAW-PRED: 0.1 1/CMH2O*S
SRAW-%PRED-PRE: 71 %
SRAW-PRE: 3.41 CMH2O*S
SRAW-PRED: 4.76 CMH2O*S
TLCPLETH-%PRED-PRE: 102 %
TLCPLETH-PRE: 4.72 L
TLCPLETH-PRED: 4.6 L
VA-%PRED-PRE: 94 %
VA-PRE: 4.27 L
VC-%PRED-PRE: 90 %
VC-PRE: 3.06 L
VC-PRED: 3.37 L

## 2019-03-19 NOTE — TELEPHONE ENCOUNTER
Percocet 10/325 MG       Last Written Prescription Date:  3/6/19  Last Fill Quantity: 60,   # refills: 0  Last Office Visit: 3/13/19  Future Office visit:       Routing refill request to provider for review/approval because:  Drug not on the [facility], [facility] or [facility] refill protocol or controlled substance

## 2019-03-20 RX ORDER — OXYCODONE AND ACETAMINOPHEN 10; 325 MG/1; MG/1
1 TABLET ORAL EVERY 6 HOURS PRN
Qty: 60 TABLET | Refills: 0 | Status: SHIPPED | OUTPATIENT
Start: 2019-03-20 | End: 2019-04-01

## 2019-03-26 ENCOUNTER — PATIENT OUTREACH (OUTPATIENT)
Dept: CARDIOLOGY | Facility: CLINIC | Age: 44
End: 2019-03-26

## 2019-03-26 DIAGNOSIS — I27.20 PULMONARY HYPERTENSION (H): Primary | ICD-10-CM

## 2019-03-26 DIAGNOSIS — G47.9 DISTURBANCE IN SLEEP BEHAVIOR: ICD-10-CM

## 2019-03-26 NOTE — TELEPHONE ENCOUNTER
Alprazolam 0.5 MG       Last Written Prescription Date:  3/18/19  Last Fill Quantity: 40,   # refills: 0  Last Office Visit: 3/13/19  Future Office visit:       Routing refill request to provider for review/approval because:  Drug not on the [facility], [facility] or [facility] refill protocol or controlled substance

## 2019-03-27 RX ORDER — ALPRAZOLAM 0.5 MG
TABLET ORAL
Qty: 40 TABLET | Refills: 0 | Status: SHIPPED | OUTPATIENT
Start: 2019-03-27 | End: 2019-04-04

## 2019-03-28 DIAGNOSIS — I27.0 PRIMARY PULMONARY HYPERTENSION (H): ICD-10-CM

## 2019-03-28 DIAGNOSIS — R06.02 SOB (SHORTNESS OF BREATH): ICD-10-CM

## 2019-03-28 DIAGNOSIS — E87.6 HYPOKALEMIA: ICD-10-CM

## 2019-03-28 LAB
ANION GAP SERPL CALCULATED.3IONS-SCNC: 6 MMOL/L (ref 3–14)
BUN SERPL-MCNC: 9 MG/DL (ref 7–30)
CALCIUM SERPL-MCNC: 8.9 MG/DL (ref 8.5–10.1)
CHLORIDE SERPL-SCNC: 98 MMOL/L (ref 94–109)
CO2 SERPL-SCNC: 32 MMOL/L (ref 20–32)
CREAT SERPL-MCNC: 0.64 MG/DL (ref 0.52–1.04)
GFR SERPL CREATININE-BSD FRML MDRD: >90 ML/MIN/{1.73_M2}
GLUCOSE SERPL-MCNC: 119 MG/DL (ref 70–99)
POTASSIUM SERPL-SCNC: 3.2 MMOL/L (ref 3.4–5.3)
SODIUM SERPL-SCNC: 136 MMOL/L (ref 133–144)

## 2019-03-28 PROCEDURE — 80048 BASIC METABOLIC PNL TOTAL CA: CPT | Performed by: INTERNAL MEDICINE

## 2019-03-28 PROCEDURE — 36415 COLL VENOUS BLD VENIPUNCTURE: CPT | Performed by: INTERNAL MEDICINE

## 2019-03-29 RX ORDER — POTASSIUM CHLORIDE 750 MG/1
20 TABLET, EXTENDED RELEASE ORAL DAILY
Qty: 180 TABLET | Refills: 3 | Status: SHIPPED | OUTPATIENT
Start: 2019-03-29 | End: 2020-08-19

## 2019-03-29 NOTE — PROGRESS NOTES
Date: 3/29/2019    Time of Call: 2:58 PM     Diagnosis:  Low Potassium     [ TORB ] Ordering provider: Callie Ledesma MD   Order: Start Potassium 20 mEq      Order received by: Naty Lau RN     Follow-up/additional notes: I called pt and reviewed the above information.  All questions and concerns addressed. Naty Lau RN on 3/29/2019 at 3:01 PM

## 2019-04-04 DIAGNOSIS — G47.9 DISTURBANCE IN SLEEP BEHAVIOR: ICD-10-CM

## 2019-04-08 RX ORDER — ALPRAZOLAM 0.5 MG
TABLET ORAL
Qty: 40 TABLET | Refills: 0 | Status: SHIPPED | OUTPATIENT
Start: 2019-04-08 | End: 2019-04-17

## 2019-04-08 NOTE — TELEPHONE ENCOUNTER
Last Written Prescription Date:  3/27/19  Last Fill Quantity: 40,  # refills: 0   Last office visit: 3/13/2019 with prescribing provider:     Future Office Visit:      Routing refill request to provider for review/approval because:  Drug not on the [facility] refill protocol   ILIR MerinoN, RN

## 2019-04-10 DIAGNOSIS — M19.90 INFLAMMATORY ARTHRITIS: ICD-10-CM

## 2019-04-10 NOTE — TELEPHONE ENCOUNTER
Morphine 15 MG       Last Written Prescription Date:  3/13/19  Last Fill Quantity: 60,   # refills: 0  Last Office Visit: 3/13/19  Future Office visit:       Routing refill request to provider for review/approval because:  Drug not on the [facility], [facility] or [facility] refill protocol or controlled substance

## 2019-04-11 RX ORDER — MORPHINE SULFATE 15 MG/1
15 TABLET, FILM COATED, EXTENDED RELEASE ORAL 2 TIMES DAILY
Qty: 60 TABLET | Refills: 0 | Status: SHIPPED | OUTPATIENT
Start: 2019-04-11 | End: 2019-05-08

## 2019-04-15 ENCOUNTER — TELEPHONE (OUTPATIENT)
Dept: FAMILY MEDICINE | Facility: CLINIC | Age: 44
End: 2019-04-15

## 2019-04-15 DIAGNOSIS — R07.81 PLEURITIC CHEST PAIN: ICD-10-CM

## 2019-04-15 DIAGNOSIS — G47.9 DISTURBANCE IN SLEEP BEHAVIOR: ICD-10-CM

## 2019-04-15 RX ORDER — ALPRAZOLAM 0.5 MG
TABLET ORAL
Qty: 40 TABLET | Refills: 0 | Status: CANCELLED | OUTPATIENT
Start: 2019-04-15

## 2019-04-15 NOTE — TELEPHONE ENCOUNTER
Left message for patient to return call. Patient was just had refill on 4/8/19 and is not due for a refill at this time.

## 2019-04-15 NOTE — TELEPHONE ENCOUNTER
Reason for Call:  Other prescription    Detailed comments: Ellen calls to let Dr Loya know that she has to go to [facility] on Wednesday for ECHO, CT Scan and a Rt Heart Cath.  Ellen states she just call for a refill of her Alprazolam and is hoping to have this ready for her on Wednesday morning 8:30am to  so she has some for those tests.  Ellen states she gets very anxious when she has to have these test done, especially the Heart Cath.    Phone Number Patient can be reached at: Cell number on file:    Telephone Information:   Mobile [phone]       Best Time: any    Can we leave a detailed message on this number? YES    Call taken on 4/15/2019 at 2:21 PM by Shereen Quinteros

## 2019-04-15 NOTE — TELEPHONE ENCOUNTER
Patient returned call and was informed patient had no further questions. States will contact pharmacy when refill is due.  Sravani Ramos MA

## 2019-04-15 NOTE — TELEPHONE ENCOUNTER
Alprazolam 0.5 MG       Last Written Prescription Date:  4/8/19  Last Fill Quantity: 40,   # refills: 0  Last Office Visit: 3/13/19  Future Office visit:       Routing refill request to provider for review/approval because:  Drug not on the [facility], [facility] or [facility] refill protocol or controlled substance

## 2019-04-15 NOTE — TELEPHONE ENCOUNTER
Percocet  MG       Last Written Prescription Date:  4/3/19  Last Fill Quantity: 60,   # refills: 0  Last Office Visit: 3/13/19  Future Office visit:       Routing refill request to provider for review/approval because:  Drug not on the [facility], [facility] or [facility] refill protocol or controlled substance  Alprazolam 0.5 MG       Last Written Prescription Date:  4/8/19  Last Fill Quantity: 40,   # refills: 0  Last Office Visit: 3/13/19  Future Office visit:       Routing refill request to provider for review/approval because:  Drug not on the [facility], [facility] or [facility] refill protocol or controlled substance

## 2019-04-17 ENCOUNTER — APPOINTMENT (OUTPATIENT)
Dept: MEDSURG UNIT | Facility: CLINIC | Age: 44
End: 2019-04-17
Attending: INTERNAL MEDICINE
Payer: COMMERCIAL

## 2019-04-17 ENCOUNTER — HOSPITAL ENCOUNTER (OUTPATIENT)
Dept: CARDIOLOGY | Facility: CLINIC | Age: 44
Discharge: HOME | End: 2019-04-17
Attending: INTERNAL MEDICINE | Admitting: INTERNAL MEDICINE
Payer: COMMERCIAL

## 2019-04-17 ENCOUNTER — APPOINTMENT (OUTPATIENT)
Dept: LAB | Facility: CLINIC | Age: 44
End: 2019-04-17
Attending: INTERNAL MEDICINE
Payer: COMMERCIAL

## 2019-04-17 ENCOUNTER — HOSPITAL ENCOUNTER (OUTPATIENT)
Dept: CT IMAGING | Facility: CLINIC | Age: 44
Discharge: HOME | End: 2019-04-17
Attending: INTERNAL MEDICINE | Admitting: INTERNAL MEDICINE
Payer: COMMERCIAL

## 2019-04-17 ENCOUNTER — HOSPITAL ENCOUNTER (OUTPATIENT)
Facility: CLINIC | Age: 44
Discharge: HOME | End: 2019-04-17
Attending: INTERNAL MEDICINE | Admitting: INTERNAL MEDICINE
Payer: COMMERCIAL

## 2019-04-17 VITALS
BODY MASS INDEX: 21.35 KG/M2 | SYSTOLIC BLOOD PRESSURE: 99 MMHG | HEIGHT: 62 IN | TEMPERATURE: 97.5 F | RESPIRATION RATE: 16 BRPM | OXYGEN SATURATION: 100 % | WEIGHT: 116 LBS | HEART RATE: 70 BPM | DIASTOLIC BLOOD PRESSURE: 54 MMHG

## 2019-04-17 DIAGNOSIS — I27.0 PRIMARY PULMONARY HYPERTENSION (H): ICD-10-CM

## 2019-04-17 DIAGNOSIS — R06.02 SOB (SHORTNESS OF BREATH): ICD-10-CM

## 2019-04-17 LAB
ANION GAP SERPL CALCULATED.3IONS-SCNC: 8 MMOL/L (ref 3–14)
B-HCG SERPL-ACNC: 1 IU/L (ref 0–5)
BUN SERPL-MCNC: 8 MG/DL (ref 7–30)
CALCIUM SERPL-MCNC: 8.4 MG/DL (ref 8.5–10.1)
CHLORIDE SERPL-SCNC: 101 MMOL/L (ref 94–109)
CO2 SERPL-SCNC: 26 MMOL/L (ref 20–32)
CREAT SERPL-MCNC: 0.6 MG/DL (ref 0.52–1.04)
GFR SERPL CREATININE-BSD FRML MDRD: >90 ML/MIN/{1.73_M2}
GLUCOSE SERPL-MCNC: 108 MG/DL (ref 70–99)
POTASSIUM SERPL-SCNC: 4 MMOL/L (ref 3.4–5.3)
SODIUM SERPL-SCNC: 134 MMOL/L (ref 133–144)

## 2019-04-17 PROCEDURE — 93451 RIGHT HEART CATH: CPT | Mod: 26 | Performed by: INTERNAL MEDICINE

## 2019-04-17 PROCEDURE — 93306 TTE W/DOPPLER COMPLETE: CPT

## 2019-04-17 PROCEDURE — 93306 TTE W/DOPPLER COMPLETE: CPT | Mod: 26 | Performed by: INTERNAL MEDICINE

## 2019-04-17 PROCEDURE — 71250 CT THORAX DX C-: CPT

## 2019-04-17 PROCEDURE — 25000125 ZZHC RX 250: Performed by: INTERNAL MEDICINE

## 2019-04-17 PROCEDURE — 27210794 ZZH OR GENERAL SUPPLY STERILE: Performed by: INTERNAL MEDICINE

## 2019-04-17 PROCEDURE — 84702 CHORIONIC GONADOTROPIN TEST: CPT | Performed by: INTERNAL MEDICINE

## 2019-04-17 PROCEDURE — 40000166 ZZH STATISTIC PP CARE STAGE 1

## 2019-04-17 PROCEDURE — 93451 RIGHT HEART CATH: CPT | Performed by: INTERNAL MEDICINE

## 2019-04-17 PROCEDURE — 80048 BASIC METABOLIC PNL TOTAL CA: CPT | Performed by: INTERNAL MEDICINE

## 2019-04-17 PROCEDURE — 36415 COLL VENOUS BLD VENIPUNCTURE: CPT | Performed by: INTERNAL MEDICINE

## 2019-04-17 RX ORDER — ALPRAZOLAM 0.5 MG
TABLET ORAL
Qty: 40 TABLET | Refills: 0 | Status: SHIPPED | OUTPATIENT
Start: 2019-04-17 | End: 2019-04-26

## 2019-04-17 RX ORDER — OXYCODONE AND ACETAMINOPHEN 10; 325 MG/1; MG/1
1 TABLET ORAL EVERY 6 HOURS PRN
Qty: 60 TABLET | Refills: 0 | Status: SHIPPED | OUTPATIENT
Start: 2019-04-17 | End: 2019-05-01

## 2019-04-17 RX ORDER — LIDOCAINE 40 MG/G
CREAM TOPICAL
Status: COMPLETED | OUTPATIENT
Start: 2019-04-17 | End: 2019-04-17

## 2019-04-17 RX ADMIN — LIDOCAINE: 40 CREAM TOPICAL at 13:18

## 2019-04-17 ASSESSMENT — MIFFLIN-ST. JEOR: SCORE: 1134.42

## 2019-04-17 NOTE — PROGRESS NOTES
Pt returned from cath lab post right heart cath; right neck is flat, dry and intact. Pt denies pain. Pt up walking in room, steady on her feet. Discharge instructions reviewed previously; pt stated understanding. Pt discharged to lobby with family; pt had all belongings with her. PT taking ice chips and gingerale soda with her.

## 2019-04-17 NOTE — PROGRESS NOTES
1315  Prep is complete for procedure.  Ellen is here for RHC.  O2 is on continuously at 3L/NC.  Denies any SOB or pain at this time.  Friend, Dayami, is here as  home.   CTRN

## 2019-04-17 NOTE — IP AVS SNAPSHOT
MRN:[id number]                      After Visit Summary   4/17/2019    Ellen Loya    MRN: [id number]           Visit Information        Department      4/17/2019 11:21 AM [address] [facility]          Review of your medicines      UNREVIEWED medicines. Ask your doctor about these medicines       Dose / Directions   ALPRAZolam 0.5 MG tablet  Commonly known as:  XANAX  Used for:  Disturbance in sleep behavior      TAKE 1 TABLET BY MOUTH EVERY SIX HOURS AS NEEDED FOR ANXIETY.  Quantity:  40 tablet  Refills:  0     digoxin 125 MCG tablet  Commonly known as:  LANOXIN  Used for:  Pulmonary hypertension (H)      Dose:  125 mcg  Take 1 tablet (125 mcg) by mouth daily  Quantity:  90 tablet  Refills:  3     FLOVENT  MCG/ACT inhaler  Used for:  ILD (interstitial lung disease) (H)  Generic drug:  fluticasone      INHALE 2 PUFFS INTO THE LUNGS TWO TIMES A DAY  Quantity:  12 g  Refills:  3     fluticasone 50 MCG/ACT nasal spray  Commonly known as:  FLONASE  Used for:  Nasal congestion      USE 2 SPRAYS IN EACH NOSTRIL DAILY  Quantity:  16 g  Refills:  11     furosemide 20 MG tablet  Commonly known as:  LASIX  Used for:  Pulmonary hypertension (H)      Dose:  40 mg  Take 2 tablets (40 mg) by mouth daily  Quantity:  180 tablet  Refills:  3     levofloxacin 500 MG tablet  Commonly known as:  LEVAQUIN  Used for:  Acute recurrent maxillary sinusitis      Dose:  500 mg  Take 1 tablet (500 mg) by mouth daily  Quantity:  10 tablet  Refills:  0     morphine 15 MG CR tablet  Commonly known as:  MS CONTIN  Used for:  Inflammatory arthritis      Dose:  15 mg  Take 1 tablet (15 mg) by mouth 2 times daily  Quantity:  60 tablet  Refills:  0     NEXIUM PO  Used for:  Pulmonary hypertension (H)      Dose:  40 mg  Take 40 mg by mouth every morning (before breakfast)  Refills:  0     oxyCODONE-acetaminophen  MG per tablet  Commonly known as:  PERCOCET  Used for:  Pleuritic chest pain      Dose:  1 tablet  Take  1 tablet by mouth every 6 hours as needed for moderate to severe pain  Quantity:  60 tablet  Refills:  0     potassium chloride ER 10 MEQ CR tablet  Commonly known as:  K-DUR/KLOR-CON M  Used for:  Pulmonary hypertension (H)      Dose:  20 mEq  Take 2 tablets (20 mEq) by mouth daily  Quantity:  180 tablet  Refills:  3     tadalafil (PAH) 20 MG Tabs  Commonly known as:  ADCIRCA  Used for:  Pulmonary hypertension (H)      Dose:  40 mg  Take 2 tablets (40 mg) by mouth daily  Quantity:  60 tablet  Refills:  10     VENTOLIN  (90 Base) MCG/ACT inhaler  Used for:  SOB (shortness of breath)  Generic drug:  albuterol      INHALE ONE TO TWO PUFFS INTO THE LUNGS EVERY 4 HOURS AS NEEDED FOR SHORTNESS OF BREATH / DYSPNEA  Quantity:  18 g  Refills:  9        CONTINUE these medicines which have NOT CHANGED       Dose / Directions   * order for DME  Used for:  ILD (interstitial lung disease) (H)      Please provide patient with 2  liquid oxygen tanks for portability. Spot check patient on liquid oxygen. Titrate oxygen to maintain saturations above 88%.  Quantity:  2 Device  Refills:  0     * order for DME  Used for:  Hypoxia      Oxygen: Patient requires supplemental Oxygen 4 LPM via nasal canula at rest and 6 LPM with activity. Please provide patient with portability capability. Okay to spot check patient on oxygen device, to keep sats above 90%. Please provider with home concentrator that can go up to 10 LPM. Oxygen will be for a lifetime.  Quantity:  1 Device  Refills:  0         * This list has 2 medication(s) that are the same as other medications prescribed for you. Read the directions carefully, and ask your doctor or other care provider to review them with you.                  Protect others around you: Learn how to safely use, store and throw away your medicines at www.disposemymeds.org.       Follow-ups after your visit       Your next 10 appointments already scheduled    Apr 17, 2019  Procedure with Callie  MD Callie  [facility], [place],  Heart Cath Lab ([facility]) [address] [address]  [phone]   The [facility] is located on the corner of [address] and [address] on the [facility]. It is easily accessible from virtually any point in the [place] area, via [address].   Apr 24, 2019 12:30 PM CDT  [facility]  [facility] [address] [address]  [phone]      Jun 11, 2019 10:30 AM CDT  Lab with  LAB   Health Lab ([facility]) [address] [place] [address]  [address] [facility] [address]  [phone]      Jun 11, 2019 11:00 AM CDT  Six Minute Walk with [facility] 6 MINUTE WALK 1  [facility] Function [facility] ([facility]) [address] [place] [address]  3rd Floor  [facility] [address]  [phone]      Jun 11, 2019 11:30 AM CDT  (Arrive by 11:15 AM)  RETURN PRIMARY PULMONARY with PURA Mace [facility] Heart Care ([facility]) [address] [place] [address]  [address]  [facility] [address] [phone]         Care Instructions       Further instructions from your care team       [facility]                        Interventional Cardiology  Discharge Instructions   Post Right Heart Cath      AFTER YOU GO HOME:    DO drink plenty of fluids    DO resume your regular diet and medications unless otherwise instructed by your Primary Physician    Do Not scrub the procedure site vigorously    No lotion or powder to the puncture site for 3 days    CALL YOUR PRIMARY PHYSICIAN IF: You may resume all normal activity.  Monitor neck site for bleeding, swelling, or voice changes. If you notice bleeding or swelling immediately apply pressure to the site and call number below to speak with Cardiology Fellow.  If  you experience any changes in your breathing you should call your doctor immediately or come to the closest Emergency Department.  Do not drive yourself.    ADDITIONAL INSTRUCTIONS: Medications: You are to resume all home medications including anticoagulation therapy unless otherwise advised by your primary cardiologist or nurse coordinator.    Follow Up: Per your primary cardiology team    If you have any questions or concerns regarding your procedure site please call [phone] at anytime and ask for Cardiology Fellow on call.  They are available 24 hours a day.  You may also contact the Cardiology Clinic after hours number at [phone].                                                       Telephone Numbers [phone] Monday-Friday 8:00 am to 4:30 pm    [phone] After 4:30 pm Monday-Friday, Weekends & Holidays  Ask for Interventional Cardiologist on call. Someone is on call 24 hours/day   [facility] toll free number [phone] Monday-Friday 8:00 am to 4:30 pm   [facility] Emergency Dept [phone]                   Additional Information About Your Visit       Care EveryWhere ID    This is your Care EveryWhere ID. This could be used by other organizations to access your [facility] medical records  [id number]       Your Vitals Were     Blood Pressure   116/59 (BP Location: Right arm, Cuff Size: Adult Regular)    Pulse   70    Temperature   97.5  F (36.4  C) (Oral)    Respirations   20    Pulse Oximetry   100%          Primary Care Provider Office Phone # Fax #    Ignacio Loya [phone]      Equal Access to Services    KERRY SPENCER : Hadii aad ku hadasho Soomaali, waaxda luqadaha, qaybta kaalmada jilegyarubia, justyn garcia . So [facility] [phone].    ATENCIÓN: Si habla español, tiene a bassett disposición servicios gratuitos de asistencia lingüística. Llame al [phone].    We comply with applicable federal civil rights laws and [place] laws. We do  not discriminate on the basis of race, color, national origin, age, disability, sex, sexual orientation, or gender identity.           Thank you!    Thank you for choosing [facility] for your care. Our goal is always to provide you with excellent care. Hearing back from our patients is one way we can continue to improve our services. Please take a few minutes to complete the written survey that you may receive in the mail after you visit with us. Thank you!            Medication List      Medications          Morning Afternoon Evening Bedtime As Needed    * order for DME  INSTRUCTIONS:  Please provide patient with 2  liquid oxygen tanks for portability. Spot check patient on liquid oxygen. Titrate oxygen to maintain saturations above 88%.                     * order for DME  INSTRUCTIONS:  Oxygen: Patient requires supplemental Oxygen 4 LPM via nasal canula at rest and 6 LPM with activity. Please provide patient with portability capability. Okay to spot check patient on oxygen device, to keep sats above 90%. Please provider with home concentrator that can go up to 10 LPM. Oxygen will be for a lifetime.                        * This list has 2 medication(s) that are the same as other medications prescribed for you. Read the directions carefully, and ask your doctor or other care provider to review them with you.            ASK your doctor about these medications          Morning Afternoon Evening Bedtime As Needed    ALPRAZolam 0.5 MG tablet  Also known as:  XANAX  INSTRUCTIONS:  TAKE 1 TABLET BY MOUTH EVERY SIX HOURS AS NEEDED FOR ANXIETY.                     digoxin 125 MCG tablet  Also known as:  LANOXIN  INSTRUCTIONS:  Take 1 tablet (125 mcg) by mouth daily                     FLOVENT  MCG/ACT inhaler  INSTRUCTIONS:  INHALE 2 PUFFS INTO THE LUNGS TWO TIMES A DAY  Generic drug:  fluticasone                     fluticasone 50 MCG/ACT nasal spray  Also known as:  FLONASE  INSTRUCTIONS:  USE 2 SPRAYS IN EACH  NOSTRIL DAILY                     furosemide 20 MG tablet  Also known as:  LASIX  INSTRUCTIONS:  Take 2 tablets (40 mg) by mouth daily                     levofloxacin 500 MG tablet  Also known as:  LEVAQUIN  INSTRUCTIONS:  Take 1 tablet (500 mg) by mouth daily                     morphine 15 MG CR tablet  Also known as:  MS CONTIN  INSTRUCTIONS:  Take 1 tablet (15 mg) by mouth 2 times daily  Doctor's comments:  LAST  03/13/2019   QTY 60  DAYS SUPPLY 30                     NEXIUM PO  INSTRUCTIONS:  Take 40 mg by mouth every morning (before breakfast)                     oxyCODONE-acetaminophen  MG per tablet  Also known as:  PERCOCET  INSTRUCTIONS:  Take 1 tablet by mouth every 6 hours as needed for moderate to severe pain                     potassium chloride ER 10 MEQ CR tablet  Also known as:  K-DUR/KLOR-CON M  INSTRUCTIONS:  Take 2 tablets (20 mEq) by mouth daily                     tadalafil (PAH) 20 MG Tabs  Also known as:  ADCIRCA  INSTRUCTIONS:  Take 2 tablets (40 mg) by mouth daily                     VENTOLIN  (90 Base) MCG/ACT inhaler  INSTRUCTIONS:  INHALE ONE TO TWO PUFFS INTO THE LUNGS EVERY 4 HOURS AS NEEDED FOR SHORTNESS OF BREATH / DYSPNEA  Generic drug:  albuterol

## 2019-04-17 NOTE — TELEPHONE ENCOUNTER
Written prescription for alprazolam and percocet were walked down to [facility].    Landen Pedraza CMA

## 2019-04-17 NOTE — IP AVS SNAPSHOT
[address] [address]                                    After Visit Summary   4/17/2019    Ellen Loya    MRN: [id number]           After Visit Summary Signature Page    I have received my discharge instructions, and my questions have been answered. I have discussed any challenges I see with this plan with the nurse or doctor.    ..........................................................................................................................................  Patient/Patient Representative Signature      ..........................................................................................................................................  Patient Representative Print Name and Relationship to Patient    ..................................................               ................................................  Date                                   Time    ..........................................................................................................................................  Reviewed by Signature/Title    ...................................................              ..............................................  Date                                               Time          22EPIC Rev 08/18

## 2019-04-17 NOTE — PROGRESS NOTES
[facility]   Interventional Cardiology        Consenting/Education for Right Heart Catheterization     Patient understands due to their history of pulmonary hypertension we would like to perform right heart catheterization.  This procedure will be performed by Dr. Bailey    Patient understands during the right heart catheterization, a fine tube (catheter) is put into the vein of the groin/neck.  It is carefully passed along until it reaches the heart and then goes up into the blood vessels of the lungs. This is done to measure a variety of pressures in your heart and can tell us how well the heart is filling and emptying, as well as monitor fluid status. This is usually painless. The doctor uses x-ray imaging to see the catheter. Afterwards, the catheter is removed, and incision site covered before leaving the cath lab. This procedure is done with no sedation, usually only requiring Lidocaine.     Patient also understands risks and complications of the procedure which include, but are not limited to bruising/swelling around the incision site, infection, bleeding, allergic reaction to local anesthetic, air embolism, arterial puncture, stroke, heart attack.       Patient verbalized understanding of risks and benefits of the right heart catheterization and has elected to proceed with right heart catheterization.         Alecia King, PURA, CNP  [facility] Interventional Cardiology  [phone]

## 2019-04-24 ENCOUNTER — OFFICE VISIT (OUTPATIENT)
Dept: PULMONOLOGY | Facility: CLINIC | Age: 44
End: 2019-04-24
Payer: COMMERCIAL

## 2019-04-24 VITALS
HEIGHT: 62 IN | RESPIRATION RATE: 16 BRPM | SYSTOLIC BLOOD PRESSURE: 110 MMHG | WEIGHT: 116 LBS | OXYGEN SATURATION: 94 % | HEART RATE: 84 BPM | BODY MASS INDEX: 21.35 KG/M2 | DIASTOLIC BLOOD PRESSURE: 69 MMHG

## 2019-04-24 DIAGNOSIS — J84.9 ILD (INTERSTITIAL LUNG DISEASE) (H): Primary | ICD-10-CM

## 2019-04-24 DIAGNOSIS — G47.9 DISTURBANCE IN SLEEP BEHAVIOR: ICD-10-CM

## 2019-04-24 DIAGNOSIS — R06.02 SOB (SHORTNESS OF BREATH): ICD-10-CM

## 2019-04-24 PROCEDURE — 90471 IMMUNIZATION ADMIN: CPT | Performed by: INTERNAL MEDICINE

## 2019-04-24 PROCEDURE — 90670 PCV13 VACCINE IM: CPT | Performed by: INTERNAL MEDICINE

## 2019-04-24 ASSESSMENT — MIFFLIN-ST. JEOR: SCORE: 1134.42

## 2019-04-24 NOTE — NURSING NOTE
Does Ellen have home oxygen?  Yes     (If yes please fill out below.  If no please delete links)    HOME OXYGEN  Concentrator  O2 flow rate 2.5-8  L/min continuous    nasal cannula    [facility]
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
## 2019-04-25 RX ORDER — ALBUTEROL SULFATE 90 UG/1
AEROSOL, METERED RESPIRATORY (INHALATION)
Qty: 18 G | Refills: 9 | Status: SHIPPED | OUTPATIENT
Start: 2019-04-25 | End: 2020-06-25

## 2019-04-26 RX ORDER — ALPRAZOLAM 0.5 MG
TABLET ORAL
Qty: 40 TABLET | Refills: 0 | Status: SHIPPED | OUTPATIENT
Start: 2019-04-26 | End: 2019-05-03

## 2019-04-26 NOTE — TELEPHONE ENCOUNTER
RX placed at the [facility].   
Requested Prescriptions   Pending Prescriptions Disp Refills     ALPRAZolam (XANAX) 0.5 MG tablet 40 tablet 0     Sig: TAKE 1 TABLET BY MOUTH EVERY SIX HOURS AS NEEDED FOR ANXIETY.       There is no refill protocol information for this order        Last Written Prescription Date:  4/17/19  Last Fill Quantity: 40,  # refills: 0   Last office visit: 3/13/2019 with prescribing provider:     Future Office Visit:      Routing refill request to provider for review/approval because:  Drug not on the [facility] refill protocol     Olimpia Oakley RN          
VIEW ALL

## 2019-04-29 DIAGNOSIS — R07.81 PLEURITIC CHEST PAIN: ICD-10-CM

## 2019-04-30 NOTE — TELEPHONE ENCOUNTER
Controlled Substance Refill Request for Percocet  Problem List Complete:    No     PROVIDER TO CONSIDER COMPLETION OF PROBLEM LIST AND OVERVIEW/CONTROLLED SUBSTANCE AGREEMENT    Last Written Prescription Date:  4/17/2019.  Last Fill Quantity: 60,   # refills: 0    THE MOST RECENT OFFICE VISIT MUST BE WITHIN THE PAST 3 MONTHS. AT LEAST ONE FACE TO FACE VISIT MUST OCCUR EVERY 6 MONTHS. ADDITIONAL VISITS CAN BE VIRTUAL.  (THIS STATEMENT SHOULD BE DELETED.)    Last Office Visit with [facility] primary care provider: 3/13/2019    Future Office visit:     Controlled substance agreement:   Encounter-Level CSA:    There are no encounter-level csa.     Patient-Level CSA:    Controlled Substance Agreement - Opioid - Scan on 3/13/2019 11:16 AM (below)           Last Urine Drug Screen:   Pain Drug SCR UR W RPTD Meds   Date Value Ref Range Status   03/13/2019 FINAL  Final     Comment:     (Note)  ====================================================================  TOXASSURE COMP DRUG ANALYSIS,UR  ====================================================================  Specimen Alert  Note:  Urinary creatinine is low; ability to detect some  drugs may be compromised.  Interpret results  with caution.  ====================================================================  Test                             Result       Flag       Units        Drug Present and Declared for Prescription Verification   Alprazolam                     228          EXPECTED   ng/mg creat   Alpha-hydroxyalprazolam        250          EXPECTED   ng/mg creat    Source of alprazolam is a scheduled prescription medication.    Alpha-hydroxyalprazolam is an expected metabolite of alprazolam.   Oxycodone                      6356         EXPECTED   ng/mg creat   Oxymorphone                    1122         EXPECTED   ng/mg creat   Noroxycodone                   80140        EXPECTED   ng/mg creat   Noroxymorphone                 628          EXPECTED   ng/mg creat     Sources of oxycodone are scheduled prescription medications.    Oxymorphone, noroxycodone, and noroxymorphone are expected    metabolites of oxycodone. Oxymorphone is also available as a    scheduled prescription medication.   Acetaminophen                  PRESENT      EXPECTED                Drug Present not Declared for Prescription Verification   Methamphetamine                59498        UNEXPECTED ng/mg creat   Amphetamine                    3117         UNEXPECTED ng/mg creat    Sources of methamphetamine include illicit sources, as a    scheduled prescription medication, as a metabolite of some    prescription drugs, or use of an l-methamphetamine inhaler.    Amphetamine is an expected metabolite of methamphetamine.    Amphetamine is also available as a schedule II prescription drug.   Oxazepam                       178          UNEXPECTED ng/mg creat    Oxazepam may be administered as a scheduled prescription    medication; it is also an expected metabolite of other    benzodiazepine drugs, including diazepam, chlordiazepoxide,    prazepam, clorazepate, halazepam, and temazepam.   Diphenhydramine                PRESENT      UNEXPECTED              Drug Absent but Declared for Prescription Verification   Morphine                       Not Detected UNEXPECTED ng/mg creat  ====================================================================  Test                      Result    Flag   Units      Ref Range        Creatinine              18        L      mg/dL      >=20            ====================================================================  Declared Medications:  The flagging and interpretation on this report are based on the  following declared medications.  Unexpected results may arise from  inaccuracies in the declared medications.  **Note: The testing scope of this panel includes these medications:  Alprazolam (Xanax)  Morphine (MS Contin)  Oxycodone (Percocet)  **Note: The testing scope of this  panel does not include small to  moderate amounts of these reported medications:  Acetaminophen (Percocet)  **Note: The testing scope of this panel does not include following  reported medications:  Digoxin  Furosemide (Lasix)  Omeprazole (Nexium)  Tadalafil  ====================================================================  For clinical consultation, please call [phone].  ====================================================================  Analysis performed by [employer] [place] [address]     , No results found for: COMDAT, No results found for: THC13, PCP13, COC13, MAMP13, OPI13, AMP13, BZO13, TCA13, MTD13, BAR13, OXY13, PPX13, BUP13         [email or web address]       checked in past 3 months?  Yes 4/30/2019

## 2019-05-01 RX ORDER — OXYCODONE AND ACETAMINOPHEN 10; 325 MG/1; MG/1
1 TABLET ORAL EVERY 6 HOURS PRN
Qty: 60 TABLET | Refills: 0 | Status: SHIPPED | OUTPATIENT
Start: 2019-05-01 | End: 2019-05-13

## 2019-05-03 DIAGNOSIS — G47.9 DISTURBANCE IN SLEEP BEHAVIOR: ICD-10-CM

## 2019-05-06 NOTE — TELEPHONE ENCOUNTER
Ellen calls to check on the status of this refill.  Ellen states she is out as of today and is asking if one of Dr Giles partners would approve in his absence.  Please call her and advise.

## 2019-05-06 NOTE — TELEPHONE ENCOUNTER
Requested Prescriptions   Pending Prescriptions Disp Refills     ALPRAZolam (XANAX) 0.5 MG tablet 40 tablet 0     Sig: TAKE 1 TABLET BY MOUTH EVERY SIX HOURS AS NEEDED FOR ANXIETY.       There is no refill protocol information for this order        Last Written Prescription Date:  4/26/19  Last Fill Quantity: 40,  # refills: 0   Last office visit: 3/13/2019 with prescribing provider:     Future Office Visit:      Routing refill request to provider for review/approval because:  Drug not on the [facility] [place] refill protocol     Olimpia Oakley RN

## 2019-05-07 RX ORDER — ALPRAZOLAM 0.5 MG
TABLET ORAL
Qty: 40 TABLET | Refills: 0 | Status: SHIPPED | OUTPATIENT
Start: 2019-05-07 | End: 2019-05-16

## 2019-05-07 NOTE — TELEPHONE ENCOUNTER
Ellen calls again today to check on this refill request.  Yesterday she asked in her message if another provider could approve this in Dr Giles absence.    Ellen states she ran out of her Alprazolam yesterday and today she is starting to feel very anxious.

## 2019-05-08 DIAGNOSIS — M19.90 INFLAMMATORY ARTHRITIS: ICD-10-CM

## 2019-05-08 DIAGNOSIS — J01.00 ACUTE NON-RECURRENT MAXILLARY SINUSITIS: Primary | ICD-10-CM

## 2019-05-08 RX ORDER — AMOXICILLIN 500 MG/1
500 CAPSULE ORAL 3 TIMES DAILY
Qty: 30 CAPSULE | Refills: 0 | Status: SHIPPED | OUTPATIENT
Start: 2019-05-08 | End: 2019-10-17

## 2019-05-08 NOTE — TELEPHONE ENCOUNTER
Reason for call:  Patient reporting a symptom    Symptom or request: cough/ congestion/ sinus pressure     Duration (how long have symptoms been present): since Sunday     Have you been treated for this before? No    Additional comments: Pt is wondering if Dr. Loya can call in an antibiotic? Or work her in today?   [facility]     Phone Number patient can be reached at:  Home number on file [phone] (home) or [phone] (cell)    Best Time:  Any     Can we leave a detailed message on this number:  YES    Call taken on 5/8/2019 at 9:45 AM by Sandra Leary

## 2019-05-10 RX ORDER — MORPHINE SULFATE 15 MG/1
15 TABLET, FILM COATED, EXTENDED RELEASE ORAL 2 TIMES DAILY
Qty: 60 TABLET | Refills: 0 | Status: SHIPPED | OUTPATIENT
Start: 2019-05-10 | End: 2019-06-06

## 2019-05-10 NOTE — TELEPHONE ENCOUNTER
Requested Prescriptions   Pending Prescriptions Disp Refills     morphine (MS CONTIN) 15 MG CR tablet 60 tablet 0     Sig: Take 1 tablet (15 mg) by mouth 2 times daily       There is no refill protocol information for this order        Last Written Prescription Date:  4/11/19  Last Fill Quantity: 60,  # refills: 0   Last office visit: 3/13/2019 with prescribing provider:     Future Office Visit:      Routing refill request to provider for review/approval because:  Drug not on the [facility] refill protocol     Olimpia Oakley RN

## 2019-05-13 DIAGNOSIS — R07.81 PLEURITIC CHEST PAIN: ICD-10-CM

## 2019-05-14 NOTE — TELEPHONE ENCOUNTER
Requested Prescriptions   Pending Prescriptions Disp Refills     oxyCODONE-acetaminophen (PERCOCET)  MG per tablet 60 tablet 0     Sig: Take 1 tablet by mouth every 6 hours as needed for moderate to severe pain       There is no refill protocol information for this order          Last Written Prescription Date:  5/1/2019  Last Fill Quantity: 60,  # refills: 0   Last office visit: 3/13/2019 with prescribing provider:     Future Office Visit:      Routing refill request to provider for review/approval because:  Drug not on the [facility] refill protocol     Olimpia Oakley RN

## 2019-05-15 RX ORDER — OXYCODONE AND ACETAMINOPHEN 10; 325 MG/1; MG/1
1 TABLET ORAL EVERY 6 HOURS PRN
Qty: 60 TABLET | Refills: 0 | Status: SHIPPED | OUTPATIENT
Start: 2019-05-16 | End: 2019-05-29

## 2019-05-16 DIAGNOSIS — G47.9 DISTURBANCE IN SLEEP BEHAVIOR: ICD-10-CM

## 2019-05-17 RX ORDER — ALPRAZOLAM 0.5 MG
TABLET ORAL
Qty: 40 TABLET | Refills: 0 | Status: SHIPPED | OUTPATIENT
Start: 2019-05-17 | End: 2019-05-29

## 2019-05-17 NOTE — TELEPHONE ENCOUNTER
Pending Prescriptions:                       Disp   Refills    ALPRAZolam (XANAX) 0.5 MG tablet           40 tab*0        Sig: TAKE 1 TABLET BY MOUTH EVERY SIX HOURS AS NEEDED FOR           ANXIETY.    Routing refill request to provider for review/approval because:  Drug not on the [facility] refill protocol

## 2019-05-24 DIAGNOSIS — R07.81 PLEURITIC CHEST PAIN: ICD-10-CM

## 2019-05-24 DIAGNOSIS — G47.9 DISTURBANCE IN SLEEP BEHAVIOR: ICD-10-CM

## 2019-05-24 NOTE — TELEPHONE ENCOUNTER
Requested Prescriptions   Pending Prescriptions Disp Refills     ALPRAZolam (XANAX) 0.5 MG tablet 40 tablet 0     Sig: TAKE 1 TABLET BY MOUTH EVERY SIX HOURS AS NEEDED FOR ANXIETY.       There is no refill protocol information for this order   Last Written Prescription Date:  5/17/2019  Last Fill Quantity: 40,  # refills: 0   Last office visit: 3/13/2019 with prescribing provider:     Future Office Visit:   Next 5 appointments (look out 90 days)    Jul 26, 2019 10:00 AM CDT  Return Visit with Caleb Arteaga MD  [facility] ([address] [address]  [phone]         Routing refill request to provider for review/approval because:  Drug not on the [facility] refill protocol            oxyCODONE-acetaminophen (PERCOCET)  MG per tablet 60 tablet 0     Sig: Take 1 tablet by mouth every 6 hours as needed for moderate to severe pain       There is no refill protocol information for this order      Last Written Prescription Date:  5/16/2019  Last Fill Quantity: 60,  # refills: 0   Last office visit: 3/13/2019 with prescribing provider:     Future Office Visit:   Next 5 appointments (look out 90 days)    Jul 26, 2019 10:00 AM CDT  Return Visit with Caleb Arteaga MD  [facility] ([address] [address]  [phone]         Routing refill request to provider for review/approval because:  Drug not on the [facility] refill protocol     Olimpia Oakley RN

## 2019-05-29 RX ORDER — OXYCODONE AND ACETAMINOPHEN 10; 325 MG/1; MG/1
1 TABLET ORAL EVERY 6 HOURS PRN
Qty: 60 TABLET | Refills: 0 | Status: SHIPPED | OUTPATIENT
Start: 2019-05-29 | End: 2019-06-12

## 2019-05-29 RX ORDER — ALPRAZOLAM 0.5 MG
TABLET ORAL
Qty: 40 TABLET | Refills: 0 | Status: SHIPPED | OUTPATIENT
Start: 2019-05-29 | End: 2019-06-06

## 2019-06-06 DIAGNOSIS — M19.90 INFLAMMATORY ARTHRITIS: ICD-10-CM

## 2019-06-06 DIAGNOSIS — G47.9 DISTURBANCE IN SLEEP BEHAVIOR: ICD-10-CM

## 2019-06-07 ENCOUNTER — TELEPHONE (OUTPATIENT)
Dept: NURSING | Facility: CLINIC | Age: 44
End: 2019-06-07

## 2019-06-07 NOTE — TELEPHONE ENCOUNTER
Requested Prescriptions   Pending Prescriptions Disp Refills     ALPRAZolam (XANAX) 0.5 MG tablet 40 tablet 0     Sig: TAKE 1 TABLET BY MOUTH EVERY SIX HOURS AS NEEDED FOR ANXIETY.       There is no refill protocol information for this order   Last Written Prescription Date:  5/29/2019  Last Fill Quantity: 40,  # refills: 0   Last office visit: 3/13/2019 with prescribing provider:     Future Office Visit:   Next 5 appointments (look out 90 days)    Jul 26, 2019 10:00 AM CDT  Return Visit with Caleb Arteaga MD  [facility] ([address] [address]  [phone]              morphine (MS CONTIN) 15 MG CR tablet 60 tablet 0     Sig: Take 1 tablet (15 mg) by mouth 2 times daily       There is no refill protocol information for this order      Last Written Prescription Date:  5/10/2019  Last Fill Quantity: 60,  # refills: 0   Last office visit: 3/13/2019 with prescribing provider:     Future Office Visit:   Next 5 appointments (look out 90 days)    Jul 26, 2019 10:00 AM CDT  Return Visit with Caleb Arteaga MD  [facility] ([address] [address]  [phone]         Routing refill request to provider for review/approval because:  Drug not on the [facility] refill protocol     Olimpia Oakley RN

## 2019-06-08 ENCOUNTER — MYC MEDICAL ADVICE (OUTPATIENT)
Dept: FAMILY MEDICINE | Facility: CLINIC | Age: 44
End: 2019-06-08

## 2019-06-08 NOTE — TELEPHONE ENCOUNTER
"\"I had called the clinic earlier regarding 2 refills. The person said she would walk them to the pharmacy, the pharmacy does not have them.\" Per epic RX still an open refill encounter.Advised to call MD on Monday 6/10.  Tatiana Fields RN [facility]      "

## 2019-06-10 DIAGNOSIS — R07.81 PLEURITIC CHEST PAIN: ICD-10-CM

## 2019-06-10 RX ORDER — MORPHINE SULFATE 15 MG/1
15 TABLET, FILM COATED, EXTENDED RELEASE ORAL 2 TIMES DAILY
Qty: 60 TABLET | Refills: 0 | Status: SHIPPED | OUTPATIENT
Start: 2019-06-10 | End: 2019-07-05

## 2019-06-10 RX ORDER — ALPRAZOLAM 0.5 MG
TABLET ORAL
Qty: 40 TABLET | Refills: 0 | Status: SHIPPED | OUTPATIENT
Start: 2019-06-10 | End: 2019-06-18

## 2019-06-11 NOTE — TELEPHONE ENCOUNTER
Requested Prescriptions   Pending Prescriptions Disp Refills     oxyCODONE-acetaminophen (PERCOCET)  MG per tablet 60 tablet 0     Sig: Take 1 tablet by mouth every 6 hours as needed for moderate to severe pain       There is no refill protocol information for this order        Last Written Prescription Date:  5/29/2019  Last Fill Quantity: 60,  # refills: 0   Last office visit: 3/13/2019 with prescribing provider:     Future Office Visit:   Next 5 appointments (look out 90 days)    Jul 26, 2019 10:00 AM CDT  [facility] Visit with Caleb [facility]aga [facility]  [facility] [address] [place] [id number] [phone]         Routing refill request to provider for review/approval because:  Drug not on the [facility] refill protocol     Olimpia Oakley RN

## 2019-06-12 RX ORDER — OXYCODONE AND ACETAMINOPHEN 10; 325 MG/1; MG/1
1 TABLET ORAL EVERY 6 HOURS PRN
Qty: 60 TABLET | Refills: 0 | Status: SHIPPED | OUTPATIENT
Start: 2019-06-12 | End: 2019-06-26

## 2019-06-12 RX ORDER — OXYCODONE AND ACETAMINOPHEN 10; 325 MG/1; MG/1
1 TABLET ORAL EVERY 6 HOURS PRN
Qty: 60 TABLET | Refills: 0 | Status: SHIPPED | OUTPATIENT
Start: 2019-06-12 | End: 2019-06-12

## 2019-06-12 NOTE — TELEPHONE ENCOUNTER
This didn't print either Dr. Loya per the pharmacy.  Please reprint and give me the script to give to our pharmacy.

## 2019-06-18 DIAGNOSIS — G47.9 DISTURBANCE IN SLEEP BEHAVIOR: ICD-10-CM

## 2019-06-20 RX ORDER — ALPRAZOLAM 0.5 MG
TABLET ORAL
Qty: 40 TABLET | Refills: 0 | Status: SHIPPED | OUTPATIENT
Start: 2019-06-20 | End: 2019-07-01

## 2019-06-20 NOTE — TELEPHONE ENCOUNTER
Requested Prescriptions   Pending Prescriptions Disp Refills     ALPRAZolam (XANAX) 0.5 MG tablet 40 tablet 0     Sig: TAKE 1 TABLET BY MOUTH EVERY SIX HOURS AS NEEDED FOR ANXIETY.       There is no refill protocol information for this order        Last Written Prescription Date:  6/10/2019  Last Fill Quantity: 40,  # refills: 0   Last office visit: 3/13/2019 with prescribing provider:  Vincenzo   Future Office Visit:   Next 5 appointments (look out 90 days)    Jul 26, 2019 10:00 AM CDT  [facility] Visit with C[facility]  [facility] [address] [place] [address] [phone]         Disturbance in sleep behavior [G47.9]   Routing refill request to provider for review/approval because:  Drug not on the [facility] refill protocol     Olimpia Oakley RN

## 2019-06-21 DIAGNOSIS — R07.81 PLEURITIC CHEST PAIN: ICD-10-CM

## 2019-06-25 NOTE — TELEPHONE ENCOUNTER
Controlled Substance Refill Request for percocet  Problem List Complete:    No   Last Written Prescription Date:  6/12/2019  Last Fill Quantity: 60,   # refills: 0  Last Office Visit with [facility] primary care provider: 3/13/2019    Future Office visit:   Next 5 appointments (look out 90 days)    Jul 26, 2019 10:00 AM CDT  Return Visit with Caleb Arteaga MD  [facility]) [address] [place] [id number] [phone]          Controlled substance agreement:   Encounter-Level CSA:    There are no encounter-level csa.     Patient-Level CSA:    Controlled Substance Agreement - Opioid - Scan on 3/13/2019 11:16 AM (below)           Last Urine Drug Screen:   Pain Drug SCR UR W RPTD Meds   Date Value Ref Range Status   03/13/2019 FINAL  Final     Comment:     (Note)  ====================================================================  TOXASSURE COMP DRUG ANALYSIS,UR  ====================================================================  Specimen Alert  Note:  Urinary creatinine is low; ability to detect some  drugs may be compromised.  Interpret results  with caution.  ====================================================================  Test                             Result       Flag       Units        Drug Present and Declared for Prescription Verification   Alprazolam                     228          EXPECTED   ng/mg creat   Alpha-hydroxyalprazolam        250          EXPECTED   ng/mg creat    Source of alprazolam is a scheduled prescription medication.    Alpha-hydroxyalprazolam is an expected metabolite of alprazolam.   Oxycodone                      6356         EXPECTED   ng/mg creat   Oxymorphone                    1122         EXPECTED   ng/mg creat   Noroxycodone                   01114        EXPECTED   ng/mg creat   Noroxymorphone                 628          EXPECTED   ng/mg creat    Sources of oxycodone are scheduled prescription medications.     Oxymorphone, noroxycodone, and noroxymorphone are expected    metabolites of oxycodone. Oxymorphone is also available as a    scheduled prescription medication.   Acetaminophen                  PRESENT      EXPECTED                Drug Present not Declared for Prescription Verification   Methamphetamine                45160        UNEXPECTED ng/mg creat   Amphetamine                    3117         UNEXPECTED ng/mg creat    Sources of methamphetamine include illicit sources, as a    scheduled prescription medication, as a metabolite of some    prescription drugs, or use of an l-methamphetamine inhaler.    Amphetamine is an expected metabolite of methamphetamine.    Amphetamine is also available as a schedule II prescription drug.   Oxazepam                       178          UNEXPECTED ng/mg creat    Oxazepam may be administered as a scheduled prescription    medication; it is also an expected metabolite of other    benzodiazepine drugs, including diazepam, chlordiazepoxide,    prazepam, clorazepate, halazepam, and temazepam.   Diphenhydramine                PRESENT      UNEXPECTED              Drug Absent but Declared for Prescription Verification   Morphine                       Not Detected UNEXPECTED ng/mg creat  ====================================================================  Test                      Result    Flag   Units      Ref Range        Creatinine              18        L      mg/dL      >=20            ====================================================================  Declared Medications:  The flagging and interpretation on this report are based on the  following declared medications.  Unexpected results may arise from  inaccuracies in the declared medications.  **Note: The testing scope of this panel includes these medications:  Alprazolam (Xanax)  Morphine (MS Contin)  Oxycodone (Percocet)  **Note: The testing scope of this panel does not include small to  moderate amounts of these reported  medications:  Acetaminophen (Percocet)  **Note: The testing scope of this panel does not include following  reported medications:  Digoxin  Furosemide (Lasix)  Omeprazole (Nexium)  Tadalafil  ====================================================================  For clinical consultation, please call [phone].  ====================================================================  Analysis performed by [employer] [place] [address]     , No results found for: COMDAT, No results found for: THC13, PCP13, COC13, MAMP13, OPI13, AMP13, BZO13, TCA13, MTD13, BAR13, OXY13, PPX13, BUP13       [email or web address]       checked in past 3 months?  Yes 4/30/2019

## 2019-06-26 RX ORDER — OXYCODONE AND ACETAMINOPHEN 10; 325 MG/1; MG/1
1 TABLET ORAL EVERY 6 HOURS PRN
Qty: 60 TABLET | Refills: 0 | Status: SHIPPED | OUTPATIENT
Start: 2019-06-26 | End: 2019-07-05

## 2019-06-26 NOTE — TELEPHONE ENCOUNTER
Ellen calls to check status of request.  She asks for a call when the hard copy has been brought to the [facility].

## 2019-06-27 DIAGNOSIS — G47.9 DISTURBANCE IN SLEEP BEHAVIOR: ICD-10-CM

## 2019-07-01 RX ORDER — ALPRAZOLAM 0.5 MG
TABLET ORAL
Qty: 40 TABLET | Refills: 0 | Status: SHIPPED | OUTPATIENT
Start: 2019-07-01 | End: 2019-07-08

## 2019-07-01 NOTE — TELEPHONE ENCOUNTER
Requested Prescriptions   Pending Prescriptions Disp Refills     ALPRAZolam (XANAX) 0.5 MG tablet 40 tablet 0     Sig: TAKE 1 TABLET BY MOUTH EVERY SIX HOURS AS NEEDED FOR ANXIETY.       There is no refill protocol information for this order        Last Written Prescription Date:  6/20/2019  Last Fill Quantity: 40,  # refills: 0   Last office visit: 3/13/2019 with prescribing provider:     Future Office Visit:   Next 5 appointments (look out 90 days)    Jul 26, 2019 10:00 AM CDT  Return Visit with Caleb Arteaga MD  [facility]) [address] [place] [address] [phone]         Disturbance in sleep behavior [G47.9]   Routing refill request to provider for review/approval because:  Drug not on the [facility] [place]     Olimpia Oakley RN

## 2019-07-05 ENCOUNTER — TELEPHONE (OUTPATIENT)
Dept: CARDIOLOGY | Facility: CLINIC | Age: 44
End: 2019-07-05

## 2019-07-05 DIAGNOSIS — R07.81 PLEURITIC CHEST PAIN: ICD-10-CM

## 2019-07-05 DIAGNOSIS — M19.90 INFLAMMATORY ARTHRITIS: ICD-10-CM

## 2019-07-05 NOTE — TELEPHONE ENCOUNTER
Called pt and was unable to reach pt. Left voicemail    Ivis Mejia  Clinic   Pulmonary Hypertension  [facility]  (P) [phone]

## 2019-07-05 NOTE — TELEPHONE ENCOUNTER
[facility]    Phone Message    May a detailed message be left on voicemail: yes    Reason for Call: Other: Ellen calling to request a reschedule of her 7/8 appointments for late next week or the week after.  She is out of town.  Please call her back to coordinate appointments     Action Taken: Message routed to:  [facility] ([facility]

## 2019-07-08 DIAGNOSIS — G47.9 DISTURBANCE IN SLEEP BEHAVIOR: ICD-10-CM

## 2019-07-08 RX ORDER — OXYCODONE AND ACETAMINOPHEN 10; 325 MG/1; MG/1
1 TABLET ORAL EVERY 6 HOURS PRN
Qty: 60 TABLET | Refills: 0 | Status: SHIPPED | OUTPATIENT
Start: 2019-07-08 | End: 2019-07-22

## 2019-07-08 RX ORDER — MORPHINE SULFATE 15 MG/1
15 TABLET, FILM COATED, EXTENDED RELEASE ORAL 2 TIMES DAILY
Qty: 60 TABLET | Refills: 0 | Status: SHIPPED | OUTPATIENT
Start: 2019-07-08 | End: 2019-08-05

## 2019-07-08 NOTE — TELEPHONE ENCOUNTER
MS Contin  Last Written Prescription Date:  6/10/19  Last Fill Quantity: 60,  # refills: 0   Last office visit: 3/13/2019 with prescribing provider:  Vincenzo   Percocet  Last Written Prescription Date:  6/26/2019  Last Fill Quantity: 60,  # refills: 0   Last office visit: 3/13/2019 with prescribing provider:  Vincenzo Jackson Office Visit:   Next 5 appointments (look out 90 days)    Jul 26, 2019 10:00 AM CDT  Return Visit with Caleb Arteaga MD  [facility] ([address] [address]  [phone]           Requested Prescriptions   Pending Prescriptions Disp Refills     morphine (MS CONTIN) 15 MG CR tablet 60 tablet 0     Sig: Take 1 tablet (15 mg) by mouth 2 times daily       There is no refill protocol information for this order        oxyCODONE-acetaminophen (PERCOCET)  MG per tablet 60 tablet 0     Sig: Take 1 tablet by mouth every 6 hours as needed for moderate to severe pain       There is no refill protocol information for this order        Routing refill request to provider for review/approval because:  Drug not on the [facility] refill protocol   Martha Pollock RN on 7/8/2019 at 10:40 AM

## 2019-07-09 NOTE — TELEPHONE ENCOUNTER
Pt contacted scheduling line and rescheduled appt for 7/15/19.     Ivis Mejia  Clinic   Pulmonary Hypertension  [facility]  (P) [phone]

## 2019-07-09 NOTE — TELEPHONE ENCOUNTER
Xanax  Last Written Prescription Date:  7/1/2019  Last Fill Quantity: 40,  # refills: 0   Last office visit: 3/13/2019 with prescribing provider:  Vincenzo Jackson Office Visit:   Next 5 appointments (look out 90 days)    Jul 26, 2019 10:00 AM CDT  Return Visit with Caleb Arteaga MD  [facility] ([facility]) [address] [place] [id number] [phone]           Requested Prescriptions   Pending Prescriptions Disp Refills     ALPRAZolam (XANAX) 0.5 MG tablet 40 tablet 0     Sig: TAKE 1 TABLET BY MOUTH EVERY SIX HOURS AS NEEDED FOR ANXIETY.       There is no refill protocol information for this order        Routing refill request to provider for review/approval because:  Drug not on the [facility] refill protocol     Martha Pollock RN on 7/9/2019 at 12:30 PM

## 2019-07-10 RX ORDER — ALPRAZOLAM 0.5 MG
TABLET ORAL
Qty: 40 TABLET | Refills: 0 | Status: SHIPPED | OUTPATIENT
Start: 2019-07-10 | End: 2019-07-17

## 2019-07-15 ENCOUNTER — OFFICE VISIT (OUTPATIENT)
Dept: CARDIOLOGY | Facility: CLINIC | Age: 44
End: 2019-07-15
Attending: NURSE PRACTITIONER
Payer: COMMERCIAL

## 2019-07-15 VITALS
DIASTOLIC BLOOD PRESSURE: 79 MMHG | WEIGHT: 111.2 LBS | HEART RATE: 72 BPM | SYSTOLIC BLOOD PRESSURE: 121 MMHG | BODY MASS INDEX: 20.46 KG/M2 | HEIGHT: 62 IN | OXYGEN SATURATION: 100 %

## 2019-07-15 DIAGNOSIS — I27.0 PRIMARY PULMONARY HYPERTENSION (H): Primary | ICD-10-CM

## 2019-07-15 DIAGNOSIS — I27.0 PRIMARY PULMONARY HYPERTENSION (H): ICD-10-CM

## 2019-07-15 DIAGNOSIS — R06.02 SOB (SHORTNESS OF BREATH): ICD-10-CM

## 2019-07-15 LAB
6 MIN WALK (FT): 1315 FT
6 MIN WALK (M): 401 M
ALBUMIN SERPL-MCNC: 3.6 G/DL (ref 3.4–5)
ALP SERPL-CCNC: 74 U/L (ref 40–150)
ALT SERPL W P-5'-P-CCNC: 13 U/L (ref 0–50)
ANION GAP SERPL CALCULATED.3IONS-SCNC: 6 MMOL/L (ref 3–14)
AST SERPL W P-5'-P-CCNC: 14 U/L (ref 0–45)
BILIRUB SERPL-MCNC: 0.3 MG/DL (ref 0.2–1.3)
BUN SERPL-MCNC: 10 MG/DL (ref 7–30)
CALCIUM SERPL-MCNC: 9.1 MG/DL (ref 8.5–10.1)
CHLORIDE SERPL-SCNC: 100 MMOL/L (ref 94–109)
CO2 SERPL-SCNC: 29 MMOL/L (ref 20–32)
CREAT SERPL-MCNC: 0.68 MG/DL (ref 0.52–1.04)
ERYTHROCYTE [DISTWIDTH] IN BLOOD BY AUTOMATED COUNT: 16.3 % (ref 10–15)
GFR SERPL CREATININE-BSD FRML MDRD: >90 ML/MIN/{1.73_M2}
GLUCOSE SERPL-MCNC: 86 MG/DL (ref 70–99)
HCT VFR BLD AUTO: 39 % (ref 35–47)
HGB BLD-MCNC: 11.6 G/DL (ref 11.7–15.7)
MCH RBC QN AUTO: 25.4 PG (ref 26.5–33)
MCHC RBC AUTO-ENTMCNC: 29.7 G/DL (ref 31.5–36.5)
MCV RBC AUTO: 85 FL (ref 78–100)
NT-PROBNP SERPL-MCNC: 602 PG/ML (ref 0–125)
PLATELET # BLD AUTO: 394 10E9/L (ref 150–450)
POTASSIUM SERPL-SCNC: 3.6 MMOL/L (ref 3.4–5.3)
PROT SERPL-MCNC: 8.3 G/DL (ref 6.8–8.8)
RBC # BLD AUTO: 4.57 10E12/L (ref 3.8–5.2)
SODIUM SERPL-SCNC: 135 MMOL/L (ref 133–144)
WBC # BLD AUTO: 10 10E9/L (ref 4–11)

## 2019-07-15 PROCEDURE — G0463 HOSPITAL OUTPT CLINIC VISIT: HCPCS | Mod: ZF

## 2019-07-15 PROCEDURE — 83880 ASSAY OF NATRIURETIC PEPTIDE: CPT | Performed by: INTERNAL MEDICINE

## 2019-07-15 PROCEDURE — 36415 COLL VENOUS BLD VENIPUNCTURE: CPT | Performed by: INTERNAL MEDICINE

## 2019-07-15 PROCEDURE — 99214 OFFICE O/P EST MOD 30 MIN: CPT | Mod: ZP | Performed by: NURSE PRACTITIONER

## 2019-07-15 PROCEDURE — 85027 COMPLETE CBC AUTOMATED: CPT | Performed by: INTERNAL MEDICINE

## 2019-07-15 PROCEDURE — 80053 COMPREHEN METABOLIC PANEL: CPT | Performed by: INTERNAL MEDICINE

## 2019-07-15 ASSESSMENT — PAIN SCALES - GENERAL: PAINLEVEL: NO PAIN (0)

## 2019-07-15 ASSESSMENT — MIFFLIN-ST. JEOR: SCORE: 1112.65

## 2019-07-15 NOTE — PROGRESS NOTES
"July 15, 2019      Ms. Ellen Loya is a pleasant 43 year old female with a past medical history of polymyositis, ILD, and pulmonary arterial hypertension.  She is currently on Adcirca monotherapy 40 mg daily, and she did not tolerate Tyvaso due to worsening VQ mismatch and hypoxia.    Today, she is accompanied by: no one; here alone    Current Symptoms  1. Any ER visits or hospital admissions since last appointment: No    2. Shortness of breath: No   3. Dizziness or lightheadedness: No  4. Any syncopal episodes or falls: No  5. Chest pain or chest tightness: No  6. Nausea, vomiting, diarrhea, constipation: No  7. Swelling in the legs: No  8. Bloating in the abdomen: No  9. PND; Waking up at night coughing, choking or SOB: No  10. Any medication intolerances: No  11. Reported headaches: No  12. Jaw pain or bone/joint pain: No  13. On IV Prostacyclin: No; current dose: N/A    14. Current level of activity: very active. Has been doing a lot of meditation and started yoga. Has had multiple questions on starting different herbal supplements: sea pascual and seaweed Chinese supplements.      PAST MEDICAL HISTORY:  Past Medical History:   Diagnosis Date     Acute bronchitis 06/05/07    Admit. Discharged 06/05/07     Anxiety      Arthritis     h/o \"seronegative rheumatoid arthritis\" since age 30, however no evidence of active arthritis by Rheum eval [id number]     ASCUS of cervix with negative high risk HPV 05/15/2014    neg HPV     ILD (interstitial lung disease) (H)     seen on chest CT 5-2011; methotrexate stopped 6-2011     Lung nodule     KM nodule; EBUS 12/2014 of lymph nodes was negative; CT-guided bx 1-2015 hamartoma/chondroma     Prematurity     born 6-7 weeks early     Pulmonary hypertension (H)     RHC 2/2016 mean PA 25     Varicella without mention of complication          FAMILY HISTORY:  Family History   Problem Relation Age of Onset     Arthritis Mother         psoriatic arthritis     Depression Mother      " Diabetes Mother      Thyroid Disease Mother      Obesity Mother      Hyperlipidemia Mother      Lipids Father      Heart Disease Father         recent angioplasty for 3 blocked arteries.     Substance Abuse Father      Hypertension Father      Cerebrovascular Disease Father      Hyperlipidemia Father      Coronary Artery Disease Father      LUNG DISEASE Father      Scleroderma Paternal Uncle         Had scleroderma ILD     Other Cancer Paternal Grandmother         lung cancer     Substance Abuse Brother      Depression Brother      Asthma Brother      Diabetes Maternal Grandfather         adult onset     Hyperlipidemia Maternal Grandfather          SOCIAL HISTORY:  Social History     Tobacco Use     Smoking status: Current Every Day Smoker     Packs/day: 0.50     Years: 25.00     Pack years: 12.50     Types: Cigarettes     Start date: 12/3/1992     Last attempt to quit: 2016     Years since quittin.5     Smokeless tobacco: Former User     Quit date: 2016     Tobacco comment: Started vaping and cut back on her cigarettes   Substance Use Topics     Alcohol use: No     Alcohol/week: 0.0 oz     Comment: 5 per month or less     Drug use: No         CURRENT MEDICATIONS:  Current Outpatient Medications   Medication Sig Dispense Refill     ALPRAZolam (XANAX) 0.5 MG tablet TAKE 1 TABLET BY MOUTH EVERY SIX HOURS AS NEEDED FOR ANXIETY. 40 tablet 0     digoxin (LANOXIN) 125 MCG tablet Take 1 tablet (125 mcg) by mouth daily 90 tablet 3     Esomeprazole Magnesium (NEXIUM PO) Take 40 mg by mouth every morning (before breakfast)       FLOVENT  MCG/ACT inhaler INHALE 2 PUFFS INTO THE LUNGS TWO TIMES A DAY 12 g 3     fluticasone (FLONASE) 50 MCG/ACT spray USE 2 SPRAYS IN EACH NOSTRIL DAILY 16 g 11     furosemide (LASIX) 20 MG tablet Take 2 tablets (40 mg) by mouth daily 180 tablet 3     morphine (MS CONTIN) 15 MG CR tablet Take 1 tablet (15 mg) by mouth 2 times daily 60 tablet 0     order for DME Oxygen:  "Patient requires supplemental Oxygen 4 LPM via nasal canula at rest and 6 LPM with activity. Please provide patient with portability capability. Okay to spot check patient on oxygen device, to keep sats above 90%. Please provider with home concentrator that can go up to 10 LPM. Oxygen will be for a lifetime. 1 Device 0     order for DME Please provide patient with 2  liquid oxygen tanks for portability. Spot check patient on liquid oxygen. Titrate oxygen to maintain saturations above 88%. 2 Device 0     oxyCODONE-acetaminophen (PERCOCET)  MG per tablet Take 1 tablet by mouth every 6 hours as needed for moderate to severe pain 60 tablet 0     potassium chloride ER (K-DUR/KLOR-CON M) 10 MEQ CR tablet Take 2 tablets (20 mEq) by mouth daily 180 tablet 3     tadalafil, PAH, (ADCIRCA) 20 MG TABS Take 2 tablets (40 mg) by mouth daily 60 tablet 10     VENTOLIN  (90 Base) MCG/ACT inhaler INHALE ONE TO TWO PUFFS INTO THE LUNGS EVERY 4 HOURS AS NEEDED FOR SHORTNESS OF BREATH / DYSPNEA 18 g 9     amoxicillin (AMOXIL) 500 MG capsule Take 1 capsule (500 mg) by mouth 3 times daily (Patient not taking: Reported on 7/15/2019) 30 capsule 0         ROS:   10 point ROS of systems including Constitutional, Eyes, Respiratory, Cardiovascular, Gastroenterology, Genitourinary, Integumentary, Muscularskeletal, Psychiatric were all negative except for pertinent positives noted in my HPI.    EXAM:  /79 (BP Location: Right arm, Patient Position: Chair, Cuff Size: Adult Regular)   Pulse 72   Ht 1.575 m (5' 2\")   Wt 50.4 kg (111 lb 3.2 oz)   SpO2 100%   BMI 20.34 kg/m    General: appears comfortable, alert and articulate  Head: normocephalic, atraumatic  Eyes: anicteric sclera, EOMI  Neck: no adenopathy  Orophyarynx: moist mucosa, no lesions, dentition intact  Heart: regular, S1/S2, no murmur, gallop, rub, estimated JVP non elevated  Lungs: clear, no rales or wheezing  Abdomen: soft, non-tender, bowel sounds present, no " hepatosplenomegaly  Extremities: no clubbing, cyanosis or edema  Neurological: normal speech and affect, no gross motor deficits      Weight  Wt Readings from Last 10 Encounters:   07/15/19 50.4 kg (111 lb 3.2 oz)   04/24/19 52.6 kg (116 lb)   04/17/19 52.6 kg (116 lb)   03/13/19 52.8 kg (116 lb 8 oz)   03/11/19 53 kg (116 lb 14.4 oz)   12/17/18 51.6 kg (113 lb 11.2 oz)   09/10/18 48.9 kg (107 lb 14.4 oz)   07/06/18 49 kg (108 lb)   05/16/18 49.2 kg (108 lb 8 oz)   01/30/18 52.1 kg (114 lb 12.8 oz)         Labs:    CBC RESULTS:  Lab Results   Component Value Date    WBC 10.0 07/15/2019    RBC 4.57 07/15/2019    HGB 11.6 (L) 07/15/2019    HCT 39.0 07/15/2019    MCV 85 07/15/2019    MCH 25.4 (L) 07/15/2019    MCHC 29.7 (L) 07/15/2019    RDW 16.3 (H) 07/15/2019     07/15/2019       CMP RESULTS:  Lab Results   Component Value Date     07/15/2019    POTASSIUM 3.6 07/15/2019    CHLORIDE 100 07/15/2019    CO2 29 07/15/2019    ANIONGAP 6 07/15/2019    GLC 86 07/15/2019    BUN 10 07/15/2019    CR 0.68 07/15/2019    GFRESTIMATED >90 07/15/2019    GFRESTBLACK >90 07/15/2019    MARCIN 9.1 07/15/2019    BILITOTAL 0.3 07/15/2019    ALBUMIN 3.6 07/15/2019    ALKPHOS 74 07/15/2019    ALT 13 07/15/2019    AST 14 07/15/2019        INR RESULTS:  Lab Results   Component Value Date    INR 1.04 01/23/2015       BNP RESULTS:  Lab Results   Component Value Date    NTBNPI 4,168 (H) 11/30/2016     No results found for: BNP      Recent Tests:      Echocardiogram (4/17/2019):  Interpretation Summary  Left ventricular size is normal.  The Ejection Fraction is estimated at 50-55%.  Right ventricular function, chamber size, wall motion, and thickness are  normal.  Right ventricular systolic pressure is 41mmHg above the right atrial pressure.  PV acceleration time 80ms.  The inferior vena cava is normal.  No pericardial effusion is present.     Left Ventricle  Left ventricular size is normal. Left ventricular wall thickness is  normal.  The Ejection Fraction is estimated at 50-55%. Left ventricular diastolic  function is indeterminate. No regional wall motion abnormalities are seen.     Right Ventricle  Right ventricular function, chamber size, wall motion, and thickness are  normal.     Atria  Mild to moderate left atrial enlargement is present. Mild right atrial  enlargement is present. The atrial septum is intact as assessed by color  Doppler .     Mitral Valve  The mitral valve is normal.        Aortic Valve  Aortic valve is normal in structure and function.     Tricuspid Valve  The tricuspid valve is normal. Trace tricuspid insufficiency is present. Right  ventricular systolic pressure is 41mmHg above the right atrial pressure. TR  velocity measured using an incomplete TR doppler envelope.     Pulmonic Valve  PV acceleration time 80ms. The pulmonic valve is normal.     Vessels  The aorta root is normal. The pulmonary artery is normal. The inferior vena  cava is normal.     Pericardium  No pericardial effusion is present.        Compared to Previous Study  There has been no change.        RHC (4/17/2019):  RA  A-wave: 5 mmHg  V-wave: 14 mmHg  Mean: 5 mmHg   HR: 58 bpm  RV  Systolic: 36 mmHg  End Diastolic: 5 mmHg  HR: 62 bpm  PA  Systolic:35 mmHg  Diasotlic: 15 mmHg  Mean: 22 mmHg  HR: 60 bpm  PCW  A-wave: 16 mmHg  V-wave: 20 mmHg  Mean: 15 mmHg  HR: 62 bpm    Cardiac Output  Measured VO2: 152  Measured CO Hernán: 3.87 L/min  Measured CI Hernán: 2.55 L/min/m2  CO TD: 4.6 L/min  CI TD: 3.03 L/min/m2  Vitals  BP: 107 mmHg / 61 mmHg  SpO2: 100 %    Resistance Metric  PVR ANGLIN: 2.31 ANGLIN/m2  Right sided filling pressures are normal.Left sided filling pressures are mildly elevated. Normal PA pressures.Normal cardiac output level.        Assessment and Plan:   Ms. Ellen Loya is a pleasant 43 year old female with a past medical history of polymyositis, ILD, and pulmonary arterial hypertension.  She is currently on Adcirca monotherapy 40 mg  daily, and she did not tolerate Tyvaso due to worsening VQ mismatch and hypoxia.    #PH Plan:  1. [facility] and is feeling well; VS and weights stable, labs unremarkable. She is euvolemic on physical exam. She was highly discouraged from adding herbal supplements to her medication regimen as their interactions with her current medications are unknown and not found in the literature.  Today her 6 minute walk test showed an improvement of greater than 60 meters with desaturation to 89% on 8L and 1 minute recovery.  2. She expressed interested in coming off of Xanax, which she is taking four times a day. She was advised to do under her PCP's guidance, however a taper was suggested that she decline the dose on a weekly basis.  3. Follow up in three months with repeat labs and 6 minute walk test.    It was a pleasure seeing Ms. Ellen Loya at the [facility].       Sincerely,  Gayatri Altamirano

## 2019-07-15 NOTE — NURSING NOTE
Chief Complaint   Patient presents with     Follow Up     return for three month follow up      Vitals were taken and medications were reconciled.   Winifred Schwartz  12:11 PM

## 2019-07-15 NOTE — LETTER
"7/15/2019      RE: Ellen Loya  [address]  [facility] [address]       Dear Colleague,    Thank you for the opportunity to participate in the care of your patient, Ellen Loya, at the [facility]. Please see a copy of my visit note below.    July 15, 2019      Ms. Ellen Loya is a pleasant 43 year old female with a past medical history of polymyositis, ILD, and pulmonary arterial hypertension.  She is currently on Adcirca monotherapy 40 mg daily, and she did not tolerate Tyvaso due to worsening VQ mismatch and hypoxia.    Today, she is accompanied by: no one; here alone    Current Symptoms  1. Any ER visits or hospital admissions since last appointment: No    2. Shortness of breath: No   3. Dizziness or lightheadedness: No  4. Any syncopal episodes or falls: No  5. Chest pain or chest tightness: No  6. Nausea, vomiting, diarrhea, constipation: No  7. Swelling in the legs: No  8. Bloating in the abdomen: No  9. PND; Waking up at night coughing, choking or SOB: No  10. Any medication intolerances: No  11. Reported headaches: No  12. Jaw pain or bone/joint pain: No  13. On IV Prostacyclin: No; current dose: N/A    14. Current level of activity: very active. Has been doing a lot of meditation and started yoga. Has had multiple questions on starting different herbal supplements: sea apscual and seaweed Chinese supplements.      PAST MEDICAL HISTORY:  Past Medical History:   Diagnosis Date     Acute bronchitis 06/05/07    Admit. Discharged 06/05/07     Anxiety      Arthritis     h/o \"seronegative rheumatoid arthritis\" since age 30, however no evidence of active arthritis by Rheum eval [id number]     ASCUS of cervix with negative high risk HPV 05/15/2014    neg HPV     ILD (interstitial lung disease) (H)     seen on chest CT 5-2011; methotrexate stopped 6-2011     Lung nodule     KM nodule; EBUS 12/2014 of lymph nodes was negative; CT-guided bx "  hamartoma/chondroma     Prematurity     born 6-7 weeks early     Pulmonary hypertension (H)     RHC 2016 mean PA 25     Varicella without mention of complication          FAMILY HISTORY:  Family History   Problem Relation Age of Onset     Arthritis Mother         psoriatic arthritis     Depression Mother      Diabetes Mother      Thyroid Disease Mother      Obesity Mother      Hyperlipidemia Mother      Lipids Father      Heart Disease Father         recent angioplasty for 3 blocked arteries.     Substance Abuse Father      Hypertension Father      Cerebrovascular Disease Father      Hyperlipidemia Father      Coronary Artery Disease Father      LUNG DISEASE Father      Scleroderma Paternal Uncle         Had scleroderma ILD     Other Cancer Paternal Grandmother         lung cancer     Substance Abuse Brother      Depression Brother      Asthma Brother      Diabetes Maternal Grandfather         adult onset     Hyperlipidemia Maternal Grandfather          SOCIAL HISTORY:  Social History     Tobacco Use     Smoking status: Current Every Day Smoker     Packs/day: 0.50     Years: 25.00     Pack years: 12.50     Types: Cigarettes     Start date: 12/3/1992     Last attempt to quit: 2016     Years since quittin.5     Smokeless tobacco: Former User     Quit date: 2016     Tobacco comment: Started vaping and cut back on her cigarettes   Substance Use Topics     Alcohol use: No     Alcohol/week: 0.0 oz     Comment: 5 per month or less     Drug use: No         CURRENT MEDICATIONS:  Current Outpatient Medications   Medication Sig Dispense Refill     ALPRAZolam (XANAX) 0.5 MG tablet TAKE 1 TABLET BY MOUTH EVERY SIX HOURS AS NEEDED FOR ANXIETY. 40 tablet 0     digoxin (LANOXIN) 125 MCG tablet Take 1 tablet (125 mcg) by mouth daily 90 tablet 3     Esomeprazole Magnesium (NEXIUM PO) Take 40 mg by mouth every morning (before breakfast)       FLOVENT  MCG/ACT inhaler INHALE 2 PUFFS INTO THE LUNGS TWO  "TIMES A DAY 12 g 3     fluticasone (FLONASE) 50 MCG/ACT spray USE 2 SPRAYS IN EACH NOSTRIL DAILY 16 g 11     furosemide (LASIX) 20 MG tablet Take 2 tablets (40 mg) by mouth daily 180 tablet 3     morphine (MS CONTIN) 15 MG CR tablet Take 1 tablet (15 mg) by mouth 2 times daily 60 tablet 0     order for DME Oxygen: Patient requires supplemental Oxygen 4 LPM via nasal canula at rest and 6 LPM with activity. Please provide patient with portability capability. Okay to spot check patient on oxygen device, to keep sats above 90%. Please provider with home concentrator that can go up to 10 LPM. Oxygen will be for a lifetime. 1 Device 0     order for DME Please provide patient with 2  liquid oxygen tanks for portability. Spot check patient on liquid oxygen. Titrate oxygen to maintain saturations above 88%. 2 Device 0     oxyCODONE-acetaminophen (PERCOCET)  MG per tablet Take 1 tablet by mouth every 6 hours as needed for moderate to severe pain 60 tablet 0     potassium chloride ER (K-DUR/KLOR-CON M) 10 MEQ CR tablet Take 2 tablets (20 mEq) by mouth daily 180 tablet 3     tadalafil, PAH, (ADCIRCA) 20 MG TABS Take 2 tablets (40 mg) by mouth daily 60 tablet 10     VENTOLIN  (90 Base) MCG/ACT inhaler INHALE ONE TO TWO PUFFS INTO THE LUNGS EVERY 4 HOURS AS NEEDED FOR SHORTNESS OF BREATH / DYSPNEA 18 g 9     amoxicillin (AMOXIL) 500 MG capsule Take 1 capsule (500 mg) by mouth 3 times daily (Patient not taking: Reported on 7/15/2019) 30 capsule 0         ROS:   10 point ROS of systems including Constitutional, Eyes, Respiratory, Cardiovascular, Gastroenterology, Genitourinary, Integumentary, Muscularskeletal, Psychiatric were all negative except for pertinent positives noted in my HPI.    EXAM:  /79 (BP Location: Right arm, Patient Position: Chair, Cuff Size: Adult Regular)   Pulse 72   Ht 1.575 m (5' 2\")   Wt 50.4 kg (111 lb 3.2 oz)   SpO2 100%   BMI 20.34 kg/m     General: appears comfortable, alert and " articulate  Head: normocephalic, atraumatic  Eyes: anicteric sclera, EOMI  Neck: no adenopathy  Orophyarynx: moist mucosa, no lesions, dentition intact  Heart: regular, S1/S2, no murmur, gallop, rub, estimated JVP non elevated  Lungs: clear, no rales or wheezing  Abdomen: soft, non-tender, bowel sounds present, no hepatosplenomegaly  Extremities: no clubbing, cyanosis or edema  Neurological: normal speech and affect, no gross motor deficits      Weight  Wt Readings from Last 10 Encounters:   07/15/19 50.4 kg (111 lb 3.2 oz)   04/24/19 52.6 kg (116 lb)   04/17/19 52.6 kg (116 lb)   03/13/19 52.8 kg (116 lb 8 oz)   03/11/19 53 kg (116 lb 14.4 oz)   12/17/18 51.6 kg (113 lb 11.2 oz)   09/10/18 48.9 kg (107 lb 14.4 oz)   07/06/18 49 kg (108 lb)   05/16/18 49.2 kg (108 lb 8 oz)   01/30/18 52.1 kg (114 lb 12.8 oz)         Labs:    CBC RESULTS:  Lab Results   Component Value Date    WBC 10.0 07/15/2019    RBC 4.57 07/15/2019    HGB 11.6 (L) 07/15/2019    HCT 39.0 07/15/2019    MCV 85 07/15/2019    MCH 25.4 (L) 07/15/2019    MCHC 29.7 (L) 07/15/2019    RDW 16.3 (H) 07/15/2019     07/15/2019       CMP RESULTS:  Lab Results   Component Value Date     07/15/2019    POTASSIUM 3.6 07/15/2019    CHLORIDE 100 07/15/2019    CO2 29 07/15/2019    ANIONGAP 6 07/15/2019    GLC 86 07/15/2019    BUN 10 07/15/2019    CR 0.68 07/15/2019    GFRESTIMATED >90 07/15/2019    GFRESTBLACK >90 07/15/2019    MARCIN 9.1 07/15/2019    BILITOTAL 0.3 07/15/2019    ALBUMIN 3.6 07/15/2019    ALKPHOS 74 07/15/2019    ALT 13 07/15/2019    AST 14 07/15/2019        INR RESULTS:  Lab Results   Component Value Date    INR 1.04 01/23/2015       BNP RESULTS:  Lab Results   Component Value Date    NTBNPI 4,168 (H) 11/30/2016     No results found for: BNP      Recent Tests:      Echocardiogram (4/17/2019):  Interpretation Summary  Left ventricular size is normal.  The Ejection Fraction is estimated at 50-55%.  Right ventricular function, chamber size,  wall motion, and thickness are  normal.  Right ventricular systolic pressure is 41mmHg above the right atrial pressure.  PV acceleration time 80ms.  The inferior vena cava is normal.  No pericardial effusion is present.     Left Ventricle  Left ventricular size is normal. Left ventricular wall thickness is normal.  The Ejection Fraction is estimated at 50-55%. Left ventricular diastolic  function is indeterminate. No regional wall motion abnormalities are seen.     Right Ventricle  Right ventricular function, chamber size, wall motion, and thickness are  normal.     Atria  Mild to moderate left atrial enlargement is present. Mild right atrial  enlargement is present. The atrial septum is intact as assessed by color  Doppler .     Mitral Valve  The mitral valve is normal.        Aortic Valve  Aortic valve is normal in structure and function.     Tricuspid Valve  The tricuspid valve is normal. Trace tricuspid insufficiency is present. Right  ventricular systolic pressure is 41mmHg above the right atrial pressure. TR  velocity measured using an incomplete TR doppler envelope.     Pulmonic Valve  PV acceleration time 80ms. The pulmonic valve is normal.     Vessels  The aorta root is normal. The pulmonary artery is normal. The inferior vena  cava is normal.     Pericardium  No pericardial effusion is present.        Compared to Previous Study  There has been no change.        RHC (4/17/2019):  RA  A-wave: 5 mmHg  V-wave: 14 mmHg  Mean: 5 mmHg   HR: 58 bpm  RV  Systolic: 36 mmHg  End Diastolic: 5 mmHg  HR: 62 bpm  PA  Systolic:35 mmHg  Diasotlic: 15 mmHg  Mean: 22 mmHg  HR: 60 bpm  PCW  A-wave: 16 mmHg  V-wave: 20 mmHg  Mean: 15 mmHg  HR: 62 bpm    Cardiac Output  Measured VO2: 152  Measured CO Hernán: 3.87 L/min  Measured CI Hernán: 2.55 L/min/m2  CO TD: 4.6 L/min  CI TD: 3.03 L/min/m2  Vitals  BP: 107 mmHg / 61 mmHg  SpO2: 100 %    Resistance Metric  PVR ANGLIN: 2.31 ANGLIN/m2  Right sided filling pressures are normal.Left sided  filling pressures are mildly elevated. Normal PA pressures.Normal cardiac output level.        Assessment and Plan:   Ms. Ellen Loya is a pleasant 43 year old female with a past medical history of polymyositis, ILD, and pulmonary arterial hypertension.  She is currently on Adcirca monotherapy 40 mg daily, and she did not tolerate Tyvaso due to worsening VQ mismatch and hypoxia.    #PH Plan:  1. Ms Ellen [facility] up and is feeling well; VS and weights stable, labs unremarkable. She is euvolemic on physical exam. She was highly discouraged from adding herbal supplements to her medication regimen as their interactions with her current medications are unknown and not found in the literature.  Today her 6 minute walk test showed an improvement of greater than 60 meters with desaturation to 89% on 8L and 1 minute recovery.  2. She expressed interested in coming off of Xanax, which she is taking four times a day. She was advised to do under her PCP's guidance, however a taper was suggested that she decline the dose on a weekly basis.  3. Follow up in three months with repeat labs and 6 minute walk test.    It was a pleasure seeing Ms. Ellen Loya at the [facility].       Sincerely,  Gayatri Altamirano

## 2019-07-15 NOTE — PATIENT INSTRUCTIONS
Medication Changes:   1. NO medication changes  2. To stop use of Xanax, do a slow taper with changes each week (IE, 4 tablets a day, to 3 tablets a day, to 2 tablets a day, to 1 tablet a day, to every other day, off).  3. Consult your PCP    Patient Instructions:  1. Continue staying active and eat a heart healthy diet.    2. Please keep current list of medications with you at all times.    3. Remember to weigh yourself daily after voiding and before you consume any food or beverages and log the numbers.  If you have gained 2 pounds overnight or 5 pounds in a week contact us immediately for medication adjustments or further instructions.    4. **Please call us immediately if you have any syncope (fainting or passing out), chest pain, edema (swelling or weight gain), or decline in your functional status (general decline in how you are feeling).    Follow up Appointment Information:  1. Follow up in three months with Dr. Ledesma, labs and a 6 minute walk test.    Results:  Results for TYREL SQUIRES (MRN [id number]) as of 7/15/2019 13:04   Ref. Range 7/15/2019 11:31   Sodium Latest Ref Range: 133 - 144 mmol/L 135   Potassium Latest Ref Range: 3.4 - 5.3 mmol/L 3.6   Chloride Latest Ref Range: 94 - 109 mmol/L 100   Carbon Dioxide Latest Ref Range: 20 - 32 mmol/L 29   Urea Nitrogen Latest Ref Range: 7 - 30 mg/dL 10   Creatinine Latest Ref Range: 0.52 - 1.04 mg/dL 0.68   GFR Estimate Latest Ref Range: >60 mL/min/1.73_m2 >90   GFR Estimate If Black Latest Ref Range: >60 mL/min/1.73_m2 >90   Calcium Latest Ref Range: 8.5 - 10.1 mg/dL 9.1   Anion Gap Latest Ref Range: 3 - 14 mmol/L 6   Albumin Latest Ref Range: 3.4 - 5.0 g/dL 3.6   Protein Total Latest Ref Range: 6.8 - 8.8 g/dL 8.3   Bilirubin Total Latest Ref Range: 0.2 - 1.3 mg/dL 0.3   Alkaline Phosphatase Latest Ref Range: 40 - 150 U/L 74   ALT Latest Ref Range: 0 - 50 U/L 13   AST Latest Ref Range: 0 - 45 U/L 14   N-Terminal Pro Bnp Latest Ref Range: 0 - 125 pg/mL  602 (H)   Glucose Latest Ref Range: 70 - 99 mg/dL 86   WBC Latest Ref Range: 4.0 - 11.0 10e9/L 10.0   Hemoglobin Latest Ref Range: 11.7 - 15.7 g/dL 11.6 (L)   Hematocrit Latest Ref Range: 35.0 - 47.0 % 39.0   Platelet Count Latest Ref Range: 150 - 450 10e9/L 394   RBC Count Latest Ref Range: 3.8 - 5.2 10e12/L 4.57   MCV Latest Ref Range: 78 - 100 fl 85   MCH Latest Ref Range: 26.5 - 33.0 pg 25.4 (L)   MCHC Latest Ref Range: 31.5 - 36.5 g/dL 29.7 (L)   RDW Latest Ref Range: 10.0 - 15.0 % 16.3 (H)     We are located on the third floor of [facility] on the [facility].  Our address is     [address] [place] [address] on [address] [place] [address]      Thank you for allowing us to be a part of your care here at the [facility]    If you have questions or concerns please contact us at:    Naty Lau RN, BSN    Louis Jack or Ivis Mejia (Schedule,Prior Auth)  Nurse Coordinator     Clinic   Pulmonary Hypertension   Pulmonary Hypertension  [facility]  [phone])[phone]    (P) [phone]        (F)[phone]                ** Please note that you will NOT receive a reminder call regarding your scheduled testing, reminder calls are for provider appointments only.  If you are scheduled for testing within the [facility] system you may receive a call regarding pre-registration for billing purposes only.**     Support Group:  Pulmonary Hypertension Association  Https://www.phassociation.org/  **Look at the Events Tab** They even have Support Groups that you can call into    [facility] PH Support Group  Second Saturday of the Month from 1-3 PM   Location: [address] [place] [address]  Leader: Vale Delgado and Claudia Canales  Phone: [phone] or [phone]  Email: [email or web address]

## 2019-07-17 DIAGNOSIS — G47.9 DISTURBANCE IN SLEEP BEHAVIOR: ICD-10-CM

## 2019-07-17 NOTE — TELEPHONE ENCOUNTER
Requested Prescriptions   Pending Prescriptions Disp Refills     ALPRAZolam (XANAX) 0.5 MG tablet 40 tablet 0     Sig: TAKE 1 TABLET BY MOUTH EVERY SIX HOURS AS NEEDED FOR ANXIETY.       There is no refill protocol information for this order        Last Written Prescription Date:  7/10/19  Last Fill Quantity: 40,  # refills: 0   Last office visit: 3/13/2019 with prescribing provider:     Future Office Visit:   Next 5 appointments (look out 90 days)    Jul 26, 2019 10:00 AM CDT  [facility] Visit with Caleb [facility]aga MD  [facility] [address] [place] [id number] [phone]         Routing refill request to provider for review/approval because:  Drug not on the [facility] refill protocol   ILIR MerinoN, RN

## 2019-07-18 LAB — FIO2-PRE: 50 %

## 2019-07-18 RX ORDER — ALPRAZOLAM 0.5 MG
TABLET ORAL
Qty: 40 TABLET | Refills: 0 | Status: SHIPPED | OUTPATIENT
Start: 2019-07-20 | End: 2019-07-29

## 2019-07-22 DIAGNOSIS — R07.81 PLEURITIC CHEST PAIN: ICD-10-CM

## 2019-07-23 RX ORDER — OXYCODONE AND ACETAMINOPHEN 10; 325 MG/1; MG/1
1 TABLET ORAL EVERY 6 HOURS PRN
Qty: 60 TABLET | Refills: 0 | Status: SHIPPED | OUTPATIENT
Start: 2019-07-25 | End: 2019-08-07

## 2019-07-23 NOTE — TELEPHONE ENCOUNTER
Requested Prescriptions   Pending Prescriptions Disp Refills     oxyCODONE-acetaminophen (PERCOCET)  MG per tablet 60 tablet 0     Sig: Take 1 tablet by mouth every 6 hours as needed for moderate to severe pain       There is no refill protocol information for this order        Last Written Prescription Date:  7/8/2019  Last Fill Quantity: 60,  # refills: 0   Last office visit: 3/13/2019 with prescribing provider:     Future Office Visit:   Next 5 appointments (look out 90 days)    Jul 26, 2019 10:00 AM CDT  Return Visit with Caleb [facility]aga [facility]  [facility] [address] [place] [id number] [phone]         Pleuritic chest pain [R07.81]   Routing refill request to provider for review/approval because:  Drug not on the [facility] refill protocol     Olimpia Oakley RN

## 2019-07-24 ENCOUNTER — TELEPHONE (OUTPATIENT)
Dept: FAMILY MEDICINE | Facility: CLINIC | Age: 44
End: 2019-07-24

## 2019-07-24 NOTE — TELEPHONE ENCOUNTER
Prior Authorization Retail Medication Request    Medication/Dose: percocet needs pa for qty and day supply   ICD code (if different than what is on RX):  na  Previously Tried and Failed:  na  Rationale:  na    Insurance Name:   [facility]   Insurance ID:  [id number]  Group: [id number]  Bin [id number]  Pcn: [id number]  Phone: [phone]      Pharmacy Information (if different than what is on RX)  Name:  josé miguel  Phone:  Na

## 2019-07-29 DIAGNOSIS — G47.9 DISTURBANCE IN SLEEP BEHAVIOR: ICD-10-CM

## 2019-07-29 NOTE — TELEPHONE ENCOUNTER
Prior Authorization Approval    Authorization Effective Date: 6/29/2019  Authorization Expiration Date: 7/28/2020  Medication: percocet needs pa for qty and day supply -APPROVED  Approved Dose/Quantity:   Reference #:     Insurance Company: [employer] - Phone [phone] Fax [phone]  Expected CoPay:       CoPay Card Available:      Foundation Assistance Needed:    Which Pharmacy is filling the prescription (Not needed for infusion/clinic administered): [facility] [address]   Pharmacy Notified: Yes  Patient Notified: No    Pharmacy will notify patient when medication is ready.

## 2019-07-31 RX ORDER — ALPRAZOLAM 0.5 MG
TABLET ORAL
Qty: 40 TABLET | Refills: 0 | Status: SHIPPED | OUTPATIENT
Start: 2019-07-31 | End: 2019-08-09

## 2019-07-31 NOTE — TELEPHONE ENCOUNTER
Controlled Substance Refill Request for alprazolam  Problem List Complete:    No   Last Written Prescription Date:  7/20/2019  Last Fill Quantity: 40,   # refills: 0  Last Office Visit with [place] primary care provider: 3/13/2019    Future Office visit:     Controlled substance agreement:   Encounter-Level CSA:    There are no encounter-level csa.     Patient-Level CSA:    Controlled Substance Agreement - Opioid - Scan on 3/13/2019 11:16 AM (below)           Last Urine Drug Screen:   Pain Drug SCR UR W RPTD Meds   Date Value Ref Range Status   03/13/2019 FINAL  Final     Comment:     (Note)  ====================================================================  TOXASSURE COMP DRUG ANALYSIS,UR  ====================================================================  Specimen Alert  Note:  Urinary creatinine is low; ability to detect some  drugs may be compromised.  Interpret results  with caution.  ====================================================================  Test                             Result       Flag       Units        Drug Present and Declared for Prescription Verification   Alprazolam                     228          EXPECTED   ng/mg creat   Alpha-hydroxyalprazolam        250          EXPECTED   ng/mg creat    Source of alprazolam is a scheduled prescription medication.    Alpha-hydroxyalprazolam is an expected metabolite of alprazolam.   Oxycodone                      6356         EXPECTED   ng/mg creat   Oxymorphone                    1122         EXPECTED   ng/mg creat   Noroxycodone                   75852        EXPECTED   ng/mg creat   Noroxymorphone                 628          EXPECTED   ng/mg creat    Sources of oxycodone are scheduled prescription medications.    Oxymorphone, noroxycodone, and noroxymorphone are expected    metabolites of oxycodone. Oxymorphone is also available as a    scheduled prescription medication.   Acetaminophen                  PRESENT      EXPECTED                Drug  Present not Declared for Prescription Verification   Methamphetamine                58310        UNEXPECTED ng/mg creat   Amphetamine                    3117         UNEXPECTED ng/mg creat    Sources of methamphetamine include illicit sources, as a    scheduled prescription medication, as a metabolite of some    prescription drugs, or use of an l-methamphetamine inhaler.    Amphetamine is an expected metabolite of methamphetamine.    Amphetamine is also available as a schedule II prescription drug.   Oxazepam                       178          UNEXPECTED ng/mg creat    Oxazepam may be administered as a scheduled prescription    medication; it is also an expected metabolite of other    benzodiazepine drugs, including diazepam, chlordiazepoxide,    prazepam, clorazepate, halazepam, and temazepam.   Diphenhydramine                PRESENT      UNEXPECTED              Drug Absent but Declared for Prescription Verification   Morphine                       Not Detected UNEXPECTED ng/mg creat  ====================================================================  Test                      Result    Flag   Units      Ref Range        Creatinine              18        L      mg/dL      >=20            ====================================================================  Declared Medications:  The flagging and interpretation on this report are based on the  following declared medications.  Unexpected results may arise from  inaccuracies in the declared medications.  **Note: The testing scope of this panel includes these medications:  Alprazolam (Xanax)  Morphine (MS Contin)  Oxycodone (Percocet)  **Note: The testing scope of this panel does not include small to  moderate amounts of these reported medications:  Acetaminophen (Percocet)  **Note: The testing scope of this panel does not include following  reported medications:  Digoxin  Furosemide (Lasix)  Omeprazole  (Nexium)  Tadalafil  ====================================================================  For clinical consultation, please call [phone].  ====================================================================  Analysis performed by [employer] [place] [address]     , No results found for: COMDAT, No results found for: THC13, PCP13, COC13, MAMP13, OPI13, AMP13, BZO13, TCA13, MTD13, BAR13, OXY13, PPX13, BUP13         [email or web address]       checked in past 3 months?  Yes 4/30/2019

## 2019-08-05 ENCOUNTER — TELEPHONE (OUTPATIENT)
Dept: FAMILY MEDICINE | Facility: CLINIC | Age: 44
End: 2019-08-05

## 2019-08-05 DIAGNOSIS — M19.90 INFLAMMATORY ARTHRITIS: ICD-10-CM

## 2019-08-05 RX ORDER — MORPHINE SULFATE 15 MG/1
15 TABLET, FILM COATED, EXTENDED RELEASE ORAL 2 TIMES DAILY
Qty: 60 TABLET | Refills: 0 | Status: SHIPPED | OUTPATIENT
Start: 2019-08-05 | End: 2019-09-04

## 2019-08-05 RX ORDER — MORPHINE SULFATE 15 MG/1
15 TABLET, FILM COATED, EXTENDED RELEASE ORAL 2 TIMES DAILY
Qty: 60 TABLET | Refills: 0 | Status: CANCELLED | OUTPATIENT
Start: 2019-08-05

## 2019-08-05 NOTE — TELEPHONE ENCOUNTER
Prior Authorization Retail Medication Request    Medication/Dose: morphine sulfate   ICD code (if different than what is on RX): na  Previously Tried and Failed:  na  Rationale:  Na     Insurance Name:  [facility]   Insurance ID:  [id number]  Group: [id number]  Pcn: [id number]  Phone: [phone]      Pharmacy Information (if different than what is on RX)  Name:  deandra  Phone:  [place]

## 2019-08-05 NOTE — TELEPHONE ENCOUNTER
MS Contin 15 MG       Last Written Prescription Date:  7/8/19  Last Fill Quantity: 60,   # refills: 0  Last Office Visit: 3/13/19  Future Office visit:       Routing refill request to provider for review/approval because:  Drug not on the [facility], P or [facility] refill protocol or controlled substance

## 2019-08-08 ENCOUNTER — TELEPHONE (OUTPATIENT)
Dept: FAMILY MEDICINE | Facility: CLINIC | Age: 44
End: 2019-08-08

## 2019-08-08 NOTE — TELEPHONE ENCOUNTER
Prior Authorization Retail Medication Request    Medication/Dose: morphine er 15mg  ICD code (if different than what is on RX):     Previously Tried and Failed:     Rationale:       Insurance Name:   [facility]  Insurance ID:  [id number]      Pharmacy Information (if different than what is on RX)  Name:     Phone:

## 2019-08-09 DIAGNOSIS — G47.9 DISTURBANCE IN SLEEP BEHAVIOR: ICD-10-CM

## 2019-08-09 NOTE — TELEPHONE ENCOUNTER
Patient calling states that her insurance needs more information for the prior Authorization. Patient states they faxed over a form today, and if it gets done right away patient can get this Rx today. Fax number to sent form #[phone] Attn: Sandra Phone number for Sandra #[phone], please call to advise

## 2019-08-12 RX ORDER — ALPRAZOLAM 0.5 MG
TABLET ORAL
Qty: 40 TABLET | Refills: 0 | Status: SHIPPED | OUTPATIENT
Start: 2019-08-12 | End: 2019-08-21

## 2019-08-12 NOTE — TELEPHONE ENCOUNTER
Alprazolam 0.5 MG       Last Written Prescription Date:  7/31/19  Last Fill Quantity: 40,   # refills: 0  Last Office Visit: 3/13/19  Future Office visit:       Routing refill request to provider for review/approval because:  Drug not on the [facility], [facility] or [facility] refill protocol or controlled substance

## 2019-08-13 NOTE — TELEPHONE ENCOUNTER
Central Prior Authorization Team   Phone: [phone]      PA NOT NEEDED  Medication: morphine sulfate -ALREADY APPROVED  Insurance Company: [employer] - Phone [phone] Fax [phone]  Pharmacy Filling the Rx: [facility] [place], [place] - [address] [place] [address]   Filling Pharmacy Phone: [phone]  Filling Pharmacy Fax:    Start Date: 8/13/2019    Another request was completed and approved, please see telephone encounter 8/8/19.

## 2019-08-21 DIAGNOSIS — G47.9 DISTURBANCE IN SLEEP BEHAVIOR: ICD-10-CM

## 2019-08-21 RX ORDER — ALPRAZOLAM 0.5 MG
TABLET ORAL
Qty: 40 TABLET | Refills: 0 | Status: SHIPPED | OUTPATIENT
Start: 2019-08-21 | End: 2019-08-30

## 2019-08-21 NOTE — TELEPHONE ENCOUNTER
Requested Prescriptions   Pending Prescriptions Disp Refills     ALPRAZolam (XANAX) 0.5 MG tablet 40 tablet 0     Sig: TAKE 1 TABLET BY MOUTH EVERY SIX HOURS AS NEEDED FOR ANXIETY.       There is no refill protocol information for this order        Last Written Prescription Date:  8/12/2019  Last Fill Quantity: 40,  # refills: 0   Last office visit: 3/13/2019 with prescribing provider:     Future Office Visit:      Disturbance in sleep behavior [G47.9]     Routing refill request to provider for review/approval because:  Drug not on the [facility] refill protocol     Olimpia Oakley RN

## 2019-08-22 DIAGNOSIS — R07.81 PLEURITIC CHEST PAIN: ICD-10-CM

## 2019-08-22 RX ORDER — OXYCODONE AND ACETAMINOPHEN 10; 325 MG/1; MG/1
1 TABLET ORAL EVERY 6 HOURS PRN
Qty: 60 TABLET | Refills: 0 | Status: SHIPPED | OUTPATIENT
Start: 2019-08-22 | End: 2019-09-05

## 2019-08-30 DIAGNOSIS — G47.9 DISTURBANCE IN SLEEP BEHAVIOR: ICD-10-CM

## 2019-08-30 RX ORDER — ALPRAZOLAM 0.5 MG
TABLET ORAL
Qty: 40 TABLET | Refills: 0 | Status: SHIPPED | OUTPATIENT
Start: 2019-08-30 | End: 2019-09-09

## 2019-08-30 NOTE — TELEPHONE ENCOUNTER
Requested Prescriptions   Pending Prescriptions Disp Refills     ALPRAZolam (XANAX) 0.5 MG tablet 40 tablet 0     Sig: TAKE 1 TABLET BY MOUTH EVERY SIX HOURS AS NEEDED FOR ANXIETY.       There is no refill protocol information for this order        Last Written Prescription Date:  8/21/2019  Last Fill Quantity: 40,  # refills: 0   Last office visit: 3/13/2019 with prescribing provider:     Future Office Visit:      Disturbance in sleep behavior [G47.9]     Routing refill request to provider for review/approval because:  Drug not on the [facility] refill protocol     Olimpia Oakley RN

## 2019-09-04 DIAGNOSIS — M19.90 INFLAMMATORY ARTHRITIS: ICD-10-CM

## 2019-09-05 DIAGNOSIS — R07.81 PLEURITIC CHEST PAIN: ICD-10-CM

## 2019-09-05 RX ORDER — OXYCODONE AND ACETAMINOPHEN 10; 325 MG/1; MG/1
1 TABLET ORAL EVERY 6 HOURS PRN
Qty: 60 TABLET | Refills: 0 | Status: SHIPPED | OUTPATIENT
Start: 2019-09-05 | End: 2019-09-18

## 2019-09-05 RX ORDER — MORPHINE SULFATE 15 MG/1
15 TABLET, FILM COATED, EXTENDED RELEASE ORAL 2 TIMES DAILY
Qty: 60 TABLET | Refills: 0 | Status: SHIPPED | OUTPATIENT
Start: 2019-09-05 | End: 2019-10-02

## 2019-09-05 NOTE — TELEPHONE ENCOUNTER
Percocet  MG       Last Written Prescription Date:  8/22/19  Last Fill Quantity: 60,   # refills: 0  Last Office Visit: 3/13/19  Future Office visit:       Routing refill request to provider for review/approval because:  Drug not on the [facility], [facility] or [facility] refill protocol or controlled substance

## 2019-09-05 NOTE — TELEPHONE ENCOUNTER
MS Contin 15 MG       Last Written Prescription Date:  8/5/19  Last Fill Quantity: 60,   # refills: 0  Last Office Visit: 3/13/19  Future Office visit:       Routing refill request to provider for review/approval because:  Drug not on the [facility], P or [facility] refill protocol or controlled substance

## 2019-09-09 ENCOUNTER — TELEPHONE (OUTPATIENT)
Dept: FAMILY MEDICINE | Facility: CLINIC | Age: 44
End: 2019-09-09

## 2019-09-09 DIAGNOSIS — G47.9 DISTURBANCE IN SLEEP BEHAVIOR: ICD-10-CM

## 2019-09-09 RX ORDER — ALPRAZOLAM 0.5 MG
TABLET ORAL
Qty: 40 TABLET | Refills: 0 | Status: SHIPPED | OUTPATIENT
Start: 2019-09-09 | End: 2019-09-18

## 2019-09-09 NOTE — TELEPHONE ENCOUNTER
Left message for patient to call back and speak with any .    Thank you,  Ivis Fagan   for [facility]

## 2019-09-09 NOTE — TELEPHONE ENCOUNTER
Alprazolam 0.5 MG       Last Written Prescription Date:  8/30/19  Last Fill Quantity: 40,   # refills: 0  Last Office Visit: 3/13/19  Future Office visit:       Routing refill request to provider for review/approval because:  Drug not on the [facility], [facility] or [facility] refill protocol or controlled substance

## 2019-09-09 NOTE — TELEPHONE ENCOUNTER
Reason for Call:  Other prescription    Detailed comments: Ellen states she has a sinus infection and wondering if PCP can call something in for her? If yes, please send to [facility].     Phone Number Patient can be reached at: Cell number on file:    Telephone Information:   Mobile [phone]       Best Time: any     Can we leave a detailed message on this number? YES    Call taken on 9/9/2019 at 1:45 PM by Erica Mckeon

## 2019-09-18 DIAGNOSIS — G47.9 DISTURBANCE IN SLEEP BEHAVIOR: ICD-10-CM

## 2019-09-18 DIAGNOSIS — R07.81 PLEURITIC CHEST PAIN: ICD-10-CM

## 2019-09-18 RX ORDER — ALPRAZOLAM 0.5 MG
TABLET ORAL
Qty: 40 TABLET | Refills: 0 | Status: SHIPPED | OUTPATIENT
Start: 2019-09-18 | End: 2019-09-26

## 2019-09-18 NOTE — TELEPHONE ENCOUNTER
Alprazolam      Last Written Prescription Date:  9/09/2019  Last Fill Quantity: 40,   # refills: 0  Last Office Visit: 3/13/2019  Future Office visit:       Routing refill request to provider for review/approval because:  Drug not on the [facility], [facility] or [facility] refill protocol or controlled substance

## 2019-09-19 RX ORDER — OXYCODONE AND ACETAMINOPHEN 10; 325 MG/1; MG/1
1 TABLET ORAL EVERY 6 HOURS PRN
Qty: 60 TABLET | Refills: 0 | Status: SHIPPED | OUTPATIENT
Start: 2019-09-23 | End: 2019-10-07

## 2019-09-19 NOTE — TELEPHONE ENCOUNTER
Percocet  MG       Last Written Prescription Date:  9-5-19  Last Fill Quantity: 60,   # refills: 0  Last Office Visit: 3/13/19  Future Office visit:       Routing refill request to provider for review/approval because:  Drug not on the [facility], [facility] or [facility] refill protocol or controlled substance

## 2019-09-26 DIAGNOSIS — G47.9 DISTURBANCE IN SLEEP BEHAVIOR: ICD-10-CM

## 2019-09-26 RX ORDER — ALPRAZOLAM 0.5 MG
TABLET ORAL
Qty: 40 TABLET | Refills: 0 | Status: SHIPPED | OUTPATIENT
Start: 2019-09-26 | End: 2019-10-07

## 2019-09-26 NOTE — TELEPHONE ENCOUNTER
Requested Prescriptions   Pending Prescriptions Disp Refills     ALPRAZolam (XANAX) 0.5 MG tablet 40 tablet 0     Sig: TAKE 1 TABLET BY MOUTH EVERY SIX HOURS AS NEEDED FOR ANXIETY.       There is no refill protocol information for this order        Last Written Prescription Date:  9/18/2019  Last Fill Quantity: 40,  # refills: 0   Last office visit: 3/13/2019 with prescribing provider:     Future Office Visit:      Disturbance in sleep behavior [G47.9]     Routing refill request to provider for review/approval because:  Drug not on the [facility] refill protocol     Olimpia Oakley RN

## 2019-09-27 ENCOUNTER — TELEPHONE (OUTPATIENT)
Dept: PHARMACY | Facility: CLINIC | Age: 44
End: 2019-09-27

## 2019-09-27 NOTE — TELEPHONE ENCOUNTER
Walked to [facility].  Christina Carney on 9/27/2019 at 10:12 AM

## 2019-09-27 NOTE — TELEPHONE ENCOUNTER
Called patient for schedule 9 AM phone visit to review medications. Left VM stating I would call back in 10-15 minutes. Attempted patient again around 9:15 AM but still no answer. Left second voicemail with [facility] Scheduling Line for patient to call.    Routing to [facility] Coordinators as MADHU.    Miguelito Rasmussen, UlicesD, [facility]  Medication Therapy Management Pharmacist  Pager: [phone]

## 2019-09-28 ENCOUNTER — HEALTH MAINTENANCE LETTER (OUTPATIENT)
Age: 44
End: 2019-09-28

## 2019-10-02 DIAGNOSIS — M19.90 INFLAMMATORY ARTHRITIS: ICD-10-CM

## 2019-10-02 RX ORDER — MORPHINE SULFATE 15 MG/1
15 TABLET, FILM COATED, EXTENDED RELEASE ORAL 2 TIMES DAILY
Qty: 60 TABLET | Refills: 0 | Status: SHIPPED | OUTPATIENT
Start: 2019-10-02 | End: 2019-10-31

## 2019-10-02 NOTE — TELEPHONE ENCOUNTER
Morphine      Last Written Prescription Date:  9/05/2019  Last Fill Quantity: 60,   # refills: 0  Last Office Visit: 3/13/2019  Future Office visit:       Routing refill request to provider for review/approval because:  Drug not on the [facility], P or [facility] refill protocol or controlled substance

## 2019-10-04 DIAGNOSIS — R07.81 PLEURITIC CHEST PAIN: ICD-10-CM

## 2019-10-07 DIAGNOSIS — G47.9 DISTURBANCE IN SLEEP BEHAVIOR: ICD-10-CM

## 2019-10-07 RX ORDER — OXYCODONE AND ACETAMINOPHEN 10; 325 MG/1; MG/1
1 TABLET ORAL EVERY 6 HOURS PRN
Qty: 60 TABLET | Refills: 0 | Status: SHIPPED | OUTPATIENT
Start: 2019-10-07 | End: 2019-10-17

## 2019-10-07 RX ORDER — ALPRAZOLAM 0.5 MG
TABLET ORAL
Qty: 40 TABLET | Refills: 0 | Status: SHIPPED | OUTPATIENT
Start: 2019-10-07 | End: 2019-10-16

## 2019-10-07 NOTE — TELEPHONE ENCOUNTER
Alprazolam 0.5 MG       Last Written Prescription Date:  9-26-19  Last Fill Quantity: 40,   # refills: 0  Last Office Visit: 3/13/19  Future Office visit:       Routing refill request to provider for review/approval because:  Drug not on the [facility], [facility] or [facility] refill protocol or controlled substance

## 2019-10-07 NOTE — TELEPHONE ENCOUNTER
Percocet  MG       Last Written Prescription Date:  9-23-19  Last Fill Quantity: 60,   # refills: 0  Last Office Visit: 3/13/19  Future Office visit:       Routing refill request to provider for review/approval because:  Drug not on the [facility], [facility] or [facility] refill protocol or controlled substance

## 2019-10-17 ENCOUNTER — ALLIED HEALTH/NURSE VISIT (OUTPATIENT)
Dept: PHARMACY | Facility: CLINIC | Age: 44
End: 2019-10-17
Payer: COMMERCIAL

## 2019-10-17 DIAGNOSIS — J84.9 ILD (INTERSTITIAL LUNG DISEASE) (H): ICD-10-CM

## 2019-10-17 DIAGNOSIS — K21.9 GASTROESOPHAGEAL REFLUX DISEASE, ESOPHAGITIS PRESENCE NOT SPECIFIED: ICD-10-CM

## 2019-10-17 DIAGNOSIS — F41.9 ANXIETY: ICD-10-CM

## 2019-10-17 DIAGNOSIS — M19.90 INFLAMMATORY ARTHRITIS: ICD-10-CM

## 2019-10-17 DIAGNOSIS — R07.81 PLEURITIC CHEST PAIN: ICD-10-CM

## 2019-10-17 DIAGNOSIS — I27.0 PRIMARY PULMONARY HYPERTENSION (H): Primary | ICD-10-CM

## 2019-10-17 PROCEDURE — 99607 MTMS BY PHARM ADDL 15 MIN: CPT | Performed by: PHARMACIST

## 2019-10-17 PROCEDURE — 99605 MTMS BY PHARM NP 15 MIN: CPT | Performed by: PHARMACIST

## 2019-10-17 RX ORDER — OXYCODONE AND ACETAMINOPHEN 10; 325 MG/1; MG/1
1 TABLET ORAL EVERY 6 HOURS PRN
Qty: 60 TABLET | Refills: 0 | Status: SHIPPED | OUTPATIENT
Start: 2019-10-21 | End: 2019-10-31

## 2019-10-17 NOTE — TELEPHONE ENCOUNTER
Percocet  MG       Last Written Prescription Date:  10/17/19  Last Fill Quantity: 60,   # refills: 0  Last Office Visit: 3/13/19  Future Office visit:       Routing refill request to provider for review/approval because:  Drug not on the [facility], [facility] or [facility] refill protocol or controlled substance

## 2019-10-17 NOTE — PROGRESS NOTES
SUBJECTIVE/OBJECTIVE:                           Anali Loya is a 43 year old female called for an initial visit for Medication Therapy Management.  She was referred to me from her [employer] insurance plan.    Chief Complaint: Comprehensive Medication Review.    Allergies/ADRs: Reviewed in Epic  Tobacco: Pt has started to try and quit smoking this week. She has been cutting down recently.   Alcohol: Social History    Substance and Sexual Activity      Alcohol use: No        Alcohol/week: 0.0 standard drinks        Comment: 5 per month or less    Caffeine: 1 cups/day of coffee  Activity: yoga - goes out in nature to meditate  PMH: Reviewed in Epic    Medication Adherence/Access:  Patient uses pill box(es).  Patient takes medications 2 time(s) per day.   Per patient, misses medication 0 times per week.   Medication barriers: none.   The patient fills medications at [place]: YES.     ILD/Pulm HTN: Currently taking digoxin 125 mcg daily, esomeprazole 40 mg daily,, furosemide 40 mg daily, tadalafil 40 mg daily, and Ventolin HFA PRN - zero to four times per day (odors or smoke can contribute to this use). Tries to eat potassium rich foods to avoid taking potassium supplement. Pt has found that she has been able to walk farther without tiring since starting the tadalafil.  Pt is also trying energy self healing - she believes that Eastern medicine can play a role in her getting better.  She is a level 1 Raki master - primarily for self healing, does meditation, and yoga.  She has also considered what herbals may be helpful for her, but does not plan on starting anything at this time.  She is using 3 to 4 L of oxygen/min at rest and 6 to 8 L of oxygen when walking. Pt reports some heartburn when starting tadalafil, but this is okay now that she is on PPI.      Inflammatory Arthritis: Currently taking morphine ER 15 mg twice daily and Percocet  mg every 6 hours PRN. Pt finds this to be somewhat effective -  states she has grown tolerant to her current dose, but does not want to take more. Pt reports no known side effects.     Anxiety: Currently taking alprazolam 1 mg twice daily. Pt finds this to be effective. States that Prozac worked well for her about 20 years ago, but she had tried Wellbutrin and Zoloft since then and it was not helpful.  She is thinking about making a change/trying to wean down dose in the future, but she is trying to quit smoking currently and does not want her anxiety/stress to increase. Pt reports no known side effects.     GERD: Current medications include: Nexium (esomeprazole) 40 mg once daily. Pt c/o no current symptoms.  Patient feels that current regimen is effective.    Vitals: There were no vitals taken for this visit. - telephone encounter, no vitals      ASSESSMENT:                              Medication Adherence: good, no issues identified    ILD/Pulm HTN: Improved per patient since starting tadalafil.      Inflammatory Arthritis: Stable per patient. Could potentially consider supplementing small dose of acetaminophen to see if this helps with arthritis control.     Anxiety: Stable per patient. Would likely benefit from change to serotonin specific reuptake inhibitor to control symptoms versus scheduling benzodiazepine, but may want to make changes once patient has been smoke free for longer.     GERD: Stable.       PLAN:                            1. Pt to add acetaminophen 325 mg with each Percocet  mg dose.     I spent 45 minutes with this patient today. A copy of the visit note was provided to the patient's primary care provider.    Will follow up in 6 months or sooner if needed.    The patient was sent via [employer] a summary of these recommendations as an after visit summary.     Miguelito Rasmussen, UlicesD, BCACP  Medication Therapy Management Pharmacist  Pager: [phone]

## 2019-10-18 NOTE — TELEPHONE ENCOUNTER
Ms contin      Last Written Prescription Date: 08/23/2017  Last Fill Quantity: 60,  # refills: 0   Last Office Visit with [facility], [facility] or [facility] prescribing provider: 06/28/2017      Thank You,  Pavan Stanley, Pharmacy [facility] [place]                                                  Yes

## 2019-10-31 DIAGNOSIS — M19.90 INFLAMMATORY ARTHRITIS: ICD-10-CM

## 2019-10-31 DIAGNOSIS — R07.81 PLEURITIC CHEST PAIN: ICD-10-CM

## 2019-10-31 NOTE — TELEPHONE ENCOUNTER
MS contin      Last Written Prescription Date:  10/2/19  Last Fill Quantity: 60,   # refills: 0  Last Office Visit: 3/13/19  Future Office visit:         percocet      Last Written Prescription Date:  10/21/19  Last Fill Quantity: 60,   # refills: 0  Last Office Visit: 3/13/19  Future Office visit:       Routing refill request to provider for review/approval because:  Drug not on the [facility], [facility] or [facility] refill protocol or controlled substance    Lesley Hale RN on 10/31/2019 at 2:52 PM

## 2019-11-04 RX ORDER — OXYCODONE AND ACETAMINOPHEN 10; 325 MG/1; MG/1
TABLET ORAL
Qty: 60 TABLET | Refills: 0 | Status: SHIPPED | OUTPATIENT
Start: 2019-11-04 | End: 2019-11-14

## 2019-11-04 RX ORDER — MORPHINE SULFATE 15 MG/1
TABLET, FILM COATED, EXTENDED RELEASE ORAL
Qty: 60 TABLET | Refills: 0 | Status: SHIPPED | OUTPATIENT
Start: 2019-11-04 | End: 2019-11-26

## 2019-12-02 DIAGNOSIS — G47.9 DISTURBANCE IN SLEEP BEHAVIOR: ICD-10-CM

## 2019-12-02 NOTE — TELEPHONE ENCOUNTER
Alprazolam 0.5 MG       Last Written Prescription Date:  11/27/19  Last Fill Quantity: 40,   # refills: 0  Last Office Visit: 3/13/19  Future Office visit:       Routing refill request to provider for review/approval because:  Drug not on the [facility], [facility] or [facility] refill protocol or controlled substance

## 2019-12-03 DIAGNOSIS — I27.20 PULMONARY HYPERTENSION (H): Primary | ICD-10-CM

## 2019-12-03 RX ORDER — DIGOXIN 125 MCG
125 TABLET ORAL DAILY
Qty: 90 TABLET | Refills: 0 | Status: SHIPPED | OUTPATIENT
Start: 2019-12-03 | End: 2020-03-16

## 2019-12-03 NOTE — TELEPHONE ENCOUNTER
digoxin (LANOXIN) 125 MCG tablet      Last Written Prescription Date:  11/27/18  Last Fill Quantity: 90 tab,   # refills: 3  Last Office Visit : 7/15/19  Future Office visit:  1/13/20    Lab(s) past due-Digoxin level.  Justine refill 90 days and FYI to Cardiology.

## 2019-12-04 RX ORDER — ALPRAZOLAM 0.5 MG
TABLET ORAL
Qty: 40 TABLET | Refills: 0 | Status: SHIPPED | OUTPATIENT
Start: 2019-12-04 | End: 2019-12-12

## 2019-12-04 NOTE — TELEPHONE ENCOUNTER
Medication Refill double check:    Last office visit was on 7/15/19 with Gayatri Altamirano CNP.    Follow up was recommended for 3 months. (Overdue)    Any additional encounters with changes to requested med? no    Authorizing provider is: Dr. Callie Norwood was approved.     Additional orders/notes: Staff msg was sent to Coordinators to call patient for appt w/ ADRIANNA.    Dig lab order was placed for next appt.    Jada Mcallister RN on 12/4/2019 at 8:01 AM

## 2019-12-09 DIAGNOSIS — I27.20 PULMONARY HYPERTENSION (H): ICD-10-CM

## 2019-12-09 RX ORDER — TADALAFIL 20 MG/1
40 TABLET ORAL DAILY
Qty: 60 TABLET | Refills: 11 | Status: SHIPPED | OUTPATIENT
Start: 2019-12-09 | End: 2020-02-24

## 2019-12-20 DIAGNOSIS — G47.9 DISTURBANCE IN SLEEP BEHAVIOR: ICD-10-CM

## 2019-12-20 NOTE — TELEPHONE ENCOUNTER
Alprazolam      Last Written Prescription Date:  12/14/2019  Last Fill Quantity: 40,   # refills: 0  Last Office Visit: 3/13/2019  Future Office visit:       Routing refill request to provider for review/approval because:  Drug not on the [facility], P or [facility] refill protocol or controlled substance

## 2019-12-23 RX ORDER — ALPRAZOLAM 0.5 MG
TABLET ORAL
Qty: 40 TABLET | Refills: 0 | Status: SHIPPED | OUTPATIENT
Start: 2019-12-23 | End: 2019-12-30

## 2019-12-24 ENCOUNTER — OFFICE VISIT (OUTPATIENT)
Dept: URGENT CARE | Facility: RETAIL CLINIC | Age: 44
End: 2019-12-24
Payer: COMMERCIAL

## 2019-12-24 VITALS
HEART RATE: 72 BPM | TEMPERATURE: 97.5 F | SYSTOLIC BLOOD PRESSURE: 122 MMHG | OXYGEN SATURATION: 95 % | DIASTOLIC BLOOD PRESSURE: 71 MMHG

## 2019-12-24 DIAGNOSIS — J01.90 ACUTE SINUSITIS WITH SYMPTOMS > 10 DAYS: Primary | ICD-10-CM

## 2019-12-24 PROCEDURE — 99213 OFFICE O/P EST LOW 20 MIN: CPT | Performed by: INTERNAL MEDICINE

## 2019-12-24 RX ORDER — DOXYCYCLINE 100 MG/1
100 CAPSULE ORAL 2 TIMES DAILY
Qty: 20 CAPSULE | Refills: 0 | Status: SHIPPED | OUTPATIENT
Start: 2019-12-24 | End: 2020-06-05

## 2019-12-24 NOTE — PROGRESS NOTES
[facility]         Elmo Denise MD, MPH  12/24/2019        History:      Anali Loya is a pleasant 44 year old year old female with a chief complaint of  Nasal congestion,facial and sinus pressure and pain since 2 weeks ago.   No fever or chills.   No dyspnea or chest pain.    Hx of smoking ( 1/2 pack of cigs daily).   interstitial lung disease ( oxygen dependent).  No headache or neck pain.  No GI or  symptoms.   No MSK symptoms.         Assessment and Plan:         Acute sinusitis with symptoms > 10 days  - doxycycline hyclate (VIBRAMYCIN) 100 MG capsule; Take 1 capsule (100 mg) by mouth 2 times daily for 10 days  Dispense: 20 capsule; Refill: 0  Discussed the potential adverse effects of doxcycline with the patient including: gastrointestinal symptoms and photosensitivity. Advised the patient to drink plenty of fluids while taking this medication and to avoid laying flat for 1 hour after taking the medication.   Discussed supportive care with the patient.  Advised to increase fluid intake and rest.  F/u w PCP in 4-5 days, earlier if symptoms worsen.                   Physical Exam:      /71   Pulse 72   Temp 97.5  F (36.4  C) (Oral)   SpO2 95%      Constitutional: Patient is in no distress The patient is pleasant and cooperative.   HEENT: Head:  Head is atraumatic, normocephalic.    Eyes: Pupils are equal, round and reactive to light and accomodation.  Sclera is non-icteric. No conjunctival injection, or exudate noted. Extraocular motion is intact. Visual acuity is intact bilaterally.  Ears:  External acoustic canals are patent and clear.  There is no erythema and bulging( exudate)  of the ( R/L ) tympanic membrane(s ).   Nose:  Patient has nasal oxygen canula. Nasal congestion w/o drainage or mucosal ulceration is noted. The patient experiences pain upon gentle percussion of malar and maxillary prominences . No pain upon palpation of mastoid processes.  Throat:  Oral  mucosa is moist.  No oral lesions are noted.  No posterior pharyngeal hyperemia nor exudate noted.     Neck Supple.  There is no cervical lymphadenopathy.  No nuchal rigidity noted.  There is no meningismus.     Cardiovascular: Heart is regular to rate and rhythm.  No murmur is noted.     Lungs: Clear in the anterior and posterior pulmonary fields.    Abdomen: Soft and non-tender.    Back No flank tenderness is noted.   Extremeties No edema, no calf tenderness.   Neuro: No focal deficit.   Skin No petechiae or purpura is noted.  There is no rash.   Mood Normal              Data:      All new lab and imaging data was reviewed.   No results found for any visits on 12/24/19.

## 2020-01-13 ENCOUNTER — MEDICAL CORRESPONDENCE (OUTPATIENT)
Dept: HEALTH INFORMATION MANAGEMENT | Facility: CLINIC | Age: 45
End: 2020-01-13

## 2020-01-14 ENCOUNTER — TELEPHONE (OUTPATIENT)
Dept: CARDIOLOGY | Facility: CLINIC | Age: 45
End: 2020-01-14

## 2020-01-14 NOTE — TELEPHONE ENCOUNTER
PA Initiation    Medication: Tadalafil 20mg  Insurance Company: [employer] - Phone [phone] Fax [phone]  Pharmacy Filling the Rx: [facility] - [place], [place] - [facility] [address]    Start Date: 1/14/2020    *Prior authorization form completed and faxed to [facility] for expedited review along with letter of medical necessity, right heart catheterization reports, and clinic note.

## 2020-01-16 NOTE — TELEPHONE ENCOUNTER
Prior Authorization Approval    Authorization Effective Date: 12/15/2019  Authorization Expiration Date: 1/15/2021  Medication: Tadalafil 20mg - Approved  Approved Dose/Quantity: 60 tablets/30 days  Reference #: [id number]   Insurance Company: [employer] - Phone [phone] Fax [phone]  Expected CoPay:       CoPay Card Available:      Foundation Assistance Needed:    Which Pharmacy is filling the prescription (Not needed for infusion/clinic administered): [facility] - [place], [place] - [address]   ------------------------  Insurance: [employer]  BIN: [id number]  PCN: [id number]  ID#: [id number]  GRP#: [id number]

## 2020-01-23 DIAGNOSIS — R07.81 PLEURITIC CHEST PAIN: ICD-10-CM

## 2020-01-23 RX ORDER — OXYCODONE AND ACETAMINOPHEN 10; 325 MG/1; MG/1
TABLET ORAL
Qty: 60 TABLET | Refills: 0 | Status: SHIPPED | OUTPATIENT
Start: 2020-01-28 | End: 2020-02-07

## 2020-01-23 NOTE — TELEPHONE ENCOUNTER
Percocet  MG       Last Written Prescription Date:  1/13/2020  Last Fill Quantity: 60,   # refills: 0  Last Office Visit: 3/13/19  Future Office visit:       Routing refill request to provider for review/approval because:  Drug not on the [facility], [facility] or [facility] refill protocol or controlled substance

## 2020-01-27 DIAGNOSIS — M19.90 INFLAMMATORY ARTHRITIS: ICD-10-CM

## 2020-01-27 RX ORDER — MORPHINE SULFATE 15 MG/1
TABLET, FILM COATED, EXTENDED RELEASE ORAL
Qty: 60 TABLET | Refills: 0 | Status: SHIPPED | OUTPATIENT
Start: 2020-01-27 | End: 2020-02-24

## 2020-01-27 NOTE — TELEPHONE ENCOUNTER
Morphine  15 MG      Last Written Prescription Date:  12/30/19  Last Fill Quantity: 60,   # refills: 0  Last Office Visit: 3/13/19  Future Office visit:       Routing refill request to provider for review/approval because:  Drug not on the [facility], [facility] or [facility] refill protocol or controlled substance

## 2020-01-27 NOTE — TELEPHONE ENCOUNTER
The pharmacy was called and asked about the correct  date. The pharmacy verified that tomorrow is the correct date based on the last  date. They hold the patient's to the day.

## 2020-02-20 ENCOUNTER — TELEPHONE (OUTPATIENT)
Dept: CARDIOLOGY | Facility: CLINIC | Age: 45
End: 2020-02-20

## 2020-02-20 DIAGNOSIS — I27.20 PULMONARY HYPERTENSION (H): Primary | ICD-10-CM

## 2020-02-20 DIAGNOSIS — R06.02 SOB (SHORTNESS OF BREATH): ICD-10-CM

## 2020-02-20 NOTE — TELEPHONE ENCOUNTER
Patient scheduled for March 12 at 3 PM with labs prior. Sunitha Tadeo RN on 2/21/2020 at 10:59 AM  ________________________________________    Called and LM with patient to call me back to reschedule her missed appointment with Dr. Ledesma. Sunitha Tadeo RN on 2/20/2020 at 11:47 AM

## 2020-02-21 ENCOUNTER — MYC MEDICAL ADVICE (OUTPATIENT)
Dept: CARDIOLOGY | Facility: CLINIC | Age: 45
End: 2020-02-21

## 2020-02-24 DIAGNOSIS — G47.9 DISTURBANCE IN SLEEP BEHAVIOR: ICD-10-CM

## 2020-02-24 DIAGNOSIS — M19.90 INFLAMMATORY ARTHRITIS: ICD-10-CM

## 2020-02-24 DIAGNOSIS — I27.20 PULMONARY HYPERTENSION (H): ICD-10-CM

## 2020-02-24 RX ORDER — MORPHINE SULFATE 15 MG/1
TABLET, FILM COATED, EXTENDED RELEASE ORAL
Qty: 60 TABLET | Refills: 0 | Status: SHIPPED | OUTPATIENT
Start: 2020-02-24 | End: 2020-03-19

## 2020-02-24 RX ORDER — ALPRAZOLAM 0.5 MG
TABLET ORAL
Qty: 40 TABLET | Refills: 0 | Status: SHIPPED | OUTPATIENT
Start: 2020-02-24 | End: 2020-03-08

## 2020-02-24 RX ORDER — TADALAFIL 20 MG/1
40 TABLET ORAL DAILY
Qty: 60 TABLET | Refills: 1 | Status: SHIPPED | OUTPATIENT
Start: 2020-02-24 | End: 2020-03-31

## 2020-02-24 NOTE — TELEPHONE ENCOUNTER
I will route to a covering provider as Dr. Loya is out of the clinic. Farida De CMA ([facility])

## 2020-02-24 NOTE — TELEPHONE ENCOUNTER
Morphine      Last Written Prescription Date:  1/27/2020  Last Fill Quantity: 60,   # refills: 0  Last Office Visit: 3/13/2019  Future Office visit:       Routing refill request to provider for review/approval because:  Drug not on the [facility], P or [facility] refill protocol or controlled substance    Alprazolam      Sent to pharmacy on 2/20/2020. Refuse refill request.

## 2020-02-27 ENCOUNTER — TELEPHONE (OUTPATIENT)
Dept: FAMILY MEDICINE | Facility: CLINIC | Age: 45
End: 2020-02-27

## 2020-02-27 NOTE — TELEPHONE ENCOUNTER
Reason for Call:  Other prescription    Detailed comments: Patient is calling stating that she thinks she has a sinus infection and would like someone to prescribe her antibiotics because she has a lot of pain and pressure.   Please advise    Phone Number Patient can be reached at: Home number on file [phone] (home)    Best Time: anytime    Can we leave a detailed message on this number? YES    Call taken on 2/27/2020 at 10:13 AM by Madi R. Seipel Vaccari

## 2020-02-27 NOTE — TELEPHONE ENCOUNTER
Dr. Loya is not in clinic and no provider will prescribe without her being seen.  She hasn't been seen by Dr. Loya since March 2019 and is supposed to be seen every 3 to 6 months because of the medications she is on. Please offer her an appointment

## 2020-03-06 DIAGNOSIS — G47.9 DISTURBANCE IN SLEEP BEHAVIOR: ICD-10-CM

## 2020-03-06 NOTE — TELEPHONE ENCOUNTER
Alprazolam 0.5 MG       Last Written Prescription Date:  2/24/2020  Last Fill Quantity: 40,   # refills: 0  Last Office Visit: 3/13/19  Future Office visit:       Routing refill request to provider for review/approval because:  Drug not on the [facility], [facility] or [facility] refill protocol or controlled substance

## 2020-03-08 RX ORDER — ALPRAZOLAM 0.5 MG
TABLET ORAL
Qty: 40 TABLET | Refills: 0 | Status: SHIPPED | OUTPATIENT
Start: 2020-03-08 | End: 2020-03-17

## 2020-03-11 ENCOUNTER — OFFICE VISIT (OUTPATIENT)
Dept: FAMILY MEDICINE | Facility: CLINIC | Age: 45
End: 2020-03-11
Payer: COMMERCIAL

## 2020-03-11 VITALS
BODY MASS INDEX: 20.7 KG/M2 | HEIGHT: 62 IN | TEMPERATURE: 97.7 F | WEIGHT: 112.5 LBS | DIASTOLIC BLOOD PRESSURE: 62 MMHG | HEART RATE: 78 BPM | SYSTOLIC BLOOD PRESSURE: 110 MMHG | OXYGEN SATURATION: 98 % | RESPIRATION RATE: 12 BRPM

## 2020-03-11 DIAGNOSIS — J84.10 FIBROSIS OF LUNG (H): ICD-10-CM

## 2020-03-11 DIAGNOSIS — J01.90 ACUTE NON-RECURRENT SINUSITIS, UNSPECIFIED LOCATION: Primary | ICD-10-CM

## 2020-03-11 DIAGNOSIS — B37.31 CANDIDIASIS OF VAGINA: ICD-10-CM

## 2020-03-11 PROCEDURE — 99214 OFFICE O/P EST MOD 30 MIN: CPT | Performed by: FAMILY MEDICINE

## 2020-03-11 RX ORDER — FLUCONAZOLE 100 MG/1
100 TABLET ORAL DAILY
Qty: 7 TABLET | Refills: 0 | Status: SHIPPED | OUTPATIENT
Start: 2020-03-11 | End: 2020-06-05

## 2020-03-11 ASSESSMENT — MIFFLIN-ST. JEOR: SCORE: 1113.55
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
## 2020-03-15 DIAGNOSIS — I27.20 PULMONARY HYPERTENSION (H): ICD-10-CM

## 2020-03-15 RX ORDER — DIGOXIN 125 MCG
125 TABLET ORAL DAILY
Qty: 90 TABLET | Refills: 0 | Status: CANCELLED | OUTPATIENT
Start: 2020-03-15

## 2020-03-16 DIAGNOSIS — I27.20 PULMONARY HYPERTENSION (H): ICD-10-CM

## 2020-03-16 RX ORDER — DIGOXIN 125 MCG
125 TABLET ORAL DAILY
Qty: 90 TABLET | Refills: 0 | Status: SHIPPED | OUTPATIENT
Start: 2020-03-16 | End: 2020-06-16

## 2020-03-16 RX ORDER — FUROSEMIDE 20 MG
40 TABLET ORAL DAILY
Qty: 180 TABLET | Refills: 3 | Status: SHIPPED | OUTPATIENT
Start: 2020-03-16 | End: 2020-11-02

## 2020-03-16 NOTE — TELEPHONE ENCOUNTER
Called and informed patient her ERx was sent to her desired pharmacy. Sunitha Tadeo RN on 3/16/2020 at 3:37 PM      [facility] Call Center    Phone Message    May a detailed message be left on voicemail: yes     Reason for Call: Other: pt calling because she is still waiting on her refill. She is completely out the rx. Please call the pt bsck to discuss.      Action Taken: Message routed to:  [facility] cardiology    Travel Screening: Not Applicable

## 2020-03-16 NOTE — TELEPHONE ENCOUNTER
digoxin (LANOXIN) 125 MCG tablet  90 tablet  0  3/16/2020   No    Sig - Route: Take 1 tablet (125 mcg) by mouth daily - Oral    Sent to pharmacy as: digoxin (LANOXIN) 125 MCG tablet    Class: E-Prescribe    Order: [id number]    E-Prescribing Status: Receipt confirmed by pharmacy (3/16/2020  3:26 PM CDT)        Medication sent to pharm for Pt care  3/16/2020.    Alyce Anaya RN  Central Triage Red Flags/Med Refills

## 2020-03-30 DIAGNOSIS — I27.20 PULMONARY HYPERTENSION (H): ICD-10-CM

## 2020-03-31 RX ORDER — TADALAFIL 20 MG/1
40 TABLET ORAL DAILY
Qty: 60 TABLET | Refills: 3 | Status: SHIPPED | OUTPATIENT
Start: 2020-03-31 | End: 2020-07-29

## 2020-03-31 NOTE — TELEPHONE ENCOUNTER
tadalafil, PAH, (ADCIRCA) 20 MG TABS       Last Written Prescription Date:  2/24/2020  Last Fill Quantity: 60,   # refills: 1  Last Office Visit : 7/15/2019  Future Office visit:  none    Routing refill request to provider for review/approval because:  Medication not on the Cardiology refill protocol.

## 2020-03-31 NOTE — TELEPHONE ENCOUNTER
Medication Refill double check:    Last office visit was on 7/15/19 with Gayatri Altamirano CNP.    Follow up was recommended for 3 months. (Overdue)    Any additional encounters with changes to requested med? no    Authorizing provider is: Dr. Ledesma    Limited refill was approved.     Additional orders/notes: patient will be scheduled for a virtual visit in lieu of the COVID -19 precautions.  patient was provided enough medication until July 2020 which equals 1 year since she was seen.  I have updated NAREN Saleh as well.       Jada Mcallister RN on 3/31/2020 at 8:32 AM

## 2020-04-01 ENCOUNTER — TELEPHONE (OUTPATIENT)
Dept: CARDIOLOGY | Facility: CLINIC | Age: 45
End: 2020-04-01

## 2020-04-01 NOTE — TELEPHONE ENCOUNTER
"Called and confirmed with  that patient will be able to speak with Dr. Ledesma on April 6th at 11 AM. They do not have access to video so it will have to be a telephone visit. Sunitha Tadeo RN on 4/1/2020 at 8:14 AM    Staff message sent to [facility] to schedule patient. Sunitha Tadeo RN on 4/1/2020 at 8:14 AM    _____________________________________________  ----- Message from Callie Ledesma MD sent at 3/31/2020  9:20 PM CDT -----  Regarding: RE: Virtual F/U  Agreed. Lets double book her in one of my clinics in the next two weeks before I go on service on April 17th.    Thank you    TT  ----- Message -----  From: Sunitha Tadeo RN  Sent: 3/31/2020  11:39 AM CDT  To: Callie Ledesma MD  Subject: Virtual F/U                                      Hi Dr. Ledesma,    Anail was suppose to see you on 3/12 but cancelled via [employer] d/t  \"the corona virus, I would feel more comfortable re-scheduling at a later date.I'm feeling well, no new health concerns at this time with my heart.\" Her las visit was in July of last year with Gayatri. Is there a place we can double book her for a virtual visit with you in the coming weeks? I don't want her to fall through the cracks.    Thank you!          "

## 2020-04-06 ENCOUNTER — VIRTUAL VISIT (OUTPATIENT)
Dept: CARDIOLOGY | Facility: CLINIC | Age: 45
End: 2020-04-06
Attending: INTERNAL MEDICINE
Payer: COMMERCIAL

## 2020-04-06 DIAGNOSIS — I27.0 PRIMARY PULMONARY HYPERTENSION (H): ICD-10-CM

## 2020-04-06 PROCEDURE — 99215 OFFICE O/P EST HI 40 MIN: CPT | Mod: GT | Performed by: INTERNAL MEDICINE

## 2020-04-06 NOTE — PROGRESS NOTES
"Anali Loya is a 44 year old female who is being evaluated via a billable video visit.      The patient has been notified of following:     \"This video visit will be conducted via a call between you and your physician/provider. We have found that certain health care needs can be provided without the need for an in-person physical exam.  This service lets us provide the care you need with a video conversation.  If a prescription is necessary we can send it directly to your pharmacy.  If lab work is needed we can place an order for that and you can then stop by our lab to have the test done at a later time.    If during the course of the call the physician/provider feels a video visit is not appropriate, you will not be charged for this service.\"     Patient has given verbal consent for Video visit? Yes    Patient would like the video invitation sent by: Text to cell phone: [phone]    Video Start Time: 11.00 am    Anali Loya complains of    Chief Complaint   Patient presents with     Follow Up     video visit        I have reviewed and updated the patient's Past Medical History, Social History, Family History and Medication List.    ALLERGIES  Cellcept [mycophenolate]; Dulera; Imuran [azathioprine]; and Methotrexate    Additional provider notes: see dictated notes      Video-Visit Details    Type of service:  Video Visit    Video End Time (time video stopped): 11. 15 am    Originating Location (pt. Location): [id number]    Distant Location (provider location):  [facility]     Mode of Communication:  Video Conference via Matthias Ledesma MD   [facility]  Heart Failure, Transplant, and Mechanical Circulatory Support Cardiology   Cardiovascular Division  [facility]   [phone]          "

## 2020-04-06 NOTE — LETTER
"4/6/2020      RE: Anali Loya  [address]  [facility] [address]       Dear Colleague,    Thank you for the opportunity to participate in the care of your patient, Anali Loya, at the [facility]. Please see a copy of my visit note below.    Anali Loya is a 44 year old female who is being evaluated via a billable video visit.      The patient has been notified of following:     \"This video visit will be conducted via a call between you and your physician/provider. We have found that certain health care needs can be provided without the need for an in-person physical exam.  This service lets us provide the care you need with a video conversation.  If a prescription is necessary we can send it directly to your pharmacy.  If lab work is needed we can place an order for that and you can then stop by our lab to have the test done at a later time.    If during the course of the call the physician/provider feels a video visit is not appropriate, you will not be charged for this service.\"     Patient has given verbal consent for Video visit? Yes    Patient would like the video invitation sent by: Text to cell phone: [phone]    Video Start Time: 11.00 am    Anali Loya complains of    Chief Complaint   Patient presents with     Follow Up     video visit        I have reviewed and updated the patient's Past Medical History, Social History, Family History and Medication List.    ALLERGIES  Cellcept [mycophenolate]; Dulera; Imuran [azathioprine]; and Methotrexate    Additional provider notes: see dictated notes      Video-Visit Details    Type of service:  Video Visit    Video End Time (time video stopped): 11. 15 am    Originating Location (pt. Location): [id number]    Distant Location (provider location):  [facility]     Mode of Communication:  Video Conference via Matthias Ledesma MD   [facility] " Hypertension  Heart Failure, Transplant, and Mechanical Circulatory Support Cardiology   Cardiovascular Division  [facility]   [phone]            Service Date: 2020     Ignacio Loya MD   [address] [address]      RE: Ellen Loya   MRN: [id number]   : 1975      Dear Dr. Loya:      We had the pleasure of seeing Ms. Ellen Loya in our Pulmonary Hypertension Clinic.  As you know, she is a very pleasant 44-year-old female with polymyositis, interstitial lung disease and pulmonary arterial hypertension.  She is currently on Adcirca.  She did not tolerate Tyvaso due to worsening V/Q mismatch.  She returns today for followup.      Overall, she is stable and doing okay.  She has not noticed any worsening exertional shortness of breath.  She is continues to use 6 to 8 L of oxygen.  She can do her activities of daily living and walk a block without limitation.  I would currently characterize her as functional class III.  She denies having any exertional chest pain or chest pressure.  No exertional presyncope or syncope.  No lower extremity swelling or abdominal distention.  Her weight has been stable.  She has not had any interim hospitalizations or ER visit.  She is compliant with her Adcirca therapy.  She continues to have dry cough intermittently from her ILD.  This has not gotten any worse.        PAST MEDICAL HISTORY:   1.  Polymyositis and dermatomyositis.   2.  Interstitial lung disease.   3.  Pulmonary arterial hypertension.   4.  Arthritis.      MEDICATIONS:    Current Outpatient Medications   Medication Sig     ALPRAZolam (XANAX) 0.5 MG tablet TAKE ONE TABLET BY MOUTH EVERY 6 HOURS AS NEEDED FOR ANXIETY     digoxin (LANOXIN) 125 MCG tablet Take 1 tablet (125 mcg) by mouth daily     Esomeprazole Magnesium (NEXIUM PO) Take 40 mg by mouth every morning (before breakfast)     furosemide (LASIX) 20 MG tablet Take 2  tablets (40 mg) by mouth daily     morphine (MS CONTIN) 15 MG CR tablet TAKE ONE TABLET BY MOUTH TWICE A DAY     order for DME Oxygen: Patient requires supplemental Oxygen 4 LPM via nasal canula at rest and 6 LPM with activity. Please provide patient with portability capability. Okay to spot check patient on oxygen device, to keep sats above 90%. Please provider with home concentrator that can go up to 10 LPM. Oxygen will be for a lifetime.     order for DME Please provide patient with 2  liquid oxygen tanks for portability. Spot check patient on liquid oxygen. Titrate oxygen to maintain saturations above 88%.     tadalafil, PAH, (ADCIRCA) 20 MG TABS Take 2 tablets (40 mg) by mouth daily More refills after office or virtual visit only.     VENTOLIN  (90 Base) MCG/ACT inhaler INHALE ONE TO TWO PUFFS INTO THE LUNGS EVERY 4 HOURS AS NEEDED FOR SHORTNESS OF BREATH / DYSPNEA     fluticasone (FLONASE) 50 MCG/ACT spray USE 2 SPRAYS IN EACH NOSTRIL DAILY (Patient not taking: Reported on 3/11/2020)     oxyCODONE-acetaminophen (PERCOCET)  MG per tablet TAKE ONE TABLET BY MOUTH EVERY 6 HOURS AS NEEDED FOR MODERATE TO SEVERE PAIN (Patient not taking: Reported on 4/6/2020)     potassium chloride ER (K-DUR/KLOR-CON M) 10 MEQ CR tablet Take 2 tablets (20 mEq) by mouth daily (Patient not taking: Reported on 3/11/2020)     No current facility-administered medications for this visit.      REVIEW OF SYSTEMS:  A detailed 10-point review of systems was obtained as described in the History of Present Illness.  All other systems reviewed and are negative.      PHYSICAL EXAMINATION:      She was comfortable.  She was in no apparent distress.    GEN: well appearing, no acute distress   No obvious JVD  CV: appears warm and well-perfused   PULM: easy work of breathing   NEURO: alert, conversant   PSYCH: affect normal  SKIN: no rash or lesion on visualized skin  She had no lower extremity edema.      CBC RESULTS:   Recent Labs    Lab Test 07/15/19  1131   WBC 10.0   RBC 4.57   HGB 11.6*   HCT 39.0   MCV 85   MCH 25.4*   MCHC 29.7*   RDW 16.3*        Recent Labs   Lab Test 07/15/19  1131 04/17/19  1142    134   POTASSIUM 3.6 4.0   CHLORIDE 100 101   CO2 29 26   ANIONGAP 6 8   GLC 86 108*   BUN 10 8   CR 0.68 0.60   MARCIN 9.1 8.4*     Lab Results   Component Value Date    NTBNPI 4,168 (H) 11/30/2016     Lab Results   Component Value Date    NTBNP 602 (H) 07/15/2019       6MWT (07/2019)    401 meters with lowest oxygen saturation of 89% on 8 liters of oxygen.    Echo (04/2019)  Left ventricular size is normal.  The Ejection Fraction is estimated at 50-55%.  Right ventricular function, chamber size, wall motion, and thickness are  normal.  Right ventricular systolic pressure is 41mmHg above the right atrial pressure.  PV acceleration time 80ms.  The inferior vena cava is normal.  No pericardial effusion is present.    RHC (04/2019)  RA 5  RV 36/5  PA 35/15 (22)  PCWP 15  CO/CI 4.6/3/03  PA saturation 72.5%  PVR 1.52 ANGLIN     Her last echocardiogram was from 09/2018.  This showed normal right ventricular size and normal right ventricular function.  She had moderate right ventricular hypertrophy.  Her interatrial septum was not flattened.  Her LV function was normal.  She had only mild tricuspid regurgitation.  Her right ventricular systolic pressure could not be estimated.  She had no pericardial effusion.  Her IVC was normal in size and function.         ASSESSMENT AND PLAN:      Ms. Ellen Loya is a very pleasant 44-year-old female with polymyositis, interstitial lung disease and pulmonary arterial hypertension, who is currently on Adcirca monotherapy.      Overall, she is doing okay.  No concern for worsening right heart failure or pulmonary hypertension.  Normally, we would have repeated her 6-minute walk test, echocardiogram, and right heart catheterization.  However, we have deferred this for now until the coronavirus pandemic  laura.    I recommend her to continue Adcirca 40 mg daily, Lasix 40 mg daily, digoxin, and supplemental oxygen at 6 to 8 L as needed.    Since her 6-minute walk distance is lower, I have recommended her to have a repeat right heart catheterization as it has been more than 2 years.  We will also repeat an echocardiogram.  She has not had a CT scan of the chest in 2 years.  Her most recent PFTs showed significant improvement.  We will also repeat a high-resolution CT scan. We will decide on need for escalation of her PH therapy based on her repeat testing.     Her potassium level is low.  She is reluctant to take supplemental potassium.  I have encouraged her to increase potassium-rich diet.  I recommended her to take 1 banana a day and more avocados.  We will repeat chemistry in 2 weeks.  If her repeat potassium continues to be low, she may need supplemental potassium therapy. I did not change any therapy for her now.  She will continue on digoxin, Lasix, oxygen and Adcirca.     She will return to clinic in 3 months with labs, 6-minute walk test, echocardiogram, and right heart catheterization.  She will call us in the interim if you have any further worsening symptoms.     It was a pleasure seeing Ms. Ellen Loya in our Pulmonary Hypertension Clinic. We thank you for allowing us to participate in her care.      Sincerely,   Callie Ledesma MD   [place] [facility] Pulmonary Hypertension  Heart Failure, Transplant, and Mechanical Circulatory Support Cardiology   Cardiovascular Division  [facility]   [phone]    Addendum:     Repeat echo shows normal RV size and function  Repeat hemodynamic assessment shows normal right sided filling pressures, borderline elevated PA pressure, and preserved cardiac output.   No need for further escalation of PH-specific therapies. Will continue Adcirca 40 mg daily.     Sincerely,  Callie Ledesma MD   [place] for Pulmonary Hypertension  Heart  Failure, Transplant, and Mechanical Circulatory Support Cardiology   Cardiovascular Division  [facility]   [phone]                 cc:   Clarita Martínez MD   [facility] 108      Caleb Arteaga MD   [facility] 276         CALLIE LEDESMA MD             D: 2019   T: 2019   MT: al      Name:     TYREL SQUIRES   MRN:      [id number]        Account:      [id number]   :      1975           Service Date: 2019      Document: [id number]        Please do not hesitate to contact me if you have any questions/concerns.     Sincerely,     Callie Ledesma MD

## 2020-04-06 NOTE — LETTER
"4/6/2020      RE: Anali Loya  [address]  [facility] [address]       Dear Colleague,    Thank you for the opportunity to participate in the care of your patient, Anali Loya, at the [facility]. Please see a copy of my visit note below.    Anali Loya is a 44 year old female who is being evaluated via a billable video visit.      The patient has been notified of following:     \"This video visit will be conducted via a call between you and your physician/provider. We have found that certain health care needs can be provided without the need for an in-person physical exam.  This service lets us provide the care you need with a video conversation.  If a prescription is necessary we can send it directly to your pharmacy.  If lab work is needed we can place an order for that and you can then stop by our lab to have the test done at a later time.    If during the course of the call the physician/provider feels a video visit is not appropriate, you will not be charged for this service.\"     Patient has given verbal consent for Video visit? Yes    Patient would like the video invitation sent by: Text to cell phone: [phone]    Video Start Time: 11.00 am    Anali Loya complains of    Chief Complaint   Patient presents with     Follow Up     video visit        I have reviewed and updated the patient's Past Medical History, Social History, Family History and Medication List.    ALLERGIES  Cellcept [mycophenolate]; Dulera; Imuran [azathioprine]; and Methotrexate    Additional provider notes: see dictated notes      Video-Visit Details    Type of service:  Video Visit    Video End Time (time video stopped): 11. 15 am    Originating Location (pt. Location): [id number]    Distant Location (provider location):  [facility]     Mode of Communication:  Video Conference via Matthias Ledesma MD   [facility]monary " Hypertension  Heart Failure, Transplant, and Mechanical Circulatory Support Cardiology   Cardiovascular Division  [facility]   [phone]            Service Date: 2020     Ignacio Loya MD   [address] [address]      RE: Ellen Loya   MRN: [id number]   : 1975      Dear Dr. Loya:      We had the pleasure of seeing Ms. Ellen Loya in our Pulmonary Hypertension Clinic.  As you know, she is a very pleasant 44-year-old female with polymyositis, interstitial lung disease and pulmonary arterial hypertension.  She is currently on Adcirca.  She did not tolerate Tyvaso due to worsening V/Q mismatch.  She returns today for followup.      Overall, she is stable and doing okay.  She has not noticed any worsening exertional shortness of breath.  She is continues to use 6 to 8 L of oxygen.  She can do her activities of daily living and walk a block without limitation.  I would currently characterize her as functional class III.  She denies having any exertional chest pain or chest pressure.  No exertional presyncope or syncope.  No lower extremity swelling or abdominal distention.  Her weight has been stable.  She has not had any interim hospitalizations or ER visit.  She is compliant with her Adcirca therapy.  She continues to have dry cough intermittently from her ILD.  This has not gotten any worse.        PAST MEDICAL HISTORY:   1.  Polymyositis and dermatomyositis.   2.  Interstitial lung disease.   3.  Pulmonary arterial hypertension.   4.  Arthritis.      MEDICATIONS:    Current Outpatient Medications   Medication Sig     ALPRAZolam (XANAX) 0.5 MG tablet TAKE ONE TABLET BY MOUTH EVERY 6 HOURS AS NEEDED FOR ANXIETY     digoxin (LANOXIN) 125 MCG tablet Take 1 tablet (125 mcg) by mouth daily     Esomeprazole Magnesium (NEXIUM PO) Take 40 mg by mouth every morning (before breakfast)     furosemide (LASIX) 20 MG tablet Take 2  tablets (40 mg) by mouth daily     morphine (MS CONTIN) 15 MG CR tablet TAKE ONE TABLET BY MOUTH TWICE A DAY     order for DME Oxygen: Patient requires supplemental Oxygen 4 LPM via nasal canula at rest and 6 LPM with activity. Please provide patient with portability capability. Okay to spot check patient on oxygen device, to keep sats above 90%. Please provider with home concentrator that can go up to 10 LPM. Oxygen will be for a lifetime.     order for DME Please provide patient with 2  liquid oxygen tanks for portability. Spot check patient on liquid oxygen. Titrate oxygen to maintain saturations above 88%.     tadalafil, PAH, (ADCIRCA) 20 MG TABS Take 2 tablets (40 mg) by mouth daily More refills after office or virtual visit only.     VENTOLIN  (90 Base) MCG/ACT inhaler INHALE ONE TO TWO PUFFS INTO THE LUNGS EVERY 4 HOURS AS NEEDED FOR SHORTNESS OF BREATH / DYSPNEA     fluticasone (FLONASE) 50 MCG/ACT spray USE 2 SPRAYS IN EACH NOSTRIL DAILY (Patient not taking: Reported on 3/11/2020)     oxyCODONE-acetaminophen (PERCOCET)  MG per tablet TAKE ONE TABLET BY MOUTH EVERY 6 HOURS AS NEEDED FOR MODERATE TO SEVERE PAIN (Patient not taking: Reported on 4/6/2020)     potassium chloride ER (K-DUR/KLOR-CON M) 10 MEQ CR tablet Take 2 tablets (20 mEq) by mouth daily (Patient not taking: Reported on 3/11/2020)     No current facility-administered medications for this visit.      REVIEW OF SYSTEMS:  A detailed 10-point review of systems was obtained as described in the History of Present Illness.  All other systems reviewed and are negative.      PHYSICAL EXAMINATION:      She was comfortable.  She was in no apparent distress.    GEN: well appearing, no acute distress   No obvious JVD  CV: appears warm and well-perfused   PULM: easy work of breathing   NEURO: alert, conversant   PSYCH: affect normal  SKIN: no rash or lesion on visualized skin  She had no lower extremity edema.      CBC RESULTS:   Recent Labs    Lab Test 07/15/19  1131   WBC 10.0   RBC 4.57   HGB 11.6*   HCT 39.0   MCV 85   MCH 25.4*   MCHC 29.7*   RDW 16.3*        Recent Labs   Lab Test 07/15/19  1131 04/17/19  1142    134   POTASSIUM 3.6 4.0   CHLORIDE 100 101   CO2 29 26   ANIONGAP 6 8   GLC 86 108*   BUN 10 8   CR 0.68 0.60   MARCIN 9.1 8.4*     Lab Results   Component Value Date    NTBNPI 4,168 (H) 11/30/2016     Lab Results   Component Value Date    NTBNP 602 (H) 07/15/2019       6MWT (07/2019)    401 meters with lowest oxygen saturation of 89% on 8 liters of oxygen.    Echo (04/2019)  Left ventricular size is normal.  The Ejection Fraction is estimated at 50-55%.  Right ventricular function, chamber size, wall motion, and thickness are  normal.  Right ventricular systolic pressure is 41mmHg above the right atrial pressure.  PV acceleration time 80ms.  The inferior vena cava is normal.  No pericardial effusion is present.    RHC (04/2019)  RA 5  RV 36/5  PA 35/15 (22)  PCWP 15  CO/CI 4.6/3/03  PA saturation 72.5%  PVR 1.52 ANGLIN     Her last echocardiogram was from 09/2018.  This showed normal right ventricular size and normal right ventricular function.  She had moderate right ventricular hypertrophy.  Her interatrial septum was not flattened.  Her LV function was normal.  She had only mild tricuspid regurgitation.  Her right ventricular systolic pressure could not be estimated.  She had no pericardial effusion.  Her IVC was normal in size and function.         ASSESSMENT AND PLAN:      Ms. Ellen Loya is a very pleasant 44-year-old female with polymyositis, interstitial lung disease and pulmonary arterial hypertension, who is currently on Adcirca monotherapy.      Overall, she is doing okay.  No concern for worsening right heart failure or pulmonary hypertension.  Normally, we would have repeated her 6-minute walk test, echocardiogram, and right heart catheterization.  However, we have deferred this for now until the coronavirus pandemic  laura.    I recommend her to continue Adcirca 40 mg daily, Lasix 40 mg daily, digoxin, and supplemental oxygen at 6 to 8 L as needed.    Since her 6-minute walk distance is lower, I have recommended her to have a repeat right heart catheterization as it has been more than 2 years.  We will also repeat an echocardiogram.  She has not had a CT scan of the chest in 2 years.  Her most recent PFTs showed significant improvement.  We will also repeat a high-resolution CT scan. We will decide on need for escalation of her PH therapy based on her repeat testing.     Her potassium level is low.  She is reluctant to take supplemental potassium.  I have encouraged her to increase potassium-rich diet.  I recommended her to take 1 banana a day and more avocados.  We will repeat chemistry in 2 weeks.  If her repeat potassium continues to be low, she may need supplemental potassium therapy. I did not change any therapy for her now.  She will continue on digoxin, Lasix, oxygen and Adcirca.     She will return to clinic in 3 months with labs, 6-minute walk test, echocardiogram, and right heart catheterization.  She will call us in the interim if you have any further worsening symptoms.     It was a pleasure seeing Ms. Ellen Loya in our Pulmonary Hypertension Clinic. We thank you for allowing us to participate in her care.      Sincerely,   Callie Ledesma MD   [place] for Pulmonary Hypertension  Heart Failure, Transplant, and Mechanical Circulatory Support Cardiology   Cardiovascular Division  [facility]   [phone]    Addendum:     Repeat echo shows normal RV size and function  Repeat hemodynamic assessment shows normal right sided filling pressures, borderline elevated PA pressure, and preserved cardiac output.   No need for further escalation of PH-specific therapies. Will continue Adcirca 40 mg daily.     Sincerely,  Callie Ledesma MD   [place] for Pulmonary Hypertension  Heart  Failure, Transplant, and Mechanical Circulatory Support Cardiology   Cardiovascular Division  [facility]   [phone]                 cc:   Clarita Martínez MD   [facility] 108      Caleb Arteaga MD   [facility] 276         CALLIE LEDESMA MD             D: 2019   T: 2019   MT: al      Name:     TYREL SQUIRES   MRN:      [id number]        Account:      [id number]   :      1975           Service Date: 2019      Document: [id number]        Please do not hesitate to contact me if you have any questions/concerns.     Sincerely,     Callie Ledesma MD

## 2020-04-08 NOTE — PROGRESS NOTES
Service Date: 2020     Ignacio Loya MD   [address] [address]      RE: Ellen Loya   MRN: [id number]   : 1975      Dear Dr. Loya:      We had the pleasure of seeing Ms. Ellen Loya in our Pulmonary Hypertension Clinic.  As you know, she is a very pleasant 44-year-old female with polymyositis, interstitial lung disease and pulmonary arterial hypertension.  She is currently on Adcirca.  She did not tolerate Tyvaso due to worsening V/Q mismatch.  She returns today for followup.      Overall, she is stable and doing okay.  She has not noticed any worsening exertional shortness of breath.  She is continues to use 6 to 8 L of oxygen.  She can do her activities of daily living and walk a block without limitation.  I would currently characterize her as functional class III.  She denies having any exertional chest pain or chest pressure.  No exertional presyncope or syncope.  No lower extremity swelling or abdominal distention.  Her weight has been stable.  She has not had any interim hospitalizations or ER visit.  She is compliant with her Adcirca therapy.  She continues to have dry cough intermittently from her ILD.  This has not gotten any worse.        PAST MEDICAL HISTORY:   1.  Polymyositis and dermatomyositis.   2.  Interstitial lung disease.   3.  Pulmonary arterial hypertension.   4.  Arthritis.      MEDICATIONS:    Current Outpatient Medications   Medication Sig     ALPRAZolam (XANAX) 0.5 MG tablet TAKE ONE TABLET BY MOUTH EVERY 6 HOURS AS NEEDED FOR ANXIETY     digoxin (LANOXIN) 125 MCG tablet Take 1 tablet (125 mcg) by mouth daily     Esomeprazole Magnesium (NEXIUM PO) Take 40 mg by mouth every morning (before breakfast)     furosemide (LASIX) 20 MG tablet Take 2 tablets (40 mg) by mouth daily     morphine (MS CONTIN) 15 MG CR tablet TAKE ONE TABLET BY MOUTH TWICE A DAY     order for DME Oxygen: Patient requires supplemental Oxygen 4 LPM via  nasal canula at rest and 6 LPM with activity. Please provide patient with portability capability. Okay to spot check patient on oxygen device, to keep sats above 90%. Please provider with home concentrator that can go up to 10 LPM. Oxygen will be for a lifetime.     order for DME Please provide patient with 2  liquid oxygen tanks for portability. Spot check patient on liquid oxygen. Titrate oxygen to maintain saturations above 88%.     tadalafil, PAH, (ADCIRCA) 20 MG TABS Take 2 tablets (40 mg) by mouth daily More refills after office or virtual visit only.     VENTOLIN  (90 Base) MCG/ACT inhaler INHALE ONE TO TWO PUFFS INTO THE LUNGS EVERY 4 HOURS AS NEEDED FOR SHORTNESS OF BREATH / DYSPNEA     fluticasone (FLONASE) 50 MCG/ACT spray USE 2 SPRAYS IN EACH NOSTRIL DAILY (Patient not taking: Reported on 3/11/2020)     oxyCODONE-acetaminophen (PERCOCET)  MG per tablet TAKE ONE TABLET BY MOUTH EVERY 6 HOURS AS NEEDED FOR MODERATE TO SEVERE PAIN (Patient not taking: Reported on 4/6/2020)     potassium chloride ER (K-DUR/KLOR-CON M) 10 MEQ CR tablet Take 2 tablets (20 mEq) by mouth daily (Patient not taking: Reported on 3/11/2020)     No current facility-administered medications for this visit.      REVIEW OF SYSTEMS:  A detailed 10-point review of systems was obtained as described in the History of Present Illness.  All other systems reviewed and are negative.      PHYSICAL EXAMINATION:      She was comfortable.  She was in no apparent distress.    GEN: well appearing, no acute distress   No obvious JVD  CV: appears warm and well-perfused   PULM: easy work of breathing   NEURO: alert, conversant   PSYCH: affect normal  SKIN: no rash or lesion on visualized skin  She had no lower extremity edema.      CBC RESULTS:   Recent Labs   Lab Test 07/15/19  1131   WBC 10.0   RBC 4.57   HGB 11.6*   HCT 39.0   MCV 85   MCH 25.4*   MCHC 29.7*   RDW 16.3*        Recent Labs   Lab Test 07/15/19  1131 04/17/19  1142     134   POTASSIUM 3.6 4.0   CHLORIDE 100 101   CO2 29 26   ANIONGAP 6 8   GLC 86 108*   BUN 10 8   CR 0.68 0.60   MARCIN 9.1 8.4*     Lab Results   Component Value Date    NTBNPI 4,168 (H) 11/30/2016     Lab Results   Component Value Date    NTBNP 602 (H) 07/15/2019       6MWT (07/2019)    401 meters with lowest oxygen saturation of 89% on 8 liters of oxygen.    Echo (04/2019)  Left ventricular size is normal.  The Ejection Fraction is estimated at 50-55%.  Right ventricular function, chamber size, wall motion, and thickness are  normal.  Right ventricular systolic pressure is 41mmHg above the right atrial pressure.  PV acceleration time 80ms.  The inferior vena cava is normal.  No pericardial effusion is present.    RHC (04/2019)  RA 5  RV 36/5  PA 35/15 (22)  PCWP 15  CO/CI 4.6/3/03  PA saturation 72.5%  PVR 1.52 ANGLIN     Her last echocardiogram was from 09/2018.  This showed normal right ventricular size and normal right ventricular function.  She had moderate right ventricular hypertrophy.  Her interatrial septum was not flattened.  Her LV function was normal.  She had only mild tricuspid regurgitation.  Her right ventricular systolic pressure could not be estimated.  She had no pericardial effusion.  Her IVC was normal in size and function.         ASSESSMENT AND PLAN:      Ms. Ellen Loya is a very pleasant 44-year-old female with polymyositis, interstitial lung disease and pulmonary arterial hypertension, who is currently on Adcirca monotherapy.      Overall, she is doing okay.  No concern for worsening right heart failure or pulmonary hypertension.  Normally, we would have repeated her 6-minute walk test, echocardiogram, and right heart catheterization.  However, we have deferred this for now until the coronavirus pandemic settles.    I recommend her to continue Adcirca 40 mg daily, Lasix 40 mg daily, digoxin, and supplemental oxygen at 6 to 8 L as needed.    Since her 6-minute walk distance is lower, I  have recommended her to have a repeat right heart catheterization as it has been more than 2 years.  We will also repeat an echocardiogram.  She has not had a CT scan of the chest in 2 years.  Her most recent PFTs showed significant improvement.  We will also repeat a high-resolution CT scan. We will decide on need for escalation of her PH therapy based on her repeat testing.     Her potassium level is low.  She is reluctant to take supplemental potassium.  I have encouraged her to increase potassium-rich diet.  I recommended her to take 1 banana a day and more avocados.  We will repeat chemistry in 2 weeks.  If her repeat potassium continues to be low, she may need supplemental potassium therapy. I did not change any therapy for her now.  She will continue on digoxin, Lasix, oxygen and Adcirca.     She will return to clinic in 3 months with labs, 6-minute walk test, echocardiogram, and right heart catheterization.  She will call us in the interim if you have any further worsening symptoms.     It was a pleasure seeing Ms. Ellen Loya in our Pulmonary Hypertension Clinic. We thank you for allowing us to participate in her care.      Sincerely,   Callie Ledesma MD   [facility]  Heart Failure, Transplant, and Mechanical Circulatory Support Cardiology   Cardiovascular Division  [facility]   [phone]

## 2020-04-14 DIAGNOSIS — G47.9 DISTURBANCE IN SLEEP BEHAVIOR: ICD-10-CM

## 2020-04-14 RX ORDER — ALPRAZOLAM 0.5 MG
TABLET ORAL
Qty: 40 TABLET | Refills: 0 | Status: SHIPPED | OUTPATIENT
Start: 2020-04-14 | End: 2020-04-23

## 2020-04-14 NOTE — TELEPHONE ENCOUNTER
Alprazolam        Last Written Prescription Date:  4/5/2020  Last Fill Quantity: 40,   # refills: 0  Last Office Visit: 3/11/2020  Future Office visit:       Routing refill request to provider for review/approval because:  Drug not on the [facility], [facility] or [facility] refill protocol or controlled substance

## 2020-04-16 DIAGNOSIS — R07.81 PLEURITIC CHEST PAIN: ICD-10-CM

## 2020-04-16 DIAGNOSIS — M19.90 INFLAMMATORY ARTHRITIS: ICD-10-CM

## 2020-04-16 RX ORDER — MORPHINE SULFATE 15 MG/1
TABLET, FILM COATED, EXTENDED RELEASE ORAL
Qty: 60 TABLET | Refills: 0 | Status: SHIPPED | OUTPATIENT
Start: 2020-04-16 | End: 2020-05-18

## 2020-04-16 RX ORDER — OXYCODONE AND ACETAMINOPHEN 10; 325 MG/1; MG/1
TABLET ORAL
Qty: 60 TABLET | Refills: 0 | Status: SHIPPED | OUTPATIENT
Start: 2020-04-16 | End: 2020-04-30

## 2020-04-16 NOTE — TELEPHONE ENCOUNTER
Percocet  MG       Last Written Prescription Date:  4/6/2020  Last Fill Quantity: 60,   # refills: 0  Last Office Visit: 3/11/2020  Future Office visit:       Routing refill request to provider for review/approval because:  Drug not on the [facility], [facility] or [facility] refill protocol or controlled substance  Morphine       Last Written Prescription Date:  3/23/2020  Last Fill Quantity: 60,   # refills: 0  Last Office Visit: 3/11/2020  Future Office visit:       Routing refill request to provider for review/approval because:  Drug not on the [facility], [facility] or [facility] refill protocol or controlled substance

## 2020-04-30 DIAGNOSIS — R07.81 PLEURITIC CHEST PAIN: ICD-10-CM

## 2020-04-30 RX ORDER — OXYCODONE AND ACETAMINOPHEN 10; 325 MG/1; MG/1
TABLET ORAL
Qty: 60 TABLET | Refills: 0 | Status: SHIPPED | OUTPATIENT
Start: 2020-04-30 | End: 2020-05-14

## 2020-04-30 NOTE — TELEPHONE ENCOUNTER
Percocet  MG       Last Written Prescription Date:  4/16/2020  Last Fill Quantity: 60,   # refills: 0  Last Office Visit: 3/11/2020  Future Office visit:       Routing refill request to provider for review/approval because:  Drug not on the [facility], [facility] or [facility] refill protocol or controlled substance

## 2020-05-03 ENCOUNTER — MYC REFILL (OUTPATIENT)
Dept: FAMILY MEDICINE | Facility: CLINIC | Age: 45
End: 2020-05-03

## 2020-05-03 DIAGNOSIS — G47.9 DISTURBANCE IN SLEEP BEHAVIOR: ICD-10-CM

## 2020-05-04 RX ORDER — ALPRAZOLAM 0.5 MG
0.5 TABLET ORAL EVERY 6 HOURS
Qty: 40 TABLET | Refills: 0 | Status: SHIPPED | OUTPATIENT
Start: 2020-05-04 | End: 2020-05-13

## 2020-05-04 NOTE — TELEPHONE ENCOUNTER
Alprazolam 0.5 mg      Last Written Prescription Date:  4/24/2020  Last Fill Quantity: 40,   # refills: 0  Last Office Visit: 3/11/2020  Future Office visit:       Routing refill request to provider for review/approval because:  Drug not on the [facility], [facility] or [facility] refill protocol or controlled substance

## 2020-05-06 ENCOUNTER — TELEPHONE (OUTPATIENT)
Dept: CARDIOLOGY | Facility: CLINIC | Age: 45
End: 2020-05-06

## 2020-05-06 NOTE — TELEPHONE ENCOUNTER
Called and spoke with [facility] (P)  [phone] regarding NC orders. Provided office fax number where order can be sent. Sunitha Tadeo RN on 2020 at 1:01 PM     Health Call Center    Phone Message    May a detailed message be left on voicemail: yes     Reason for Call: Other: Pt would like an order to be put in for her oxygen tubing as the order  and she will need another order put in. If any questions please reach out to pt     Action Taken: Message routed to:  [facility] ([facility]): Cardio    Travel Screening: Not Applicable

## 2020-05-12 DIAGNOSIS — G47.9 DISTURBANCE IN SLEEP BEHAVIOR: ICD-10-CM

## 2020-05-12 NOTE — TELEPHONE ENCOUNTER
xanax      Last Written Prescription Date:  5/04/20  Last Fill Quantity: 40,   # refills: 5/04/20  Last Office Visit: 3/11/20  Future Office visit:       Routing refill request to provider for review/approval because:  Drug not on the [facility], [facility] or [facility] refill protocol or controlled substance

## 2020-05-13 DIAGNOSIS — R07.81 PLEURITIC CHEST PAIN: ICD-10-CM

## 2020-05-13 RX ORDER — ALPRAZOLAM 0.5 MG
TABLET ORAL
Qty: 20 TABLET | Refills: 0 | Status: SHIPPED | OUTPATIENT
Start: 2020-05-13 | End: 2020-06-01

## 2020-05-13 NOTE — TELEPHONE ENCOUNTER
Left message for call back with patient's mother. When patient calls back please give message below.    Aye Patrick CMA ([facility]) 5/13/2020

## 2020-05-13 NOTE — TELEPHONE ENCOUNTER
I only filled xanax for #20.  She needs a visit with Dr Loya to discuss narcotics and benzodiazepine use together and risk of oversedation.

## 2020-05-14 RX ORDER — OXYCODONE AND ACETAMINOPHEN 10; 325 MG/1; MG/1
TABLET ORAL
Qty: 60 TABLET | Refills: 0 | Status: SHIPPED | OUTPATIENT
Start: 2020-05-14 | End: 2020-05-28

## 2020-05-14 NOTE — TELEPHONE ENCOUNTER
Oxycodone-acet 10/325 mg       Last Written Prescription Date:  04/30/2020  Last Fill Quantity: 60,   # refills: 0  Last Office Visit: 03/11/2020  Future Office visit:       Routing refill request to provider for review/approval because:  Drug not on the [facility], [facility] or [facility] refill protocol or controlled substance

## 2020-05-15 DIAGNOSIS — G47.9 DISTURBANCE IN SLEEP BEHAVIOR: ICD-10-CM

## 2020-05-15 RX ORDER — ALPRAZOLAM 0.5 MG
TABLET ORAL
Qty: 20 TABLET | Refills: 0 | OUTPATIENT
Start: 2020-05-15

## 2020-05-15 NOTE — TELEPHONE ENCOUNTER
Alprazolam 0.5 mg      Last Written Prescription Date:  05/13/2020  Last Fill Quantity: 20,   # refills: 0  Last Office Visit: 03/11/2020  Future Office visit:       Routing refill request to provider for review/approval because:  Drug not on the [facility], [facility] or [facility] refill protocol or controlled substance    Patient needs to schedule an appointment with Dr. Loya for further refills of alprazolam. Has not made an appointment as of yet.     Please deny RORY Carney on 5/15/2020 at 9:58 AM

## 2020-05-18 ENCOUNTER — VIRTUAL VISIT (OUTPATIENT)
Dept: FAMILY MEDICINE | Facility: CLINIC | Age: 45
End: 2020-05-18
Payer: COMMERCIAL

## 2020-05-18 DIAGNOSIS — M19.90 INFLAMMATORY ARTHRITIS: ICD-10-CM

## 2020-05-18 DIAGNOSIS — F41.9 ANXIETY: Primary | ICD-10-CM

## 2020-05-18 PROCEDURE — 99213 OFFICE O/P EST LOW 20 MIN: CPT | Mod: GT | Performed by: OBSTETRICS & GYNECOLOGY

## 2020-05-18 RX ORDER — MORPHINE SULFATE 15 MG/1
TABLET, FILM COATED, EXTENDED RELEASE ORAL
Qty: 60 TABLET | Refills: 0 | Status: SHIPPED | OUTPATIENT
Start: 2020-05-18 | End: 2020-06-15

## 2020-05-18 RX ORDER — ALPRAZOLAM 0.5 MG
0.5 TABLET ORAL 3 TIMES DAILY PRN
Qty: 50 TABLET | Refills: 0 | Status: SHIPPED | OUTPATIENT
Start: 2020-05-18 | End: 2020-06-02

## 2020-05-18 NOTE — PROGRESS NOTES
"Anali Loya is a 44 year old female who is being evaluated via a billable video visit.      The patient has been notified of following:     \"This video visit will be conducted via a call between you and your physician/provider. We have found that certain health care needs can be provided without the need for an in-person physical exam.  This service lets us provide the care you need with a video conversation.  If a prescription is necessary we can send it directly to your pharmacy.  If lab work is needed we can place an order for that and you can then stop by our lab to have the test done at a later time.    Video visits are billed at different rates depending on your insurance coverage.  Please reach out to your insurance provider with any questions.    If during the course of the call the physician/provider feels a video visit is not appropriate, you will not be charged for this service.\"    Patient has given verbal consent for Video visit? Yes    How would you like to obtain your AVS? [facility]    Patient would like the video invitation sent by:  Text to cell - [phone]    Will anyone else be joining your video visit? No      Subjective     Anali Loya is a 44 year old female who presents today via video visit for the following health issues:      Refill her Xanax.     Subjective: Anali requested a videoconference consultation today because of concerns regarding  Xanax refill. Due to the current covid-19 coronavirus epidemic we are managing much of our patients' concerns remotely when possible.    She has pulmonary hypertension- and still smokes- she is on oxygen- needs refill of xanax for anxiety. She has had CHF history and is on chronic oxygen 24 hours a day.     The past medical history and medications and allergies have been reviewed today by me.  .  Past Medical History:   Diagnosis Date     Acute bronchitis 06/05/07    Admit. Discharged 06/05/07     Anxiety      Arthritis     h/o \"seronegative " "rheumatoid arthritis\" since age 30, however no evidence of active arthritis by Rheum eval [id number]     ASCUS of cervix with negative high risk HPV 05/15/2014    neg HPV     ILD (interstitial lung disease) (H)     seen on chest CT 5-2011; methotrexate stopped 6-2011     Lung nodule     KM nodule; EBUS 12/2014 of lymph nodes was negative; CT-guided bx 1-2015 hamartoma/chondroma     Prematurity     born 6-7 weeks early     Pulmonary hypertension (H)     RHC 2/2016 mean PA 25     Varicella without mention of complication      Allergies   Allergen Reactions     Cellcept [Mycophenolate] GI Disturbance     Dulera      syncope     Imuran [Azathioprine] GI Disturbance     Methotrexate Other (See Comments)     Lung toxicity     Current Outpatient Medications   Medication Sig Dispense Refill     ALPRAZolam (XANAX) 0.5 MG tablet TAKE ONE TABLET BY MOUTH EVERY 6 HOURS 20 tablet 0     digoxin (LANOXIN) 125 MCG tablet Take 1 tablet (125 mcg) by mouth daily 90 tablet 0     Esomeprazole Magnesium (NEXIUM PO) Take 40 mg by mouth every morning (before breakfast)       fluticasone (FLONASE) 50 MCG/ACT spray USE 2 SPRAYS IN EACH NOSTRIL DAILY 16 g 11     furosemide (LASIX) 20 MG tablet Take 2 tablets (40 mg) by mouth daily 180 tablet 3     morphine (MS CONTIN) 15 MG CR tablet TAKE ONE TABLET BY MOUTH TWICE A DAY 60 tablet 0     order for DME Oxygen: Patient requires supplemental Oxygen 4 LPM via nasal canula at rest and 6 LPM with activity. Please provide patient with portability capability. Okay to spot check patient on oxygen device, to keep sats above 90%. Please provider with home concentrator that can go up to 10 LPM. Oxygen will be for a lifetime. 1 Device 0     order for DME Please provide patient with 2  liquid oxygen tanks for portability. Spot check patient on liquid oxygen. Titrate oxygen to maintain saturations above 88%. 2 Device 0     oxyCODONE-acetaminophen (PERCOCET)  MG per tablet TAKE ONE TABLET BY MOUTH EVERY " 6 HOURS AS NEEDED FOR MODERATE TO SEVERE PAIN 60 tablet 0     tadalafil, PAH, (ADCIRCA) 20 MG TABS Take 2 tablets (40 mg) by mouth daily More refills after office or virtual visit only. 60 tablet 3     VENTOLIN  (90 Base) MCG/ACT inhaler INHALE ONE TO TWO PUFFS INTO THE LUNGS EVERY 4 HOURS AS NEEDED FOR SHORTNESS OF BREATH / DYSPNEA 18 g 9     potassium chloride ER (K-DUR/KLOR-CON M) 10 MEQ CR tablet Take 2 tablets (20 mEq) by mouth daily (Patient not taking: Reported on 3/11/2020) 180 tablet 3       Objective: on my video exam she has her O2 in place per NC-    Assessment/Plan:anxiety due to   CHF and pulmonary fibrosis- due to methotrexate. She wants refill of xanax 0.5 mg  She takes 4 a day. If warned her that this can slow her breathing- she is at risk of respiratory arrest- she should wean off this medication.  I advised her to cut down to 3 daily and discuss this further with dr. Loya when he returns in 2 weeks- will refill enough for her to get back in touch with him.    Followup: with Dr. Loya by phone in 2 weeks.      Start of call: 1020  hours    Call ended: 1033   hours  Videoconference call duration was   13  minutes.     KANU Garza MD

## 2020-05-18 NOTE — TELEPHONE ENCOUNTER
Pt has a virtual visit scheduled for today with Dr. Garza to discuss medication. Farida De CMA ([facility])

## 2020-05-18 NOTE — TELEPHONE ENCOUNTER
Requested Prescriptions   Pending Prescriptions Disp Refills     morphine (MS CONTIN) 15 MG CR tablet [Pharmacy Med Name: MORPHINE SULFATE ER 15MG TBCR] 60 tablet 0     Sig: TAKE ONE TABLET BY MOUTH TWICE A DAY     Last Written Prescription Date:  04/16/2020  Last Fill Quantity: 60,   # refills: 0  Last Office Visit: 05/18/2020  Future Office visit:       Routing refill request to provider for review/approval because:  Drug not on the [facility], [facility] or [facility] refill protocol or controlled substance.     Rebecca Tobias MA

## 2020-05-28 DIAGNOSIS — R07.81 PLEURITIC CHEST PAIN: ICD-10-CM

## 2020-05-28 DIAGNOSIS — R09.81 NASAL CONGESTION: ICD-10-CM

## 2020-05-28 RX ORDER — OXYCODONE AND ACETAMINOPHEN 10; 325 MG/1; MG/1
TABLET ORAL
Qty: 60 TABLET | Refills: 0 | Status: SHIPPED | OUTPATIENT
Start: 2020-05-28 | End: 2020-06-11

## 2020-05-28 NOTE — TELEPHONE ENCOUNTER
Oxycodone-acet 10/325       Last Written Prescription Date: 05/14/2020  Last Fill Quantity: 60,   # refills: 0  Last Office Visit: 05/18/2020  Future Office visit:       Routing refill request to provider for review/approval because:  Drug not on the [facility], [facility] or [facility] refill protocol or controlled substance

## 2020-05-29 DIAGNOSIS — F41.9 ANXIETY: ICD-10-CM

## 2020-05-29 RX ORDER — FLUTICASONE PROPIONATE 50 MCG
SPRAY, SUSPENSION (ML) NASAL
Qty: 16 G | Refills: 11 | Status: SHIPPED | OUTPATIENT
Start: 2020-05-29 | End: 2023-02-06

## 2020-05-29 NOTE — TELEPHONE ENCOUNTER
Alprazolam       Last Written Prescription Date:  05/18/2020  Last Fill Quantity: 50,   # refills: 0  Last Office Visit:05/18/2020  Future Office visit:       Routing refill request to provider for review/approval because:  Drug not on the [facility], [facility] or [facility] refill protocol or controlled substance

## 2020-06-02 RX ORDER — ALPRAZOLAM 0.5 MG
0.5 TABLET ORAL 3 TIMES DAILY PRN
Qty: 50 TABLET | Refills: 0 | Status: SHIPPED | OUTPATIENT
Start: 2020-06-02 | End: 2020-06-15

## 2020-06-05 ENCOUNTER — VIRTUAL VISIT (OUTPATIENT)
Dept: FAMILY MEDICINE | Facility: CLINIC | Age: 45
End: 2020-06-05
Payer: COMMERCIAL

## 2020-06-05 DIAGNOSIS — J01.00 ACUTE NON-RECURRENT MAXILLARY SINUSITIS: Primary | ICD-10-CM

## 2020-06-05 DIAGNOSIS — T36.95XA ANTIBIOTIC-INDUCED YEAST INFECTION: ICD-10-CM

## 2020-06-05 DIAGNOSIS — B37.9 ANTIBIOTIC-INDUCED YEAST INFECTION: ICD-10-CM

## 2020-06-05 PROCEDURE — 99213 OFFICE O/P EST LOW 20 MIN: CPT | Mod: GT | Performed by: FAMILY MEDICINE

## 2020-06-05 RX ORDER — FLUCONAZOLE 100 MG/1
100 TABLET ORAL
Qty: 5 TABLET | Refills: 0 | Status: SHIPPED | OUTPATIENT
Start: 2020-06-05 | End: 2020-06-20

## 2020-06-05 NOTE — PROGRESS NOTES
"Anali Loya is a 44 year old female who is being evaluated via a billable video visit.      The patient has been notified of following:     \"This video visit will be conducted via a call between you and your physician/provider. We have found that certain health care needs can be provided without the need for an in-person physical exam.  This service lets us provide the care you need with a video conversation.  If a prescription is necessary we can send it directly to your pharmacy.  If lab work is needed we can place an order for that and you can then stop by our lab to have the test done at a later time.    Video visits are billed at different rates depending on your insurance coverage.  Please reach out to your insurance provider with any questions.    If during the course of the call the physician/provider feels a video visit is not appropriate, you will not be charged for this service.\"    Patient has given verbal consent for Video visit? Yes    How would you like to obtain your AVS? [facility]    Patient would like the video invitation sent by: Text to cell phone: [phone]    Will anyone else be joining your video visit? No      Subjective     Anali Loya is a 44 year old female who presents today via video visit for the following health issues:    HPI   Believes she has a recurrent sinus infection. She was treated with Augmentin 875 mg bid for 10 days in March and did well initially from that but never completely cleared. She was taking mucinex at that time as well and did get off of that for a period of time but now is again needing to take mucinex bid, is getting sinus pain and pressure back and now again has purulent sputum.  She is aware of pine dust exposure but denies itchy watery eyes and no sneezing. She feels her respiratory status from her pulmonary hypertension remains stable on her current meds. She does use albuterol inhaler but that has not accelerated at all from baseline and has not " adjusted her oxygen levels. She states she got fairly sick in March and wants to stay ahead of things at this time.  No other signs or symptoms to suggest covid infection.      Video Start Time: 4:22 PM  Current Outpatient Medications   Medication Sig Dispense Refill     ALPRAZolam (XANAX) 0.5 MG tablet Take 1 tablet (0.5 mg) by mouth 3 times daily as needed for anxiety 50 tablet 0     amoxicillin-clavulanate (AUGMENTIN) 875-125 MG tablet Take 1 tablet by mouth 2 times daily 20 tablet 0     digoxin (LANOXIN) 125 MCG tablet Take 1 tablet (125 mcg) by mouth daily 90 tablet 0     Esomeprazole Magnesium (NEXIUM PO) Take 40 mg by mouth every morning (before breakfast)       fluconazole (DIFLUCAN) 100 MG tablet Take 1 tablet (100 mg) by mouth every 3 days for 15 days 5 tablet 0     fluticasone (FLONASE) 50 MCG/ACT nasal spray USE 2 SPRAYS IN EACH NOSTRIL DAILY 16 g 11     furosemide (LASIX) 20 MG tablet Take 2 tablets (40 mg) by mouth daily 180 tablet 3     morphine (MS CONTIN) 15 MG CR tablet TAKE ONE TABLET BY MOUTH TWICE A DAY 60 tablet 0     order for DME Oxygen: Patient requires supplemental Oxygen 4 LPM via nasal canula at rest and 6 LPM with activity. Please provide patient with portability capability. Okay to spot check patient on oxygen device, to keep sats above 90%. Please provider with home concentrator that can go up to 10 LPM. Oxygen will be for a lifetime. 1 Device 0     order for DME Please provide patient with 2  liquid oxygen tanks for portability. Spot check patient on liquid oxygen. Titrate oxygen to maintain saturations above 88%. 2 Device 0     oxyCODONE-acetaminophen (PERCOCET)  MG per tablet TAKE ONE TABLET BY MOUTH EVERY 6 HOURS AS NEEDED FOR MODERATE TO SEVERE PAIN 60 tablet 0     tadalafil, PAH, (ADCIRCA) 20 MG TABS Take 2 tablets (40 mg) by mouth daily More refills after office or virtual visit only. 60 tablet 3     VENTOLIN  (90 Base) MCG/ACT inhaler INHALE ONE TO TWO PUFFS INTO  "THE LUNGS EVERY 4 HOURS AS NEEDED FOR SHORTNESS OF BREATH / DYSPNEA 18 g 9     potassium chloride ER (K-DUR/KLOR-CON M) 10 MEQ CR tablet Take 2 tablets (20 mEq) by mouth daily (Patient not taking: Reported on 3/11/2020) 180 tablet 3     Reviewed and updated as needed this visit by Provider         Review of Systems   CONSTITUTIONAL: NEGATIVE for fever, chills, change in weight  ENT/MOUTH: NEGATIVE for ear, mouth and throat problems. Sinus congestiong and pain as noted above.   RESP: baseline cough and SOB  CV: NEGATIVE for chest pain, palpitations or peripheral edema      Objective    There were no vitals taken for this visit.  Estimated body mass index is 20.58 kg/m  as calculated from the following:    Height as of 3/11/20: 1.575 m (5' 2\").    Weight as of 3/11/20: 51 kg (112 lb 8 oz).  Physical Exam     GENERAL: Healthy, alert and no distress  EYES: Eyes grossly normal to inspection.  No discharge or erythema, or obvious scleral/conjunctival abnormalities. Oxygen per nasal canula in place.   RESP: No audible wheeze, cough, or visible cyanosis.  No visible retractions or increased work of breathing.    SKIN: Visible skin clear. No significant rash, abnormal pigmentation or lesions.  NEURO: Cranial nerves grossly intact.  Mentation and speech appropriate for age.  PSYCH: Mentation appears normal, affect normal/bright, judgement and insight intact, normal speech and appearance well-groomed.              Assessment & Plan         Acute non-recurrent maxillary sinusitis  Antibiotic-induced yeast infection    Will treat with Augmentin 875mg bid and also she has a very predictable history of yeast infections with ABX so will have her take diflucan 100 mg every three days during the course of antibiotic treatment.      She is to follow up on one week if not improved or at any time if worse.  She can consider adding zyrtec if she gets issues with sneezing or itchy/watery eyes.                Return in about 1 week (around " 6/12/2020) for Phone Visit.    Gregory G. Schoen, MD  [facility]      Video-Visit Details    Type of service:  Video Visit    Video End Time:4:31PM    Video time 9 minutes.  Total time for chart review, order placement 15 minutes.     Originating Location (pt. Location): Home    Distant Location (provider location):  [facility]     Platform used for Video Visit: AmWell Gregory G. Schoen, MD

## 2020-06-11 DIAGNOSIS — R07.81 PLEURITIC CHEST PAIN: ICD-10-CM

## 2020-06-11 DIAGNOSIS — F11.90 CHRONIC, CONTINUOUS USE OF OPIOIDS: ICD-10-CM

## 2020-06-11 RX ORDER — OXYCODONE AND ACETAMINOPHEN 10; 325 MG/1; MG/1
TABLET ORAL
Qty: 60 TABLET | Refills: 0 | Status: SHIPPED | OUTPATIENT
Start: 2020-06-11 | End: 2020-06-25

## 2020-06-11 NOTE — TELEPHONE ENCOUNTER
Oxycodone 10/325       Last Written Prescription Date:  05/28/2020  Last Fill Quantity: 60,   # refills: 0  Last Office Visit: 06/05/2020  Future Office visit:       Routing refill request to provider for review/approval because:  Drug not on the [facility], [facility] or [facility] refill protocol or controlled substance

## 2020-06-11 NOTE — TELEPHONE ENCOUNTER
Chart reviewed for chronic narcotic refill for Ignacio Loya   Dx: Chronic interstitial lung disease, inflammatory arthritis  Chronic continuous opioid use not present.  CSA 3/13/19  UDS past due 3/113/19  MNPMP reviewed, appropriate. Multiple narcotic and benzodiazapine.  Last clinic visit with Ignacio Loya 3/11/20    ASSESSMENT:  #1 sinusitis #2 fibrosis of the lung        PLAN:  She will be put on Augmentin 875 twice daily and given some Diflucan as she quite often gets yeast vaginitis.  With her fragile health she is to report back sooner if not improving.  Tobacco Cessation Action Plan: Self help information given to patient  Weight management plan noted, stable and monitoring     Ignaico Loya MD  [facility]    Last virtual visit 5/18/20 and 6/5/20.    Approve limited refill for Ignacio Loya     Signed Prescriptions:                        Disp   Refills    oxyCODONE-acetaminophen (PERCOCET)  *60 tab*0        Sig: TAKE ONE TABLET BY MOUTH EVERY 6 HOURS AS NEEDED FOR           MODERATE TO SEVERE PAIN  Authorizing Provider: CRISTIAN RUBIO    Recommend follow up virtual visit with Ignacio Loya in 2 weeks to review chronic narcotic pain medication plan.    Electronically signed by Cristian Rubio MD

## 2020-06-15 ENCOUNTER — ALLIED HEALTH/NURSE VISIT (OUTPATIENT)
Dept: PHARMACY | Facility: CLINIC | Age: 45
End: 2020-06-15
Payer: COMMERCIAL

## 2020-06-15 DIAGNOSIS — I27.0 PRIMARY PULMONARY HYPERTENSION (H): Primary | ICD-10-CM

## 2020-06-15 DIAGNOSIS — K21.9 GASTROESOPHAGEAL REFLUX DISEASE, ESOPHAGITIS PRESENCE NOT SPECIFIED: ICD-10-CM

## 2020-06-15 DIAGNOSIS — F41.9 ANXIETY: ICD-10-CM

## 2020-06-15 DIAGNOSIS — M19.90 INFLAMMATORY ARTHRITIS: ICD-10-CM

## 2020-06-15 DIAGNOSIS — J84.9 ILD (INTERSTITIAL LUNG DISEASE) (H): ICD-10-CM

## 2020-06-15 PROCEDURE — 99605 MTMS BY PHARM NP 15 MIN: CPT | Performed by: PHARMACIST

## 2020-06-15 RX ORDER — ALPRAZOLAM 0.5 MG
TABLET ORAL
Qty: 50 TABLET | Refills: 0 | Status: SHIPPED | OUTPATIENT
Start: 2020-06-15 | End: 2020-06-17 | Stop reason: DRUGHIGH

## 2020-06-15 RX ORDER — MORPHINE SULFATE 15 MG/1
TABLET, FILM COATED, EXTENDED RELEASE ORAL
Qty: 60 TABLET | Refills: 0 | Status: SHIPPED | OUTPATIENT
Start: 2020-06-15 | End: 2020-07-13

## 2020-06-15 NOTE — TELEPHONE ENCOUNTER
Requested Prescriptions   Pending Prescriptions Disp Refills     ALPRAZolam (XANAX) 0.5 MG tablet [Pharmacy Med Name: ALPRAZOLAM 0.5MG TABS] 50 tablet 0     Sig: TAKE ONE TABLET BY MOUTH THREE TIMES A DAY AS NEEDED FOR ANXIETY     Last Written Prescription Date:  06/02/2020  Last Fill Quantity: 50,   # refills: 0  Last Office Visit: 06/05/2020  Future Office visit:       Routing refill request to provider for review/approval because:  Drug not on the [facility], [facility] or [facility] refill protocol or controlled substance              morphine (MS CONTIN) 15 MG CR tablet [Pharmacy Med Name: MORPHINE SULFATE ER 15MG TBCR] 60 tablet 0     Sig: TAKE ONE TABLET BY MOUTH TWICE A DAY     Last Written Prescription Date:  05/18/2020  Last Fill Quantity: 60,   # refills: 0  Last Office Visit: 06/05/2020  Future Office visit:       Routing refill request to provider for review/approval because:  Drug not on the [facility], [facility] or  Health refill protocol or controlled substance

## 2020-06-15 NOTE — PROGRESS NOTES
MTM ENCOUNTER  SUBJECTIVE/OBJECTIVE:                           Anali Loya is a 44 year old female called for a follow-up visit. She was referred to me from her [email or web address] insurance plan.  Today's visit is a follow-up MTM visit from 10/17/19.     Patient consented to a telehealth visit: yes  Telemedicine Visit Details  Type of service:  Telephone visit  Start Time: 10:04 AM  End Time: 10:21 AM  Originating Location (pt. Location): Home  Distant Location (provider location):  [facility]  Mode of Communication:  Telephone    Chief Complaint: Med Review.    Allergies/ADRs: Reviewed in EHR  Tobacco:  reports that she has been smoking cigarettes. She started smoking about 27 years ago. She has a 12.50 pack-year smoking history. She quit smokeless tobacco use about 3 years ago.  Caffeine: 1 cups/day of coffee  Activity: yoga - goes out in nature to meditate  PMH: Reviewed in Epic    Medication Adherence/Access:  Patient uses pill box(es).  Patient takes medications 2 time(s) per day.   Per patient, misses medication 0 times per week.   Medication barriers: none.   The patient fills medications at [place]: YES.     ILD/Pulm HTN: Currently taking digoxin 125 mcg daily, esomeprazole 40 mg daily, furosemide 40 mg daily, tadalafil 40 mg daily, and Ventolin HFA PRN - zero to four times per day (odors or smoke can contribute to this use). Tries to eat potassium rich foods to avoid taking potassium supplement. Pt has found that she has been able to walk farther without tiring since starting the tadalafil..  She is using 3 to 4 L of oxygen/min at rest and 6 to 8 L of oxygen when walking. Pt reports some heartburn when starting tadalafil, but this is okay now that she is on PPI.      Inflammatory Arthritis: Currently taking morphine ER 15 mg twice daily and Percocet  mg every 6 hours PRN. Pt finds this to be somewhat effective - states she has grown tolerant to her current dose, but does not want  to take more (sometimes will go off - unclear if it's due to using more doses - feels that it works better if off for a few days). Tried supplementing with additional Tylenol, but did not find to be helpful. Pt reports no known side effects.     Anxiety: Currently taking alprazolam 0.5 mg twice daily (down from previous dose). Pt finds this to be effective. Does have some increased stress [place] the country. Pt reports no known side effects.     GERD: Current medications include: Nexium (esomeprazole) 40 mg once daily. Pt c/o no current symptoms.  Patient feels that current regimen is effective.    Today's Vitals: There were no vitals taken for this visit. - telephone encounter, no vitals    BP Readings from Last 3 Encounters:   03/11/20 110/62   12/24/19 122/71   07/15/19 121/79       Wt Readings from Last 5 Encounters:   03/11/20 112 lb 8 oz (51 kg)   07/15/19 111 lb 3.2 oz (50.4 kg)   04/24/19 116 lb (52.6 kg)   04/17/19 116 lb (52.6 kg)   03/13/19 116 lb 8 oz (52.8 kg)       ASSESSMENT:                              Medication Adherence: good, no issues identified    ILD/Pulm HTN: Stable per patient.    Inflammatory Arthritis: Somewhat stable per patient.    Anxiety: Anxiety is increased, manageable with current therapy.      GERD: Stable.     PLAN:                            1. Continue current therapy.     I spent 15 minutes with this patient today. A copy of the visit note was provided to the patient's primary care provider.    Will follow up in 6 months or sooner if needed.    The patient was sent via [employer] a summary of these recommendations.     Miguelito Rasmussen, PharmD, BCACP  Medication Therapy Management Pharmacist  Pager: [phone]

## 2020-06-16 ENCOUNTER — MYC MEDICAL ADVICE (OUTPATIENT)
Dept: FAMILY MEDICINE | Facility: CLINIC | Age: 45
End: 2020-06-16

## 2020-06-16 ENCOUNTER — MEDICAL CORRESPONDENCE (OUTPATIENT)
Dept: HEALTH INFORMATION MANAGEMENT | Facility: CLINIC | Age: 45
End: 2020-06-16

## 2020-06-16 DIAGNOSIS — I27.20 PULMONARY HYPERTENSION (H): ICD-10-CM

## 2020-06-16 DIAGNOSIS — J01.00 ACUTE NON-RECURRENT MAXILLARY SINUSITIS: ICD-10-CM

## 2020-06-16 DIAGNOSIS — G47.9 DISTURBANCE IN SLEEP BEHAVIOR: ICD-10-CM

## 2020-06-16 RX ORDER — DIGOXIN 125 MCG
125 TABLET ORAL DAILY
Qty: 90 TABLET | Refills: 0 | Status: SHIPPED | OUTPATIENT
Start: 2020-06-16 | End: 2020-08-20

## 2020-06-16 NOTE — TELEPHONE ENCOUNTER
Last Clinic Visit: 4/6/2020  [facility]    Lab(s) - Digoxin past due.  Justine refill 90 days and FYI to Cardiology.  *Future lab order in chart.

## 2020-06-17 RX ORDER — ALPRAZOLAM 0.5 MG
0.5 TABLET ORAL EVERY 6 HOURS
Qty: 40 TABLET | Refills: 0 | Status: SHIPPED | OUTPATIENT
Start: 2020-06-17 | End: 2020-06-25

## 2020-06-17 NOTE — TELEPHONE ENCOUNTER
Anali calls to check if there was a response to the Blush[employer] message she sent to Dr Loya.  I relayed message about the dosage update, but she is also asking if he will address the question about another medication for her sinus infection.  Anali states she is worried about the infection getting bad again and affecting her lungs.

## 2020-06-17 NOTE — TELEPHONE ENCOUNTER
Patient has been scheduled for a follow up on her narcotic.   Christina Carney on 6/17/2020 at 1:41 PM

## 2020-06-22 ENCOUNTER — TELEPHONE (OUTPATIENT)
Dept: CARDIOLOGY | Facility: CLINIC | Age: 45
End: 2020-06-22

## 2020-07-01 ENCOUNTER — HOSPITAL ENCOUNTER (OUTPATIENT)
Facility: CLINIC | Age: 45
End: 2020-07-01
Attending: INTERNAL MEDICINE | Admitting: INTERNAL MEDICINE
Payer: COMMERCIAL

## 2020-07-01 DIAGNOSIS — I27.20 PULMONARY HYPERTENSION (H): ICD-10-CM

## 2020-07-01 DIAGNOSIS — Z11.59 ENCOUNTER FOR SCREENING FOR OTHER VIRAL DISEASES: Primary | ICD-10-CM

## 2020-07-01 DIAGNOSIS — R06.02 SOB (SHORTNESS OF BREATH): ICD-10-CM

## 2020-07-01 DIAGNOSIS — I27.20 PULMONARY HYPERTENSION (H): Primary | ICD-10-CM

## 2020-07-01 RX ORDER — LIDOCAINE 40 MG/G
CREAM TOPICAL
Status: CANCELLED | OUTPATIENT
Start: 2020-07-01

## 2020-07-08 DIAGNOSIS — G47.9 DISTURBANCE IN SLEEP BEHAVIOR: ICD-10-CM

## 2020-07-08 RX ORDER — ALPRAZOLAM 0.5 MG
TABLET ORAL
Qty: 40 TABLET | Refills: 0 | Status: SHIPPED | OUTPATIENT
Start: 2020-07-08 | End: 2020-07-23

## 2020-07-08 NOTE — TELEPHONE ENCOUNTER
Reason for Call:  Other call back    Detailed comments: patient calling wondering why her alprazolam is 3 a day instead of 4 a day, she is wondering if this is the pharmacy's mistake or what is going on. Please advise     Phone Number Patient can be reached at: Home number on file [phone] (home)    Best Time: Anytime     Can we leave a detailed message on this number? YES    Call taken on 7/8/2020 at 9:45 AM by Miranda Seipiel Vaccari

## 2020-07-13 DIAGNOSIS — M19.90 INFLAMMATORY ARTHRITIS: ICD-10-CM

## 2020-07-13 RX ORDER — MORPHINE SULFATE 15 MG/1
TABLET, FILM COATED, EXTENDED RELEASE ORAL
Qty: 60 TABLET | Refills: 0 | Status: SHIPPED | OUTPATIENT
Start: 2020-07-13 | End: 2020-08-07

## 2020-07-13 NOTE — TELEPHONE ENCOUNTER
morphine (MS CONTIN) 15 MG CR tablet  Last Written Prescription Date:  06/15/2020  Last Fill Quantity: 60,   # refills: 0  Last Office Visit: 03/11/2020  Future Office visit:       Routing refill request to provider for review/approval because:  Drug not on the [facility], [facility] or [facility] refill protocol or controlled substance

## 2020-07-29 DIAGNOSIS — I27.20 PULMONARY HYPERTENSION (H): ICD-10-CM

## 2020-07-29 RX ORDER — TADALAFIL 20 MG/1
40 TABLET ORAL DAILY
Qty: 180 TABLET | Refills: 3 | Status: SHIPPED | OUTPATIENT
Start: 2020-07-29 | End: 2020-10-30

## 2020-08-05 DIAGNOSIS — G47.9 DISTURBANCE IN SLEEP BEHAVIOR: ICD-10-CM

## 2020-08-05 RX ORDER — ALPRAZOLAM 0.5 MG
TABLET ORAL
Qty: 40 TABLET | Refills: 0 | OUTPATIENT
Start: 2020-08-05

## 2020-08-05 NOTE — TELEPHONE ENCOUNTER
Was denied saying to early. Patient got a 10 day supply on 7/27/2020.      Thank You,  Pavan Stanley, Pharmacy [facility]

## 2020-08-05 NOTE — TELEPHONE ENCOUNTER
Xanax      Last Written Prescription Date:  7/24/2020  Last Fill Quantity: 40,   # refills: 0  Last Office Visit: 6/5/2020  Future Office visit:       Routing refill request to provider for review/approval because:  Drug not on the [facility], P or [facility] refill protocol or controlled substance

## 2020-08-05 NOTE — TELEPHONE ENCOUNTER
Diagnosis is for sleep disturbance. She was prescribed 40, 1 week ago, she should not need more in just 1 week.

## 2020-08-05 NOTE — TELEPHONE ENCOUNTER
05/18/2020 patient had seen Dr. Garza and was told to follow up with Dr. Loya about this medication as there was concern with the medication causing respiratory arrest. He made mention of weaning her off of this medication. She should probably schedule a follow up with Dr. Loya for refills of this medication.     Called patient and left a VM. Please give her the message below from Tino Cabral CNP. She should not already need a refill of this script.  Christina Carney on 8/5/2020 at 3:32 PM

## 2020-08-05 NOTE — LETTER
[address] [address]  Phone: [phone]  Fax: [phone]        August 7, 2020      Anali Loya                                                                                                                                [address] [facility] [address]            Dear Ms. Loya,    We are concerned about your health care.  We recently provided you with a medication refill.  Many medications require routine follow-up with your Doctor.      At this time we ask that: You schedule a routine office visit with your physician to follow your care. Please follow up with Dr. Loya for your medication request because there are concerns with the medication causing respiratory arrest. A mention was made of weaning you of the medication.     Your prescription: No further refills will be given until your follow up care is completed.      Thank you,      [facility] Care Team

## 2020-08-06 ENCOUNTER — MYC REFILL (OUTPATIENT)
Dept: FAMILY MEDICINE | Facility: CLINIC | Age: 45
End: 2020-08-06

## 2020-08-06 DIAGNOSIS — G47.9 DISTURBANCE IN SLEEP BEHAVIOR: ICD-10-CM

## 2020-08-06 DIAGNOSIS — R07.81 PLEURITIC CHEST PAIN: ICD-10-CM

## 2020-08-06 RX ORDER — OXYCODONE AND ACETAMINOPHEN 10; 325 MG/1; MG/1
TABLET ORAL
Qty: 60 TABLET | Refills: 0 | Status: SHIPPED | OUTPATIENT
Start: 2020-08-06 | End: 2020-08-20

## 2020-08-06 RX ORDER — OXYCODONE AND ACETAMINOPHEN 10; 325 MG/1; MG/1
TABLET ORAL
Qty: 60 TABLET | Refills: 0 | Status: CANCELLED | OUTPATIENT
Start: 2020-08-06

## 2020-08-06 RX ORDER — ALPRAZOLAM 0.5 MG
0.5 TABLET ORAL EVERY 6 HOURS PRN
Qty: 40 TABLET | Refills: 0 | Status: SHIPPED | OUTPATIENT
Start: 2020-08-06 | End: 2020-08-14

## 2020-08-06 NOTE — TELEPHONE ENCOUNTER
Covering provider refused. She has gone through 40 tablets in a week.     Will have PCP review when back in clinic.     ILIR MolinaN, RN  [facility]

## 2020-08-06 NOTE — TELEPHONE ENCOUNTER
oxycodone      Last Written Prescription Date:  07/24/20  Last Fill Quantity: 60,   # refills: 0  Last Office Visit: 03/11/20  Future Office visit:       Routing refill request to provider for review/approval because:  Drug not on the [facility], P or [facility] refill protocol or controlled substance

## 2020-08-06 NOTE — TELEPHONE ENCOUNTER
ALPRAZolam (XANAX) 0.5 MG tablet      Last Written Prescription Date:  7/24/2020  Last Fill Quantity: 40,   # refills: 0  Last Office Visit: 6/5/2020  Future Office visit:       Routing refill request to provider for review/approval because:  Drug not on the [facility], [facility] or [facility] refill protocol or controlled substance

## 2020-08-07 ENCOUNTER — MYC MEDICAL ADVICE (OUTPATIENT)
Dept: FAMILY MEDICINE | Facility: CLINIC | Age: 45
End: 2020-08-07

## 2020-08-07 DIAGNOSIS — M19.90 INFLAMMATORY ARTHRITIS: ICD-10-CM

## 2020-08-07 RX ORDER — MORPHINE SULFATE 15 MG/1
TABLET, FILM COATED, EXTENDED RELEASE ORAL
Qty: 60 TABLET | Refills: 0 | Status: SHIPPED | OUTPATIENT
Start: 2020-08-07 | End: 2020-09-04

## 2020-08-07 NOTE — TELEPHONE ENCOUNTER
Morphine      Last Written Prescription Date:  7/13/2020  Last Fill Quantity: 60,   # refills: 0  Last Office Visit: 6/5/2020  Future Office visit:    Next 5 appointments (look out 90 days)    Aug 19, 2020 10:40 AM CDT  Office Visit with Ignacio [facility] MD  [facility] [address] [place] [id number]  [phone]           Routing refill request to provider for review/approval because:  Drug not on the [facility], [facility] or [facility] refill protocol or controlled substance

## 2020-08-10 ENCOUNTER — TELEPHONE (OUTPATIENT)
Dept: FAMILY MEDICINE | Facility: CLINIC | Age: 45
End: 2020-08-10

## 2020-08-10 ENCOUNTER — MYC MEDICAL ADVICE (OUTPATIENT)
Dept: FAMILY MEDICINE | Facility: CLINIC | Age: 45
End: 2020-08-10

## 2020-08-10 NOTE — TELEPHONE ENCOUNTER
Prior Authorization Approval    Authorization Effective Date: 7/10/2020  Authorization Expiration Date: 8/10/2027  Medication: Morphine sulfate and Oxycodone-apap 10mg-325mg-APPROVED  Approved Dose/Quantity:    Reference #:     Insurance Company: [employer] - Phone [phone] Fax [phone]  Expected CoPay:       CoPay Card Available:      Foundation Assistance Needed:    Which Pharmacy is filling the prescription (Not needed for infusion/clinic administered): [facility] [address]   Pharmacy Notified: Yes  Patient Notified: Yes  **Instructed pharmacy to notify patient when script is ready to /ship.**

## 2020-08-10 NOTE — TELEPHONE ENCOUNTER
Central Prior Authorization Team   Phone: [phone]    PA Initiation    Medication: OXYCODONE-ACETAMINOPHEN  Insurance Company: [employer] - Phone [phone] Fax [phone]  Pharmacy Filling the Rx: [address]   Filling Pharmacy Phone: [phone]  Filling Pharmacy Fax: [phone]  Start Date: 8/10/2020

## 2020-08-10 NOTE — TELEPHONE ENCOUNTER
Prior Authorization Approval    Authorization Effective Date: 8/10/2020  Authorization Expiration Date: 8/10/2021  Medication: OXYCODONE-ACETAMINOPHEN-APPROVED  Approved Dose/Quantity:    Reference #:     Insurance Company: [employer] - Phone [phone] Fax [phone]  Expected CoPay:       CoPay Card Available:      Foundation Assistance Needed:    Which Pharmacy is filling the prescription (Not needed for infusion/clinic administered): [facility] [address]   Pharmacy Notified: Yes  Patient Notified: Yes  **Instructed pharmacy to notify patient when script is ready to /ship.**

## 2020-08-10 NOTE — TELEPHONE ENCOUNTER
Central Prior Authorization Team   Phone: [phone]    PA Initiation    Medication: Morphine sulfate and Oxycodone-apap 10mg-325mg  Insurance Company: [employer] - Phone [phone] Fax [phone]  Pharmacy Filling the Rx: [facility] [address]   Filling Pharmacy Phone: [phone]  Filling Pharmacy Fax: [phone]  Start Date: 8/10/2020

## 2020-08-10 NOTE — TELEPHONE ENCOUNTER
Oxycodone -apap 10mg-325mg AND morphine sulfate 15mg BOTH need prior authorizations.   The oxycodone-apap 10mg-325mg states that insurance allows a 14 day supply in 60 days.   The morphine sulfate 15mg prescription just states PA needed.          Insurance [facility]  Insurance phone [phone]   Patient ID# [id number]    When approved or denied, please contact pharmacy [phone]      Patient is supposed to be leaving today for camping if this can be rushed    Thanks  Rosalie Bolanos [facility]   [phone]

## 2020-08-14 ENCOUNTER — MYC REFILL (OUTPATIENT)
Dept: FAMILY MEDICINE | Facility: CLINIC | Age: 45
End: 2020-08-14

## 2020-08-14 DIAGNOSIS — G47.9 DISTURBANCE IN SLEEP BEHAVIOR: ICD-10-CM

## 2020-08-14 RX ORDER — ALPRAZOLAM 0.5 MG
0.5 TABLET ORAL EVERY 6 HOURS PRN
Qty: 40 TABLET | Refills: 0 | Status: SHIPPED | OUTPATIENT
Start: 2020-08-14 | End: 2020-08-23

## 2020-08-14 NOTE — TELEPHONE ENCOUNTER
Alprazolam  0.5 MG      Last Written Prescription Date:  8/6/2020  Last Fill Quantity: 40,   # refills: 0  Last Office Visit: 6-5-2020  Future Office visit:    Next 5 appointments (look out 90 days)    Aug 19, 2020 10:40 AM CDT  Office Visit with Ignacio [facility] MD  [facility] [address] [place] [id number]  [phone]           Routing refill request to provider for review/approval because:  Drug not on the [facility], [facility]MP or  Health refill protocol or controlled substance

## 2020-08-18 RX ORDER — ALPRAZOLAM 0.5 MG
TABLET ORAL
Qty: 40 TABLET | Refills: 0 | OUTPATIENT
Start: 2020-08-18

## 2020-08-19 ENCOUNTER — OFFICE VISIT (OUTPATIENT)
Dept: FAMILY MEDICINE | Facility: CLINIC | Age: 45
End: 2020-08-19
Payer: COMMERCIAL

## 2020-08-19 VITALS
TEMPERATURE: 97.4 F | WEIGHT: 105 LBS | HEART RATE: 66 BPM | SYSTOLIC BLOOD PRESSURE: 108 MMHG | OXYGEN SATURATION: 99 % | DIASTOLIC BLOOD PRESSURE: 70 MMHG | BODY MASS INDEX: 19.2 KG/M2 | RESPIRATION RATE: 20 BRPM

## 2020-08-19 DIAGNOSIS — I27.20 PULMONARY HYPERTENSION (H): ICD-10-CM

## 2020-08-19 DIAGNOSIS — J84.9 ILD (INTERSTITIAL LUNG DISEASE) (H): Primary | ICD-10-CM

## 2020-08-19 DIAGNOSIS — F11.90 CHRONIC, CONTINUOUS USE OF OPIOIDS: ICD-10-CM

## 2020-08-19 DIAGNOSIS — F41.9 ANXIETY: ICD-10-CM

## 2020-08-19 DIAGNOSIS — M19.90 INFLAMMATORY ARTHRITIS: ICD-10-CM

## 2020-08-19 PROCEDURE — 99214 OFFICE O/P EST MOD 30 MIN: CPT | Performed by: FAMILY MEDICINE

## 2020-08-19 ASSESSMENT — ANXIETY QUESTIONNAIRES
GAD7 TOTAL SCORE: 0
6. BECOMING EASILY ANNOYED OR IRRITABLE: NOT AT ALL
5. BEING SO RESTLESS THAT IT IS HARD TO SIT STILL: NOT AT ALL
2. NOT BEING ABLE TO STOP OR CONTROL WORRYING: NOT AT ALL
1. FEELING NERVOUS, ANXIOUS, OR ON EDGE: NOT AT ALL
3. WORRYING TOO MUCH ABOUT DIFFERENT THINGS: NOT AT ALL
7. FEELING AFRAID AS IF SOMETHING AWFUL MIGHT HAPPEN: NOT AT ALL
IF YOU CHECKED OFF ANY PROBLEMS ON THIS QUESTIONNAIRE, HOW DIFFICULT HAVE THESE PROBLEMS MADE IT FOR YOU TO DO YOUR WORK, TAKE CARE OF THINGS AT HOME, OR GET ALONG WITH OTHER PEOPLE: NOT DIFFICULT AT ALL

## 2020-08-19 ASSESSMENT — PAIN SCALES - GENERAL: PAINLEVEL: MILD PAIN (2)

## 2020-08-19 ASSESSMENT — PATIENT HEALTH QUESTIONNAIRE - PHQ9
SUM OF ALL RESPONSES TO PHQ QUESTIONS 1-9: 3
5. POOR APPETITE OR OVEREATING: NOT AT ALL
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
## 2020-08-20 ENCOUNTER — MYC REFILL (OUTPATIENT)
Dept: CARDIOLOGY | Facility: CLINIC | Age: 45
End: 2020-08-20

## 2020-08-20 ENCOUNTER — MYC REFILL (OUTPATIENT)
Dept: FAMILY MEDICINE | Facility: CLINIC | Age: 45
End: 2020-08-20

## 2020-08-20 DIAGNOSIS — I27.20 PULMONARY HYPERTENSION (H): ICD-10-CM

## 2020-08-20 DIAGNOSIS — R07.81 PLEURITIC CHEST PAIN: ICD-10-CM

## 2020-08-20 RX ORDER — OXYCODONE AND ACETAMINOPHEN 10; 325 MG/1; MG/1
TABLET ORAL
Qty: 60 TABLET | Refills: 0 | Status: SHIPPED | OUTPATIENT
Start: 2020-08-20 | End: 2020-09-03

## 2020-08-20 RX ORDER — DIGOXIN 125 MCG
125 TABLET ORAL DAILY
Qty: 90 TABLET | Refills: 1 | Status: SHIPPED | OUTPATIENT
Start: 2020-08-20 | End: 2021-03-16

## 2020-08-20 ASSESSMENT — ANXIETY QUESTIONNAIRES: GAD7 TOTAL SCORE: 0

## 2020-08-20 NOTE — TELEPHONE ENCOUNTER
Medication Refill double check:    Last virtual visit was on 4/6/20 with .    Follow up was recommended for 3 months .    Any additional encounters with changes to requested med? no    Authorizing provider is: Dr. Ledesma    Refill was approved.     Additional orders/notes: Unable to find any Digoxin level, but there is an order for one back in January 2020.    Will send msg to Delfino Tadeo RN to follow up with patient for Digoxin level.      Jada Mcallister RN on 8/20/2020 at 12:22 PM

## 2020-08-20 NOTE — TELEPHONE ENCOUNTER
Percocet  MG       Last Written Prescription Date:  8/6/2020  Last Fill Quantity: 60,   # refills: 0  Last Office Visit: 8/19/2020  Future Office visit:       Routing refill request to provider for review/approval because:  Drug not on the [facility], [facility] or [facility] refill protocol or controlled substance

## 2020-08-21 ENCOUNTER — TELEPHONE (OUTPATIENT)
Dept: CARDIOLOGY | Facility: CLINIC | Age: 45
End: 2020-08-21

## 2020-08-21 NOTE — TELEPHONE ENCOUNTER
Called and advised we were able to refill her digoxin for a few months, but patient needs to rescheduled her cancelled testing and have a digoxin level drawn. Asked patient to call me back when able. Sunitha Tadeo RN on 8/21/2020 at 9:56 AM

## 2020-08-23 ENCOUNTER — MYC REFILL (OUTPATIENT)
Dept: FAMILY MEDICINE | Facility: CLINIC | Age: 45
End: 2020-08-23

## 2020-08-23 DIAGNOSIS — G47.9 DISTURBANCE IN SLEEP BEHAVIOR: ICD-10-CM

## 2020-08-23 NOTE — TELEPHONE ENCOUNTER
Alprazolam        Last Written Prescription Date:  8/14/2020  Last Fill Quantity: 40,   # refills: 0  Last Office Visit: 8/19/2020  Future Office visit:       Routing refill request to provider for review/approval because:  Drug not on the [facility], [facility] or [facility] refill protocol or controlled substance

## 2020-08-24 ENCOUNTER — MYC REFILL (OUTPATIENT)
Dept: FAMILY MEDICINE | Facility: CLINIC | Age: 45
End: 2020-08-24

## 2020-08-24 DIAGNOSIS — R06.02 SOB (SHORTNESS OF BREATH): ICD-10-CM

## 2020-08-24 DIAGNOSIS — G47.9 DISTURBANCE IN SLEEP BEHAVIOR: ICD-10-CM

## 2020-08-24 RX ORDER — ALBUTEROL SULFATE 90 UG/1
AEROSOL, METERED RESPIRATORY (INHALATION)
Qty: 18 G | Refills: 9 | Status: CANCELLED | OUTPATIENT
Start: 2020-08-24

## 2020-08-24 RX ORDER — ALPRAZOLAM 0.5 MG
0.5 TABLET ORAL EVERY 6 HOURS PRN
Qty: 40 TABLET | Refills: 0 | Status: CANCELLED | OUTPATIENT
Start: 2020-08-24

## 2020-08-25 ENCOUNTER — HOSPITAL ENCOUNTER (OUTPATIENT)
Facility: CLINIC | Age: 45
End: 2020-08-25
Attending: INTERNAL MEDICINE | Admitting: INTERNAL MEDICINE
Payer: COMMERCIAL

## 2020-08-25 DIAGNOSIS — I27.20 PULMONARY HYPERTENSION (H): ICD-10-CM

## 2020-08-25 DIAGNOSIS — I27.20 PULMONARY HYPERTENSION (H): Primary | ICD-10-CM

## 2020-08-25 DIAGNOSIS — Z11.59 ENCOUNTER FOR SCREENING FOR OTHER VIRAL DISEASES: Primary | ICD-10-CM

## 2020-08-25 DIAGNOSIS — R06.02 SOB (SHORTNESS OF BREATH): ICD-10-CM

## 2020-08-25 RX ORDER — LIDOCAINE 40 MG/G
CREAM TOPICAL
Status: CANCELLED | OUTPATIENT
Start: 2020-08-25

## 2020-08-25 RX ORDER — ALPRAZOLAM 0.5 MG
0.5 TABLET ORAL EVERY 6 HOURS PRN
Qty: 40 TABLET | Refills: 0 | Status: SHIPPED | OUTPATIENT
Start: 2020-08-25 | End: 2020-09-03

## 2020-08-25 NOTE — TELEPHONE ENCOUNTER
Patient has refills at pharmacy.  Is informed via [employer] to check with pharmacy.  Closing this encounter.  ILIR MerinoN, RN

## 2020-09-03 ENCOUNTER — MYC REFILL (OUTPATIENT)
Dept: FAMILY MEDICINE | Facility: CLINIC | Age: 45
End: 2020-09-03

## 2020-09-03 DIAGNOSIS — R07.81 PLEURITIC CHEST PAIN: ICD-10-CM

## 2020-09-03 DIAGNOSIS — G47.9 DISTURBANCE IN SLEEP BEHAVIOR: ICD-10-CM

## 2020-09-03 NOTE — TELEPHONE ENCOUNTER
ALPRAZolam (XANAX) 0.5 MG tablet       Last Written Prescription Date:  8/25/20  Last Fill Quantity: 40,   # refills: 0  Last Office Visit: 8/19/20  Future Office visit:       Routing refill request to provider for review/approval because:  Drug not on the [facility], [facility] or [facility] refill protocol or controlled substance

## 2020-09-04 ENCOUNTER — MYC REFILL (OUTPATIENT)
Dept: FAMILY MEDICINE | Facility: CLINIC | Age: 45
End: 2020-09-04

## 2020-09-04 DIAGNOSIS — M19.90 INFLAMMATORY ARTHRITIS: ICD-10-CM

## 2020-09-04 RX ORDER — ALPRAZOLAM 0.5 MG
0.5 TABLET ORAL EVERY 6 HOURS PRN
Qty: 40 TABLET | Refills: 0 | Status: SHIPPED | OUTPATIENT
Start: 2020-09-04 | End: 2020-09-14

## 2020-09-04 RX ORDER — OXYCODONE AND ACETAMINOPHEN 10; 325 MG/1; MG/1
TABLET ORAL
Qty: 60 TABLET | Refills: 0 | Status: SHIPPED | OUTPATIENT
Start: 2020-09-04 | End: 2020-09-17

## 2020-09-04 NOTE — TELEPHONE ENCOUNTER
MS Contin        Last Written Prescription Date:  8/7/20  Last Fill Quantity: 60,   # refills: 0  Last Office Visit: 8/19/20  Future Office visit:       Routing refill request to provider for review/approval because:  Drug not on the [facility], [facility] or [facility] refill protocol or controlled substance

## 2020-09-08 RX ORDER — MORPHINE SULFATE 15 MG/1
15 TABLET, FILM COATED, EXTENDED RELEASE ORAL 2 TIMES DAILY
Qty: 60 TABLET | Refills: 0 | Status: SHIPPED | OUTPATIENT
Start: 2020-09-08 | End: 2020-10-02

## 2020-09-14 ENCOUNTER — MYC REFILL (OUTPATIENT)
Dept: FAMILY MEDICINE | Facility: CLINIC | Age: 45
End: 2020-09-14

## 2020-09-14 DIAGNOSIS — G47.9 DISTURBANCE IN SLEEP BEHAVIOR: ICD-10-CM

## 2020-09-14 RX ORDER — ALPRAZOLAM 0.5 MG
0.5 TABLET ORAL EVERY 6 HOURS PRN
Qty: 40 TABLET | Refills: 0 | Status: SHIPPED | OUTPATIENT
Start: 2020-09-14 | End: 2020-09-24

## 2020-09-14 NOTE — TELEPHONE ENCOUNTER
ALPRAZolam (XANAX) 0.5 MG tablet   Last Written Prescription Date:  09/04/2020  Last Fill Quantity: 40,   # refills: 0  Last Office Visit: 08/19/2020  Future Office visit:       Routing refill request to provider for review/approval because:  Drug not on the [facility], [facility] or [facility] refill protocol or controlled substance

## 2020-09-17 ENCOUNTER — MYC REFILL (OUTPATIENT)
Dept: FAMILY MEDICINE | Facility: CLINIC | Age: 45
End: 2020-09-17

## 2020-09-17 DIAGNOSIS — R07.81 PLEURITIC CHEST PAIN: ICD-10-CM

## 2020-09-17 NOTE — TELEPHONE ENCOUNTER
PERCOCET      Last Written Prescription Date:  9/4/2020  Last Fill Quantity: 60,   # refills: 0  Last Office Visit: 8/19/2020  Future Office visit:       Routing refill request to provider for review/approval because:  Drug not on the [facility], [facility] or [facility] refill protocol or controlled substance

## 2020-09-18 RX ORDER — OXYCODONE AND ACETAMINOPHEN 10; 325 MG/1; MG/1
TABLET ORAL
Qty: 60 TABLET | Refills: 0 | Status: SHIPPED | OUTPATIENT
Start: 2020-09-18 | End: 2020-10-01

## 2020-09-24 ENCOUNTER — MYC REFILL (OUTPATIENT)
Dept: FAMILY MEDICINE | Facility: CLINIC | Age: 45
End: 2020-09-24

## 2020-09-24 DIAGNOSIS — G47.9 DISTURBANCE IN SLEEP BEHAVIOR: ICD-10-CM

## 2020-09-24 RX ORDER — ALPRAZOLAM 0.5 MG
0.5 TABLET ORAL EVERY 6 HOURS PRN
Qty: 40 TABLET | Refills: 0 | Status: SHIPPED | OUTPATIENT
Start: 2020-09-24 | End: 2020-10-05

## 2020-09-24 NOTE — TELEPHONE ENCOUNTER
ALPRAZolam (XANAX) 0.5 MG tablet       Last Written Prescription Date:  9/14/20  Last Fill Quantity: 40,   # refills: 0  Last Office Visit: 8/19/20  Future Office visit:       Routing refill request to provider for review/approval because:  Drug not on the [facility], [facility] or [facility] refill protocol or controlled substance

## 2020-10-05 ENCOUNTER — MYC REFILL (OUTPATIENT)
Dept: FAMILY MEDICINE | Facility: CLINIC | Age: 45
End: 2020-10-05

## 2020-10-05 DIAGNOSIS — G47.9 DISTURBANCE IN SLEEP BEHAVIOR: ICD-10-CM

## 2020-10-05 RX ORDER — ALPRAZOLAM 0.5 MG
0.5 TABLET ORAL EVERY 6 HOURS PRN
Qty: 40 TABLET | Refills: 0 | Status: SHIPPED | OUTPATIENT
Start: 2020-10-05 | End: 2020-10-16

## 2020-10-05 NOTE — TELEPHONE ENCOUNTER
Xanax      Last Written Prescription Date:  9/24/20  Last Fill Quantity: 40,   # refills: 0  Last Office Visit: 8/19/20  Future Office visit:       Routing refill request to provider for review/approval because:  Drug not on the [facility], [facility] or [facility] refill protocol or controlled substance

## 2020-10-16 ENCOUNTER — MYC REFILL (OUTPATIENT)
Dept: FAMILY MEDICINE | Facility: CLINIC | Age: 45
End: 2020-10-16

## 2020-10-16 DIAGNOSIS — G47.9 DISTURBANCE IN SLEEP BEHAVIOR: ICD-10-CM

## 2020-10-16 NOTE — TELEPHONE ENCOUNTER
Routing refill request to provider for review/approval because:  Drug not on the [facility] refill protocol   Last Written Prescription Date:  10/5/2020  Last Fill Quantity: 40,  # refills: 0   Last office visit: 8/19/2020 with prescribing provider:     Future Office Visit:        ILIR MerinoN, RN

## 2020-10-19 RX ORDER — ALPRAZOLAM 0.5 MG
0.5 TABLET ORAL EVERY 6 HOURS PRN
Qty: 40 TABLET | Refills: 0 | Status: SHIPPED | OUTPATIENT
Start: 2020-10-19 | End: 2020-10-30

## 2020-10-29 ENCOUNTER — MYC REFILL (OUTPATIENT)
Dept: FAMILY MEDICINE | Facility: CLINIC | Age: 45
End: 2020-10-29

## 2020-10-29 DIAGNOSIS — I27.20 PULMONARY HYPERTENSION (H): ICD-10-CM

## 2020-10-29 DIAGNOSIS — R07.81 PLEURITIC CHEST PAIN: ICD-10-CM

## 2020-10-29 NOTE — TELEPHONE ENCOUNTER
Percocet      Last Written Prescription Date:  10/16/20  Last Fill Quantity: 60,   # refills: 0  Last Office Visit: 8/19/20  Future Office visit:       Routing refill request to provider for review/approval because:  Drug not on the [facility], [facility] or [facility] refill protocol or controlled substance

## 2020-10-30 ENCOUNTER — MYC REFILL (OUTPATIENT)
Dept: FAMILY MEDICINE | Facility: CLINIC | Age: 45
End: 2020-10-30

## 2020-10-30 DIAGNOSIS — G47.9 DISTURBANCE IN SLEEP BEHAVIOR: ICD-10-CM

## 2020-10-30 RX ORDER — OXYCODONE AND ACETAMINOPHEN 10; 325 MG/1; MG/1
TABLET ORAL
Qty: 60 TABLET | Refills: 0 | Status: SHIPPED | OUTPATIENT
Start: 2020-10-30 | End: 2020-11-12

## 2020-10-30 RX ORDER — ALPRAZOLAM 0.5 MG
0.5 TABLET ORAL EVERY 6 HOURS PRN
Qty: 40 TABLET | Refills: 0 | Status: SHIPPED | OUTPATIENT
Start: 2020-10-30 | End: 2020-11-10

## 2020-10-30 RX ORDER — TADALAFIL 20 MG/1
40 TABLET ORAL DAILY
Qty: 180 TABLET | Refills: 3 | Status: SHIPPED | OUTPATIENT
Start: 2020-10-30 | End: 2020-11-13

## 2020-10-30 NOTE — TELEPHONE ENCOUNTER
Xanax 0.5 mg      Last Written Prescription Date:  10/19/20  Last Fill Quantity: 40,   # refills: 0  Last Office Visit: 8/19/20  Future Office visit:       Routing refill request to provider for review/approval because:  Drug not on the [facility], [facility] or [facility] refill protocol or controlled substance

## 2020-11-01 NOTE — PROGRESS NOTES
Service Date: 2020     Ignacio Loya MD   [address] [address]      RE: Ellen Loya   MRN: [id number]   : 1975      Dear Dr. Loya:      We had the pleasure of seeing Ms. Ellen Loya in our Pulmonary Hypertension Clinic.  As you know, she is a very pleasant 44-year-old female with polymyositis, interstitial lung disease and pulmonary arterial hypertension.  She is currently on Adcirca.  She did not tolerate Tyvaso due to worsening V/Q mismatch.  She returns today for followup.      Overall, she is stable and doing okay.  She has not noticed any worsening exertional shortness of breath.  She is continues to use 6 to 8 L of oxygen.  She can do her activities of daily living and walk a block without limitation.  I would currently characterize her as functional class III.  She denies having any exertional chest pain or chest pressure.  No exertional presyncope or syncope.  No lower extremity swelling or abdominal distention.  Her weight has been stable.  She has not had any interim hospitalizations or ER visit.  She continues to have dry cough intermittently from her ILD.  This has not gotten any worse.     She is currently taking lasix only 20 mg as she is watching her salt intake very closely. She has been taking her tadalafil only 20 mg for the last week as she thought she may be overmedicated and want to wean her off if possible.      PAST MEDICAL HISTORY:   1.  Polymyositis and dermatomyositis.   2.  Interstitial lung disease.   3.  Pulmonary arterial hypertension.   4.  Arthritis.      MEDICATIONS:    Current Outpatient Medications   Medication Sig     albuterol (PROAIR HFA/PROVENTIL HFA/VENTOLIN HFA) 108 (90 Base) MCG/ACT inhaler INHALE ONE TO TWO PUFFS INTO THE LUNGS EVERY 4 HOURS AS NEEDED FOR SHORTNESS OF BREATH / DYSPNEA     ALPRAZolam (XANAX) 0.5 MG tablet Take 1 tablet (0.5 mg) by mouth every 6 hours as needed for anxiety     digoxin  (LANOXIN) 125 MCG tablet Take 1 tablet (125 mcg) by mouth daily *lab due     Esomeprazole Magnesium (NEXIUM PO) Take 40 mg by mouth every morning (before breakfast)     fluticasone (FLONASE) 50 MCG/ACT nasal spray USE 2 SPRAYS IN EACH NOSTRIL DAILY     furosemide (LASIX) 20 MG tablet Take 2 tablets (40 mg) by mouth daily (Patient taking differently: Take 20 mg by mouth daily )     morphine (MS CONTIN) 15 MG CR tablet Take 1 tablet (15 mg) by mouth 2 times daily     order for DME Oxygen: Patient requires supplemental Oxygen 4 LPM via nasal canula at rest and 6 LPM with activity. Please provide patient with portability capability. Okay to spot check patient on oxygen device, to keep sats above 90%. Please provider with home concentrator that can go up to 10 LPM. Oxygen will be for a lifetime.     order for DME Please provide patient with 2  liquid oxygen tanks for portability. Spot check patient on liquid oxygen. Titrate oxygen to maintain saturations above 88%.     oxyCODONE-acetaminophen (PERCOCET)  MG per tablet TAKE ONE TABLET BY MOUTH EVERY 6 HOURS AS NEEDED FOR MODERATE TO SEVERE PAIN     tadalafil, PAH, (ADCIRCA) 20 MG TABS Take 2 tablets (40 mg) by mouth daily More refills after office or virtual visit only.     No current facility-administered medications for this visit.      REVIEW OF SYSTEMS:  A detailed 10-point review of systems was obtained as described in the History of Present Illness.  All other systems reviewed and are negative.      PHYSICAL EXAMINATION:    Not done due to telephonic nature of the visit.     CBC RESULTS:   Recent Labs   Lab Test 07/15/19  1131   WBC 10.0   RBC 4.57   HGB 11.6*   HCT 39.0   MCV 85   MCH 25.4*   MCHC 29.7*   RDW 16.3*        Recent Labs   Lab Test 07/15/19  1131 04/17/19  1142    134   POTASSIUM 3.6 4.0   CHLORIDE 100 101   CO2 29 26   ANIONGAP 6 8   GLC 86 108*   BUN 10 8   CR 0.68 0.60   MARCIN 9.1 8.4*     Lab Results   Component Value Date     NTBNPI 4,168 (H) 11/30/2016     Lab Results   Component Value Date    NTBNP 602 (H) 07/15/2019       6MWT (07/2019)    401 meters with lowest oxygen saturation of 89% on 8 liters of oxygen.    Echo (04/2019)  Left ventricular size is normal.  The Ejection Fraction is estimated at 50-55%.  Right ventricular function, chamber size, wall motion, and thickness are  normal.  Right ventricular systolic pressure is 41mmHg above the right atrial pressure.  PV acceleration time 80ms.  The inferior vena cava is normal.  No pericardial effusion is present.    RHC (04/2019)  RA 5  RV 36/5  PA 35/15 (22)  PCWP 15  CO/CI 4.6/3/03  PA saturation 72.5%  PVR 1.52 ANGLIN     ASSESSMENT AND PLAN:      Ms. Ellen Loya is a very pleasant 44-year-old female with polymyositis, interstitial lung disease and pulmonary arterial hypertension, who is currently on tadalafil monotherapy.      Overall, she is doing okay.  No concern for worsening right heart failure or pulmonary hypertension.  FC III. As it has been a while, I have recommended her to have labs and echocardiogram locally. She is hesitant to travel given the pandemic. Will decide on RHC and 6-minute walk test based on her labs and echocardiogram.       I recommend her not to decrease her tadalafil as she has done reasonably well on this therapy for the last several years. I have recommended her to continue Adcirca 40 mg daily, Lasix 20 mg daily, digoxin, and supplemental oxygen at 6 to 8 L as needed.    She continues to smoke. D/W regarding the importance of smoking cessation and also encouraged her to call her PCP for smoking cessation prescription.    She will return to clinic in 3 months with labs and 6-minute walk test for a in-person visit as we have not seen her in a long time. She will call us in the interim if you have any further worsening symptoms.     It was a pleasure seeing Ms. Ellen Loya in our Pulmonary Hypertension Clinic. We thank you for allowing us to  participate in her care.     This is a telephone visit of  minutes duration with more than 50% of the time spent in counseling the patient.       Sincerely,   Callie Ledesma MD   Center for Pulmonary Hypertension  Heart Failure, Transplant, and Mechanical Circulatory Support Cardiology   Cardiovascular Division  [facility]   [phone]

## 2020-11-02 ENCOUNTER — MYC REFILL (OUTPATIENT)
Dept: CARDIOLOGY | Facility: CLINIC | Age: 45
End: 2020-11-02

## 2020-11-02 ENCOUNTER — VIRTUAL VISIT (OUTPATIENT)
Dept: CARDIOLOGY | Facility: CLINIC | Age: 45
End: 2020-11-02
Attending: INTERNAL MEDICINE
Payer: COMMERCIAL

## 2020-11-02 ENCOUNTER — MYC REFILL (OUTPATIENT)
Dept: FAMILY MEDICINE | Facility: CLINIC | Age: 45
End: 2020-11-02

## 2020-11-02 DIAGNOSIS — M19.90 INFLAMMATORY ARTHRITIS: ICD-10-CM

## 2020-11-02 DIAGNOSIS — R06.02 SOB (SHORTNESS OF BREATH): Primary | ICD-10-CM

## 2020-11-02 DIAGNOSIS — I27.20 PULMONARY HYPERTENSION (H): ICD-10-CM

## 2020-11-02 PROCEDURE — 99214 OFFICE O/P EST MOD 30 MIN: CPT | Mod: TEL | Performed by: INTERNAL MEDICINE

## 2020-11-02 RX ORDER — MORPHINE SULFATE 15 MG/1
15 TABLET, FILM COATED, EXTENDED RELEASE ORAL 2 TIMES DAILY
Qty: 60 TABLET | Refills: 0 | Status: SHIPPED | OUTPATIENT
Start: 2020-11-02 | End: 2020-11-30

## 2020-11-02 RX ORDER — DIGOXIN 125 MCG
125 TABLET ORAL DAILY
Qty: 90 TABLET | Refills: 1 | Status: CANCELLED | OUTPATIENT
Start: 2020-11-02

## 2020-11-02 RX ORDER — FUROSEMIDE 20 MG
20 TABLET ORAL DAILY
Qty: 90 TABLET | Refills: 3 | Status: SHIPPED | OUTPATIENT
Start: 2020-11-02 | End: 2023-02-06

## 2020-11-02 RX ORDER — MORPHINE SULFATE 15 MG/1
15 TABLET, FILM COATED, EXTENDED RELEASE ORAL 2 TIMES DAILY
Qty: 60 TABLET | Refills: 0 | Status: CANCELLED | OUTPATIENT
Start: 2020-11-02

## 2020-11-02 NOTE — NURSING NOTE
"Orders placed nad patient marked \"ready for checkout.\" [email or web address] message sent to patient regarding follow up information and to schedule testing locally. Sunitha Tadeo RN on 11/2/2020 at 11:29 AM    "

## 2020-11-02 NOTE — PATIENT INSTRUCTIONS
Medication Changes:  - No medication changes at this time. Continue current regiment.     Patient Instructions:  1. Continue staying active and eat a heart healthy diet.    2. Please keep current list of medications with you at all times.    3. Remember to weigh yourself daily after voiding and before you consume any food or beverages and log the numbers.  If you have gained 2 pounds overnight or 5 pounds in a week contact us immediately for medication adjustments or further instructions.    4. **Please call us immediately if you have any syncope (fainting or passing out), chest pain, edema (swelling or weight gain), or decline in your functional status (general decline in how you are feeling).    Follow up Appointment Information:  - Follow up with Dr. Loya regarding a smoking secession program  - Please call the [facility] at [phone] to schedule an appointment for labs and echocardiogram in the next few weeks  - 3 month in person follow up with Dr. Ledesma, labs and 6 minute walk test prior    We are located on the third floor of the [facility] on the [facility].  Our address is     [address] [place] [address] on [address] [place] [address]    Thank you for allowing us to be a part of your care here at the [facility]    If you have questions or concerns please contact us at:    Delfino Tadeo RN, BSN   Ivis Mejia (Schedule,Prior Auth)  Nurse Coordinator     Clinic   Pulmonary Hypertension   Pulmonary Hypertension  [facility]  [facility]  (Phone)[phone]    (Phone) [phone]        (Fax) [phone]    ** Please note that you will NOT receive a reminder call regarding your scheduled testing, reminder calls are for provider appointments only.  If you are scheduled for testing within the [facility] system you may receive a call  regarding pre-registration for billing purposes only.**     Remember to weigh yourself daily after voiding and before you consume any food or beverages and log the numbers.  If you have gained/lost 2 pounds overnight or 5 pounds in a week contact us immediately for medication adjustments or further instructions.   **Please call us immediately if you have any syncope, chest pain, edema, or decline in your functional status.    Support Group:  Pulmonary Hypertension Association  Https://www.phassociation.org/  **Look at the Events Tab** They even have Support Groups that you can call into    [facility] Support Group  Second Saturday of the Month from 1-3 PM   Location: [address] [place] [address]  Leader: Vale Delgado and Claudia Canales  Phone: [phone] or [phone]  Email: [email or web address]

## 2020-11-02 NOTE — PROGRESS NOTES
"Anali Loya is a 44 year old female who is being evaluated via a billable telephone visit.      The patient has been notified of following:     \"This telephone visit will be conducted via a call between you and your physician/provider. We have found that certain health care needs can be provided without the need for a physical exam.  This service lets us provide the care you need with a short phone conversation.  If a prescription is necessary we can send it directly to your pharmacy.  If lab work is needed we can place an order for that and you can then stop by our lab to have the test done at a later time.    Telephone visits are billed at different rates depending on your insurance coverage. During this emergency period, for some insurers they may be billed the same as an in-person visit.  Please reach out to your insurance provider with any questions.    If during the course of the call the physician/provider feels a telephone visit is not appropriate, you will not be charged for this service.\"    Patient has given verbal consent for Telephone visit?  Yes  What phone number would you like to be contacted at? Cell phone    How would you like to obtain your AVS My chart    Video didn't work for the patient    Phone call duration: 30 minutes    Callie Ledesma MD   Center for Pulmonary Hypertension  Heart Failure, Transplant, and Mechanical Circulatory Support Cardiology   Cardiovascular Division  [facility]   [phone]            "

## 2020-11-02 NOTE — TELEPHONE ENCOUNTER
Morphine        Last Written Prescription Date:  10/6/2020  Last Fill Quantity: 60,   # refills: 0  Last Office Visit: 8/19/2020  Future Office visit:       Routing refill request to provider for review/approval because:  Drug not on the [facility], P or [facility] refill protocol or controlled substance

## 2020-11-02 NOTE — LETTER
2020      RE: Ellen Loya  [address]  [facility] [address]       Dear Colleague,    Thank you for the opportunity to participate in the care of your patient, Ellen Loya, at the [facility] HEART [facility]. Please see a copy of my visit note below.    Service Date: 2020     Ignacio Loya MD   [facility]   [address] [place] [address]      RE: Ellen Loya   MRN: [id number]   : 1975      Dear Dr. Loya:      We had the pleasure of seeing Ms. Ellen Loya in our Pulmonary Hypertension Clinic.  As you know, she is a very pleasant 44-year-old female with polymyositis, interstitial lung disease and pulmonary arterial hypertension.  She is currently on Adcirca.  She did not tolerate Tyvaso due to worsening V/Q mismatch.  She returns today for followup.      Overall, she is stable and doing okay.  She has not noticed any worsening exertional shortness of breath.  She is continues to use 6 to 8 L of oxygen.  She can do her activities of daily living and walk a block without limitation.  I would currently characterize her as functional class III.  She denies having any exertional chest pain or chest pressure.  No exertional presyncope or syncope.  No lower extremity swelling or abdominal distention.  Her weight has been stable.  She has not had any interim hospitalizations or ER visit.  She continues to have dry cough intermittently from her ILD.  This has not gotten any worse.     She is currently taking lasix only 20 mg as she is watching her salt intake very closely. She has been taking her tadalafil only 20 mg for the last week as she thought she may be overmedicated and want to wean her off if possible.      PAST MEDICAL HISTORY:   1.  Polymyositis and dermatomyositis.   2.  Interstitial lung disease.   3.  Pulmonary arterial hypertension.   4.  Arthritis.      MEDICATIONS:    Current  Outpatient Medications   Medication Sig     albuterol (PROAIR HFA/PROVENTIL HFA/VENTOLIN HFA) 108 (90 Base) MCG/ACT inhaler INHALE ONE TO TWO PUFFS INTO THE LUNGS EVERY 4 HOURS AS NEEDED FOR SHORTNESS OF BREATH / DYSPNEA     ALPRAZolam (XANAX) 0.5 MG tablet Take 1 tablet (0.5 mg) by mouth every 6 hours as needed for anxiety     digoxin (LANOXIN) 125 MCG tablet Take 1 tablet (125 mcg) by mouth daily *lab due     Esomeprazole Magnesium (NEXIUM PO) Take 40 mg by mouth every morning (before breakfast)     fluticasone (FLONASE) 50 MCG/ACT nasal spray USE 2 SPRAYS IN EACH NOSTRIL DAILY     furosemide (LASIX) 20 MG tablet Take 2 tablets (40 mg) by mouth daily (Patient taking differently: Take 20 mg by mouth daily )     morphine (MS CONTIN) 15 MG CR tablet Take 1 tablet (15 mg) by mouth 2 times daily     order for DME Oxygen: Patient requires supplemental Oxygen 4 LPM via nasal canula at rest and 6 LPM with activity. Please provide patient with portability capability. Okay to spot check patient on oxygen device, to keep sats above 90%. Please provider with home concentrator that can go up to 10 LPM. Oxygen will be for a lifetime.     order for DME Please provide patient with 2  liquid oxygen tanks for portability. Spot check patient on liquid oxygen. Titrate oxygen to maintain saturations above 88%.     oxyCODONE-acetaminophen (PERCOCET)  MG per tablet TAKE ONE TABLET BY MOUTH EVERY 6 HOURS AS NEEDED FOR MODERATE TO SEVERE PAIN     tadalafil, PAH, (ADCIRCA) 20 MG TABS Take 2 tablets (40 mg) by mouth daily More refills after office or virtual visit only.     No current facility-administered medications for this visit.      REVIEW OF SYSTEMS:  A detailed 10-point review of systems was obtained as described in the History of Present Illness.  All other systems reviewed and are negative.      PHYSICAL EXAMINATION:    Not done due to telephonic nature of the visit.     CBC RESULTS:   Recent Labs   Lab Test 07/15/19  1131    WBC 10.0   RBC 4.57   HGB 11.6*   HCT 39.0   MCV 85   MCH 25.4*   MCHC 29.7*   RDW 16.3*        Recent Labs   Lab Test 07/15/19  1131 04/17/19  1142    134   POTASSIUM 3.6 4.0   CHLORIDE 100 101   CO2 29 26   ANIONGAP 6 8   GLC 86 108*   BUN 10 8   CR 0.68 0.60   MARCIN 9.1 8.4*     Lab Results   Component Value Date    NTBNPI 4,168 (H) 11/30/2016     Lab Results   Component Value Date    NTBNP 602 (H) 07/15/2019       6MWT (07/2019)    401 meters with lowest oxygen saturation of 89% on 8 liters of oxygen.    Echo (04/2019)  Left ventricular size is normal.  The Ejection Fraction is estimated at 50-55%.  Right ventricular function, chamber size, wall motion, and thickness are  normal.  Right ventricular systolic pressure is 41mmHg above the right atrial pressure.  PV acceleration time 80ms.  The inferior vena cava is normal.  No pericardial effusion is present.    RHC (04/2019)  RA 5  RV 36/5  PA 35/15 (22)  PCWP 15  CO/CI 4.6/3/03  PA saturation 72.5%  PVR 1.52 ANGLIN     ASSESSMENT AND PLAN:      Ms. Ellen Loya is a very pleasant 44-year-old female with polymyositis, interstitial lung disease and pulmonary arterial hypertension, who is currently on tadalafil monotherapy.      Overall, she is doing okay.  No concern for worsening right heart failure or pulmonary hypertension.  FC III. As it has been a while, I have recommended her to have labs and echocardiogram locally. She is hesitant to travel given the pandemic. Will decide on RHC and 6-minute walk test based on her labs and echocardiogram.       I recommend her not to decrease her tadalafil as she has done reasonably well on this therapy for the last several years. I have recommended her to continue Adcirca 40 mg daily, Lasix 20 mg daily, digoxin, and supplemental oxygen at 6 to 8 L as needed.    She continues to smoke. D/W regarding the importance of smoking cessation and also encouraged her to call her PCP for smoking cessation  "prescription.    She will return to clinic in 3 months with labs and 6-minute walk test for a in-person visit as we have not seen her in a long time. She will call us in the interim if you have any further worsening symptoms.     It was a pleasure seeing Ms. Ellen Loya in our Pulmonary Hypertension Clinic. We thank you for allowing us to participate in her care.     This is a telephone visit of  minutes duration with more than 50% of the time spent in counseling the patient.       Sincerely,   Callie Ledesma MD   Center for Pulmonary Hypertension  Heart Failure, Transplant, and Mechanical Circulatory Support Cardiology   Cardiovascular Division  [facility]   [phone]        Ellen Loya is a 44 year old female who is being evaluated via a billable video visit.      The patient has been notified of following:     \"This video visit will be conducted via a call between you and your physician/provider. We have found that certain health care needs can be provided without the need for an in-person physical exam.  This service lets us provide the care you need with a video conversation.  If a prescription is necessary we can send it directly to your pharmacy.  If lab work is needed we can place an order for that and you can then stop by our lab to have the test done at a later time.    Video visits are billed at different rates depending on your insurance coverage.  Please reach out to your insurance provider with any questions.    If during the course of the call the physician/provider feels a video visit is not appropriate, you will not be charged for this service.\"    Patient has given verbal consent for Video visit? Yes  How would you like to obtain your AVS? MyChart  If you are dropped from the video visit, the video invite should be resent to: Text to cell phone: [phone]   Will anyone else be joining your video visit? No      Vitals - Patient Reported  Weight " "(Patient Reported): 47.6 kg (105 lb)  Height (Patient Reported): 157.5 cm (5' 2\")  BMI (Based on Pt Reported Ht/Wt): 19.2  Pain Score: No Pain (0)(No SOB, only on exertion)    Vitals were taken and medication reconciled.    QUETA Elder  9:36 AM      Please do not hesitate to contact me if you have any questions/concerns.     Sincerely,     Callie Ledesma MD    "

## 2020-11-03 NOTE — TELEPHONE ENCOUNTER
morphine (MS CONTIN) 15 MG CR tablet        Last Written Prescription Date:  112  Last Fill Quantity: 60,   # refills: 0  Last Office Visit: 8/19/20  Future Office visit:       Routing refill request to provider for review/approval because:  Drug not on the [facility], [facility] or [facility] refill protocol or controlled substance         Duplicate   Ora Wolfe MA 11/3/2020

## 2020-11-10 ENCOUNTER — MYC REFILL (OUTPATIENT)
Dept: FAMILY MEDICINE | Facility: CLINIC | Age: 45
End: 2020-11-10

## 2020-11-10 ENCOUNTER — TELEPHONE (OUTPATIENT)
Dept: CARDIOLOGY | Facility: CLINIC | Age: 45
End: 2020-11-10

## 2020-11-10 DIAGNOSIS — G47.9 DISTURBANCE IN SLEEP BEHAVIOR: ICD-10-CM

## 2020-11-10 NOTE — TELEPHONE ENCOUNTER
LM with patient to follow up on echo and lab appt. Asked patient to schedule these at [facility] in the next week; provided my direct number for call back with any questions or concerns. Sunitha Tadeo RN on 11/10/2020 at 8:38 AM    Returned patient VM but was unable to connect with patient. Advised that we unfortunately cannot schedule with [facility], but provided their direct number and asked patient to scheduled in the next week. Advised we will be able to see the results and will follow up with the patient afterwards. Sunitha Tadeo RN on 11/10/2020 at 2:39 PM

## 2020-11-10 NOTE — TELEPHONE ENCOUNTER
ALPRAZolam (XANAX) 0.5 MG tablet       Last Written Prescription Date:  10/30/20  Last Fill Quantity: 40,   # refills: 0  Last Office Visit: 8/19/20  Future Office visit:       Routing refill request to provider for review/approval because:  Drug not on the [facility], [facility] or [facility] refill protocol or controlled substance

## 2020-11-11 DIAGNOSIS — Z72.0 TOBACCO ABUSE: Primary | ICD-10-CM

## 2020-11-11 RX ORDER — ALPRAZOLAM 0.5 MG
0.5 TABLET ORAL EVERY 6 HOURS PRN
Qty: 40 TABLET | Refills: 0 | Status: SHIPPED | OUTPATIENT
Start: 2020-11-11 | End: 2020-11-23

## 2020-11-11 NOTE — TELEPHONE ENCOUNTER
Reason for Call:  Medication or medication refill:    Do you use a [facility]?  Name of the pharmacy and phone number for the current request:  [facility] - [phone]    Name of the medication requested: Nicorette gum     Other request: requesting a new prescription     Can we leave a detailed message on this number? YES    Phone number patient can be reached at: Cell number on file:    Telephone Information:   Mobile [phone]       Best Time: any     Call taken on 11/11/2020 at 8:47 AM by Alyce Loya

## 2020-11-12 ENCOUNTER — HOSPITAL ENCOUNTER (OUTPATIENT)
Dept: CARDIOLOGY | Facility: CLINIC | Age: 45
Discharge: HOME | End: 2020-11-12
Attending: INTERNAL MEDICINE | Admitting: INTERNAL MEDICINE
Payer: COMMERCIAL

## 2020-11-12 ENCOUNTER — MYC MEDICAL ADVICE (OUTPATIENT)
Dept: FAMILY MEDICINE | Facility: CLINIC | Age: 45
End: 2020-11-12

## 2020-11-12 ENCOUNTER — MYC REFILL (OUTPATIENT)
Dept: FAMILY MEDICINE | Facility: CLINIC | Age: 45
End: 2020-11-12

## 2020-11-12 DIAGNOSIS — R07.81 PLEURITIC CHEST PAIN: ICD-10-CM

## 2020-11-12 DIAGNOSIS — R06.02 SOB (SHORTNESS OF BREATH): ICD-10-CM

## 2020-11-12 DIAGNOSIS — I27.20 PULMONARY HYPERTENSION (H): ICD-10-CM

## 2020-11-12 LAB
ANION GAP SERPL CALCULATED.3IONS-SCNC: 4 MMOL/L (ref 3–14)
BUN SERPL-MCNC: 9 MG/DL (ref 7–30)
CALCIUM SERPL-MCNC: 8.8 MG/DL (ref 8.5–10.1)
CHLORIDE SERPL-SCNC: 98 MMOL/L (ref 94–109)
CO2 SERPL-SCNC: 35 MMOL/L (ref 20–32)
CREAT SERPL-MCNC: 0.76 MG/DL (ref 0.52–1.04)
DIGOXIN SERPL-MCNC: 0.8 UG/L (ref 0.5–2)
ERYTHROCYTE [DISTWIDTH] IN BLOOD BY AUTOMATED COUNT: 16.8 % (ref 10–15)
GFR SERPL CREATININE-BSD FRML MDRD: >90 ML/MIN/{1.73_M2}
GLUCOSE SERPL-MCNC: 77 MG/DL (ref 70–99)
HCT VFR BLD AUTO: 32.4 % (ref 35–47)
HGB BLD-MCNC: 9.7 G/DL (ref 11.7–15.7)
MCH RBC QN AUTO: 24.3 PG (ref 26.5–33)
MCHC RBC AUTO-ENTMCNC: 29.9 G/DL (ref 31.5–36.5)
MCV RBC AUTO: 81 FL (ref 78–100)
NT-PROBNP SERPL-MCNC: 436 PG/ML (ref 0–125)
PLATELET # BLD AUTO: 334 10E9/L (ref 150–450)
POTASSIUM SERPL-SCNC: 4 MMOL/L (ref 3.4–5.3)
RBC # BLD AUTO: 3.99 10E12/L (ref 3.8–5.2)
SODIUM SERPL-SCNC: 137 MMOL/L (ref 133–144)
WBC # BLD AUTO: 9 10E9/L (ref 4–11)

## 2020-11-12 PROCEDURE — 93306 TTE W/DOPPLER COMPLETE: CPT | Mod: 26 | Performed by: INTERNAL MEDICINE

## 2020-11-12 PROCEDURE — 85027 COMPLETE CBC AUTOMATED: CPT | Performed by: INTERNAL MEDICINE

## 2020-11-12 PROCEDURE — 36415 COLL VENOUS BLD VENIPUNCTURE: CPT | Performed by: INTERNAL MEDICINE

## 2020-11-12 PROCEDURE — 93306 TTE W/DOPPLER COMPLETE: CPT

## 2020-11-12 PROCEDURE — 80048 BASIC METABOLIC PNL TOTAL CA: CPT | Performed by: INTERNAL MEDICINE

## 2020-11-12 PROCEDURE — 80162 ASSAY OF DIGOXIN TOTAL: CPT | Performed by: INTERNAL MEDICINE

## 2020-11-12 PROCEDURE — 83880 ASSAY OF NATRIURETIC PEPTIDE: CPT | Performed by: INTERNAL MEDICINE

## 2020-11-12 RX ORDER — OXYCODONE AND ACETAMINOPHEN 10; 325 MG/1; MG/1
TABLET ORAL
Qty: 60 TABLET | Refills: 0 | Status: SHIPPED | OUTPATIENT
Start: 2020-11-12 | End: 2020-11-25

## 2020-11-12 NOTE — TELEPHONE ENCOUNTER
"Routing refill request to provider for review/approval because:  Drug not active on patient's medication list      Requested Prescriptions   Pending Prescriptions Disp Refills     nicotine (NICORETTE) 2 MG gum       Sig: Place 1 each (2 mg) inside cheek as needed for smoking cessation   Last Written Prescription Date:  NA  Last Fill Quantity: NA,  # refills: NA   Last office visit: 8/19/2020 with prescribing provider:     Future Office Visit:        Partial Cholinergic Nicotinic Agonist Agents Failed - 11/11/2020  2:28 PM        Failed - Medication is active on med list        Passed - Blood pressure under 140/90 in past 12 months     BP Readings from Last 3 Encounters:   08/19/20 108/70   03/11/20 110/62   12/24/19 122/71           Passed - Recent (12 mo) or future (30 days) visit within the authorizing provider's specialty     Patient has had an office visit with the authorizing provider or a provider within the authorizing providers department within the previous 12 mos or has a future within next 30 days. See \"Patient Info\" tab in inbasket, or \"Choose Columns\" in Meds & Orders section of the refill encounter.              Passed - Patient is 18 years of age or older        Passed - Patient is not pregnant        Passed - No positive pregnancy test on file in past 12 months         Olimpia Oakley RN    "

## 2020-11-12 NOTE — TELEPHONE ENCOUNTER
oxyCODONE-acetaminophen (PERCOCET)  MG per tablet       Last Written Prescription Date:  10/30/20  Last Fill Quantity: 60,   # refills: 0  Last Office Visit: 8/19/20  Future Office visit:       Routing refill request to provider for review/approval because:  Drug not on the [facility], [facility] or [facility] refill protocol or controlled substance

## 2020-11-13 ENCOUNTER — MYC MEDICAL ADVICE (OUTPATIENT)
Dept: CARDIOLOGY | Facility: CLINIC | Age: 45
End: 2020-11-13

## 2020-11-13 DIAGNOSIS — I27.20 PULMONARY HYPERTENSION (H): ICD-10-CM

## 2020-11-13 DIAGNOSIS — D50.9 ANEMIA, IRON DEFICIENCY: Primary | ICD-10-CM

## 2020-11-13 RX ORDER — TADALAFIL 20 MG/1
40 TABLET ORAL DAILY
Qty: 180 TABLET | Refills: 3 | Status: SHIPPED | OUTPATIENT
Start: 2020-11-13 | End: 2021-06-30

## 2020-11-18 DIAGNOSIS — D50.9 ANEMIA, IRON DEFICIENCY: ICD-10-CM

## 2020-11-18 LAB
FERRITIN SERPL-MCNC: 5 NG/ML (ref 12–150)
FOLATE SERPL-MCNC: 14.8 NG/ML
IRON SATN MFR SERPL: 5 % (ref 15–46)
IRON SERPL-MCNC: 17 UG/DL (ref 35–180)
TIBC SERPL-MCNC: 349 UG/DL (ref 240–430)
VIT B12 SERPL-MCNC: 503 PG/ML (ref 193–986)

## 2020-11-18 PROCEDURE — 84238 ASSAY NONENDOCRINE RECEPTOR: CPT | Mod: 90 | Performed by: INTERNAL MEDICINE

## 2020-11-18 PROCEDURE — 82728 ASSAY OF FERRITIN: CPT | Performed by: INTERNAL MEDICINE

## 2020-11-18 PROCEDURE — 82607 VITAMIN B-12: CPT | Performed by: INTERNAL MEDICINE

## 2020-11-18 PROCEDURE — 36415 COLL VENOUS BLD VENIPUNCTURE: CPT | Performed by: INTERNAL MEDICINE

## 2020-11-18 PROCEDURE — 99000 SPECIMEN HANDLING OFFICE-LAB: CPT | Performed by: INTERNAL MEDICINE

## 2020-11-18 PROCEDURE — 83550 IRON BINDING TEST: CPT | Performed by: INTERNAL MEDICINE

## 2020-11-18 PROCEDURE — 83540 ASSAY OF IRON: CPT | Performed by: INTERNAL MEDICINE

## 2020-11-18 PROCEDURE — 82746 ASSAY OF FOLIC ACID SERUM: CPT | Performed by: INTERNAL MEDICINE

## 2020-11-19 DIAGNOSIS — Z11.59 ENCOUNTER FOR SCREENING FOR OTHER VIRAL DISEASES: Primary | ICD-10-CM

## 2020-11-19 DIAGNOSIS — E61.1 IRON DEFICIENCY: ICD-10-CM

## 2020-11-19 RX ORDER — HEPARIN SODIUM,PORCINE 10 UNIT/ML
5 VIAL (ML) INTRAVENOUS
Status: CANCELLED
Start: 2020-11-19

## 2020-11-19 RX ORDER — HEPARIN SODIUM (PORCINE) LOCK FLUSH IV SOLN 100 UNIT/ML 100 UNIT/ML
5 SOLUTION INTRAVENOUS
Status: CANCELLED
Start: 2020-11-19

## 2020-11-19 NOTE — PROGRESS NOTES
She is iron deficient. Please arrange for her to have IV iron infusion as an outpatient. Also, please refer her to GI for iron deficiency anemia - she may need EGD and colonoscopy. Please call her with the plan   ______________________________________________    Spoke with patient and provided the number for the [facility] to get scheduled. Advised that we had also placed an order for a GI referral and should be calling in the next few days. Patient verbalized understanding and did not have any further questions. Sunitha Tadeo RN on 11/19/2020 at 2:36 PM

## 2020-11-20 ENCOUNTER — VIRTUAL VISIT (OUTPATIENT)
Dept: FAMILY MEDICINE | Facility: CLINIC | Age: 45
End: 2020-11-20
Payer: COMMERCIAL

## 2020-11-20 DIAGNOSIS — E61.1 IRON DEFICIENCY: ICD-10-CM

## 2020-11-20 DIAGNOSIS — N39.0 ACUTE LOWER UTI: ICD-10-CM

## 2020-11-20 DIAGNOSIS — I27.0 PRIMARY PULMONARY HYPERTENSION (H): ICD-10-CM

## 2020-11-20 DIAGNOSIS — Z87.891 PERSONAL HISTORY OF TOBACCO USE, PRESENTING HAZARDS TO HEALTH: ICD-10-CM

## 2020-11-20 DIAGNOSIS — J84.10 FIBROSIS OF LUNG (H): ICD-10-CM

## 2020-11-20 DIAGNOSIS — R09.81 NASAL CONGESTION: ICD-10-CM

## 2020-11-20 DIAGNOSIS — J84.9 ILD (INTERSTITIAL LUNG DISEASE) (H): ICD-10-CM

## 2020-11-20 DIAGNOSIS — R30.0 DYSURIA: ICD-10-CM

## 2020-11-20 DIAGNOSIS — J30.2 SEASONAL ALLERGIC RHINITIS, UNSPECIFIED TRIGGER: ICD-10-CM

## 2020-11-20 DIAGNOSIS — R30.0 DYSURIA: Primary | ICD-10-CM

## 2020-11-20 LAB
ALBUMIN UR-MCNC: NEGATIVE MG/DL
APPEARANCE UR: CLEAR
BILIRUB UR QL STRIP: NEGATIVE
COLOR UR AUTO: NORMAL
GLUCOSE UR STRIP-MCNC: NEGATIVE MG/DL
HGB UR QL STRIP: NEGATIVE
KETONES UR STRIP-MCNC: NEGATIVE MG/DL
LEUKOCYTE ESTERASE UR QL STRIP: NEGATIVE
NITRATE UR QL: NEGATIVE
PH UR STRIP: 6 PH (ref 5–7)
SOURCE: NORMAL
SP GR UR STRIP: 1.01 (ref 1–1.03)
STFR SERPL-SCNC: 6.8 MG/L (ref 1.9–4.4)
UROBILINOGEN UR STRIP-MCNC: 0 MG/DL (ref 0–2)

## 2020-11-20 PROCEDURE — 81003 URINALYSIS AUTO W/O SCOPE: CPT | Performed by: PHYSICIAN ASSISTANT

## 2020-11-20 PROCEDURE — 87088 URINE BACTERIA CULTURE: CPT | Performed by: PHYSICIAN ASSISTANT

## 2020-11-20 PROCEDURE — 99214 OFFICE O/P EST MOD 30 MIN: CPT | Mod: TEL | Performed by: PHYSICIAN ASSISTANT

## 2020-11-20 PROCEDURE — 87186 SC STD MICRODIL/AGAR DIL: CPT | Performed by: PHYSICIAN ASSISTANT

## 2020-11-20 PROCEDURE — 87086 URINE CULTURE/COLONY COUNT: CPT | Performed by: PHYSICIAN ASSISTANT

## 2020-11-20 RX ORDER — CEPHALEXIN 500 MG/1
500 CAPSULE ORAL 2 TIMES DAILY
Qty: 14 CAPSULE | Refills: 0 | Status: SHIPPED | OUTPATIENT
Start: 2020-11-20 | End: 2020-11-30

## 2020-11-20 ASSESSMENT — ENCOUNTER SYMPTOMS
HEADACHES: 0
FACIAL SWELLING: 0
RHINORRHEA: 0
EYE DISCHARGE: 0
NAUSEA: 0
FREQUENCY: 1
FEVER: 0
ADENOPATHY: 0
WHEEZING: 1
FLANK PAIN: 0
DYSURIA: 1
ABDOMINAL PAIN: 0
DIAPHORESIS: 0
EYE REDNESS: 0
FATIGUE: 0
COUGH: 1
CHILLS: 0
SINUS PAIN: 0
HEMATURIA: 0
SHORTNESS OF BREATH: 1
SORE THROAT: 0
VOMITING: 0
CHEST TIGHTNESS: 1
SINUS PRESSURE: 1

## 2020-11-20 NOTE — PATIENT INSTRUCTIONS
Flonase (fluticasone) 2 sprays in each nostril daily until symptoms resolve, then continue 1 spray in each nostril for at least 5 more days.  Take Tylenol as needed for pain.  May use netti pot with bottled or distilled water and saline packets to flush sinuses.  Mucinex (guiafenesin) thins mucus and may help it to loosen more quickly  Saline drops or nasal sprays may loosen mucus.  Sit in the bathroom with the door closed and hot shower running to loosen mucus    Keflex twice daily for 7 days to treat UTI.  Follow up with primary care provider if symptoms do not improve.

## 2020-11-20 NOTE — PROGRESS NOTES
"Anali Loya is a 44 year old female who is being evaluated via a billable video visit.      The patient has been notified of following:     \"This video visit will be conducted via a call between you and your physician/provider. We have found that certain health care needs can be provided without the need for an in-person physical exam.  This service lets us provide the care you need with a video conversation.  If a prescription is necessary we can send it directly to your pharmacy.  If lab work is needed we can place an order for that and you can then stop by our lab to have the test done at a later time.    Video visits are billed at different rates depending on your insurance coverage.  Please reach out to your insurance provider with any questions.    If during the course of the call the physician/provider feels a video visit is not appropriate, you will not be charged for this service.\"    Patient has given verbal consent for Video visit? Yes  How would you like to obtain your AVS? MyChart  If you are dropped from the video visit, the video invite should be resent to: Text to cell phone: [phone]  Will anyone else be joining your video visit? No      Subjective     Anali Loya is a 44 year old female who presents today via video visit for the following health issues:    HPI     Acute Illness  Acute illness concerns: sinus issues  Onset/Duration: 2 1/2 weeks  Symptoms:  Fever: no  Chills/Sweats: no  Headache (location?): no  Sinus Pressure: YES  Conjunctivitis:  no  Ear Pain: no  Rhinorrhea: YES, yellow mucus  Congestion: YES  Sore Throat: no  Cough: no, sometimes in the mornings that has been ongoing for years  Wheeze: YES  Decreased Appetite: YES  Nausea: no  Vomiting: no  Diarrhea: no  Fatigue/Achiness: no  Sick/Strep Exposure: no  Therapies tried and outcome: Mucinex D, helps sometimes  Genitourinary - Female  Onset/Duration: x 1 1/2 weeks  Description:   Painful urination (Dysuria): YES- burning " and urgency           Frequency: YES, feels like it doesn't empty completely  Blood in urine (Hematuria): no  Delay in urine (Hesitency): YES, sometimes  Intensity: 6/10  Progression of Symptoms:  worsening  Accompanying Signs & Symptoms:  Fever/chills: no  Flank pain: no  Nausea and vomiting: no  Vaginal symptoms: none  Abdominal/Pelvic Pain: no  History:   History of frequent UTI s: no. Last infection about 2 years ago.  History of kidney stones: no  Sexually Active: no  Possibility of pregnancy: No  Precipitating or alleviating factors: None  Therapies tried and outcome: Cranberry juice prn (contraindicated in Coumadin patients) and Increase fluid intake, has not really helped     Anali presents today for evaluation of a presumed sinus infection and UTI. She states her sinus symptoms started a little over 2 weeks ago. She has had nasal congestion with yellow mucus. She has not had a different cough, wheezing, shortness of breath or chest tightness from her baseline. No fever, shortness of breath, loss of taste or smell, sore throat, nausea, vomiting, diarrhea, chills, muscle pain, fatigue, headache or a runny nose.    She has also had UTI symptoms for about 2 weeks. She notes she has urinary frequency and urgency and dysuria. Her urine is cloudy with a foul odor. She has not had nausea, vomiting, diarrhea, flank pain or a change in her back pain. She does not have any vaginal discharge.    Video attempted, phone call done instead because the video did not work.    Review of Systems   Constitutional: Negative for chills, diaphoresis, fatigue and fever.   HENT: Positive for congestion and sinus pressure. Negative for ear pain, facial swelling, postnasal drip, rhinorrhea, sinus pain and sore throat.    Eyes: Negative for discharge and redness.   Respiratory: Positive for cough (respiratory sx are not different from baseline), chest tightness, shortness of breath and wheezing.    Gastrointestinal: Negative for  abdominal pain, nausea and vomiting.   Genitourinary: Positive for dysuria, frequency and urgency. Negative for flank pain, genital sores, hematuria, pelvic pain, vaginal bleeding, vaginal discharge and vaginal pain.   Neurological: Negative for headaches.   Hematological: Negative for adenopathy.         Objective         Vitals:  No vitals were obtained today due to virtual visit.    Physical Exam     healthy, alert and no distress  PSYCH: Alert and oriented times 3; coherent speech, normal   rate and volume, able to articulate logical thoughts, able   to abstract reason, no tangential thoughts, no hallucinations   or delusions  Her affect is normal  RESP: No cough, no audible wheezing, able to talk in full sentences  Remainder of exam unable to be completed due to telephone visits    Results for orders placed or performed in visit on 11/20/20   UA reflex to Microscopic ([facility])     Status: None   Result Value Ref Range    Color Urine Straw     Appearance Urine Clear     Glucose Urine Negative NEG^Negative mg/dL    Bilirubin Urine Negative NEG^Negative    Ketones Urine Negative NEG^Negative mg/dL    Specific Gravity Urine 1.008 1.003 - 1.035    Blood Urine Negative NEG^Negative    pH Urine 6.0 5.0 - 7.0 pH    Protein Albumin Urine Negative NEG^Negative mg/dL    Urobilinogen mg/dL 0.0 0.0 - 2.0 mg/dL    Nitrite Urine Negative NEG^Negative    Leukocyte Esterase Urine Negative NEG^Negative    Source Midstream Urine            Assessment & Plan     1. Dysuria  - Urine Culture Aerobic Bacterial; Future  - UA reflex to Microscopic ([facility]); Future    2. Seasonal allergic rhinitis, unspecified trigger  - Flonase twice daily     3. Nasal congestion  - this is her only COVID symptoms so testing is not necessary    4. Acute lower UTI  - cephALEXin (KEFLEX) 500 MG capsule; Take 1 capsule (500 mg) by mouth 2 times daily for 7 days  Dispense: 14 capsule; Refill: 0    5. Fibrosis of  lung (H)  This diagnosis was taken into consideration when assessing and treating this patient today.    6. ILD (interstitial lung disease) (H)  This diagnosis was taken into consideration when assessing and treating this patient today.    7. Iron deficiency  This diagnosis was taken into consideration when assessing and treating this patient today.    8. Primary pulmonary hypertension (H)  This diagnosis was taken into consideration when assessing and treating this patient today.    9. Personal history of tobacco use, presenting hazards to health  This diagnosis was taken into consideration when assessing and treating this patient today.    Keflex twice daily for presumed UTI. Will adjust as needed after urine culture is resulted. Sinus symptoms are improving and she states are similar to her seasonal allergies. She has Flonase at home but is not using it. I recommend starting this.      No follow-ups on file.    ALEX Causey [facility]      Virtual-Visit Details    Type of service:  Telephone visit  Duration: 12 minutes  Distant Location (provider location):  [place]

## 2020-11-21 DIAGNOSIS — E61.1 IRON DEFICIENCY: ICD-10-CM

## 2020-11-21 DIAGNOSIS — Z11.59 ENCOUNTER FOR SCREENING FOR OTHER VIRAL DISEASES: ICD-10-CM

## 2020-11-21 PROCEDURE — U0003 INFECTIOUS AGENT DETECTION BY NUCLEIC ACID (DNA OR RNA); SEVERE ACUTE RESPIRATORY SYNDROME CORONAVIRUS 2 (SARS-COV-2) (CORONAVIRUS DISEASE [COVID-19]), AMPLIFIED PROBE TECHNIQUE, MAKING USE OF HIGH THROUGHPUT TECHNOLOGIES AS DESCRIBED BY CMS-2020-01-R: HCPCS | Performed by: INTERNAL MEDICINE

## 2020-11-22 LAB
BACTERIA SPEC CULT: ABNORMAL
LABORATORY COMMENT REPORT: NORMAL
Lab: ABNORMAL
SARS-COV-2 RNA SPEC QL NAA+PROBE: NEGATIVE
SARS-COV-2 RNA SPEC QL NAA+PROBE: NORMAL
SPECIMEN SOURCE: ABNORMAL
SPECIMEN SOURCE: NORMAL
SPECIMEN SOURCE: NORMAL

## 2020-11-23 ENCOUNTER — MYC REFILL (OUTPATIENT)
Dept: FAMILY MEDICINE | Facility: CLINIC | Age: 45
End: 2020-11-23

## 2020-11-23 DIAGNOSIS — D64.9 ANEMIA: Primary | ICD-10-CM

## 2020-11-23 DIAGNOSIS — G47.9 DISTURBANCE IN SLEEP BEHAVIOR: ICD-10-CM

## 2020-11-23 RX ORDER — ALPRAZOLAM 0.5 MG
0.5 TABLET ORAL EVERY 6 HOURS PRN
Qty: 40 TABLET | Refills: 0 | Status: SHIPPED | OUTPATIENT
Start: 2020-11-23 | End: 2020-12-04

## 2020-11-23 NOTE — TELEPHONE ENCOUNTER
Requested Prescriptions   Pending Prescriptions Disp Refills     ALPRAZolam (XANAX) 0.5 MG tablet 40 tablet 0     Sig: Take 1 tablet (0.5 mg) by mouth every 6 hours as needed for anxiety     Last Written Prescription Date:  11/11/2020  Last Fill Quantity: 40,   # refills: 0  Last Office Visit: 08/19/2020  Future Office visit:       Routing refill request to provider for review/approval because:  Drug not on the [facility], P or [facility] refill protocol or controlled substance      Rebecca Tobias MA

## 2020-11-23 NOTE — RESULT ENCOUNTER NOTE
Urine culture positive with 10,000- 50,000 colonies/mL of E. coli. Bacteria is sensitive to first generation cefazolin and patient is currently taking Keflex. No change necessary.    Kassidy Naqvi PA-C  [facility]

## 2020-11-25 ENCOUNTER — INFUSION THERAPY VISIT (OUTPATIENT)
Dept: INFUSION THERAPY | Facility: CLINIC | Age: 45
End: 2020-11-25
Attending: INTERNAL MEDICINE
Payer: COMMERCIAL

## 2020-11-25 ENCOUNTER — MYC REFILL (OUTPATIENT)
Dept: FAMILY MEDICINE | Facility: CLINIC | Age: 45
End: 2020-11-25

## 2020-11-25 VITALS
BODY MASS INDEX: 19.11 KG/M2 | OXYGEN SATURATION: 100 % | RESPIRATION RATE: 16 BRPM | SYSTOLIC BLOOD PRESSURE: 105 MMHG | TEMPERATURE: 97.6 F | DIASTOLIC BLOOD PRESSURE: 60 MMHG | WEIGHT: 104.5 LBS | HEART RATE: 61 BPM

## 2020-11-25 DIAGNOSIS — R07.81 PLEURITIC CHEST PAIN: ICD-10-CM

## 2020-11-25 DIAGNOSIS — D64.9 ANEMIA: Primary | ICD-10-CM

## 2020-11-25 PROCEDURE — 96365 THER/PROPH/DIAG IV INF INIT: CPT

## 2020-11-25 PROCEDURE — 250N000011 HC RX IP 250 OP 636: Performed by: INTERNAL MEDICINE

## 2020-11-25 PROCEDURE — 258N000003 HC RX IP 258 OP 636: Performed by: INTERNAL MEDICINE

## 2020-11-25 RX ORDER — OXYCODONE AND ACETAMINOPHEN 10; 325 MG/1; MG/1
TABLET ORAL
Qty: 60 TABLET | Refills: 0 | Status: SHIPPED | OUTPATIENT
Start: 2020-11-25 | End: 2020-12-09

## 2020-11-25 RX ORDER — HEPARIN SODIUM,PORCINE 10 UNIT/ML
5 VIAL (ML) INTRAVENOUS
Status: CANCELLED
Start: 2020-12-02

## 2020-11-25 RX ORDER — HEPARIN SODIUM (PORCINE) LOCK FLUSH IV SOLN 100 UNIT/ML 100 UNIT/ML
5 SOLUTION INTRAVENOUS
Status: CANCELLED
Start: 2020-12-02

## 2020-11-25 RX ADMIN — SODIUM CHLORIDE 250 ML: 9 INJECTION, SOLUTION INTRAVENOUS at 14:39

## 2020-11-25 RX ADMIN — FERRIC CARBOXYMALTOSE INJECTION 750 MG: 50 INJECTION, SOLUTION INTRAVENOUS at 14:42

## 2020-11-25 ASSESSMENT — PAIN SCALES - GENERAL: PAINLEVEL: NO PAIN (0)

## 2020-11-25 NOTE — TELEPHONE ENCOUNTER
Requested Prescriptions   Pending Prescriptions Disp Refills     oxyCODONE-acetaminophen (PERCOCET)  MG per tablet 60 tablet 0     Sig: TAKE ONE TABLET BY MOUTH EVERY 6 HOURS AS NEEDED FOR MODERATE TO SEVERE PAIN     Last Written Prescription Date:  11/12/2020  Last Fill Quantity: 60,   # refills: 0  Last Office Visit: 08/19/2020  Future Office visit:       Routing refill request to provider for review/approval because:  Drug not on the [facility], [facility] or [facility] refill protocol or controlled substance    Rebecca Tobias MA

## 2020-11-30 ENCOUNTER — MYC MEDICAL ADVICE (OUTPATIENT)
Dept: FAMILY MEDICINE | Facility: CLINIC | Age: 45
End: 2020-11-30

## 2020-11-30 ENCOUNTER — MYC REFILL (OUTPATIENT)
Dept: FAMILY MEDICINE | Facility: CLINIC | Age: 45
End: 2020-11-30

## 2020-11-30 DIAGNOSIS — N39.0 ACUTE LOWER UTI: Primary | ICD-10-CM

## 2020-11-30 DIAGNOSIS — M19.90 INFLAMMATORY ARTHRITIS: ICD-10-CM

## 2020-11-30 RX ORDER — CEPHALEXIN 500 MG/1
500 CAPSULE ORAL 2 TIMES DAILY
Qty: 14 CAPSULE | Refills: 0 | Status: SHIPPED | OUTPATIENT
Start: 2020-11-30 | End: 2021-01-14

## 2020-11-30 RX ORDER — MORPHINE SULFATE 15 MG/1
15 TABLET, FILM COATED, EXTENDED RELEASE ORAL 2 TIMES DAILY
Qty: 60 TABLET | Refills: 0 | Status: SHIPPED | OUTPATIENT
Start: 2020-11-30 | End: 2020-12-28

## 2020-11-30 NOTE — TELEPHONE ENCOUNTER
Patient was informed that prescription was sent to [facility] [place].    Landen Pedraza, RODY

## 2020-12-04 ENCOUNTER — MYC REFILL (OUTPATIENT)
Dept: CARDIOLOGY | Facility: CLINIC | Age: 45
End: 2020-12-04

## 2020-12-04 ENCOUNTER — MYC REFILL (OUTPATIENT)
Dept: FAMILY MEDICINE | Facility: CLINIC | Age: 45
End: 2020-12-04

## 2020-12-04 DIAGNOSIS — G47.9 DISTURBANCE IN SLEEP BEHAVIOR: ICD-10-CM

## 2020-12-04 DIAGNOSIS — I27.20 PULMONARY HYPERTENSION (H): ICD-10-CM

## 2020-12-04 RX ORDER — DIGOXIN 125 MCG
125 TABLET ORAL DAILY
Qty: 90 TABLET | Refills: 1 | Status: CANCELLED | OUTPATIENT
Start: 2020-12-04

## 2020-12-04 RX ORDER — ALPRAZOLAM 0.5 MG
0.5 TABLET ORAL EVERY 6 HOURS PRN
Qty: 40 TABLET | Refills: 0 | Status: SHIPPED | OUTPATIENT
Start: 2020-12-04 | End: 2020-12-16

## 2020-12-04 NOTE — TELEPHONE ENCOUNTER
Requested Prescriptions   Pending Prescriptions Disp Refills     ALPRAZolam (XANAX) 0.5 MG tablet 40 tablet 0     Sig: Take 1 tablet (0.5 mg) by mouth every 6 hours as needed for anxiety     Last Written Prescription Date:  11/23/2020  Last Fill Quantity: 40,   # refills: 0  Last Office Visit: 08/19/2020  Future Office visit:       Routing refill request to provider for review/approval because:  Drug not on the [facility], P or [facility] refill protocol or controlled substance    Rebecca Tobias MA

## 2020-12-08 ENCOUNTER — INFUSION THERAPY VISIT (OUTPATIENT)
Dept: INFUSION THERAPY | Facility: CLINIC | Age: 45
End: 2020-12-08
Attending: INTERNAL MEDICINE
Payer: COMMERCIAL

## 2020-12-08 VITALS
TEMPERATURE: 96.8 F | RESPIRATION RATE: 18 BRPM | HEART RATE: 56 BPM | DIASTOLIC BLOOD PRESSURE: 40 MMHG | SYSTOLIC BLOOD PRESSURE: 84 MMHG | OXYGEN SATURATION: 100 %

## 2020-12-08 DIAGNOSIS — D64.9 ANEMIA: Primary | ICD-10-CM

## 2020-12-08 PROCEDURE — 258N000003 HC RX IP 258 OP 636: Performed by: INTERNAL MEDICINE

## 2020-12-08 PROCEDURE — 96365 THER/PROPH/DIAG IV INF INIT: CPT

## 2020-12-08 PROCEDURE — 250N000011 HC RX IP 250 OP 636: Performed by: INTERNAL MEDICINE

## 2020-12-08 RX ORDER — HEPARIN SODIUM,PORCINE 10 UNIT/ML
5 VIAL (ML) INTRAVENOUS
Status: CANCELLED
Start: 2020-12-09

## 2020-12-08 RX ORDER — HEPARIN SODIUM (PORCINE) LOCK FLUSH IV SOLN 100 UNIT/ML 100 UNIT/ML
5 SOLUTION INTRAVENOUS
Status: CANCELLED
Start: 2020-12-09

## 2020-12-08 RX ADMIN — FERRIC CARBOXYMALTOSE INJECTION 750 MG: 50 INJECTION, SOLUTION INTRAVENOUS at 14:15

## 2020-12-08 RX ADMIN — SODIUM CHLORIDE 250 ML: 9 INJECTION, SOLUTION INTRAVENOUS at 14:15

## 2020-12-08 ASSESSMENT — PAIN SCALES - GENERAL: PAINLEVEL: NO PAIN (0)

## 2020-12-08 NOTE — PROGRESS NOTES
Infusion Nursing Note:  Anali Loya presents today for Injectafer #2 of 2.    Patient seen by provider today: No   present during visit today: Not Applicable.    Note: Patient states tolerated first Injectafer infusion well without side effects. Also stated after that infusion the next day felt like she had more energy. Patients BP lower and she states she does run on the low side but took Alprazolam and is feeling very relaxed and sleepy.    Intravenous Access:  Peripheral IV placed.    Treatment Conditions:  Not Applicable.      Post Infusion Assessment:  Patient tolerated infusion without incident.  Patient observed for 30 minutes post infusion per protocol.  Blood return noted pre and post infusion.  Site patent and intact, free from redness, edema or discomfort.  No evidence of extravasations.  Access discontinued per protocol.       Discharge Plan:   Discharge instructions reviewed with: Patient.  Patient and/or family verbalized understanding of discharge instructions and all questions answered.  Patient discharged in stable condition accompanied by: self.  Departure Mode: Ambulatory.    Jeanne Burkett RN

## 2020-12-09 ENCOUNTER — MYC REFILL (OUTPATIENT)
Dept: FAMILY MEDICINE | Facility: CLINIC | Age: 45
End: 2020-12-09

## 2020-12-09 DIAGNOSIS — R07.81 PLEURITIC CHEST PAIN: ICD-10-CM

## 2020-12-09 RX ORDER — OXYCODONE AND ACETAMINOPHEN 10; 325 MG/1; MG/1
TABLET ORAL
Qty: 60 TABLET | Refills: 0 | Status: SHIPPED | OUTPATIENT
Start: 2020-12-09 | End: 2020-12-22

## 2020-12-09 NOTE — TELEPHONE ENCOUNTER
oxyCODONE-acetaminophen (PERCOCET)  MG per tablet       Last Written Prescription Date:  11/25/20  Last Fill Quantity: 60,   # refills: 0  Last Office Visit: 11/20/20  Future Office visit:       Routing refill request to provider for review/approval because:  Drug not on the [facility], [facility] or [facility] refill protocol or controlled substance

## 2020-12-16 ENCOUNTER — MYC REFILL (OUTPATIENT)
Dept: FAMILY MEDICINE | Facility: CLINIC | Age: 45
End: 2020-12-16

## 2020-12-16 ENCOUNTER — TELEPHONE (OUTPATIENT)
Dept: CARDIOLOGY | Facility: CLINIC | Age: 45
End: 2020-12-16

## 2020-12-16 DIAGNOSIS — G47.9 DISTURBANCE IN SLEEP BEHAVIOR: ICD-10-CM

## 2020-12-16 RX ORDER — ALPRAZOLAM 0.5 MG
0.5 TABLET ORAL EVERY 6 HOURS PRN
Qty: 40 TABLET | Refills: 0 | Status: SHIPPED | OUTPATIENT
Start: 2020-12-16 | End: 2020-12-28

## 2020-12-16 NOTE — TELEPHONE ENCOUNTER
Alprazolam        Last Written Prescription Date:  12/4/2020  Last Fill Quantity: 40,   # refills: 0  Last Office Visit: 8/19/2020  Future Office visit:       Routing refill request to provider for review/approval because:  Drug not on the [facility], P or [facility] refill protocol or controlled substance

## 2020-12-16 NOTE — TELEPHONE ENCOUNTER
PA Initiation    Medication: Tadalafil 20mg  Insurance Company: [employer] - Phone [phone] Fax [phone]  Pharmacy Filling the Rx: [facility] - [address] [place] [address]  Start Date: 12/16/2020    *Prior authorization form completed and faxed to [employer] for review along with right heart catheterization report.

## 2020-12-22 ENCOUNTER — MYC REFILL (OUTPATIENT)
Dept: FAMILY MEDICINE | Facility: CLINIC | Age: 45
End: 2020-12-22

## 2020-12-22 DIAGNOSIS — R07.81 PLEURITIC CHEST PAIN: ICD-10-CM

## 2020-12-22 RX ORDER — OXYCODONE AND ACETAMINOPHEN 10; 325 MG/1; MG/1
TABLET ORAL
Qty: 60 TABLET | Refills: 0 | Status: SHIPPED | OUTPATIENT
Start: 2020-12-22 | End: 2021-01-06

## 2020-12-22 NOTE — TELEPHONE ENCOUNTER
Percocet       Last Written Prescription Date:  12/9/20  Last Fill Quantity: 60,   # refills: 0  Last Office Visit: 8/19/20  Future Office visit:       Routing refill request to provider for review/approval because:  Drug not on the [facility], [facility] or [facility] refill protocol or controlled substance

## 2020-12-28 ENCOUNTER — MYC REFILL (OUTPATIENT)
Dept: FAMILY MEDICINE | Facility: CLINIC | Age: 45
End: 2020-12-28

## 2020-12-28 DIAGNOSIS — M19.90 INFLAMMATORY ARTHRITIS: ICD-10-CM

## 2020-12-28 DIAGNOSIS — G47.9 DISTURBANCE IN SLEEP BEHAVIOR: ICD-10-CM

## 2020-12-28 RX ORDER — ALPRAZOLAM 0.5 MG
0.5 TABLET ORAL EVERY 6 HOURS PRN
Qty: 30 TABLET | Refills: 0 | Status: SHIPPED | OUTPATIENT
Start: 2020-12-28 | End: 2021-01-10

## 2020-12-28 RX ORDER — MORPHINE SULFATE 15 MG/1
15 TABLET, FILM COATED, EXTENDED RELEASE ORAL 2 TIMES DAILY
Qty: 30 TABLET | Refills: 0 | Status: SHIPPED | OUTPATIENT
Start: 2020-12-28 | End: 2021-01-11

## 2020-12-28 NOTE — TELEPHONE ENCOUNTER
ALPRAZolam (XANAX) 0.5 MG tablet   Last Written Prescription Date:  12/16/2020  Last Fill Quantity: 40,   # refills: 0  Last Office Visit: 08/19/2020  Future Office visit:       Routing refill request to provider for review/approval because:  Drug not on the [facility], [facility] or [facility] refill protocol or controlled substance    morphine (MS CONTIN) 15 MG CR tablet  Last Written Prescription Date:  11/30/2020  Last Fill Quantity: 60,   # refills: 0  Last Office Visit: 08/19/2020  Future Office visit:       Routing refill request to provider for review/approval because:  Drug not on the [facility], [facility] or [facility] refill protocol or controlled substance

## 2020-12-29 DIAGNOSIS — Z72.0 TOBACCO ABUSE: ICD-10-CM

## 2020-12-31 NOTE — TELEPHONE ENCOUNTER
Prescription approved per [facility] Refill Protocol.    ILIR MolinaN, RN  [facility]

## 2020-12-31 NOTE — TELEPHONE ENCOUNTER
Prior Authorization Approval    Authorization Effective Date: 11/17/2020  Authorization Expiration Date: 12/17/2021  Medication: Tadalafil 20mg - Approved  Approved Dose/Quantity: 60 tablets/30 days  Reference #: [id number]   Insurance Company: [employer] - Phone [phone] Fax [phone]    Which Pharmacy is filling the prescription (Not needed for infusion/clinic administered): [address]  Pharmacy Notified:  Yes  ----------------------------  Insurance: [employer]  BIN: N/A  PCN: N/A  ID#: [id number]  GRP#: N/A

## 2021-01-09 ENCOUNTER — HEALTH MAINTENANCE LETTER (OUTPATIENT)
Age: 46
End: 2021-01-09

## 2021-01-10 ENCOUNTER — MYC REFILL (OUTPATIENT)
Dept: FAMILY MEDICINE | Facility: CLINIC | Age: 46
End: 2021-01-10

## 2021-01-10 DIAGNOSIS — G47.9 DISTURBANCE IN SLEEP BEHAVIOR: ICD-10-CM

## 2021-01-11 ENCOUNTER — MYC REFILL (OUTPATIENT)
Dept: FAMILY MEDICINE | Facility: CLINIC | Age: 46
End: 2021-01-11

## 2021-01-11 DIAGNOSIS — M19.90 INFLAMMATORY ARTHRITIS: ICD-10-CM

## 2021-01-11 RX ORDER — MORPHINE SULFATE 15 MG/1
15 TABLET, FILM COATED, EXTENDED RELEASE ORAL 2 TIMES DAILY
Qty: 30 TABLET | Refills: 0 | Status: SHIPPED | OUTPATIENT
Start: 2021-01-11 | End: 2021-01-25

## 2021-01-11 RX ORDER — ALPRAZOLAM 0.5 MG
0.5 TABLET ORAL EVERY 6 HOURS PRN
Qty: 30 TABLET | Refills: 0 | Status: SHIPPED | OUTPATIENT
Start: 2021-01-11 | End: 2021-01-21

## 2021-01-11 NOTE — TELEPHONE ENCOUNTER
ALPRAZolam (XANAX) 0.5 MG tablet       Last Written Prescription Date:  12/28/20  Last Fill Quantity: 30,   # refills: 0  Last Office Visit: 8/19/20  Future Office visit:       Routing refill request to provider for review/approval because:  Drug not on the [facility], [facility] or [facility] refill protocol or controlled substance

## 2021-01-14 ENCOUNTER — VIRTUAL VISIT (OUTPATIENT)
Dept: PHARMACY | Facility: CLINIC | Age: 46
End: 2021-01-14
Payer: COMMERCIAL

## 2021-01-14 DIAGNOSIS — J84.9 ILD (INTERSTITIAL LUNG DISEASE) (H): ICD-10-CM

## 2021-01-14 DIAGNOSIS — F11.90 CHRONIC, CONTINUOUS USE OF OPIOIDS: ICD-10-CM

## 2021-01-14 DIAGNOSIS — K21.9 GASTROESOPHAGEAL REFLUX DISEASE, UNSPECIFIED WHETHER ESOPHAGITIS PRESENT: ICD-10-CM

## 2021-01-14 DIAGNOSIS — F41.9 ANXIETY: ICD-10-CM

## 2021-01-14 DIAGNOSIS — I27.0 PRIMARY PULMONARY HYPERTENSION (H): Primary | ICD-10-CM

## 2021-01-14 PROCEDURE — 99605 MTMS BY PHARM NP 15 MIN: CPT | Mod: TEL | Performed by: PHARMACIST

## 2021-01-14 PROCEDURE — 99607 MTMS BY PHARM ADDL 15 MIN: CPT | Mod: TEL | Performed by: PHARMACIST

## 2021-01-14 NOTE — PATIENT INSTRUCTIONS
Recommendations from today's MTM visit:                                                    MTM (medication therapy management) is a service provided by a clinical pharmacist designed to help you get the most of out of your medicines.   Today we reviewed what your medicines are for, how to know if they are working, that your medicines are safe and how to make your medicine regimen as easy as possible.      1. Continue to work on reducing alprazolam - consider cutting tablets in half and reducing by a half tablet every 1-2 weeks as able.     2. I placed an order for naloxone (Narcan) nasal spray for you to have on hand. This is a rescue medication used in case your body becomes more susceptible to your current medications.  You would train someone else to use this medication as it can reverse the effects of your pain medications that could be impeding your ability to breathe on top of another medical issue. Alprazolam and your pain medications can interact to reduce your lungs ability to breath. Because of your higher risk this medication was ordered.     It was great to speak with you today.  I value your experience and would be very thankful for your time with providing feedback on our clinic survey. You may receive a survey via email or text message in the next few days.     Next MTM visit: 6 months or sooner if needed    To schedule another MTM appointment, please call the clinic directly or you may call the MTM scheduling line at [phone] or toll-free at [phone].     My Clinical Pharmacist's contact information:                                                      It was a pleasure talking with you today!  Please feel free to contact me with any questions or concerns you have.      Miguelito Rasmussen, Noam, [facility]  Medication Therapy Management Pharmacist  Pager: [phone]

## 2021-01-14 NOTE — PROGRESS NOTES
Medication Therapy Management (MTM) Encounter    ASSESSMENT:                            Medication Adherence/Access: No issues identified    ILD/Pulm HTN: Stable.    Inflammatory Arthritis: Stable per patient. Discussed dose reduction - patient not interested in reducing this dose at this time. May benefit from having naloxone on hand.      Anxiety: Stable. Working on lowering dose. Discussed using half tablets.    GERD: Stable.     PLAN:                            1. Continue to work on reducing alprazolam - consider cutting tablets in half and reducing by a half tablet every 1-2 weeks.     2. I placed an order for naloxone (Narcan) nasal spray for you to have on hand. This is a rescue medication used in case your body becomes more susceptible to your current medications. Alprazolam and your pain medications can interact to reduce your lungs ability to breath. Because of your higher risk this medication was ordered.     Follow-up: 6 months or sooner if needed    SUBJECTIVE/OBJECTIVE:                          Anali Loya is a 45 year old female called for a follow-up visit. She was referred to me from her [employer] insurance plan.  Today's visit is a follow-up MTM visit from 6/15/2020.     Reason for visit: Comprehensive medication review.    Allergies/ADRs: Reviewed in chart  Tobacco: She reports that she has been smoking cigarettes. She started smoking about 28 years ago. She has a 12.50 pack-year smoking history. She quit smokeless tobacco use about 4 years ago.Tobacco Cessation Action Plan:   Pharmacotherapies : other Nicotine replacement - last pack lasted about 3-4 days  Alcohol: none  Caffeine: 1 cups/day of coffee  Activity: yoga - goes out in nature to meditate  PMH: Reviewed in Epic    Medication Adherence/Access:  Patient uses pill box(es).  Patient takes medications 2 time(s) per day.   Per patient, misses medication 0 times per week.   Medication barriers: none.   The patient fills medications  at [place]: YES.     ILD/Pulm HTN: Currently taking digoxin 125 mcg daily, esomeprazole 40 mg daily, furosemide 20 mg daily, tadalafil 40 mg daily, and Ventolin HFA PRN - zero to four times per day (odors or smoke can contribute to this use). Tries to eat potassium rich foods to avoid taking potassium supplement. Pt has found that she has been able to walk farther without tiring since starting the tadalafil..  She is using 3 to 4 L of oxygen/min at rest and 6 to 8 L of oxygen when walking. Pt reports some heartburn when starting tadalafil, but this is okay now that she is on PPI.   BP Readings from Last 3 Encounters:   12/08/20 (!) 84/40   11/25/20 105/60   08/19/20 108/70     Wt Readings from Last 5 Encounters:   11/25/20 104 lb 8 oz (47.4 kg)   08/19/20 105 lb (47.6 kg)   03/11/20 112 lb 8 oz (51 kg)   07/15/19 111 lb 3.2 oz (50.4 kg)   04/24/19 116 lb (52.6 kg)       Inflammatory Arthritis: Currently taking morphine ER 15 mg twice daily and Percocet  mg every 6 hours as needed (taking 4-5 times daily). Pt finds this to be somewhat effective - states she has grown tolerant to her current dose, but does not want to take more. Tried supplementing with additional Tylenol, but did not find to be helpful. Pt reports no known side effects.     Anxiety: Currently taking alprazolam 0.5 mg 2-3 times daily (down from previous dose). Pt finds this to be effective. Working down on the dose. Pt reports no known side effects.     GERD: Current medications include: Nexium (esomeprazole) 40 mg once daily. Pt c/o no current symptoms.  Patient feels that current regimen is effective.    Today's Vitals: There were no vitals taken for this visit.  ----------------        I spent 25 minutes with this patient today. A copy of the visit note was provided to the patient's primary care provider.    The patient was sent via [employer] a summary of these recommendations.     Miguelito Rasmussen, UlicesD, BCACP  Medication Therapy Management  Pharmacist  Pager: [phone]      Telemedicine Visit Details  Type of service:  Telephone visit  Start Time: 2:00 PM  End Time: 2:25 PM  Originating Location (patient location): Home  Distant Location (provider location):  [facility]        Medication Therapy Recommendations  Anxiety    Current Medication: ALPRAZolam (XANAX) 0.5 MG tablet   Rationale: Medication interaction - Dosage too high - Safety   Recommendation: Decrease Dose - ALPRAZolam 0.5 MG tablet - Reduce alprazolam by one-half tablet every 1-2 weeks as able.   Status: Accepted - no CPA Needed         Chronic, continuous use of opioids    Current Medication: oxyCODONE-acetaminophen (PERCOCET)  MG per tablet   Rationale: Undesirable effect - Adverse medication event - Safety   Recommendation: Start Medication - naloxone 4 MG/0.1ML nasal spray - Start naloxone nasal spray as needed.   Status: Accepted per CPA

## 2021-01-20 ENCOUNTER — MYC REFILL (OUTPATIENT)
Dept: FAMILY MEDICINE | Facility: CLINIC | Age: 46
End: 2021-01-20

## 2021-01-20 DIAGNOSIS — R07.81 PLEURITIC CHEST PAIN: ICD-10-CM

## 2021-01-20 RX ORDER — OXYCODONE AND ACETAMINOPHEN 10; 325 MG/1; MG/1
TABLET ORAL
Qty: 60 TABLET | Refills: 0 | Status: SHIPPED | OUTPATIENT
Start: 2021-01-20 | End: 2021-02-03

## 2021-01-20 NOTE — TELEPHONE ENCOUNTER
Percocet        Last Written Prescription Date:  1/6/2021  Last Fill Quantity: 60,   # refills: 0  Last Office Visit: 11/20/20 Driss, 8/19/2020 Vincenzo  Future Office visit:       Routing refill request to provider for review/approval because:  Drug not on the [facility], [facility] or [facility] refill protocol or controlled substance

## 2021-01-21 ENCOUNTER — MYC REFILL (OUTPATIENT)
Dept: FAMILY MEDICINE | Facility: CLINIC | Age: 46
End: 2021-01-21

## 2021-01-21 DIAGNOSIS — G47.9 DISTURBANCE IN SLEEP BEHAVIOR: ICD-10-CM

## 2021-01-22 ENCOUNTER — MYC MEDICAL ADVICE (OUTPATIENT)
Dept: CARDIOLOGY | Facility: CLINIC | Age: 46
End: 2021-01-22

## 2021-01-22 DIAGNOSIS — R06.02 SOB (SHORTNESS OF BREATH): ICD-10-CM

## 2021-01-22 DIAGNOSIS — I27.20 PULMONARY HYPERTENSION (H): Primary | ICD-10-CM

## 2021-01-22 RX ORDER — ALPRAZOLAM 0.5 MG
0.5 TABLET ORAL EVERY 6 HOURS PRN
Qty: 30 TABLET | Refills: 0 | Status: SHIPPED | OUTPATIENT
Start: 2021-01-22 | End: 2021-02-02

## 2021-01-22 NOTE — TELEPHONE ENCOUNTER
Xanax 0.5mg      Last Written Prescription Date:  1/11/21  Last Fill Quantity: 30,   # refills: 0  Last Office Visit: 8/19/20  Future Office visit:       Routing refill request to provider for review/approval because:  Drug not on the [facility], [facility] or [facility] refill protocol or controlled substance

## 2021-01-25 ENCOUNTER — MYC REFILL (OUTPATIENT)
Dept: FAMILY MEDICINE | Facility: CLINIC | Age: 46
End: 2021-01-25

## 2021-01-25 DIAGNOSIS — M19.90 INFLAMMATORY ARTHRITIS: ICD-10-CM

## 2021-01-25 NOTE — TELEPHONE ENCOUNTER
CBC and iron levels okay'd by Dr. Ledesma for patient to have drawn locally.Sunitha aTdeo RN on 1/25/2021 at 8:00 AM

## 2021-01-26 NOTE — TELEPHONE ENCOUNTER
Morphine 15 mg      Last Written Prescription Date:  1/11/21  Last Fill Quantity: 30,   # refills: 0  Last Office Visit: 8/19/20  Future Office visit:       Routing refill request to provider for review/approval because:  Drug not on the [facility], P or [facility] refill protocol or controlled substance

## 2021-01-27 ENCOUNTER — MYC REFILL (OUTPATIENT)
Dept: FAMILY MEDICINE | Facility: CLINIC | Age: 46
End: 2021-01-27

## 2021-01-27 DIAGNOSIS — M19.90 INFLAMMATORY ARTHRITIS: ICD-10-CM

## 2021-01-27 RX ORDER — MORPHINE SULFATE 15 MG/1
15 TABLET, FILM COATED, EXTENDED RELEASE ORAL 2 TIMES DAILY
Qty: 30 TABLET | Refills: 0 | Status: SHIPPED | OUTPATIENT
Start: 2021-01-27 | End: 2021-02-08

## 2021-01-27 RX ORDER — MORPHINE SULFATE 15 MG/1
15 TABLET, FILM COATED, EXTENDED RELEASE ORAL 2 TIMES DAILY
Qty: 30 TABLET | Refills: 0 | Status: CANCELLED | OUTPATIENT
Start: 2021-01-27

## 2021-01-27 NOTE — TELEPHONE ENCOUNTER
Prescription was sent 1/27/2021 for #30 with 0 refills.    ILIR MolinaN, RN  [facility]

## 2021-02-02 ENCOUNTER — MYC REFILL (OUTPATIENT)
Dept: FAMILY MEDICINE | Facility: CLINIC | Age: 46
End: 2021-02-02

## 2021-02-02 DIAGNOSIS — R07.81 PLEURITIC CHEST PAIN: ICD-10-CM

## 2021-02-02 DIAGNOSIS — G47.9 DISTURBANCE IN SLEEP BEHAVIOR: ICD-10-CM

## 2021-02-02 NOTE — TELEPHONE ENCOUNTER
ALPRAZolam (XANAX) 0.5 MG tablet.       Last Written Prescription Date:  1/22/21  Last Fill Quantity: 30,   # refills: 0  Last Office Visit: 11/20/20  Future Office visit:       Routing refill request to provider for review/approval because:  Drug not on the [facility], P or [facility] refill protocol or controlled substance

## 2021-02-03 ENCOUNTER — MYC REFILL (OUTPATIENT)
Dept: FAMILY MEDICINE | Facility: CLINIC | Age: 46
End: 2021-02-03

## 2021-02-03 ENCOUNTER — MYC MEDICAL ADVICE (OUTPATIENT)
Dept: FAMILY MEDICINE | Facility: CLINIC | Age: 46
End: 2021-02-03

## 2021-02-03 DIAGNOSIS — R07.81 PLEURITIC CHEST PAIN: ICD-10-CM

## 2021-02-03 RX ORDER — OXYCODONE AND ACETAMINOPHEN 10; 325 MG/1; MG/1
TABLET ORAL
Qty: 60 TABLET | Refills: 0 | Status: SHIPPED | OUTPATIENT
Start: 2021-02-03 | End: 2021-02-16

## 2021-02-03 RX ORDER — OXYCODONE AND ACETAMINOPHEN 10; 325 MG/1; MG/1
TABLET ORAL
Qty: 60 TABLET | Refills: 0 | Status: CANCELLED | OUTPATIENT
Start: 2021-02-03

## 2021-02-03 RX ORDER — ALPRAZOLAM 0.5 MG
0.5 TABLET ORAL EVERY 6 HOURS PRN
Qty: 30 TABLET | Refills: 0 | Status: SHIPPED | OUTPATIENT
Start: 2021-02-03 | End: 2021-02-14

## 2021-02-03 RX ORDER — OXYCODONE AND ACETAMINOPHEN 10; 325 MG/1; MG/1
TABLET ORAL
Qty: 60 TABLET | Refills: 0 | Status: SHIPPED | OUTPATIENT
Start: 2021-02-03 | End: 2021-05-25

## 2021-02-03 NOTE — TELEPHONE ENCOUNTER
Duplicate request. Was just requested yesterday. Sent to PCP to review.    ILIR MolinaN, RN  [facility]

## 2021-02-03 NOTE — TELEPHONE ENCOUNTER
Percocet      Last Written Prescription Date:  1/20/2021  Last Fill Quantity: 60,   # refills: 0  Last Office Visit: 8/19/2020  Future Office visit:       Routing refill request to provider for review/approval because:  Drug not on the [facility], P or [facility] refill protocol or controlled substance

## 2021-02-03 NOTE — TELEPHONE ENCOUNTER
oxyCODONE-acetaminophen (PERCOCET)  MG per tablet       Last Written Prescription Date:  1/20/21  Last Fill Quantity: 60,   # refills: 0  Last Office Visit: 11/20/20  Future Office visit:       Routing refill request to provider for review/approval because:  Drug not on the [facility], [facility] or [facility] refill protocol or controlled substance

## 2021-02-05 DIAGNOSIS — R06.02 SOB (SHORTNESS OF BREATH): ICD-10-CM

## 2021-02-05 DIAGNOSIS — I27.20 PULMONARY HYPERTENSION (H): ICD-10-CM

## 2021-02-05 LAB
ALBUMIN SERPL-MCNC: 3.5 G/DL (ref 3.4–5)
ALP SERPL-CCNC: 73 U/L (ref 40–150)
ALT SERPL W P-5'-P-CCNC: 10 U/L (ref 0–50)
ANION GAP SERPL CALCULATED.3IONS-SCNC: 4 MMOL/L (ref 3–14)
AST SERPL W P-5'-P-CCNC: 11 U/L (ref 0–45)
BILIRUB SERPL-MCNC: 0.4 MG/DL (ref 0.2–1.3)
BUN SERPL-MCNC: 11 MG/DL (ref 7–30)
CALCIUM SERPL-MCNC: 8.7 MG/DL (ref 8.5–10.1)
CHLORIDE SERPL-SCNC: 107 MMOL/L (ref 94–109)
CO2 SERPL-SCNC: 30 MMOL/L (ref 20–32)
CREAT SERPL-MCNC: 0.71 MG/DL (ref 0.52–1.04)
ERYTHROCYTE [DISTWIDTH] IN BLOOD BY AUTOMATED COUNT: 17.4 % (ref 10–15)
GFR SERPL CREATININE-BSD FRML MDRD: >90 ML/MIN/{1.73_M2}
GLUCOSE SERPL-MCNC: 89 MG/DL (ref 70–99)
HCT VFR BLD AUTO: 41.7 % (ref 35–47)
HGB BLD-MCNC: 12.7 G/DL (ref 11.7–15.7)
IRON SATN MFR SERPL: 45 % (ref 15–46)
IRON SERPL-MCNC: 91 UG/DL (ref 35–180)
MCH RBC QN AUTO: 28.9 PG (ref 26.5–33)
MCHC RBC AUTO-ENTMCNC: 30.5 G/DL (ref 31.5–36.5)
MCV RBC AUTO: 95 FL (ref 78–100)
NT-PROBNP SERPL-MCNC: 248 PG/ML (ref 0–125)
PLATELET # BLD AUTO: 329 10E9/L (ref 150–450)
POTASSIUM SERPL-SCNC: 3.4 MMOL/L (ref 3.4–5.3)
PROT SERPL-MCNC: 8 G/DL (ref 6.8–8.8)
RBC # BLD AUTO: 4.4 10E12/L (ref 3.8–5.2)
SODIUM SERPL-SCNC: 141 MMOL/L (ref 133–144)
TIBC SERPL-MCNC: 203 UG/DL (ref 240–430)
WBC # BLD AUTO: 10.8 10E9/L (ref 4–11)

## 2021-02-05 PROCEDURE — 83880 ASSAY OF NATRIURETIC PEPTIDE: CPT | Performed by: INTERNAL MEDICINE

## 2021-02-05 PROCEDURE — 85027 COMPLETE CBC AUTOMATED: CPT | Performed by: INTERNAL MEDICINE

## 2021-02-05 PROCEDURE — 83550 IRON BINDING TEST: CPT | Performed by: INTERNAL MEDICINE

## 2021-02-05 PROCEDURE — 80053 COMPREHEN METABOLIC PANEL: CPT | Performed by: INTERNAL MEDICINE

## 2021-02-05 PROCEDURE — 36415 COLL VENOUS BLD VENIPUNCTURE: CPT | Performed by: INTERNAL MEDICINE

## 2021-02-05 PROCEDURE — 83540 ASSAY OF IRON: CPT | Performed by: INTERNAL MEDICINE

## 2021-02-16 ENCOUNTER — MYC REFILL (OUTPATIENT)
Dept: FAMILY MEDICINE | Facility: CLINIC | Age: 46
End: 2021-02-16

## 2021-02-16 DIAGNOSIS — R07.81 PLEURITIC CHEST PAIN: ICD-10-CM

## 2021-02-16 NOTE — TELEPHONE ENCOUNTER
oxyCODONE-acetaminophen (PERCOCET)  MG per tablet       Last Written Prescription Date:  2/3/21  Last Fill Quantity: 60,   # refills: 0  Last Office Visit: 8/19/20  Future Office visit:       Routing refill request to provider for review/approval because:  Drug not on the [facility], P or [facility] refill protocol or controlled substance

## 2021-02-17 RX ORDER — OXYCODONE AND ACETAMINOPHEN 10; 325 MG/1; MG/1
TABLET ORAL
Qty: 60 TABLET | Refills: 0 | Status: SHIPPED | OUTPATIENT
Start: 2021-02-17 | End: 2021-03-01

## 2021-02-21 ENCOUNTER — MYC REFILL (OUTPATIENT)
Dept: FAMILY MEDICINE | Facility: CLINIC | Age: 46
End: 2021-02-21

## 2021-02-21 DIAGNOSIS — M19.90 INFLAMMATORY ARTHRITIS: ICD-10-CM

## 2021-02-22 NOTE — TELEPHONE ENCOUNTER
Requested Prescriptions   Pending Prescriptions Disp Refills     morphine (MS CONTIN) 15 MG CR tablet 30 tablet 0     Sig: Take 1 tablet (15 mg) by mouth 2 times daily       There is no refill protocol information for this order        Last Written Prescription Date:  2/20/2021  Last Fill Quantity: 30,  # refills: 0   Last office visit: 8/19/2020 with prescribing provider:  Armida Loya visit 11/20/2020 with Driss   Future Office Visit:          Routing refill request to provider for review/approval because:  Drug not on the [facility] refill protocol                   Vicky Stout RN  Triage Nurse  [facility], 24 hour nurse line, available by calling clinic at [phone] and following prompts.

## 2021-02-23 RX ORDER — MORPHINE SULFATE 15 MG/1
15 TABLET, FILM COATED, EXTENDED RELEASE ORAL 2 TIMES DAILY
Qty: 30 TABLET | Refills: 0 | Status: SHIPPED | OUTPATIENT
Start: 2021-02-23 | End: 2021-03-08

## 2021-02-27 ENCOUNTER — MYC REFILL (OUTPATIENT)
Dept: FAMILY MEDICINE | Facility: CLINIC | Age: 46
End: 2021-02-27

## 2021-02-27 DIAGNOSIS — G47.9 DISTURBANCE IN SLEEP BEHAVIOR: ICD-10-CM

## 2021-03-01 NOTE — TELEPHONE ENCOUNTER
Requested Prescriptions   Pending Prescriptions Disp Refills     ALPRAZolam (XANAX) 0.5 MG tablet 30 tablet 0     Sig: Take 1 tablet (0.5 mg) by mouth every 6 hours as needed for anxiety     Last Written Prescription Date:  02/17/2021  Last Fill Quantity: 30,   # refills: 0  Last Office Visit: 08/19/2020  Future Office visit:       Routing refill request to provider for review/approval because:  Drug not on the [facility], P or [facility] refill protocol or controlled substance    Rebecca Tobias MA

## 2021-03-02 RX ORDER — ALPRAZOLAM 0.5 MG
0.5 TABLET ORAL EVERY 6 HOURS PRN
Qty: 30 TABLET | Refills: 0 | Status: SHIPPED | OUTPATIENT
Start: 2021-03-02 | End: 2021-03-12

## 2021-03-03 ENCOUNTER — MYC MEDICAL ADVICE (OUTPATIENT)
Dept: RHEUMATOLOGY | Facility: CLINIC | Age: 46
End: 2021-03-03

## 2021-03-12 ENCOUNTER — MYC REFILL (OUTPATIENT)
Dept: FAMILY MEDICINE | Facility: CLINIC | Age: 46
End: 2021-03-12

## 2021-03-12 DIAGNOSIS — G47.9 DISTURBANCE IN SLEEP BEHAVIOR: ICD-10-CM

## 2021-03-12 DIAGNOSIS — I27.20 PULMONARY HYPERTENSION (H): Primary | ICD-10-CM

## 2021-03-12 RX ORDER — ALPRAZOLAM 0.5 MG
0.5 TABLET ORAL EVERY 6 HOURS PRN
Qty: 30 TABLET | Refills: 0 | Status: SHIPPED | OUTPATIENT
Start: 2021-03-12 | End: 2021-03-22

## 2021-03-12 NOTE — TELEPHONE ENCOUNTER
Xanax       Last Written Prescription Date:  3/2/2021  Last Fill Quantity: 30,   # refills: 0  Last Office Visit: 8/19/2020  Future Office visit:       Routing refill request to provider for review/approval because:  Drug not on the [facility], P or [facility] refill protocol or controlled substance

## 2021-03-13 ENCOUNTER — HEALTH MAINTENANCE LETTER (OUTPATIENT)
Age: 46
End: 2021-03-13

## 2021-03-16 ENCOUNTER — MYC REFILL (OUTPATIENT)
Dept: FAMILY MEDICINE | Facility: CLINIC | Age: 46
End: 2021-03-16

## 2021-03-16 DIAGNOSIS — R07.81 PLEURITIC CHEST PAIN: ICD-10-CM

## 2021-03-16 RX ORDER — DIGOXIN 125 MCG
125 TABLET ORAL DAILY
Qty: 90 TABLET | Refills: 2 | Status: SHIPPED | OUTPATIENT
Start: 2021-03-16 | End: 2021-12-14

## 2021-03-17 RX ORDER — OXYCODONE AND ACETAMINOPHEN 10; 325 MG/1; MG/1
TABLET ORAL
Qty: 60 TABLET | Refills: 0 | Status: SHIPPED | OUTPATIENT
Start: 2021-03-17 | End: 2021-03-29

## 2021-03-22 DIAGNOSIS — M19.90 INFLAMMATORY ARTHRITIS: ICD-10-CM

## 2021-03-22 NOTE — TELEPHONE ENCOUNTER
Patient is questioning the dose of the MS contin, she is only getting 30 tablets which is not lasting her the month.  Patient was getting 60 and that would last her the month.  She takes this twice daily so this is lasting only 2 weeks.    The last script that was sent has  date of 04/06/2021 which will not make the month fill like she had previously.       Argenis NETTLES

## 2021-03-22 NOTE — TELEPHONE ENCOUNTER
The last time patient received a quantity of 60 of MS contin was 11/30/2020.     She started receiving a quantity of 30 on the following dates:    12/28/2020  1/11/2021  1/27/2021  2/10/2021  2/23/2021  3/9/2021

## 2021-03-23 ENCOUNTER — TELEPHONE (OUTPATIENT)
Dept: FAMILY MEDICINE | Facility: CLINIC | Age: 46
End: 2021-03-23

## 2021-03-23 RX ORDER — MORPHINE SULFATE 15 MG/1
15 TABLET, FILM COATED, EXTENDED RELEASE ORAL 2 TIMES DAILY
Qty: 60 TABLET | Refills: 0 | Status: SHIPPED | OUTPATIENT
Start: 2021-04-06 | End: 2021-05-03

## 2021-03-23 NOTE — TELEPHONE ENCOUNTER
Patient has multiple message in re: this medication and the dosing she is needing to have a script to  for # 30 before the 4/6-to complete the month of 2 daily.  and also a call re: when this changed and why as she does not recall the change of qty.    Looks like the refill qty was changed when Dr. Loya was out of the clinic to qty of 30 and never changed back.    Patient is going to be out.       Argenis NETTLES

## 2021-03-23 NOTE — TELEPHONE ENCOUNTER
morphine (MS CONTIN) 15 MG CR tablet 30 tablet 0     Sig: Take 1 tablet (15 mg) by mouth 2 times daily     Last Written Prescription Date:  03/09/2021  Last Fill Quantity: 30,   # refills: 0  Last Office Visit: 08/19/2020  Future Office visit:       Routing refill request to provider for review/approval because:  Drug not on the [facility], P or [facility] refill protocol or controlled substance

## 2021-03-23 NOTE — TELEPHONE ENCOUNTER
Patient is looking for her Morphine prescription. 2 Rx's were sent to pharmacy but both were dated to fill 4/6/2021. One was for a 15 day supply sent to us 3/22/2021 and the other is for a 30 day supply sent today (3/23/2021). Please send new Rx for current fill or call pharmacy to change date on one of the previous Rx's.    Thanks

## 2021-03-24 NOTE — TELEPHONE ENCOUNTER
Spoke with pharmacy and patient will  15 day prescription today. Then on 4/6/2021 she will  the new 30 day prescription with #qty of 60 of morphine.    Landen Pedraza, CMA

## 2021-03-29 ENCOUNTER — MYC REFILL (OUTPATIENT)
Dept: FAMILY MEDICINE | Facility: CLINIC | Age: 46
End: 2021-03-29

## 2021-03-29 DIAGNOSIS — R07.81 PLEURITIC CHEST PAIN: ICD-10-CM

## 2021-03-29 RX ORDER — OXYCODONE AND ACETAMINOPHEN 10; 325 MG/1; MG/1
TABLET ORAL
Qty: 60 TABLET | Refills: 0 | Status: SHIPPED | OUTPATIENT
Start: 2021-03-29 | End: 2021-04-12

## 2021-03-29 NOTE — TELEPHONE ENCOUNTER
Requested Prescriptions   Pending Prescriptions Disp Refills     oxyCODONE-acetaminophen (PERCOCET)  MG per tablet 60 tablet 0     Sig: TAKE ONE TABLET BY MOUTH EVERY 6 HOURS AS NEEDED FOR MODERATE TO SEVERE PAIN     Last Written Prescription Date:  03/17/2021  Last Fill Quantity: 60,   # refills: 0  Last Office Visit: 08/19/2020  Future Office visit:       Routing refill request to provider for review/approval because:  Drug not on the [facility], P or [facility] refill protocol or controlled substance    Rebecca Tobias MA

## 2021-04-01 ENCOUNTER — MYC REFILL (OUTPATIENT)
Dept: FAMILY MEDICINE | Facility: CLINIC | Age: 46
End: 2021-04-01

## 2021-04-01 DIAGNOSIS — G47.9 DISTURBANCE IN SLEEP BEHAVIOR: ICD-10-CM

## 2021-04-01 RX ORDER — ALPRAZOLAM 0.5 MG
0.5 TABLET ORAL EVERY 6 HOURS PRN
Qty: 30 TABLET | Refills: 0 | Status: SHIPPED | OUTPATIENT
Start: 2021-04-01 | End: 2021-04-09

## 2021-04-01 NOTE — TELEPHONE ENCOUNTER
xanax      Last Written Prescription Date:  3-  Last Fill Quantity: 30,   # refills: 0  Last Office Visit: 11- virtual  Future Office visit:       Routing refill request to provider for review/approval because:  Drug not on the [facility], P or [facility] refill protocol or controlled substance

## 2021-04-09 ENCOUNTER — MYC REFILL (OUTPATIENT)
Dept: FAMILY MEDICINE | Facility: CLINIC | Age: 46
End: 2021-04-09

## 2021-04-09 DIAGNOSIS — G47.9 DISTURBANCE IN SLEEP BEHAVIOR: ICD-10-CM

## 2021-04-09 RX ORDER — ALPRAZOLAM 0.5 MG
0.5 TABLET ORAL EVERY 6 HOURS PRN
Qty: 30 TABLET | Refills: 0 | Status: SHIPPED | OUTPATIENT
Start: 2021-04-09 | End: 2021-04-19

## 2021-04-09 NOTE — TELEPHONE ENCOUNTER
Requested Prescriptions   Pending Prescriptions Disp Refills     ALPRAZolam (XANAX) 0.5 MG tablet 30 tablet 0     Sig: Take 1 tablet (0.5 mg) by mouth every 6 hours as needed for anxiety     Last Written Prescription Date:  04/01/2021  Last Fill Quantity: 30,   # refills: 0  Last Office Visit: 08/19/2020  Future Office visit:       Routing refill request to provider for review/approval because:  Drug not on the [facility], P or [facility] refill protocol or controlled substance    Rebecca Tobias MA

## 2021-04-12 ENCOUNTER — MYC REFILL (OUTPATIENT)
Dept: FAMILY MEDICINE | Facility: CLINIC | Age: 46
End: 2021-04-12

## 2021-04-12 DIAGNOSIS — R07.81 PLEURITIC CHEST PAIN: ICD-10-CM

## 2021-04-12 RX ORDER — OXYCODONE AND ACETAMINOPHEN 10; 325 MG/1; MG/1
TABLET ORAL
Qty: 60 TABLET | Refills: 0 | Status: SHIPPED | OUTPATIENT
Start: 2021-04-12 | End: 2021-04-23

## 2021-04-12 NOTE — TELEPHONE ENCOUNTER
Requested Prescriptions   Pending Prescriptions Disp Refills     oxyCODONE-acetaminophen (PERCOCET)  MG per tablet 60 tablet 0     Sig: TAKE ONE TABLET BY MOUTH EVERY 6 HOURS AS NEEDED FOR MODERATE TO SEVERE PAIN     Last Written Prescription Date:  03/29/2021  Last Fill Quantity: 60,   # refills: 0  Last Office Visit: 08/19/2020  Future Office visit:       Routing refill request to provider for review/approval because:  Drug not on the [facility], P or [facility] refill protocol or controlled substance    Rebecca Tobias MA

## 2021-04-19 ENCOUNTER — MYC REFILL (OUTPATIENT)
Dept: FAMILY MEDICINE | Facility: CLINIC | Age: 46
End: 2021-04-19

## 2021-04-19 DIAGNOSIS — G47.9 DISTURBANCE IN SLEEP BEHAVIOR: ICD-10-CM

## 2021-04-19 RX ORDER — ALPRAZOLAM 0.5 MG
0.5 TABLET ORAL EVERY 6 HOURS PRN
Qty: 30 TABLET | Refills: 0 | Status: SHIPPED | OUTPATIENT
Start: 2021-04-19 | End: 2021-04-27

## 2021-04-19 NOTE — TELEPHONE ENCOUNTER
Requested Prescriptions   Pending Prescriptions Disp Refills     ALPRAZolam (XANAX) 0.5 MG tablet 30 tablet 0     Sig: Take 1 tablet (0.5 mg) by mouth every 6 hours as needed for anxiety     Last Written Prescription Date:  04/09/2021  Last Fill Quantity: 30,   # refills: 0  Last Office Visit: 08/19/2020  Future Office visit:       Routing refill request to provider for review/approval because:  Drug not on the [facility], P or [facility] refill protocol or controlled substance    Rebecca Tobias MA

## 2021-04-27 ENCOUNTER — MYC REFILL (OUTPATIENT)
Dept: FAMILY MEDICINE | Facility: CLINIC | Age: 46
End: 2021-04-27

## 2021-04-27 DIAGNOSIS — G47.9 DISTURBANCE IN SLEEP BEHAVIOR: ICD-10-CM

## 2021-04-27 NOTE — TELEPHONE ENCOUNTER
Xanax 0.5mg       Last Written Prescription Date:  4/19/21  Last Fill Quantity: 30,   # refills: 0  Last Office Visit: 8/19/20  Future Office visit:       Routing refill request to provider for review/approval because:  Drug not on the [facility], [facility] or [facility] refill protocol or controlled substance

## 2021-04-28 RX ORDER — ALPRAZOLAM 0.5 MG
0.5 TABLET ORAL EVERY 6 HOURS PRN
Qty: 30 TABLET | Refills: 0 | Status: SHIPPED | OUTPATIENT
Start: 2021-04-28 | End: 2021-05-06

## 2021-05-06 ENCOUNTER — MYC REFILL (OUTPATIENT)
Dept: FAMILY MEDICINE | Facility: CLINIC | Age: 46
End: 2021-05-06

## 2021-05-06 DIAGNOSIS — G47.9 DISTURBANCE IN SLEEP BEHAVIOR: ICD-10-CM

## 2021-05-06 NOTE — TELEPHONE ENCOUNTER
Alprazolam        Last Written Prescription Date:  4/28/2021  Last Fill Quantity: 30,   # refills: 0  Last Office Visit: 8/19/2020  Future Office visit:       Routing refill request to provider for review/approval because:  Drug not on the [facility], [facility] or [facility] refill protocol or controlled substance    
66 yo F with no pmhx, presents for evaluation of palpitations, onset 3 days ago, associated with shortness of breath. NO fever, no chills, no headache, no nausea, no vomiting, no chest pain, no back pain, no abdominal pain. Patient states she had an episode like this a few years ago. Denies any other symptoms     CHASVASC 0

## 2021-05-07 RX ORDER — ALPRAZOLAM 0.5 MG
0.5 TABLET ORAL EVERY 6 HOURS PRN
Qty: 30 TABLET | Refills: 0 | Status: SHIPPED | OUTPATIENT
Start: 2021-05-07 | End: 2021-05-16

## 2021-05-25 ENCOUNTER — MYC MEDICAL ADVICE (OUTPATIENT)
Dept: FAMILY MEDICINE | Facility: CLINIC | Age: 46
End: 2021-05-25

## 2021-05-25 ENCOUNTER — OFFICE VISIT (OUTPATIENT)
Dept: FAMILY MEDICINE | Facility: CLINIC | Age: 46
End: 2021-05-25
Payer: COMMERCIAL

## 2021-05-25 VITALS
BODY MASS INDEX: 21.11 KG/M2 | WEIGHT: 111.8 LBS | RESPIRATION RATE: 20 BRPM | OXYGEN SATURATION: 97 % | HEART RATE: 72 BPM | SYSTOLIC BLOOD PRESSURE: 130 MMHG | HEIGHT: 61 IN | DIASTOLIC BLOOD PRESSURE: 72 MMHG | TEMPERATURE: 98.4 F

## 2021-05-25 DIAGNOSIS — R07.81 PLEURITIC CHEST PAIN: ICD-10-CM

## 2021-05-25 DIAGNOSIS — F41.9 ANXIETY: ICD-10-CM

## 2021-05-25 DIAGNOSIS — F11.90 CHRONIC, CONTINUOUS USE OF OPIOIDS: Primary | ICD-10-CM

## 2021-05-25 DIAGNOSIS — R09.81 SINUS CONGESTION: ICD-10-CM

## 2021-05-25 DIAGNOSIS — R09.81 SINUS CONGESTION: Primary | ICD-10-CM

## 2021-05-25 LAB
AMPHETAMINES UR QL: NOT DETECTED NG/ML
BARBITURATES UR QL SCN: NOT DETECTED NG/ML
BENZODIAZ UR QL SCN: ABNORMAL NG/ML
BUPRENORPHINE UR QL: NOT DETECTED NG/ML
CANNABINOIDS UR QL: NOT DETECTED NG/ML
COCAINE UR QL SCN: NOT DETECTED NG/ML
D-METHAMPHET UR QL: NOT DETECTED NG/ML
METHADONE UR QL SCN: NOT DETECTED NG/ML
OPIATES UR QL SCN: ABNORMAL NG/ML
OXYCODONE UR QL SCN: ABNORMAL NG/ML
PCP UR QL SCN: NOT DETECTED NG/ML
PROPOXYPH UR QL: NOT DETECTED NG/ML
TRICYCLICS UR QL SCN: NOT DETECTED NG/ML

## 2021-05-25 PROCEDURE — 80306 DRUG TEST PRSMV INSTRMNT: CPT | Performed by: PHYSICIAN ASSISTANT

## 2021-05-25 PROCEDURE — 99214 OFFICE O/P EST MOD 30 MIN: CPT | Performed by: PHYSICIAN ASSISTANT

## 2021-05-25 RX ORDER — HYDROXYZINE HYDROCHLORIDE 10 MG/1
10-20 TABLET, FILM COATED ORAL 3 TIMES DAILY PRN
Qty: 180 TABLET | Refills: 0 | Status: SHIPPED | OUTPATIENT
Start: 2021-05-25 | End: 2021-06-01

## 2021-05-25 RX ORDER — OXYCODONE AND ACETAMINOPHEN 10; 325 MG/1; MG/1
TABLET ORAL
Qty: 60 TABLET | Refills: 0 | Status: SHIPPED | OUTPATIENT
Start: 2021-05-25 | End: 2021-06-04

## 2021-05-25 ASSESSMENT — MIFFLIN-ST. JEOR: SCORE: 1081.56

## 2021-05-25 ASSESSMENT — PAIN SCALES - GENERAL: PAINLEVEL: SEVERE PAIN (6)

## 2021-05-25 NOTE — PATIENT INSTRUCTIONS
Patient Education     Understanding Chronic Pain  Chronic means ongoing. Pain is called chronic when it lasts over a long period of time, at least 3 months. This includes pain that you feel regularly, even if it comes and goes. Chronic pain may be due to continuing injury or disease. Or it may be due to problems with the body s pain-control system. An example of this is fibromyalgia.      Chronic stimulus  Chronic pain may be from ongoing arousal of the body's pain system. The cause may be an untreated injury or health problem. Common examples of these are:    Joint degeneration (arthritis)    Back injury    Nervous system damage (neuropathic pain)    Headaches  With this type of pain, both the pain and the condition that is causing it must be treated.  Chronic pain syndrome  In some cases, no cause can be found for a person's chronic pain. Some people with chronic pain develop chronic pain syndrome.  In addition to the pain, this can include:    Anxiety    Depression    Anger    Changed lifestyle  It's important to talk with your healthcare team about these. You will need treatment for these problems in addition to treatment for pain.  [employer] last reviewed this educational content on 5/1/2020 2000-2021 [employer]. All rights reserved. This information is not intended as a substitute for professional medical care. Always follow your healthcare professional's instructions.

## 2021-05-25 NOTE — PROGRESS NOTES
Addendum: Patient did not tolerate the hydroxyzine. I have offered to start her on an ssri medication to help with sleep and anxiety. She was given a refill on her xanax with intentional reduction. She was also referred to pain specialty to further evaluate the safety of her current treatment plan.     Erickson Gilliam PA-C on 6/10/2021 at 5:39 PM      Assessment & Plan     1. Chronic, continuous use of opioids    2. Anxiety    3. Sinus congestion      Patient is a patient of Dr. Loya's. He is out of clinic until August and patient is looking to find a provider cover her medications until his return. In review of the patient's medications I see that she is on multiple medications which will cause CNS depression. I see that MTM also had the same concern. I discussed this concern with the patient and advised that she use hydroxyzine in place of xanax. She reported to me that she was using her xanax in excess of the current dose and wanted to have the medication increased to reflect her use. I discussed with the patient that benzodiazepines are not considered a first line treatment for sleep or for anxiety. Patient was receptive to this plan and will try the hydroxyzine. If this is not effect consider trazodone or similar non-controlled substance.     Current controlled substance use includes:  Morphine 15mg twice daily  Oxycodone 10mg three times daily  Xanax 0.5mg up to four times daily    MME equivalent daily dose is 75    Return in about 3 months (around 8/25/2021) for Return for scheduled annual checkup with PCP.    Erickson Gilliam PA-C  [facility]   Anali is a 45 year old who presents for the following health issues     HPI     Medication Followup of Percocet    Taking Medication as prescribed: yes    Side Effects:  None    Medication Helping Symptoms:  yes     Patient is a 45 year old female, who normally sees Dr. Loya, with PMH of ILD and fibrosis of the lung,  "pulmonary HTN, history of tobacco abuse and chronic use of opioids. The patient has been receiving morphine tablets 1 tablet twice daily (30MME) and percocet 1 tablet every 6 hours (60MME) for a total of 90 MME/day. In addition, patient is concurrently using benzodiazepines while having a diagnosis of interstitial lung disease. I discussed with the patient the breathing risk associated with her current regimen and advised that she taper off of the xanax as there are several other treatments to help with pain. She was receptive to this information.     Review of Systems   Constitutional, HEENT, cardiovascular, pulmonary, gi and gu systems are negative, except as otherwise noted.      Objective    /72 (BP Location: Right arm, Patient Position: Chair, Cuff Size: Adult Regular)   Pulse 72   Temp 98.4  F (36.9  C) (Temporal)   Resp 20   Ht 1.537 m (5' 0.5\")   Wt 50.7 kg (111 lb 12.8 oz)   SpO2 97%   BMI 21.48 kg/m    Body mass index is 21.48 kg/m .  Physical Exam   GENERAL: healthy, alert and no distress  RESP: poor airflow - no wheeze, rhonchi, rales  CV: regular rate and rhythm, normal S1 S2, no S3 or S4, no murmur, click or rub, no peripheral edema and peripheral pulses strong  MS: no gross musculoskeletal defects noted, no edema  PSYCH: mentation appears normal, affect normal/bright      Results for TYREL SQUIRES (MRN [id number]) as of 5/27/2021 21:02   Ref. Range 5/25/2021 09:58   Cannabinoids ([id number]-6-[id number]-9-THC) Latest Ref Range: NDET^Not Detected ng/mL Not Detected   Phencyclidine (Phencyclidine) Latest Ref Range: NDET^Not Detected ng/mL Not Detected   Cocaine (Benzoylecgonine) Latest Ref Range: NDET^Not Detected ng/mL Not Detected   Methamphetamine (d-Methamphetamine) Latest Ref Range: NDET^Not Detected ng/mL Not Detected   Opiates (Morphine) Latest Ref Range: NDET^Not Detected ng/mL Detected, Abnormal Result (A)   Amphetamine (d-Amphetamine) Latest Ref Range: NDET^Not Detected ng/mL Not " Detected   Benzodiazepines (Nordiazepam) Latest Ref Range: NDET^Not Detected ng/mL Detected, Abnormal Result (A)   Tricyclic Antidepressants (Desipramine) Latest Ref Range: NDET^Not Detected ng/mL Not Detected   Methadone (Methadone) Latest Ref Range: NDET^Not Detected ng/mL Not Detected   Barbiturates (Butalbital) Latest Ref Range: NDET^Not Detected ng/mL Not Detected   Oxycodone (Oxycodone) Latest Ref Range: NDET^Not Detected ng/mL Detected, Abnormal Result (A)   Propoxyphene (Norpropoxyphene) Latest Ref Range: NDET^Not Detected ng/mL Not Detected   Buprenorphine (Buprenorphine) Latest Ref Range: NDET^Not Detected ng/mL Not Detected

## 2021-05-25 NOTE — LETTER
Opioid / Opioid Plus Controlled Substance Agreement    This is an agreement between you and your provider about the safe and appropriate use of controlled substance/opioids prescribed by your care team. Controlled substances are medicines that can cause physical and mental dependence (abuse).    There are strict laws about having and using these medicines. We here at [facility] are committing to working with you in your efforts to get better. To support you in this work, we ll help you schedule regular office appointments for medicine refills. If we must cancel or change your appointment for any reason, we ll make sure you have enough medicine to last until your next appointment.     As a Provider, I will:    Listen carefully to your concerns and treat you with respect.     Recommend a treatment plan that I believe is in your best interest. This plan may involve therapies other than opioid pain medication.     Talk with you often about the possible benefits, and the risk of harm of any medicine that we prescribe for you.     Provide a plan on how to taper (discontinue or go off) using this medicine if the decision is made to stop its use.    As a Patient, I understand that opioid(s):     Are a controlled substance prescribed by my care team to help me function or work and manage my condition(s).     Are strong medicines and can cause serious side effects such as:    Drowsiness, which can seriously affect my driving ability    A lower breathing rate, enough to cause death    Harm to my thinking ability     Depression     Abuse of and addiction to this medicine    Need to be taken exactly as prescribed. Combining opioids with certain medicines or chemicals (such as illegal drugs, sedatives, sleeping pills, and benzodiazepines) can be dangerous or even fatal. If I stop opioids suddenly, I may have severe withdrawal symptoms.    Do not work for all types of pain nor for all patients. If they re not helpful, I may  be asked to stop them.    I am also being prescribed a benzodiazepine (tranquilizer) controlled substance: I understand this type of medicine is sedating and can increase the risk of death when taken together with opioids. I have talked to my care team about having prescription Narcan available for reversing the opioid medicine and have been instructed in its use. I will be very careful to take my medicines only as directed    The risks, benefits and side effects of these medicine(s) were explained to me. I agree that:  1. I will take part in other treatments as advised by my care team. This may be psychiatry or counseling, physical therapy, behavioral therapy, group treatment or a referral to a specialist.     2. I will keep all my appointments. I understand that this is part of the monitoring of opioids. My care team may require an office visit for EVERY opioid/controlled substance refill. If I miss appointments or don t follow instructions, my care team may stop my medicine.    3. I will take my medicines as prescribed. I will not change the dose or schedule unless my care team tells me to. There will be no refills if I run out early.     4. I may be asked to come to the clinic and complete a urine drug test or complete a pill count at any time. If I don t give a urine sample or participate in a pill count, the care team may stop my medicine.    5. I will only receive prescriptions from this clinic for chronic pain. If I am treated by another provider for acute pain issues, I will tell them that I am taking opioid pain medication for chronic pain and that I have a treatment agreement with this provider. I will inform my [facility] care team within one business day if I am given a prescription for any pain medication by another healthcare provider. My [facility] care team can contact other providers and pharmacists about my use of any medicines.    6. It is up to me to make sure that I don t run out  of my medicines on weekends or holidays. If my care team is willing to refill my opioid prescription without a visit, I must request refills only during office hours. Refills may take up to 3 business days to process. I will use one pharmacy to fill all my opioid and other controlled substance prescriptions. I will notify the clinic about any changes to my insurance or medication availability.    7. I am responsible for my prescriptions. If the medicine/prescription is lost, stolen or destroyed, it will not be replaced. I also agree not to share controlled substance medicines with anyone.    8. I am aware I should not use any illegal or recreational drugs. I agree not to drink alcohol unless my care team says I can.       9. If I enroll in the [place] [facility] Cannabis program, I will tell my care team prior to my next refill.     10. I will tell my care team right away if I become pregnant, have a new medical problem treated outside of my regular clinic, or have a change in my medications.    11. I understand that this medicine can affect my thinking, judgment and reaction time. Alcohol and drugs affect the brain and body, which can affect the safety of my driving. Being under the influence of alcohol or drugs can affect my decision-making, behaviors, personal safety, and the safety of others. Driving while impaired (DWI) can occur if a person is driving, operating, or in physical control of a car, motorcycle, boat, snowmobile, ATV, motorbike, off-road vehicle, or any other motor vehicle ([place] Statute 169A.20). I understand the risk if I choose to drive or operate any vehicle or machinery.    I understand that if I do not follow any of the conditions above, my prescriptions or treatment may be stopped or changed.          Opioids  What You Need to Know    What are opioids?   Opioids are pain medicines that must be prescribed by a doctor. They are also known as narcotics.     Examples are:   1. morphine (MS Contin,  Delphine)  2. oxycodone (Oxycontin)  3. oxycodone and acetaminophen (Percocet)  4. hydrocodone and acetaminophen (Vicodin, Norco)   5. fentanyl patch (Duragesic)   6. hydromorphone (Dilaudid)   7. methadone  8. codeine (Tylenol #3)     What do opioids do well?   Opioids are best for severe short-term pain such as after a surgery or injury. They may work well for cancer pain. They may help some people with long-lasting (chronic) pain.     What do opioids NOT do well?   Opioids never get rid of pain entirely, and they don t work well for most patients with chronic pain. Opioids don t reduce swelling, one of the causes of pain.                                    Other ways to manage chronic pain and improve function include:       Treat the health problem that may be causing pain    Anti-inflammation medicines, which reduce swelling and tenderness, such as ibuprofen (Advil, Motrin) or naproxen (Aleve)    Acetaminophen (Tylenol)    Antidepressants and anti-seizure medicines, especially for nerve pain    Topical treatments such as patches or creams    Injections or nerve blocks    Chiropractic or osteopathic treatment    Acupuncture, massage, deep breathing, meditation, visual imagery, aromatherapy    Use heat or ice at the pain site    Physical therapy     Exercise    Stop smoking    Take part in therapy       Risks and side effects     Talk to your doctor before you start or decide to keep taking opioids. Possible side effects include:      Lowering your breathing rate enough to cause death    Overdose, including death, especially if taking higher than prescribed doses    Worse depression symptoms; less pleasure in things you usually enjoy    Feeling tired or sluggish    Slower thoughts or cloudy thinking    Being more sensitive to pain over time; pain is harder to control    Trouble sleeping or restless sleep    Changes in hormone levels (for example, less testosterone)    Changes in sex drive or ability to have  sex    Constipation    Unsafe driving    Itching and sweating    Dizziness    Nausea, throwing up and dry mouth    What else should I know about opioids?    Opioids may lead to dependence, tolerance, or addiction.      Dependence means that if you stop or reduce the medicine too quickly, you will have withdrawal symptoms. These include loose poop (diarrhea), jitters, flu-like symptoms, nervousness and tremors. Dependence is not the same as addiction.                       Tolerance means needing higher doses over time to get the same effect. This may increase the chance of serious side effects.      Addiction is when people improperly use a substance that harms their body, their mind or their relations with others. Use of opiates can cause a relapse of addiction if you have a history of drug or alcohol abuse.      People who have used opioids for a long time may have a lower quality of life, worse depression, higher levels of pain and more visits to doctors.    You can overdose on opioids. Take these steps to lower your risk of overdose:    1. Recognize the signs:  Signs of overdose include decrease or loss of consciousness (blackout), slowed breathing, trouble waking up and blue lips. If someone is worried about overdose, they should call 911.    2. Talk to your doctor about Narcan (naloxone).   If you are at risk for overdose, you may be given a prescription for Narcan. This medicine very quickly reverses the effects of opioids.   If you overdose, a friend or family member can give you Narcan while waiting for the ambulance. They need to know the signs of overdose and how to give Narcan.     3. Don't use alcohol or street drugs.   Taking them with opioids can cause death.    4. Do not take any of these medicines unless your doctor says it s OK. Taking these with opioids can cause death:    Benzodiazepines, such as lorazepam (Ativan), alprazolam (Xanax) or diazepam (Valium)    Muscle relaxers, such as  cyclobenzaprine (Flexeril)    Sleeping pills like zolpidem (Ambien)     Other opioids      How to keep you and other people safe while taking opioids:    1. Never share your opioids with others.  Opioid medicines are regulated by the Drug Enforcement Agency ([place]). Selling or sharing medications is a criminal act.    2. Be sure to store opioids in a secure place, locked up if possible. Young children can easily swallow them and overdose.    3. When you are traveling with your medicines, keep them in the original bottles. If you use a pill box, be sure you also carry a copy of your medicine list from your clinic or pharmacy.    4. Safe disposal of opioids    Most pharmacies have places to get rid of medicine, called disposal kiosks. Medicine disposal options are also available in every [facility]. Search your county and  medication disposal  to find more options. You can find more details at:  https://www.pca.[facility].[email or web address]./living-green/managing-unwanted-medications     I agree that my provider, clinic care team, and pharmacy may work with any city, state or federal law enforcement agency that investigates the misuse, sale, or other diversion of my controlled medicine. I will allow my provider to discuss my care with, or share a copy of, this agreement with any other treating provider, pharmacy or emergency room where I receive care.    I have read this agreement and have asked questions about anything I did not understand.    _______________________________________________________  Patient Signature - Anali Loya _____________________                   Date     _______________________________________________________  Provider Signature - Erickson Gilliam PA-C   _____________________                   Date     _______________________________________________________  Witness Signature (required if provider not present while patient signing)   _____________________                   Date

## 2021-05-25 NOTE — NURSING NOTE
Health Maintenance Due   Topic Date Due     ANNUAL REVIEW OF HM ORDERS  Never done     ADVANCE CARE PLANNING  Never done     MAMMO SCREENING  Never done     COVID-19 Vaccine (1) Never done     PREVENTIVE CARE VISIT  05/15/2015     Pneumococcal Vaccine: Pediatrics (0 to 5 Years) and At-Risk Patients (6 to 64 Years) (1 of 2 - PPSV23) 06/19/2019     URINE DRUG SCREEN  03/13/2020     TREATMENT AGREEMENT FOR CHRONIC PAIN MANAGEMENT  03/13/2020     DTAP/TDAP/TD IMMUNIZATION (3 - Td) 04/14/2020     LIPID  12/04/2020     HPV TEST  07/06/2021     PAP  07/06/2021     Mercy QUESADA LPN

## 2021-05-26 ENCOUNTER — MYC MEDICAL ADVICE (OUTPATIENT)
Dept: FAMILY MEDICINE | Facility: CLINIC | Age: 46
End: 2021-05-26

## 2021-05-26 RX ORDER — AMOXICILLIN 500 MG/1
500 CAPSULE ORAL 2 TIMES DAILY
Qty: 10 CAPSULE | Refills: 0 | Status: SHIPPED | OUTPATIENT
Start: 2021-05-26 | End: 2021-05-31

## 2021-05-27 ENCOUNTER — MYC REFILL (OUTPATIENT)
Dept: FAMILY MEDICINE | Facility: CLINIC | Age: 46
End: 2021-05-27

## 2021-05-27 DIAGNOSIS — G47.9 DISTURBANCE IN SLEEP BEHAVIOR: ICD-10-CM

## 2021-05-27 RX ORDER — ALPRAZOLAM 0.5 MG
0.5 TABLET ORAL
Qty: 15 TABLET | Refills: 0 | Status: SHIPPED | OUTPATIENT
Start: 2021-05-27 | End: 2021-06-21

## 2021-05-27 NOTE — TELEPHONE ENCOUNTER
Requested Prescriptions   Pending Prescriptions Disp Refills     ALPRAZolam (XANAX) 0.5 MG tablet 30 tablet 0     Sig: Take 1 tablet (0.5 mg) by mouth nightly as needed for anxiety or sleep     Last Written Prescription Date:  05/17/2021  Last Fill Quantity: 30,   # refills: 0  Last Office Visit: 11/20/2020  Future Office visit:       Routing refill request to provider for review/approval because:  Drug not on the [facility], P or [facility] refill protocol or controlled substance    Rebecca Tobias MA

## 2021-05-31 ENCOUNTER — MYC REFILL (OUTPATIENT)
Dept: FAMILY MEDICINE | Facility: CLINIC | Age: 46
End: 2021-05-31

## 2021-05-31 DIAGNOSIS — R07.81 PLEURITIC CHEST PAIN: Primary | ICD-10-CM

## 2021-05-31 DIAGNOSIS — F11.90 CHRONIC, CONTINUOUS USE OF OPIOIDS: ICD-10-CM

## 2021-05-31 DIAGNOSIS — J84.9 ILD (INTERSTITIAL LUNG DISEASE) (H): ICD-10-CM

## 2021-05-31 DIAGNOSIS — M19.90 INFLAMMATORY ARTHRITIS: ICD-10-CM

## 2021-06-01 RX ORDER — MORPHINE SULFATE 15 MG/1
15 TABLET, FILM COATED, EXTENDED RELEASE ORAL 2 TIMES DAILY
Qty: 60 TABLET | Refills: 0 | Status: SHIPPED | OUTPATIENT
Start: 2021-06-01 | End: 2021-11-23

## 2021-06-01 NOTE — TELEPHONE ENCOUNTER
Requested Prescriptions   Pending Prescriptions Disp Refills     morphine (MS CONTIN) 15 MG CR tablet 60 tablet 0     Sig: Take 1 tablet (15 mg) by mouth 2 times daily     Last Written Prescription Date:  05/04/2021  Last Fill Quantity: 60,   # refills: 0  Last Office Visit: 05/25/2021  Future Office visit:       Routing refill request to provider for review/approval because:  Drug not on the [facility], [facility] or [facility] refill protocol or controlled substance    Routing to covering provider to sign.   Rebecca Tobias MA

## 2021-06-02 NOTE — TELEPHONE ENCOUNTER
Patient picked up 30 tablets on 5/17 taking 1 a day.  She said she is taking 3 a day.  She said you agreed to change the prescription to 90 tablets for 30 days taking 3 a day.  Please change if you agree and call the pharmacy so they are aware of the change.

## 2021-06-03 ENCOUNTER — MYC MEDICAL ADVICE (OUTPATIENT)
Dept: FAMILY MEDICINE | Facility: CLINIC | Age: 46
End: 2021-06-03

## 2021-06-03 NOTE — TELEPHONE ENCOUNTER
Please call patient to inform her that I did not agree to increase the dose of her alprazolam. In fact we discussed tapering off of the medication which is why an alternative was sent out. Please inform her that a pain clinic referral was placed and I would like her to schedule a consult with them to review her use of controlled substances and whether the current regimen is safe.    Erickson Gilliam PA-C on 6/3/2021 at 9:56 AM

## 2021-06-04 ENCOUNTER — MYC REFILL (OUTPATIENT)
Dept: FAMILY MEDICINE | Facility: CLINIC | Age: 46
End: 2021-06-04

## 2021-06-04 DIAGNOSIS — R07.81 PLEURITIC CHEST PAIN: ICD-10-CM

## 2021-06-04 RX ORDER — OXYCODONE AND ACETAMINOPHEN 10; 325 MG/1; MG/1
TABLET ORAL
Qty: 42 TABLET | Refills: 0 | Status: SHIPPED | OUTPATIENT
Start: 2021-06-04 | End: 2021-07-05

## 2021-06-04 NOTE — TELEPHONE ENCOUNTER
Oxycodone       Last Written Prescription Date:  5/25/2021  Last Fill Quantity: 60,   # refills: 0  Last Office Visit: 5/25/2021  Future Office visit:       Routing refill request to provider for review/approval because:  Drug not on the [facility], P or [facility] refill protocol or controlled substance

## 2021-06-08 ENCOUNTER — MYC MEDICAL ADVICE (OUTPATIENT)
Dept: FAMILY MEDICINE | Facility: CLINIC | Age: 46
End: 2021-06-08

## 2021-06-08 NOTE — TELEPHONE ENCOUNTER
I will call her tomorrow. Pharmacy said 5/17 30 for 30 days was picked up.  Then 5/27 for 15 tablets for 30 days.

## 2021-06-11 DIAGNOSIS — F41.9 ANXIETY: Primary | ICD-10-CM

## 2021-06-11 RX ORDER — BUSPIRONE HYDROCHLORIDE 15 MG/1
15 TABLET ORAL 3 TIMES DAILY
Qty: 90 TABLET | Refills: 1 | Status: SHIPPED | OUTPATIENT
Start: 2021-06-11 | End: 2021-06-21

## 2021-06-16 ENCOUNTER — MYC REFILL (OUTPATIENT)
Dept: FAMILY MEDICINE | Facility: CLINIC | Age: 46
End: 2021-06-16

## 2021-06-16 DIAGNOSIS — R07.81 PLEURITIC CHEST PAIN: ICD-10-CM

## 2021-06-16 RX ORDER — OXYCODONE AND ACETAMINOPHEN 10; 325 MG/1; MG/1
TABLET ORAL
Qty: 42 TABLET | Refills: 0 | OUTPATIENT
Start: 2021-06-16

## 2021-06-16 NOTE — TELEPHONE ENCOUNTER
Oxycodone       Last Written Prescription Date:  6/4/2021  Last Fill Quantity: 42,   # refills: 0  Last Office Visit: 5/25/2021  Future Office visit:    Next 5 appointments (look out 90 days)    Aug 05, 2021  1:00 PM  PHYSICAL with Ignacio Loya MD  [facility] ([facility] ) [address] [place] [id number] [phone]           Routing refill request to provider for review/approval because:  Drug not on the [facility], [facility] or [facility] refill protocol or controlled substance

## 2021-06-21 ENCOUNTER — MEDICAL CORRESPONDENCE (OUTPATIENT)
Dept: HEALTH INFORMATION MANAGEMENT | Facility: CLINIC | Age: 46
End: 2021-06-21

## 2021-06-22 DIAGNOSIS — Z72.0 TOBACCO ABUSE: ICD-10-CM

## 2021-06-23 ENCOUNTER — HOSPITAL ENCOUNTER (OUTPATIENT)
Dept: PHYSICAL THERAPY | Facility: CLINIC | Age: 46
Setting detail: THERAPIES SERIES
End: 2021-06-23
Attending: PAIN MEDICINE
Payer: COMMERCIAL

## 2021-06-23 PROCEDURE — 97162 PT EVAL MOD COMPLEX 30 MIN: CPT | Mod: GP | Performed by: PHYSICAL THERAPIST

## 2021-06-23 PROCEDURE — 97110 THERAPEUTIC EXERCISES: CPT | Mod: GP | Performed by: PHYSICAL THERAPIST
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
## 2021-06-23 NOTE — PROGRESS NOTES
[facility]          OUTPATIENT PHYSICAL THERAPY ORTHOPEDIC EVALUATION  PLAN OF TREATMENT FOR OUTPATIENT REHABILITATION  (COMPLETE FOR INITIAL CLAIMS ONLY)  Patient's Last Name, First Name, M.I.  YOB: 1975  Anali Loya    Provider s Name:  [facility]   Medical Record No.  [id number]   Start of Care Date:  06/23/21   Onset Date:  06/23/20   Type:     _X__PT   ___OT   ___SLP Medical Diagnosis:  lower back joint pain      PT Diagnosis:  chronic low back and right hip pain    Visits from SOC:  1      _________________________________________________________________________________  Plan of Treatment/Functional Goals:  ADL retraining, ROM, strengthening, stretching        modalities as needed   Goals  Goal Identifier: 1  Goal Description: instruction in HEP and compliant with it 5 of 7 days to improve quality of life   Target Date: 09/23/21    Goal Identifier: 2  Goal Description: patient to have improved tolerance to activity currently HAILE 35.56 goal is 25  Target Date: 09/23/21    Goal Identifier: 3  Goal Description: patient to have reduction in pain level currently 4-8/10 goal is 4-6/10  Target Date: 09/23/21          Therapy Frequency:     Predicted Duration of Therapy Intervention:  1-2x week for land and possible pool therapy 1-3 months     Lisa Pinto, PT                 I CERTIFY THE NEED FOR THESE SERVICES FURNISHED UNDER        THIS PLAN OF TREATMENT AND WHILE UNDER MY CARE .             Physician Signature               Date    X_____________________________________________________                             Certification Date From:  06/23/21   Certification Date To:  09/23/21    Referring Provider:  jb barber DO     Initial Assessment        See Epic Evaluation Start of Care Date: 06/23/21

## 2021-06-25 ENCOUNTER — MYC MEDICAL ADVICE (OUTPATIENT)
Dept: CARDIOLOGY | Facility: CLINIC | Age: 46
End: 2021-06-25

## 2021-06-25 DIAGNOSIS — I27.20 PULMONARY HYPERTENSION (H): Primary | ICD-10-CM

## 2021-06-25 DIAGNOSIS — R06.02 SOB (SHORTNESS OF BREATH): ICD-10-CM

## 2021-06-30 DIAGNOSIS — I27.20 PULMONARY HYPERTENSION (H): ICD-10-CM

## 2021-06-30 RX ORDER — TADALAFIL 20 MG/1
40 TABLET ORAL DAILY
Qty: 180 TABLET | Refills: 3 | Status: SHIPPED | OUTPATIENT
Start: 2021-06-30 | End: 2022-04-25

## 2021-07-05 ENCOUNTER — OFFICE VISIT (OUTPATIENT)
Dept: CARDIOLOGY | Facility: CLINIC | Age: 46
End: 2021-07-05
Attending: INTERNAL MEDICINE
Payer: COMMERCIAL

## 2021-07-05 VITALS
WEIGHT: 103 LBS | DIASTOLIC BLOOD PRESSURE: 78 MMHG | HEART RATE: 75 BPM | OXYGEN SATURATION: 99 % | BODY MASS INDEX: 19.78 KG/M2 | SYSTOLIC BLOOD PRESSURE: 122 MMHG

## 2021-07-05 DIAGNOSIS — R07.9 CHEST PAIN, UNSPECIFIED TYPE: Primary | ICD-10-CM

## 2021-07-05 DIAGNOSIS — I27.20 PULMONARY HYPERTENSION (H): ICD-10-CM

## 2021-07-05 DIAGNOSIS — R06.02 SOB (SHORTNESS OF BREATH): ICD-10-CM

## 2021-07-05 DIAGNOSIS — J84.9 ILD (INTERSTITIAL LUNG DISEASE) (H): ICD-10-CM

## 2021-07-05 LAB
6 MIN WALK (FT): 1150 FT
6 MIN WALK (M): 351 M
ANION GAP SERPL CALCULATED.3IONS-SCNC: 6 MMOL/L (ref 3–14)
BUN SERPL-MCNC: 8 MG/DL (ref 7–30)
CALCIUM SERPL-MCNC: 9.2 MG/DL (ref 8.5–10.1)
CHLORIDE SERPL-SCNC: 106 MMOL/L (ref 94–109)
CO2 SERPL-SCNC: 29 MMOL/L (ref 20–32)
CREAT SERPL-MCNC: 0.67 MG/DL (ref 0.52–1.04)
ERYTHROCYTE [DISTWIDTH] IN BLOOD BY AUTOMATED COUNT: 12.6 % (ref 10–15)
GFR SERPL CREATININE-BSD FRML MDRD: >90 ML/MIN/{1.73_M2}
GLUCOSE SERPL-MCNC: 145 MG/DL (ref 70–99)
HCT VFR BLD AUTO: 40.7 % (ref 35–47)
HGB BLD-MCNC: 13.2 G/DL (ref 11.7–15.7)
MCH RBC QN AUTO: 30 PG (ref 26.5–33)
MCHC RBC AUTO-ENTMCNC: 32.4 G/DL (ref 31.5–36.5)
MCV RBC AUTO: 93 FL (ref 78–100)
NT-PROBNP SERPL-MCNC: 326 PG/ML (ref 0–125)
PLATELET # BLD AUTO: 372 10E9/L (ref 150–450)
POTASSIUM SERPL-SCNC: 3.5 MMOL/L (ref 3.4–5.3)
RBC # BLD AUTO: 4.4 10E12/L (ref 3.8–5.2)
SODIUM SERPL-SCNC: 141 MMOL/L (ref 133–144)
T4 FREE SERPL-MCNC: 0.77 NG/DL (ref 0.76–1.46)
TSH SERPL DL<=0.005 MIU/L-ACNC: 0.33 MU/L (ref 0.4–4)
WBC # BLD AUTO: 11.8 10E9/L (ref 4–11)

## 2021-07-05 PROCEDURE — 84439 ASSAY OF FREE THYROXINE: CPT | Performed by: PATHOLOGY

## 2021-07-05 PROCEDURE — 85027 COMPLETE CBC AUTOMATED: CPT | Performed by: PATHOLOGY

## 2021-07-05 PROCEDURE — 83880 ASSAY OF NATRIURETIC PEPTIDE: CPT | Performed by: PATHOLOGY

## 2021-07-05 PROCEDURE — G0463 HOSPITAL OUTPT CLINIC VISIT: HCPCS | Mod: 25

## 2021-07-05 PROCEDURE — 36415 COLL VENOUS BLD VENIPUNCTURE: CPT | Performed by: PATHOLOGY

## 2021-07-05 PROCEDURE — 99215 OFFICE O/P EST HI 40 MIN: CPT | Mod: GC | Performed by: INTERNAL MEDICINE

## 2021-07-05 PROCEDURE — 93005 ELECTROCARDIOGRAM TRACING: CPT

## 2021-07-05 PROCEDURE — 94618 PULMONARY STRESS TESTING: CPT | Performed by: INTERNAL MEDICINE

## 2021-07-05 PROCEDURE — 84443 ASSAY THYROID STIM HORMONE: CPT | Performed by: PATHOLOGY

## 2021-07-05 PROCEDURE — 80048 BASIC METABOLIC PNL TOTAL CA: CPT | Performed by: PATHOLOGY

## 2021-07-05 ASSESSMENT — PAIN SCALES - GENERAL: PAINLEVEL: MODERATE PAIN (5)

## 2021-07-05 NOTE — LETTER
"7/5/2021      RE: Ellen Loya  [address]  [facility] [address]       Dear Colleague,    Thank you for the opportunity to participate in the care of your patient, Ellen Loya, at the [facility] [place] [facility]. Please see a copy of my visit note below.    Service Date:  07/05/2021    Dear :  We had the pleasure of seeing Ms. Ellen Loya in our Pulmonary Hypertension Clinic.  As you know, she is a very pleasant 44-year-old female with polymyositis, interstitial lung disease and pulmonary arterial hypertension.  She is currently on Adcirca.  She did not tolerate Tyvaso due to worsening V/Q mismatch.  She returns today for followup.     Over the last few months she has been experiencing increased frequency of panic attacks. Her PCP had recently retired and she has been on and off several medication prescribed by other providers in an attempt to mitigate this, but has not been successful. There is also recurrence of back pain and polyarthralgia. As for her cardiopulmonary state, she has been stable. Continues to be on home O2 of 4L at rest and 8L with activity. Since our last visit, there have been no host pial admissions or ED visits. Unfortunately, patient continues to smoke and has been increasing the number of cigarettes per day due to anxiety.     She is currently on digoxin 125mcg, lasix 20mg every day and tadalafil 40mg PO daily.         PAST MEDICAL HISTORY:  Past Medical History:   Diagnosis Date     Acute bronchitis 06/05/07    Admit. Discharged 06/05/07     Anxiety      Arthritis     h/o \"seronegative rheumatoid arthritis\" since age 30, however no evidence of active arthritis by Rheum eval [id number]     ASCUS of cervix with negative high risk HPV 05/15/2014    neg HPV     ILD (interstitial lung disease) (H)     seen on chest CT 5-2011; methotrexate stopped 6-2011     Lung nodule     KM nodule; EBUS 12/2014 of lymph " nodes was negative; CT-guided bx 1-2015 hamartoma/chondroma     Prematurity     born 6-7 weeks early     Pulmonary hypertension (H)     RHC 2/2016 mean PA 25     Varicella without mention of complication          CURRENT MEDICATIONS:  Current Outpatient Medications   Medication Sig     albuterol (PROAIR HFA/PROVENTIL HFA/VENTOLIN HFA) 108 (90 Base) MCG/ACT inhaler INHALE ONE TO TWO PUFFS INTO THE LUNGS EVERY 4 HOURS AS NEEDED FOR SHORTNESS OF BREATH / DYSPNEA     ALPRAZolam (XANAX) 0.5 MG tablet Take 1 tablet (0.5 mg) by mouth nightly as needed for anxiety or sleep - INTENTIONAL DOSE REDUCTION (must last 30 days)     digoxin (DIGITEK) 125 MCG tablet Take 1 tablet (125 mcg) by mouth daily     Esomeprazole Magnesium (NEXIUM PO) Take 20 mg by mouth every morning (before breakfast)      fluticasone (FLONASE) 50 MCG/ACT nasal spray USE 2 SPRAYS IN EACH NOSTRIL DAILY     furosemide (LASIX) 20 MG tablet Take 1 tablet (20 mg) by mouth daily     morphine (MS CONTIN) 15 MG CR tablet Take 1 tablet (15 mg) by mouth 2 times daily (Patient taking differently: Take 30 mg by mouth every 12 hours )     naloxone (NARCAN) 4 MG/0.1ML nasal spray Spray 1mL into each nostril.  Repeat after 3 minutes if no or minimal response     nicotine (NICORETTE) 2 MG gum PLACE 1 PIECE OF GUM (2 MG) INSIDE CHEEK AS NEEDED FOR SMOKING CESSATION     order for DME Oxygen: Patient requires supplemental Oxygen 4 LPM via nasal canula at rest and 6 LPM with activity. Please provide patient with portability capability. Okay to spot check patient on oxygen device, to keep sats above 90%. Please provider with home concentrator that can go up to 10 LPM. Oxygen will be for a lifetime.     order for DME Please provide patient with 2  liquid oxygen tanks for portability. Spot check patient on liquid oxygen. Titrate oxygen to maintain saturations above 88%.     tadalafil, PAH, (ADCIRCA) 20 MG TABS Take 2 tablets (40 mg) by mouth daily     No current  facility-administered medications for this visit.          ROS:   10 point ROS negative except as discussed in above HPI.    EXAM:  There were no vitals taken for this visit.  General: appears comfortable, alert and articulate, on O2  Head: normocephalic, atraumatic  Eyes: anicteric sclera, EOMI  Neck: no adenopathy  Orophyarynx: moist mucosa, no lesions, dentition intact  Heart: regular, normal S1/S2, no murmur, gallop, rub, estimated JVP 8cmH2O  Lungs: scattered crackles and wheezes,   Abdomen: soft, non-tender, bowel sounds present, no hepatosplenomegaly  Extremities: +clubbing, no cyanosis or edema  Neurological: normal speech and affect, no gross motor deficits    Labs:  CBC RESULTS:   Recent Labs   Lab Test 07/05/21  1313   WBC 11.8*   RBC 4.40   HGB 13.2   HCT 40.7   MCV 93   MCH 30.0   MCHC 32.4   RDW 12.6          Recent Labs   Lab Test 07/05/21  1313 02/05/21  1038    141   POTASSIUM 3.5 3.4   CHLORIDE 106 107   CO2 29 30   ANIONGAP 6 4   * 89   BUN 8 11   CR 0.67 0.71   MARCIN 9.2 8.7       Liver Function Studies -   Recent Labs   Lab Test 02/05/21  1038   PROTTOTAL 8.0   ALBUMIN 3.5   BILITOTAL 0.4   ALKPHOS 73   AST 11   ALT 10       Lab Results   Component Value Date    NTBNPI 4,168 (H) 11/30/2016     Lab Results   Component Value Date    NTBNP 326 (H) 07/05/2021       Echocardiogram :    RHC: 04/2019  RA: 5  RV: 36/5  PA: 35/15/22  PCWP:  15  TD CO/CI: 3.06/4.6  [place] CO/CI: 2.55/3.87  PVR: 2.31      PFTs: 03/19  FVC 85%  FEV1 88%  FEV1/FVC 84%  %  DLCOunc 25%    PFT Latest Ref Rng & Units 3/18/2019   FVC L 2.89   FEV1 L 2.43   FVC% % 85   FEV1% % 88         6MWT :   SIX MINUTE WALK TEST  Pulmonary hypertension clinic  7/5/2021    Anali Loya MRN# [id number]   YOB: 1975 Age: 45 year old     Height: Data Unavailable  Weight (lbs): 103 lbs 0 oz Weight (kg): 46.7 kg (actual weight)    Supplemental oxygen during the test: Yes, flow 4 L/min    Oxygen Appliance:  Nasal Cannula    Oximetry: Foreheaed    Gait Aid: None     Pre-test Post-test   Time 0 6min   Blood Pressure (mm Hg) NA NA   Heart rate (bpm) 95 107   Rated Perceived Dyspnea (Mariel Scale) 1 -- Very mild shortness of breath  3 -- Moderate shortness of breath or breathing difficulty    Rated Perceived Exertion (Mariel Scale) 3 -- Moderate  3 -- Moderate    SpO2 (%) 96 98     Total distance walked in 6 minutes: 150 feet, 351 meters    Paused during test?No  Number of pauses: none  Total Time stopped: none    Stopped test before 6 minutes? No  Time completed: 6min  Distance: 351m  Reason: joint pain    Did the patient experience any pain or discomfort during the test? Yes  Pain Rating: not reported  Pain Description: Hip pain and polyarthralgia  Comments: NA    Oxygen Titration Required: No  Resting oxygen requirement: 4 Liters on Nasal Cannula  Exercise oxygen requirement: 6 Liters on Nasal Cannula    Assessment and Plan:   Ms. Ellen Loya is a very pleasant 44-year-old female with polymyositis, interstitial lung disease and pulmonary arterial hypertension, who is currently on tadalafil monotherapy.       Overall, she is doing okay. Has been going through anxiety and dealing with the correction of her PCP who she felt comfortable with managing her anxiety. Seems that pain has also become an issue over the last few months, but fortunately she is going to pain specialist.     As for her cardiac and pulmonary status, she appears stable. There are no clinical or imaging evidence of decompensated right heart dysfunction. Continues to be WHO FC III. She had a 6 min walk test and her total distance was 351 meters. However, O2 flow during activity was 6L and not the 8L per minutes she does at home. Unfortunately she continues to smoke and has increased frequency due to anxiety. She is to continue tadalafil  40 mg daily, Lasix 20 mg daily, digoxin 125mcg daily and resting O2 @ 4L and 8L with activity.     D/W regarding the  importance of smoking cessation and also encouraged her to call her PCP for smoking cessation prescription. We discussed and patient agreed on ILD clinic referral to explore other treatment options for ILD such as nintedanib or pirfenidone.      She will return to clinic in 3 months with PFT's and high resolution chest CT.  She will call us in the interim if you have any further worsening symptoms.     It was a pleasure seeing Ms. Ellen Loya in our Pulmonary Hypertension Clinic. We thank you for allowing us to participate in her care.         Lencho Cline, [facility]  Cardiology Fellow     Patient evaluated and discussed with Dr. Malhotra.     I examined the patient and agree with the assessment and plan of Dr. Cali Cline.    PH associated with autoimmune ILD  Stable on PDE5-inhibitor monotherapy. Did not tolerate inhaled Treprostinil in the past  Not a candidate for lung transplant as she is still smoking  Recommended her to reestablish care with ILD clinic for optimization of her ILD  Will continue her on Tadalafil, Digoxin, lasix, and supplemental oxygen.    Total time today was 44 minutes reviewing notes, imaging, labs, patient visit, orders and documentation         Callie Ledesma MD   [facility]  Heart Failure, Transplant, and Mechanical Circulatory Support Cardiology   Cardiovascular Division  [facility]   [phone]

## 2021-07-05 NOTE — NURSING NOTE
Chief Complaint   Patient presents with     Follow Up     6 month f/u      Vitals were taken and medications reconciled.    Martínez Valerio, EMT  2:12 PM

## 2021-07-05 NOTE — PROGRESS NOTES
"Service Date:  07/05/2021    Dear :  We had the pleasure of seeing Ms. Ellen Loya in our Pulmonary Hypertension Clinic.  As you know, she is a very pleasant 44-year-old female with polymyositis, interstitial lung disease and pulmonary arterial hypertension.  She is currently on Adcirca.  She did not tolerate Tyvaso due to worsening V/Q mismatch.  She returns today for followup.     Over the last few months she has been experiencing increased frequency of panic attacks. Her PCP had recently retired and she has been on and off several medication prescribed by other providers in an attempt to mitigate this, but has not been successful. There is also recurrence of back pain and polyarthralgia. As for her cardiopulmonary state, she has been stable. Continues to be on home O2 of 4L at rest and 8L with activity. Since our last visit, there have been no host pial admissions or ED visits. Unfortunately, patient continues to smoke and has been increasing the number of cigarettes per day due to anxiety.     She is currently on digoxin 125mcg, lasix 20mg every day and tadalafil 40mg PO daily.         PAST MEDICAL HISTORY:  Past Medical History:   Diagnosis Date     Acute bronchitis 06/05/07    Admit. Discharged 06/05/07     Anxiety      Arthritis     h/o \"seronegative rheumatoid arthritis\" since age 30, however no evidence of active arthritis by Rheum eval [id number]     ASCUS of cervix with negative high risk HPV 05/15/2014    neg HPV     ILD (interstitial lung disease) (H)     seen on chest CT 5-2011; methotrexate stopped 6-2011     Lung nodule     KM nodule; EBUS 12/2014 of lymph nodes was negative; CT-guided bx 1-2015 hamartoma/chondroma     Prematurity     born 6-7 weeks early     Pulmonary hypertension (H)     RHC 2/2016 mean PA 25     Varicella without mention of complication          CURRENT MEDICATIONS:  Current Outpatient Medications   Medication Sig     albuterol (PROAIR HFA/PROVENTIL HFA/VENTOLIN HFA) 108 (90 " Base) MCG/ACT inhaler INHALE ONE TO TWO PUFFS INTO THE LUNGS EVERY 4 HOURS AS NEEDED FOR SHORTNESS OF BREATH / DYSPNEA     ALPRAZolam (XANAX) 0.5 MG tablet Take 1 tablet (0.5 mg) by mouth nightly as needed for anxiety or sleep - INTENTIONAL DOSE REDUCTION (must last 30 days)     digoxin (DIGITEK) 125 MCG tablet Take 1 tablet (125 mcg) by mouth daily     Esomeprazole Magnesium (NEXIUM PO) Take 20 mg by mouth every morning (before breakfast)      fluticasone (FLONASE) 50 MCG/ACT nasal spray USE 2 SPRAYS IN EACH NOSTRIL DAILY     furosemide (LASIX) 20 MG tablet Take 1 tablet (20 mg) by mouth daily     morphine (MS CONTIN) 15 MG CR tablet Take 1 tablet (15 mg) by mouth 2 times daily (Patient taking differently: Take 30 mg by mouth every 12 hours )     naloxone (NARCAN) 4 MG/0.1ML nasal spray Spray 1mL into each nostril.  Repeat after 3 minutes if no or minimal response     nicotine (NICORETTE) 2 MG gum PLACE 1 PIECE OF GUM (2 MG) INSIDE CHEEK AS NEEDED FOR SMOKING CESSATION     order for DME Oxygen: Patient requires supplemental Oxygen 4 LPM via nasal canula at rest and 6 LPM with activity. Please provide patient with portability capability. Okay to spot check patient on oxygen device, to keep sats above 90%. Please provider with home concentrator that can go up to 10 LPM. Oxygen will be for a lifetime.     order for DME Please provide patient with 2  liquid oxygen tanks for portability. Spot check patient on liquid oxygen. Titrate oxygen to maintain saturations above 88%.     tadalafil, PAH, (ADCIRCA) 20 MG TABS Take 2 tablets (40 mg) by mouth daily     No current facility-administered medications for this visit.          ROS:   10 point ROS negative except as discussed in above HPI.    EXAM:  There were no vitals taken for this visit.  General: appears comfortable, alert and articulate, on O2  Head: normocephalic, atraumatic  Eyes: anicteric sclera, EOMI  Neck: no adenopathy  Orophyarynx: moist mucosa, no lesions,  dentition intact  Heart: regular, normal S1/S2, no murmur, gallop, rub, estimated JVP 8cmH2O  Lungs: scattered crackles and wheezes,   Abdomen: soft, non-tender, bowel sounds present, no hepatosplenomegaly  Extremities: +clubbing, no cyanosis or edema  Neurological: normal speech and affect, no gross motor deficits    Labs:  CBC RESULTS:   Recent Labs   Lab Test 07/05/21  1313   WBC 11.8*   RBC 4.40   HGB 13.2   HCT 40.7   MCV 93   MCH 30.0   MCHC 32.4   RDW 12.6          Recent Labs   Lab Test 07/05/21  1313 02/05/21  1038    141   POTASSIUM 3.5 3.4   CHLORIDE 106 107   CO2 29 30   ANIONGAP 6 4   * 89   BUN 8 11   CR 0.67 0.71   MARCIN 9.2 8.7       Liver Function Studies -   Recent Labs   Lab Test 02/05/21  1038   PROTTOTAL 8.0   ALBUMIN 3.5   BILITOTAL 0.4   ALKPHOS 73   AST 11   ALT 10       Lab Results   Component Value Date    NTBNPI 4,168 (H) 11/30/2016     Lab Results   Component Value Date    NTBNP 326 (H) 07/05/2021       Echocardiogram :    RHC: 04/2019  RA: 5  RV: 36/5  PA: 35/15/22  PCWP:  15  TD CO/CI: 3.06/4.6  [place] CO/CI: 2.55/3.87  PVR: 2.31      PFTs: 03/19  FVC 85%  FEV1 88%  FEV1/FVC 84%  %  DLCOunc 25%    PFT Latest Ref Rng & Units 3/18/2019   FVC L 2.89   FEV1 L 2.43   FVC% % 85   FEV1% % 88         6MWT :   SIX MINUTE WALK TEST  Pulmonary hypertension clinic  7/5/2021    Anali Loya MRN# [id number]   YOB: 1975 Age: 45 year old     Height: Data Unavailable  Weight (lbs): 103 lbs 0 oz Weight (kg): 46.7 kg (actual weight)    Supplemental oxygen during the test: Yes, flow 4 L/min    Oxygen Appliance: Nasal Cannula    Oximetry: Foreheaed    Gait Aid: None     Pre-test Post-test   Time 0 6min   Blood Pressure (mm Hg) NA NA   Heart rate (bpm) 95 107   Rated Perceived Dyspnea (Mariel Scale) 1 -- Very mild shortness of breath  3 -- Moderate shortness of breath or breathing difficulty    Rated Perceived Exertion (Mariel Scale) 3 -- Moderate  3 -- Moderate    SpO2  (%) 96 98     Total distance walked in 6 minutes: 150 feet, 351 meters    Paused during test?No  Number of pauses: none  Total Time stopped: none    Stopped test before 6 minutes? No  Time completed: 6min  Distance: 351m  Reason: joint pain    Did the patient experience any pain or discomfort during the test? Yes  Pain Rating: not reported  Pain Description: Hip pain and polyarthralgia  Comments: NA    Oxygen Titration Required: No  Resting oxygen requirement: 4 Liters on Nasal Cannula  Exercise oxygen requirement: 6 Liters on Nasal Cannula    Assessment and Plan:   Ms. Ellen Loya is a very pleasant 44-year-old female with polymyositis, interstitial lung disease and pulmonary arterial hypertension, who is currently on tadalafil monotherapy.       Overall, she is doing okay. Has been going through anxiety and dealing with the FPC of her PCP who she felt comfortable with managing her anxiety. Seems that pain has also become an issue over the last few months, but fortunately she is going to pain specialist.     As for her cardiac and pulmonary status, she appears stable. There are no clinical or imaging evidence of decompensated right heart dysfunction. Continues to be WHO FC III. She had a 6 min walk test and her total distance was 351 meters. However, O2 flow during activity was 6L and not the 8L per minutes she does at home. Unfortunately she continues to smoke and has increased frequency due to anxiety. She is to continue tadalafil  40 mg daily, Lasix 20 mg daily, digoxin 125mcg daily and resting O2 @ 4L and 8L with activity.     D/W regarding the importance of smoking cessation and also encouraged her to call her PCP for smoking cessation prescription. We discussed and patient agreed on ILD clinic referral to explore other treatment options for ILD such as nintedanib or pirfenidone.      She will return to clinic in 3 months with PFT's and high resolution chest CT.  She will call us in the interim if you  have any further worsening symptoms.     It was a pleasure seeing Ms. Ellen Loya in our Pulmonary Hypertension Clinic. We thank you for allowing us to participate in her care.         Lencho Cline, [facility]  Cardiology Fellow     Patient evaluated and discussed with Dr. Malhotra.     I examined the patient and agree with the assessment and plan of Dr. Cali Cline.    PH associated with autoimmune ILD  Stable on PDE5-inhibitor monotherapy. Did not tolerate inhaled Treprostinil in the past  Not a candidate for lung transplant as she is still smoking  Recommended her to reestablish care with ILD clinic for optimization of her ILD  Will continue her on Tadalafil, Digoxin, lasix, and supplemental oxygen.    Total time today was 44 minutes reviewing notes, imaging, labs, patient visit, orders and documentation         Callie Ledesma MD   [facility]  Heart Failure, Transplant, and Mechanical Circulatory Support Cardiology   Cardiovascular Division  [facility]   [phone]

## 2021-07-05 NOTE — NURSING NOTE
"Patient left before I could review AVS. Orders placed and patient marked \"ready for checkout.\" Sunitha Tadeo RN on 7/5/2021 at 2:43 PM    "

## 2021-07-05 NOTE — PATIENT INSTRUCTIONS
Medication Changes:  - No medication changes at this time. Please continue current medication regiment.     Patient Instructions:  1. Continue staying active and eat a heart healthy diet.    2. Please keep current list of medications with you at all times.    3. Remember to weigh yourself daily after voiding and before you consume any food or beverages and log the numbers.  If you have gained 2 pounds overnight or 5 pounds in a week contact us immediately for medication adjustments or further instructions.    4. **Please call us immediately if you have any syncope (fainting or passing out), chest pain, edema (swelling or weight gain), or decline in your functional status (general decline in how you are feeling).    5. Patients on Remodulin (treprostinil) or Veletri (epoprostenol): Please make sure that you have your backup pump and supplies with you at all times, your mixing instructions, and contact information for your specialty pharmacy.    Follow up Appointment Information:  - 3 month follow up with JCARLOS Low - 6 minute walk test, echocardiogram, PFTs, CT scan and labs prior  - Pulmonary referral placed    Check-In  Time Check-In Location Estimated Length Procedure   Name        [facility] [place]   3rd [facility] 60 minutes Pulmonary Function Test**   Procedure Preparations & Instructions     This is a non-invasive procedure and does NOT require any preparation         Check-In  Time Check-In Location Estimated Length Procedure   Name         20 minutes CT Scan**   Procedure Preparations & Instructions     This is a non-invasive procedure and does NOT require any preparation         Check-In  Time Check-In Location Estimated Length Procedure   Name         60 minutes Echocardiogram (Echo)**   Procedure Preparations & Instructions     This is a non-invasive procedure and does NOT require any preparation         Check-In  Time Check-In Location Estimated Length Procedure   Name        [facility] [place]   3rd Floor 30 minutes Six Minute  Walk Test**   Procedure Preparations & Instructions     This is a non-invasive procedure and does NOT require any preparation         Results:  Component      Latest Ref Rng & Units 7/5/2021   Sodium      133 - 144 mmol/L 141   Potassium      3.4 - 5.3 mmol/L 3.5   Chloride      94 - 109 mmol/L 106   Carbon Dioxide      20 - 32 mmol/L 29   Anion Gap      3 - 14 mmol/L 6   Glucose      70 - 99 mg/dL 145 (H)   Urea Nitrogen      7 - 30 mg/dL 8   Creatinine      0.52 - 1.04 mg/dL 0.67   GFR Estimate      >60 mL/min/1.73:m2 >90   GFR Estimate If Black      >60 mL/min/1.73:m2 >90   Calcium      8.5 - 10.1 mg/dL 9.2   WBC      4.0 - 11.0 10e9/L 11.8 (H)   RBC Count      3.8 - 5.2 10e12/L 4.40   Hemoglobin      11.7 - 15.7 g/dL 13.2   Hematocrit      35.0 - 47.0 % 40.7   MCV      78 - 100 fl 93   MCH      26.5 - 33.0 pg 30.0   MCHC      31.5 - 36.5 g/dL 32.4   RDW      10.0 - 15.0 % 12.6   Platelet Count      150 - 450 10e9/L 372   TSH      0.40 - 4.00 mU/L 0.33 (L)   N-Terminal Pro Bnp      0 - 125 pg/mL 326 (H)   T4 Free      0.76 - 1.46 ng/dL 0.77     We are located on the third floor of the [facility] on the [facility].  Our address is     [address] [place] [address] on [address] [place] [address]    Thank you for allowing us to be a part of your care here at the [facility]    If you have questions or concerns please contact us at:    Delfino Tadeo, RN, BSN   Ivis Mejia (Schedule,Prior Auth)  Nurse Coordinator     Clinic   Pulmonary Hypertension   Pulmonary Hypertension  [facility]  [facility]  (Phone)[phone]    (Phone) [phone]        (Fax) [phone]    ** Please note that you will NOT receive a reminder call regarding your scheduled testing, reminder calls are for provider appointments only.  If you are scheduled for testing within the [facility] system you  may receive a call regarding pre-registration for billing purposes only.**     Remember to weigh yourself daily after voiding and before you consume any food or beverages and log the numbers.  If you have gained/lost 2 pounds overnight or 5 pounds in a week contact us immediately for medication adjustments or further instructions.   **Please call us immediately if you have any syncope, chest pain, edema, or decline in your functional status.    Support Group:  Pulmonary Hypertension Association  Https://www.phassociation.org/  **Look at the Events Tab** They even have Support Groups that you can call into    [facility] Support Group  Second Saturday of the Month from 1-3 PM   Location: [address] [place] [address]  Leader: Vale Delgado and Claudia Canales  Phone: [phone] or [phone]  Email: [email or web address]

## 2021-07-06 LAB — INTERPRETATION ECG - MUSE: NORMAL

## 2021-07-07 ENCOUNTER — HOSPITAL ENCOUNTER (OUTPATIENT)
Dept: PHYSICAL THERAPY | Facility: CLINIC | Age: 46
Setting detail: THERAPIES SERIES
End: 2021-07-07
Attending: PAIN MEDICINE
Payer: COMMERCIAL

## 2021-07-07 PROCEDURE — 97110 THERAPEUTIC EXERCISES: CPT | Mod: GP | Performed by: PHYSICAL THERAPIST

## 2021-07-08 LAB — FIO2-PRE: 44 %

## 2021-07-12 DIAGNOSIS — R06.02 SOB (SHORTNESS OF BREATH): ICD-10-CM

## 2021-07-13 RX ORDER — ALBUTEROL SULFATE 90 UG/1
AEROSOL, METERED RESPIRATORY (INHALATION)
Qty: 18 G | Refills: 9 | Status: SHIPPED | OUTPATIENT
Start: 2021-07-13 | End: 2022-09-28

## 2021-07-14 ENCOUNTER — HOSPITAL ENCOUNTER (OUTPATIENT)
Dept: PHYSICAL THERAPY | Facility: CLINIC | Age: 46
Setting detail: THERAPIES SERIES
End: 2021-07-14
Attending: PAIN MEDICINE
Payer: COMMERCIAL

## 2021-07-14 PROCEDURE — 97110 THERAPEUTIC EXERCISES: CPT | Mod: GP | Performed by: PHYSICAL THERAPIST

## 2021-07-28 ENCOUNTER — HOSPITAL ENCOUNTER (OUTPATIENT)
Dept: PHYSICAL THERAPY | Facility: CLINIC | Age: 46
Setting detail: THERAPIES SERIES
End: 2021-07-28
Attending: PAIN MEDICINE
Payer: COMMERCIAL

## 2021-07-28 PROCEDURE — 97110 THERAPEUTIC EXERCISES: CPT | Mod: GP | Performed by: PHYSICAL THERAPIST

## 2021-08-05 ENCOUNTER — MYC REFILL (OUTPATIENT)
Dept: FAMILY MEDICINE | Facility: CLINIC | Age: 46
End: 2021-08-05

## 2021-08-05 DIAGNOSIS — G47.9 DISTURBANCE IN SLEEP BEHAVIOR: ICD-10-CM

## 2021-08-05 RX ORDER — ALPRAZOLAM 0.5 MG
0.5 TABLET ORAL
Qty: 15 TABLET | Refills: 0 | Status: SHIPPED | OUTPATIENT
Start: 2021-08-05 | End: 2021-09-03

## 2021-08-05 NOTE — TELEPHONE ENCOUNTER
Xanax       Last Written Prescription Date:  6/21/2021  Last Fill Quantity: 15,   # refills: 0  Last Office Visit: 5/25/2021  Future Office visit:       Routing refill request to provider for review/approval because:  Drug not on the [facility], P or [facility] refill protocol or controlled substance

## 2021-08-06 ENCOUNTER — TELEPHONE (OUTPATIENT)
Dept: CARDIOLOGY | Facility: CLINIC | Age: 46
End: 2021-08-06

## 2021-08-06 NOTE — TELEPHONE ENCOUNTER
Reason for Call:  Other appointment    Detailed comments: patient given the number to the lung nodule clinic to make an appointment [phone]         Best Time: any    Can we leave a detailed message on this number? YES    Call taken on 8/6/2021 at 9:35 AM by Dayami Perez

## 2021-08-11 ENCOUNTER — HOSPITAL ENCOUNTER (OUTPATIENT)
Dept: PHYSICAL THERAPY | Facility: CLINIC | Age: 46
Setting detail: THERAPIES SERIES
End: 2021-08-11
Attending: PAIN MEDICINE
Payer: COMMERCIAL

## 2021-08-11 PROCEDURE — 97113 AQUATIC THERAPY/EXERCISES: CPT | Mod: GP | Performed by: PHYSICAL THERAPIST

## 2021-08-18 ENCOUNTER — HOSPITAL ENCOUNTER (OUTPATIENT)
Dept: PHYSICAL THERAPY | Facility: CLINIC | Age: 46
Setting detail: THERAPIES SERIES
End: 2021-08-18
Attending: PAIN MEDICINE
Payer: COMMERCIAL

## 2021-08-18 PROCEDURE — 97113 AQUATIC THERAPY/EXERCISES: CPT | Mod: GP | Performed by: PHYSICAL THERAPIST

## 2021-08-25 ENCOUNTER — HOSPITAL ENCOUNTER (OUTPATIENT)
Dept: PHYSICAL THERAPY | Facility: CLINIC | Age: 46
Setting detail: THERAPIES SERIES
End: 2021-08-25
Attending: PAIN MEDICINE
Payer: COMMERCIAL

## 2021-08-25 PROCEDURE — 97113 AQUATIC THERAPY/EXERCISES: CPT | Mod: GP | Performed by: PHYSICAL THERAPIST

## 2021-08-26 ENCOUNTER — TRANSCRIBE ORDERS (OUTPATIENT)
Dept: OTHER | Age: 46
End: 2021-08-26

## 2021-08-26 DIAGNOSIS — M47.812 CERVICAL SPONDYLOSIS: Primary | ICD-10-CM

## 2021-09-02 DIAGNOSIS — G47.9 DISTURBANCE IN SLEEP BEHAVIOR: ICD-10-CM

## 2021-09-03 RX ORDER — ALPRAZOLAM 0.5 MG
0.5 TABLET ORAL
Qty: 15 TABLET | Refills: 0 | Status: SHIPPED | OUTPATIENT
Start: 2021-09-03 | End: 2021-10-04

## 2021-09-03 NOTE — TELEPHONE ENCOUNTER
Pending Prescriptions:                       Disp   Refills    ALPRAZolam (XANAX) 0.5 MG tablet [Pharmacy*15 tab*0        Sig: Take 1 tablet (0.5 mg) by mouth nightly as needed for           anxiety or sleep - INTENTIONAL DOSE REDUCTION           (must last 30 days)  Routing refill request to provider for review/approval because:  Drug not on the [facility] refill protocol

## 2021-09-15 ENCOUNTER — MEDICAL CORRESPONDENCE (OUTPATIENT)
Dept: HEALTH INFORMATION MANAGEMENT | Facility: CLINIC | Age: 46
End: 2021-09-15
Payer: COMMERCIAL

## 2021-09-17 ENCOUNTER — HOSPITAL ENCOUNTER (OUTPATIENT)
Dept: PHYSICAL THERAPY | Facility: CLINIC | Age: 46
Setting detail: THERAPIES SERIES
End: 2021-09-17
Attending: PAIN MEDICINE
Payer: COMMERCIAL

## 2021-09-17 PROCEDURE — 97110 THERAPEUTIC EXERCISES: CPT | Mod: GP

## 2021-09-17 PROCEDURE — 97162 PT EVAL MOD COMPLEX 30 MIN: CPT | Mod: GP

## 2021-09-17 PROCEDURE — 97140 MANUAL THERAPY 1/> REGIONS: CPT | Mod: GP

## 2021-09-17 NOTE — PROGRESS NOTES
Rehabilitation Services          OUTPATIENT PHYSICAL THERAPY ORTHOPEDIC EVALUATION  PLAN OF TREATMENT FOR OUTPATIENT REHABILITATION  (COMPLETE FOR INITIAL CLAIMS ONLY)  Patient's Last Name, First Name, M.I.  YOB: 1975  Fernanda Loyae  CLARISSA    Provider s Name:  Lilian Delcid, PT   Medical Record No.  [id number]   Start of Care Date:  09/17/21   Onset Date:  09/17/20   Type:     _X__PT   ___OT   ___SLP Medical Diagnosis:  Cervical spondylosis (M47.812)     PT Diagnosis:  Chronic neck pain, impaired posture   Visits from SOC:  1      _________________________________________________________________________________  Plan of Treatment/Functional Goals:  strengthening, stretching, ROM, neuromuscular re-education, manual therapy      (prn)     Goals  Goal Identifier: 1  Goal Description: instruction in HEP and compliant with it 5 of 7 days to improve quality of life   Target Date: 11/12/21    Goal Identifier: 2  Goal Description: patient to have improved tolerance to activity currently HAILE 35.56 goal is 25  Target Date: 12/10/21    Goal Identifier: 3  Goal Description: patient to have reduction in pain level currently 4-8/10 goal is 4-6/10  Target Date: 12/10/21    Goal Identifier: 4  Goal Description: Pt. will self report ability to ambulate 1/2 mile in order to return to walking/hiking (pt. goal)  Target Date: 12/10/21                                                Therapy Frequency:  2 times/Week  Predicted Duration of Therapy Intervention:  8-12 weeks    Lilian Delcid, PT                 I CERTIFY THE NEED FOR THESE SERVICES FURNISHED UNDER        THIS PLAN OF TREATMENT AND WHILE UNDER MY CARE .             Physician Signature               Date    X_____________________________________________________                             Certification Date From:  09/17/21   Certification Date To:  12/10/21    Referring Provider:  Dr. Juan Mast, DO    Initial Assessment        See Epic  Evaluation Start of Care Date: 09/17/21
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
## 2021-09-20 ENCOUNTER — HOSPITAL ENCOUNTER (OUTPATIENT)
Dept: PHYSICAL THERAPY | Facility: CLINIC | Age: 46
Setting detail: THERAPIES SERIES
End: 2021-09-20
Attending: PAIN MEDICINE
Payer: COMMERCIAL

## 2021-09-20 PROCEDURE — 97113 AQUATIC THERAPY/EXERCISES: CPT | Mod: GP

## 2021-09-20 NOTE — PROGRESS NOTES
"Outpatient Physical Therapy Progress Note     Patient: Anali Loya  : 1975    Beginning/End Dates of Reporting Period:  2021 to 2021    Referring Provider: Dr. Juan Mast    Therapy Diagnosis: Cervical and lumbar spondylosis     Client Self Report: Pt. states she was a little sore after last session but states felt some of the \"knots\" were worked out.       Goals:  Goal Identifier 1   Goal Description instruction in HEP and compliant with it 5 of 7 days to improve quality of life    Target Date 21   Date Met      Progress (detail required for progress note):       Goal Identifier 2   Goal Description patient to have improved tolerance to activity currently HAILE 35.56 goal is 25   Target Date 12/10/21   Date Met      Progress (detail required for progress note):       Goal Identifier 3   Goal Description patient to have reduction in pain level currently 4-8/10 goal is 4-6/10   Target Date 12/10/21   Date Met      Progress (detail required for progress note):       Goal Identifier 4   Goal Description Pt. will self report ability to ambulate 1/2 mile in order to return to walking/hiking (pt. goal)   Target Date 12/10/21   Date Met      Progress (detail required for progress note):           Plan:  Continue therapy per current plan of care. Goals from previous POC for lumbar spine to continue as well.    Discharge:  No    Thank you for your referral.   Lilian Delcid, PT, DPT    [facility]   O: [phone]  E: [email or web address]     "

## 2021-09-27 ENCOUNTER — HOSPITAL ENCOUNTER (OUTPATIENT)
Dept: PHYSICAL THERAPY | Facility: CLINIC | Age: 46
Setting detail: THERAPIES SERIES
End: 2021-09-27
Attending: PAIN MEDICINE
Payer: COMMERCIAL

## 2021-09-27 PROCEDURE — 97110 THERAPEUTIC EXERCISES: CPT | Mod: GP | Performed by: PHYSICAL THERAPIST

## 2021-09-28 NOTE — PROGRESS NOTES
Outpatient Physical Therapy Discharge Note     Patient: Anali Loya  : 1975    Beginning/End Dates of Reporting Period:  2021 to     Referring Provider: jb MORE     Therapy Diagnosis: chronic low back pain     Client Self Report: overall low back is feeling pretty good ,  had nerve burning on right side and wed doing the left , also had injection in her neck and is going to continue PT for pool and land to neck and low back to allow her to get neck burning nerves     Objective Measurements:  Objective Measure: neymar low HAILE 35.56 pain -8/10 at [facility]   Details: neymar colon HAILE 35.56      pain 3-8/10 but last 2 days has stayed at 3/10 level   Objective Measure: neymar low HAILE 33.33 pain 3-4/10      patient has been seen for 7 Rx sessions for exercises on land and in the pool at last Rx reviewed her current HEP    reviewed HEP supine bent knee trunk rotation and SK  sitting knee flexion/extension flossing hold 5 x 5 , added bridges , SLR and hip abduction 10x goal 20 ,  goal is 5x week , completed also nustep level 1 x 15       patient has had to cancel 9 Rx sessions      Goals:  Goal Identifier 1   Goal Description instruction in HEP and compliant with it 5 of 7 days to improve quality of life  (3x week with exercise in pool and land )   Target Date 21   Date Met      Progress (detail required for progress note):       Goal Identifier 2   Goal Description patient to have improved tolerance to activity currently HAILE 35.56 goal is 25   Target Date 21   Date Met      Progress (detail required for progress note): HAILE is unchanged but notes she feels like she is overall more active     Goal Identifier 3   Goal Description patient to have reduction in pain level currently 4-8/10 goal is 4-6/10   Target Date 21   Date Met      Progress (detail required for progress note): pain 3-8/10       Plan:  Discharge from therapy.    Discharge:    Reason for Discharge:  patient at this point is going to continue therapy pool and land for both her neck and low back     Equipment Issued: none     Discharge Plan: Other services: continue therapy for neck and low back .

## 2021-10-04 ENCOUNTER — HOSPITAL ENCOUNTER (OUTPATIENT)
Dept: CT IMAGING | Facility: CLINIC | Age: 46
Discharge: HOME | End: 2021-10-04
Attending: INTERNAL MEDICINE | Admitting: INTERNAL MEDICINE
Payer: COMMERCIAL

## 2021-10-04 ENCOUNTER — MYC REFILL (OUTPATIENT)
Dept: FAMILY MEDICINE | Facility: CLINIC | Age: 46
End: 2021-10-04

## 2021-10-04 DIAGNOSIS — R06.02 SOB (SHORTNESS OF BREATH): ICD-10-CM

## 2021-10-04 DIAGNOSIS — I27.20 PULMONARY HYPERTENSION (H): ICD-10-CM

## 2021-10-04 DIAGNOSIS — G47.9 DISTURBANCE IN SLEEP BEHAVIOR: ICD-10-CM

## 2021-10-04 PROCEDURE — 71250 CT THORAX DX C-: CPT

## 2021-10-04 RX ORDER — ALPRAZOLAM 0.5 MG
0.5 TABLET ORAL
Qty: 15 TABLET | Refills: 0 | Status: SHIPPED | OUTPATIENT
Start: 2021-10-04 | End: 2021-11-03

## 2021-10-04 NOTE — TELEPHONE ENCOUNTER
ALPRAZolam (XANAX) 0.5 MG tablet         Last Written Prescription Date:  9/3/21  Last Fill Quantity: 15,   # refills: 0  Last Office Visit: 5/25/21  Future Office visit:       Routing refill request to provider for review/approval because:  Drug not on the [facility], [facility] or [facility] refill protocol or controlled substance

## 2021-10-07 ENCOUNTER — HOSPITAL ENCOUNTER (OUTPATIENT)
Dept: PHYSICAL THERAPY | Facility: CLINIC | Age: 46
Setting detail: THERAPIES SERIES
End: 2021-10-07
Attending: PAIN MEDICINE
Payer: COMMERCIAL

## 2021-10-07 PROCEDURE — 97110 THERAPEUTIC EXERCISES: CPT | Mod: GP

## 2021-10-07 PROCEDURE — 97140 MANUAL THERAPY 1/> REGIONS: CPT | Mod: GP

## 2021-10-15 ENCOUNTER — HOSPITAL ENCOUNTER (OUTPATIENT)
Dept: PHYSICAL THERAPY | Facility: CLINIC | Age: 46
Setting detail: THERAPIES SERIES
End: 2021-10-15
Attending: PAIN MEDICINE
Payer: COMMERCIAL

## 2021-10-15 PROCEDURE — 97110 THERAPEUTIC EXERCISES: CPT | Mod: GP

## 2021-10-15 PROCEDURE — 97140 MANUAL THERAPY 1/> REGIONS: CPT | Mod: GP

## 2021-10-15 NOTE — PROGRESS NOTES
Outpatient Physical Therapy Progress Note     Patient: Anali Loya  : 1975    Beginning/End Dates of Reporting Period:  2021 to 10/15/2021    Referring Provider: Dr. Juan Mast    Therapy Diagnosis: Cervical and lumbar spondylosis     Client Self Report: Pt. reports her nerve on the left side wasn't fully burned. Went to the doctor yesterday and ended up getting a shot of prednisone and epidural injection. Reports her pain is better today as she took her pain pill later this morning too.    Objective Measurements:  Objective Measure: Cervical ROM  Details: L Rotation: decreased 25%, R Rotation: decreased 10%, Flex/Ext: WNL    Objective Measure: Pain  Details: 3/10    Objective Measure: Shoulder AROM  Details: WNL grossly    Objective Measure: Bilat. Shoulder MMT  Details: WNL grossly          Goals:  Goal Identifier 1   Goal Description instruction in HEP and compliant with it 5 of 7 days to improve quality of life    Target Date 21   Date Met      Progress (detail required for progress note):  On going, pt. Does report performing various exercises depending on her sx.     Goal Identifier 2   Goal Description patient to have improved tolerance to activity currently HAILE 35.56 goal is 25   Target Date 12/10/21   Date Met      Progress (detail required for progress note): HAILE is unchanged but notes she feels like she is overall more active     Goal Identifier 3   Goal Description patient to have reduction in pain level currently 4-8/10 goal is 4-6/10   Target Date 12/10/21   Date Met      Progress (detail required for progress note): pain 3-8/10     Goal Identifier 4   Goal Description Pt. will self report ability to ambulate 1/2 mile in order to return to walking/hiking (pt. goal)   Target Date 12/10/21   Date Met      Progress (detail required for progress note):           Plan:  Continue therapy per current plan of care.    Discharge:  No    Thank you for your referral.   Lilian  Bhavin PT, DPT    [facility]   O: [phone]  E: [email or web address]

## 2021-10-19 ENCOUNTER — HOSPITAL ENCOUNTER (OUTPATIENT)
Dept: PHYSICAL THERAPY | Facility: CLINIC | Age: 46
Setting detail: THERAPIES SERIES
End: 2021-10-19
Attending: PAIN MEDICINE
Payer: COMMERCIAL

## 2021-10-19 PROCEDURE — 97110 THERAPEUTIC EXERCISES: CPT | Mod: GP

## 2021-10-19 PROCEDURE — 97140 MANUAL THERAPY 1/> REGIONS: CPT | Mod: GP

## 2021-10-21 ENCOUNTER — HOSPITAL ENCOUNTER (OUTPATIENT)
Dept: PHYSICAL THERAPY | Facility: CLINIC | Age: 46
Setting detail: THERAPIES SERIES
End: 2021-10-21
Attending: PAIN MEDICINE
Payer: COMMERCIAL

## 2021-10-21 PROCEDURE — 97110 THERAPEUTIC EXERCISES: CPT | Mod: GP

## 2021-10-21 PROCEDURE — 97140 MANUAL THERAPY 1/> REGIONS: CPT | Mod: GP

## 2021-10-23 ENCOUNTER — HEALTH MAINTENANCE LETTER (OUTPATIENT)
Age: 46
End: 2021-10-23

## 2021-11-03 ENCOUNTER — MYC REFILL (OUTPATIENT)
Dept: FAMILY MEDICINE | Facility: CLINIC | Age: 46
End: 2021-11-03

## 2021-11-03 DIAGNOSIS — G47.9 DISTURBANCE IN SLEEP BEHAVIOR: ICD-10-CM

## 2021-11-04 RX ORDER — ALPRAZOLAM 0.5 MG
0.5 TABLET ORAL
Qty: 15 TABLET | Refills: 0 | Status: SHIPPED | OUTPATIENT
Start: 2021-11-04 | End: 2021-11-29

## 2021-11-04 NOTE — TELEPHONE ENCOUNTER
Pending Prescriptions:                       Disp   Refills    ALPRAZolam (XANAX) 0.5 MG tablet           15 tab*0        Sig: Take 1 tablet (0.5 mg) by mouth nightly as needed for           anxiety or sleep - INTENTIONAL DOSE REDUCTION           (must last 30 days)    Routing refill request to provider for review/approval because:  Drug not on the [facility] refill protocol

## 2021-11-05 NOTE — DISCHARGE SUMMARY
Outpatient Physical Therapy Discharge Note     Patient: Anali Loya  : 1975    Beginning/End Dates of Reporting Period:  10/15/2021 to 10/21/2021    Referring Provider: ESTELA Gaitan DO    Therapy Diagnosis: Chronic low back pain     Client Self Report: Pt. reports she's in a lot more pain today, states her leg is really bothering her today.    Objective Measurements:        Objective Measure: Pain  Details: 4/10 (with pain medication)           Goals: Goals not met  Goal Identifier 1   Goal Description instruction in HEP and compliant with it 5 of 7 days to improve quality of life    Target Date 21   Date Met      Progress (detail required for progress note):       Goal Identifier 2   Goal Description patient to have improved tolerance to activity currently HAILE 35.56 goal is 25   Target Date 12/10/21   Date Met      Progress (detail required for progress note): HAILE is unchanged but notes she feels like she is overall more active     Goal Identifier 3   Goal Description patient to have reduction in pain level currently 4-8/10 goal is 4-6/10   Target Date 12/10/21   Date Met      Progress (detail required for progress note): pain 3-8/10     Goal Identifier 4   Goal Description Pt. will self report ability to ambulate 1/2 mile in order to return to walking/hiking (pt. goal)   Target Date 12/10/21   Date Met      Progress (detail required for progress note):         Plan:  Discharge from therapy.    Discharge:    Reason for Discharge: Patient chooses to discontinue therapy. Spoke with pt. Via phone, states she doesn't think she's going to do the ablation for her neck anymore so no need to do PT. Patient has completed 6 PT sessions and has cancelled/No showed the past 5 consecutive sessions.     Equipment Issued: None    Discharge Plan: Patient to continue home program.

## 2021-11-12 ENCOUNTER — TRANSCRIBE ORDERS (OUTPATIENT)
Dept: OTHER | Age: 46
End: 2021-11-12
Payer: COMMERCIAL

## 2021-11-12 DIAGNOSIS — M47.816 LUMBAR SPONDYLOSIS: ICD-10-CM

## 2021-11-12 DIAGNOSIS — M47.812 CERVICAL SPONDYLOSIS: Primary | ICD-10-CM

## 2021-11-19 ENCOUNTER — HOSPITAL ENCOUNTER (OUTPATIENT)
Dept: PHYSICAL THERAPY | Facility: CLINIC | Age: 46
Setting detail: THERAPIES SERIES
End: 2021-11-19
Attending: PAIN MEDICINE
Payer: COMMERCIAL

## 2021-11-19 PROCEDURE — 97110 THERAPEUTIC EXERCISES: CPT | Mod: GP | Performed by: PHYSICAL THERAPIST

## 2021-11-19 PROCEDURE — 97140 MANUAL THERAPY 1/> REGIONS: CPT | Mod: GP | Performed by: PHYSICAL THERAPIST

## 2021-11-19 PROCEDURE — 97162 PT EVAL MOD COMPLEX 30 MIN: CPT | Mod: GP | Performed by: PHYSICAL THERAPIST
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
## 2021-11-19 NOTE — PROGRESS NOTES
[facility]    OUTPATIENT PHYSICAL THERAPY ORTHOPEDIC EVALUATION  PLAN OF TREATMENT FOR OUTPATIENT REHABILITATION  (COMPLETE FOR INITIAL CLAIMS ONLY)  Patient's Last Name, First Name, M.I.  YOB: 1975  Anali Loya    Provider s Name:  [facility]   Medical Record No.  [id number]   Start of Care Date:  11/19/21   Onset Date:  09/01/21   Type:     _X__PT   ___OT   ___SLP Medical Diagnosis:  (P) Cervical spondylosis, Lumbar spondylosis.     PT Diagnosis:  (P) Neck pain, low back pain.   Visits from SOC:  1      _________________________________________________________________________________  Plan of Treatment/Functional Goals:  (P) joint mobilization,manual therapy,neuromuscular re-education,ROM,strengthening,stretching     (P) Cryotherapy,Electrical stimulation,Hot packs,TENS,Ultrasound  (P) As needed  Goals  Goal Identifier: (P) HEP  Goal Description: (P) Pt will be independent with HEP in order to improve lumbar motor control, LE strength, postural stabilization, and cervical stabilization.  Target Date: (P) 01/14/22    Goal Identifier: (P) HAILE  Goal Description: (P) Pt will demonstrate 10% improvement per HAILE in order to demonstrate functional improvement of low back.       Goal Identifier: (P) NDI  Goal Description: (P) Pt will demonstrate 10% improvement per NDI in order to demonstrate functional improvement of neck.  Target Date: (P) 01/14/22                                                           Therapy Frequency:  (P) 2 times/Week  Predicted Duration of Therapy Intervention:  (P) 8 weeks    Buck Loya, PT                 I CERTIFY THE NEED FOR THESE SERVICES FURNISHED UNDER        THIS PLAN OF TREATMENT AND WHILE UNDER MY CARE .             Physician Signature               Date    X_____________________________________________________                              Certification Date From:  (P) 11/19/21   Certification Date To:  (P) 01/14/22    Referring Provider:  Dr. Juan Mast, DO    Initial Assessment        See Epic Evaluation Start of Care Date: 11/19/21

## 2021-11-23 ENCOUNTER — E-VISIT (OUTPATIENT)
Dept: FAMILY MEDICINE | Facility: CLINIC | Age: 46
End: 2021-11-23
Payer: COMMERCIAL

## 2021-11-23 DIAGNOSIS — Z71.89 ADVICE GIVEN ABOUT 2019-NCOV INFECTION: Primary | ICD-10-CM

## 2021-11-23 PROCEDURE — 99207 PR NO CHARGE LOS: CPT | Performed by: PHYSICIAN ASSISTANT

## 2021-11-23 NOTE — PATIENT INSTRUCTIONS
Dear Anali Loya,    We are sorry that Erickson is not feeling well.  Unfortunately I am not able to complete an E-visit for Erickson under Anali's name.  If I placed any orders they would not be linked to Erickson and he would not be able to be swabbed.  Please complete an E-visit via his login.  If you are unable to do that please have Erickson be seen in person at one of our urgent care clinics. Please click here to find the nearest urgent care location to you.   You will not be charged for this Visit. Thank you for trusting us with your care.    Frannie Pleitez PA-C

## 2021-11-24 ENCOUNTER — HOSPITAL ENCOUNTER (OUTPATIENT)
Dept: PHYSICAL THERAPY | Facility: CLINIC | Age: 46
Setting detail: THERAPIES SERIES
End: 2021-11-24
Attending: PAIN MEDICINE
Payer: COMMERCIAL

## 2021-11-24 PROCEDURE — 97110 THERAPEUTIC EXERCISES: CPT | Mod: GP | Performed by: PHYSICAL THERAPIST

## 2021-11-24 NOTE — PROGRESS NOTES
11/24/21 1340   Signing Clinician's Name / Credentials   Signing clinician's name / credentials kymberly veliz PT    Session Number   Session Number 2   Authorization status [facility] cert required   Progress Note/Recertification   Progress Note Due Date 02/17/22   Recertification Due Date 01/14/22   Ortho Goal 1   Goal Identifier HEP   Goal Description Pt will be independent with HEP in order to improve lumbar motor control, LE strength, postural stabilization, and cervical stabilization.   Target Date 01/14/22   Ortho Goal 2   Goal Identifier HAILE   Goal Description Pt will demonstrate 10% improvement per HAILE in order to demonstrate functional improvement of low back.   Goal Progress 1/14/22   Ortho Goal 3   Goal Identifier NDI   Goal Description Pt will demonstrate 10% improvement per NDI in order to demonstrate functional improvement of neck.   Target Date 01/14/22   Subjective Report   Subjective Report goal of therapy is to do therapy for 6 weeks for insurance approval for cervical  radiofrequency deneraveration .    Objective Measure 1   Objective Measure had therapy for neck and back from 9/17/2021 to 10/21/2021 and then resumed therapy 11/19/2021   Objective Measure 2   Objective Measure neck 3/10 low back 3/10 , left nerve pain in ankle and foot hypersensative    Treatment Interventions   Interventions Therapeutic Procedure/Exercise   Therapeutic Procedure/exercise   Therapeutic Procedures: strength, endurance, ROM, flexibillity minutes (59402) 35   Skilled Intervention instruction in HEP and POC   Patient Response tolerated well no increase in pain or SOB   Treatment Detail reviewed HEP and progressed chin tucks 10x 2-3sets , cervical laterial flexion hold 30 x 3 B, Supine TA contraction 10 x 5 seconds, standing hip abduction, hip extension, marching x 10 each. , bicep curls , elbow extension , and shoulder extension with yellow theraband , arm bike forward and backward 3 minutes    Assessments  Completed   Assessments Completed resume therapy for stretching and strengthening    Education   Learner Patient   Readiness Acceptance   Method Demonstration;Booklet/handout;Explanation   Response Verbalizes Understanding;Demonstrates Understanding   Plan   Homework HEP   Home program chin tucks 10x 2-3sets , cervical laterial flexion hold 30 x 3 B, Supine TA contraction 10 x 5 seconds, standing hip abduction, hip extension, marching x 10 each. , bicep curls , elbow extension , and shoulder extension with yellow theraband , arm bike forward and backward 3 minutes    Updates to plan of care 2x week x 8 weeks    Plan for next session exercises and HEP    Total Session Time   Timed Code Treatment Minutes 35   Total Treatment Time (sum of timed and untimed services) 35

## 2021-11-29 ENCOUNTER — MYC MEDICAL ADVICE (OUTPATIENT)
Dept: FAMILY MEDICINE | Facility: CLINIC | Age: 46
End: 2021-11-29
Payer: COMMERCIAL

## 2021-11-29 ENCOUNTER — MYC REFILL (OUTPATIENT)
Dept: FAMILY MEDICINE | Facility: CLINIC | Age: 46
End: 2021-11-29
Payer: COMMERCIAL

## 2021-11-29 DIAGNOSIS — G47.9 DISTURBANCE IN SLEEP BEHAVIOR: ICD-10-CM

## 2021-11-30 NOTE — TELEPHONE ENCOUNTER
Requested Prescriptions   Pending Prescriptions Disp Refills     ALPRAZolam (XANAX) 0.5 MG tablet 30 tablet 0     Sig: Take 1 tablet (0.5 mg) by mouth nightly as needed for anxiety or sleep - INTENTIONAL DOSE REDUCTION (must last 30 days)     Last Written Prescription Date:  11/04/2021  Last Fill Quantity: 15,   # refills: 0  Last Office Visit: 05/25/2021  Future Office visit:       Routing refill request to provider for review/approval because:  Drug not on the [facility], P or [facility] refill protocol or controlled substance

## 2021-12-01 RX ORDER — ALPRAZOLAM 0.5 MG
0.5 TABLET ORAL
Qty: 30 TABLET | Refills: 0 | Status: SHIPPED | OUTPATIENT
Start: 2021-12-01 | End: 2021-12-29

## 2021-12-03 ENCOUNTER — MYC MEDICAL ADVICE (OUTPATIENT)
Dept: FAMILY MEDICINE | Facility: CLINIC | Age: 46
End: 2021-12-03
Payer: COMMERCIAL

## 2021-12-11 DIAGNOSIS — I27.20 PULMONARY HYPERTENSION (H): ICD-10-CM

## 2021-12-13 ENCOUNTER — HOSPITAL ENCOUNTER (OUTPATIENT)
Dept: PHYSICAL THERAPY | Facility: CLINIC | Age: 46
Setting detail: THERAPIES SERIES
End: 2021-12-13
Attending: PAIN MEDICINE
Payer: COMMERCIAL

## 2021-12-13 PROCEDURE — 97110 THERAPEUTIC EXERCISES: CPT | Mod: GP | Performed by: PHYSICAL THERAPIST

## 2021-12-13 NOTE — PROGRESS NOTES
12/13/21 1100   Signing Clinician's Name / Credentials   Signing clinician's name / credentials kymberly veliz PT    Session Number   Session Number 3   Authorization status [facility] cert required   Progress Note/Recertification   Progress Note Due Date 02/17/22   Recertification Due Date 01/14/22   Ortho Goal 1   Goal Identifier HEP   Goal Description Pt will be independent with HEP in order to improve lumbar motor control, LE strength, postural stabilization, and cervical stabilization.   Target Date 01/14/22   Ortho Goal 2   Goal Identifier HAILE   Goal Description Pt will demonstrate 10% improvement per HAILE in order to demonstrate functional improvement of low back.   Goal Progress 1/14/22   Ortho Goal 3   Goal Identifier NDI   Goal Description Pt will demonstrate 10% improvement per NDI in order to demonstrate functional improvement of neck.   Target Date 01/14/22   Subjective Report   Subjective Report had doctor appt and didnt go well , neck and low back are both started to bother more , feels like her injections are wearing off , still has nerve pain in the left leg/foot is slowing getting better is able to wear a sock x 5 days .is waiting to get approval for her radio frequency for her neck    Objective Measure 2   Objective Measure neck 5/10 low back 4/10 , left foot /ankle hypersensative but is slowly improving    Treatment Interventions   Interventions Therapeutic Procedure/Exercise   Therapeutic Procedure/exercise   Therapeutic Procedures: strength, endurance, ROM, flexibillity minutes (51887) 40   Skilled Intervention instruction in HEP and POC   Patient Response tolerated well no increase in pain or SOB   Treatment Detail reviewed HEP and progressed chin tucks 10x 2-3sets , cervical laterial flexion hold 30 x 3 B shoulder rolls , and scapular squeezes , Supine TA contraction 10 x 5 seconds, standing hip abduction, hip extension, marching x 10 each. , bicep curls , elbow extension , and  shoulder extension with yellow theraband ,  nustep level 1 x 15 , bous at llbars x 5 , walking in hallway against blue theraband forward and backward    Progress patient is on O2   Assessments Completed   Assessments Completed patient uncertain her goals for therapy at this time , is having more pain    Education   Learner Patient   Readiness Acceptance   Method Demonstration;Booklet/handout;Explanation   Response Verbalizes Understanding;Demonstrates Understanding   Plan   Homework HEP   Home program reviewed HEP and progressed chin tucks 10x 2-3sets , cervical laterial flexion hold 30 x 3 B shoulder rolls , and scapular squeezes , Supine TA contraction 10 x 5 seconds, standing hip abduction, hip extension, marching x 10 each. , bicep curls , elbow extension , and shoulder extension with yellow theraband ,  nustep level 1 x 15 , bous at llbars x 5 , walking in hallway against blue theraband forward and backward    Updates to plan of care 2x week x 8 weeks    Plan for next session exercises and HEP    Total Session Time   Timed Code Treatment Minutes 40   Total Treatment Time (sum of timed and untimed services) 40

## 2021-12-14 RX ORDER — DIGOXIN 125 MCG
125 TABLET ORAL DAILY
Qty: 90 TABLET | Refills: 0 | Status: SHIPPED | OUTPATIENT
Start: 2021-12-14 | End: 2022-03-18

## 2021-12-14 NOTE — TELEPHONE ENCOUNTER
Last Clinic Visit: 7/5/2021  [facility]    Lab(s)- Dig level past due.  Justine refill of Digoxin was sent for a 90 day supply and FYI to Cardiology.     Lab Results   Component Value Date    DIGOXIN 0.8 11/12/2020

## 2021-12-15 ENCOUNTER — HOSPITAL ENCOUNTER (OUTPATIENT)
Dept: PHYSICAL THERAPY | Facility: CLINIC | Age: 46
Setting detail: THERAPIES SERIES
End: 2021-12-15
Attending: PAIN MEDICINE
Payer: COMMERCIAL

## 2021-12-15 PROCEDURE — 97110 THERAPEUTIC EXERCISES: CPT | Mod: GP | Performed by: PHYSICAL THERAPIST

## 2021-12-29 ENCOUNTER — MYC REFILL (OUTPATIENT)
Dept: FAMILY MEDICINE | Facility: CLINIC | Age: 46
End: 2021-12-29
Payer: COMMERCIAL

## 2021-12-29 DIAGNOSIS — G47.9 DISTURBANCE IN SLEEP BEHAVIOR: ICD-10-CM

## 2021-12-29 RX ORDER — ALPRAZOLAM 0.5 MG
0.5 TABLET ORAL
Qty: 30 TABLET | Refills: 0 | Status: SHIPPED | OUTPATIENT
Start: 2021-12-29 | End: 2022-01-31

## 2021-12-29 NOTE — TELEPHONE ENCOUNTER
Alprazolam      Last Written Prescription Date:  12/01/2021  Last Fill Quantity: 30,   # refills: 0  Last Office Visit: 05/25/2021  Future Office visit:    Next 5 appointments (look out 90 days)    Jan 13, 2022 10:40 AM  (Arrive by 10:20 AM)  Provider Visit with Ignacio Loya MD  [facility] ([facility] ) [address] [place] [id number] [phone]           Routing refill request to provider for review/approval because:  Drug not on the [facility], [facility] or [facility] refill protocol or controlled substance

## 2022-01-13 ENCOUNTER — MYC MEDICAL ADVICE (OUTPATIENT)
Dept: FAMILY MEDICINE | Facility: CLINIC | Age: 47
End: 2022-01-13

## 2022-01-13 ENCOUNTER — OFFICE VISIT (OUTPATIENT)
Dept: FAMILY MEDICINE | Facility: CLINIC | Age: 47
End: 2022-01-13
Payer: COMMERCIAL

## 2022-01-13 VITALS
WEIGHT: 101.7 LBS | OXYGEN SATURATION: 100 % | HEIGHT: 62 IN | BODY MASS INDEX: 18.71 KG/M2 | TEMPERATURE: 98.2 F | HEART RATE: 83 BPM | DIASTOLIC BLOOD PRESSURE: 76 MMHG | SYSTOLIC BLOOD PRESSURE: 128 MMHG

## 2022-01-13 DIAGNOSIS — M47.817 SPONDYLOSIS OF LUMBOSACRAL REGION WITHOUT MYELOPATHY OR RADICULOPATHY: ICD-10-CM

## 2022-01-13 PROCEDURE — 99213 OFFICE O/P EST LOW 20 MIN: CPT | Performed by: FAMILY MEDICINE

## 2022-01-13 RX ORDER — OXYCODONE AND ACETAMINOPHEN 10; 325 MG/1; MG/1
1 TABLET ORAL 2 TIMES DAILY PRN
Qty: 30 TABLET | Refills: 0 | Status: SHIPPED | OUTPATIENT
Start: 2022-01-13 | End: 2022-02-03

## 2022-01-13 RX ORDER — MORPHINE SULFATE 15 MG/1
15 TABLET, FILM COATED, EXTENDED RELEASE ORAL 2 TIMES DAILY
Qty: 60 TABLET | Refills: 0 | Status: SHIPPED | OUTPATIENT
Start: 2022-01-13 | End: 2022-02-10

## 2022-01-13 RX ORDER — MORPHINE SULFATE 15 MG/1
15 TABLET, FILM COATED, EXTENDED RELEASE ORAL 2 TIMES DAILY
COMMUNITY
Start: 2021-12-10 | End: 2022-01-13

## 2022-01-13 ASSESSMENT — ANXIETY QUESTIONNAIRES
GAD7 TOTAL SCORE: 7
6. BECOMING EASILY ANNOYED OR IRRITABLE: MORE THAN HALF THE DAYS
2. NOT BEING ABLE TO STOP OR CONTROL WORRYING: SEVERAL DAYS
7. FEELING AFRAID AS IF SOMETHING AWFUL MIGHT HAPPEN: SEVERAL DAYS
5. BEING SO RESTLESS THAT IT IS HARD TO SIT STILL: SEVERAL DAYS
1. FEELING NERVOUS, ANXIOUS, OR ON EDGE: SEVERAL DAYS
3. WORRYING TOO MUCH ABOUT DIFFERENT THINGS: SEVERAL DAYS
4. TROUBLE RELAXING: NOT AT ALL
IF YOU CHECKED OFF ANY PROBLEMS ON THIS QUESTIONNAIRE, HOW DIFFICULT HAVE THESE PROBLEMS MADE IT FOR YOU TO DO YOUR WORK, TAKE CARE OF THINGS AT HOME, OR GET ALONG WITH OTHER PEOPLE: SOMEWHAT DIFFICULT

## 2022-01-13 ASSESSMENT — PATIENT HEALTH QUESTIONNAIRE - PHQ9: SUM OF ALL RESPONSES TO PHQ QUESTIONS 1-9: 0

## 2022-01-13 ASSESSMENT — PAIN SCALES - GENERAL: PAINLEVEL: SEVERE PAIN (6)

## 2022-01-13 ASSESSMENT — MIFFLIN-ST. JEOR: SCORE: 1046.62

## 2022-01-13 NOTE — PROGRESS NOTES
Assessment & Plan     Spondylosis of lumbosacral region without myelopathy or radiculopathy  She has been on a combination of opioids for many years for this.  Has not really increased.  She was seeing a pain clinic in [place] but does not want to go there anymore.  She would like to get set up with [place] pain management to help with her chronic pain syndrome basically.  She has comorbidity of bad lung disease and is on oxygen continuously.  She uses Xanax because of the anxiety here.  - morphine (MS CONTIN) 15 MG CR tablet; Take 1 tablet (15 mg) by mouth 2 times daily  - oxyCODONE-acetaminophen (PERCOCET)  MG per tablet; Take 1 tablet by mouth 2 times daily as needed for severe pain  - Pain Management Referral; Future             Tobacco Cessation:   reports that she has been smoking cigarettes. She started smoking about 29 years ago. She has a 12.50 pack-year smoking history. She quit smokeless tobacco use about 5 years ago.  Tobacco Cessation Action Plan: Self help information given to patient        No follow-ups on file.    Ignacio Loya MD  [facility]    Thierno Briones is a 46 year old who presents for the following health issues     HPI     Pain History:  When did you first notice your pain? - More than 6 weeks   Have you seen this provider for your pain in the past?   Yes   Where in your body do you have pain? Back, neck, joint pain  Are you seeing anyone else for your pain? No  [facility] Follow-up to PHQ 8/19/2020 1/13/2022   PHQ-9 9. Suicide Ideation past 2 weeks Not at all Not at all     MOLLY-7 SCORE 8/19/2020 1/13/2022   Total Score 0 7           PDMP Review     None        Last CSA Agreement:   CSA -- Patient Level:    Controlled Substance Agreement - Opioid - Scan on 6/13/2021 12:36 PM  Controlled Substance Agreement - Opioid - Scan on 3/13/2019 11:16 AM       Last UDS: 5/25/2021            Review of Systems   Constitutional, HEENT, cardiovascular, pulmonary, gi and  "gu systems are negative, except as otherwise noted.      Objective    /76 (BP Location: Right arm, Patient Position: Sitting, Cuff Size: Adult Regular)   Pulse 83   Temp 98.2  F (36.8  C) (Temporal)   Ht 1.562 m (5' 1.5\")   Wt 46.1 kg (101 lb 11.2 oz)   SpO2 100%   BMI 18.90 kg/m    Body mass index is 18.9 kg/m .  Physical Exam   GENERAL: healthy, alert and no distress  EYES: Eyes grossly normal to inspection, PERRL and conjunctivae and sclerae normal  NECK: no adenopathy, no asymmetry, masses, or scars, thyroid normal to palpation and some tenderness with full range of motion.  RESP: decreased breath sounds throughout  CV: regular rate and rhythm, normal S1 S2, no S3 or S4, no murmur, click or rub, no peripheral edema and peripheral pulses strong  MS: no gross musculoskeletal defects noted, no edema  Comprehensive back pain exam:  Pain limits the following motions: Full flexion and extension  PSYCH: mentation appears normal, affect normal/bright                "

## 2022-01-14 ASSESSMENT — ANXIETY QUESTIONNAIRES: GAD7 TOTAL SCORE: 7

## 2022-01-20 ENCOUNTER — MYC REFILL (OUTPATIENT)
Dept: FAMILY MEDICINE | Facility: CLINIC | Age: 47
End: 2022-01-20
Payer: COMMERCIAL

## 2022-01-20 DIAGNOSIS — M47.817 SPONDYLOSIS OF LUMBOSACRAL REGION WITHOUT MYELOPATHY OR RADICULOPATHY: ICD-10-CM

## 2022-01-20 RX ORDER — OXYCODONE AND ACETAMINOPHEN 10; 325 MG/1; MG/1
1 TABLET ORAL 2 TIMES DAILY PRN
Qty: 30 TABLET | Refills: 0 | Status: CANCELLED | OUTPATIENT
Start: 2022-01-20

## 2022-01-20 NOTE — TELEPHONE ENCOUNTER
Percocet      Last Written Prescription Date:  1/13/2022  Last Fill Quantity: 30,   # refills: 0  Last Office Visit: 1/13/2022  Future Office visit:       Routing refill request to provider for review/approval because:  Drug not on the [facility], [facility] or [facility] refill protocol or controlled substance

## 2022-01-24 ENCOUNTER — TELEPHONE (OUTPATIENT)
Dept: CARDIOLOGY | Facility: CLINIC | Age: 47
End: 2022-01-24
Payer: COMMERCIAL

## 2022-01-25 NOTE — TELEPHONE ENCOUNTER
Letter drafted - not mailed. Patient is scheduled to see Jania Woo PA-C on 1/27/2022.    No further follow-up completed and encounter closed.    Ivis Mejia  Clinic   Pulmonary Hypertension  [facility]  (p) [phone]

## 2022-01-26 ENCOUNTER — HOSPITAL ENCOUNTER (OUTPATIENT)
Dept: CARDIOLOGY | Facility: CLINIC | Age: 47
Discharge: HOME | End: 2022-01-26
Attending: INTERNAL MEDICINE | Admitting: INTERNAL MEDICINE
Payer: COMMERCIAL

## 2022-01-26 ENCOUNTER — LAB (OUTPATIENT)
Dept: LAB | Facility: CLINIC | Age: 47
End: 2022-01-26
Payer: COMMERCIAL

## 2022-01-26 DIAGNOSIS — R07.9 CHEST PAIN, UNSPECIFIED TYPE: ICD-10-CM

## 2022-01-26 DIAGNOSIS — I27.20 PULMONARY HYPERTENSION (H): ICD-10-CM

## 2022-01-26 DIAGNOSIS — R06.02 SOB (SHORTNESS OF BREATH): ICD-10-CM

## 2022-01-26 DIAGNOSIS — J84.9 ILD (INTERSTITIAL LUNG DISEASE) (H): ICD-10-CM

## 2022-01-26 LAB
ANION GAP SERPL CALCULATED.3IONS-SCNC: 4 MMOL/L (ref 3–14)
BUN SERPL-MCNC: 8 MG/DL (ref 7–30)
CALCIUM SERPL-MCNC: 9.2 MG/DL (ref 8.5–10.1)
CHLORIDE BLD-SCNC: 100 MMOL/L (ref 94–109)
CO2 SERPL-SCNC: 32 MMOL/L (ref 20–32)
CREAT SERPL-MCNC: 0.57 MG/DL (ref 0.52–1.04)
DIGOXIN SERPL-MCNC: 0.9 UG/L
ERYTHROCYTE [DISTWIDTH] IN BLOOD BY AUTOMATED COUNT: 13.2 % (ref 10–15)
GFR SERPL CREATININE-BSD FRML MDRD: >90 ML/MIN/1.73M2
GLUCOSE BLD-MCNC: 74 MG/DL (ref 70–99)
HCT VFR BLD AUTO: 41.9 % (ref 35–47)
HGB BLD-MCNC: 12.9 G/DL (ref 11.7–15.7)
LVEF ECHO: NORMAL
MCH RBC QN AUTO: 29.6 PG (ref 26.5–33)
MCHC RBC AUTO-ENTMCNC: 30.8 G/DL (ref 31.5–36.5)
MCV RBC AUTO: 96 FL (ref 78–100)
NT-PROBNP SERPL-MCNC: 162 PG/ML (ref 0–125)
PLATELET # BLD AUTO: 289 10E3/UL (ref 150–450)
POTASSIUM BLD-SCNC: 4.4 MMOL/L (ref 3.4–5.3)
RBC # BLD AUTO: 4.36 10E6/UL (ref 3.8–5.2)
SODIUM SERPL-SCNC: 136 MMOL/L (ref 133–144)
WBC # BLD AUTO: 7.5 10E3/UL (ref 4–11)

## 2022-01-26 PROCEDURE — 80162 ASSAY OF DIGOXIN TOTAL: CPT

## 2022-01-26 PROCEDURE — 83880 ASSAY OF NATRIURETIC PEPTIDE: CPT

## 2022-01-26 PROCEDURE — 36415 COLL VENOUS BLD VENIPUNCTURE: CPT

## 2022-01-26 PROCEDURE — 80048 BASIC METABOLIC PNL TOTAL CA: CPT

## 2022-01-26 PROCEDURE — 85027 COMPLETE CBC AUTOMATED: CPT

## 2022-01-26 PROCEDURE — 93306 TTE W/DOPPLER COMPLETE: CPT

## 2022-01-26 PROCEDURE — 93306 TTE W/DOPPLER COMPLETE: CPT | Mod: 26 | Performed by: INTERNAL MEDICINE

## 2022-01-31 ENCOUNTER — MYC REFILL (OUTPATIENT)
Dept: FAMILY MEDICINE | Facility: CLINIC | Age: 47
End: 2022-01-31
Payer: COMMERCIAL

## 2022-01-31 DIAGNOSIS — G47.9 DISTURBANCE IN SLEEP BEHAVIOR: ICD-10-CM

## 2022-02-02 RX ORDER — ALPRAZOLAM 0.5 MG
0.5 TABLET ORAL
Qty: 30 TABLET | Refills: 0 | Status: SHIPPED | OUTPATIENT
Start: 2022-02-02 | End: 2022-03-01

## 2022-02-07 ENCOUNTER — TELEPHONE (OUTPATIENT)
Dept: FAMILY MEDICINE | Facility: CLINIC | Age: 47
End: 2022-02-07
Payer: COMMERCIAL

## 2022-02-07 ENCOUNTER — MYC MEDICAL ADVICE (OUTPATIENT)
Dept: FAMILY MEDICINE | Facility: CLINIC | Age: 47
End: 2022-02-07
Payer: COMMERCIAL

## 2022-02-07 NOTE — TELEPHONE ENCOUNTER
Prior Authorization Not Needed per Insurance    Medication: oxyCODONE-acetaminophen (PERCOCET)  MG per tablet  Insurance Company: [employer] - Phone [phone] Fax [phone]  Expected CoPay:      Pharmacy Filling the Rx: [facility] [address]   Pharmacy Notified: Yes  Patient Notified: Yes    Prior Authorization Not Required  Pharmacy received paid claim.

## 2022-02-07 NOTE — TELEPHONE ENCOUNTER
Prior Authorization Retail Medication Request    Medication/Dose: oxyCODONE-acetaminophen (PERCOCET)  MG per table  ICD code (if different than what is on RX):    Previously Tried and Failed:    Rationale:      Insurance Name:    Insurance ID:        Pharmacy Information (if different than what is on RX)  Name:    Phone:

## 2022-02-09 DIAGNOSIS — M47.817 SPONDYLOSIS OF LUMBOSACRAL REGION WITHOUT MYELOPATHY OR RADICULOPATHY: ICD-10-CM

## 2022-02-10 RX ORDER — MORPHINE SULFATE 15 MG/1
15 TABLET, FILM COATED, EXTENDED RELEASE ORAL 2 TIMES DAILY
Qty: 60 TABLET | Refills: 0 | Status: SHIPPED | OUTPATIENT
Start: 2022-02-10 | End: 2022-03-07

## 2022-02-10 NOTE — TELEPHONE ENCOUNTER
Morphine      Last Written Prescription Date:  1/13/2022  Last Fill Quantity: 60,   # refills: 0  Last Office Visit: 1/13/2022  Future Office visit:       Routing refill request to provider for review/approval because:  Drug not on the [facility], P or [facility] refill protocol or controlled substance

## 2022-02-12 ENCOUNTER — HEALTH MAINTENANCE LETTER (OUTPATIENT)
Age: 47
End: 2022-02-12

## 2022-02-28 ENCOUNTER — MYC REFILL (OUTPATIENT)
Dept: FAMILY MEDICINE | Facility: CLINIC | Age: 47
End: 2022-02-28
Payer: COMMERCIAL

## 2022-02-28 DIAGNOSIS — M47.817 SPONDYLOSIS OF LUMBOSACRAL REGION WITHOUT MYELOPATHY OR RADICULOPATHY: ICD-10-CM

## 2022-02-28 RX ORDER — OXYCODONE AND ACETAMINOPHEN 10; 325 MG/1; MG/1
1 TABLET ORAL EVERY 6 HOURS PRN
Qty: 60 TABLET | Refills: 0 | Status: SHIPPED | OUTPATIENT
Start: 2022-02-28 | End: 2022-03-18

## 2022-02-28 NOTE — TELEPHONE ENCOUNTER
Percocet      Last Written Prescription Date:  2/7/2022  Last Fill Quantity: 60,   # refills: 0  Last Office Visit: 1/13/2022  Future Office visit:       Routing refill request to provider for review/approval because:  Drug not on the [facility], P or [facility] refill protocol or controlled substance

## 2022-03-01 ENCOUNTER — MYC REFILL (OUTPATIENT)
Dept: FAMILY MEDICINE | Facility: CLINIC | Age: 47
End: 2022-03-01

## 2022-03-01 DIAGNOSIS — G47.9 DISTURBANCE IN SLEEP BEHAVIOR: ICD-10-CM

## 2022-03-02 RX ORDER — ALPRAZOLAM 0.5 MG
0.5 TABLET ORAL
Qty: 30 TABLET | Refills: 0 | Status: SHIPPED | OUTPATIENT
Start: 2022-03-02 | End: 2022-03-30

## 2022-03-02 NOTE — TELEPHONE ENCOUNTER
Requested Prescriptions   Pending Prescriptions Disp Refills     ALPRAZolam (XANAX) 0.5 MG tablet 30 tablet 0     Sig: Take 1 tablet (0.5 mg) by mouth nightly as needed for anxiety or sleep - INTENTIONAL DOSE REDUCTION (must last 30 days)       Last Written Prescription Date:  02/02/2022  Last Fill Quantity: 30,   # refills: 0  Last Office Visit: 01/13/2022  Future Office visit:       Routing refill request to provider for review/approval because:  Drug not on the [facility], [facility] or [facility] refill protocol or controlled substance

## 2022-03-16 DIAGNOSIS — I27.20 PULMONARY HYPERTENSION (H): ICD-10-CM

## 2022-03-18 ENCOUNTER — MYC REFILL (OUTPATIENT)
Dept: FAMILY MEDICINE | Facility: CLINIC | Age: 47
End: 2022-03-18

## 2022-03-18 DIAGNOSIS — M47.817 SPONDYLOSIS OF LUMBOSACRAL REGION WITHOUT MYELOPATHY OR RADICULOPATHY: ICD-10-CM

## 2022-03-18 RX ORDER — DIGOXIN 125 MCG
125 TABLET ORAL DAILY
Qty: 30 TABLET | Refills: 1 | Status: SHIPPED | OUTPATIENT
Start: 2022-03-18 | End: 2022-04-25

## 2022-03-18 RX ORDER — OXYCODONE AND ACETAMINOPHEN 10; 325 MG/1; MG/1
1 TABLET ORAL EVERY 6 HOURS PRN
Qty: 60 TABLET | Refills: 0 | Status: SHIPPED | OUTPATIENT
Start: 2022-03-18 | End: 2022-04-01

## 2022-03-18 NOTE — TELEPHONE ENCOUNTER
DIGITEK 125MCG TABS   Last Written Prescription Date:   12/14/2021  Last Fill Quantity: 90,   # refills: 0  Last Office Visit :  7/5/2021  Future Office visit:   4/25/2022  Routing refill request to provider for review/approval because:  Protocol request a 6 month visit.   No 6 month visit.  But has visit scheduled in April.    Is it okay to send a 30 day supply to cover Pt until visit?   Refer to clinic for review       Alyce Anaya RN  Central Triage Red Flags/Med Refills

## 2022-03-18 NOTE — TELEPHONE ENCOUNTER
Has scheduled follow up with Callie on 4/25/22 Refill approved for 30 days supply x1 refill.    Lilliana Lewis RN on 3/18/2022 at 2:05 PM

## 2022-03-18 NOTE — TELEPHONE ENCOUNTER
Requested Prescriptions   Pending Prescriptions Disp Refills     oxyCODONE-acetaminophen (PERCOCET)  MG per tablet 60 tablet 0     Sig: Take 1 tablet by mouth every 6 hours as needed for severe pain     Last Written Prescription Date:  02/28/2022  Last Fill Quantity: 60,   # refills: 0  Last Office Visit: 01/13/2022  Future Office visit:       Routing refill request to provider for review/approval because:  Drug not on the G, P or [facility] refill protocol or controlled substance

## 2022-03-18 NOTE — TELEPHONE ENCOUNTER
[facility] Call Center    Phone Message    May a detailed message be left on voicemail: yes     Reason for Call: Medication Refill Request    Has the patient contacted the pharmacy for the refill? Yes     Name of medication being requested: Digoxin  Provider who prescribed the medication: Callie  Pharmacy:  [facility] [address]    Date medication is needed: [facility] already sent request but has not heard back and she has been out for 2 days and now not feeling too well.      Action Taken: Message routed to:  [facility] Cardiology    Travel Screening: Not Applicable

## 2022-03-30 ENCOUNTER — MYC REFILL (OUTPATIENT)
Dept: FAMILY MEDICINE | Facility: CLINIC | Age: 47
End: 2022-03-30

## 2022-03-30 DIAGNOSIS — G47.9 DISTURBANCE IN SLEEP BEHAVIOR: ICD-10-CM

## 2022-03-31 RX ORDER — ALPRAZOLAM 0.5 MG
0.5 TABLET ORAL
Qty: 30 TABLET | Refills: 0 | Status: SHIPPED | OUTPATIENT
Start: 2022-03-31 | End: 2022-05-01

## 2022-04-01 ENCOUNTER — MYC REFILL (OUTPATIENT)
Dept: FAMILY MEDICINE | Facility: CLINIC | Age: 47
End: 2022-04-01
Payer: COMMERCIAL

## 2022-04-01 DIAGNOSIS — M47.817 SPONDYLOSIS OF LUMBOSACRAL REGION WITHOUT MYELOPATHY OR RADICULOPATHY: ICD-10-CM

## 2022-04-01 RX ORDER — OXYCODONE AND ACETAMINOPHEN 10; 325 MG/1; MG/1
1 TABLET ORAL EVERY 6 HOURS PRN
Qty: 60 TABLET | Refills: 0 | Status: SHIPPED | OUTPATIENT
Start: 2022-04-01 | End: 2022-04-15

## 2022-04-01 NOTE — TELEPHONE ENCOUNTER
Requested Prescriptions   Pending Prescriptions Disp Refills     oxyCODONE-acetaminophen (PERCOCET)  MG per tablet 60 tablet 0     Sig: Take 1 tablet by mouth every 6 hours as needed for severe pain     Last Written Prescription Date:  03/18/2022  Last Fill Quantity: 60,   # refills: 0  Last Office Visit: 01/13/2022  Future Office visit:       Routing refill request to provider for review/approval because:  Drug not on the G, P or [facility] refill protocol or controlled substance

## 2022-04-07 ENCOUNTER — MYC REFILL (OUTPATIENT)
Dept: FAMILY MEDICINE | Facility: CLINIC | Age: 47
End: 2022-04-07
Payer: COMMERCIAL

## 2022-04-07 DIAGNOSIS — M47.817 SPONDYLOSIS OF LUMBOSACRAL REGION WITHOUT MYELOPATHY OR RADICULOPATHY: ICD-10-CM

## 2022-04-07 RX ORDER — MORPHINE SULFATE 15 MG/1
15 TABLET, FILM COATED, EXTENDED RELEASE ORAL 2 TIMES DAILY
Qty: 60 TABLET | Refills: 0 | Status: SHIPPED | OUTPATIENT
Start: 2022-04-07 | End: 2022-05-05

## 2022-04-07 NOTE — TELEPHONE ENCOUNTER
Requested Prescriptions   Pending Prescriptions Disp Refills     morphine (MS CONTIN) 15 MG CR tablet 60 tablet 0     Sig: Take 1 tablet (15 mg) by mouth 2 times daily        Last Written Prescription Date:  03/10/2022  Last Fill Quantity: 60,   # refills: 0  Last Office Visit: 01/13/2022  Future Office visit:       Routing refill request to provider for review/approval because:  Drug not on the [facility], P or [facility] refill protocol or controlled substance

## 2022-04-09 ENCOUNTER — HEALTH MAINTENANCE LETTER (OUTPATIENT)
Age: 47
End: 2022-04-09

## 2022-04-15 ENCOUNTER — MYC REFILL (OUTPATIENT)
Dept: FAMILY MEDICINE | Facility: CLINIC | Age: 47
End: 2022-04-15
Payer: COMMERCIAL

## 2022-04-15 DIAGNOSIS — M47.817 SPONDYLOSIS OF LUMBOSACRAL REGION WITHOUT MYELOPATHY OR RADICULOPATHY: ICD-10-CM

## 2022-04-15 RX ORDER — OXYCODONE AND ACETAMINOPHEN 10; 325 MG/1; MG/1
1 TABLET ORAL EVERY 6 HOURS PRN
Qty: 60 TABLET | Refills: 0 | Status: SHIPPED | OUTPATIENT
Start: 2022-04-20 | End: 2022-04-27

## 2022-04-15 RX ORDER — OXYCODONE AND ACETAMINOPHEN 10; 325 MG/1; MG/1
1 TABLET ORAL EVERY 6 HOURS PRN
Qty: 60 TABLET | Refills: 0 | Status: SHIPPED | OUTPATIENT
Start: 2022-04-20 | End: 2022-04-15

## 2022-04-15 NOTE — TELEPHONE ENCOUNTER
Percocet      Last Written Prescription Date:  04/01/2022  Last Fill Quantity: 60,   # refills: 0  Last Office Visit: 01/13/2022  Future Office visit:       Routing refill request to provider for review/approval because:  Drug not on the [facility], P or [facility] refill protocol or controlled substance

## 2022-04-22 DIAGNOSIS — R06.02 SOB (SHORTNESS OF BREATH): ICD-10-CM

## 2022-04-22 DIAGNOSIS — I27.20 PULMONARY HYPERTENSION (H): Primary | ICD-10-CM

## 2022-04-23 NOTE — PROGRESS NOTES
Service Date: 22     Ignacio Loya MD   [address] [address]      RE: Ellen Loya   MRN: [id number]   : 1975      Dear Dr. Loya:      We had the pleasure of seeing Ms. Ellen Loya in our Pulmonary Hypertension Clinic.  As you know, she is a very pleasant 46 year old female with polymyositis, interstitial lung disease and pulmonary arterial hypertension.  She is currently on Adcirca.  She did not tolerate Tyvaso due to worsening V/Q mismatch. She is also on digoxin 125 mcg and lasix 20 mg daily. She was last seen 2021 and was encouraged to re-establish care with ILD clinic. She returns today for followup.     She reports that she stopped taking her tadalafil due to insurance issues. She noticed improvement in her GERD without worsening respiratory symptoms, so she elected not to resume it when she had access to it again. She currently reports FC III symptoms. She denies chest pain, presyncope, syncope, or edema. She does continue to take her digoxin and she rarely needs her prn Lasix. She has been turning her home O2 down to 3L at rest but does not measure her saturation, and continues to use 6L with activity. She has cut her smoking down to 1-2 cigarettes daily. She also has started a vegetarian diet and is interested in more holistic care with as few medications as possible.     PAST MEDICAL HISTORY:   1.  Polymyositis and dermatomyositis.   2.  Interstitial lung disease.   3.  Pulmonary arterial hypertension.   4.  Arthritis.      MEDICATIONS:    Current Outpatient Medications   Medication Sig     albuterol (PROAIR HFA/PROVENTIL HFA/VENTOLIN HFA) 108 (90 Base) MCG/ACT inhaler INHALE ONE TO TWO PUFFS INTO THE LUNGS EVERY 4 HOURS AS NEEDED FOR SHORTNESS OF BREATH / DYSPNEA     ALPRAZolam (XANAX) 0.5 MG tablet Take 1 tablet (0.5 mg) by mouth nightly as needed for anxiety or sleep - INTENTIONAL DOSE REDUCTION (must last 30 days)     digoxin  (DIGITEK) 125 MCG tablet Take 1 tablet (125 mcg) by mouth daily     fluticasone (FLONASE) 50 MCG/ACT nasal spray USE 2 SPRAYS IN EACH NOSTRIL DAILY     furosemide (LASIX) 20 MG tablet Take 1 tablet (20 mg) by mouth daily     morphine (MS CONTIN) 15 MG CR tablet Take 1 tablet (15 mg) by mouth 2 times daily     nicotine (NICORETTE) 2 MG gum PLACE 1 PIECE OF GUM (2 MG) INSIDE CHEEK AS NEEDED FOR SMOKING CESSATION     order for DME Oxygen: Patient requires supplemental Oxygen 4 LPM via nasal canula at rest and 6 LPM with activity. Please provide patient with portability capability. Okay to spot check patient on oxygen device, to keep sats above 90%. Please provider with home concentrator that can go up to 10 LPM. Oxygen will be for a lifetime.     order for DME Please provide patient with 2  liquid oxygen tanks for portability. Spot check patient on liquid oxygen. Titrate oxygen to maintain saturations above 88%.     oxyCODONE-acetaminophen (PERCOCET)  MG per tablet Take 1 tablet by mouth every 6 hours as needed for severe pain     naloxone (NARCAN) 4 MG/0.1ML nasal spray Spray 1mL into each nostril.  Repeat after 3 minutes if no or minimal response (Patient not taking: No sig reported)     tadalafil, PAH, (ADCIRCA) 20 MG TABS Take 2 tablets (40 mg) by mouth daily (Patient not taking: Reported on 4/25/2022)     No current facility-administered medications for this visit.     REVIEW OF SYSTEMS:  A detailed 10-point review of systems was obtained as described in the History of Present Illness.  All other systems reviewed and are negative.      PHYSICAL EXAMINATION:    /79 (BP Location: Left arm, Patient Position: Chair, Cuff Size: Adult Small)   Pulse 77   Wt 46.4 kg (102 lb 3.2 oz)   SpO2 100%   BMI 19.00 kg/m     General: appears comfortable, alert and articulate, on O2  Head: normocephalic, atraumatic  Eyes: anicteric sclera, EOMI  Neck: no adenopathy  Orophyarynx: moist mucosa, no lesions, dentition  intact  Heart: regular, normal S1/S2, no murmur, gallop, rub, estimated JVP 8cmH2O  Lungs: scattered crackles, squeeking   Abdomen: soft, non-tender, bowel sounds present, no hepatosplenomegaly  Extremities: +clubbing, no cyanosis or edema  Neurological: normal speech and affect, no gross motor deficits     CBC RESULTS:   Recent Labs   Lab Test 07/15/19  1131   WBC 10.0   RBC 4.57   HGB 11.6*   HCT 39.0   MCV 85   MCH 25.4*   MCHC 29.7*   RDW 16.3*        Recent Labs   Lab Test 07/15/19  1131 04/17/19  1142    134   POTASSIUM 3.6 4.0   CHLORIDE 100 101   CO2 29 26   ANIONGAP 6 8   GLC 86 108*   BUN 10 8   CR 0.68 0.60   MARCIN 9.1 8.4*     Lab Results   Component Value Date    NTBNPI 4,168 (H) 11/30/2016     Lab Results   Component Value Date    NTBNP 110 04/25/2022    NTBNP 326 (H) 07/05/2021     Echo (01/26/22)  Left ventricular systolic function is normal.  The visual ejection fraction is 55-60%.  The right ventricular systolic function is normal.  Right ventricular systolic pressure could not be approximated due to  inadequate tricuspid regurgitation.  Technically difficult, suboptimal study.    RHC (04/2019)  RA 5  RV 36/5  PA 35/15 (22)  PCWP 15  CO/CI 4.6/3.03  PA saturation 72.5%  PVR 1.52 ANGLIN    6MWT (07/2019): 401 meters on 8Lwith lowest oxygen saturation of 89%   6MWT (07/2021): 351 meters on 6L with desaturation and hypoxia, 98% to 87% priscilla   6MWT (4/25/22): 396 meters on 6L with desaturation but no hypoxia, 100% to 90% priscilla     ASSESSMENT AND PLAN:      Ms. Ellen Loya is a very pleasant 46 year old female with polymyositis, interstitial lung.     Overall she appears stable with WHO FC II symptoms and no suggestions of right heart failure. Her NT proBNP is normal, her most recent echocardiography showed normal RV size and function, and her 6MWT is improved albeit still with significant desaturation. She is overall doing ok, but given her clear hemodynamic response to tadalafil on prior RHCs  (mean PAP 32 improved to 22), we would recommend she resume it and she agreed. We did agree to discontinuing her digoxin given her intact RV function. She will continue her prn Lasix.    We encouraged her to continue to work on smoking cessation, and she is down to 1-2 cigarettes daily. We also encouraged her to use her O2 as prescribed. She continues to defer ILD clinic referral for now.    She will return in 3 months with Jania with labs and 6MWT.  She will call us in the interim if you have any further worsening symptoms.       It was a pleasure seeing Ms. Ellen Loya in our Pulmonary Hypertension Clinic. We thank you for allowing us to participate in her care.       Sandra Rosales MD  Pulmonary & Critical Care Fellow    I examined the patient and agree with the assessment and plan of Dr. Rosales    Total time today was 43 minutes reviewing notes, imaging, labs, patient visit, orders and documentation         Callie Ledesma MD   [facility] Pul[facility]pert[facility]  Heart Failure, Transplant, and Mechanical Circulatory Support Cardiology   Cardiovascular Division  [facility]   [phone]

## 2022-04-25 ENCOUNTER — OFFICE VISIT (OUTPATIENT)
Dept: CARDIOLOGY | Facility: CLINIC | Age: 47
End: 2022-04-25
Attending: INTERNAL MEDICINE
Payer: COMMERCIAL

## 2022-04-25 ENCOUNTER — LAB (OUTPATIENT)
Dept: LAB | Facility: CLINIC | Age: 47
End: 2022-04-25
Payer: COMMERCIAL

## 2022-04-25 VITALS
OXYGEN SATURATION: 100 % | SYSTOLIC BLOOD PRESSURE: 120 MMHG | DIASTOLIC BLOOD PRESSURE: 79 MMHG | HEART RATE: 77 BPM | BODY MASS INDEX: 19 KG/M2 | WEIGHT: 102.2 LBS

## 2022-04-25 DIAGNOSIS — R06.02 SOB (SHORTNESS OF BREATH): ICD-10-CM

## 2022-04-25 DIAGNOSIS — I27.20 PULMONARY HYPERTENSION (H): ICD-10-CM

## 2022-04-25 LAB
6 MIN WALK (FT): 1300 FT
6 MIN WALK (M): 396 M
ANION GAP SERPL CALCULATED.3IONS-SCNC: 7 MMOL/L (ref 3–14)
BUN SERPL-MCNC: 13 MG/DL (ref 7–30)
CALCIUM SERPL-MCNC: 9.4 MG/DL (ref 8.5–10.1)
CHLORIDE BLD-SCNC: 104 MMOL/L (ref 94–109)
CO2 SERPL-SCNC: 29 MMOL/L (ref 20–32)
CREAT SERPL-MCNC: 0.58 MG/DL (ref 0.52–1.04)
ERYTHROCYTE [DISTWIDTH] IN BLOOD BY AUTOMATED COUNT: 13.4 % (ref 10–15)
GFR SERPL CREATININE-BSD FRML MDRD: >90 ML/MIN/1.73M2
GLUCOSE BLD-MCNC: 95 MG/DL (ref 70–99)
HCT VFR BLD AUTO: 43.1 % (ref 35–47)
HGB BLD-MCNC: 13.8 G/DL (ref 11.7–15.7)
MCH RBC QN AUTO: 29.8 PG (ref 26.5–33)
MCHC RBC AUTO-ENTMCNC: 32 G/DL (ref 31.5–36.5)
MCV RBC AUTO: 93 FL (ref 78–100)
NT-PROBNP SERPL-MCNC: 110 PG/ML (ref 0–125)
PLATELET # BLD AUTO: 346 10E3/UL (ref 150–450)
POTASSIUM BLD-SCNC: 3.9 MMOL/L (ref 3.4–5.3)
RBC # BLD AUTO: 4.63 10E6/UL (ref 3.8–5.2)
SODIUM SERPL-SCNC: 140 MMOL/L (ref 133–144)
WBC # BLD AUTO: 13.1 10E3/UL (ref 4–11)

## 2022-04-25 PROCEDURE — 94729 DIFFUSING CAPACITY: CPT | Performed by: INTERNAL MEDICINE

## 2022-04-25 PROCEDURE — 36415 COLL VENOUS BLD VENIPUNCTURE: CPT | Performed by: PATHOLOGY

## 2022-04-25 PROCEDURE — 94618 PULMONARY STRESS TESTING: CPT | Performed by: INTERNAL MEDICINE

## 2022-04-25 PROCEDURE — 94726 PLETHYSMOGRAPHY LUNG VOLUMES: CPT | Performed by: INTERNAL MEDICINE

## 2022-04-25 PROCEDURE — 99215 OFFICE O/P EST HI 40 MIN: CPT | Mod: GC | Performed by: INTERNAL MEDICINE

## 2022-04-25 PROCEDURE — 94375 RESPIRATORY FLOW VOLUME LOOP: CPT | Performed by: INTERNAL MEDICINE

## 2022-04-25 PROCEDURE — G0463 HOSPITAL OUTPT CLINIC VISIT: HCPCS

## 2022-04-25 RX ORDER — TADALAFIL 20 MG/1
20 TABLET ORAL DAILY
Qty: 90 TABLET | Refills: 3 | Status: SHIPPED | OUTPATIENT
Start: 2022-04-25 | End: 2022-05-23

## 2022-04-25 ASSESSMENT — PAIN SCALES - GENERAL: PAINLEVEL: NO PAIN (0)

## 2022-04-25 NOTE — NURSING NOTE
Chief Complaint   Patient presents with     New Patient     Return pulmonary hypertension     Vitals were taken and medications reconciled.    Martínez Valerio, EMT  2:46 PM

## 2022-04-25 NOTE — NURSING NOTE
Patient educated to the following during visit and educated to contact RN or MD for any questions.     Plan: Restart Tadalafi 20mg/day. discontinue digoxin. Follow-up in 3MO with Amna with labs and 6MWT prior    Patient demonstrated understanding of all health information given and agreed to call with further questions or concerns.    Patient brought to POD for scheduling  Lilliana Lewis RN

## 2022-04-25 NOTE — PATIENT INSTRUCTIONS
Medication Changes:  Restart your Tadalafil 20mg (1 tablet) daily  Stop taking Digoxin    Follow up Appointment Information:  1: Schedule for 3MO follow-up with Jania Woo with labs and 6 minute walk test prior    Patient Instructions:  1. Continue staying active and eat a heart healthy diet.    2. Please keep current list of medications with you at all times.    3. Remember to weigh yourself daily after voiding and before you consume any food or beverages and log the numbers.  If you have gained 2 pounds overnight or 5 pounds in a week contact us immediately for medication adjustments or further instructions.    4. **Please call us immediately if you have any syncope (fainting or passing out), chest pain, edema (swelling or weight gain), or decline in your functional status (general decline in how you are feeling).    5. Patients on Remodulin (treprostinil) or Veletri (epoprostenol): Please make sure that you have your backup pump and supplies with you at all times, your mixing instructions, and contact information for your specialty pharmacy.        Results:  Component      Latest Ref Rng & Units 4/25/2022   Sodium      133 - 144 mmol/L 140   Potassium      3.4 - 5.3 mmol/L 3.9   Chloride      94 - 109 mmol/L 104   Carbon Dioxide      20 - 32 mmol/L 29   Anion Gap      3 - 14 mmol/L 7   Urea Nitrogen      7 - 30 mg/dL 13   Creatinine      0.52 - 1.04 mg/dL 0.58   Calcium      8.5 - 10.1 mg/dL 9.4   Glucose      70 - 99 mg/dL 95   GFR Estimate      >60 mL/min/1.73m2 >90   WBC      4.0 - 11.0 10e3/uL 13.1 (H)   RBC Count      3.80 - 5.20 10e6/uL 4.63   Hemoglobin      11.7 - 15.7 g/dL 13.8   Hematocrit      35.0 - 47.0 % 43.1   MCV      78 - 100 fL 93   MCH      26.5 - 33.0 pg 29.8   MCHC      31.5 - 36.5 g/dL 32.0   RDW      10.0 - 15.0 % 13.4   Platelet Count      150 - 450 10e3/uL 346   N-Terminal Pro Bnp      0 - 125 pg/mL 110     We are located on the third floor of the [facility] ([facility] on the  [facility].  Our address is     [address] [place] [address] on [address]rd [place] [address]    Thank you for allowing us to be a part of your care here at the [facility]    If you have questions or concerns please contact us at:    Delfino Tadeo RN, BSN   Izzy Brown (Schedule,Prior Auth)  Nurse Coordinator     Clinic   Pulmonary Hypertension   Pulmonary Hypertension  [facility]  [facility]  (Phone)[phone]    (Phone) [phone]        (Fax) [phone]    ** Please note that you will NOT receive a reminder call regarding your scheduled testing, reminder calls are for provider appointments only.  If you are scheduled for testing within the [facility] system you may receive a call regarding pre-registration for billing purposes only.**     Remember to weigh yourself daily after voiding and before you consume any food or beverages and log the numbers.  If you have gained/lost 2 pounds overnight or 5 pounds in a week contact us immediately for medication adjustments or further instructions.   **Please call us immediately if you have any syncope, chest pain, edema, or decline in your functional status.    Support Group:  Pulmonary Hypertension Association  Https://www.phassociation.org/  **Look at the Events Tab** They even have Support Groups that you can call into    [facility] PH Support Group  Second Saturday of the Month from 1-3 PM   Location: [address] [place] [id number]  Leader: Vale Delgado   Phone: [phone]  Email: [email or web address]

## 2022-04-25 NOTE — LETTER
2022      RE: Ellen Loya  [address]  [facility] [address]       Dear Colleague,    Thank you for the opportunity to participate in the care of your patient, Ellen Loya, at the [facility] at [facility]. Please see a copy of my visit note below.    Service Date: 22     Ignacio oLya MD   [facility]   [address] [place] [address]      RE: Ellen Loya   MRN: [id number]   : 1975      Dear Dr. Loya:      We had the pleasure of seeing Ms. Ellen Loya in our Pulmonary Hypertension Clinic.  As you know, she is a very pleasant 46 year old female with polymyositis, interstitial lung disease and pulmonary arterial hypertension.  She is currently on Adcirca.  She did not tolerate Tyvaso due to worsening V/Q mismatch. She is also on digoxin 125 mcg and lasix 20 mg daily. She was last seen 2021 and was encouraged to re-establish care with ILD clinic. She returns today for followup.     She reports that she stopped taking her tadalafil due to insurance issues. She noticed improvement in her GERD without worsening respiratory symptoms, so she elected not to resume it when she had access to it again. She currently reports FC III symptoms. She denies chest pain, presyncope, syncope, or edema. She does continue to take her digoxin and she rarely needs her prn Lasix. She has been turning her home O2 down to 3L at rest but does not measure her saturation, and continues to use 6L with activity. She has cut her smoking down to 1-2 cigarettes daily. She also has started a vegetarian diet and is interested in more holistic care with as few medications as possible.     PAST MEDICAL HISTORY:   1.  Polymyositis and dermatomyositis.   2.  Interstitial lung disease.   3.  Pulmonary arterial hypertension.   4.  Arthritis.      MEDICATIONS:    Current Outpatient Medications   Medication Sig     albuterol  (PROAIR HFA/PROVENTIL HFA/VENTOLIN HFA) 108 (90 Base) MCG/ACT inhaler INHALE ONE TO TWO PUFFS INTO THE LUNGS EVERY 4 HOURS AS NEEDED FOR SHORTNESS OF BREATH / DYSPNEA     ALPRAZolam (XANAX) 0.5 MG tablet Take 1 tablet (0.5 mg) by mouth nightly as needed for anxiety or sleep - INTENTIONAL DOSE REDUCTION (must last 30 days)     digoxin (DIGITEK) 125 MCG tablet Take 1 tablet (125 mcg) by mouth daily     fluticasone (FLONASE) 50 MCG/ACT nasal spray USE 2 SPRAYS IN EACH NOSTRIL DAILY     furosemide (LASIX) 20 MG tablet Take 1 tablet (20 mg) by mouth daily     morphine (MS CONTIN) 15 MG CR tablet Take 1 tablet (15 mg) by mouth 2 times daily     nicotine (NICORETTE) 2 MG gum PLACE 1 PIECE OF GUM (2 MG) INSIDE CHEEK AS NEEDED FOR SMOKING CESSATION     order for DME Oxygen: Patient requires supplemental Oxygen 4 LPM via nasal canula at rest and 6 LPM with activity. Please provide patient with portability capability. Okay to spot check patient on oxygen device, to keep sats above 90%. Please provider with home concentrator that can go up to 10 LPM. Oxygen will be for a lifetime.     order for DME Please provide patient with 2  liquid oxygen tanks for portability. Spot check patient on liquid oxygen. Titrate oxygen to maintain saturations above 88%.     oxyCODONE-acetaminophen (PERCOCET)  MG per tablet Take 1 tablet by mouth every 6 hours as needed for severe pain     naloxone (NARCAN) 4 MG/0.1ML nasal spray Spray 1mL into each nostril.  Repeat after 3 minutes if no or minimal response (Patient not taking: No sig reported)     tadalafil, PAH, (ADCIRCA) 20 MG TABS Take 2 tablets (40 mg) by mouth daily (Patient not taking: Reported on 4/25/2022)     No current facility-administered medications for this visit.     REVIEW OF SYSTEMS:  A detailed 10-point review of systems was obtained as described in the History of Present Illness.  All other systems reviewed and are negative.      PHYSICAL EXAMINATION:    /79 (BP  Location: Left arm, Patient Position: Chair, Cuff Size: Adult Small)   Pulse 77   Wt 46.4 kg (102 lb 3.2 oz)   SpO2 100%   BMI 19.00 kg/m     General: appears comfortable, alert and articulate, on O2  Head: normocephalic, atraumatic  Eyes: anicteric sclera, EOMI  Neck: no adenopathy  Orophyarynx: moist mucosa, no lesions, dentition intact  Heart: regular, normal S1/S2, no murmur, gallop, rub, estimated JVP 8cmH2O  Lungs: scattered crackles, squeeking   Abdomen: soft, non-tender, bowel sounds present, no hepatosplenomegaly  Extremities: +clubbing, no cyanosis or edema  Neurological: normal speech and affect, no gross motor deficits     CBC RESULTS:   Recent Labs   Lab Test 07/15/19  1131   WBC 10.0   RBC 4.57   HGB 11.6*   HCT 39.0   MCV 85   MCH 25.4*   MCHC 29.7*   RDW 16.3*        Recent Labs   Lab Test 07/15/19  1131 04/17/19  1142    134   POTASSIUM 3.6 4.0   CHLORIDE 100 101   CO2 29 26   ANIONGAP 6 8   GLC 86 108*   BUN 10 8   CR 0.68 0.60   MARCIN 9.1 8.4*     Lab Results   Component Value Date    NTBNPI 4,168 (H) 11/30/2016     Lab Results   Component Value Date    NTBNP 110 04/25/2022    NTBNP 326 (H) 07/05/2021     Echo (01/26/22)  Left ventricular systolic function is normal.  The visual ejection fraction is 55-60%.  The right ventricular systolic function is normal.  Right ventricular systolic pressure could not be approximated due to  inadequate tricuspid regurgitation.  Technically difficult, suboptimal study.    RHC (04/2019)  RA 5  RV 36/5  PA 35/15 (22)  PCWP 15  CO/CI 4.6/3.03  PA saturation 72.5%  PVR 1.52 ANGLIN    6MWT (07/2019): 401 meters on 8Lwith lowest oxygen saturation of 89%   6MWT (07/2021): 351 meters on 6L with desaturation and hypoxia, 98% to 87% priscilla   6MWT (4/25/22): 396 meters on 6L with desaturation but no hypoxia, 100% to 90% priscilla     ASSESSMENT AND PLAN:      Ms. Ellen Loya is a very pleasant 46 year old female with polymyositis, interstitial lung.     Overall she  appears stable with WHO FC II symptoms and no suggestions of right heart failure. Her NT proBNP is normal, her most recent echocardiography showed normal RV size and function, and her 6MWT is improved albeit still with significant desaturation. She is overall doing ok, but given her clear hemodynamic response to tadalafil on prior RHCs (mean PAP 32 improved to 22), we would recommend she resume it and she agreed. We did agree to discontinuing her digoxin given her intact RV function. She will continue her prn Lasix.    We encouraged her to continue to work on smoking cessation, and she is down to 1-2 cigarettes daily. We also encouraged her to use her O2 as prescribed. She continues to defer ILD clinic referral for now.    She will return in 3 months with Jania with labs and 6MWT.  She will call us in the interim if you have any further worsening symptoms.     It was a pleasure seeing Ms. Ellen Loya in our Pulmonary Hypertension Clinic. We thank you for allowing us to participate in her care.     Sandra Rosales MD  Pulmonary & Critical Care Fellow    I examined the patient and agree with the assessment and plan of Dr. Rosales    Total time today was 43 minutes reviewing notes, imaging, labs, patient visit, orders and documentation       Please do not hesitate to contact me if you have any questions/concerns.     Sincerely,     Callie Ledesma MD

## 2022-04-26 LAB
DLCOCOR-%PRED-PRE: 30 %
DLCOCOR-PRE: 6.16 ML/MIN/MMHG
DLCOUNC-%PRED-PRE: 31 %
DLCOUNC-PRE: 6.23 ML/MIN/MMHG
DLCOUNC-PRED: 19.96 ML/MIN/MMHG
ERV-%PRED-PRE: 70 %
ERV-PRE: 0.88 L
ERV-PRED: 1.25 L
EXPTIME-PRE: 6.54 SEC
FEF2575-%PRED-PRE: 44 %
FEF2575-PRE: 1.25 L/SEC
FEF2575-PRED: 2.79 L/SEC
FEFMAX-%PRED-PRE: 85 %
FEFMAX-PRE: 5.61 L/SEC
FEFMAX-PRED: 6.55 L/SEC
FEV1-%PRED-PRE: 68 %
FEV1-PRE: 1.85 L
FEV1FEV6-PRE: 72 %
FEV1FEV6-PRED: 83 %
FEV1FVC-PRE: 72 %
FEV1FVC-PRED: 81 %
FEV1SVC-PRE: 70 %
FEV1SVC-PRED: 81 %
FIFMAX-PRE: 5.69 L/SEC
FRCPLETH-%PRED-PRE: 142 %
FRCPLETH-PRE: 3.66 L
FRCPLETH-PRED: 2.57 L
FVC-%PRED-PRE: 77 %
FVC-PRE: 2.57 L
FVC-PRED: 3.31 L
IC-%PRED-PRE: 84 %
IC-PRE: 1.74 L
IC-PRED: 2.06 L
RVPLETH-%PRED-PRE: 174 %
RVPLETH-PRE: 2.77 L
RVPLETH-PRED: 1.59 L
TLCPLETH-%PRED-PRE: 117 %
TLCPLETH-PRE: 5.4 L
TLCPLETH-PRED: 4.6 L
VA-%PRED-PRE: 88 %
VA-PRE: 3.99 L
VC-%PRED-PRE: 79 %
VC-PRE: 2.62 L
VC-PRED: 3.31 L

## 2022-04-27 ENCOUNTER — MYC REFILL (OUTPATIENT)
Dept: FAMILY MEDICINE | Facility: CLINIC | Age: 47
End: 2022-04-27
Payer: COMMERCIAL

## 2022-04-27 DIAGNOSIS — M47.817 SPONDYLOSIS OF LUMBOSACRAL REGION WITHOUT MYELOPATHY OR RADICULOPATHY: ICD-10-CM

## 2022-04-28 RX ORDER — OXYCODONE AND ACETAMINOPHEN 10; 325 MG/1; MG/1
1 TABLET ORAL EVERY 6 HOURS PRN
Qty: 60 TABLET | Refills: 0 | Status: SHIPPED | OUTPATIENT
Start: 2022-04-28 | End: 2022-05-11

## 2022-04-28 NOTE — TELEPHONE ENCOUNTER
Pending Prescriptions:                       Disp   Refills    oxyCODONE-acetaminophen (PERCOCET)  *60 tab*0        Sig: Take 1 tablet by mouth every 6 hours as needed for           severe pain        Last Written Prescription Date:  4/20/22  Last Fill Quantity: 60,   # refills: 0  Last Office Visit:   Future Office visit:       Routing refill request to provider for review/approval because:  Drug not on the [facility] refill protocol     Guadalupe Gibson RN

## 2022-05-01 ENCOUNTER — MYC REFILL (OUTPATIENT)
Dept: FAMILY MEDICINE | Facility: CLINIC | Age: 47
End: 2022-05-01
Payer: COMMERCIAL

## 2022-05-01 DIAGNOSIS — G47.9 DISTURBANCE IN SLEEP BEHAVIOR: ICD-10-CM

## 2022-05-03 NOTE — TELEPHONE ENCOUNTER
Xanax      Last Written Prescription Date:  3/31/2022  Last Fill Quantity: 30,   # refills: 0  Last Office Visit: 1/13/2022  Future Office visit:       Routing refill request to provider for review/approval because:  Drug not on the [facility], P or [facility] refill protocol or controlled substance

## 2022-05-04 RX ORDER — ALPRAZOLAM 0.5 MG
0.5 TABLET ORAL
Qty: 30 TABLET | Refills: 0 | Status: SHIPPED | OUTPATIENT
Start: 2022-05-04 | End: 2022-06-03

## 2022-05-11 ENCOUNTER — MYC REFILL (OUTPATIENT)
Dept: FAMILY MEDICINE | Facility: CLINIC | Age: 47
End: 2022-05-11
Payer: COMMERCIAL

## 2022-05-11 DIAGNOSIS — M47.817 SPONDYLOSIS OF LUMBOSACRAL REGION WITHOUT MYELOPATHY OR RADICULOPATHY: ICD-10-CM

## 2022-05-11 NOTE — TELEPHONE ENCOUNTER
oxyCODONE-acetaminophen (PERCOCET)  MG per tablet         Last Written Prescription Date:  4/28/22  Last Fill Quantity: 60,   # refills: 0  Last Office Visit: 1/13/22  Future Office visit:       Routing refill request to provider for review/approval because:  Drug not on the [facility], P or [facility] refill protocol or controlled substance

## 2022-05-12 RX ORDER — OXYCODONE AND ACETAMINOPHEN 10; 325 MG/1; MG/1
1 TABLET ORAL EVERY 6 HOURS PRN
Qty: 60 TABLET | Refills: 0 | Status: SHIPPED | OUTPATIENT
Start: 2022-05-12 | End: 2022-05-25

## 2022-05-16 NOTE — PATIENT INSTRUCTIONS
Lillie Vera called requesting a refill of the below medication which has been pended for you:     Requested Prescriptions     Pending Prescriptions Disp Refills    FEROSUL 325 (65 Fe) MG tablet [Pharmacy Med Name: FERROUS SULFATE 325MG (5GR) TABS] 180 tablet 1     Sig: TAKE 1 TABLET BY MOUTH TWICE DAILY       Last Appointment Date: 4/26/2022  Next Appointment Date: 5/24/2022    Allergies   Allergen Reactions    Allopurinol Other (See Comments)     Other reaction(s): nausea    Bumex [Bumetanide] Other (See Comments)     Extreme fatigue, nausea, dizziness    Colchicine     Erythromycin     Indocin [Indomethacin]     Lasix [Furosemide] Other (See Comments)     Dizziness, extreme fatigue    Lisinopril     Morphine     Norvasc [Amlodipine]     Tenormin [Atenolol]     Uloric [Febuxostat]     Hydrocodone-Acetaminophen Nausea Only    Topamax [Topiramate] Swelling and Rash Medication Changes:  No medication changes at this time. Please continue current medication regiment.    Patient Instructions:  Check-In  Time Check-In Location Estimated Length Procedure   Name        [facility]   3rd Floor 30 minutes Six Minute Walk Test**   Procedure Preparations & Instructions     This is a non-invasive procedure and does NOT require any preparation       Follow up Appointment Information:  Follow up in 2-3 weeks with six minute walk test    We are located on the third floor of the [facility] ([facility]) on the [facility].  Our address is     [address] [place] [address] on [address]rd [place] [address]    Thank you for allowing us to be a part of your care here at the [facility]    If you have questions or concerns please contact us at:    Naty Lau RN, BSN    Louis Jack (Schedule,P.A.)  Nurse Coordinator     Clinic   Pulmonary Hypertension   Pulmonary Hypertension  [facility]  (P)[phone]    (P) [phone]        (F)[phone]    ** Please note that you will NOT receive a reminder call regarding your scheduled testing, reminder calls are for provider appointments only.  If you are scheduled for testing within the [facility] system you may receive a call regarding pre-registration for billing purposes only.**     Remember to weigh yourself daily after voiding and before you consume any food or beverages and log the numbers.  If you have gained/lost 2 pounds overnight or 5 pounds in a week contact us immediately for medication adjustments or further instructions.

## 2022-05-23 DIAGNOSIS — I27.20 PULMONARY HYPERTENSION (H): ICD-10-CM

## 2022-05-23 RX ORDER — TADALAFIL 20 MG/1
20 TABLET ORAL DAILY
Qty: 90 TABLET | Refills: 3 | Status: SHIPPED | OUTPATIENT
Start: 2022-05-23 | End: 2022-11-22

## 2022-05-25 ENCOUNTER — MYC REFILL (OUTPATIENT)
Dept: FAMILY MEDICINE | Facility: CLINIC | Age: 47
End: 2022-05-25
Payer: COMMERCIAL

## 2022-05-25 DIAGNOSIS — M47.817 SPONDYLOSIS OF LUMBOSACRAL REGION WITHOUT MYELOPATHY OR RADICULOPATHY: ICD-10-CM

## 2022-05-25 RX ORDER — OXYCODONE AND ACETAMINOPHEN 10; 325 MG/1; MG/1
1 TABLET ORAL EVERY 6 HOURS PRN
Qty: 60 TABLET | Refills: 0 | Status: SHIPPED | OUTPATIENT
Start: 2022-05-25 | End: 2022-06-09

## 2022-05-25 NOTE — TELEPHONE ENCOUNTER
Percocet      Last Written Prescription Date:  05/12/2022  Last Fill Quantity: 60,   # refills: 0  Last Office Visit: 1/13/2022  Future Office visit:       Routing refill request to provider for review/approval because:  Drug not on the [facility], P or [facility] refill protocol or controlled substance

## 2022-06-02 ENCOUNTER — MYC REFILL (OUTPATIENT)
Dept: FAMILY MEDICINE | Facility: CLINIC | Age: 47
End: 2022-06-02
Payer: COMMERCIAL

## 2022-06-02 DIAGNOSIS — M47.817 SPONDYLOSIS OF LUMBOSACRAL REGION WITHOUT MYELOPATHY OR RADICULOPATHY: ICD-10-CM

## 2022-06-03 ENCOUNTER — MYC REFILL (OUTPATIENT)
Dept: FAMILY MEDICINE | Facility: CLINIC | Age: 47
End: 2022-06-03
Payer: COMMERCIAL

## 2022-06-03 DIAGNOSIS — G47.9 DISTURBANCE IN SLEEP BEHAVIOR: ICD-10-CM

## 2022-06-03 RX ORDER — MORPHINE SULFATE 15 MG/1
15 TABLET, FILM COATED, EXTENDED RELEASE ORAL 2 TIMES DAILY
Qty: 60 TABLET | Refills: 0 | Status: SHIPPED | OUTPATIENT
Start: 2022-06-03 | End: 2022-06-29

## 2022-06-03 RX ORDER — ALPRAZOLAM 0.5 MG
0.5 TABLET ORAL
Qty: 30 TABLET | Refills: 0 | Status: SHIPPED | OUTPATIENT
Start: 2022-06-03 | End: 2022-06-30

## 2022-06-03 NOTE — TELEPHONE ENCOUNTER
Requested Prescriptions   Pending Prescriptions Disp Refills     ALPRAZolam (XANAX) 0.5 MG tablet 30 tablet 0     Sig: Take 1 tablet (0.5 mg) by mouth nightly as needed for anxiety or sleep - INTENTIONAL DOSE REDUCTION (must last 30 days)         Last Written Prescription Date:  5/4/2022  Last Fill Quantity: 30,   # refills: 0  Last Office Visit: 01/13/2022  Future Office visit:       Routing refill request to provider for review/approval because:  Drug not on the [facility], P or [facility] refill protocol or controlled substance

## 2022-06-03 NOTE — TELEPHONE ENCOUNTER
Requested Prescriptions   Pending Prescriptions Disp Refills     morphine (MS CONTIN) 15 MG CR tablet 60 tablet 0     Sig: Take 1 tablet (15 mg) by mouth 2 times daily       There is no refill protocol information for this order            Last Written Prescription Date:  05/05/2022  Last Fill Quantity: 60,   # refills: 0  Last Office Visit: 01/13/2022  Future Office visit:       Routing refill request to provider for review/approval because:  Drug not on the [facility], P or [facility] refill protocol or controlled substance

## 2022-06-09 ENCOUNTER — MYC REFILL (OUTPATIENT)
Dept: FAMILY MEDICINE | Facility: CLINIC | Age: 47
End: 2022-06-09
Payer: COMMERCIAL

## 2022-06-09 DIAGNOSIS — M47.817 SPONDYLOSIS OF LUMBOSACRAL REGION WITHOUT MYELOPATHY OR RADICULOPATHY: ICD-10-CM

## 2022-06-09 RX ORDER — OXYCODONE AND ACETAMINOPHEN 10; 325 MG/1; MG/1
1 TABLET ORAL EVERY 6 HOURS PRN
Qty: 60 TABLET | Refills: 0 | Status: SHIPPED | OUTPATIENT
Start: 2022-06-09 | End: 2022-06-23

## 2022-06-09 NOTE — TELEPHONE ENCOUNTER
Requested Prescriptions   Pending Prescriptions Disp Refills     oxyCODONE-acetaminophen (PERCOCET)  MG per tablet 60 tablet 0     Sig: Take 1 tablet by mouth every 6 hours as needed for severe pain     Last Written Prescription Date:  05/25/2022  Last Fill Quantity: 60,   # refills: 0  Last Office Visit: 01/13/2022  Future Office visit:       Routing refill request to provider for review/approval because:  Drug not on the [facility], P or [facility] refill protocol or controlled substance

## 2022-06-23 ENCOUNTER — MYC REFILL (OUTPATIENT)
Dept: FAMILY MEDICINE | Facility: CLINIC | Age: 47
End: 2022-06-23

## 2022-06-23 DIAGNOSIS — M47.817 SPONDYLOSIS OF LUMBOSACRAL REGION WITHOUT MYELOPATHY OR RADICULOPATHY: ICD-10-CM

## 2022-06-23 RX ORDER — OXYCODONE AND ACETAMINOPHEN 10; 325 MG/1; MG/1
1 TABLET ORAL EVERY 6 HOURS PRN
Qty: 60 TABLET | Refills: 0 | Status: SHIPPED | OUTPATIENT
Start: 2022-06-23 | End: 2022-07-06

## 2022-06-23 NOTE — TELEPHONE ENCOUNTER
Pending Prescriptions:                       Disp   Refills    oxyCODONE-acetaminophen (PERCOCET)  *60 tab*0        Sig: Take 1 tablet by mouth every 6 hours as needed for           severe pain        Routing refill request to provider for review/approval because:  Drug not on the [facility] refill protocol   Last FIlled: 06/09/22    Rufino Wesley, ILIRN, RN, PHN  [facility] RN for [place]  June 23, 2022

## 2022-06-30 ENCOUNTER — MYC REFILL (OUTPATIENT)
Dept: FAMILY MEDICINE | Facility: CLINIC | Age: 47
End: 2022-06-30

## 2022-06-30 DIAGNOSIS — G47.9 DISTURBANCE IN SLEEP BEHAVIOR: ICD-10-CM

## 2022-07-01 RX ORDER — ALPRAZOLAM 0.5 MG
0.5 TABLET ORAL
Qty: 30 TABLET | Refills: 0 | Status: SHIPPED | OUTPATIENT
Start: 2022-07-01 | End: 2022-07-29

## 2022-07-01 NOTE — TELEPHONE ENCOUNTER
Xanax      Last Written Prescription Date:  6-3-2022  Last Fill Quantity: 30,   # refills: 0  Last Office Visit: 1-  Future Office visit:       Routing refill request to provider for review/approval because:  Drug not on the [facility], P or [facility] refill protocol or controlled substance

## 2022-07-06 ENCOUNTER — MYC REFILL (OUTPATIENT)
Dept: FAMILY MEDICINE | Facility: CLINIC | Age: 47
End: 2022-07-06

## 2022-07-06 DIAGNOSIS — M47.817 SPONDYLOSIS OF LUMBOSACRAL REGION WITHOUT MYELOPATHY OR RADICULOPATHY: ICD-10-CM

## 2022-07-07 RX ORDER — OXYCODONE AND ACETAMINOPHEN 10; 325 MG/1; MG/1
1 TABLET ORAL EVERY 6 HOURS PRN
Qty: 60 TABLET | Refills: 0 | Status: SHIPPED | OUTPATIENT
Start: 2022-07-07 | End: 2022-07-21

## 2022-07-07 NOTE — TELEPHONE ENCOUNTER
Requested Prescriptions   Pending Prescriptions Disp Refills     oxyCODONE-acetaminophen (PERCOCET)  MG per tablet 60 tablet 0     Sig: Take 1 tablet by mouth every 6 hours as needed for severe pain       There is no refill protocol information for this order            Last Written Prescription Date:  06/23/2022  Last Fill Quantity: 60,   # refills: 0  Last Office Visit: 01/13/2022  Future Office visit:       Routing refill request to provider for review/approval because:  Drug not on the [facility], P or [facility] refill protocol or controlled substance

## 2022-07-21 ENCOUNTER — MYC REFILL (OUTPATIENT)
Dept: FAMILY MEDICINE | Facility: CLINIC | Age: 47
End: 2022-07-21

## 2022-07-21 DIAGNOSIS — M47.817 SPONDYLOSIS OF LUMBOSACRAL REGION WITHOUT MYELOPATHY OR RADICULOPATHY: ICD-10-CM

## 2022-07-21 RX ORDER — OXYCODONE AND ACETAMINOPHEN 10; 325 MG/1; MG/1
1 TABLET ORAL EVERY 6 HOURS PRN
Qty: 60 TABLET | Refills: 0 | Status: SHIPPED | OUTPATIENT
Start: 2022-07-21 | End: 2022-08-03

## 2022-07-21 NOTE — TELEPHONE ENCOUNTER
Requested Prescriptions   Pending Prescriptions Disp Refills     oxyCODONE-acetaminophen (PERCOCET)  MG per tablet 60 tablet 0     Sig: Take 1 tablet by mouth every 6 hours as needed for severe pain     Last Written Prescription Date:  07/7/2022  Last Fill Quantity: 60,   # refills: 0  Last Office Visit: 01/13/2022  Future Office visit:       Routing refill request to provider for review/approval because:  Drug not on the [facility], P or [facility] refill protocol or controlled substance

## 2022-07-22 ENCOUNTER — TELEPHONE (OUTPATIENT)
Dept: CARDIOLOGY | Facility: CLINIC | Age: 47
End: 2022-07-22

## 2022-07-25 ENCOUNTER — TELEPHONE (OUTPATIENT)
Dept: CARDIOLOGY | Facility: CLINIC | Age: 47
End: 2022-07-25

## 2022-07-27 ENCOUNTER — MYC REFILL (OUTPATIENT)
Dept: FAMILY MEDICINE | Facility: CLINIC | Age: 47
End: 2022-07-27

## 2022-07-27 DIAGNOSIS — M47.817 SPONDYLOSIS OF LUMBOSACRAL REGION WITHOUT MYELOPATHY OR RADICULOPATHY: ICD-10-CM

## 2022-07-28 RX ORDER — MORPHINE SULFATE 15 MG/1
15 TABLET, FILM COATED, EXTENDED RELEASE ORAL 2 TIMES DAILY
Qty: 60 TABLET | Refills: 0 | Status: SHIPPED | OUTPATIENT
Start: 2022-07-28 | End: 2022-08-25

## 2022-07-28 NOTE — TELEPHONE ENCOUNTER
Requested Prescriptions   Pending Prescriptions Disp Refills     morphine (MS CONTIN) 15 MG CR tablet 60 tablet 0     Sig: Take 1 tablet (15 mg) by mouth 2 times daily       There is no refill protocol information for this order            Last Written Prescription Date:  07/01/2022  Last Fill Quantity: 60,   # refills: 0  Last Office Visit: 1/13/2022  Future Office visit:       Routing refill request to provider for review/approval because:  Drug not on the [facility], P or [facility] refill protocol or controlled substance

## 2022-07-29 ENCOUNTER — MYC REFILL (OUTPATIENT)
Dept: FAMILY MEDICINE | Facility: CLINIC | Age: 47
End: 2022-07-29

## 2022-07-29 DIAGNOSIS — G47.9 DISTURBANCE IN SLEEP BEHAVIOR: ICD-10-CM

## 2022-07-29 RX ORDER — ALPRAZOLAM 0.5 MG
0.5 TABLET ORAL
Qty: 30 TABLET | Refills: 0 | Status: SHIPPED | OUTPATIENT
Start: 2022-07-29 | End: 2022-08-31

## 2022-07-29 NOTE — TELEPHONE ENCOUNTER
Xanax      Last Written Prescription Date:  7-1-2022  Last Fill Quantity: 30,   # refills: 0  Last Office Visit: 1-  Future Office visit:       Routing refill request to provider for review/approval because:  Drug not on the [facility], P or [facility] refill protocol or controlled substance

## 2022-08-03 ENCOUNTER — MYC REFILL (OUTPATIENT)
Dept: FAMILY MEDICINE | Facility: CLINIC | Age: 47
End: 2022-08-03

## 2022-08-03 DIAGNOSIS — M47.817 SPONDYLOSIS OF LUMBOSACRAL REGION WITHOUT MYELOPATHY OR RADICULOPATHY: ICD-10-CM

## 2022-08-03 RX ORDER — OXYCODONE AND ACETAMINOPHEN 10; 325 MG/1; MG/1
1 TABLET ORAL EVERY 6 HOURS PRN
Qty: 60 TABLET | Refills: 0 | Status: SHIPPED | OUTPATIENT
Start: 2022-08-03 | End: 2022-08-17

## 2022-08-03 NOTE — TELEPHONE ENCOUNTER
Requested Prescriptions   Pending Prescriptions Disp Refills     oxyCODONE-acetaminophen (PERCOCET)  MG per tablet 60 tablet 0     Sig: Take 1 tablet by mouth every 6 hours as needed for severe pain       There is no refill protocol information for this order           Last Written Prescription Date:  07/21/2022  Last Fill Quantity: 60,   # refills: 0  Last Office Visit: 01/13/2022  Future Office visit:       Routing refill request to provider for review/approval because:  Drug not on the [facility], [facility] or [facility] refill protocol or controlled substance

## 2022-08-17 DIAGNOSIS — M47.817 SPONDYLOSIS OF LUMBOSACRAL REGION WITHOUT MYELOPATHY OR RADICULOPATHY: ICD-10-CM

## 2022-08-17 RX ORDER — OXYCODONE AND ACETAMINOPHEN 10; 325 MG/1; MG/1
1 TABLET ORAL EVERY 6 HOURS PRN
Qty: 30 TABLET | Refills: 0 | Status: SHIPPED | OUTPATIENT
Start: 2022-08-17 | End: 2022-08-26

## 2022-08-17 NOTE — TELEPHONE ENCOUNTER
Routing refill request to provider for review/approval because:    Requested Prescriptions   Pending Prescriptions Disp Refills    oxyCODONE-acetaminophen (PERCOCET)  MG per tablet [Pharmacy Med Name: OXYCODONE-ACETAMINOPHEN  TABS] 60 tablet 0     Sig: Take 1 tablet by mouth every 6 hours as needed for severe pain        There is no refill protocol information for this order         oxyCODONE-acetaminophen (PERCOCET)  MG per tablet 60 tablet 0 8/3/2022  No   Sig - Route: Take 1 tablet by mouth every 6 hours as needed for severe pain - Oral   Sent to pharmacy as: oxyCODONE-Acetaminophen  MG Oral Tablet (PERCOCET)   Class: E-Prescribe   Earliest Fill Date: 8/3/2022   Order: [id number]   E-Prescribing Status: Receipt confirmed by pharmacy (8/3/2022 11:27 AM CDT)

## 2022-08-18 NOTE — TELEPHONE ENCOUNTER
I reduced her to #30 pills, not comfortable with the other narcotics, benzodiazepine and not recent clinic visit.

## 2022-08-24 DIAGNOSIS — M47.817 SPONDYLOSIS OF LUMBOSACRAL REGION WITHOUT MYELOPATHY OR RADICULOPATHY: ICD-10-CM

## 2022-08-24 NOTE — TELEPHONE ENCOUNTER
Requested Prescriptions   Pending Prescriptions Disp Refills    morphine (MS CONTIN) 15 MG CR tablet [Pharmacy Med Name: MORPHINE SULFATE ER 15MG TBCR] 60 tablet 0     Sig: Take 1 tablet (15 mg) by mouth 2 times daily        There is no refill protocol information for this order           Sandra Bokyin, ILIRN, RN

## 2022-08-25 RX ORDER — MORPHINE SULFATE 15 MG/1
15 TABLET, FILM COATED, EXTENDED RELEASE ORAL 2 TIMES DAILY
Qty: 60 TABLET | Refills: 0 | Status: SHIPPED | OUTPATIENT
Start: 2022-08-25 | End: 2022-09-22

## 2022-08-25 NOTE — TELEPHONE ENCOUNTER
Requested Prescriptions   Pending Prescriptions Disp Refills     oxyCODONE-acetaminophen (PERCOCET)  MG per tablet [Pharmacy Med Name: OXYCODONE-ACETAMINOPHEN  TABS] 30 tablet 0     Sig: TAKE 1 TABLET BY MOUTH EVERY 6 HOURS AS NEEDED FOR SEVERE PAIN     Last Written Prescription Date:  08/17/2022  Last Fill Quantity: 30,   # refills: 0  Last Office Visit: 01/13/2022  Future Office visit:       Routing refill request to provider for review/approval because:  Drug not on the [facility], [facility] or [facility] refill protocol or controlled substance

## 2022-08-26 ENCOUNTER — MYC REFILL (OUTPATIENT)
Dept: FAMILY MEDICINE | Facility: CLINIC | Age: 47
End: 2022-08-26

## 2022-08-26 DIAGNOSIS — M47.817 SPONDYLOSIS OF LUMBOSACRAL REGION WITHOUT MYELOPATHY OR RADICULOPATHY: ICD-10-CM

## 2022-08-26 RX ORDER — OXYCODONE AND ACETAMINOPHEN 10; 325 MG/1; MG/1
1 TABLET ORAL EVERY 6 HOURS PRN
Qty: 30 TABLET | Refills: 0 | Status: CANCELLED | OUTPATIENT
Start: 2022-08-26

## 2022-08-26 RX ORDER — OXYCODONE AND ACETAMINOPHEN 10; 325 MG/1; MG/1
1 TABLET ORAL EVERY 6 HOURS PRN
Qty: 60 TABLET | Refills: 0 | Status: SHIPPED | OUTPATIENT
Start: 2022-08-26 | End: 2022-09-07

## 2022-08-31 ENCOUNTER — VIRTUAL VISIT (OUTPATIENT)
Dept: FAMILY MEDICINE | Facility: CLINIC | Age: 47
End: 2022-08-31
Payer: COMMERCIAL

## 2022-08-31 ENCOUNTER — MYC REFILL (OUTPATIENT)
Dept: FAMILY MEDICINE | Facility: CLINIC | Age: 47
End: 2022-08-31

## 2022-08-31 DIAGNOSIS — F11.90 CHRONIC, CONTINUOUS USE OF OPIOIDS: ICD-10-CM

## 2022-08-31 DIAGNOSIS — M47.817 SPONDYLOSIS OF LUMBOSACRAL REGION WITHOUT MYELOPATHY OR RADICULOPATHY: Primary | ICD-10-CM

## 2022-08-31 DIAGNOSIS — G47.9 DISTURBANCE IN SLEEP BEHAVIOR: ICD-10-CM

## 2022-08-31 DIAGNOSIS — J84.9 ILD (INTERSTITIAL LUNG DISEASE) (H): ICD-10-CM

## 2022-08-31 PROCEDURE — 99214 OFFICE O/P EST MOD 30 MIN: CPT | Mod: TEL | Performed by: FAMILY MEDICINE

## 2022-08-31 RX ORDER — ALPRAZOLAM 0.5 MG
0.5 TABLET ORAL
Qty: 30 TABLET | Refills: 0 | Status: SHIPPED | OUTPATIENT
Start: 2022-08-31 | End: 2022-09-29

## 2022-08-31 RX ORDER — GUAIFENESIN AND DEXTROMETHORPHAN HYDROBROMIDE 600; 30 MG/1; MG/1
1 TABLET, EXTENDED RELEASE ORAL PRN
COMMUNITY
End: 2023-02-06

## 2022-08-31 NOTE — TELEPHONE ENCOUNTER
Requested Prescriptions   Pending Prescriptions Disp Refills     ALPRAZolam (XANAX) 0.5 MG tablet 30 tablet 0     Sig: Take 1 tablet (0.5 mg) by mouth nightly as needed for anxiety or sleep - INTENTIONAL DOSE REDUCTION (must last 30 days)     Last Written Prescription Date:  07/29/2022  Last Fill Quantity: 30,   # refills: 0  Last Office Visit: 08/31/2022  Future Office visit:       Routing refill request to provider for review/approval because:  Drug not on the [facility], [facility] or [facility] refill protocol or controlled substance

## 2022-08-31 NOTE — PROGRESS NOTES
Anali is a 46 year old who is being evaluated via a billable telephone visit.      What phone number would you like to be contacted at? [phone]  How would you like to obtain your AVS? Angyhart    Assessment & Plan     Spondylosis of lumbosacral region without myelopathy or radiculopathy  Suffered with this for years.  Was going to a pain clinic in [place] on she quit doing that as she felt it made it worse.  She feels she is getting a little better now.    Chronic, continuous use of opioids  We were going to set her up with pain clinic care but then the service discontinued.  Been monitoring her opioid use.  Discussed with her now cutting down on the immediate acting oxycodone to 3 a day.  She feels she can do this.  Just picked up a prescription that said for a day when she will try to go to 3 a day and we can switch to that in the future if she is tolerating it.  She also is on Xanax because she has interstitial lung disease and on continuous oxygen and gets panicky at times if she is feeling short of breath.  Also cardiomyopathy which is improving and she follows with [facility] cardiology she is told she is due to come in for urine drug screen and to sign an opioid use agreement and she will set up appointment up for this.                   No follow-ups on file.    Ignacio Loya MD  [facility]   Anali is a 46 year old, presenting for the following health issues:  No chief complaint on file.  Virtual telephone visit to discuss her medication use and follow-up.    History of Present Illness       Back Pain:  She presents for follow up of back pain. Patient's back pain is a chronic problem.  Location of back pain:  Right lower back, left lower back, right side of neck, left buttock and right hip  Description of back pain: burning, dull ache and sharp  Back pain spreads: left buttocks and left foot    Since patient first noticed back pain, pain is: always present, but gets  better and worse  Does back pain interfere with her job:  Yes      She eats 2-3 servings of fruits and vegetables daily.She consumes 1 sweetened beverage(s) daily.She exercises with enough effort to increase her heart rate 10 to 19 minutes per day.  She exercises with enough effort to increase her heart rate 3 or less days per week.   She is taking medications regularly.             Review of Systems   Constitutional, HEENT, cardiovascular, pulmonary, gi and gu systems are negative, except as otherwise noted.      Objective           Vitals:  No vitals were obtained today due to virtual visit.    Physical Exam   healthy, alert and no distress  PSYCH: Alert and oriented times 3; coherent speech, normal   rate and volume, able to articulate logical thoughts, able   to abstract reason, no tangential thoughts, no hallucinations   or delusions  Her affect is normal and pleasant  RESP: No cough, no audible wheezing, able to talk in full sentences  Remainder of exam unable to be completed due to telephone visits                Phone call duration: 5 minutes    .  ..

## 2022-09-07 ENCOUNTER — MYC REFILL (OUTPATIENT)
Dept: INTERNAL MEDICINE | Facility: CLINIC | Age: 47
End: 2022-09-07

## 2022-09-07 DIAGNOSIS — M47.817 SPONDYLOSIS OF LUMBOSACRAL REGION WITHOUT MYELOPATHY OR RADICULOPATHY: ICD-10-CM

## 2022-09-09 RX ORDER — OXYCODONE AND ACETAMINOPHEN 10; 325 MG/1; MG/1
1 TABLET ORAL EVERY 6 HOURS PRN
Qty: 60 TABLET | Refills: 0 | Status: SHIPPED | OUTPATIENT
Start: 2022-09-09 | End: 2022-09-22

## 2022-09-09 NOTE — TELEPHONE ENCOUNTER
Pending Prescriptions:                       Disp   Refills    oxyCODONE-acetaminophen (PERCOCET)  *60 tab*0        Sig: Take 1 tablet by mouth every 6 hours as needed for           severe pain      Routing refill request to provider for review/approval because:  Drug not on the [facility] refill protocol     Guadalupe Gibson RN

## 2022-09-22 ENCOUNTER — MYC REFILL (OUTPATIENT)
Dept: INTERNAL MEDICINE | Facility: CLINIC | Age: 47
End: 2022-09-22

## 2022-09-22 DIAGNOSIS — M47.817 SPONDYLOSIS OF LUMBOSACRAL REGION WITHOUT MYELOPATHY OR RADICULOPATHY: ICD-10-CM

## 2022-09-22 NOTE — TELEPHONE ENCOUNTER
Morphine       Last Written Prescription Date:  4/5/18  Last Fill Quantity: 60,   # refills: 0  Last Office Visit: 10/11/17  Future Office visit:       Routing refill request to provider for review/approval because:  Drug not on the [facility], P or [facility] refill protocol or controlled substance     Provider Procedure Text (A): After obtaining clear surgical margins the defect was repaired by another provider.

## 2022-09-23 RX ORDER — OXYCODONE AND ACETAMINOPHEN 10; 325 MG/1; MG/1
1 TABLET ORAL EVERY 6 HOURS PRN
Qty: 60 TABLET | Refills: 0 | Status: SHIPPED | OUTPATIENT
Start: 2022-09-23 | End: 2022-10-05

## 2022-09-23 RX ORDER — MORPHINE SULFATE 15 MG/1
15 TABLET, FILM COATED, EXTENDED RELEASE ORAL 2 TIMES DAILY
Qty: 60 TABLET | Refills: 0 | Status: SHIPPED | OUTPATIENT
Start: 2022-09-23 | End: 2022-10-21

## 2022-09-23 NOTE — TELEPHONE ENCOUNTER
Requested Prescriptions   Pending Prescriptions Disp Refills     oxyCODONE-acetaminophen (PERCOCET)  MG per tablet 60 tablet 0     Sig: Take 1 tablet by mouth every 6 hours as needed for severe pain (7-10)      Routing refill request to provider for review/approval because:  Drug not on the [facility], [facility] or [facility] refill protocol or controlled substance

## 2022-09-23 NOTE — TELEPHONE ENCOUNTER
Requested Prescriptions   Pending Prescriptions Disp Refills     morphine (MS CONTIN) 15 MG CR tablet 60 tablet 0     Sig: Take 1 tablet (15 mg) by mouth 2 times daily       Routing refill request to provider for review/approval because:  Drug not on the [facility], P or [facility] refill protocol or controlled substance

## 2022-09-24 DIAGNOSIS — R06.02 SOB (SHORTNESS OF BREATH): ICD-10-CM

## 2022-09-28 RX ORDER — ALBUTEROL SULFATE 90 UG/1
AEROSOL, METERED RESPIRATORY (INHALATION)
Qty: 18 G | Refills: 9 | Status: SHIPPED | OUTPATIENT
Start: 2022-09-28 | End: 2023-11-01

## 2022-09-28 NOTE — TELEPHONE ENCOUNTER
Routing refill request to provider for review/approval because:  Drug interaction warning    Marc Huang RN

## 2022-09-29 ENCOUNTER — MYC REFILL (OUTPATIENT)
Dept: FAMILY MEDICINE | Facility: CLINIC | Age: 47
End: 2022-09-29

## 2022-09-29 DIAGNOSIS — G47.9 DISTURBANCE IN SLEEP BEHAVIOR: ICD-10-CM

## 2022-09-29 NOTE — TELEPHONE ENCOUNTER
Requested Prescriptions   Pending Prescriptions Disp Refills     ALPRAZolam (XANAX) 0.5 MG tablet 30 tablet 0     Sig: Take 1 tablet (0.5 mg) by mouth nightly as needed for anxiety or sleep - INTENTIONAL DOSE REDUCTION (must last 30 days)     Next 5 appointments (look out 90 days)    Oct 20, 2022  1:00 PM  (Arrive by 12:40 PM)  Provider Visit with Ignacio Loya MD  [facility] ([facility] ) [address] [place] [id number] [phone]           Routing refill request to provider for review/approval because:  Drug not on the [facility], P or [facility] refill protocol or controlled substance

## 2022-09-30 RX ORDER — ALPRAZOLAM 0.5 MG
0.5 TABLET ORAL
Qty: 30 TABLET | Refills: 0 | Status: SHIPPED | OUTPATIENT
Start: 2022-09-30 | End: 2022-10-31

## 2022-10-05 ENCOUNTER — MYC REFILL (OUTPATIENT)
Dept: INTERNAL MEDICINE | Facility: CLINIC | Age: 47
End: 2022-10-05

## 2022-10-05 DIAGNOSIS — M47.817 SPONDYLOSIS OF LUMBOSACRAL REGION WITHOUT MYELOPATHY OR RADICULOPATHY: ICD-10-CM

## 2022-10-06 RX ORDER — OXYCODONE AND ACETAMINOPHEN 10; 325 MG/1; MG/1
1 TABLET ORAL EVERY 6 HOURS PRN
Qty: 60 TABLET | Refills: 0 | Status: SHIPPED | OUTPATIENT
Start: 2022-10-07 | End: 2022-10-21

## 2022-10-06 NOTE — TELEPHONE ENCOUNTER
Requested Prescriptions   Pending Prescriptions Disp Refills     oxyCODONE-acetaminophen (PERCOCET)  MG per tablet 60 tablet 0     Sig: Take 1 tablet by mouth every 6 hours as needed for severe pain     Next 5 appointments (look out 90 days)    Oct 20, 2022  1:00 PM  (Arrive by 12:40 PM)  Provider Visit with Ignacio Loya MD  [facility] ([facility] ) [address] [place] [id number] [phone]           Routing refill request to provider for review/approval because:  Drug not on the [facility], [facility] or [facility] refill protocol or controlled substance

## 2022-10-09 ENCOUNTER — HEALTH MAINTENANCE LETTER (OUTPATIENT)
Age: 47
End: 2022-10-09

## 2022-10-20 ENCOUNTER — OFFICE VISIT (OUTPATIENT)
Dept: FAMILY MEDICINE | Facility: CLINIC | Age: 47
End: 2022-10-20
Payer: COMMERCIAL

## 2022-10-20 VITALS
SYSTOLIC BLOOD PRESSURE: 137 MMHG | OXYGEN SATURATION: 99 % | HEART RATE: 64 BPM | DIASTOLIC BLOOD PRESSURE: 79 MMHG | RESPIRATION RATE: 15 BRPM | WEIGHT: 107.6 LBS | BODY MASS INDEX: 20 KG/M2

## 2022-10-20 DIAGNOSIS — Z87.891 PERSONAL HISTORY OF TOBACCO USE, PRESENTING HAZARDS TO HEALTH: ICD-10-CM

## 2022-10-20 DIAGNOSIS — Z13.220 SCREENING FOR HYPERLIPIDEMIA: ICD-10-CM

## 2022-10-20 DIAGNOSIS — Z79.899 ENCOUNTER FOR LONG-TERM (CURRENT) USE OF MEDICATIONS: Primary | ICD-10-CM

## 2022-10-20 LAB — CREAT UR-MCNC: 29 MG/DL

## 2022-10-20 PROCEDURE — 80307 DRUG TEST PRSMV CHEM ANLYZR: CPT | Performed by: FAMILY MEDICINE

## 2022-10-20 PROCEDURE — 99214 OFFICE O/P EST MOD 30 MIN: CPT | Performed by: FAMILY MEDICINE

## 2022-10-20 ASSESSMENT — ANXIETY QUESTIONNAIRES
1. FEELING NERVOUS, ANXIOUS, OR ON EDGE: SEVERAL DAYS
6. BECOMING EASILY ANNOYED OR IRRITABLE: NOT AT ALL
GAD7 TOTAL SCORE: 4
7. FEELING AFRAID AS IF SOMETHING AWFUL MIGHT HAPPEN: NOT AT ALL
7. FEELING AFRAID AS IF SOMETHING AWFUL MIGHT HAPPEN: NOT AT ALL
8. IF YOU CHECKED OFF ANY PROBLEMS, HOW DIFFICULT HAVE THESE MADE IT FOR YOU TO DO YOUR WORK, TAKE CARE OF THINGS AT HOME, OR GET ALONG WITH OTHER PEOPLE?: SOMEWHAT DIFFICULT
GAD7 TOTAL SCORE: 4
2. NOT BEING ABLE TO STOP OR CONTROL WORRYING: SEVERAL DAYS
4. TROUBLE RELAXING: SEVERAL DAYS
IF YOU CHECKED OFF ANY PROBLEMS ON THIS QUESTIONNAIRE, HOW DIFFICULT HAVE THESE PROBLEMS MADE IT FOR YOU TO DO YOUR WORK, TAKE CARE OF THINGS AT HOME, OR GET ALONG WITH OTHER PEOPLE: SOMEWHAT DIFFICULT
5. BEING SO RESTLESS THAT IT IS HARD TO SIT STILL: NOT AT ALL
3. WORRYING TOO MUCH ABOUT DIFFERENT THINGS: SEVERAL DAYS

## 2022-10-20 NOTE — PROGRESS NOTES
Assessment & Plan     Encounter for long-term (current) use of medications  Long-term use of MS Contin twice a day and uses Percocet daily as well this is for right lower back pain and left back pain neck pain hip pain.  She is never abuses these.  Opioid agreement was signed again.  She also uses Xanax when she gets panicky especially with her breathing.  - [id number] - Urine Drug Confirmation Panel (Comprehensive); Future  - [id number] - Urine Drug Confirmation Panel (Comprehensive)    Personal history of tobacco use, presenting hazards to health  She is on oxygen because of lung disease from pulmonary hypertension.  She follows with cardiology.  She has tried many times to quit smoking.  She is getting down to 4 cigarettes a day so wants some patches to help her.  This is good  - nicotine (NICODERM CQ) 7 MG/24HR 24 hr patch; Place 1 patch onto the skin every 24 hours    Screening for hyperlipidemia  She will come in and do this in the future she is not fasting today.  - Lipid panel reflex to direct LDL Fasting; Future                 No follow-ups on file.    Ignacio Loya MD  [facility]    Thierno Briones is a 46 year oldself, presenting for the following health issues:  No chief complaint on file.  In for follow-up on her long-term use of controlled substances mainly.  Also wants some help in smoking cessation with nicotine patch.    History of Present Illness       Back Pain:  She presents for follow up of back pain. Patient's back pain is a chronic problem.  Location of back pain:  Right lower back, left lower back, left side of neck, left buttock and right hip  Description of back pain: dull ache and sharp  Back pain spreads: left buttocks    Since patient first noticed back pain, pain is: always present, but gets better and worse  Does back pain interfere with her job:  Not applicable      Reason for visit:  Follow up for medications    She eats 2-3 servings of fruits and  vegetables daily.She consumes 1 sweetened beverage(s) daily.She exercises with enough effort to increase her heart rate 9 or less minutes per day.  She exercises with enough effort to increase her heart rate 3 or less days per week.   She is taking medications regularly.  Today's MOLLY-7 Score: 4             Review of Systems   Constitutional, HEENT, cardiovascular, pulmonary, gi and gu systems are negative, except as otherwise noted.      Objective    There were no vitals taken for this visit.  There is no height or weight on file to calculate BMI.  Physical Exam   GENERAL: alert, no distress and appears older than stated age  NECK: no adenopathy, no asymmetry, masses, or scars and thyroid normal to palpation  RESP: decreased breath sounds throughout and bilateral wheezes.  CV: regular rate and rhythm, normal S1 S2, no S3 or S4, no murmur, click or rub, no peripheral edema and peripheral pulses strong  MS: no gross musculoskeletal defects noted, no edema  NEURO: Normal strength and tone, mentation intact and speech normal  PSYCH: mentation appears normal, affect normal/bright

## 2022-10-20 NOTE — LETTER
Opioid / Opioid Plus Controlled Substance Agreement    This is an agreement between you and your provider about the safe and appropriate use of controlled substance/opioids prescribed by your care team. Controlled substances are medicines that can cause physical and mental dependence (abuse).    There are strict laws about having and using these medicines. We here at [facility] are committing to working with you in your efforts to get better. To support you in this work, we ll help you schedule regular office appointments for medicine refills. If we must cancel or change your appointment for any reason, we ll make sure you have enough medicine to last until your next appointment.     As a Provider, I will:    Listen carefully to your concerns and treat you with respect.     Recommend a treatment plan that I believe is in your best interest. This plan may involve therapies other than opioid pain medication.     Talk with you often about the possible benefits, and the risk of harm of any medicine that we prescribe for you.     Provide a plan on how to taper (discontinue or go off) using this medicine if the decision is made to stop its use.    As a Patient, I understand that opioid(s):     Are a controlled substance prescribed by my care team to help me function or work and manage my condition(s).     Are strong medicines and can cause serious side effects such as:    Drowsiness, which can seriously affect my driving ability    A lower breathing rate, enough to cause death    Harm to my thinking ability     Depression     Abuse of and addiction to this medicine    Need to be taken exactly as prescribed. Combining opioids with certain medicines or chemicals (such as illegal drugs, sedatives, sleeping pills, and benzodiazepines) can be dangerous or even fatal. If I stop opioids suddenly, I may have severe withdrawal symptoms.    Do not work for all types of pain nor for all patients. If they re not helpful, I may  be asked to stop them.        The risks, benefits and side effects of these medicine(s) were explained to me. I agree that:  1. I will take part in other treatments as advised by my care team. This may be psychiatry or counseling, physical therapy, behavioral therapy, group treatment or a referral to a specialist.     2. I will keep all my appointments. I understand that this is part of the monitoring of opioids. My care team may require an office visit for EVERY opioid/controlled substance refill. If I miss appointments or don t follow instructions, my care team may stop my medicine.    3. I will take my medicines as prescribed. I will not change the dose or schedule unless my care team tells me to. There will be no refills if I run out early.     4. I may be asked to come to the clinic and complete a urine drug test or complete a pill count at any time. If I don t give a urine sample or participate in a pill count, the care team may stop my medicine.    5. I will only receive prescriptions from this clinic for chronic pain. If I am treated by another provider for acute pain issues, I will tell them that I am taking opioid pain medication for chronic pain and that I have a treatment agreement with this provider. I will inform my [facility] care team within one business day if I am given a prescription for any pain medication by another healthcare provider. My [facility] care team can contact other providers and pharmacists about my use of any medicines.    6. It is up to me to make sure that I don t run out of my medicines on weekends or holidays. If my care team is willing to refill my opioid prescription without a visit, I must request refills only during office hours. Refills may take up to 3 business days to process. I will use one pharmacy to fill all my opioid and other controlled substance prescriptions. I will notify the clinic about any changes to my insurance or medication  availability.    7. I am responsible for my prescriptions. If the medicine/prescription is lost, stolen or destroyed, it will not be replaced. I also agree not to share controlled substance medicines with anyone.    8. I am aware I should not use any illegal or recreational drugs. I agree not to drink alcohol unless my care team says I can.       9. If I enroll in the [place] [facility] program, I will tell my care team prior to my next refill.     10. I will tell my care team right away if I become pregnant, have a new medical problem treated outside of my regular clinic, or have a change in my medications.    11. I understand that this medicine can affect my thinking, judgment and reaction time. Alcohol and drugs affect the brain and body, which can affect the safety of my driving. Being under the influence of alcohol or drugs can affect my decision-making, behaviors, personal safety, and the safety of others. Driving while impaired (DWI) can occur if a person is driving, operating, or in physical control of a car, motorcycle, boat, snowmobile, ATV, motorbike, off-road vehicle, or any other motor vehicle ([place] Statute 169A.20). I understand the risk if I choose to drive or operate any vehicle or machinery.    I understand that if I do not follow any of the conditions above, my prescriptions or treatment may be stopped or changed.          Opioids  What You Need to Know    What are opioids?   Opioids are pain medicines that must be prescribed by a doctor. They are also known as narcotics.     Examples are:   1. morphine (MS Contin, Delphine)  2. oxycodone (Oxycontin)  3. oxycodone and acetaminophen (Percocet)  4. hydrocodone and acetaminophen (Vicodin, Norco)   5. fentanyl patch (Duragesic)   6. hydromorphone (Dilaudid)   7. methadone  8. codeine (Tylenol #3)     What do opioids do well?   Opioids are best for severe short-term pain such as after a surgery or injury. They may work well for cancer pain. They may  help some people with long-lasting (chronic) pain.     What do opioids NOT do well?   Opioids never get rid of pain entirely, and they don t work well for most patients with chronic pain. Opioids don t reduce swelling, one of the causes of pain.                                    Other ways to manage chronic pain and improve function include:       Treat the health problem that may be causing pain    Anti-inflammation medicines, which reduce swelling and tenderness, such as ibuprofen (Advil, Motrin) or naproxen (Aleve)    Acetaminophen (Tylenol)    Antidepressants and anti-seizure medicines, especially for nerve pain    Topical treatments such as patches or creams    Injections or nerve blocks    Chiropractic or osteopathic treatment    Acupuncture, massage, deep breathing, meditation, visual imagery, aromatherapy    Use heat or ice at the pain site    Physical therapy     Exercise    Stop smoking    Take part in therapy       Risks and side effects     Talk to your doctor before you start or decide to keep taking opioids. Possible side effects include:      Lowering your breathing rate enough to cause death    Overdose, including death, especially if taking higher than prescribed doses    Worse depression symptoms; less pleasure in things you usually enjoy    Feeling tired or sluggish    Slower thoughts or cloudy thinking    Being more sensitive to pain over time; pain is harder to control    Trouble sleeping or restless sleep    Changes in hormone levels (for example, less testosterone)    Changes in sex drive or ability to have sex    Constipation    Unsafe driving    Itching and sweating    Dizziness    Nausea, throwing up and dry mouth    What else should I know about opioids?    Opioids may lead to dependence, tolerance, or addiction.      Dependence means that if you stop or reduce the medicine too quickly, you will have withdrawal symptoms. These include loose poop (diarrhea), jitters, flu-like symptoms,  nervousness and tremors. Dependence is not the same as addiction.                       Tolerance means needing higher doses over time to get the same effect. This may increase the chance of serious side effects.      Addiction is when people improperly use a substance that harms their body, their mind or their relations with others. Use of opiates can cause a relapse of addiction if you have a history of drug or alcohol abuse.      People who have used opioids for a long time may have a lower quality of life, worse depression, higher levels of pain and more visits to doctors.    You can overdose on opioids. Take these steps to lower your risk of overdose:    1. Recognize the signs:  Signs of overdose include decrease or loss of consciousness (blackout), slowed breathing, trouble waking up and blue lips. If someone is worried about overdose, they should call 911.    2. Talk to your doctor about Narcan (naloxone).   If you are at risk for overdose, you may be given a prescription for Narcan. This medicine very quickly reverses the effects of opioids.   If you overdose, a friend or family member can give you Narcan while waiting for the ambulance. They need to know the signs of overdose and how to give Narcan.     3. Don't use alcohol or street drugs.   Taking them with opioids can cause death.    4. Do not take any of these medicines unless your doctor says it s OK. Taking these with opioids can cause death:    Benzodiazepines, such as lorazepam (Ativan), alprazolam (Xanax) or diazepam (Valium)    Muscle relaxers, such as cyclobenzaprine (Flexeril)    Sleeping pills like zolpidem (Ambien)     Other opioids      How to keep you and other people safe while taking opioids:    1. Never share your opioids with others.  Opioid medicines are regulated by the Drug Enforcement Agency ([place]). Selling or sharing medications is a criminal act.    2. Be sure to store opioids in a secure place, locked up if possible. Young children  can easily swallow them and overdose.    3. When you are traveling with your medicines, keep them in the original bottles. If you use a pill box, be sure you also carry a copy of your medicine list from your clinic or pharmacy.    4. Safe disposal of opioids    Most pharmacies have places to get rid of medicine, called disposal kiosks. Medicine disposal options are also available in every [facility]. Search your county and  medication disposal  to find more options. You can find more details at:  [email or web address]     I agree that my provider, clinic care team, and pharmacy may work with any city, state or federal law enforcement agency that investigates the misuse, sale, or other diversion of my controlled medicine. I will allow my provider to discuss my care with, or share a copy of, this agreement with any other treating provider, pharmacy or emergency room where I receive care.    I have read this agreement and have asked questions about anything I did not understand.    _______________________________________________________  Patient Signature - Anali Loya _____________________                   Date     _______________________________________________________  Provider Signature - Ignacio Loya MD   _____________________                   Date     _______________________________________________________  Witness Signature (required if provider not present while patient signing)   _____________________                   Date

## 2022-10-20 NOTE — LETTER
Opioid / Opioid Plus Controlled Substance Agreement    This is an agreement between you and your provider about the safe and appropriate use of controlled substance/opioids prescribed by your care team. Controlled substances are medicines that can cause physical and mental dependence (abuse).    There are strict laws about having and using these medicines. We here at [facility] are committing to working with you in your efforts to get better. To support you in this work, we ll help you schedule regular office appointments for medicine refills. If we must cancel or change your appointment for any reason, we ll make sure you have enough medicine to last until your next appointment.     As a Provider, I will:    Listen carefully to your concerns and treat you with respect.     Recommend a treatment plan that I believe is in your best interest. This plan may involve therapies other than opioid pain medication.     Talk with you often about the possible benefits, and the risk of harm of any medicine that we prescribe for you.     Provide a plan on how to taper (discontinue or go off) using this medicine if the decision is made to stop its use.    As a Patient, I understand that opioid(s):     Are a controlled substance prescribed by my care team to help me function or work and manage my condition(s).     Are strong medicines and can cause serious side effects such as:    Drowsiness, which can seriously affect my driving ability    A lower breathing rate, enough to cause death    Harm to my thinking ability     Depression     Abuse of and addiction to this medicine    Need to be taken exactly as prescribed. Combining opioids with certain medicines or chemicals (such as illegal drugs, sedatives, sleeping pills, and benzodiazepines) can be dangerous or even fatal. If I stop opioids suddenly, I may have severe withdrawal symptoms.    Do not work for all types of pain nor for all patients. If they re not helpful, I may  be asked to stop them.        The risks, benefits and side effects of these medicine(s) were explained to me. I agree that:  1. I will take part in other treatments as advised by my care team. This may be psychiatry or counseling, physical therapy, behavioral therapy, group treatment or a referral to a specialist.     2. I will keep all my appointments. I understand that this is part of the monitoring of opioids. My care team may require an office visit for EVERY opioid/controlled substance refill. If I miss appointments or don t follow instructions, my care team may stop my medicine.    3. I will take my medicines as prescribed. I will not change the dose or schedule unless my care team tells me to. There will be no refills if I run out early.     4. I may be asked to come to the clinic and complete a urine drug test or complete a pill count at any time. If I don t give a urine sample or participate in a pill count, the care team may stop my medicine.    5. I will only receive prescriptions from this clinic for chronic pain. If I am treated by another provider for acute pain issues, I will tell them that I am taking opioid pain medication for chronic pain and that I have a treatment agreement with this provider. I will inform my [facility] care team within one business day if I am given a prescription for any pain medication by another healthcare provider. My [facility] care team can contact other providers and pharmacists about my use of any medicines.    6. It is up to me to make sure that I don t run out of my medicines on weekends or holidays. If my care team is willing to refill my opioid prescription without a visit, I must request refills only during office hours. Refills may take up to 3 business days to process. I will use one pharmacy to fill all my opioid and other controlled substance prescriptions. I will notify the clinic about any changes to my insurance or medication  availability.    7. I am responsible for my prescriptions. If the medicine/prescription is lost, stolen or destroyed, it will not be replaced. I also agree not to share controlled substance medicines with anyone.    8. I am aware I should not use any illegal or recreational drugs. I agree not to drink alcohol unless my care team says I can.       9. If I enroll in the [place] [facility] Cannabis program, I will tell my care team prior to my next refill.     10. I will tell my care team right away if I become pregnant, have a new medical problem treated outside of my regular clinic, or have a change in my medications.    11. I understand that this medicine can affect my thinking, judgment and reaction time. Alcohol and drugs affect the brain and body, which can affect the safety of my driving. Being under the influence of alcohol or drugs can affect my decision-making, behaviors, personal safety, and the safety of others. Driving while impaired (DWI) can occur if a person is driving, operating, or in physical control of a car, motorcycle, boat, snowmobile, ATV, motorbike, off-road vehicle, or any other motor vehicle ([place] Statute 169[place].20). I understand the risk if I choose to drive or operate any vehicle or machinery.    I understand that if I do not follow any of the conditions above, my prescriptions or treatment may be stopped or changed.          Opioids  What You Need to Know    What are opioids?   Opioids are pain medicines that must be prescribed by a doctor. They are also known as narcotics.     Examples are:   1. morphine (MS Contin, Delphine)  2. oxycodone (Oxycontin)  3. oxycodone and acetaminophen (Percocet)  4. hydrocodone and acetaminophen (Vicodin, Norco)   5. fentanyl patch (Duragesic)   6. hydromorphone (Dilaudid)   7. methadone  8. codeine (Tylenol #3)     What do opioids do well?   Opioids are best for severe short-term pain such as after a surgery or injury. They may work well for cancer pain. They may  help some people with long-lasting (chronic) pain.     What do opioids NOT do well?   Opioids never get rid of pain entirely, and they don t work well for most patients with chronic pain. Opioids don t reduce swelling, one of the causes of pain.                                    Other ways to manage chronic pain and improve function include:       Treat the health problem that may be causing pain    Anti-inflammation medicines, which reduce swelling and tenderness, such as ibuprofen (Advil, Motrin) or naproxen (Aleve)    Acetaminophen (Tylenol)    Antidepressants and anti-seizure medicines, especially for nerve pain    Topical treatments such as patches or creams    Injections or nerve blocks    Chiropractic or osteopathic treatment    Acupuncture, massage, deep breathing, meditation, visual imagery, aromatherapy    Use heat or ice at the pain site    Physical therapy     Exercise    Stop smoking    Take part in therapy       Risks and side effects     Talk to your doctor before you start or decide to keep taking opioids. Possible side effects include:      Lowering your breathing rate enough to cause death    Overdose, including death, especially if taking higher than prescribed doses    Worse depression symptoms; less pleasure in things you usually enjoy    Feeling tired or sluggish    Slower thoughts or cloudy thinking    Being more sensitive to pain over time; pain is harder to control    Trouble sleeping or restless sleep    Changes in hormone levels (for example, less testosterone)    Changes in sex drive or ability to have sex    Constipation    Unsafe driving    Itching and sweating    Dizziness    Nausea, throwing up and dry mouth    What else should I know about opioids?    Opioids may lead to dependence, tolerance, or addiction.      Dependence means that if you stop or reduce the medicine too quickly, you will have withdrawal symptoms. These include loose poop (diarrhea), jitters, flu-like symptoms,  nervousness and tremors. Dependence is not the same as addiction.                       Tolerance means needing higher doses over time to get the same effect. This may increase the chance of serious side effects.      Addiction is when people improperly use a substance that harms their body, their mind or their relations with others. Use of opiates can cause a relapse of addiction if you have a history of drug or alcohol abuse.      People who have used opioids for a long time may have a lower quality of life, worse depression, higher levels of pain and more visits to doctors.    You can overdose on opioids. Take these steps to lower your risk of overdose:    1. Recognize the signs:  Signs of overdose include decrease or loss of consciousness (blackout), slowed breathing, trouble waking up and blue lips. If someone is worried about overdose, they should call 911.    2. Talk to your doctor about Narcan (naloxone).   If you are at risk for overdose, you may be given a prescription for Narcan. This medicine very quickly reverses the effects of opioids.   If you overdose, a friend or family member can give you Narcan while waiting for the ambulance. They need to know the signs of overdose and how to give Narcan.     3. Don't use alcohol or street drugs.   Taking them with opioids can cause death.    4. Do not take any of these medicines unless your doctor says it s OK. Taking these with opioids can cause death:    Benzodiazepines, such as lorazepam (Ativan), alprazolam (Xanax) or diazepam (Valium)    Muscle relaxers, such as cyclobenzaprine (Flexeril)    Sleeping pills like zolpidem (Ambien)     Other opioids      How to keep you and other people safe while taking opioids:    1. Never share your opioids with others.  Opioid medicines are regulated by the Drug Enforcement Agency ([place]). Selling or sharing medications is a criminal act.    2. Be sure to store opioids in a secure place, locked up if possible. Young children  can easily swallow them and overdose.    3. When you are traveling with your medicines, keep them in the original bottles. If you use a pill box, be sure you also carry a copy of your medicine list from your clinic or pharmacy.    4. Safe disposal of opioids    Most pharmacies have places to get rid of medicine, called disposal kiosks. Medicine disposal options are also available in every [facility]. Search your county and  medication disposal  to find more options. You can find more details at:  [email or web address]     I agree that my provider, clinic care team, and pharmacy may work with any city, state or federal law enforcement agency that investigates the misuse, sale, or other diversion of my controlled medicine. I will allow my provider to discuss my care with, or share a copy of, this agreement with any other treating provider, pharmacy or emergency room where I receive care.    I have read this agreement and have asked questions about anything I did not understand.    _______________________________________________________  Patient Signature - Anali Loya _____________________                   Date     _______________________________________________________  Provider Signature - Ignacio Loya MD   _____________________                   Date     _______________________________________________________  Witness Signature (required if provider not present while patient signing)   _____________________                   Date

## 2022-10-24 LAB
A-OH ALPRAZ UR QL CFM: PRESENT
ALPRAZ UR QL CFM: PRESENT
HYDROMORPHONE UR CFM-MCNC: 300 NG/ML
HYDROMORPHONE/CREAT UR: 1034 NG/MG {CREAT}
MORPHINE UR CFM-MCNC: >7000 NG/ML
MORPHINE/CREAT UR: ABNORMAL
OXYCODONE UR CFM-MCNC: 847 NG/ML
OXYCODONE/CREAT UR: 2921 NG/MG {CREAT}
OXYMORPHONE UR CFM-MCNC: 229 NG/ML
OXYMORPHONE/CREAT UR: 790 NG/MG {CREAT}

## 2022-10-31 ENCOUNTER — MYC REFILL (OUTPATIENT)
Dept: FAMILY MEDICINE | Facility: CLINIC | Age: 47
End: 2022-10-31

## 2022-10-31 DIAGNOSIS — G47.9 DISTURBANCE IN SLEEP BEHAVIOR: ICD-10-CM

## 2022-10-31 RX ORDER — ALPRAZOLAM 0.5 MG
0.5 TABLET ORAL
Qty: 30 TABLET | Refills: 0 | Status: SHIPPED | OUTPATIENT
Start: 2022-10-31 | End: 2022-11-29

## 2022-10-31 NOTE — TELEPHONE ENCOUNTER
Requested Prescriptions   Pending Prescriptions Disp Refills     ALPRAZolam (XANAX) 0.5 MG tablet 30 tablet 0     Sig: Take 1 tablet (0.5 mg) by mouth nightly as needed for anxiety or sleep - INTENTIONAL DOSE REDUCTION (must last 30 days)       Routing refill request to provider for review/approval because:  Drug not on the [facility], P or [facility] refill protocol or controlled substance

## 2022-11-16 ENCOUNTER — MYC REFILL (OUTPATIENT)
Dept: FAMILY MEDICINE | Facility: CLINIC | Age: 47
End: 2022-11-16

## 2022-11-16 DIAGNOSIS — M47.817 SPONDYLOSIS OF LUMBOSACRAL REGION WITHOUT MYELOPATHY OR RADICULOPATHY: ICD-10-CM

## 2022-11-16 RX ORDER — MORPHINE SULFATE 15 MG/1
15 TABLET, FILM COATED, EXTENDED RELEASE ORAL 2 TIMES DAILY
Qty: 60 TABLET | Refills: 0 | Status: SHIPPED | OUTPATIENT
Start: 2022-11-18 | End: 2022-12-14

## 2022-11-16 NOTE — TELEPHONE ENCOUNTER
Requested Prescriptions   Pending Prescriptions Disp Refills     morphine (MS CONTIN) 15 MG CR tablet 60 tablet 0     Sig: Take 1 tablet (15 mg) by mouth 2 times daily        Routing refill request to provider for review/approval because:  Drug not on the G, P or [facility] refill protocol or controlled substance

## 2022-11-18 ENCOUNTER — TELEPHONE (OUTPATIENT)
Dept: CARDIOLOGY | Facility: CLINIC | Age: 47
End: 2022-11-18

## 2022-11-18 DIAGNOSIS — I27.20 PULMONARY HYPERTENSION (H): ICD-10-CM

## 2022-11-18 NOTE — TELEPHONE ENCOUNTER
"11/18/2022 4:20 PM -     Rcvd  email from Sofy: \"Re: EMILE Loya, 1975, current approval expires on 11/23, please complete authorization via [facility], Key: [id number].\"  Thank you,  Ernesto Mccoy  provider \"    Shows pt d/c'd med October, 2022, routing to Delfino QUILES RN to advise if PA is needed.            Leda HUMPHREY CMA  Clinical   Cardiology/Pulmonary Hypertension   [facility]  PH: [phone]   "

## 2022-11-21 ENCOUNTER — TELEPHONE (OUTPATIENT)
Dept: CARDIOLOGY | Facility: CLINIC | Age: 47
End: 2022-11-21

## 2022-11-21 NOTE — TELEPHONE ENCOUNTER
"11/21/2022 1:15 PM -     From Delfino ALVES RN : \"Sunitha Tadeo RN Rogers, Linda, CMA  Caller: Unspecified (3 days ago,  4:20 PM)  It looks like she was asked to restart tadalafil, as noted the OVN from Dr. Ledesma in April of this year.     NAREN Saleh      Per Delfino, pt should be on med currently; submit PA for 40 m q day.    ----------------------------  Insurance: [facility] / [place]   BIN:   [id number]  PCN[id number]  ID#: [id number]  GRP#: N/A      PA Initiation    Medication: Tadalfil 20 mg    Insurance Company: [employer] - Phone [phone] Fax [phone]  Pharmacy Filling the Rx: [facility] [address]   Filling Pharmacy Phone:    Filling Pharmacy Fax:    Start Date: 11/21/2022kunal SOLIMAN key:   [id number]                          Leda HUMPHREY CMA  Clinical   Cardiology/Pulmonary Hypertension   [facility]  PH: [phone]     "

## 2022-11-21 NOTE — TELEPHONE ENCOUNTER
Unable to LM d/t full mailbox. Sunitha Tadeo RN on 11/21/2022 at 1:31 PM    Unable to LM x2. Sent new ERx for tadalafil to local pharmacy. Sunitha Tadeo RN on 11/22/2022 at 10:14 AM

## 2022-11-22 ENCOUNTER — MYC MEDICAL ADVICE (OUTPATIENT)
Dept: CARDIOLOGY | Facility: CLINIC | Age: 47
End: 2022-11-22

## 2022-11-22 RX ORDER — TADALAFIL 20 MG/1
20 TABLET ORAL DAILY
Qty: 90 TABLET | Refills: 3 | Status: SHIPPED | OUTPATIENT
Start: 2022-11-22 | End: 2022-11-29

## 2022-11-22 NOTE — TELEPHONE ENCOUNTER
11/22/2022 10:02 AM -       Prior Authorization Approval    Authorization Effective Date: 10/22/2022  Authorization Expiration Date: 11/22/2023  Medication: Tadalfil 20 mg    Approved Dose/Quantity: *up to 60 for 30    Reference #:     Insurance Company: [employer] - Phone [phone] Fax [phone]  Expected CoPay:       CoPay Card Available:      Foundation Assistance Needed:    Which Pharmacy is filling the prescription (Not needed for infusion/clinic administered): [facility] [place], [place] [facility] [address] [facility]     11/22/2022 10:03 AM  - Routed to  Nursing :  Sunitha Tadeo RN  - Pls send rx to local pharmacy    - Updated Snapshot  - Added expiration date to JCARLOS HUMPHREY CMA  Clinical   Cardiology/Pulmonary Hypertension   [facility]  PH: [phone]

## 2022-11-29 ENCOUNTER — MYC REFILL (OUTPATIENT)
Dept: FAMILY MEDICINE | Facility: CLINIC | Age: 47
End: 2022-11-29

## 2022-11-29 DIAGNOSIS — G47.9 DISTURBANCE IN SLEEP BEHAVIOR: ICD-10-CM

## 2022-11-29 RX ORDER — ALPRAZOLAM 0.5 MG
0.5 TABLET ORAL
Qty: 30 TABLET | Refills: 0 | Status: SHIPPED | OUTPATIENT
Start: 2022-11-29 | End: 2022-12-28

## 2022-11-29 NOTE — TELEPHONE ENCOUNTER
Routing refill request to provider for review/approval because:    Requested Prescriptions   Pending Prescriptions Disp Refills    ALPRAZolam (XANAX) 0.5 MG tablet 30 tablet 0     Sig: Take 1 tablet (0.5 mg) by mouth nightly as needed for anxiety or sleep - INTENTIONAL DOSE REDUCTION (must last 30 days)       There is no refill protocol information for this order

## 2022-11-29 NOTE — TELEPHONE ENCOUNTER
Staff message sent to Dr. Ledesma for follow up on tadalafil stop. Sunitha Tadeo RN on 11/29/2022 at 8:46 AM    Date: 12/1/2022    Time of Call: 9:01 AM     Diagnosis:  Pulmonary HTN     [ TORB ] Ordering provider: Callie Ledesma   Order: tadalafil 20 mg every other day     Order received by: Delfino Tadeo RN     Follow-up/additional notes:

## 2022-12-01 RX ORDER — TADALAFIL 20 MG/1
20 TABLET ORAL EVERY OTHER DAY
COMMUNITY
Start: 2022-12-01 | End: 2023-03-16

## 2022-12-05 DIAGNOSIS — R06.02 SOB (SHORTNESS OF BREATH): ICD-10-CM

## 2022-12-05 DIAGNOSIS — I27.20 PULMONARY HYPERTENSION (H): Primary | ICD-10-CM

## 2022-12-14 ENCOUNTER — MYC REFILL (OUTPATIENT)
Dept: FAMILY MEDICINE | Facility: CLINIC | Age: 47
End: 2022-12-14

## 2022-12-14 DIAGNOSIS — M47.817 SPONDYLOSIS OF LUMBOSACRAL REGION WITHOUT MYELOPATHY OR RADICULOPATHY: ICD-10-CM

## 2022-12-16 RX ORDER — OXYCODONE AND ACETAMINOPHEN 10; 325 MG/1; MG/1
1 TABLET ORAL EVERY 6 HOURS PRN
Qty: 120 TABLET | Refills: 0 | Status: SHIPPED | OUTPATIENT
Start: 2022-12-16 | End: 2023-01-11

## 2022-12-16 RX ORDER — MORPHINE SULFATE 15 MG/1
15 TABLET, FILM COATED, EXTENDED RELEASE ORAL 2 TIMES DAILY
Qty: 60 TABLET | Refills: 0 | Status: SHIPPED | OUTPATIENT
Start: 2022-12-16 | End: 2023-01-11

## 2022-12-16 NOTE — TELEPHONE ENCOUNTER
Requested Prescriptions   Pending Prescriptions Disp Refills    morphine (MS CONTIN) 15 MG CR tablet 60 tablet 0     Sig: Take 1 tablet (15 mg) by mouth 2 times daily       There is no refill protocol information for this order       oxyCODONE-acetaminophen (PERCOCET)  MG per tablet 120 tablet 0     Sig: Take 1 tablet by mouth every 6 hours as needed for severe pain (7-10)       There is no refill protocol information for this order

## 2022-12-21 ENCOUNTER — TELEPHONE (OUTPATIENT)
Dept: CARDIOLOGY | Facility: CLINIC | Age: 47
End: 2022-12-21

## 2022-12-21 NOTE — TELEPHONE ENCOUNTER
Called to confirm appointment in person tomorrow with Jania Woo. Patient was not planning on coming and would like to rescheduled. Message sent to scheduling team for assistance.     Lilliana Lewis RN on 12/21/2022 at 4:49 PM

## 2022-12-28 ENCOUNTER — MYC REFILL (OUTPATIENT)
Dept: FAMILY MEDICINE | Facility: CLINIC | Age: 47
End: 2022-12-28

## 2022-12-28 DIAGNOSIS — G47.9 DISTURBANCE IN SLEEP BEHAVIOR: ICD-10-CM

## 2022-12-28 RX ORDER — ALPRAZOLAM 0.5 MG
0.5 TABLET ORAL
Qty: 30 TABLET | Refills: 0 | Status: SHIPPED | OUTPATIENT
Start: 2022-12-28 | End: 2023-01-27

## 2022-12-28 NOTE — TELEPHONE ENCOUNTER
Requested Prescriptions   Pending Prescriptions Disp Refills     ALPRAZolam (XANAX) 0.5 MG tablet 30 tablet 0     Sig: Take 1 tablet (0.5 mg) by mouth nightly as needed for anxiety or sleep - INTENTIONAL DOSE REDUCTION (must last 30 days)         Routing refill request to provider for review/approval because:  Drug not on the [facility], P or [facility] refill protocol or controlled substance

## 2023-01-11 ENCOUNTER — MYC REFILL (OUTPATIENT)
Dept: FAMILY MEDICINE | Facility: CLINIC | Age: 48
End: 2023-01-11

## 2023-01-11 DIAGNOSIS — M47.817 SPONDYLOSIS OF LUMBOSACRAL REGION WITHOUT MYELOPATHY OR RADICULOPATHY: ICD-10-CM

## 2023-01-11 RX ORDER — OXYCODONE AND ACETAMINOPHEN 10; 325 MG/1; MG/1
1 TABLET ORAL EVERY 6 HOURS PRN
Qty: 30 TABLET | Refills: 0 | Status: SHIPPED | OUTPATIENT
Start: 2023-01-11 | End: 2023-01-18

## 2023-01-11 RX ORDER — MORPHINE SULFATE 15 MG/1
15 TABLET, FILM COATED, EXTENDED RELEASE ORAL 2 TIMES DAILY
Qty: 15 TABLET | Refills: 0 | Status: SHIPPED | OUTPATIENT
Start: 2023-01-11 | End: 2023-01-18

## 2023-01-11 NOTE — TELEPHONE ENCOUNTER
Requested Prescriptions   Pending Prescriptions Disp Refills     morphine (MS CONTIN) 15 MG CR tablet 60 tablet 0     Sig: Take 1 tablet (15 mg) by mouth 2 times daily     :       Routing refill request to provider for review/approval because:  Drug not on the [facility], P or [facility] refill protocol or controlled substance              oxyCODONE-acetaminophen (PERCOCET)  MG per tablet 120 tablet 0     Sig: Take 1 tablet by mouth every 6 hours as needed for severe pain (7-10)         Routing refill request to provider for review/approval because:  Drug not on the [facility], P or [facility] refill protocol or controlled substance

## 2023-01-13 ENCOUNTER — TELEPHONE (OUTPATIENT)
Dept: CARDIOLOGY | Facility: CLINIC | Age: 48
End: 2023-01-13

## 2023-01-13 DIAGNOSIS — I27.20 PULMONARY HYPERTENSION (H): ICD-10-CM

## 2023-01-13 RX ORDER — FUROSEMIDE 20 MG/1
20 TABLET ORAL DAILY
Qty: 90 TABLET | Refills: 3 | Status: CANCELLED | OUTPATIENT
Start: 2023-01-13

## 2023-01-17 ENCOUNTER — TELEPHONE (OUTPATIENT)
Dept: CARDIOLOGY | Facility: CLINIC | Age: 48
End: 2023-01-17
Payer: COMMERCIAL

## 2023-01-17 DIAGNOSIS — M47.817 SPONDYLOSIS OF LUMBOSACRAL REGION WITHOUT MYELOPATHY OR RADICULOPATHY: ICD-10-CM

## 2023-01-17 NOTE — TELEPHONE ENCOUNTER
1/17/2023 5:22 PM -   APPROVED - CaseId:[id number];Status:Approved;Review Type:Prior Auth;Coverage Start Date:12/18/2022;Coverage End Date:01/17/2024;    Prior Authorization Approval    Authorization Effective Date: 12/18/2022  Authorization Expiration Date: 1/17/2024  Medication: Tadalafil 20 mg PAH - renewal- new ins for 2023   Approved Dose/Quantity: 60/30  Reference #: CaseId:[id number]   Insurance Company: [employer] - Phone [phone] Fax [phone]  Expected CoPay:       CoPay Card Available:      Foundation Assistance Needed:    Which Pharm[facility] is filling the prescription (Not needed for infusion[facility]clinic [facility] [place] [place] [facility] [address] [place] [address]    Updated Snapshot and added to PA Calendar.  Copy of approval sent to Jeanna/Eulalia.       Leda HUMPHREY CMA  Clinical   Cardiology/Pulmonary Hypertension   [facility]  PH: [phone]

## 2023-01-17 NOTE — TELEPHONE ENCOUNTER
OK for medication refill for 1 month and schedule for establish care visit, 40 min in next available appropriate time slot in the next month. OK to use Approval Only designated slot. Must be 40 min. Please notify patient and request refills from pharmacy.  Cristian Fagan MD

## 2023-01-17 NOTE — TELEPHONE ENCOUNTER
Reason for Call:  Appointment Request  Patient will be out of pain medication on Saturday. Patient asking if this can be refilled until she has an appointment with you.    Patient requesting this type of appt:  Medication and establish care    Requested provider: Dr. Araujo    Reason patient unable to be scheduled: Not within requested timeframe    When does patient want to be seen/preferred time: 1-2 days    Comments: patient is requesting Dr. Araujo to be new provider.     Could we send this information to you in [employer] or would you prefer to receive a phone call?:   Patient would prefer a phone call   Okay to leave a detailed message?: Yes at Cell number on file:    Telephone Information:   Mobile [phone]       Call taken on 1/17/2023 at 12:00 PM by Adama Minaya     pain, low back

## 2023-01-17 NOTE — TELEPHONE ENCOUNTER
1/17/2023 1:43 PM -   From [facility]: PLEASE COMPLETE AUTHORIZATION VIA CM, SUMMERS CODE: [id number] AND SUBMIT TO PLAN.     PA Initiation    Medication: Tadalafil 20 mg PAH - renewal- new ins for 2023   Insurance Company: [employer] - Phone [phone] Fax [phone]  [facility] [place] [place] [facility] [address] [place] [address]  Filling Pharmacy Phone:    Filling Pharmacy Fax:    Start Date: 1/17/2023 VIA [facility], SUMMERS:   [id number] - W/ [facility] DATED [id number]               Leda HUMPHREY CMA  Clinical   Cardiology/Pulmonary Hypertension   [facility]  PH: [phone]

## 2023-01-17 NOTE — TELEPHONE ENCOUNTER
Reason for Call:  Appointment Request    Patient requesting this type of appt: Chronic Diease Management/Medication/Follow-Up    Requested provider: Gracia was recommended to her by Vincenzo but she will see anyone to get in ASAP.    Reason patient unable to be scheduled: Not within requested timeframe    When does patient want to be seen/preferred time: 1-2 days    Comments: She was given about a week of medication but will be out over the weekend. She is requesting a work-in appt by Friday 1/20    Could we send this information to you in [employer] [facility] or would you prefer to receive a phone call?:   Patient would prefer a phone call   Okay to leave a detailed message?: Yes at Cell number on file:    Telephone Information:   Mobile [phone]       Call taken on 1/17/2023 at 9:45 AM by Vinny Peck

## 2023-01-17 NOTE — TELEPHONE ENCOUNTER
Patient scheduled. Monday February 3rd    Patient will be out of medication on Friday and Saturday, this week.    Zoë Hatfield XRO/

## 2023-01-18 RX ORDER — OXYCODONE AND ACETAMINOPHEN 10; 325 MG/1; MG/1
1 TABLET ORAL EVERY 6 HOURS PRN
Qty: 120 TABLET | Refills: 0 | Status: SHIPPED | OUTPATIENT
Start: 2023-01-21 | End: 2023-02-15

## 2023-01-18 RX ORDER — MORPHINE SULFATE 15 MG/1
15 TABLET, FILM COATED, EXTENDED RELEASE ORAL 2 TIMES DAILY
Qty: 60 TABLET | Refills: 0 | Status: SHIPPED | OUTPATIENT
Start: 2023-01-21 | End: 2023-02-15

## 2023-01-27 ENCOUNTER — MYC REFILL (OUTPATIENT)
Dept: FAMILY MEDICINE | Facility: CLINIC | Age: 48
End: 2023-01-27
Payer: COMMERCIAL

## 2023-01-27 DIAGNOSIS — G47.9 DISTURBANCE IN SLEEP BEHAVIOR: ICD-10-CM

## 2023-01-27 RX ORDER — ALPRAZOLAM 0.5 MG
0.5 TABLET ORAL
Qty: 30 TABLET | Refills: 0 | Status: SHIPPED | OUTPATIENT
Start: 2023-01-27 | End: 2023-02-24

## 2023-01-27 NOTE — TELEPHONE ENCOUNTER
Requested Prescriptions   Pending Prescriptions Disp Refills     ALPRAZolam (XANAX) 0.5 MG tablet 30 tablet 0     Sig: Take 1 tablet (0.5 mg) by mouth nightly as needed for anxiety or sleep - INTENTIONAL DOSE REDUCTION (must last 30 days)       Next 5 appointments (look out 90 days)    Feb 06, 2023  3:00 PM  (Arrive by 2:40 PM)  Provider Visit with Cristian Fagan MD  [facility] ([facility] ) [address] [place] [id number] [phone]           Routing refill request to provider for review/approval because:  Drug not on the G, P or [facility] refill protocol or controlled substance

## 2023-02-06 ENCOUNTER — OFFICE VISIT (OUTPATIENT)
Dept: FAMILY MEDICINE | Facility: CLINIC | Age: 48
End: 2023-02-06
Payer: COMMERCIAL

## 2023-02-06 VITALS
HEIGHT: 61 IN | WEIGHT: 106 LBS | DIASTOLIC BLOOD PRESSURE: 78 MMHG | OXYGEN SATURATION: 97 % | HEART RATE: 77 BPM | RESPIRATION RATE: 14 BRPM | BODY MASS INDEX: 20.01 KG/M2 | TEMPERATURE: 98.1 F | SYSTOLIC BLOOD PRESSURE: 128 MMHG

## 2023-02-06 DIAGNOSIS — F11.90 CHRONIC, CONTINUOUS USE OF OPIOIDS: ICD-10-CM

## 2023-02-06 DIAGNOSIS — Z76.89 ESTABLISHING CARE WITH NEW DOCTOR, ENCOUNTER FOR: Primary | ICD-10-CM

## 2023-02-06 DIAGNOSIS — J84.9 ILD (INTERSTITIAL LUNG DISEASE) (H): ICD-10-CM

## 2023-02-06 DIAGNOSIS — Z87.891 PERSONAL HISTORY OF TOBACCO USE, PRESENTING HAZARDS TO HEALTH: ICD-10-CM

## 2023-02-06 DIAGNOSIS — I27.20 PULMONARY HYPERTENSION (H): ICD-10-CM

## 2023-02-06 DIAGNOSIS — Z72.0 TOBACCO ABUSE: ICD-10-CM

## 2023-02-06 DIAGNOSIS — M33.20 POLYMYOSITIS, ORGAN INVOLVEMENT UNSPECIFIED (H): ICD-10-CM

## 2023-02-06 PROCEDURE — 99214 OFFICE O/P EST MOD 30 MIN: CPT | Performed by: FAMILY MEDICINE

## 2023-02-06 RX ORDER — BUPROPION HYDROCHLORIDE 150 MG/1
150 TABLET, FILM COATED, EXTENDED RELEASE ORAL 2 TIMES DAILY
Qty: 60 TABLET | Refills: 3 | Status: SHIPPED | OUTPATIENT
Start: 2023-02-06 | End: 2023-05-11

## 2023-02-06 RX ORDER — FUROSEMIDE 20 MG
20 TABLET ORAL DAILY
Qty: 90 TABLET | Refills: 3 | Status: SHIPPED | OUTPATIENT
Start: 2023-02-06 | End: 2023-03-16

## 2023-02-06 ASSESSMENT — ANXIETY QUESTIONNAIRES
GAD7 TOTAL SCORE: 7
5. BEING SO RESTLESS THAT IT IS HARD TO SIT STILL: MORE THAN HALF THE DAYS
1. FEELING NERVOUS, ANXIOUS, OR ON EDGE: SEVERAL DAYS
7. FEELING AFRAID AS IF SOMETHING AWFUL MIGHT HAPPEN: SEVERAL DAYS
3. WORRYING TOO MUCH ABOUT DIFFERENT THINGS: SEVERAL DAYS
IF YOU CHECKED OFF ANY PROBLEMS ON THIS QUESTIONNAIRE, HOW DIFFICULT HAVE THESE PROBLEMS MADE IT FOR YOU TO DO YOUR WORK, TAKE CARE OF THINGS AT HOME, OR GET ALONG WITH OTHER PEOPLE: SOMEWHAT DIFFICULT
8. IF YOU CHECKED OFF ANY PROBLEMS, HOW DIFFICULT HAVE THESE MADE IT FOR YOU TO DO YOUR WORK, TAKE CARE OF THINGS AT HOME, OR GET ALONG WITH OTHER PEOPLE?: SOMEWHAT DIFFICULT
6. BECOMING EASILY ANNOYED OR IRRITABLE: NOT AT ALL
7. FEELING AFRAID AS IF SOMETHING AWFUL MIGHT HAPPEN: SEVERAL DAYS
4. TROUBLE RELAXING: SEVERAL DAYS
GAD7 TOTAL SCORE: 7
GAD7 TOTAL SCORE: 7
2. NOT BEING ABLE TO STOP OR CONTROL WORRYING: SEVERAL DAYS

## 2023-02-06 ASSESSMENT — PATIENT HEALTH QUESTIONNAIRE - PHQ9
SUM OF ALL RESPONSES TO PHQ QUESTIONS 1-9: 3
SUM OF ALL RESPONSES TO PHQ QUESTIONS 1-9: 3
10. IF YOU CHECKED OFF ANY PROBLEMS, HOW DIFFICULT HAVE THESE PROBLEMS MADE IT FOR YOU TO DO YOUR WORK, TAKE CARE OF THINGS AT HOME, OR GET ALONG WITH OTHER PEOPLE: SOMEWHAT DIFFICULT

## 2023-02-06 ASSESSMENT — PAIN SCALES - GENERAL: PAINLEVEL: MILD PAIN (2)
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
## 2023-02-10 ENCOUNTER — HOSPITAL ENCOUNTER (EMERGENCY)
Facility: CLINIC | Age: 48
Discharge: HOME | End: 2023-02-10
Attending: EMERGENCY MEDICINE | Admitting: EMERGENCY MEDICINE
Payer: COMMERCIAL

## 2023-02-10 ENCOUNTER — APPOINTMENT (OUTPATIENT)
Dept: GENERAL RADIOLOGY | Facility: CLINIC | Age: 48
End: 2023-02-10
Attending: EMERGENCY MEDICINE
Payer: COMMERCIAL

## 2023-02-10 VITALS
BODY MASS INDEX: 19.87 KG/M2 | DIASTOLIC BLOOD PRESSURE: 89 MMHG | RESPIRATION RATE: 27 BRPM | WEIGHT: 106.2 LBS | OXYGEN SATURATION: 96 % | TEMPERATURE: 97.9 F | HEART RATE: 74 BPM | SYSTOLIC BLOOD PRESSURE: 137 MMHG

## 2023-02-10 DIAGNOSIS — R51.9 ACUTE NONINTRACTABLE HEADACHE, UNSPECIFIED HEADACHE TYPE: ICD-10-CM

## 2023-02-10 DIAGNOSIS — R06.02 SHORTNESS OF BREATH: ICD-10-CM

## 2023-02-10 LAB
ALBUMIN SERPL BCG-MCNC: 3.9 G/DL (ref 3.5–5.2)
ALP SERPL-CCNC: 59 U/L (ref 35–104)
ALT SERPL W P-5'-P-CCNC: 6 U/L (ref 10–35)
ANION GAP SERPL CALCULATED.3IONS-SCNC: 9 MMOL/L (ref 7–15)
AST SERPL W P-5'-P-CCNC: 17 U/L (ref 10–35)
BASOPHILS # BLD AUTO: 0.1 10E3/UL (ref 0–0.2)
BASOPHILS NFR BLD AUTO: 1 %
BILIRUB SERPL-MCNC: 0.2 MG/DL
BUN SERPL-MCNC: 11.3 MG/DL (ref 6–20)
CALCIUM SERPL-MCNC: 9.3 MG/DL (ref 8.6–10)
CHLORIDE SERPL-SCNC: 100 MMOL/L (ref 98–107)
CREAT SERPL-MCNC: 0.58 MG/DL (ref 0.51–0.95)
DEPRECATED HCO3 PLAS-SCNC: 27 MMOL/L (ref 22–29)
DEPRECATED S PYO AG THROAT QL EIA: NEGATIVE
EOSINOPHIL # BLD AUTO: 0.6 10E3/UL (ref 0–0.7)
EOSINOPHIL NFR BLD AUTO: 6 %
ERYTHROCYTE [DISTWIDTH] IN BLOOD BY AUTOMATED COUNT: 13.2 % (ref 10–15)
FLUAV RNA SPEC QL NAA+PROBE: NEGATIVE
FLUBV RNA RESP QL NAA+PROBE: NEGATIVE
GFR SERPL CREATININE-BSD FRML MDRD: >90 ML/MIN/1.73M2
GLUCOSE SERPL-MCNC: 72 MG/DL (ref 70–99)
GROUP A STREP BY PCR: NOT DETECTED
HCT VFR BLD AUTO: 35.8 % (ref 35–47)
HGB BLD-MCNC: 11.2 G/DL (ref 11.7–15.7)
IMM GRANULOCYTES # BLD: 0 10E3/UL
IMM GRANULOCYTES NFR BLD: 0 %
LYMPHOCYTES # BLD AUTO: 2 10E3/UL (ref 0.8–5.3)
LYMPHOCYTES NFR BLD AUTO: 21 %
MCH RBC QN AUTO: 29.2 PG (ref 26.5–33)
MCHC RBC AUTO-ENTMCNC: 31.3 G/DL (ref 31.5–36.5)
MCV RBC AUTO: 94 FL (ref 78–100)
MONOCYTES # BLD AUTO: 0.7 10E3/UL (ref 0–1.3)
MONOCYTES NFR BLD AUTO: 8 %
NEUTROPHILS # BLD AUTO: 6.1 10E3/UL (ref 1.6–8.3)
NEUTROPHILS NFR BLD AUTO: 64 %
NRBC # BLD AUTO: 0 10E3/UL
NRBC BLD AUTO-RTO: 0 /100
PLATELET # BLD AUTO: 395 10E3/UL (ref 150–450)
POTASSIUM SERPL-SCNC: 3.9 MMOL/L (ref 3.4–5.3)
PROT SERPL-MCNC: 7.7 G/DL (ref 6.4–8.3)
RBC # BLD AUTO: 3.83 10E6/UL (ref 3.8–5.2)
RSV RNA SPEC NAA+PROBE: NEGATIVE
SARS-COV-2 RNA RESP QL NAA+PROBE: NEGATIVE
SODIUM SERPL-SCNC: 136 MMOL/L (ref 136–145)
TROPONIN T SERPL HS-MCNC: <6 NG/L
WBC # BLD AUTO: 9.6 10E3/UL (ref 4–11)

## 2023-02-10 PROCEDURE — 85004 AUTOMATED DIFF WBC COUNT: CPT | Performed by: EMERGENCY MEDICINE

## 2023-02-10 PROCEDURE — 96374 THER/PROPH/DIAG INJ IV PUSH: CPT | Performed by: EMERGENCY MEDICINE

## 2023-02-10 PROCEDURE — 87637 SARSCOV2&INF A&B&RSV AMP PRB: CPT | Performed by: EMERGENCY MEDICINE

## 2023-02-10 PROCEDURE — 250N000011 HC RX IP 250 OP 636: Performed by: EMERGENCY MEDICINE

## 2023-02-10 PROCEDURE — 250N000009 HC RX 250: Performed by: EMERGENCY MEDICINE

## 2023-02-10 PROCEDURE — 84484 ASSAY OF TROPONIN QUANT: CPT | Performed by: EMERGENCY MEDICINE

## 2023-02-10 PROCEDURE — 99285 EMERGENCY DEPT VISIT HI MDM: CPT | Mod: 25,CS | Performed by: EMERGENCY MEDICINE

## 2023-02-10 PROCEDURE — C9803 HOPD COVID-19 SPEC COLLECT: HCPCS | Performed by: EMERGENCY MEDICINE

## 2023-02-10 PROCEDURE — 36415 COLL VENOUS BLD VENIPUNCTURE: CPT | Performed by: EMERGENCY MEDICINE

## 2023-02-10 PROCEDURE — 94640 AIRWAY INHALATION TREATMENT: CPT | Performed by: EMERGENCY MEDICINE

## 2023-02-10 PROCEDURE — 87651 STREP A DNA AMP PROBE: CPT | Performed by: EMERGENCY MEDICINE

## 2023-02-10 PROCEDURE — 96375 TX/PRO/DX INJ NEW DRUG ADDON: CPT | Performed by: EMERGENCY MEDICINE

## 2023-02-10 PROCEDURE — 80053 COMPREHEN METABOLIC PANEL: CPT | Performed by: EMERGENCY MEDICINE

## 2023-02-10 PROCEDURE — 99284 EMERGENCY DEPT VISIT MOD MDM: CPT | Mod: CS | Performed by: EMERGENCY MEDICINE

## 2023-02-10 PROCEDURE — 71045 X-RAY EXAM CHEST 1 VIEW: CPT

## 2023-02-10 RX ORDER — AZITHROMYCIN 250 MG/1
TABLET, FILM COATED ORAL
Qty: 6 TABLET | Refills: 0 | Status: SHIPPED | OUTPATIENT
Start: 2023-02-10 | End: 2023-02-15

## 2023-02-10 RX ORDER — KETOROLAC TROMETHAMINE 15 MG/ML
15 INJECTION, SOLUTION INTRAMUSCULAR; INTRAVENOUS ONCE
Status: COMPLETED | OUTPATIENT
Start: 2023-02-10 | End: 2023-02-10

## 2023-02-10 RX ORDER — DIPHENHYDRAMINE HYDROCHLORIDE 50 MG/ML
25 INJECTION INTRAMUSCULAR; INTRAVENOUS ONCE
Status: COMPLETED | OUTPATIENT
Start: 2023-02-10 | End: 2023-02-10

## 2023-02-10 RX ORDER — ALBUTEROL SULFATE 0.83 MG/ML
2.5 SOLUTION RESPIRATORY (INHALATION) ONCE
Status: COMPLETED | OUTPATIENT
Start: 2023-02-10 | End: 2023-02-10

## 2023-02-10 RX ADMIN — ALBUTEROL SULFATE 2.5 MG: 2.5 SOLUTION RESPIRATORY (INHALATION) at 19:07

## 2023-02-10 RX ADMIN — KETOROLAC TROMETHAMINE 15 MG: 15 INJECTION, SOLUTION INTRAMUSCULAR; INTRAVENOUS at 19:04

## 2023-02-10 RX ADMIN — DIPHENHYDRAMINE HYDROCHLORIDE 25 MG: 50 INJECTION, SOLUTION INTRAMUSCULAR; INTRAVENOUS at 18:59

## 2023-02-10 ASSESSMENT — ACTIVITIES OF DAILY LIVING (ADL): ADLS_ACUITY_SCORE: 37
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
## 2023-02-11 NOTE — DISCHARGE INSTRUCTIONS
Try to drink lots of fluids and get plenty of rest.    Use your home inhaler if needed for shortness of breath.    I sent a prescription for antibiotics to the pharmacy.  If you choose to start them they will be available tomorrow.  I recommend starting them if you have any worsening cough or change in your phlegm.    Okay to take over-the-counter Tylenol or ibuprofen if needed for headache.    If you have any significant worsening, changes or concerns return at any time.    I hope that you start to feel much better quickly and and enjoy the rest of your weekend!!!

## 2023-02-15 ENCOUNTER — MYC REFILL (OUTPATIENT)
Dept: FAMILY MEDICINE | Facility: CLINIC | Age: 48
End: 2023-02-15
Payer: COMMERCIAL

## 2023-02-15 DIAGNOSIS — M47.817 SPONDYLOSIS OF LUMBOSACRAL REGION WITHOUT MYELOPATHY OR RADICULOPATHY: ICD-10-CM

## 2023-02-15 RX ORDER — MORPHINE SULFATE 15 MG/1
15 TABLET, FILM COATED, EXTENDED RELEASE ORAL 2 TIMES DAILY
Qty: 60 TABLET | Refills: 0 | Status: SHIPPED | OUTPATIENT
Start: 2023-02-20 | End: 2023-03-16

## 2023-02-15 RX ORDER — OXYCODONE AND ACETAMINOPHEN 10; 325 MG/1; MG/1
1 TABLET ORAL EVERY 6 HOURS PRN
Qty: 120 TABLET | Refills: 0 | Status: SHIPPED | OUTPATIENT
Start: 2023-02-20 | End: 2023-03-16

## 2023-02-15 NOTE — TELEPHONE ENCOUNTER
Morphine  Percocet  Routing refill request to provider for review/approval because:  Drug not on the [facility] refill protocol         Requested Prescriptions   Pending Prescriptions Disp Refills     oxyCODONE-acetaminophen (PERCOCET)  MG per tablet 120 tablet 0     Sig: Take 1 tablet by mouth every 6 hours as needed for severe pain (7-10)       There is no refill protocol information for this order        morphine (MS CONTIN) 15 MG CR tablet 60 tablet 0     Sig: Take 1 tablet (15 mg) by mouth 2 times daily       There is no refill protocol information for this order        Olimpia Oakley, RN

## 2023-02-18 ENCOUNTER — HEALTH MAINTENANCE LETTER (OUTPATIENT)
Age: 48
End: 2023-02-18

## 2023-02-24 ENCOUNTER — MYC REFILL (OUTPATIENT)
Dept: FAMILY MEDICINE | Facility: CLINIC | Age: 48
End: 2023-02-24
Payer: COMMERCIAL

## 2023-02-24 DIAGNOSIS — G47.9 DISTURBANCE IN SLEEP BEHAVIOR: ICD-10-CM

## 2023-02-24 RX ORDER — ALPRAZOLAM 0.5 MG
0.5 TABLET ORAL
Qty: 30 TABLET | Refills: 0 | Status: SHIPPED | OUTPATIENT
Start: 2023-02-24 | End: 2023-03-22

## 2023-02-24 NOTE — TELEPHONE ENCOUNTER
Pending Prescriptions:                       Disp   Refills    ALPRAZolam (XANAX) 0.5 MG tablet           30 tab*0        Sig: Take 1 tablet (0.5 mg) by mouth nightly as needed for           anxiety or sleep - INTENTIONAL DOSE REDUCTION           (must last 30 days)      Routing refill request to provider for review/approval because:  Drug not on the [facility] refill protocol     Lesley Hale RN on 2/24/2023 at 1:27 PM

## 2023-03-06 ENCOUNTER — NURSE TRIAGE (OUTPATIENT)
Dept: FAMILY MEDICINE | Facility: CLINIC | Age: 48
End: 2023-03-06
Payer: COMMERCIAL

## 2023-03-06 NOTE — TELEPHONE ENCOUNTER
Patient is having minor rectal bleeding.  She finds that when she wipes she has blood and also finds some drops in the toilet.  She has some abdominal pain that comes and goes but is tolerable.    Patient has scheduled an appointment for 03/09/23 for further follow up.    Encouraged selected care advice.  RN reviewed red flag symptoms with patient and when to seek emergency care.   Patient agreed and verbalized understanding.      Reason for Disposition    Rectal bleeding is minimal (e.g., blood just on toilet paper, a few drops in toilet bowl)    Additional Information    Negative: Passed out (i.e., fainted, collapsed and was not responding)    Negative: Shock suspected (e.g., cold/pale/clammy skin, too weak to stand, low BP, rapid pulse)    Negative: Vomiting red blood or black (coffee ground) material    Negative: Sounds like a life-threatening emergency to the triager    Negative: Diarrhea is main symptom    Negative: Rectal symptoms    Negative: SEVERE rectal bleeding (large blood clots; constant or on and off bleeding)    Negative: SEVERE dizziness (e.g., unable to stand, requires support to walk, feels like passing out now)    Negative: MODERATE rectal bleeding (small blood clots, passing blood without stool, or toilet water turns red) more than once a day    Negative: Bloody, black, or tarry bowel movements  (Exception: Chronic-unchanged black-grey bowel movements and is taking iron pills or Pepto-Bismol.)    Negative: High-risk adult (e.g., prior surgery on aorta, abdominal aortic aneurysm)    Negative: Rectal foreign body (inserted or swallowed)    Negative: SEVERE abdominal pain (e.g., excruciating)    Negative: Constant abdominal pain lasting > 2 hours    Negative: Pale skin (pallor) of new-onset or worsening    Negative: Patient sounds very sick or weak to the triager    Negative: MODERATE rectal bleeding (small blood clots, passing blood without stool, or toilet water turns red)    Negative: Taking  "Coumadin (warfarin) or other strong blood thinner, or known bleeding disorder (e.g., thrombocytopenia)    Negative: Colonoscopy in past 72 hours    Negative: Known cirrhosis of the liver (or history of liver failure or ascites)    Negative: Patient wants to be seen    Negative: MILD rectal bleeding (more than just a few drops or streaks)    Negative: Cancer of rectum or intestines (colon)    Negative: Radiation therapy to lower abdomen or pelvis    Answer Assessment - Initial Assessment Questions  1. APPEARANCE of BLOOD: \"What color is it?\" \"Is it passed separately, on the surface of the stool, or mixed in with the stool?\"       Bright red - notices it when she wipes and has had some in underwear    2. AMOUNT: \"How much blood was passed?\"       Little bits of blood    3. FREQUENCY: \"How many times has blood been passed with the stools?\"       5 x    4. ONSET: \"When was the blood first seen in the stools?\" (Days or weeks)       02/2023  Thought it was part of being constipated    5. DIARRHEA: \"Is there also some diarrhea?\" If Yes, ask: \"How many diarrhea stools in the past 24 hours?\"       No    6. CONSTIPATION: \"Do you have constipation?\" If Yes, ask: \"How bad is it?\"      Take miralax to help with constipation  Not struggling with constipation at this time - \"not real bad\"    7. RECURRENT SYMPTOMS: \"Have you had blood in your stools before?\" If Yes, ask: \"When was the last time?\" and \"What happened that time?\"       This is new    8. BLOOD THINNERS: \"Do you take any blood thinners?\" (e.g., Coumadin/warfarin, Pradaxa/dabigatran, aspirin)      No    9. OTHER SYMPTOMS: \"Do you have any other symptoms?\"  (e.g., abdomen pain, vomiting, dizziness, fever)      Some abdominal pain    10. PREGNANCY: \"Is there any chance you are pregnant?\" \"When was your last menstrual period?\"        First day of last period: end of February 2023    Protocols used: RECTAL BLEEDING-A-[place]      "

## 2023-03-16 ENCOUNTER — LAB (OUTPATIENT)
Dept: LAB | Facility: CLINIC | Age: 48
End: 2023-03-16
Payer: COMMERCIAL

## 2023-03-16 ENCOUNTER — OFFICE VISIT (OUTPATIENT)
Dept: CARDIOLOGY | Facility: CLINIC | Age: 48
End: 2023-03-16
Attending: PHYSICIAN ASSISTANT
Payer: COMMERCIAL

## 2023-03-16 ENCOUNTER — MYC REFILL (OUTPATIENT)
Dept: FAMILY MEDICINE | Facility: CLINIC | Age: 48
End: 2023-03-16

## 2023-03-16 VITALS
DIASTOLIC BLOOD PRESSURE: 79 MMHG | OXYGEN SATURATION: 97 % | BODY MASS INDEX: 19.08 KG/M2 | HEIGHT: 62 IN | SYSTOLIC BLOOD PRESSURE: 120 MMHG | HEART RATE: 78 BPM | WEIGHT: 103.7 LBS

## 2023-03-16 DIAGNOSIS — I27.20 PULMONARY HYPERTENSION (H): ICD-10-CM

## 2023-03-16 DIAGNOSIS — R06.09 DOE (DYSPNEA ON EXERTION): Primary | ICD-10-CM

## 2023-03-16 DIAGNOSIS — M47.817 SPONDYLOSIS OF LUMBOSACRAL REGION WITHOUT MYELOPATHY OR RADICULOPATHY: ICD-10-CM

## 2023-03-16 DIAGNOSIS — R06.02 SOB (SHORTNESS OF BREATH): ICD-10-CM

## 2023-03-16 LAB
6 MIN WALK (FT): 1000 FT
6 MIN WALK (M): 305 M
ALBUMIN SERPL BCG-MCNC: 4.4 G/DL (ref 3.5–5.2)
ALP SERPL-CCNC: 57 U/L (ref 35–104)
ALT SERPL W P-5'-P-CCNC: 5 U/L (ref 10–35)
ANION GAP SERPL CALCULATED.3IONS-SCNC: 11 MMOL/L (ref 7–15)
AST SERPL W P-5'-P-CCNC: 15 U/L (ref 10–35)
BILIRUB SERPL-MCNC: 0.3 MG/DL
BUN SERPL-MCNC: 10.7 MG/DL (ref 6–20)
CALCIUM SERPL-MCNC: 9.6 MG/DL (ref 8.6–10)
CHLORIDE SERPL-SCNC: 104 MMOL/L (ref 98–107)
CREAT SERPL-MCNC: 0.65 MG/DL (ref 0.51–0.95)
DEPRECATED HCO3 PLAS-SCNC: 28 MMOL/L (ref 22–29)
ERYTHROCYTE [DISTWIDTH] IN BLOOD BY AUTOMATED COUNT: 12.7 % (ref 10–15)
GFR SERPL CREATININE-BSD FRML MDRD: >90 ML/MIN/1.73M2
GLUCOSE SERPL-MCNC: 93 MG/DL (ref 70–99)
HCT VFR BLD AUTO: 41.2 % (ref 35–47)
HGB BLD-MCNC: 12.8 G/DL (ref 11.7–15.7)
MCH RBC QN AUTO: 28.8 PG (ref 26.5–33)
MCHC RBC AUTO-ENTMCNC: 31.1 G/DL (ref 31.5–36.5)
MCV RBC AUTO: 93 FL (ref 78–100)
NT-PROBNP SERPL-MCNC: 215 PG/ML (ref 0–450)
PLATELET # BLD AUTO: 425 10E3/UL (ref 150–450)
POTASSIUM SERPL-SCNC: 3.7 MMOL/L (ref 3.4–5.3)
PROT SERPL-MCNC: 8.3 G/DL (ref 6.4–8.3)
RBC # BLD AUTO: 4.45 10E6/UL (ref 3.8–5.2)
SODIUM SERPL-SCNC: 143 MMOL/L (ref 136–145)
WBC # BLD AUTO: 12.5 10E3/UL (ref 4–11)

## 2023-03-16 PROCEDURE — G0463 HOSPITAL OUTPT CLINIC VISIT: HCPCS | Performed by: PHYSICIAN ASSISTANT

## 2023-03-16 PROCEDURE — 83880 ASSAY OF NATRIURETIC PEPTIDE: CPT | Performed by: PATHOLOGY

## 2023-03-16 PROCEDURE — 94618 PULMONARY STRESS TESTING: CPT | Performed by: INTERNAL MEDICINE

## 2023-03-16 PROCEDURE — 36415 COLL VENOUS BLD VENIPUNCTURE: CPT | Performed by: PATHOLOGY

## 2023-03-16 PROCEDURE — 99215 OFFICE O/P EST HI 40 MIN: CPT | Performed by: PHYSICIAN ASSISTANT

## 2023-03-16 PROCEDURE — 80053 COMPREHEN METABOLIC PANEL: CPT | Performed by: PATHOLOGY

## 2023-03-16 PROCEDURE — 85027 COMPLETE CBC AUTOMATED: CPT | Performed by: PATHOLOGY

## 2023-03-16 RX ORDER — OXYCODONE AND ACETAMINOPHEN 10; 325 MG/1; MG/1
1 TABLET ORAL EVERY 6 HOURS PRN
Qty: 120 TABLET | Refills: 0 | Status: SHIPPED | OUTPATIENT
Start: 2023-03-16 | End: 2023-04-17

## 2023-03-16 RX ORDER — FUROSEMIDE 20 MG
20 TABLET ORAL PRN
Qty: 90 TABLET | Refills: 3 | COMMUNITY
Start: 2023-03-16 | End: 2023-09-06

## 2023-03-16 RX ORDER — TADALAFIL 20 MG/1
40 TABLET ORAL DAILY
Qty: 60 TABLET | Refills: 11 | Status: SHIPPED | OUTPATIENT
Start: 2023-03-16 | End: 2023-03-16

## 2023-03-16 RX ORDER — MORPHINE SULFATE 15 MG/1
15 TABLET, FILM COATED, EXTENDED RELEASE ORAL 2 TIMES DAILY
Qty: 60 TABLET | Refills: 0 | Status: SHIPPED | OUTPATIENT
Start: 2023-03-16 | End: 2023-04-17

## 2023-03-16 RX ORDER — TADALAFIL 20 MG/1
20 TABLET ORAL DAILY
Qty: 30 TABLET | Refills: 11 | Status: SHIPPED | OUTPATIENT
Start: 2023-03-16 | End: 2023-06-21

## 2023-03-16 ASSESSMENT — PAIN SCALES - GENERAL: PAINLEVEL: NO PAIN (0)

## 2023-03-16 NOTE — NURSING NOTE
Chief Complaint   Patient presents with     Follow Up      Return for overdue 6 mo PH F/U with labs, and 6MWT prior     Vitals were taken and medications reconciled.    Michael Benites, EMT  1:28 PM

## 2023-03-16 NOTE — PATIENT INSTRUCTIONS
Thank you for visiting the Pul[facility]ension [facility] today.      Today we discussed:   Will try to increase your tadalafil again to 20mg once every day.  If side effects become difficult to tolerate drop back to every other day and send us a message.     We will see if we can get you an appt with our lung clinic, perhaps virtually for your first consultation.       Follow up Appointment Information:    Return in 6 months to see us in clinic, or sooner if needed.       Additional Instructions:    1. Continue staying active and eat a heart healthy, low sodium diet.     2. Please keep current list of medications with you at all times.     3. Remember to weigh yourself daily after voiding and write it down on a log. If you have gained/lost 2 pounds overnight or 5 pounds in a week contact us for medication adjustments or further instructions.    4. Please call us immediately if you have syncope (fainting or passing out), chest pain, worsening edema (swelling or weight gain), or general worsening in how you are feeling.     --------------------------------------------------------------------------------------------------------------    If you have questions or concerns please contact us at:    Delfino Tadeo RN, BSN     Nurse Coordinator       Pulmonary Hypertension     [facility]    (Phone)[phone]      RODY Daly   (Prior Authorizations)    ()  Clinic   Clinic   Pulmonary Hypertension   Pulmonary Hypertension  [facility]             [facility]  (P)[phone]    (P) [phone]  (F) [phone]                ** Please note that you will NOT receive a reminder call regarding your scheduled testing, reminder calls are for provider appointments only.  If you are scheduled for testing within the [facility] system you may receive a call regarding  pre-registration for billing purposes only.**     --------------------------------------------------------------------------------------------------------------    Interested in joining a support group?    Pulmonary Hypertension Association  [email or web address].phassociation.org/  **Look at the Events Tab** They even have Support Groups that you can call into    [facility] Support Group  Second Saturday of the Month from 1-3 PM   Location: [address] [place] [id number] (Currently Virtual)  Leader: Vale Delgado   Phone: [phone]   Email: [email or web address]     Great Videos about Pulmonary Hypertension!!  Scan ME!    Website: [email or web address]

## 2023-03-16 NOTE — PROGRESS NOTES
Date of Visit:  03/16/23      [facility]      Primary PH cardiologist: Dr. Ledesma      HPI:  Ms. Anali Loya is a pleasant 47 year old female with a past medical history significant for polymyositis, COPD with chronic tobacco use, interstitial lung disease, and pulmonary arterial hypertension. She was trialed on Tyvaso though this caused VQ mismatch. Thus, she was placed on tadalafil.    She had struggled with some side effects of tadalafil and also insurance issues which has resulted in intermittent compliance.  When she met with Dr. Ledesma last, she had been off the tadalafil and he encouraged her to resume it, as her PA pressures did improve via hemodynamic testing after starting therapy. However, a few months later she again called to report stopping due to heartburn. He asked her to resume at every other day.    Today, I'm meeting Anali in clinic. She tells me that overall, things are about the same. She gets winded easily with exertion and desats quickly with exercise as well. She denies any new CP or palpitations, or new dizziness/presyncope. She tells me that she self reduced her Lasix to PRN only, and has not had any LE edema.     She had a six minute walk test done prior to our visit today. She ambulated 1000 ft and required up to 8LPM to maintain her sats, and still briefly desaturated with this as well.    Labs were performed prior to our visit today and were reviewed as below.       CURRENT PULMONARY HYPERTENSION REGIMEN:    PAH Rx: tadalafil 20mg every other day  (failed Tyvaso with VQ mismatch)    Diuretics: Lasix 20mg (using only PRN)    Oxygen: 4LPM (increases to 6-8L with exertion)    Anticoagulation: None    Immunosuppression: None      ASSESSMENT/PLAN:      1. Pulmonary hypertension:   --Ms. Loya has PAH in the setting of inflammatory arthritis and COPD-ILD. She was trialed on Tyvaso but unfortunately this caused worsening VQ mismatch.  She is now on monotherapy with tadalafil but has had issues with heartburn. As she is now treated with Nexium I asked her to try again to increase to 20mg daily (was only taking every other day).    --She appears euvolemic and is only using her Lasix PRN. NT-proBNP is not elevated.    --In regard to her COPD/ILD, she has not seen a pulmonologist in several years. She was considering lung transplant but unfortunately has not been able to succeed in  smoking cessation (lives with a smoker as well).  Per last documentation that I can find in 2017, it was felt that her lung disease was progressing and immunosuppression was recommended, but she tells me she didn't tolerate this, and did not follow up. I asked her to see our [facility] clinic but she is hesitant due to the long drive. Will see if we can complete repeat chest CT/PFTs locally and have them see her virtually if possible. Referral placed.     --For now, continue supplemental oxygen as is.     Follow up plan: Return in 6 months to see Dr. Ledesma with an echo, 6MWT, and labs.  We are happy to see the patient back sooner with any new concerns.       Orders this Visit:  Orders Placed This Encounter   Procedures     Comprehensive Metabolic Panel     N terminal pro BNP outpatient     CBC with platelets     Follow-Up with Cardiology     Adult Pulmonary Medicine Referral     Echocardiogram Complete     6 minute walk test (O2 Titration)     Orders Placed This Encounter   Medications     furosemide (LASIX) 20 MG tablet     Sig: Take 1 tablet (20 mg) by mouth as needed (swelling)     Dispense:  90 tablet     Refill:  3     DISCONTD: tadalafil, PAH, 20 MG TABS     Sig: Take 2 tablets (40 mg) by mouth daily     Dispense:  60 tablet     Refill:  11     tadalafil, PAH, 20 MG TABS     Sig: Take 1 tablet (20 mg) by mouth daily     Dispense:  30 tablet     Refill:  11     Updated prescription. Disregard 40mg that was sent     Medications Discontinued During This Encounter    Medication Reason     furosemide (LASIX) 20 MG tablet      tadalafil (PAH) 20 MG TABS      tadalafil, PAH, 20 MG TABS          CURRENT MEDICATIONS:  Current Outpatient Medications   Medication Sig Dispense Refill     ALPRAZolam (XANAX) 0.5 MG tablet Take 1 tablet (0.5 mg) by mouth nightly as needed for anxiety or sleep - INTENTIONAL DOSE REDUCTION (must last 30 days) 30 tablet 0     buPROPion (ZYBAN) 150 MG 12 hr tablet Take 1 tablet (150 mg) by mouth 2 times daily 60 tablet 3     furosemide (LASIX) 20 MG tablet Take 1 tablet (20 mg) by mouth as needed (swelling) 90 tablet 3     morphine (MS CONTIN) 15 MG CR tablet Take 1 tablet (15 mg) by mouth 2 times daily 60 tablet 0     nicotine (NICODERM CQ) 7 MG/24HR 24 hr patch Place 1 patch onto the skin every 24 hours 30 patch 1     order for DME Oxygen: Patient requires supplemental Oxygen 4 LPM via nasal canula at rest and 6 LPM with activity. Please provide patient with portability capability. Okay to spot check patient on oxygen device, to keep sats above 90%. Please provider with home concentrator that can go up to 10 LPM. Oxygen will be for a lifetime. 1 Device 0     order for DME Please provide patient with 2  liquid oxygen tanks for portability. Spot check patient on liquid oxygen. Titrate oxygen to maintain saturations above 88%. 2 Device 0     oxyCODONE-acetaminophen (PERCOCET)  MG per tablet Take 1 tablet by mouth every 6 hours as needed for severe pain (7-10) 120 tablet 0     tadalafil, PAH, 20 MG TABS Take 1 tablet (20 mg) by mouth daily 30 tablet 11     VENTOLIN  (90 Base) MCG/ACT inhaler INHALE ONE TO TWO PUFFS INTO THE LUNGS EVERY 4 HOURS AS NEEDED FOR SHORTNESS OF BREATH / DYSPNEA 18 g 9       ALLERGIES     Allergies   Allergen Reactions     Cellcept [Mycophenolate] GI Disturbance     Dulera      syncope     Imuran [Azathioprine] GI Disturbance     Methotrexate Other (See Comments)     Lung toxicity       PAST MEDICAL HISTORY:  Past Medical  "History:   Diagnosis Date     Acute bronchitis 06/05/07    Admit. Discharged 06/05/07     Anxiety      Arthritis     h/o \"seronegative rheumatoid arthritis\" since age 30, however no evidence of active arthritis by Rheum eval [id number]     ASCUS of cervix with negative high risk HPV 05/15/2014    neg HPV     ILD (interstitial lung disease) (H)     seen on chest CT 5-2011; methotrexate stopped 6-2011     Lung nodule     KM nodule; EBUS 12/2014 of lymph nodes was negative; CT-guided bx 1-2015 hamartoma/chondroma     Prematurity     born 6-7 weeks early     Pulmonary hypertension (H)     RHC 2/2016 mean PA 25     Varicella without mention of complication          Review of Systems:  POS ROS ARE BOLDED, all other negative.    Cardiovascular: Chest pain, palpitations, orthopnea, LE edema  Constitutional: New chills, sweats, fevers   Resp: Dyspnea at rest, dyspnea on exertion, cough, known chronic lung disease  HEENT: New visual changes, frequent headaches  Gastrointestinal: Abdominal pain, diarrhea, nausea, vomiting. Heartburn.  Hematologic/lymphatic: Current systemic anticoagulation, hx of blood clots, new abnormal bleeding.  Neurological: New focal weakness, LOC, seizures, syncope/presyncope       Physical Exam:  Vitals: /79 (BP Location: Right arm, Patient Position: Chair, Cuff Size: Adult Regular)   Pulse 78   Ht 1.565 m (5' 1.61\")   Wt 47 kg (103 lb 11.2 oz)   SpO2 97%   BMI 19.21 kg/m     Wt Readings from Last 4 Encounters:   03/16/23 47 kg (103 lb 11.2 oz)   02/10/23 48.2 kg (106 lb 3.2 oz)   02/06/23 48.1 kg (106 lb)   10/20/22 48.8 kg (107 lb 9.6 oz)       GEN:  In general, this is a well nourished female in no acute distress on NC.  Patient ambulatory, unaccompanied.   HEENT:  Pupils grossly equal, sclerae nonicteric.   NECK: Supple, trachea midline. No JVD.   C/V:  Regular rate and rhythm, no murmur, rub or gallop. Mildly accentuated P2. No S3 or RV heave.   RESP: Respirations are unlabored. No use " of accessory muscles. She has diffuse polyphonic wheezing on exam with a few crackles. No rhonchi.   GI: Abdomen soft, nontender, nondistended.   EXTREM: No LE edema. No cyanosis or clubbing.  NEURO: Alert and oriented, cooperative. Gait not formally assessed. No obvious focal deficits.   SKIN: Warm and dry.       Recent Lab Results:  LIVER ENZYME RESULTS:  Lab Results   Component Value Date    AST 15 03/16/2023    AST 11 02/05/2021    ALT 5 (L) 03/16/2023    ALT 10 02/05/2021       CBC RESULTS:  Lab Results   Component Value Date    WBC 12.5 (H) 03/16/2023    WBC 11.8 (H) 07/05/2021    RBC 4.45 03/16/2023    RBC 4.40 07/05/2021    HGB 12.8 03/16/2023    HGB 13.2 07/05/2021    HCT 41.2 03/16/2023    HCT 40.7 07/05/2021    MCV 93 03/16/2023    MCV 93 07/05/2021    MCH 28.8 03/16/2023    MCH 30.0 07/05/2021    MCHC 31.1 (L) 03/16/2023    MCHC 32.4 07/05/2021    RDW 12.7 03/16/2023    RDW 12.6 07/05/2021     03/16/2023     07/05/2021       BMP RESULTS:  Lab Results   Component Value Date     03/16/2023     07/05/2021    POTASSIUM 3.7 03/16/2023    POTASSIUM 3.9 04/25/2022    POTASSIUM 3.5 07/05/2021    CHLORIDE 104 03/16/2023    CHLORIDE 104 04/25/2022    CHLORIDE 106 07/05/2021    CO2 28 03/16/2023    CO2 29 04/25/2022    CO2 29 07/05/2021    ANIONGAP 11 03/16/2023    ANIONGAP 7 04/25/2022    ANIONGAP 6 07/05/2021    GLC 93 03/16/2023    GLC 95 04/25/2022     (H) 07/05/2021    BUN 10.7 03/16/2023    BUN 13 04/25/2022    BUN 8 07/05/2021    CR 0.65 03/16/2023    CR 0.67 07/05/2021    GFRESTIMATED >90 03/16/2023    GFRESTIMATED >90 07/05/2021    GFRESTBLACK >90 07/05/2021    MARCIN 9.6 03/16/2023    MARCIN 9.2 07/05/2021        Recent Labs   Lab Test 03/16/23  1236 04/25/22  1343 01/26/22  1057 12/15/16  1003 11/30/16  1102 10/03/16  0943 02/16/16  1400   NTBNPI  --   --   --   --  4,168*  --  1,662*   NTBNP 215 110 162*   < >  --    < >  --     < > = values in this interval not displayed.          Most recent testing:      Echocardiogram  1/26/22  Interpretation Summary     Left ventricular systolic function is normal.  The visual ejection fraction is 55-60%.  The right ventricular systolic function is normal.  Right ventricular systolic pressure could not be approximated due to  inadequate tricuspid regurgitation.  Technically difficult, suboptimal study.    [facility]  4/17/2019  Hemodynamics    Phase: Baseline    RA   A-wave: 5 mmHg   V-wave: 14 mmHg   Mean: 5 mmHg    HR: 58 bpm  RV   Systolic: 36 mmHg   End Diastolic: 5 mmHg   HR: 62 bpm  PA   Systolic:35 mmHg   Diasotlic: 15 mmHg   Mean: 22 mmHg   HR: 60 bpm  PCW   A-wave: 16 mmHg   V-wave: 20 mmHg   Mean: 15 mmHg   HR: 62 bpm    Cardiac Output               Measured VO2: 152                          Measured CO Hernán: 3.87 L/min    Measured CI Hernán: 2.55 L/min/m2    CO TD: 4.6 L/min    CI TD: 3.03 L/min/m2  Vitals    BP: 107 mmHg / 61 mmHg    SpO2: 100 %    Resistance Metric    PVR ANGLIN: 2.31 ANGLIN/m2   Right sided filling pressures are normal.Left sided filling pressures are mildly elevated. Normal PA pressures.Normal cardiac output level.       NYHA Functional Class:  3    A total of 40 minutes was spent today performing chart and history review, gathering HPI, physical exam, pre and post visit documentation, and care coordination.      Jania Woo PA-C  [facility]  Pager [phone]

## 2023-03-16 NOTE — LETTER
3/16/2023      RE: Anali Loya  [address]  [facility] [address]       Dear Colleague,    Thank you for the opportunity to participate in the care of your patient, Anali Lyoa, at the [facility] [place] at [facility]. Please see a copy of my visit note below.        Date of Visit:  03/16/23      [facility]      Primary PH cardiologist: Dr. Ledesma      HPI:  Ms. Anali Loya is a pleasant 47 year old female with a past medical history significant for polymyositis, COPD with chronic tobacco use, interstitial lung disease, and pulmonary arterial hypertension. She was trialed on Tyvaso though this caused VQ mismatch. Thus, she was placed on tadalafil.    She had struggled with some side effects of tadalafil and also insurance issues which has resulted in intermittent compliance.  When she met with Dr. Ledesma last, she had been off the tadalafil and he encouraged her to resume it, as her PA pressures did improve via hemodynamic testing after starting therapy. However, a few months later she again called to report stopping due to heartburn. He asked her to resume at every other day.    Today, I'm meeting Anali in clinic. She tells me that overall, things are about the same. She gets winded easily with exertion and desats quickly with exercise as well. She denies any new CP or palpitations, or new dizziness/presyncope. She tells me that she self reduced her Lasix to PRN only, and has not had any LE edema.     She had a six minute walk test done prior to our visit today. She ambulated 1000 ft and required up to 8LPM to maintain her sats, and still briefly desaturated with this as well.    Labs were performed prior to our visit today and were reviewed as below.       CURRENT PULMONARY HYPERTENSION REGIMEN:    PAH Rx: tadalafil 20mg every other day  (failed Tyvaso with VQ mismatch)    Diuretics:  Lasix 20mg (using only PRN)    Oxygen: 4LPM (increases to 6-8L with exertion)    Anticoagulation: None    Immunosuppression: None      ASSESSMENT/PLAN:      1. Pulmonary hypertension:   --Ms. Loya has PAH in the setting of inflammatory arthritis and COPD-ILD. She was trialed on Tyvaso but unfortunately this caused worsening VQ mismatch. She is now on monotherapy with tadalafil but has had issues with heartburn. As she is now treated with Nexium I asked her to try again to increase to 20mg daily (was only taking every other day).    --She appears euvolemic and is only using her Lasix PRN. NT-proBNP is not elevated.    --In regard to her COPD/ILD, she has not seen a pulmonologist in several years. She was considering lung transplant but unfortunately has not been able to succeed in  smoking cessation (lives with a smoker as well).  Per last documentation that I can find in 2017, it was felt that her lung disease was progressing and immunosuppression was recommended, but she tells me she didn't tolerate this, and did not follow up. I asked her to see our [facility] clinic but she is hesitant due to the long drive. Will see if we can complete repeat chest CT/PFTs locally and have them see her virtually if possible. Referral placed.     --For now, continue supplemental oxygen as is.     Follow up plan: Return in 6 months to see Dr. Ledesma with an echo, 6MWT, and labs.  We are happy to see the patient back sooner with any new concerns.       Orders this Visit:  Orders Placed This Encounter   Procedures     Comprehensive Metabolic Panel     N terminal pro BNP outpatient     CBC with platelets     Follow-Up with Cardiology     Adult Pulmonary Medicine Referral     Echocardiogram Complete     6 minute walk test (O2 Titration)     Orders Placed This Encounter   Medications     furosemide (LASIX) 20 MG tablet     Sig: Take 1 tablet (20 mg) by mouth as needed (swelling)     Dispense:  90 tablet     Refill:  3     DISCONTD:  tadalafil, PAH, 20 MG TABS     Sig: Take 2 tablets (40 mg) by mouth daily     Dispense:  60 tablet     Refill:  11     tadalafil, PAH, 20 MG TABS     Sig: Take 1 tablet (20 mg) by mouth daily     Dispense:  30 tablet     Refill:  11     Updated prescription. Disregard 40mg that was sent     Medications Discontinued During This Encounter   Medication Reason     furosemide (LASIX) 20 MG tablet      tadalafil (PAH) 20 MG TABS      tadalafil, PAH, 20 MG TABS          CURRENT MEDICATIONS:  Current Outpatient Medications   Medication Sig Dispense Refill     ALPRAZolam (XANAX) 0.5 MG tablet Take 1 tablet (0.5 mg) by mouth nightly as needed for anxiety or sleep - INTENTIONAL DOSE REDUCTION (must last 30 days) 30 tablet 0     buPROPion (ZYBAN) 150 MG 12 hr tablet Take 1 tablet (150 mg) by mouth 2 times daily 60 tablet 3     furosemide (LASIX) 20 MG tablet Take 1 tablet (20 mg) by mouth as needed (swelling) 90 tablet 3     morphine (MS CONTIN) 15 MG CR tablet Take 1 tablet (15 mg) by mouth 2 times daily 60 tablet 0     nicotine (NICODERM CQ) 7 MG/24HR 24 hr patch Place 1 patch onto the skin every 24 hours 30 patch 1     order for DME Oxygen: Patient requires supplemental Oxygen 4 LPM via nasal canula at rest and 6 LPM with activity. Please provide patient with portability capability. Okay to spot check patient on oxygen device, to keep sats above 90%. Please provider with home concentrator that can go up to 10 LPM. Oxygen will be for a lifetime. 1 Device 0     order for DME Please provide patient with 2  liquid oxygen tanks for portability. Spot check patient on liquid oxygen. Titrate oxygen to maintain saturations above 88%. 2 Device 0     oxyCODONE-acetaminophen (PERCOCET)  MG per tablet Take 1 tablet by mouth every 6 hours as needed for severe pain (7-10) 120 tablet 0     tadalafil, PAH, 20 MG TABS Take 1 tablet (20 mg) by mouth daily 30 tablet 11     VENTOLIN  (90 Base) MCG/ACT inhaler INHALE ONE TO TWO PUFFS  "INTO THE LUNGS EVERY 4 HOURS AS NEEDED FOR SHORTNESS OF BREATH / DYSPNEA 18 g 9       ALLERGIES     Allergies   Allergen Reactions     Cellcept [Mycophenolate] GI Disturbance     Dulera      syncope     Imuran [Azathioprine] GI Disturbance     Methotrexate Other (See Comments)     Lung toxicity       PAST MEDICAL HISTORY:  Past Medical History:   Diagnosis Date     Acute bronchitis 06/05/07    Admit. Discharged 06/05/07     Anxiety      Arthritis     h/o \"seronegative rheumatoid arthritis\" since age 30, however no evidence of active arthritis by Rheum eval [id number]     ASCUS of cervix with negative high risk HPV 05/15/2014    neg HPV     ILD (interstitial lung disease) (H)     seen on chest CT 5-2011; methotrexate stopped 6-2011     Lung nodule     KM nodule; EBUS 12/2014 of lymph nodes was negative; CT-guided bx 1-2015 hamartoma/chondroma     Prematurity     born 6-7 weeks early     Pulmonary hypertension (H)     RHC 2/2016 mean PA 25     Varicella without mention of complication          Review of Systems:  POS ROS ARE BOLDED, all other negative.    Cardiovascular: Chest pain, palpitations, orthopnea, LE edema  Constitutional: New chills, sweats, fevers   Resp: Dyspnea at rest, dyspnea on exertion, cough, known chronic lung disease  HEENT: New visual changes, frequent headaches  Gastrointestinal: Abdominal pain, diarrhea, nausea, vomiting. Heartburn.  Hematologic/lymphatic: Current systemic anticoagulation, hx of blood clots, new abnormal bleeding.  Neurological: New focal weakness, LOC, seizures, syncope/presyncope       Physical Exam:  Vitals: /79 (BP Location: Right arm, Patient Position: Chair, Cuff Size: Adult Regular)   Pulse 78   Ht 1.565 m (5' 1.61\")   Wt 47 kg (103 lb 11.2 oz)   SpO2 97%   BMI 19.21 kg/m     Wt Readings from Last 4 Encounters:   03/16/23 47 kg (103 lb 11.2 oz)   02/10/23 48.2 kg (106 lb 3.2 oz)   02/06/23 48.1 kg (106 lb)   10/20/22 48.8 kg (107 lb 9.6 oz)       GEN:  In " general, this is a well nourished female in no acute distress on NC.  Patient ambulatory, unaccompanied.   HEENT:  Pupils grossly equal, sclerae nonicteric.   NECK: Supple, trachea midline. No JVD.   C/V:  Regular rate and rhythm, no murmur, rub or gallop. Mildly accentuated P2. No S3 or RV heave.   RESP: Respirations are unlabored. No use of accessory muscles. She has diffuse polyphonic wheezing on exam with a few crackles. No rhonchi.   GI: Abdomen soft, nontender, nondistended.   EXTREM: No LE edema. No cyanosis or clubbing.  NEURO: Alert and oriented, cooperative. Gait not formally assessed. No obvious focal deficits.   SKIN: Warm and dry.       Recent Lab Results:  LIVER ENZYME RESULTS:  Lab Results   Component Value Date    AST 15 03/16/2023    AST 11 02/05/2021    ALT 5 (L) 03/16/2023    ALT 10 02/05/2021       CBC RESULTS:  Lab Results   Component Value Date    WBC 12.5 (H) 03/16/2023    WBC 11.8 (H) 07/05/2021    RBC 4.45 03/16/2023    RBC 4.40 07/05/2021    HGB 12.8 03/16/2023    HGB 13.2 07/05/2021    HCT 41.2 03/16/2023    HCT 40.7 07/05/2021    MCV 93 03/16/2023    MCV 93 07/05/2021    MCH 28.8 03/16/2023    MCH 30.0 07/05/2021    MCHC 31.1 (L) 03/16/2023    MCHC 32.4 07/05/2021    RDW 12.7 03/16/2023    RDW 12.6 07/05/2021     03/16/2023     07/05/2021       BMP RESULTS:  Lab Results   Component Value Date     03/16/2023     07/05/2021    POTASSIUM 3.7 03/16/2023    POTASSIUM 3.9 04/25/2022    POTASSIUM 3.5 07/05/2021    CHLORIDE 104 03/16/2023    CHLORIDE 104 04/25/2022    CHLORIDE 106 07/05/2021    CO2 28 03/16/2023    CO2 29 04/25/2022    CO2 29 07/05/2021    ANIONGAP 11 03/16/2023    ANIONGAP 7 04/25/2022    ANIONGAP 6 07/05/2021    GLC 93 03/16/2023    GLC 95 04/25/2022     (H) 07/05/2021    BUN 10.7 03/16/2023    BUN 13 04/25/2022    BUN 8 07/05/2021    CR 0.65 03/16/2023    CR 0.67 07/05/2021    GFRESTIMATED >90 03/16/2023    GFRESTIMATED >90 07/05/2021     GFRESTBLACK >90 07/05/2021    MARCIN 9.6 03/16/2023    MARCIN 9.2 07/05/2021        Recent Labs   Lab Test 03/16/23  1236 04/25/22  1343 01/26/22  1057 12/15/16  1003 11/30/16  1102 10/03/16  0943 02/16/16  1400   NTBNPI  --   --   --   --  4,168*  --  1,662*   NTBNP 215 110 162*   < >  --    < >  --     < > = values in this interval not displayed.         Most recent testing:      Echocardiogram  1/26/22  Interpretation Summary     Left ventricular systolic function is normal.  The visual ejection fraction is 55-60%.  The right ventricular systolic function is normal.  Right ventricular systolic pressure could not be approximated due to  inadequate tricuspid regurgitation.  Technically difficult, suboptimal study.    [facility]  4/17/2019  Hemodynamics    Phase: Baseline    RA   A-wave: 5 mmHg   V-wave: 14 mmHg   Mean: 5 mmHg    HR: 58 bpm  RV   Systolic: 36 mmHg   End Diastolic: 5 mmHg   HR: 62 bpm  PA   Systolic:35 mmHg   Diasotlic: 15 mmHg   Mean: 22 mmHg   HR: 60 bpm  PCW   A-wave: 16 mmHg   V-wave: 20 mmHg   Mean: 15 mmHg   HR: 62 bpm    Cardiac Output               Measured VO2: 152                          Measured CO Hernán: 3.87 L/min    Measured CI Hernán: 2.55 L/min/m2    CO TD: 4.6 L/min    CI TD: 3.03 L/min/m2  Vitals    BP: 107 mmHg / 61 mmHg    SpO2: 100 %    Resistance Metric    PVR ANGLIN: 2.31 ANGLIN/m2   Right sided filling pressures are normal.Left sided filling pressures are mildly elevated. Normal PA pressures.Normal cardiac output level.       NYHA Functional Class:  3    A total of 40 minutes was spent today performing chart and history review, gathering HPI, physical exam, pre and post visit documentation, and care coordination.      Jania Woo PA-C  [facility]  Pager [phone]

## 2023-03-16 NOTE — TELEPHONE ENCOUNTER
Requested Prescriptions   Pending Prescriptions Disp Refills     oxyCODONE-acetaminophen (PERCOCET)  MG per tablet 120 tablet 0     Sig: Take 1 tablet by mouth every 6 hours as needed for severe pain (7-10)       There is no refill protocol information for this order        morphine (MS CONTIN) 15 MG CR tablet 60 tablet 0     Sig: Take 1 tablet (15 mg) by mouth 2 times daily       There is no refill protocol information for this order        Future Office visit:    Next 5 appointments (look out 90 days)    May 11, 2023  1:40 PM  (Arrive by 1:20 PM)  Annual Wellness Visit with Cristian Fagan MD  [facility] ([facility] ) [address] [place] [id number] [phone]           Routing refill request to provider for review/approval because:  Drug not on the [facility], [facility] or [facility] refill protocol or controlled substance

## 2023-03-21 LAB — FIO2-PRE: 52 %

## 2023-03-22 ENCOUNTER — MYC REFILL (OUTPATIENT)
Dept: FAMILY MEDICINE | Facility: CLINIC | Age: 48
End: 2023-03-22
Payer: COMMERCIAL

## 2023-03-22 DIAGNOSIS — G47.9 DISTURBANCE IN SLEEP BEHAVIOR: ICD-10-CM

## 2023-03-23 RX ORDER — ALPRAZOLAM 0.5 MG
0.5 TABLET ORAL
Qty: 30 TABLET | Refills: 0 | Status: SHIPPED | OUTPATIENT
Start: 2023-03-28 | End: 2023-04-22

## 2023-03-23 NOTE — TELEPHONE ENCOUNTER
Requested Prescriptions   Pending Prescriptions Disp Refills     ALPRAZolam (XANAX) 0.5 MG tablet 30 tablet 0     Sig: Take 1 tablet (0.5 mg) by mouth nightly as needed for anxiety or sleep - INTENTIONAL DOSE REDUCTION (must last 30 days)       Next 5 appointments (look out 90 days)    May 11, 2023  1:40 PM  (Arrive by 1:20 PM)  Annual Wellness Visit with Cristian Fagan MD  [facility] ([facility] ) [address] [place] [id number] [phone]           Routing refill request to provider for review/approval because:  Drug not on the [facility], [facility] or [facility] refill protocol or controlled substance

## 2023-03-24 ENCOUNTER — TELEPHONE (OUTPATIENT)
Dept: CARDIOLOGY | Facility: CLINIC | Age: 48
End: 2023-03-24
Payer: COMMERCIAL

## 2023-04-17 ENCOUNTER — MYC REFILL (OUTPATIENT)
Dept: FAMILY MEDICINE | Facility: CLINIC | Age: 48
End: 2023-04-17
Payer: COMMERCIAL

## 2023-04-17 DIAGNOSIS — M47.817 SPONDYLOSIS OF LUMBOSACRAL REGION WITHOUT MYELOPATHY OR RADICULOPATHY: ICD-10-CM

## 2023-04-18 RX ORDER — MORPHINE SULFATE 15 MG/1
15 TABLET, FILM COATED, EXTENDED RELEASE ORAL 2 TIMES DAILY
Qty: 60 TABLET | Refills: 0 | Status: SHIPPED | OUTPATIENT
Start: 2023-04-19 | End: 2023-05-16

## 2023-04-18 RX ORDER — OXYCODONE AND ACETAMINOPHEN 10; 325 MG/1; MG/1
1 TABLET ORAL EVERY 6 HOURS PRN
Qty: 120 TABLET | Refills: 0 | Status: SHIPPED | OUTPATIENT
Start: 2023-04-19 | End: 2023-05-16

## 2023-04-18 NOTE — TELEPHONE ENCOUNTER
oxyCODONE-acetaminophen (PERCOCET)  MG per tablet         Last Written Prescription Date:  3/16/23  Last Fill Quantity: 120,   # refills: 0  Last Office Visit: 2/6/23  Future Office visit:    Next 5 appointments (look out 90 days)    May 11, 2023  1:40 PM  (Arrive by 1:20 PM)  Annual Wellness Visit with Cristian Fagan MD  [facility] ([facility] ) [address] [place] [id number]  [phone]           Routing refill request to provider for review/approval because:  Drug not on the [facility], [facility] or [facility] refill protocol or controlled substance              morphine (MS CONTIN) 15 MG CR tablet         Last Written Prescription Date:  3/16/23  Last Fill Quantity: 60,   # refills: 0  Last Office Visit: 2/6/23  Future Office visit:    Next 5 appointments (look out 90 days)    May 11, 2023  1:40 PM  (Arrive by 1:20 PM)  Annual Wellness Visit with Cristian Fagan MD  [facility] ([facility] ) [address] [place] [id number]  [phone]           Routing refill request to provider for review/approval because:  Drug not on the [facility], [facility] or [facility] refill protocol or controlled substance

## 2023-04-22 ENCOUNTER — MYC REFILL (OUTPATIENT)
Dept: FAMILY MEDICINE | Facility: CLINIC | Age: 48
End: 2023-04-22
Payer: COMMERCIAL

## 2023-04-22 DIAGNOSIS — G47.9 DISTURBANCE IN SLEEP BEHAVIOR: ICD-10-CM

## 2023-04-24 RX ORDER — ALPRAZOLAM 0.5 MG
0.5 TABLET ORAL
Qty: 30 TABLET | Refills: 0 | Status: SHIPPED | OUTPATIENT
Start: 2023-04-25 | End: 2023-05-22

## 2023-04-24 NOTE — TELEPHONE ENCOUNTER
Pending Prescriptions:                       Disp   Refills    ALPRAZolam (XANAX) 0.5 MG tablet           30 tab*0        Sig: Take 1 tablet (0.5 mg) by mouth nightly as needed for           anxiety or sleep - INTENTIONAL DOSE REDUCTION           (must last 30 days)    Routing refill request to provider for review/approval because:  Drug not on the [facility] refill protocol

## 2023-05-11 ENCOUNTER — OFFICE VISIT (OUTPATIENT)
Dept: FAMILY MEDICINE | Facility: CLINIC | Age: 48
End: 2023-05-11
Payer: COMMERCIAL

## 2023-05-11 VITALS
DIASTOLIC BLOOD PRESSURE: 68 MMHG | SYSTOLIC BLOOD PRESSURE: 118 MMHG | HEIGHT: 61 IN | WEIGHT: 97.5 LBS | OXYGEN SATURATION: 97 % | BODY MASS INDEX: 18.41 KG/M2 | TEMPERATURE: 98.7 F | RESPIRATION RATE: 20 BRPM | HEART RATE: 82 BPM

## 2023-05-11 DIAGNOSIS — Z12.4 SCREENING FOR MALIGNANT NEOPLASM OF CERVIX: ICD-10-CM

## 2023-05-11 DIAGNOSIS — Z00.00 ROUTINE GENERAL MEDICAL EXAMINATION AT A HEALTH CARE FACILITY: Primary | ICD-10-CM

## 2023-05-11 DIAGNOSIS — Z12.31 ENCOUNTER FOR SCREENING MAMMOGRAM FOR BREAST CANCER: ICD-10-CM

## 2023-05-11 DIAGNOSIS — F43.21 GRIEF REACTION: ICD-10-CM

## 2023-05-11 DIAGNOSIS — F41.9 ANXIETY: ICD-10-CM

## 2023-05-11 DIAGNOSIS — Z12.11 SPECIAL SCREENING FOR MALIGNANT NEOPLASMS, COLON: ICD-10-CM

## 2023-05-11 DIAGNOSIS — F11.90 CHRONIC, CONTINUOUS USE OF OPIOIDS: ICD-10-CM

## 2023-05-11 PROCEDURE — 99214 OFFICE O/P EST MOD 30 MIN: CPT | Mod: 25 | Performed by: FAMILY MEDICINE

## 2023-05-11 PROCEDURE — G0145 SCR C/V CYTO,THINLAYER,RESCR: HCPCS | Performed by: FAMILY MEDICINE

## 2023-05-11 PROCEDURE — 99396 PREV VISIT EST AGE 40-64: CPT | Performed by: FAMILY MEDICINE

## 2023-05-11 PROCEDURE — 87624 HPV HI-RISK TYP POOLED RSLT: CPT | Performed by: FAMILY MEDICINE

## 2023-05-11 RX ORDER — SERTRALINE HYDROCHLORIDE 25 MG/1
25 TABLET, FILM COATED ORAL DAILY
Qty: 30 TABLET | Refills: 3 | Status: SHIPPED | OUTPATIENT
Start: 2023-05-11 | End: 2023-08-15

## 2023-05-11 RX ORDER — AMOXICILLIN 500 MG/1
CAPSULE ORAL
COMMUNITY
Start: 2022-10-24 | End: 2023-06-13

## 2023-05-11 ASSESSMENT — ENCOUNTER SYMPTOMS
HEMATOCHEZIA: 1
MYALGIAS: 0
PARESTHESIAS: 0
SORE THROAT: 0
WEAKNESS: 1
DIZZINESS: 0
HEMATURIA: 0
NERVOUS/ANXIOUS: 1
COUGH: 0
BREAST MASS: 0
DYSURIA: 0
CONSTIPATION: 0
HEADACHES: 1
ARTHRALGIAS: 1
EYE PAIN: 0
PALPITATIONS: 0
FEVER: 0
SHORTNESS OF BREATH: 1
CHILLS: 0
DIARRHEA: 0
FREQUENCY: 1
HEARTBURN: 1
NAUSEA: 0
ABDOMINAL PAIN: 0
JOINT SWELLING: 1

## 2023-05-11 ASSESSMENT — PATIENT HEALTH QUESTIONNAIRE - PHQ9
SUM OF ALL RESPONSES TO PHQ QUESTIONS 1-9: 10
10. IF YOU CHECKED OFF ANY PROBLEMS, HOW DIFFICULT HAVE THESE PROBLEMS MADE IT FOR YOU TO DO YOUR WORK, TAKE CARE OF THINGS AT HOME, OR GET ALONG WITH OTHER PEOPLE: SOMEWHAT DIFFICULT
SUM OF ALL RESPONSES TO PHQ QUESTIONS 1-9: 10

## 2023-05-11 ASSESSMENT — ANXIETY QUESTIONNAIRES
3. WORRYING TOO MUCH ABOUT DIFFERENT THINGS: NEARLY EVERY DAY
IF YOU CHECKED OFF ANY PROBLEMS ON THIS QUESTIONNAIRE, HOW DIFFICULT HAVE THESE PROBLEMS MADE IT FOR YOU TO DO YOUR WORK, TAKE CARE OF THINGS AT HOME, OR GET ALONG WITH OTHER PEOPLE: VERY DIFFICULT
4. TROUBLE RELAXING: SEVERAL DAYS
GAD7 TOTAL SCORE: 15
7. FEELING AFRAID AS IF SOMETHING AWFUL MIGHT HAPPEN: MORE THAN HALF THE DAYS
2. NOT BEING ABLE TO STOP OR CONTROL WORRYING: MORE THAN HALF THE DAYS
GAD7 TOTAL SCORE: 15
7. FEELING AFRAID AS IF SOMETHING AWFUL MIGHT HAPPEN: MORE THAN HALF THE DAYS
8. IF YOU CHECKED OFF ANY PROBLEMS, HOW DIFFICULT HAVE THESE MADE IT FOR YOU TO DO YOUR WORK, TAKE CARE OF THINGS AT HOME, OR GET ALONG WITH OTHER PEOPLE?: VERY DIFFICULT
1. FEELING NERVOUS, ANXIOUS, OR ON EDGE: NEARLY EVERY DAY
6. BECOMING EASILY ANNOYED OR IRRITABLE: NEARLY EVERY DAY
5. BEING SO RESTLESS THAT IT IS HARD TO SIT STILL: SEVERAL DAYS

## 2023-05-11 ASSESSMENT — PAIN SCALES - GENERAL: PAINLEVEL: MODERATE PAIN (4)
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
## 2023-05-15 ENCOUNTER — MYC MEDICAL ADVICE (OUTPATIENT)
Dept: FAMILY MEDICINE | Facility: CLINIC | Age: 48
End: 2023-05-15

## 2023-05-15 ENCOUNTER — LAB (OUTPATIENT)
Dept: LAB | Facility: CLINIC | Age: 48
End: 2023-05-15
Payer: COMMERCIAL

## 2023-05-15 DIAGNOSIS — E78.5 HYPERLIPIDEMIA LDL GOAL <130: Primary | ICD-10-CM

## 2023-05-15 DIAGNOSIS — R06.09 DOE (DYSPNEA ON EXERTION): ICD-10-CM

## 2023-05-15 DIAGNOSIS — Z12.11 SPECIAL SCREENING FOR MALIGNANT NEOPLASMS, COLON: ICD-10-CM

## 2023-05-15 DIAGNOSIS — Z12.31 ENCOUNTER FOR SCREENING MAMMOGRAM FOR BREAST CANCER: ICD-10-CM

## 2023-05-15 LAB
BKR LAB AP GYN ADEQUACY: NORMAL
BKR LAB AP GYN INTERPRETATION: NORMAL
BKR LAB AP HPV REFLEX: NORMAL
BKR LAB AP PREVIOUS ABNORMAL: NORMAL
CHOLEST SERPL-MCNC: 179 MG/DL
HDLC SERPL-MCNC: 75 MG/DL
HOLD SPECIMEN: NORMAL
LDLC SERPL CALC-MCNC: 86 MG/DL
NONHDLC SERPL-MCNC: 104 MG/DL
PATH REPORT.COMMENTS IMP SPEC: NORMAL
PATH REPORT.COMMENTS IMP SPEC: NORMAL
PATH REPORT.RELEVANT HX SPEC: NORMAL
TRIGL SERPL-MCNC: 91 MG/DL

## 2023-05-15 PROCEDURE — 36415 COLL VENOUS BLD VENIPUNCTURE: CPT

## 2023-05-15 PROCEDURE — 80061 LIPID PANEL: CPT

## 2023-05-16 ENCOUNTER — MYC REFILL (OUTPATIENT)
Dept: FAMILY MEDICINE | Facility: CLINIC | Age: 48
End: 2023-05-16
Payer: COMMERCIAL

## 2023-05-16 DIAGNOSIS — M47.817 SPONDYLOSIS OF LUMBOSACRAL REGION WITHOUT MYELOPATHY OR RADICULOPATHY: ICD-10-CM

## 2023-05-16 RX ORDER — MORPHINE SULFATE 15 MG/1
15 TABLET, FILM COATED, EXTENDED RELEASE ORAL 2 TIMES DAILY
Qty: 60 TABLET | Refills: 0 | Status: SHIPPED | OUTPATIENT
Start: 2023-05-19 | End: 2023-06-20

## 2023-05-16 RX ORDER — OXYCODONE AND ACETAMINOPHEN 10; 325 MG/1; MG/1
1 TABLET ORAL EVERY 6 HOURS PRN
Qty: 120 TABLET | Refills: 0 | Status: SHIPPED | OUTPATIENT
Start: 2023-05-19 | End: 2023-06-19

## 2023-05-16 NOTE — TELEPHONE ENCOUNTER
Requested Prescriptions   Pending Prescriptions Disp Refills     oxyCODONE-acetaminophen (PERCOCET)  MG per tablet 120 tablet 0     Sig: Take 1 tablet by mouth every 6 hours as needed for severe pain       Next 5 appointments (look out 90 days)    Aug 11, 2023  3:40 PM  (Arrive by 3:20 PM)  Provider Visit with Cristian Fagan MD  [facility] ([facility] ) [address] [place] [id number]  [phone]           Routing refill request to provider for review/approval because:  Drug not on the G, P or [facility] refill protocol or controlled substance              morphine (MS CONTIN) 15 MG CR tablet 60 tablet 0     Sig: Take 1 tablet (15 mg) by mouth 2 times daily     Next 5 appointments (look out 90 days)    Aug 11, 2023  3:40 PM  (Arrive by 3:20 PM)  Provider Visit with Cristian Fagan MD  [facility] ([facility] ) [address] [place] [id number]  [phone]           Routing refill request to provider for review/approval because:  Drug not on the G, P or [facility] refill protocol or controlled substance

## 2023-05-17 LAB
HUMAN PAPILLOMA VIRUS 16 DNA: NEGATIVE
HUMAN PAPILLOMA VIRUS 18 DNA: NEGATIVE
HUMAN PAPILLOMA VIRUS FINAL DIAGNOSIS: NORMAL
HUMAN PAPILLOMA VIRUS OTHER HR: NEGATIVE

## 2023-05-21 ENCOUNTER — HEALTH MAINTENANCE LETTER (OUTPATIENT)
Age: 48
End: 2023-05-21

## 2023-05-22 ENCOUNTER — MYC REFILL (OUTPATIENT)
Dept: FAMILY MEDICINE | Facility: CLINIC | Age: 48
End: 2023-05-22
Payer: COMMERCIAL

## 2023-05-22 DIAGNOSIS — G47.9 DISTURBANCE IN SLEEP BEHAVIOR: ICD-10-CM

## 2023-05-22 NOTE — TELEPHONE ENCOUNTER
ALPRAZolam (XANAX) 0.5 MG tablet         Last Written Prescription Date:  4/25/23  Last Fill Quantity: 30,   # refills: 0  Last Office Visit: 5/11/23  Future Office visit:    Next 5 appointments (look out 90 days)    Aug 11, 2023  3:40 PM  (Arrive by 3:20 PM)  Provider Visit with Cristian Fagan MD  [facility] ([facility] ) [address] [place] [id number] [phone]           Routing refill request to provider for review/approval because:  Drug not on the [facility], [facility] or [facility] refill protocol or controlled substance

## 2023-05-23 RX ORDER — ALPRAZOLAM 0.5 MG
0.5 TABLET ORAL
Qty: 30 TABLET | Refills: 0 | Status: ON HOLD | OUTPATIENT
Start: 2023-05-23 | End: 2023-06-19

## 2023-05-26 DIAGNOSIS — I27.20 PULMONARY HYPERTENSION (H): ICD-10-CM

## 2023-05-31 RX ORDER — TADALAFIL 20 MG/1
20 TABLET ORAL DAILY
Qty: 150 TABLET | Refills: 0 | OUTPATIENT
Start: 2023-05-31

## 2023-06-13 ENCOUNTER — HOSPITAL ENCOUNTER (EMERGENCY)
Facility: CLINIC | Age: 48
Discharge: OTHER INSTITUTION | End: 2023-06-15
Attending: EMERGENCY MEDICINE | Admitting: EMERGENCY MEDICINE
Payer: COMMERCIAL

## 2023-06-13 ENCOUNTER — TELEPHONE (OUTPATIENT)
Dept: BEHAVIORAL HEALTH | Facility: CLINIC | Age: 48
End: 2023-06-13

## 2023-06-13 DIAGNOSIS — F32.3 CURRENT SEVERE EPISODE OF MAJOR DEPRESSIVE DISORDER WITH PSYCHOTIC FEATURES, UNSPECIFIED WHETHER RECURRENT (H): ICD-10-CM

## 2023-06-13 DIAGNOSIS — R44.0 AUDITORY HALLUCINATIONS: ICD-10-CM

## 2023-06-13 DIAGNOSIS — R45.851 SUICIDAL IDEATION: ICD-10-CM

## 2023-06-13 LAB
ALBUMIN SERPL BCG-MCNC: 4.5 G/DL (ref 3.5–5.2)
ALP SERPL-CCNC: 59 U/L (ref 35–104)
ALT SERPL W P-5'-P-CCNC: 7 U/L (ref 0–50)
AMPHETAMINES UR QL: NOT DETECTED
ANION GAP SERPL CALCULATED.3IONS-SCNC: 14 MMOL/L (ref 7–15)
AST SERPL W P-5'-P-CCNC: 14 U/L (ref 0–45)
BARBITURATES UR QL SCN: NOT DETECTED
BASE EXCESS BLDV CALC-SCNC: 6.4 MMOL/L (ref -7.7–1.9)
BASOPHILS # BLD AUTO: 0.1 10E3/UL (ref 0–0.2)
BASOPHILS NFR BLD AUTO: 1 %
BENZODIAZ UR QL SCN: DETECTED
BILIRUB SERPL-MCNC: 0.4 MG/DL
BUN SERPL-MCNC: 10.5 MG/DL (ref 6–20)
BUPRENORPHINE UR QL: NOT DETECTED
CALCIUM SERPL-MCNC: 10 MG/DL (ref 8.6–10)
CANNABINOIDS UR QL: DETECTED
CHLORIDE SERPL-SCNC: 101 MMOL/L (ref 98–107)
COCAINE UR QL SCN: NOT DETECTED
CREAT SERPL-MCNC: 0.61 MG/DL (ref 0.51–0.95)
D-METHAMPHET UR QL: NOT DETECTED
DEPRECATED HCO3 PLAS-SCNC: 26 MMOL/L (ref 22–29)
EOSINOPHIL # BLD AUTO: 0.2 10E3/UL (ref 0–0.7)
EOSINOPHIL NFR BLD AUTO: 2 %
ERYTHROCYTE [DISTWIDTH] IN BLOOD BY AUTOMATED COUNT: 13.5 % (ref 10–15)
ETHANOL SERPL-MCNC: <0.01 G/DL
GFR SERPL CREATININE-BSD FRML MDRD: >90 ML/MIN/1.73M2
GLUCOSE SERPL-MCNC: 76 MG/DL (ref 70–99)
HCO3 BLDV-SCNC: 33 MMOL/L (ref 21–28)
HCT VFR BLD AUTO: 43.4 % (ref 35–47)
HGB BLD-MCNC: 13.5 G/DL (ref 11.7–15.7)
IMM GRANULOCYTES # BLD: 0 10E3/UL
IMM GRANULOCYTES NFR BLD: 0 %
LYMPHOCYTES # BLD AUTO: 3 10E3/UL (ref 0.8–5.3)
LYMPHOCYTES NFR BLD AUTO: 29 %
MAGNESIUM SERPL-MCNC: 1.8 MG/DL (ref 1.7–2.3)
MCH RBC QN AUTO: 28.5 PG (ref 26.5–33)
MCHC RBC AUTO-ENTMCNC: 31.1 G/DL (ref 31.5–36.5)
MCV RBC AUTO: 92 FL (ref 78–100)
METHADONE UR QL SCN: NOT DETECTED
MONOCYTES # BLD AUTO: 0.8 10E3/UL (ref 0–1.3)
MONOCYTES NFR BLD AUTO: 7 %
NEUTROPHILS # BLD AUTO: 6.3 10E3/UL (ref 1.6–8.3)
NEUTROPHILS NFR BLD AUTO: 61 %
NRBC # BLD AUTO: 0 10E3/UL
NRBC BLD AUTO-RTO: 0 /100
O2/TOTAL GAS SETTING VFR VENT: 36 %
OPIATES UR QL SCN: NOT DETECTED
OXYCODONE UR QL SCN: NOT DETECTED
PCO2 BLDV: 55 MM HG (ref 40–50)
PCP UR QL SCN: NOT DETECTED
PH BLDV: 7.39 [PH] (ref 7.32–7.43)
PLATELET # BLD AUTO: 352 10E3/UL (ref 150–450)
PO2 BLDV: 21 MM HG (ref 25–47)
POTASSIUM SERPL-SCNC: 4.1 MMOL/L (ref 3.4–5.3)
PROPOXYPH UR QL: NOT DETECTED
PROT SERPL-MCNC: 8.1 G/DL (ref 6.4–8.3)
RBC # BLD AUTO: 4.73 10E6/UL (ref 3.8–5.2)
SODIUM SERPL-SCNC: 141 MMOL/L (ref 136–145)
TRICYCLICS UR QL SCN: NOT DETECTED
WBC # BLD AUTO: 10.4 10E3/UL (ref 4–11)

## 2023-06-13 PROCEDURE — 36415 COLL VENOUS BLD VENIPUNCTURE: CPT | Performed by: EMERGENCY MEDICINE

## 2023-06-13 PROCEDURE — 80306 DRUG TEST PRSMV INSTRMNT: CPT | Performed by: EMERGENCY MEDICINE

## 2023-06-13 PROCEDURE — 99285 EMERGENCY DEPT VISIT HI MDM: CPT | Performed by: EMERGENCY MEDICINE

## 2023-06-13 PROCEDURE — 250N000013 HC RX MED GY IP 250 OP 250 PS 637: Performed by: EMERGENCY MEDICINE

## 2023-06-13 PROCEDURE — 83735 ASSAY OF MAGNESIUM: CPT | Performed by: EMERGENCY MEDICINE

## 2023-06-13 PROCEDURE — 99285 EMERGENCY DEPT VISIT HI MDM: CPT | Mod: 25 | Performed by: EMERGENCY MEDICINE

## 2023-06-13 PROCEDURE — 82803 BLOOD GASES ANY COMBINATION: CPT | Performed by: EMERGENCY MEDICINE

## 2023-06-13 PROCEDURE — 85025 COMPLETE CBC W/AUTO DIFF WBC: CPT | Performed by: EMERGENCY MEDICINE

## 2023-06-13 PROCEDURE — 90791 PSYCH DIAGNOSTIC EVALUATION: CPT

## 2023-06-13 PROCEDURE — 80053 COMPREHEN METABOLIC PANEL: CPT | Performed by: EMERGENCY MEDICINE

## 2023-06-13 PROCEDURE — 82077 ASSAY SPEC XCP UR&BREATH IA: CPT | Performed by: EMERGENCY MEDICINE

## 2023-06-13 RX ORDER — OXYCODONE AND ACETAMINOPHEN 5; 325 MG/1; MG/1
1 TABLET ORAL ONCE
Status: COMPLETED | OUTPATIENT
Start: 2023-06-13 | End: 2023-06-13

## 2023-06-13 RX ADMIN — OXYCODONE HYDROCHLORIDE AND ACETAMINOPHEN 1 TABLET: 5; 325 TABLET ORAL at 20:58

## 2023-06-13 ASSESSMENT — COLUMBIA-SUICIDE SEVERITY RATING SCALE - C-SSRS
1. HAVE YOU WISHED YOU WERE DEAD OR WISHED YOU COULD GO TO SLEEP AND NOT WAKE UP?: YES
TOTAL  NUMBER OF ACTUAL ATTEMPTS LIFETIME: 1
TOTAL  NUMBER OF INTERRUPTED ATTEMPTS LIFETIME: NO
6. HAVE YOU EVER DONE ANYTHING, STARTED TO DO ANYTHING, OR PREPARED TO DO ANYTHING TO END YOUR LIFE?: NO
TOTAL  NUMBER OF ABORTED OR SELF INTERRUPTED ATTEMPTS LIFETIME: NO
1. IN THE PAST MONTH, HAVE YOU WISHED YOU WERE DEAD OR WISHED YOU COULD GO TO SLEEP AND NOT WAKE UP?: YES
ATTEMPT PAST THREE MONTHS: NO
ATTEMPT LIFETIME: YES

## 2023-06-13 ASSESSMENT — ACTIVITIES OF DAILY LIVING (ADL)
ADLS_ACUITY_SCORE: 37
ADLS_ACUITY_SCORE: 35
ADLS_ACUITY_SCORE: 37
ADLS_ACUITY_SCORE: 37

## 2023-06-13 NOTE — ED NOTES
IP MH Referral Acuity Rating Score (RARS)    LMHP complete at referral to IP MH, with DEC; and, daily while awaiting IP MH placement. Call score to PPS.  CRITERIA SCORING   New 72 HH and Involuntary for IP MH (not adolescent) 0/1   Boarding over 24 hours 0/1   Vulnerable adult at least 55+ with multiple co morbidities; or, Patient age 11 or under 0/1   Suicide ideation without relief of precipitating factors 0/1   Current plan for suicide 0/1   Current plan for homicide 0/1   Imminent risk or actual attempt to seriously harm another without relief of factors precipitating the attempt 0/1   Severe dysfunction in daily living (ex: complete neglect for self care, extreme disruption in vegetative function, extreme deterioration in social interactions) 1/1   Recent (last 2 weeks) or current physical aggression in the ED 0/1   Restraints or seclusion episode in ED 0/1   Verbal aggression, agitation, yelling, etc., while in the ED 0/1   Active psychosis with psychomotor agitation or catatonia 1/1   Need for constant or near constant redirection (from leaving, from others, etc).  0/1   Intrusive or disruptive behaviors 0/1   TOTAL Acuity Total Score: 2

## 2023-06-13 NOTE — TELEPHONE ENCOUNTER
S: [facility] ED , DEC  [place] calling at 4:50 pm about a 47 year old/Female presenting with disorganized, delusional, AH, SI     B: Pt arrived via Family. Presenting problem, stressors: Pt reports there are spirits in the house.  Pt reports AH that are spirits.   reports pt is disorganized.      Pt affect in ED: Disorganized  Pt Dx: none  Previous IPMH hx? Yes: suicide attempt 30 years ago  Pt endorses SI, no plan   Hx of suicide attempt? Yes: 30 yrs ago shoot self  Pt denies SIB  Pt denies HI   Pt endorses auditory hallucinations .   Pt RARS Score: not done at this time    Hx of aggression/violence, sexual offenses, legal concerns, Epic care plan? describe: none  Current concerns for aggression this visit? No  Does pt have a history of Civil Commitment? No  Is Pt their own guardian? Yes    Pt is not prescribed medication. Is patient medication compliant? N/A  Pt denies OP services   CD concerns: Pt is in recovery with a hx of alcohol use   Acute or chronic medical concerns: ILD, hyperlipidemia, arthritis  Does Pt present with specific needs, assistive devices, or exclusionary criteria? Continuous oxygen: requires Nurse        Pt is ambulatory  Pt is able to perform ADLs independently      A: Pt to be reviewed for [facility] admission. Pt is Voluntary  Preferred placement: Statewide    COVID:Not yet ordered, Intake to request lab   Utox: Positive for Cannabis and prescribed benzos   CMP: WNL  CBC: Abnormalities: MCHC 31.1  HCG: N/A    R: Patient cleared and ready for behavioral bed placement: Yes  Pt placed on IP worklist? Yes

## 2023-06-13 NOTE — ED NOTES
Updated pt on how placement works and potential delays related to the need for oxygen. Requesting to smoke-request denied. Offered nicotine patch. Pt delcined.
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
## 2023-06-13 NOTE — ED TRIAGE NOTES
Pt presents with having hallucinations auditory. Pt was thinking people were after here. Seeing buttons. This is per son. Pt stayed with friend last nite . Pt lives with parents. Pt took xanax today. Pt calm and cooperatives. Very quiet. Pt has told friend and osn she would be better off dead. Recent deaths of people close to her. Pt states she hasn't taken her depression meds for a couple of days. Pt here with friend who is very supportive.      Triage Assessment       Row Name 06/13/23 1229       Triage Assessment (Adult)    Airway WDL WDL       Respiratory WDL    Respiratory WDL X  Pt on 02 at 4 liters per N/C       Cardiac WDL    Cardiac WDL WDL       Peripheral/Neurovascular WDL    Peripheral Neurovascular WDL WDL       Cognitive/Neuro/Behavioral WDL    Cognitive/Neuro/Behavioral WDL X  hallucinations hearing voices

## 2023-06-14 ENCOUNTER — TELEPHONE (OUTPATIENT)
Dept: BEHAVIORAL HEALTH | Facility: CLINIC | Age: 48
End: 2023-06-14
Payer: COMMERCIAL

## 2023-06-14 VITALS
SYSTOLIC BLOOD PRESSURE: 102 MMHG | DIASTOLIC BLOOD PRESSURE: 77 MMHG | TEMPERATURE: 98.1 F | HEART RATE: 76 BPM | OXYGEN SATURATION: 100 % | RESPIRATION RATE: 16 BRPM

## 2023-06-14 LAB — SARS-COV-2 RNA RESP QL NAA+PROBE: NEGATIVE

## 2023-06-14 PROCEDURE — 87635 SARS-COV-2 COVID-19 AMP PRB: CPT | Performed by: FAMILY MEDICINE

## 2023-06-14 PROCEDURE — 250N000013 HC RX MED GY IP 250 OP 250 PS 637: Performed by: FAMILY MEDICINE

## 2023-06-14 PROCEDURE — 250N000013 HC RX MED GY IP 250 OP 250 PS 637: Performed by: EMERGENCY MEDICINE

## 2023-06-14 RX ORDER — ALPRAZOLAM 0.5 MG
0.5 TABLET ORAL 3 TIMES DAILY PRN
Status: DISCONTINUED | OUTPATIENT
Start: 2023-06-14 | End: 2023-06-15 | Stop reason: HOSPADM

## 2023-06-14 RX ORDER — ALBUTEROL SULFATE 90 UG/1
1-2 AEROSOL, METERED RESPIRATORY (INHALATION) EVERY 4 HOURS PRN
Status: DISCONTINUED | OUTPATIENT
Start: 2023-06-14 | End: 2023-06-15 | Stop reason: HOSPADM

## 2023-06-14 RX ORDER — OXYCODONE HYDROCHLORIDE 5 MG/1
5 TABLET ORAL EVERY 6 HOURS PRN
Status: DISCONTINUED | OUTPATIENT
Start: 2023-06-14 | End: 2023-06-14

## 2023-06-14 RX ORDER — PANTOPRAZOLE SODIUM 40 MG/1
40 TABLET, DELAYED RELEASE ORAL
Status: DISCONTINUED | OUTPATIENT
Start: 2023-06-14 | End: 2023-06-15 | Stop reason: HOSPADM

## 2023-06-14 RX ORDER — FUROSEMIDE 20 MG
20 TABLET ORAL 2 TIMES DAILY PRN
Status: DISCONTINUED | OUTPATIENT
Start: 2023-06-14 | End: 2023-06-15 | Stop reason: HOSPADM

## 2023-06-14 RX ORDER — SERTRALINE HYDROCHLORIDE 25 MG/1
25 TABLET, FILM COATED ORAL DAILY
Status: DISCONTINUED | OUTPATIENT
Start: 2023-06-14 | End: 2023-06-15 | Stop reason: HOSPADM

## 2023-06-14 RX ORDER — OXYCODONE AND ACETAMINOPHEN 10; 325 MG/1; MG/1
1 TABLET ORAL EVERY 6 HOURS PRN
Status: DISCONTINUED | OUTPATIENT
Start: 2023-06-14 | End: 2023-06-15 | Stop reason: HOSPADM

## 2023-06-14 RX ORDER — TADALAFIL 20 MG/1
20 TABLET ORAL DAILY
Status: DISCONTINUED | OUTPATIENT
Start: 2023-06-14 | End: 2023-06-15 | Stop reason: HOSPADM

## 2023-06-14 RX ADMIN — PANTOPRAZOLE SODIUM 40 MG: 40 TABLET, DELAYED RELEASE ORAL at 14:19

## 2023-06-14 RX ADMIN — OXYCODONE HYDROCHLORIDE 5 MG: 5 TABLET ORAL at 05:02

## 2023-06-14 RX ADMIN — OXYCODONE AND ACETAMINOPHEN 1 TABLET: 10; 325 TABLET ORAL at 19:18

## 2023-06-14 RX ADMIN — ALPRAZOLAM 0.5 MG: 0.5 TABLET ORAL at 12:23

## 2023-06-14 RX ADMIN — SERTRALINE HYDROCHLORIDE 25 MG: 25 TABLET ORAL at 12:23

## 2023-06-14 RX ADMIN — OXYCODONE AND ACETAMINOPHEN 1 TABLET: 10; 325 TABLET ORAL at 12:23

## 2023-06-14 RX ADMIN — TADALAFIL 20 MG: 20 TABLET ORAL at 12:24

## 2023-06-14 ASSESSMENT — ACTIVITIES OF DAILY LIVING (ADL)
ADLS_ACUITY_SCORE: 37

## 2023-06-14 NOTE — ED NOTES
IP MH Referral Acuity Rating Score (RARS)    LMHP complete at referral to IP MH, with DEC; and, daily while awaiting IP MH placement. Call score to PPS.  CRITERIA SCORING   New 72 HH and Involuntary for IP MH (not adolescent) 0/1   Boarding over 24 hours 1/1   Vulnerable adult at least 55+ with multiple co morbidities; or, Patient age 11 or under 0/1   Suicide ideation without relief of precipitating factors 1/1   Current plan for suicide 0/1   Current plan for homicide 0/1   Imminent risk or actual attempt to seriously harm another without relief of factors precipitating the attempt 0/1   Severe dysfunction in daily living (ex: complete neglect for self care, extreme disruption in vegetative function, extreme deterioration in social interactions) 1/1   Recent (last 2 weeks) or current physical aggression in the ED 0/1   Restraints or seclusion episode in ED 0/1   Verbal aggression, agitation, yelling, etc., while in the ED 0/1   Active psychosis with psychomotor agitation or catatonia 1/1   Need for constant or near constant redirection (from leaving, from others, etc).  0/1   Intrusive or disruptive behaviors 0/1   TOTAL Acuity Total Score: 4

## 2023-06-14 NOTE — ED NOTES
Anali A Vincenzo  June 13, 2023  Plan of Care Hand-off Note     Patient Care Path: Inpatient Mental Health    Plan for Care:     Patient comes in the hospital brought in by family due to concerns of increasing disorganized behavior and psychosis.  They report that the patient has shown a decrease in functioning over the last several months but noticing more severe symptoms over the last few weeks.  They report difficulty with her engaging in conversation and appearing to respond to internal and external stimuli.  She reports of hearing voices and has been trying to detect a secret elevator and hidden buttons inside of her home.  She has made passive statements about not wanting to be alive and suspects that she will not be around much longer.  They report behaviors of her trying to walk away with a limited amount of oxygen in her oxygen tank.      Due to ongoing severity and frequency of symptoms it is recommended that the patient remain in the hospital for further monitoring and stabilization.  Due to the patient requiring oxygen at all times, the patient will not be able to attend a behavioral health unit within the [facility] system.  It was then recommended that she possibly seek out admission at [facility] where they have a limited number of beds that allow for a oxygen tank.  It was also advised to seek out a possible medical admission and a psych consult at this time. Discussed case with Dr. Gallardo who is in agreement with plan.    Critical Safety Issues: pt has been struggling with auditory hallucations and paranoia. Pt has no SIB. Pt has last suicide attempt around the age of 30    Overview:  This patient is a child/adolescent: No    This patient has additional special visitor precautions: No    Legal Status: Voluntary    Contacts:   jorge Camp: Contact [phone]    Psychiatry Consult:  Adult Psychiatry Consult requested related to psychosis . Psychiatry IP Consult Order Placed: Yes    Updated Attending  Provider regarding plan of care.    Chetan Kidd

## 2023-06-14 NOTE — TELEPHONE ENCOUNTER
[facility] Access Inpatient Bed Call Log 6/14/23 @ 5:00 PM      Intake has called facilities that have not updated their bed status within the last 12 hours.          [facility] is posting 0 beds.            [facility] is posting 0 beds. [phone]  Per call @ 8:37 am, no option for a live rep; left vm asking for a call back     [facility] is posting 0 beds. [phone]            [facility] is posting 0 beds. [phone] per call at 8:39 am to Génesis, she will ask her charge rn to call us back re: bed avail     [facility] is posting 0 beds. [phone]          [facility] is posting 0 beds. [phone]          [facility] is posting 0 beds. [phone]          [facility] is posting 0 beds. [phone]               [facility] is posting 0 beds. [phone]     [facility] is posting 0 beds. [phone]            [facility] is posting 4 beds. Mixed unit 12+. Low acuity only. [phone].      [facility] is positing 0 beds. No aggression.  [phone]          [facility] is posting 0 beds. [phone]      [facility] is posting 2 beds. Low acuity only. [phone]      [facility] is posting 0 beds. Low acuity. No current aggression. [phone].      [facility] is posting 0 beds. Low acuity. [phone]     [facility] is posting 0 beds. 72 HH preferred. Low acuity. [phone]  per call at 8:45 am, phone rang multiple times with no answer          [facility] is posting 8 beds. Low acuity. [phone]      [facility] is posting 3 beds. Vol only, No Hx of aggression, violence, or assault. No sexual offenders. No 72 hr holds. [phone]      [facility] is posting 4 beds. (Must have the cognitive ability to do programming. No aggressive or violent behavior or recent HX in the last 2 yrs. MH must be primary.) [phone]     [facility] is posting 0 beds. Low acuity only. Violence and aggression capped. [phone]        [facility] is posting 0 beds. Low acuity, Neg Covid. [phone] Per call @ 8:49 am to Génesis, they are at cap for the day.     [facility] is posting 2 beds. Covid neg. Vol only. Combined adolescent and adult unit. No aggressive or violent behavior. No registered sex offenders. [phone]      [facility] posting 4 beds. Negative covid.            [facility] is posting 2 beds. [phone] no wounds, lines, drains, C-paps, tubes and must be able to care for themselves; no hx of aggression.   Per call @ 8:52 am to Madiha, they have 2 beds     [facility] is posting 8 beds. Call for details. [phone] Per call @8:53 am to Keiko, they have several beds avail.      [facility] is posting 1 beds. Mixed unit. [phone]. Per call at 8:55 am to Stephie, 1 low acuity bed avail.             Pt remains on work list pending appropriate bed availability.

## 2023-06-14 NOTE — ED PROVIDER NOTES
Anali is a 47-year-old female presenting to the emergency room with auditory hallucinations.  Was evaluated first by Dr. Saray Gallardo, and signed out to Dr. Carlos Marie to be monitored overnight.  She has been accepted at [facility] as she does have underlying pulmonary hypertension, is requiring continuous supplemental oxygen, and needing to go to a medical bed with a sitter and psych consult, as she would not be able to go to a psych floor with her oxygen tubing.  Has been stable overnight.  Has been cooperative.  She has been on a watch as she has been amenable to the treatment and transfer, is not currently on a hold but is holdable should she decide she wants to leave.  Overnight, pain medication oxycodone was ordered for her chronic pain.  Hopefully she will be transferred today    Spoke with Luis with extended care.  He states the patient is still admitting to hearing voices and still wishing that she were dead.  Still recommends inpatient placement, but notes that she would need a medical bed.  He will place order for psychiatry consult, and hopefully a telemetry psychiatry visit will be able to be completed tomorrow if patient remains in the ED.    Unfortunately, no appropriate bed has been available throughout the day, and the patient remains in the ED.  Signed out at change of shift to Dr. Marshall Galindo to monitor overnight, and hopeful transfer if bed becomes available.  Otherwise I will plan to resume care in the morning     Vy Ferrari,   06/14/23 1936

## 2023-06-14 NOTE — MEDICATION SCRIBE - ADMISSION MEDICATION HISTORY
Medication Scribe Admission Medication History    Admission medication history is complete. The information provided in this note is only as accurate as the sources available at the time of the update.    Medication reconciliation/reorder completed by provider prior to medication history? No    Information Source(s): Patient via in-person    Pertinent Information: Patient reports she is unsure where to request tadalafil refill, from mail service for [facility]. Has not had tadalafil for over a month, recent dispense history supports this report.   Patient has been at relative house and has not had any recent dose of morphine or percocet, cannot recall when she last had any.     Changes made to PTA medication list:    Added: None    Deleted: Amoxicillin 500 mg: patient reports completed therapy     Changed: None    Medication Affordability: NO        Allergies reviewed with patient and updates made in EHR: yes    Medication History Completed By: ANNA FELIZ 6/13/2023 7:07 PM    Prior to Admission medications    Medication Sig Last Dose Taking? Auth Provider Long Term End Date   ALPRAZolam (XANAX) 0.5 MG tablet Take 1 tablet (0.5 mg) by mouth nightly as needed for anxiety or sleep - INTENTIONAL DOSE REDUCTION (must last 30 days) 6/13/2023 at [facility] Yes Franklyn Rushing MD No    furosemide (LASIX) 20 MG tablet Take 1 tablet (20 mg) by mouth as needed (swelling) More than a month Yes Jania Woo PA Yes    morphine (MS CONTIN) 15 MG CR tablet Take 1 tablet (15 mg) by mouth 2 times daily Unknown, cannot recall last dose  Yes Crsitian Fagan MD     order for DME Oxygen: Patient requires supplemental Oxygen 4 LPM via nasal canula at rest and 6 LPM with activity. Please provide patient with portability capability. Okay to spot check patient on oxygen device, to keep sats above 90%. Please provider with home concentrator that can go up to 10 LPM. Oxygen will be for a lifetime.  Yes Dawson Upton MD      order for DME Please provide patient with 2  liquid oxygen tanks for portability. Spot check patient on liquid oxygen. Titrate oxygen to maintain saturations above 88%.  Yes Perlman, David Morris, MD     oxyCODONE-acetaminophen (PERCOCET)  MG per tablet Take 1 tablet by mouth every 6 hours as needed for severe pain Unknown; cannot recall last dose Yes Cristian Fagan MD     sertraline (ZOLOFT) 25 MG tablet Take 1 tablet (25 mg) by mouth daily Past Week Yes Cristian Fagan MD Yes    tadalafil, PAH, 20 MG TABS Take 1 tablet (20 mg) by mouth daily More than a month Yes Callie Ledesma MD Yes    VENTOLIN  (90 Base) MCG/ACT inhaler INHALE ONE TO TWO PUFFS INTO THE LUNGS EVERY 4 HOURS AS NEEDED FOR SHORTNESS OF BREATH / DYSPNEA  Patient taking differently: Inhale 1-2 puffs into the lungs every 4 hours as needed for shortness of breath or wheezing Past Week Yes Ignacio Loya MD Yes

## 2023-06-14 NOTE — ED PROVIDER NOTES
"I assumed patient's care at change of shift from Dr. Gallardo.  She is a 47-year-old female with primary pulmonary hypertension, interstitial lung disease and is on chronic oxygen therapy.  She is experienced lots of deaths in her family recently and started hearing voices again yesterday but not today.  Apparently was telling her kids to \"come outside and see Juan M\".    Talking about she just wants to die and God hates me.      DEC evaluated her and recommended inpatient psychiatric treatment.  Unfortunately, because she is on chronic oxygen therapy, she cannot go to an inpatient psych unit and will need to be a medical admit with psychiatric consult.  Dr. Neal from [facility] has accepted her but we need to wait for a bed to open up as she will need a one-to-one sitter.  She is currently on a watch but not on a hold.  If she were to attempt to leave, a hold will be placed.    Boarding orders have been done.    She takes oxycodone 5 mg every 6 hours as needed for back pain.  That was ordered for her.    Dr. Ferrari assumed her care at change of shift.  Still waiting for an inpatient bed.     Michael Leigh MD  06/14/23 0657    "

## 2023-06-14 NOTE — TELEPHONE ENCOUNTER
R; [place]  Access Inpatient Bed Call Log 6/14/23 @12:10PM (Statewide)     Intake has called facilities that have not updated their bed status within the last 12 hours.    4:57 PM Writer was notified that ED Toby called . Intake notified ED that pt was declined for review due to oxygen,    [facility] is posting 0 beds.          [facility] is posting 0 beds. [phone]  Per call @ 8:37 am, no option for a live rep; left vm asking for a call back   [facility] is posting 0 beds. [phone]          [facility] is posting 0 beds. [phone] per call at 8:39 am to Génesis, she will ask her charge rn to call us back re: bed avail   [facility] is posting 0 beds. [phone]        [facility] is posting 0 beds. [phone]        [facility] is posting 0 beds. [phone]        [facility] is posting 0 beds. [phone]           [facility] is posting 0 beds. [phone]   [facility] is posting 0 beds. [phone]          [facility] is posting 4 beds. Mixed unit 12+. Low acuity only. [phone].    [facility] is positing 0 beds. No aggression.  [phone]        [facility] is posting 0 beds. [phone]    [facility] is posting 2 beds. Low acuity only. [phone]  @2:58 PM Per call to Fabiana will need to confirm if able to review pt due to oxygen.  Per call @3:20 PM Pt declined will be unable to accommodate oxygen.  [facility] is posting 0 beds. Low acuity. No current aggression. [phone].    [facility] is posting 0 beds. Low acuity. [phone]   [facility] is posting 0 beds. 72 HH preferred. Low acuity. [phone]  per call at 8:45 am, phone rang multiple times with no answer      [facility] is posting 8 beds. Low acuity. [phone]    [facility] is posting 4 beds. Vol only, No Hx of aggression, violence, or assault. No sexual offenders. No 72 hr holds. [phone]    [facility] is posting 6 beds. (Must have the cognitive ability to do programming. No aggressive or violent behavior or recent HX in the last 2 yrs. MH must be primary.) [phone]   [facility] is posting 0 beds. Low acuity only. Violence and aggression capped. [phone]      Counts include 234 beds at the [facility] is posting 0 beds. Low acuity, Neg Covid. [phone] Per call @ 8:49 am to Génesis, they are at cap for the day.   [facility] is posting 2 beds. Covid neg. Vol only. Combined adolescent and adult unit. No aggressive or violent behavior. No registered sex offenders. [phone]    [facility] posting 4 beds. Negative covid.        [facility] is posting 2 beds. [phone] no wounds, lines, drains, C-paps, tubes and must be able to care for themselves; no hx of aggression.   Per call @ 8:52 am to Madiha, they have 2 beds   [facility] is posting 12 beds. Call for details. [phone] Per call @8:53 am to Keiko, they have several beds avail.    [facility] is posting 1 beds. Mixed unit. [phone]. Per call at 8:55 am to Stephie, 1 low acuity bed avail.         Patient will remain on Patient Placement work list pending appropriate bed availability.

## 2023-06-14 NOTE — PROGRESS NOTES
"Triage & Transition Services, Extended Care     Therapy Progress Note    Patient: Anali goes by \"Anali,\" uses she/her pronouns  Date of Service: June 14, 2023  Site of Service: [facility] ED  Patient was seen virtually ([employer] cart or other teleconferencing device).     Presenting problem:   Anali is followed related to Placement delay: boarding for IP MH admission. Please see initial DEC/LM Crisis Assessment completed by Chetan Kidd on 6/13/23 for complete assessment information. Notable concerns include disorganized behavior/psychosis.     Individuals Present: Anali & Luis Nation    Session start: 9:52 am  Session end: 10:15 am  Session duration in minutes: 23 minutes  Session number: 1  Anticipated number of sessions or this episode of care: 1-4  CPT utilized: 16740 - Psychotherapy (with patient) - 30 (16-37*) min    Current Presentation:   Patient and writer met virtually.  Patient was as engaged as she could be throughout the interaction.  She continues to endorse significant levels of depression and anxiety, but is unable to rate them.  Patient endorses feeling like she is trapped in a whirlwind, and she often becomes tearful.  She reports that she worries for her family, and reports feeling trapped in the \"darkness\".  Writer attempted to talk to patient about this, and she appears to have thought blocking.  At several points throughout the session writer notices thought blocking happening.  Patient appears to be quite overwhelmed, and is labile in her affect, appearing quite anxious and tearful throughout the session.  While she does not have a suicide plan, she does have extreme helplessness and believes that she would be better off dead.  She reports her thoughts of being better off dead have increased significantly over the past few weeks, but does state she has had them for most of her adult life.     Mental Status Exam:   Appearance: awake and disheveled   Behavior: Reduced eye contact and " Guarded  Eye Contact: poor , looking around room  Mood: Anxious and Depressed  Affect: labile and tearful  Speech: slowed and hesitant  Psychomotor Behavior: no evidence of tardive dyskinesia, dystonia, or tics   Thought Process:  disorganized and evidence of thought blocking present  Associations: no loose associations  Thought Content: passive suicidal ideation present and patient appears to be responding to internal stimuli  Insight: limited  Judgement: variable  Oriented to: person and place  Attention Span and Concentration: limited  Recent and Remote Memory: limited    Diagnosis:   298.9 (F29)  Unspecified Schizophrenia Spectrum   311 (F32.9) Unspecified Depressive Disorder  - by history     Therapeutic Intervention(s):   Provided active listening, unconditional positive regard, and validation. Engaged in guided discovery, explored patient's perspectives and helped expand them through socratic dialogue. Reviewed healthy living that supports positive mental health, including looking at sleep hygiene, regular movement, nutrition, and regular socialization.    Treatment Objective(s) Addressed:   The focus of this session was on rapport building, assessing safety and identifying treatment goals.     Progress Towards Goals:   Patient reports worsening symptoms. Patient is making progress towards treatment goals as evidenced by engaging in video sessions as much as she could.     Case Management:   N/A    General Recommendations:   Continue to monitor for harm. Consider: Use a positive, direct and calm approach. Pt's tend to match the energy/mood of the staff. Keep focus positive and upbeat and Use clear and concise directions, too many words can be overwhelming    Plan:   Inpatient Mental Health: Patient continues to be responding to internal stimuli, and continues to have passive suicidal ideation, which has increased in frequency and intensity over the past couple of weeks.  Writer continues to recommend  inpatient mental health placement for ongoing stabilization of symptoms.      Plan for Care reviewed with Assigned Medical Provider? Yes. Provider, Dr Ferrari, response: in agreement, writer will place a psych consult for patient to be reviewed for mental health medications.     Luis Nation   Licensed Mental Health Professional (LMHP), [facility]  [phone]
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
## 2023-06-15 ENCOUNTER — TELEPHONE (OUTPATIENT)
Dept: BEHAVIORAL HEALTH | Facility: CLINIC | Age: 48
End: 2023-06-15

## 2023-06-15 ENCOUNTER — HOSPITAL ENCOUNTER (INPATIENT)
Facility: CLINIC | Age: 48
LOS: 4 days | Discharge: HOME | End: 2023-06-19
Attending: INTERNAL MEDICINE | Admitting: INTERNAL MEDICINE
Payer: COMMERCIAL

## 2023-06-15 DIAGNOSIS — F43.81 PROLONGED GRIEF DISORDER: Primary | ICD-10-CM

## 2023-06-15 PROBLEM — R44.3 HALLUCINATIONS: Status: ACTIVE | Noted: 2023-06-15

## 2023-06-15 LAB
ALBUMIN UR-MCNC: NEGATIVE MG/DL
AMPHETAMINES UR QL SCN: NORMAL
ANION GAP SERPL CALCULATED.3IONS-SCNC: 10 MMOL/L (ref 7–15)
APPEARANCE UR: CLEAR
BARBITURATES UR QL SCN: NORMAL
BENZODIAZ UR QL SCN: NORMAL
BILIRUB UR QL STRIP: NEGATIVE
BUN SERPL-MCNC: 5.5 MG/DL (ref 6–20)
BZE UR QL SCN: NORMAL
CALCIUM SERPL-MCNC: 9.2 MG/DL (ref 8.6–10)
CANNABINOIDS UR QL SCN: NORMAL
CHLORIDE SERPL-SCNC: 101 MMOL/L (ref 98–107)
COLOR UR AUTO: ABNORMAL
CREAT SERPL-MCNC: 0.62 MG/DL (ref 0.51–0.95)
DEPRECATED HCO3 PLAS-SCNC: 27 MMOL/L (ref 22–29)
ERYTHROCYTE [DISTWIDTH] IN BLOOD BY AUTOMATED COUNT: 13.2 % (ref 10–15)
ETHANOL UR QL SCN: NORMAL
GFR SERPL CREATININE-BSD FRML MDRD: >90 ML/MIN/1.73M2
GLUCOSE SERPL-MCNC: 92 MG/DL (ref 70–99)
GLUCOSE UR STRIP-MCNC: NEGATIVE MG/DL
HCT VFR BLD AUTO: 40.3 % (ref 35–47)
HGB BLD-MCNC: 12.7 G/DL (ref 11.7–15.7)
HGB UR QL STRIP: NEGATIVE
KETONES UR STRIP-MCNC: NEGATIVE MG/DL
LEUKOCYTE ESTERASE UR QL STRIP: NEGATIVE
MCH RBC QN AUTO: 28.4 PG (ref 26.5–33)
MCHC RBC AUTO-ENTMCNC: 31.5 G/DL (ref 31.5–36.5)
MCV RBC AUTO: 90 FL (ref 78–100)
MUCOUS THREADS #/AREA URNS LPF: PRESENT /LPF
NITRATE UR QL: NEGATIVE
OPIATES UR QL SCN: NORMAL
PCP QUAL URINE (ROCHE): NORMAL
PH UR STRIP: 7.5 [PH] (ref 5–7)
PLATELET # BLD AUTO: 321 10E3/UL (ref 150–450)
POTASSIUM SERPL-SCNC: 3.7 MMOL/L (ref 3.4–5.3)
RBC # BLD AUTO: 4.47 10E6/UL (ref 3.8–5.2)
RBC URINE: 1 /HPF
SODIUM SERPL-SCNC: 138 MMOL/L (ref 136–145)
SP GR UR STRIP: 1 (ref 1–1.03)
SQUAMOUS EPITHELIAL: 2 /HPF
UROBILINOGEN UR STRIP-MCNC: NORMAL MG/DL
WBC # BLD AUTO: 8.9 10E3/UL (ref 4–11)
WBC URINE: 1 /HPF

## 2023-06-15 PROCEDURE — 99254 IP/OBS CNSLTJ NEW/EST MOD 60: CPT

## 2023-06-15 PROCEDURE — 82310 ASSAY OF CALCIUM: CPT | Performed by: INTERNAL MEDICINE

## 2023-06-15 PROCEDURE — 120N000002 HC R&B MED SURG/OB UMMC

## 2023-06-15 PROCEDURE — 80307 DRUG TEST PRSMV CHEM ANLYZR: CPT | Performed by: INTERNAL MEDICINE

## 2023-06-15 PROCEDURE — 250N000013 HC RX MED GY IP 250 OP 250 PS 637: Performed by: INTERNAL MEDICINE

## 2023-06-15 PROCEDURE — 99222 1ST HOSP IP/OBS MODERATE 55: CPT | Mod: AI | Performed by: INTERNAL MEDICINE

## 2023-06-15 PROCEDURE — 81001 URINALYSIS AUTO W/SCOPE: CPT | Performed by: INTERNAL MEDICINE

## 2023-06-15 PROCEDURE — 85027 COMPLETE CBC AUTOMATED: CPT | Performed by: INTERNAL MEDICINE

## 2023-06-15 PROCEDURE — 36415 COLL VENOUS BLD VENIPUNCTURE: CPT | Performed by: INTERNAL MEDICINE

## 2023-06-15 PROCEDURE — 250N000013 HC RX MED GY IP 250 OP 250 PS 637

## 2023-06-15 RX ORDER — ALBUTEROL SULFATE 90 UG/1
1-2 AEROSOL, METERED RESPIRATORY (INHALATION) EVERY 4 HOURS PRN
Status: DISCONTINUED | OUTPATIENT
Start: 2023-06-15 | End: 2023-06-19 | Stop reason: HOSPADM

## 2023-06-15 RX ORDER — FLUOXETINE 10 MG/1
10 CAPSULE ORAL DAILY
Status: DISCONTINUED | OUTPATIENT
Start: 2023-06-16 | End: 2023-06-15

## 2023-06-15 RX ORDER — TADALAFIL 10 MG/1
20 TABLET ORAL EVERY OTHER DAY
Status: DISCONTINUED | OUTPATIENT
Start: 2023-06-15 | End: 2023-06-15

## 2023-06-15 RX ORDER — QUETIAPINE FUMARATE 25 MG/1
25 TABLET, FILM COATED ORAL AT BEDTIME
Status: DISCONTINUED | OUTPATIENT
Start: 2023-06-15 | End: 2023-06-19 | Stop reason: HOSPADM

## 2023-06-15 RX ORDER — TADALAFIL 20 MG/1
20 TABLET ORAL DAILY
Status: DISCONTINUED | OUTPATIENT
Start: 2023-06-15 | End: 2023-06-15

## 2023-06-15 RX ORDER — NALOXONE HYDROCHLORIDE 0.4 MG/ML
0.2 INJECTION, SOLUTION INTRAMUSCULAR; INTRAVENOUS; SUBCUTANEOUS
Status: DISCONTINUED | OUTPATIENT
Start: 2023-06-15 | End: 2023-06-19 | Stop reason: HOSPADM

## 2023-06-15 RX ORDER — LIDOCAINE 40 MG/G
CREAM TOPICAL
Status: DISCONTINUED | OUTPATIENT
Start: 2023-06-15 | End: 2023-06-19 | Stop reason: HOSPADM

## 2023-06-15 RX ORDER — TADALAFIL 10 MG/1
20 TABLET ORAL DAILY
Status: DISCONTINUED | OUTPATIENT
Start: 2023-06-15 | End: 2023-06-19 | Stop reason: HOSPADM

## 2023-06-15 RX ORDER — HALOPERIDOL 2 MG/1
2 TABLET ORAL EVERY 6 HOURS PRN
Status: DISCONTINUED | OUTPATIENT
Start: 2023-06-15 | End: 2023-06-15

## 2023-06-15 RX ORDER — OXYCODONE HYDROCHLORIDE 10 MG/1
10 TABLET ORAL EVERY 6 HOURS PRN
Status: DISCONTINUED | OUTPATIENT
Start: 2023-06-15 | End: 2023-06-19 | Stop reason: HOSPADM

## 2023-06-15 RX ORDER — PANTOPRAZOLE SODIUM 40 MG/1
40 TABLET, DELAYED RELEASE ORAL
Status: DISCONTINUED | OUTPATIENT
Start: 2023-06-15 | End: 2023-06-19 | Stop reason: HOSPADM

## 2023-06-15 RX ORDER — NALOXONE HYDROCHLORIDE 0.4 MG/ML
0.4 INJECTION, SOLUTION INTRAMUSCULAR; INTRAVENOUS; SUBCUTANEOUS
Status: DISCONTINUED | OUTPATIENT
Start: 2023-06-15 | End: 2023-06-19 | Stop reason: HOSPADM

## 2023-06-15 RX ORDER — HALOPERIDOL 1 MG/1
1 TABLET ORAL EVERY 6 HOURS PRN
Status: DISCONTINUED | OUTPATIENT
Start: 2023-06-15 | End: 2023-06-19 | Stop reason: HOSPADM

## 2023-06-15 RX ORDER — FLUOXETINE 10 MG/1
10 CAPSULE ORAL DAILY
Status: COMPLETED | OUTPATIENT
Start: 2023-06-15 | End: 2023-06-15

## 2023-06-15 RX ORDER — ALPRAZOLAM 0.5 MG
0.5 TABLET ORAL DAILY PRN
Status: DISCONTINUED | OUTPATIENT
Start: 2023-06-15 | End: 2023-06-16

## 2023-06-15 RX ADMIN — OXYCODONE HYDROCHLORIDE 10 MG: 10 TABLET ORAL at 04:54

## 2023-06-15 RX ADMIN — ALPRAZOLAM 0.5 MG: 0.5 TABLET ORAL at 17:36

## 2023-06-15 RX ADMIN — OXYCODONE HYDROCHLORIDE 10 MG: 10 TABLET ORAL at 11:28

## 2023-06-15 RX ADMIN — PANTOPRAZOLE SODIUM 40 MG: 40 TABLET, DELAYED RELEASE ORAL at 08:40

## 2023-06-15 RX ADMIN — FLUOXETINE 10 MG: 10 CAPSULE ORAL at 14:54

## 2023-06-15 RX ADMIN — QUETIAPINE FUMARATE 25 MG: 25 TABLET ORAL at 20:44

## 2023-06-15 RX ADMIN — TADALAFIL 20 MG: 10 TABLET, FILM COATED ORAL at 08:40

## 2023-06-15 RX ADMIN — OXYCODONE HYDROCHLORIDE 10 MG: 10 TABLET ORAL at 17:38

## 2023-06-15 ASSESSMENT — ACTIVITIES OF DAILY LIVING (ADL)
ADLS_ACUITY_SCORE: 23
ADLS_ACUITY_SCORE: 23
HEARING_DIFFICULTY_OR_DEAF: NO
WALKING_OR_CLIMBING_STAIRS_DIFFICULTY: YES
DIFFICULTY_COMMUNICATING: NO
ADLS_ACUITY_SCORE: 23
DRESSING/BATHING_DIFFICULTY: NO
ADLS_ACUITY_SCORE: 23
ADLS_ACUITY_SCORE: 37
ADLS_ACUITY_SCORE: 23
ADLS_ACUITY_SCORE: 33
DOING_ERRANDS_INDEPENDENTLY_DIFFICULTY: NO
CONCENTRATING,_REMEMBERING_OR_MAKING_DECISIONS_DIFFICULTY: YES
TRANSFERRING: 0-->INDEPENDENT
TRANSFERRING: 0-->INDEPENDENT
COMMUNICATION: OTHER (SEE COMMENTS)
ADLS_ACUITY_SCORE: 23
ADLS_ACUITY_SCORE: 23
WEAR_GLASSES_OR_BLIND: YES
ADLS_ACUITY_SCORE: 37
DIFFICULTY_EATING/SWALLOWING: NO
FALL_HISTORY_WITHIN_LAST_SIX_MONTHS: NO
CHANGE_IN_FUNCTIONAL_STATUS_SINCE_ONSET_OF_CURRENT_ILLNESS/INJURY: YES
ADLS_ACUITY_SCORE: 23
TOILETING_ISSUES: NO
ADLS_ACUITY_SCORE: 23

## 2023-06-15 NOTE — PLAN OF CARE
VS: /74 (BP Location: Left arm)   Pulse 59   Temp 97.9  F (36.6  C)   Resp 16   LMP  (LMP Unknown)   SpO2 96%      O2: 96-99% on 4L of O 2   Output: Continent of bowel and bladder elimination   Last BM: 6/14/23   Activity: Encourage activities as tolerated   Skin: Skin is clean dry and intact   Pain: Chronic lower back  and bilateral lower limb joint pain. Pt verbalized and rate pain @ 7/10 . Pain managed with warm pack and Oxycodone 10 mg. Pt. verbalized relief after pain mangement intervention    CMS: A & O X 4. Ambulatory in room and Able to communicate needs   Dressing: No dressing on    Diet: Regular diet with thin liquid in small bite    LDA: No line, drains. Extension cord attached to oxygen tube to permit    Equipment: Oxymeter probe and personal bnelonging   Plan: Psych consult and possible discharge to behavioral unit.    Additional Info: Pt is on continuous 4L or oxygen administration via nasal  Cannular. Pt. Is pleasant but quiet and flat. Pt is able to use the call button. call button placed within reach. Continue with POC.  Pt's son is aware mother is admitted but daughter was notified via voice message.
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
## 2023-06-15 NOTE — DISCHARGE INSTRUCTIONS
Future Appts:    Date: Tuesday, 6/20/2023  Time: 11:00 am - 1:00 pm  Provider: Shawn Marrero  Supervised by: Piedad Musa MA  [facility]  Location: [facility], [address] [place] [address]  Phone: [phone]  Type: Therapy - Initial (In-Person)    Resources:    Grief Support Groups    [address] [place], [place]   [phone]    [facility] Healing Bereavement Program  Grief support groups are hosted throughout the year and are free of charge. Groups offer a supportive setting to share your experience, develop coping skills, and learn about loss. Participants should be at least two months beyond the death of their loved one before registering.  Contact Sammie at [phone] or Janet@[employer].    [facility] Grief events and support groups  https://www.[employer].org/health-conditions-and-treatments/grief-resources/support-groups  Virtual Grief Support Group  Meets monthly.  Contact: Destiny@[employer] or [phone]    [facility]  https://www.griefloss.org/    [facility]  https://www.brighterdaysgriefcenter.org/     [facility] [place]  https://griefclubmn.org/    [facility] [place]  https://www.[facility]bs[facility].org/    [facility]   https://optionb.org/grief  Everyday guidance for navigating grief  Coping with Grief Group  Connect with a community of 25K people around the world who share their experiences with grief openly and honestly and provide everyday support for one another.        Safety Plan  If I am feeling unsafe or I am in a crisis, I will:  -Contact my established care providers  -Call the National Suicide Prevention Lifeline: 988  -Go to the nearest emergency room  -Call 276

## 2023-06-15 NOTE — PROGRESS NOTES
VS: /62 (BP Location: Left arm)   Pulse 66   Temp 97.6  F (36.4  C)   Resp 16   LMP  (LMP Unknown)   SpO2 100%    O2: RA   Output: Voids in toilet   Last BM: 6/14   Activity: indep   Skin: intact   Pain: Complains of pain in her lower back, chronic pain   CMS: intact   Dressing: none   Diet: reg   LDA: none   Equipment: none   Plan: Monitor, plan is to transfer to psych unit, but needs to get off O2.  Currently on 4L O2   Additional Info:        Sitter was discontinued.  Patient was pleasant, cooperative this shift.  Her 3 kids came to visit her.  She denied having visual hallucinations or auditory hallucinations.

## 2023-06-15 NOTE — TELEPHONE ENCOUNTER
3:02 PM Intake received call from Emmy Rushing CNP that patient should remain on worklist and inpatient psych should still be pursued given the recent consult.     R: The patient is currently on [place] [facility] awaiting placement. Patient is voluntary for statewide placement. Patient requires oxygen.     Intake afternoon Bed Search (Done at 3:08 PM) Facilities that have not updated their [facility] access site in the last 12 hours have been contacted for bed availability.   [facility] is posting 0 beds.   [facility] is posting 0 beds.  [facility] is posting 0 beds.  [facility] is posting 0 beds.  [facility] is posting 0 beds.  [facility] is posting 0 beds.  [facility] is posting 0 beds.  [facility] is posting 0 beds. Low acuity.    [facility] is posting 7 beds. Mixed unit 12+. Low acuity only. 3:14 PM Intake called and spoke to Génesis, bed available and patient can be reviewed.   [facility] is posting 0 beds. No aggression.   [facility] is posting 0 beds.   [facility] is posting 0 beds. Low acuity only.  [facility] is posting 0 beds. Low acuity only.   [facility] is posting 0 beds. 0 acute, 0 med psych, 0 mood disorder- very low acuity.   [facility] is posting 1 bed. Low acuity only. 72 hr hold required.   [facility] is posting 7 beds. Low acuity.  Patient is not appropriate for open bed: oxygen is exclusionary for facility    [facility] is posting 2 beds. Vol only, No Hx of aggression, violence, or assault. No sexual offenders. No 72 hr holds.     [facility] is posting 5 beds. (Must have the cognitive ability to do programming. No aggressive or violent behavior or recent HX in the last 2 yrs. MH must be primary).  [facility] is posting 0 beds. Low acuity only. Violence and aggression capped.   [facility] is posting 0 beds. Low acuity, Neg Covid.   [facility] is posting 2 beds.  Covid neg. Vol only. Combined adolescent and adult unit. No aggressive or violent behavior. No registered sex offenders.   [facility] is posting 2 beds. No Hx aggression, violence or assault.  Patient is not appropriate for open bed: oxygen is exclusionary for facility  [facility] is posting 8 beds. No Covid needed. Call for details. Patient is not appropriate for open bed: requesting statewide placement  [facility] is posting 1 bed. Mixed Unit (13+). Low acuity only. Patient is not appropriate for open bed: oxygen is exclusionary for facility    [facility] [place] location at capacity. The patient remains on the worklist. Intake continues to identify appropriate inpatient psychiatry placement.    3:25 PM Intake faxed clinical and labs to [facility] for review.

## 2023-06-15 NOTE — PROGRESS NOTES
See history and physical by Dr. Fletcher from earlier this morning for details of HPI and assessment plan.  Patient seen and evaluated.  Currently feeling better.  Patient endorses suicidal ideation but no plan.  Breathing/oxygen stable at baseline.  On 4 L oxygen.  Hemodynamics otherwise stable.  No other concern.    -Psychiatry consultation pending.  -Continue one-to-one.  -No other concerns/changes.    Vitals:    06/15/23 0300 06/15/23 0744   BP: 117/74 113/62   BP Location: Left arm Left arm   Pulse: 59 66   Resp: 16 16   Temp: 97.9  F (36.6  C) 97.6  F (36.4  C)   SpO2: 96% 100%         .Art Spaulding MD  [facility]  Contact information available via [facility] Paging/Directory

## 2023-06-15 NOTE — PLAN OF CARE
Patient arrived @ about 0230 Hrs.  Admitted to room 548  Vitals and assessment at baseline  Hospitalist paged. Awaiting follow up visit.  1:1 Sitter in room and maintained throughout the night

## 2023-06-15 NOTE — PHARMACY-ADMISSION MEDICATION HISTORY
Admission medication history completed at [facility]. Please see Pharmacy - Admission Medication History note from 06/13/2023.    Criselda Irby, PharmD
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
## 2023-06-15 NOTE — TELEPHONE ENCOUNTER
R:  No beds within [facility] or [facility].  Georges agreed to review pt.  [facility] called intake back @ 6:04pm and reports unable to accept due to both pts acuity and due to oxygen - as [facility] does not support oxygen on  units.

## 2023-06-15 NOTE — TELEPHONE ENCOUNTER
[facility] Access Inpatient Bed Call Log 6/14/23 @ 1:10 AM      Intake has called facilities that have not updated their bed status within the last 12 hours.          [facility] is posting 0 beds.             [facility] is posting 0 beds. [phone]      [facility] is posting 0 beds. [phone]             [facility] is posting 0 beds. [phone] - per call @ 01:11, they do not have beds [facility] is posting 0 beds. [phone]           [facility] is posting 0 beds. [phone]           [facility] is posting 0 beds. [phone]           [facility] is posting 0 beds. [phone]             [facility] is posting 0 beds. [phone]      [facility] is posting 0 beds. [phone]             [facility] is posting 7 beds. Mixed unit 12+. Low acuity only. [phone].       [facility] is positing 0 beds. No aggression.  [phone]           [facility] is posting 0 beds. [phone]       [facility] is posting 2 beds. Low acuity only. [phone]       [facility] is posting 0 beds. Low acuity. No current aggression. [phone].       [facility] is posting 1 bed. Low acuity. [phone]      [facility] is posting 0 beds. 72 HH preferred. Low acuity. [phone]            [facility] is posting 8 beds. Low acuity. [phone]       [facility] is posting 3 beds. Vol only, No Hx of aggression, violence, or assault. No sexual offenders. No 72 hr holds. [phone]       [facility] is posting 4 beds. (Must have the cognitive ability to do programming. No aggressive or violent behavior or recent HX in the last 2 yrs. MH must be primary.) [phone]      [facility] is [id number]. Low acuity only. Violence and aggression capped. [phone]         [facility] is posting 0 beds. Low acuity, Neg Covid. [phone]       [facility] is posting 2 beds. Covid neg. Vol only. Combined adolescent and adult unit. No aggressive or violent behavior. No registered sex offenders. [phone] - Per Ivone @ 01:18 they do have beds available       [facility] posting 1 beds. Negative covid. Per House supe @ 00:21, they have 1 SD bed available        [facility] is posting 2 beds. [phone] no wounds, lines, drains, C-paps, tubes and must be able to care for themselves; no hx of aggression.   Per Sanjay @01:19 they are not admitting patients overnight, but intake can check back later on day shift     [facility] is posting 8 beds. Call for details. [phone]   [facility] is posting 1 beds. Mixed unit. [phone]. Per Phyliss @ 01:22 they have 1 bed available            Pt remains on work list pending appropriate bed availability

## 2023-06-16 ENCOUNTER — TELEPHONE (OUTPATIENT)
Dept: BEHAVIORAL HEALTH | Facility: CLINIC | Age: 48
End: 2023-06-16
Payer: COMMERCIAL

## 2023-06-16 PROCEDURE — 99232 SBSQ HOSP IP/OBS MODERATE 35: CPT | Performed by: INTERNAL MEDICINE

## 2023-06-16 PROCEDURE — 120N000002 HC R&B MED SURG/OB UMMC

## 2023-06-16 PROCEDURE — 250N000013 HC RX MED GY IP 250 OP 250 PS 637: Performed by: INTERNAL MEDICINE

## 2023-06-16 PROCEDURE — 99253 IP/OBS CNSLTJ NEW/EST LOW 45: CPT | Performed by: PSYCHIATRY & NEUROLOGY

## 2023-06-16 PROCEDURE — 250N000013 HC RX MED GY IP 250 OP 250 PS 637: Performed by: PSYCHIATRY & NEUROLOGY

## 2023-06-16 PROCEDURE — 250N000013 HC RX MED GY IP 250 OP 250 PS 637

## 2023-06-16 RX ORDER — ALPRAZOLAM 0.5 MG
0.5 TABLET ORAL 3 TIMES DAILY
Status: DISCONTINUED | OUTPATIENT
Start: 2023-06-16 | End: 2023-06-19 | Stop reason: HOSPADM

## 2023-06-16 RX ADMIN — PANTOPRAZOLE SODIUM 40 MG: 40 TABLET, DELAYED RELEASE ORAL at 08:12

## 2023-06-16 RX ADMIN — OXYCODONE HYDROCHLORIDE 10 MG: 10 TABLET ORAL at 14:39

## 2023-06-16 RX ADMIN — ALPRAZOLAM 0.5 MG: 0.5 TABLET ORAL at 08:13

## 2023-06-16 RX ADMIN — FLUOXETINE 20 MG: 20 CAPSULE ORAL at 08:13

## 2023-06-16 RX ADMIN — OXYCODONE HYDROCHLORIDE 10 MG: 10 TABLET ORAL at 20:43

## 2023-06-16 RX ADMIN — QUETIAPINE FUMARATE 25 MG: 25 TABLET ORAL at 20:43

## 2023-06-16 RX ADMIN — OXYCODONE HYDROCHLORIDE 10 MG: 10 TABLET ORAL at 08:12

## 2023-06-16 RX ADMIN — TADALAFIL 20 MG: 10 TABLET, FILM COATED ORAL at 08:13

## 2023-06-16 RX ADMIN — ALPRAZOLAM 0.5 MG: 0.5 TABLET ORAL at 18:14

## 2023-06-16 RX ADMIN — HALOPERIDOL 1 MG: 1 TABLET ORAL at 12:25

## 2023-06-16 ASSESSMENT — ACTIVITIES OF DAILY LIVING (ADL)
ADLS_ACUITY_SCORE: 23

## 2023-06-16 NOTE — PROGRESS NOTES
0710 Writer notified that Pt left the unit without telling staff. Pt was brought back to unit by unknown individual. Pt's RN told writer that pt is confused and wanting to leave without her oxygen.     0791 Dr. Car notified and verbal order for 1:1 was obtained.

## 2023-06-16 NOTE — PLAN OF CARE
Pt is A & O X 4.   Continues to deny pain. chest pain, N/T, N&V  compliant with treatment regimen.   Pt slept most of the shift. Pt is independent with ambulation and toileting.  Pt did not report having a BM this shift voided X 2  Pt denied SI.  Continuous O 2 @ 4L  Pt observed to have sleptmost the shift.  Pt is able to communicate needs effctively, Cull button placed within reach, will continue with POC.

## 2023-06-16 NOTE — PLAN OF CARE
"Per report from another nurse: At change of shift, a staff from another unit walked out of the elevator with Pt. Stating \" pt was found looking lost downstairs\" Pt. Will not respond to questions being asked by RN.  Pt is dressed up in street clothing.  Bed alarm in place and currently has a 1:I sitter.   Charge nurse updated.    "

## 2023-06-16 NOTE — PROGRESS NOTES
[facility]    Medicine Progress Note - Hospitalist Service, GOLD TEAM 18    Date of Admission:  6/15/2023    Assessment & Plan     47 year old female admitted on 6/15/2023. She has pulmonary hypertension, interstitial lung disease on 4-8L O2, polymyositis, tobacco use, anxiety who is admitted for suicidal ideation, hallucinations.     Suicidal ideation  Hallucinations, auditory  Paranoia  Worsening anxiety  Psychosis, acute  - continues with confusion.   - bedside attendant  - New psychiatry consult today    1. Prozac 10mg today, 20mg daily starting tomorrow   2. Seroquel 25mg at bedtime   3. PTA Xanax 0.5mg daily PRN for anxiety  4. Crisis resources, grief support groups, and psychotherapy appt in [facility]  5. Discontinue bedside sitter, denying suicidality      Pulmonary HTN, chronic  Chronic hypoxic respiratory failure  Interstitial lung disease  - continue O2 supplementation 4-8L as needed  - monitor weights daily, lasix to be ordered as needed  - continue tadalafil 20mg every other day     Chronic pain  - previously on ms contin, can't recall when she last took her long acting morphine. Will hold for now.     Underweight  - Recommended follow-up as outpatient.        Diet: Combination Diet Regular Diet; Safe Tray - with utensils    DVT Prophylaxis: Ambulate every shift  Berman Catheter: Not present  Lines: None     Cardiac Monitoring: None  Code Status:  Full code     Clinically Significant Risk Factors                                  Disposition Plan     Expected Discharge Date: 06/17/2023      Destination: other (comment) (Psych consult and eventually to behavioural unit)            Fredrick Cobb MD  Hospitalist Service, GOLD TEAM 18  [facility]  Securely message with [employer] (more info)  Text page via [employer] Paging/Directory   See signed in provider for up to date coverage  information  ______________________________________________________________________    Interval History     No acute events overnight.   She was confused this morning, she left the medical floor.   She refused to answer many questions to me.     Physical Exam   Vital Signs: Temp: (!) 96  F (35.6  C) Temp src: Oral BP: 117/73 Pulse: 57   Resp: 16 SpO2: 96 % O2 Device: Nasal cannula Oxygen Delivery: 4 LPM  Weight: 93 lbs 11.13 oz    General Appearance: Alert, room air, no acute distress.   Respiratory: Clear lungs.   Cardiovascular: RRR, no murmur.   GI: Abdomen soft. + BS, no tenderness to palpation.   Skin: No rash.   Other: Alert, confused, moving all extremities.     Medical Decision Making       60 MINUTES SPENT BY ME on the date of service doing chart review, history, exam, documentation & further activities per the note.      Data         Imaging results reviewed over the past 24 hrs:   No results found for this or any previous visit (from the past 24 hour(s)).

## 2023-06-16 NOTE — UTILIZATION REVIEW
"  Admission Status; Secondary Review Determination         Under the authority of the Utilization Management Committee, the utilization review process indicated a secondary review on the above patient.  The review outcome is based on review of the medical records, discussions with staff, and applying clinical experience noted on the date of the review.        (xxx)      Inpatient Status Appropriate - This patient's medical care is consistent with medical management for inpatient care and reasonable inpatient medical practice.      () Observation Status Appropriate - This patient does not meet hospital inpatient criteria and is placed in observation status. If this patient's primary payer is Medicare and was admitted as an inpatient, Condition Code 44 should be used and patient status changed to \"observation\".   () Admission Status NOT Appropriate - This patient's medical care is not consistent with medical management for Inpatient or Observation Status.          RATIONALE FOR DETERMINATION     Anali Loya is a 47 year old female with pulmonary hypertension, interstitial lung disease on 4-8L O2, polymyositis, tobacco use, and anxiety who presented to the ER on 6/13/2023 and was admitted on 6/15/2023 for suicidal ideation, hallucinations, and confusion.  Psychiatry consultation obtained and did not recommend inpatient psych.  1:1 sitter discontinued.  However, overnight patient remains confused and wandered off unit.  1:1 sitter reinstated.  She is not medically stable for discharge at present and will require additional hospitalization.  IP status is appropriate.    The severity of illness, intensity of service provided, expected LOS and risk for adverse outcome make the care complex, high risk and appropriate for hospital admission.        The information on this document is developed by the utilization review team in order for the business office to ensure compliance.  This only denotes the appropriateness of " proper admission status and does not reflect the quality of care rendered.         The definitions of Inpatient Status and Observation Status used in making the determination above are those provided in the CMS Coverage Manual, Chapter 1 and Chapter 6, section 70.4.      Sincerely,     Brittany Alexis MD  Physician Advisor   Utilization Review/ Case Management  [facility].

## 2023-06-16 NOTE — CONSULTS
"  PSYCHIATRIC CONSULTATION, follow up    Requesting Physician: Fredrick Cobb MD    Admission Date: 06/15/2023  Date of Service: 2023    The patient was seen, her chart reviewed.  She reports being anxious and describes frequent panic attacks, most recently on a daily basis. The usually last about 10-15 minutes and manifest by sudden onset of SOB, heart pounding and a fear of dying. She had never been treated properly and has been taking Xanax only on a PRN basis.  Earlier today she was wandering off the unit and was placed on 1:1 mainly for elopement risk. She did have some suicidal thoughts shortly prior to admission but does not feel this way anymore.    This is a 47-year-old single white mother of 3 with a long history of panic disorder, situational depression, alcohol abuse, history of primary pulmonary hypertension with interstitial lung disease, on chronic O2 at 4 to 8 L, polymyositis and hyperlipidemia, who initially presented with hallucinations, anxiety, suicidal ideation. Patient has been under a lot of stress as she lost at least 3 people within the last year. Her brother, her grandma and her ex- all have passed away. The father of her children  a few weeks ago of trauma. She has had escalating anxiety and states that yesterday she \"freaked out on her kids\". She notes hearing voices throughout the day yesterday. They were not distinctive. She has not been able to sleep. She acknowledges deep depression and states that she had recently started on Zoloft 25 mg daily, but has been off that for a few days.  She notes feeling helpless to the point where she thinks about suicide  She has been paranoid that people were after her. The patient does have a history of suicide attempt 30 years ago. She was admitted to the medical floor because of chronic oxygen therapy. Yesterday she was seen by PURA Mckee CNP for initial psychiatric consultation She was started on Prozac which was " helpful in a past and Seroquel at bedtime. Xanax was also ordered on a PRN basis  Family history is remarkable for depression in her mother and in her brother who had committed suicide.    MEDICATIONS, psychotropic  Prozac 20 mg QAM  Seroquel 25 mg at bedtime  Xanax 0.5 mg daily PRN  Haldol 1 mg P0 Q6H PRN  Melatonin 1 mg HS PRN    LABS: No new results    VS: Pulse - 57, BP - 117/73, Temp - 96, Resp -  16, SpO2 - 96%    MENTAL STATUS EXAMINATION   Revealed a normally built and normally developed, somewhat undernourished 4-year-old white female appearing slightly older than her stated age. She was alert and oriented X 3. Her speech was hardly audible but coherent and goal related. She appeared to be somewhat depressed but denied suicidal ideation or intent. She was quite anxious. She denied hallucinations an d no prominent delusions were noted or elicited. She had some in sight into her problems but her judgement was questionable.    DIAGNOSTIC IMPRESSION  Depression NOS  Prolonged Grief Disorder  Panic Disorder without agoraphobia  S/P Brief Psychotic Disorder, resolving    Plan: Continue 1:1 observation. Continue Prozac and Seroquel in the same doses. Continue PRN Haldol. Increase Xanax to 0.5 mg TID scheduled. Have  arrange outpatient psychiatric follow-up upon discharge.    Thanks,    Mario Slaughter MD  [phone] Pager

## 2023-06-16 NOTE — PLAN OF CARE
[id number]    VS: Temp: 99.9  F (37.7  C) Temp src: Oral BP: 126/79 Pulse: 65   Resp: 16 SpO2: 100 % O2 Device: Nasal cannula Oxygen Delivery: 4 LPM     O2: On 4LPM O2 at baseline. No cough present, lung sounds coarse & diminished bilaterally   Output: Voiding spontaneously and without difficulty   Last BM: 06/16/2023 per pt report   Activity: Up independently   Skin: Visible skin intact, pt refused skin assessment underneath clothing.   Pain: Managed with oxycodone   Neuro: A & O x4, intermittently confused   Dressing: N/A   Diet: Regular   LDA: N/A   Equipment: Personal belongings, call light, 1:1 for elopement/safety   Plan: Continue with plan of care, potential outpatient treatment once medically stable to discharge   Additional Info:

## 2023-06-16 NOTE — TELEPHONE ENCOUNTER
[facility] Access Inpatient Bed Call Log 6/15/23 @ 1:30 AM     Intake has called facilities that have not updated their bed status within the last 12 hours.          [facility] is posting 0 beds.             [facility] is posting 0 beds. [phone]       [facility] is posting 0 beds. [phone]             [facility] is posting 0 beds. [phone] - 1:30AM Per José, they are capped.      [facility] is posting 0 beds. [phone]           [facility] is posting 0 beds. [phone]           [facility] is posting 0 beds. [phone]           [facility] is posting 0 beds. [phone]               [facility] is posting 0 beds. [phone]      [facility] is posting 0 beds. [phone]             [facility] is posting 6 beds. Mixed unit 12+. Low acuity only. [phone].      [facility] is positing 0 beds. No aggression.  [phone]           [facility] is posting 0 beds. [phone]       [facility] is posting 0 bed. Low acuity only. [phone]      [facility] is posting 0 beds. Low acuity. No current aggression. [phone].       [facility] is posting 0 bed. Low acuity. [phone]      [facility] is posting 21beds. 72 HH preferred. Low acuity. [phone]      [facility] is posting 6 beds. Low acuity. [phone] Per call      [facility] is posting 2 beds. Vol only, No Hx of aggression, violence, or assault. No sexual offenders. No 72 hr holds. [phone]      [facility] is posting 7 beds. (Must have the cognitive ability to do programming. No aggressive or violent behavior or recent HX in the last 2 yrs. MH must be primary.) [phone]      [facility] is posting 3 beds. Low acuity only. Violence and aggression capped. [phone]      [facility] is posting 0 beds. Low acuity, Neg Covid. [phone] - 1:33Am Per  Javier, they are capped.      [facility] is posting 2 beds. Covid neg. Vol only. Combined adolescent and adult unit. No aggressive or violent behavior. No registered sex offenders. [phone] - 1:34AM Per Esme, they are able to review.     [facility] posting 0 beds. Negative covid.        [facility] is posting 2 beds. [phone] no wounds, lines, drains, C-paps, tubes and must be able to care for themselves; no hx of aggression.       [facility] is posting 8 beds. Call for details. [phone]      [facility] is posting 3 bed. Mixed unit. [phone]. [phone]        Pt remains on work list pending appropriate bed availability.

## 2023-06-16 NOTE — PROGRESS NOTES
VS: /73 (BP Location: Left arm)   Pulse 57   Temp (!) 96  F (35.6  C) (Oral)   Resp 16   Wt 42.5 kg (93 lb 11.1 oz)   LMP  (LMP Unknown)   SpO2 96%   BMI 17.80 kg/m     O2: RA   Output: Voids in toilet   Last BM: 6/13?   Activity: indep   Skin: intact   Pain: Complains of chronic lower back pain   CMS: intact   Dressing: none   Diet: reg   LDA: No PIV   Equipment: none   Plan: Monitor for SI, has sitter in place   Additional Info:      Pt attempted to leave right before day shift arrived.  Had street clothes on and got on elevator.  Somebody found her and brought her back to room.    Sitter at bedside during this shift.   Pt today is agitated and pacing the halls with the sitter.

## 2023-06-17 ENCOUNTER — TELEPHONE (OUTPATIENT)
Dept: BEHAVIORAL HEALTH | Facility: CLINIC | Age: 48
End: 2023-06-17
Payer: COMMERCIAL

## 2023-06-17 PROCEDURE — 250N000013 HC RX MED GY IP 250 OP 250 PS 637: Performed by: STUDENT IN AN ORGANIZED HEALTH CARE EDUCATION/TRAINING PROGRAM

## 2023-06-17 PROCEDURE — 250N000013 HC RX MED GY IP 250 OP 250 PS 637

## 2023-06-17 PROCEDURE — 99232 SBSQ HOSP IP/OBS MODERATE 35: CPT | Performed by: INTERNAL MEDICINE

## 2023-06-17 PROCEDURE — 120N000002 HC R&B MED SURG/OB UMMC

## 2023-06-17 PROCEDURE — 250N000013 HC RX MED GY IP 250 OP 250 PS 637: Performed by: PSYCHIATRY & NEUROLOGY

## 2023-06-17 PROCEDURE — 250N000013 HC RX MED GY IP 250 OP 250 PS 637: Performed by: INTERNAL MEDICINE

## 2023-06-17 RX ORDER — POLYETHYLENE GLYCOL 3350 17 G/17G
17 POWDER, FOR SOLUTION ORAL 2 TIMES DAILY PRN
Status: DISCONTINUED | OUTPATIENT
Start: 2023-06-17 | End: 2023-06-19 | Stop reason: HOSPADM

## 2023-06-17 RX ADMIN — HALOPERIDOL 1 MG: 1 TABLET ORAL at 08:11

## 2023-06-17 RX ADMIN — PANTOPRAZOLE SODIUM 40 MG: 40 TABLET, DELAYED RELEASE ORAL at 06:51

## 2023-06-17 RX ADMIN — OXYCODONE HYDROCHLORIDE 10 MG: 10 TABLET ORAL at 03:39

## 2023-06-17 RX ADMIN — QUETIAPINE FUMARATE 25 MG: 25 TABLET ORAL at 20:48

## 2023-06-17 RX ADMIN — POLYETHYLENE GLYCOL 3350 17 G: 17 POWDER, FOR SOLUTION ORAL at 06:50

## 2023-06-17 RX ADMIN — ALPRAZOLAM 0.5 MG: 0.5 TABLET ORAL at 14:29

## 2023-06-17 RX ADMIN — OXYCODONE HYDROCHLORIDE 10 MG: 10 TABLET ORAL at 16:35

## 2023-06-17 RX ADMIN — ALPRAZOLAM 0.5 MG: 0.5 TABLET ORAL at 20:47

## 2023-06-17 RX ADMIN — FLUOXETINE 20 MG: 20 CAPSULE ORAL at 08:11

## 2023-06-17 RX ADMIN — ALPRAZOLAM 0.5 MG: 0.5 TABLET ORAL at 08:11

## 2023-06-17 RX ADMIN — OXYCODONE HYDROCHLORIDE 10 MG: 10 TABLET ORAL at 11:14

## 2023-06-17 RX ADMIN — TADALAFIL 20 MG: 10 TABLET, FILM COATED ORAL at 08:11

## 2023-06-17 ASSESSMENT — ACTIVITIES OF DAILY LIVING (ADL)
ADLS_ACUITY_SCORE: 23

## 2023-06-17 NOTE — TELEPHONE ENCOUNTER
R:  [facility] called @ 3:32pm and declined pt due to medical complexities.  They do not feel pt is medically stable for IPMH.    Worklist updated and pt remains on worklist pending bed availability

## 2023-06-17 NOTE — PROGRESS NOTES
VS: /63   Pulse 59   Temp 97.5  F (36.4  C) (Oral)   Resp 16   Wt 42.5 kg (93 lb 11.1 oz)   LMP  (LMP Unknown)   SpO2 100%   BMI 17.80 kg/m     O2: Stable on 4L NC baseline due to interstitial lung disease   Output: Spontaneously eliminates to bathroom   Last BM: 6/17/23 per pt   Activity: Independent in/out of room   Skin: Visible skin intact, full skin assessment refused by pt   Pain: Managed with oxycodone   CMS: A/O x4, denied N/T   Dressing: None   Diet: Regular/thin   LDA: None   Equipment: Personal belongings   Plan: Potential inpatient admission to a facility in [place]   Additional Info:

## 2023-06-17 NOTE — TELEPHONE ENCOUNTER
R:  No appropriate beds within [place].  Bed Search update Statewide 10:50PM    [facility]: @ cap per website  [facility]: @cap per website- Per Sravani, no beds  [facility]: @ cap per website.  Per Aka, no beds   [facility]: @ cap per website- Per Sravani, no beds  [facility]: @ cap per website  [facility]: @ cap per website- Per Sravani, no beds  [facility]: @ cap per website  [facility]:  @ cap per website- Per Sravani, no beds  [facility]: @ 6 beds posted.   Mixed unit with children.    [facility]: @ cap per website- Per Sravani, no beds  [facility]: @ cap per website  [facility]: @ cap per website Per Sravani, no beds available  [facility]:  2 LOW ACUITY beds posted  [facility]: @ cap per website  [facility]: 1 VERY LOW ACUITY beds posted  [facility]:5 LOW ACUITY bed posted.    [facility]: 2 LOW ACUITY beds posted.  Voluntary pts only.    [facility]: 9 LOW ACUITY beds posted.    [facility]: @ cap per website   [facility]: @ cap per website  [facility]: 2 bed available.  Voluntary pts only.  Mixed unit with children.     [facility]: 12 beds posted  Out of state.    [facility]: 3 beds available.   Mixed unit with children.      Pt remains on PPS work list awaiting appropriate placement.

## 2023-06-17 NOTE — PLAN OF CARE
Pt is A&Ox4. 1:1 attendant at bedside. VSS. LS clear, on RA. BS active, LBM on 6/17/2023. Voiding well. Pain managed with oxycodone. Pt up independently. Continue to monitor.

## 2023-06-17 NOTE — TELEPHONE ENCOUNTER
R: [place]  Access Inpatient Bed Call Log @ 7:03 am: (statewide):    [facility] has called facilities that have not updated their bed status within the last 12 hours.       [facility] is posting 0 beds.              [facility] is posting 0 beds. [phone];   per call at 7:05 am to Lori,  they are at cap.      [facility] is posting 0 beds. [phone]              [facility] is posting 0 beds. [phone] per call at 7:06 am to Negar, charge RN is not avail but she will ask her to call back re: bed availability     [facility] is posting 0 beds. [phone]            [facility] is posting 0 beds. [phone]            [facility] is posting 0 beds. [phone]            [facility] is posting 0 beds. [phone]     [facility] is posting 0 beds. [phone]      [facility] is posting 0 beds. [phone]              [facility] is posting 6 beds. Mixed unit 12+. Low acuity only. [phone]. Per call at 7:10 am to [place], they have 4 beds. Per notes on [facility], pt was declined 6/15 due to needing oxygen and due to acuity    [facility] is positing 0 beds. No aggression.  [phone]            [facility] is posting 0 beds. [phone] Per call at 7:12 am,  said NOT to leave a message; no option to speak with a live rep     [facility] is posting 1 bed. Low acuity only. [phone]; Pt was declined 6/14 per notes on [facility] due to oxygen needs.    [facility] is posting 4 beds. Low acuity. No current aggression. [phone].  Pt not appropriate    [facility] is posting 0 bed. Low acuity. [phone]; Pt was declined 6/14 per notes on [facility] due to oxygen needs.    [facility] is posting 2 bed. 72 HH preferred. Low acuity. [phone];  Pt not appropriate     [facility] is posting 5 beds. Low acuity. [phone];  Pt was declined 6/14 per notes on [facility] due to oxygen needs.    [facility]  [place] is posting 2 beds. Vol only, No Hx of aggression, violence, or assault. No sexual offenders. No 72 hr holds. ; per call at 9:16 am to Sonia, she asked if oxygen is through a tank or a concentrator. Per Tahir RN at 9:28 am, the oxygen comes through the wall but he will further check into this and will call back. Per Sonia at 11:27 am, they are declining to review due to inability to accommodate pt due to her oxygen needs and due to staffing, saying she is too highly acute.    [facility] is posting 8 beds. (Must have the cognitive ability to do programming. No aggressive or violent behavior or recent HX in the last 2 yrs. MH must be primary.) low acuity [phone];  Pt not appropriate.    [facility] is posting 0 beds. Low acuity only. Violence and aggression capped. [phone]     [facility] is posting 0 beds. Low acuity, Neg Covid. [phone]; per call at 7:19 am to Tarah, they are at cap at least until 3 pm.    [facility] is posting 2 beds. Covid neg. Vol only. Combined adolescent and adult unit. No aggressive or violent behavior. No registered sex offenders. [phone]; Per call at 7:21 am to [place], they have 2 beds; per call at 11:40 am to Suman, he asked why she is on medical unit and if she has transport home and if she is cognizant to sign herself in. Author called Tahir on 5 ortho at 11:45 am and he said he feels pt is able to make decisions for herself/able to sign herself in (note in chart says vol but holdable); he said he will ask her if she has transport home and said he will then call intake back. Note in chart states pt is on medical due to psych inpt not being able to accommodate pt due to pt being on chronic oxygen therapy. Per Tahir at 11:52 am, she said she can get a ride back home. Author called Suman at 12:43 pm and informed him of the above. He said to fax info to him 20 pages at a time. Author faxed info at 1 pm (clinical,  labs and face sheet).     [place] [facility] [place] posting 0 beds. Negative covid. Pt is on continuous oxygen so can not be accommodated here.    [facility] is posting 2 beds. [phone] no wounds, lines, drains, C-paps, tubes and must be able to care for themselves; no hx of aggression. Per call at 7:23 am to [place], they have 2 beds; Per call at 8:59 am to [place], they do not accept patients on the weekend at this time.     [facility] is posting 12 beds. Call for details. [phone];  Per call at 7:24 am to [facility], they have 12 beds. Facility is in [place] and pt is on a 72 hr hold.     [facility] is posting 3 bed. Mixed unit.  [phone]; per call at 7:26 am to Sofia, they have 3 beds.  Pt not appropriate.           Pt remains on work list pending appropriate bed availability.

## 2023-06-17 NOTE — PROGRESS NOTES
VS: BP 98/60   Pulse 64   Temp 98.4  F (36.9  C) (Oral)   Resp 16   Wt 42.5 kg (93 lb 11.1 oz)   LMP  (LMP Unknown)   SpO2 100%   BMI 17.80 kg/m     O2: 4LPM NC baseline, denies SOB and CP, coarse lung sounds   Output: Voiding spontaneously in BR   Last BM: 6/17 per pt   Activity: Up ad karissa, independent    Skin: Visible skin intact    Pain: Pain rated 6 out of 10, prn oxy given   Neuro: A/Ox4, denies n/t, denies SI & hallucinations   Dressing: None    Diet: Regular, denies n/v   LDA: None    Equipment: Sitter at bedside, personal belongings, call light within reach   Plan: Continue with plan of care   Additional Info:

## 2023-06-17 NOTE — TELEPHONE ENCOUNTER
No appropriate beds are currently available within the  system. Bed search update (statewide) @ 1:35AM:       [facility]: @ cap per website  [facility]: @ cap per website  [facility]: @ cap per website. Per Hilda @ 01:37 they are unable to review tonight  [facility]: @ cap per website  [facility]: @ cap per website  [facility]: @ cap per website  [facility]: @ cap per website  [facility] @ cap per website  [facility]: @ cap per website  [facility]: Declined 6/15 d/t acuity and oxygen needs  [facility]: @ cap per website  [facility]: @ cap per website  [facility]: @ cap per website  [facility]: Does not review after 10PM.    [facility]: Posting beds in [place] and [place]. Per Noel @ 01:38, no beds in [place] and both [place] and [place] have very low acuity beds avail - Declined 6/14 d/t oxygen  [facility]: Posting 2 beds (No hx of aggression. No sexual offenders. Voluntary patients only). Facility reviewing another referral  [facility]: Posting 9 beds (Low acuity only. Must have the cognitive ability to do programming. No aggressive or violent behavior or recent HX in the last 2 yrs. MH must be primary). Facility cannot accommodate oxygen needs  [facility]: @ cap per website  [facility] @ [facility] per [facility]. Low acuity, Neg Covid.  [facility]: Posting 2 beds (Covid neg. Voluntary only. Mixed unit. No aggressive or violent behavior. No registered sex offenders). Facility reviewing another referral  [facility]: Posting 2 beds (No hx of aggression/assault. No lines, drains or tubes. Does not provide detox or CD treatment).  Facility cannot accommodate oxygen needs  [facility]: Posting 12 beds. Facility is in [place]. Pt requests statewide for placement  [facility]: @ cap per website (Mixed unit/Low acuity). Facility cannot accommodate oxygen needs        Pt remains on work list until  appropriate placement is available

## 2023-06-17 NOTE — PROGRESS NOTES
From 0330 did not sleep but was resting, and going to bathroom at times would rock in bed. At 0640 asked if can walk, walked on the hallway with oxygen no distress. Maralax and Protonix given per request will continue to monitor. Pt says suffers from anxiety for a long time, breathing exercise and essential oil provided with some relief. Pt cannot identify sources of anxiety for now.

## 2023-06-18 ENCOUNTER — TELEPHONE (OUTPATIENT)
Dept: BEHAVIORAL HEALTH | Facility: CLINIC | Age: 48
End: 2023-06-18
Payer: COMMERCIAL

## 2023-06-18 PROCEDURE — 250N000013 HC RX MED GY IP 250 OP 250 PS 637: Performed by: PSYCHIATRY & NEUROLOGY

## 2023-06-18 PROCEDURE — 250N000013 HC RX MED GY IP 250 OP 250 PS 637

## 2023-06-18 PROCEDURE — 250N000013 HC RX MED GY IP 250 OP 250 PS 637: Performed by: INTERNAL MEDICINE

## 2023-06-18 PROCEDURE — 120N000002 HC R&B MED SURG/OB UMMC

## 2023-06-18 PROCEDURE — 250N000013 HC RX MED GY IP 250 OP 250 PS 637: Performed by: PHYSICIAN ASSISTANT

## 2023-06-18 PROCEDURE — 99232 SBSQ HOSP IP/OBS MODERATE 35: CPT | Performed by: INTERNAL MEDICINE

## 2023-06-18 RX ORDER — ACETAMINOPHEN 325 MG/1
650 TABLET ORAL EVERY 4 HOURS PRN
Status: DISCONTINUED | OUTPATIENT
Start: 2023-06-18 | End: 2023-06-19 | Stop reason: HOSPADM

## 2023-06-18 RX ORDER — LIDOCAINE 4 G/G
1 PATCH TOPICAL
Status: DISCONTINUED | OUTPATIENT
Start: 2023-06-18 | End: 2023-06-19 | Stop reason: HOSPADM

## 2023-06-18 RX ADMIN — PANTOPRAZOLE SODIUM 40 MG: 40 TABLET, DELAYED RELEASE ORAL at 08:43

## 2023-06-18 RX ADMIN — QUETIAPINE FUMARATE 25 MG: 25 TABLET ORAL at 20:36

## 2023-06-18 RX ADMIN — ALPRAZOLAM 0.5 MG: 0.5 TABLET ORAL at 08:43

## 2023-06-18 RX ADMIN — OXYCODONE HYDROCHLORIDE 10 MG: 10 TABLET ORAL at 12:10

## 2023-06-18 RX ADMIN — OXYCODONE HYDROCHLORIDE 10 MG: 10 TABLET ORAL at 18:10

## 2023-06-18 RX ADMIN — ALPRAZOLAM 0.5 MG: 0.5 TABLET ORAL at 20:36

## 2023-06-18 RX ADMIN — FLUOXETINE 20 MG: 20 CAPSULE ORAL at 08:43

## 2023-06-18 RX ADMIN — ACETAMINOPHEN 650 MG: 325 TABLET, FILM COATED ORAL at 18:10

## 2023-06-18 RX ADMIN — ALPRAZOLAM 0.5 MG: 0.5 TABLET ORAL at 14:49

## 2023-06-18 RX ADMIN — OXYCODONE HYDROCHLORIDE 10 MG: 10 TABLET ORAL at 00:06

## 2023-06-18 RX ADMIN — OXYCODONE HYDROCHLORIDE 10 MG: 10 TABLET ORAL at 05:57

## 2023-06-18 RX ADMIN — TADALAFIL 20 MG: 10 TABLET, FILM COATED ORAL at 08:43

## 2023-06-18 ASSESSMENT — ACTIVITIES OF DAILY LIVING (ADL)
ADLS_ACUITY_SCORE: 23
DEPENDENT_IADLS:: INDEPENDENT

## 2023-06-18 NOTE — PROVIDER NOTIFICATION
Did not receive reply from Dr Cobb whom is listed for hospitalist on call. Paged cross cover Wagner Joseph.    Received call back at 1746- felt dose of oxycodone was comparable/in excess of equivalent to MS Contin, but adding tylenol and lidocaine patch and Voltaren.

## 2023-06-18 NOTE — PROVIDER NOTIFICATION
Patient has 6/10 pain despite PRN oxycodone, describes as deep bone pain. Paged Dr Cobb requesting to re start her MS Contin, or possibly try adding tylenol and/or toradol?

## 2023-06-18 NOTE — CONSULTS
Care Management Initial Consult    General Information  Assessment completed with: Patient,    Type of CM/SW Visit: Initial Assessment    Primary Care Provider verified and updated as needed: Yes   Readmission within the last 30 days: no previous admission in last 30 days      Reason for Consult: mental health concerns  Advance Care Planning: Advance Care Planning Reviewed: no concerns identified        Communication Assessment  Patient's communication style: spoken language (English or Bilingual)    Hearing Difficulty or Deaf: no   Wear Glasses or Blind: yes    Cognitive  Cognitive/Neuro/Behavioral: .WDL except  Level of Consciousness: alert  Arousal Level: opens eyes spontaneously  Orientation: oriented x 4  Mood/Behavior: calm, cooperative  Best Language: 0 - No aphasia  Speech: clear, spontaneous, logical    Living Environment:   People in home: parent(s), child(janessa), adult     Current living Arrangements: house      Able to return to prior arrangements: yes     Family/Social Support:  Care provided by: self  Provides care for: no one, unable/limited ability to care for self  Marital Status: Single  Parent(s), Children          Description of Support System: Supportive, Involved    Support Assessment: Adequate family and caregiver support    Current Resources:   Patient receiving home care services: No     Community Resources: None  Equipment currently used at home: none  Supplies currently used at home: Oxygen Tubing/Supplies    Employment/Financial:  Employment Status: disabled        Financial Concerns: No concerns identified   Referral to Financial Worker: No     Does the patient's insurance plan have a 3 day qualifying hospital stay waiver?  No    Lifestyle & Psychosocial Needs:  Social Determinants of Health     Tobacco Use: High Risk (5/11/2023)    Patient History      Smoking Tobacco Use: Every Day      Smokeless Tobacco Use: Former      Passive Exposure: Not on file   Alcohol Use: Not on file  "  Financial Resource Strain: Not on file   Food Insecurity: Not on file   Transportation Needs: Not on file   Physical Activity: Not on file   Stress: Not on file   Social Connections: Not on file   Intimate Partner Violence: Not on file   Depression: At risk (5/11/2023)    PHQ-2      PHQ-2 Score: 4   Housing Stability: Not on file     Functional Status:  Prior to admission patient needed assistance:   Dependent ADLs:: Independent  Dependent IADLs:: Independent  Assesssment of Functional Status: At functional baseline    Mental Health Status:  Mental Health Status: Current Concern  Mental Health Management: Other (see comment) (Suicidal Ideation, hallucinations, Anxiety)    Chemical Dependency Status:  Chemical Dependency Status: Past Concerns   Previous treatment.        Values/Beliefs:  Spiritual, Cultural Beliefs, Caodaism Practices, Values that affect care: no          Values/Beliefs Comment: Orthodoxy, Spiritual    Additional Information:  Per H&P, patient is a 47 year old female admitted to the medical unit as an alternative to inpatient psychiatry due to comorbid medical conditions on 6/15/2023. She has pulmonary hypertension, interstitial lung disease on 4-8L O2, polymyositis, tobacco use.     Writer completed initial assessment due to social work consult placed. Writer introduced self, role and duties to patient. Patient resides in independent home with parents and adult children. She states that she is not working and receives disability. Patient denies financial or chemical dependency concerns. Her primary care physician on file was confirmed. Patient does not have a healthcare directive and was not interested in completing one. She was admitted to Hospital following Suicidal Ideation and Hallucinations. Patient also has Anxiety. She reports today that she feels \"okay\". Patient needs a refferal to a Psychiatrist in the community. She is independent with ADLs and IADLs at baseline. Patient does not use " any equipment or receive any outside support. She has home oxygen through [employer]. SW/RNCC to continue to follow for support and discharge planning needs that arise.    MIRI Michele, MSW  Adult Acute Care Float   Pager [phone]

## 2023-06-18 NOTE — TELEPHONE ENCOUNTER
No appropriate beds are currently available within the  system. Bed search update (statewide) @ 12:40AM:       [facility]: @ cap per website  [facility]: @ cap per website  [facility]: @ cap per website. Per Gayatri @ 00:42, they do not have beds tonight - may have 2 male step down beds in the morning  [facility]: @ cap per website  [facility]: @ cap per website  [facility]: @ cap per website  [facility]: @ cap per website  [facility]: @ cap per website  [facility]: @ cap per website  [facility]: Declined 6/15 d/t acuity and oxygen needs  [facility]: @ cap per website  [facility]: @ cap per website  [facility]: Posting 4 beds. Per Eli @ 01:12, they are full until at least 9AM  [facility]: @ cap per website  [facility]: @ cap per website  [facility]: @ cap per website  [facility]: Declined 6/14 d/t oxygen  : Declined 6/17 d/t oxygen needs, staffing and acuity  [facility]: Posting 8 beds (Low acuity only. Must have the cognitive ability to do programming. No aggressive or violent behavior or recent HX in the last 2 yrs. MH must be primary). Facility cannot accommodate oxygen needs  [facility]: @ cap per website  [facility] per [facility]. Low acuity, Neg Covid.  [facility]: Declined 6/17 d/t medical complexities  [facility]: Posting 2 beds (No hx of aggression/assault. No lines, drains or tubes. Does not provide detox or CD treatment). Per Isis @ 00:44, at this time, they do not review after 5pm nor on the weekends d/t lack of provider coverage.   [facility]: Posting 12 beds. Facility is in [place]. Pt requests statewide for placement  [facility]: Posting 3 beds (Mixed unit/Low acuity). Facility cannot accommodate oxygen needs       Pt remains on work list until appropriate placement is available

## 2023-06-18 NOTE — PROGRESS NOTES
VS: /47 (BP Location: Left arm)   Pulse 54   Temp 99.1  F (37.3  C) (Oral)   Resp 16   Wt 42.5 kg (93 lb 11.1 oz)   LMP  (LMP Unknown)   SpO2 99%   BMI 17.80 kg/m     O2: Stable on 4L NC baseline due to interstitial lung disease   Output: Spontaneously eliminates to bathroom with no difficulties   Last BM: 6/17/23 per pt   Activity: Independent in/out of room   Skin: Visible skin intact, full skin assessment refused by pt   Pain: Managed with oxycodone   CMS: A/O x4, denied N/T   Dressing: None   Diet: Regular/thin   LDA: None   Equipment: Personal belongings   Plan: Potential inpatient admission to a facility in [place]   Additional Info:

## 2023-06-18 NOTE — PROGRESS NOTES
"[facility]    Medicine Progress Note - Hospitalist Service, GOLD TEAM 18    Date of Admission:  6/15/2023    Assessment & Plan     47 year old female admitted on 6/15/2023. She has pulmonary hypertension, interstitial lung disease on 4-8L O2, polymyositis, tobacco use, anxiety who is admitted for suicidal ideation, hallucinations.     Suicidal ideation  Hallucinations, auditory  Paranoia  Worsening anxiety  Psychosis, acute  - Pt was pleasant today, mood is stable.   - Move sitter outside pt's room.   - Psychiatry following the patient.   - Psychiatry new recommendations:  \"Continue 1:1 observation. Continue Prozac and Seroquel in the same doses. Continue PRN Haldol. Increase Xanax to 0.5 mg TID scheduled. Have  arrange outpatient psychiatric follow-up upon discharge\".  - New Psychiatry consult tomorrow for final recommendations.      Pulmonary HTN, chronic  Chronic hypoxic respiratory failure  Interstitial lung disease  - continue O2 supplementation 4-8L as needed  - monitor weights daily, lasix to be ordered as needed  - continue tadalafil 20mg every other day     Chronic pain  - previously on ms contin, can't recall when she last took her long acting morphine. Will hold for now.     Underweight  - Recommended follow-up as outpatient. Encourage the patient to eat.        Diet: Combination Diet Regular Diet; Safe Tray - with utensils    DVT Prophylaxis: Ambulate every shift  Berman Catheter: Not present  Lines: None     Cardiac Monitoring: None  Code Status:  Full code     Clinically Significant Risk Factors                                  Disposition Plan           Fredrick Cobb MD  Hospitalist Service, GOLD TEAM 18  [facility]  Securely message with [employer] (more info)  Text page via [employer] Paging/Directory   See signed in provider for up to date coverage " information  ______________________________________________________________________    Interval History     No acute events overnight.   Patient was pleasant.   Mood stable.     Physical Exam   Vital Signs: Temp: 99.1  F (37.3  C) Temp src: Oral BP: 107/47 Pulse: 54   Resp: 16 SpO2: 99 % O2 Device: Nasal cannula Oxygen Delivery: 4 LPM  Weight: 93 lbs 11.13 oz    General Appearance: Alert, on O 2 NC, no acute distress.   Respiratory: Clear lungs.   Cardiovascular: RRR, no murmur.   GI: Abdomen soft. + BS, no tenderness to palpation.   Skin: No rash.   Other: Alert, oriented, pleasant, moving all extremities.     Medical Decision Making       60 MINUTES SPENT BY ME on the date of service doing chart review, history, exam, documentation & further activities per the note.      Data         Imaging results reviewed over the past 24 hrs:   No results found for this or any previous visit (from the past 24 hour(s)).

## 2023-06-18 NOTE — PROGRESS NOTES
"[facility]    Medicine Progress Note - Hospitalist Service, GOLD TEAM 18    Date of Admission:  6/15/2023    Assessment & Plan     47 year old female admitted on 6/15/2023. She has pulmonary hypertension, interstitial lung disease on 4-8L O2, polymyositis, tobacco use, anxiety who is admitted for suicidal ideation, hallucinations.     Suicidal ideation  Hallucinations, auditory  Paranoia  Worsening anxiety  Psychosis, acute  - Pt was pleasant today, mood is stable.   - Continues with sitter at bedside.   - Psychiatry following the patient.   - Psychiatry new recommendations:  \"Continue 1:1 observation. Continue Prozac and Seroquel in the same doses. Continue PRN Haldol. Increase Xanax to 0.5 mg TID scheduled. Have  arrange outpatient psychiatric follow-up upon discharge\".     Pulmonary HTN, chronic  Chronic hypoxic respiratory failure  Interstitial lung disease  - continue O2 supplementation 4-8L as needed  - monitor weights daily, lasix to be ordered as needed  - continue tadalafil 20mg every other day     Chronic pain  - previously on ms contin, can't recall when she last took her long acting morphine. Will hold for now.     Underweight  - Recommended follow-up as outpatient.        Diet: Combination Diet Regular Diet; Safe Tray - with utensils    DVT Prophylaxis: Ambulate every shift  Berman Catheter: Not present  Lines: None     Cardiac Monitoring: None  Code Status:  Full code     Clinically Significant Risk Factors                                  Disposition Plan           Fredrick Cobb MD  Hospitalist Service, GOLD TEAM 18  [facility]  Securely message with [employer] (more info)  Text page via [employer] Paging/Directory   See signed in provider for up to date coverage information  ______________________________________________________________________    Interval History     No acute events overnight. "   Patient was pleasant.   Mood stable.   Pt's family at bedside.     Physical Exam   Vital Signs: Temp: 99.1  F (37.3  C) Temp src: Oral BP: 107/47 Pulse: 54   Resp: 16 SpO2: 99 % O2 Device: Nasal cannula Oxygen Delivery: 4 LPM  Weight: 93 lbs 11.13 oz    General Appearance: Alert, room air, no acute distress.   Respiratory: Clear lungs.   Cardiovascular: RRR, no murmur.   GI: Abdomen soft. + BS, no tenderness to palpation.   Skin: No rash.   Other: Alert, oriented, pleasant, moving all extremities.     Medical Decision Making       60 MINUTES SPENT BY ME on the date of service doing chart review, history, exam, documentation & further activities per the note.      Data         Imaging results reviewed over the past 24 hrs:   No results found for this or any previous visit (from the past 24 hour(s)).

## 2023-06-18 NOTE — TELEPHONE ENCOUNTER
R:  [facility]    [facility] Access Inpatient Bed Call Log 6/17/23 @ 9:20 PM    Intake has called facilities that have not updated their bed status within the last 12 hours.            [facility] is posting 0 beds.              [facility] is posting 0 beds. [phone];   per call at 7:05 am to Lori,  they are at cap.      [facility] is posting 0 beds. [phone]              [facility] is posting 0 beds. [phone] per call at 7:06 am to Negar, charge RN is not avail but she will ask her to call back re: bed availability     [facility] is posting 0 beds. [phone]            [facility] is posting 0 beds. [phone]            [facility] is posting 0 beds. [phone]            [facility] is posting 0 beds. [phone]     [facility] through [facility] is posting 0 beds. [phone]          [facility] is posting 0 beds. [phone]              [facility] is posting 5 beds. Mixed unit 12+. Low acuity only. [phone]. Per call at 7:10 am to [place], they have 4 beds.      [facility] is positing 0 beds. No aggression.  [phone]            [facility] is posting 0 beds. [phone] Per call at 7:12 am, vm said NOT to leave a message; no option to speak with a live rep     [facility] is posting 0 bed. Low acuity only. [phone]       [facility] is posting 4 beds. Low acuity. No current aggression. [phone].      [facility] is posting 0 bed. Low acuity. [phone]       [facility] is posting 2 bed. 72 HH preferred. Low acuity. [phone]        [facility] is posting 5 beds. Low acuity. [phone]      [facility] is posting 2 beds. Vol only, No Hx of aggression, violence, or assault. No sexual offenders. No 72 hr holds. [phone]      [facility] is posting 8 beds. (Must have the cognitive ability to do programming. No  aggressive or violent behavior or recent HX in the last 2 yrs. MH must be primary.) [phone]      [facility] is posting 0 beds. Low acuity only. Violence and aggression capped. [phone]     [facility] is posting 0 beds. Low acuity, Neg Covid. [phone]; per call at 7:19 am to Tarah, they are at cap at least until 3 pm     [facility] is posting 2 beds. Covid neg. Vol only. Combined adolescent and adult unit. No aggressive or violent behavior. No registered sex offenders. [phone]; Per call at 7:21 am to Ivone, they have 2 beds     [facility] posting 0 beds. Negative covid.          [facility] is posting 2 beds. [phone] no wounds, lines, drains, C-paps, tubes and must be able to care for themselves; no hx of aggression. Per call at 7:23 am to Aye, they have 2 beds.      [facility] is posting 12 beds. Call for details. [phone];  Per call at 7:24 am to [facility], they have 12 beds.     [facility] is posting 3 bed. Mixed unit.  [phone]; per call at 7:26 am to Sofia, they have 3 beds.         Pt remains on work list pending appropriate bed availability.

## 2023-06-18 NOTE — PLAN OF CARE
Pt. A&Ox4. VSS. Afebrile. Lung sounds coarse. Maintaining sats with 4 LPM O2 via NC, baseline for pt. LBM 6/17 per pt report. CMS and neuro's are intact. Denies numbness and tingling in all extremities. Denies nausea, shortness of breath, and chest pain. Pain managed w/ prn Oxycodone overnight. Voids spontaneously without difficulty in the BR. Tolerating regular diet. Exposed skin appears intact. Pt up independently. No IV access. Call light is within reach, pt able to make needs known and is resting comfortably between cares. Attendant present at bedside.

## 2023-06-19 ENCOUNTER — MYC REFILL (OUTPATIENT)
Dept: FAMILY MEDICINE | Facility: CLINIC | Age: 48
End: 2023-06-19
Payer: COMMERCIAL

## 2023-06-19 ENCOUNTER — TELEPHONE (OUTPATIENT)
Dept: BEHAVIORAL HEALTH | Facility: CLINIC | Age: 48
End: 2023-06-19
Payer: COMMERCIAL

## 2023-06-19 VITALS
OXYGEN SATURATION: 96 % | RESPIRATION RATE: 16 BRPM | DIASTOLIC BLOOD PRESSURE: 62 MMHG | BODY MASS INDEX: 17.8 KG/M2 | HEART RATE: 83 BPM | TEMPERATURE: 95.8 F | WEIGHT: 93.7 LBS | SYSTOLIC BLOOD PRESSURE: 118 MMHG

## 2023-06-19 DIAGNOSIS — F11.90 CHRONIC, CONTINUOUS USE OF OPIOIDS: Primary | ICD-10-CM

## 2023-06-19 DIAGNOSIS — M47.817 SPONDYLOSIS OF LUMBOSACRAL REGION WITHOUT MYELOPATHY OR RADICULOPATHY: ICD-10-CM

## 2023-06-19 PROCEDURE — 99239 HOSP IP/OBS DSCHRG MGMT >30: CPT | Performed by: INTERNAL MEDICINE

## 2023-06-19 PROCEDURE — 250N000013 HC RX MED GY IP 250 OP 250 PS 637: Performed by: PSYCHIATRY & NEUROLOGY

## 2023-06-19 PROCEDURE — 250N000013 HC RX MED GY IP 250 OP 250 PS 637: Performed by: PHYSICIAN ASSISTANT

## 2023-06-19 PROCEDURE — 250N000013 HC RX MED GY IP 250 OP 250 PS 637

## 2023-06-19 PROCEDURE — 99231 SBSQ HOSP IP/OBS SF/LOW 25: CPT | Performed by: PSYCHIATRY & NEUROLOGY

## 2023-06-19 PROCEDURE — 250N000013 HC RX MED GY IP 250 OP 250 PS 637: Performed by: INTERNAL MEDICINE

## 2023-06-19 RX ORDER — QUETIAPINE FUMARATE 25 MG/1
25 TABLET, FILM COATED ORAL AT BEDTIME
Qty: 30 TABLET | Refills: 0 | Status: SHIPPED | OUTPATIENT
Start: 2023-06-19 | End: 2023-06-30

## 2023-06-19 RX ORDER — ALPRAZOLAM 0.5 MG
0.5 TABLET ORAL 3 TIMES DAILY
Qty: 20 TABLET | Refills: 0 | Status: SHIPPED | OUTPATIENT
Start: 2023-06-19 | End: 2023-06-23

## 2023-06-19 RX ADMIN — PANTOPRAZOLE SODIUM 40 MG: 40 TABLET, DELAYED RELEASE ORAL at 07:07

## 2023-06-19 RX ADMIN — ACETAMINOPHEN 650 MG: 325 TABLET, FILM COATED ORAL at 07:07

## 2023-06-19 RX ADMIN — ALPRAZOLAM 0.5 MG: 0.5 TABLET ORAL at 19:40

## 2023-06-19 RX ADMIN — TADALAFIL 20 MG: 10 TABLET, FILM COATED ORAL at 07:07

## 2023-06-19 RX ADMIN — ACETAMINOPHEN 650 MG: 325 TABLET, FILM COATED ORAL at 01:06

## 2023-06-19 RX ADMIN — OXYCODONE HYDROCHLORIDE 10 MG: 10 TABLET ORAL at 01:06

## 2023-06-19 RX ADMIN — OXYCODONE HYDROCHLORIDE 10 MG: 10 TABLET ORAL at 20:18

## 2023-06-19 RX ADMIN — ALPRAZOLAM 0.5 MG: 0.5 TABLET ORAL at 13:22

## 2023-06-19 RX ADMIN — OXYCODONE HYDROCHLORIDE 10 MG: 10 TABLET ORAL at 07:06

## 2023-06-19 RX ADMIN — OXYCODONE HYDROCHLORIDE 10 MG: 10 TABLET ORAL at 12:52

## 2023-06-19 RX ADMIN — ALPRAZOLAM 0.5 MG: 0.5 TABLET ORAL at 07:06

## 2023-06-19 RX ADMIN — FLUOXETINE 20 MG: 20 CAPSULE ORAL at 07:07

## 2023-06-19 ASSESSMENT — ACTIVITIES OF DAILY LIVING (ADL)
ADLS_ACUITY_SCORE: 23

## 2023-06-19 NOTE — PLAN OF CARE
VS:       Pt A/O X 4. Afebrile. VSS./65   Pulse 68   Temp 97  F (36.1  C)   Resp 16   Wt 42.5 kg (93 lb 11.1 oz)   LMP  (LMP Unknown)   SpO2 100%   BMI 17.80 kg/m    Lungs- course bilaterally with both anterior and posterior. Denies nausea, shortness of breath, and chest pain.     Output:       Bowels- active in all four quadrants. Voids spontaneously without difficulty in the bathroom. Last BM 6/17/23.      Activity:       Pt up independently.      Skin:   Skin intact.     Pain:       Denies pain.      CMS:       CMS and Neuro's are intact. Numbness, no tingling in BUE, per pt states the numbness has been happening for awhile now when sleeping.       Dressing:       No incisional dressing.      Diet:       Pt is on a regular diet and appetite was good this shift.       LDA:       No PIV.     Equipment:       Pt denies PCD's on BLE's. Bilateral heels are elevated off the bed.     Plan:       Pt is able to make needs known and the call light is within the pt's reach. Continue to monitor.       Additional Info:

## 2023-06-19 NOTE — CONSULTS
Consult Date: 06/19/2023    PSYCHIATRY CONSULTATION    IDENTIFICATION:  The patient is a 47-year-old white female who is currently hospitalized with depression and panic, as well as bereavement.  I am asked to provide psychiatric followup by Dr. Cobb.    HISTORY:  Prior to interviewing this patient, I had an opportunity to review previous psychiatric consultations completed by Dr. Mario Slaughtre on 06/16/2023 and PURA Mckee on 06/15.  Both of these consultants recommended increased outpatient care with med management and therapy.    On my interview today, the patient denies suicidal ideation and she is no longer complaining of psychotic symptoms.  She is quite eager to go home.  She wants to spend time at her daughter's house and is aware that we are recommending outpatient treatment. Emmy Rsuhing arranged a variety of resources, including grief support groups and a followup with her primary doctor. She should be referred to psychiatry and she also needs psychotherapy.  The chart suggests that this has already been arranged.  Emmy Rushing gave the diagnosis of MDD with psychotic features, as well as PTSD with dissociative symptoms and generalized anxiety disorder.  Happily, the patient is doing much better and would like discharge to outpatient care.    MENTAL STATUS EXAM:  On my interview, the patient was pleasant and cooperative.  Her mood was improved.  Her affect was still restricted.  Her speech was coherent and goal oriented.  Her associations tight.  Her thought processes logical and linear.  Content of thought was without current psychosis or suicidal ideation.  Recent and remote memory, concentration, fund of knowledge, and use of language are at baseline.  She is alert and oriented x3.  Insight and judgment are improved.  Recent vital signs include a blood pressure of 103/65, temperature of 97, pulse of 68, respiration rate of 16 with 100% oxygen saturation on O2.    ASSESSMENT:    1.  Major  depressive disorder.  2.  History of posttraumatic stress disorder.  3.  History of bereavement.    RECOMMENDATIONS:  The patient points out that she will need portable oxygen when she leaves.  I talked to her treatment physician, who has already written for her discharge medications.  The patient would certainly benefit from outpatient psychiatry and psychotherapy.    Dmitry Sanches MD        D: 2023   T: 2023   MT: reyna    Name:     TYREL SQUIRESNoris  MRN:      [id number]        Account:      [id number]   :      1975           Consult Date: 2023     Document: [id number]

## 2023-06-19 NOTE — PLAN OF CARE
Goal Outcome Evaluation:      VS: Temp: (!) 96.6  F (35.9  C) Temp src: Oral BP: 105/58 Pulse: 55   Resp: 16 SpO2: 100 % O2 Device: Nasal cannula Oxygen Delivery: 4 LPM    O2: 4-8 LPM   Output: Voids independently in bathroom   Last BM: 6/17   Activity: Independent in room   Skin: No open areas   Pain: Moderate, in back. Adding tylenol helped. Declined lidocaine patch due to using heat for back.    Neuro: A&O x4. Somewhat flat affect, soft spoken.   Dressing: NA   Diet: Reg   LDA: None   Equipment: None   Plan: May discharge to inpatient psych in [place]   Additional Info: Has sitter outside of room doing frequent checks. Denies hallucinations, SI at this time.

## 2023-06-19 NOTE — DISCHARGE SUMMARY
"[facility]  Hospitalist Discharge Summary      Date of Admission:  6/15/2023  Date of Discharge:  6/19/2023  Discharging Provider: Fredrick Cobb MD  Discharge Service: Hospitalist Service, GOLD TEAM 18    Discharge Diagnoses     Depression   Suicide ideation   Paranoia   Confusion   Anxiety     Clinically Significant Risk Factors          Follow-ups Needed After Discharge   Follow-up Appointments     Adult [facility] Follow-up and recommended labs and tests      Follow up with primary care provider, Cristian Fagan, within 7 days for   hospital follow- up.  No follow up labs or test are needed.      Appointments on [place] and/or [facility] (with [facility] or [facility]   provider or service). Call [phone] if you haven't heard regarding   these appointments within 7 days of discharge.             Unresulted Labs Ordered in the Past 30 Days of this Admission     No orders found from 5/16/2023 to 6/16/2023.          Discharge Disposition   Discharged to home  Condition at discharge: Stable    Hospital Course   Anali Loya is a 47 year old female admitted on 6/15/2023. She has pulmonary hypertension, interstitial lung disease on 4-8L O2, polymyositis, tobacco use, anxiety who is admitted for suicidal ideation and hallucinations. Concern for acute episode of paranoia. Patient was admitted to the medical floor. She had a sitter at bedside. Psychiatry was consulted and managed psych medications. Paranoia improved and patient remained calm and cooperative. Psychiatry okay with discharge home with family.      Suicidal ideation  Hallucinations, auditory  Paranoia  Worsening anxiety  Psychosis, acute  - Pt was pleasant today, mood is stable.   - Continues with sitter at bedside.   - Psychiatry following the patient.   - Psychiatry new recommendations:  \"Continue 1:1 observation. Continue Prozac and Seroquel in the same doses. Continue PRN Haldol. Increase Xanax to 0.5 mg " "TID scheduled. Have  arrange outpatient psychiatric follow-up upon discharge\".     Pulmonary HTN, chronic  Chronic hypoxic respiratory failure  Interstitial lung disease  - continue O2 supplementation 4-8L as needed  - monitor weights daily, lasix to be ordered as needed  - continue tadalafil 20mg every other day     Chronic pain  - Restarted on previous pain regimen.   Underweight  - Recommended follow-up as outpatient.     Consultations This Hospital Stay   PSYCHIATRY IP CONSULT  SOCIAL WORK IP CONSULT  PSYCHIATRY IP CONSULT  SOCIAL WORK IP CONSULT  PSYCHIATRY IP CONSULT    Code Status   Full Code    Time Spent on this Encounter   I, Fredrick Cobb MD, personally saw the patient today and spent greater than 30 minutes discharging this patient.       Fredrick Cobb MD  [facility] MED SURG ORTHOPEDIC  [address] [place] [id number]  Phone: [phone]  Fax: [phone]  ______________________________________________________________________    Physical Exam   Vital Signs: Temp: 97  F (36.1  C) Temp src: Oral BP: 103/65 Pulse: 68   Resp: 16 SpO2: 100 % O2 Device: Nasal cannula Oxygen Delivery: 4 LPM  Weight: 93 lbs 11.13 oz  General Appearance:  Alert, room air, no acute distress.   Respiratory: Clear lungs.   Cardiovascular: RRR, no murmur.   GI: Abdomen soft. + BS, no tenderness to palpation.   Other:  Alert, oriented, pleasant, moving all extremities.         Primary Care Physician   Cristian Fagan    Discharge Orders      Reason for your hospital stay    47 year old female admitted on 6/15/2023. She has pulmonary hypertension, interstitial lung disease on 4-8L O2, polymyositis, tobacco use, anxiety who is admitted for suicidal ideation, hallucinations.     Activity    Your activity upon discharge: activity as tolerated     Adult [facility] Follow-up and recommended labs and tests    Follow up with primary care provider, Cristian Fagan, within 7 days for " hospital follow- up.  No follow up labs or test are needed.      Appointments on [place] and/or [facility] (with [facility] or [facility] provider or service). Call [phone] if you haven't heard regarding these appointments within 7 days of discharge.     Diet    Follow this diet upon discharge: Orders Placed This Encounter      Combination Diet Regular Diet; Safe Tray - with utensils       Significant Results and Procedures      Results for orders placed or performed during the hospital encounter of 02/10/23   XR Chest Port 1 View    Narrative    EXAM: XR CHEST PORT 1 VIEW  LOCATION: [facility]  DATE/TIME: 2/10/2023 6:28 PM    INDICATION: Shortness of breath.  COMPARISON: Chest CT 10/04/2021. Chest radiograph 12/15/2016.      Impression    IMPRESSION: Diffuse interstitial opacities throughout the lungs appear similar to the prior CT scan and are compatible with the known interstitial fibrosis. A vague rounded opacity overlying the left midlung corresponds to the known nodule in this   location. No definite new superimposed airspace opacities. No pleural effusions or pneumothorax. Nonenlarged cardiac silhouette.     *Note: Due to a large number of results and/or encounters for the requested time period, some results have not been displayed. A complete set of results can be found in Results Review.       Discharge Medications   Current Discharge Medication List      START taking these medications    Details   FLUoxetine (PROZAC) 20 MG capsule Take 1 capsule (20 mg) by mouth daily  Qty: 30 capsule, Refills: 0    Associated Diagnoses: Prolonged grief disorder      QUEtiapine (SEROQUEL) 25 MG tablet Take 1 tablet (25 mg) by mouth At Bedtime  Qty: 30 tablet, Refills: 0    Associated Diagnoses: Prolonged grief disorder         CONTINUE these medications which have CHANGED    Details   ALPRAZolam (XANAX) 0.5 MG tablet Take 1 tablet (0.5 mg) by mouth 3 times daily  Qty: 20 tablet, Refills: 0     Associated Diagnoses: Prolonged grief disorder         CONTINUE these medications which have NOT CHANGED    Details   furosemide (LASIX) 20 MG tablet Take 1 tablet (20 mg) by mouth as needed (swelling)  Qty: 90 tablet, Refills: 3    Associated Diagnoses: Pulmonary hypertension (H)      morphine (MS CONTIN) 15 MG CR tablet Take 1 tablet (15 mg) by mouth 2 times daily  Qty: 60 tablet, Refills: 0    Associated Diagnoses: Spondylosis of lumbosacral region without myelopathy or radiculopathy      !! order for DME Oxygen: Patient requires supplemental Oxygen 4 LPM via nasal canula at rest and 6 LPM with activity. Please provide patient with portability capability. Okay to spot check patient on oxygen device, to keep sats above 90%. Please provider with home concentrator that can go up to 10 LPM. Oxygen will be for a lifetime.  Qty: 1 Device, Refills: 0    Associated Diagnoses: Hypoxia      !! order for DME Please provide patient with 2  liquid oxygen tanks for portability. Spot check patient on liquid oxygen. Titrate oxygen to maintain saturations above 88%.  Qty: 2 Device, Refills: 0    Associated Diagnoses: ILD (interstitial lung disease) (H)      oxyCODONE-acetaminophen (PERCOCET)  MG per tablet Take 1 tablet by mouth every 6 hours as needed for severe pain  Qty: 120 tablet, Refills: 0    Associated Diagnoses: Spondylosis of lumbosacral region without myelopathy or radiculopathy      sertraline (ZOLOFT) 25 MG tablet Take 1 tablet (25 mg) by mouth daily  Qty: 30 tablet, Refills: 3    Associated Diagnoses: Anxiety; Grief reaction      tadalafil, PAH, 20 MG TABS Take 1 tablet (20 mg) by mouth daily  Qty: 30 tablet, Refills: 11    Comments: Updated prescription. Disregard 40mg that was sent  Associated Diagnoses: Pulmonary hypertension (H)      VENTOLIN  (90 Base) MCG/ACT inhaler INHALE ONE TO TWO PUFFS INTO THE LUNGS EVERY 4 HOURS AS NEEDED FOR SHORTNESS OF BREATH / DYSPNEA  Qty: 18 g, Refills: 9     Associated Diagnoses: SOB (shortness of breath)       !! - Potential duplicate medications found. Please discuss with provider.        Allergies   Allergies   Allergen Reactions     Cellcept [Mycophenolate] GI Disturbance     Imuran [Azathioprine] GI Disturbance     Methotrexate Other (See Comments)     Lung toxicity     Mometasone Furo-Formoterol Fum      syncope

## 2023-06-19 NOTE — TELEPHONE ENCOUNTER
0344 Bed Search Update:    [facility]: at capacity  [facility]: at capacity per website  [facility]: at capacity per website  [facility]: at capacity per unit staff  [facility]: at capacity per website  [facility]: at capacity per website  [facility]: at capacity per website  [facility]: at capacity per website  [facility]: at capacity per website  [facility]: Declined on 6/15.  Meets exclusionary criteria. (Mixed unit/Low acuity only).  [facility]: at capacity per website  [facility]) [place]: Posting 2 beds.  No Intake after 10PM.    [facility]: at capacity per website  [facility]: at capacity per website  [facility]: Declined on 6/14.  Meets exclusionary criteria.  [facility]: Declined on 6/17.  Meets exclusionary criteria.  Voluntary patients only.  [facility]: Posting 7 beds. (Low acuity only. Must have the cognitive ability to do programming. No aggressive or violent behavior or recent HX in the last 2 yrs. MH must be primary).  [facility]: at capacity per website  [facility]: at capacity per website  [facility]: Declined on 6/17.  Meets exclusionary criteria. (Covid neg. Voluntary only. Mixed unit. No aggressive or violent behavior. No registered sex offenders).  [facility]: Meets exclusionary criteria.  Negative COVID test required. No lines, drains or tubes (oxygen, CPAP, IV, etc.) No history of aggression, violence, or assault. We do not provide Detox or CD treatment.  [facility]: Posting 12 beds. Facility is in [place].  [facility]: Meets exclusionary criteria.  Will serve ages 13-18 (mixed unit with adults). Negative COVID test required. No lines, drains or tubes (oxygen, CPAP, IV, etc.)      Remains on wait list

## 2023-06-19 NOTE — PLAN OF CARE
Pt. A&Ox4. Denies hallucinations and SI currently. VSS. Afebrile. Lung sounds coarse. Maintaining sats with 4 LPM O2 via NC, baseline for pt. LBM 6/17 per pt report. CMS and neuro's are intact. Denies nausea, shortness of breath, and chest pain. Pain managed w/ prn Oxycodone and Tylenol overnight. Voids spontaneously without difficulty in the BR. Tolerating regular diet. Exposed skin appears intact. Pt up independently. No IV access. Call light is within reach, pt able to make needs known and is resting comfortably between cares. Attendant present outside door with frequent checks.   Plan is possible discharge to [place] for inpatient treatment.

## 2023-06-19 NOTE — PROGRESS NOTES
"SPIRITUAL HEALTH SERVICES Progress Note  [facility] ([facility]) 5 ORTHO    Saw pt Anali Loya per length of stay visit.    Patient/Family Understanding of Illness and Goals of Care - Pt said, \"There have been so many stressors going on in my life. I scared my family and now I'm in the hospital.\" Pt is eager to get out of the hospital and go home.     Distress and Loss - Pt said she is distressed about things going wrong in her life and about her grandmother. Pt said, \"I am so stressed and I just couldn't handle it.\" Pt expressed sadness about not being able to go outside and be in nature.     Strengths, Coping, and Resources  - Pt has a very supportive family who has been visiting and in touch with her while she is in the hospital.    Meaning, Beliefs, and Spirituality - Pt said she identifies as spiritual. She said, \"I practice spirituality through meditation, yoga, and being in nature.     Plan of Care - I lead pt in a breathing exercise which she said was helpful. Pt would like a follow up visit in a few days if she is still in the hospital.     Carley Loya   Intern  Pager [phone]    * [facility] remains available 24/7 for emergent requests/referrals, either by having the switchboard page the on-call  or by entering an ASAP/STAT consult in Epic (this will also page the on-call ). Routine Epic consults receive an initial response within 24 hours.*    "

## 2023-06-19 NOTE — TELEPHONE ENCOUNTER
R:  No appropriate [facility] beds are available.    [facility] Access Inpatient Bed Call Log 6/18/23 @ 7:35 PM    Intake has called facilities that have not updated their bed status within the last 12 hours.              [facility] is posting 0 beds.               [facility] is posting 0 beds. [phone]     [facility] is posting 0 beds. [phone]               [facility] is posting 2 beds. [phone]     [facility] is posting 0 beds. [phone]             [facility] is posting 0 beds. [phone]             [facility] is posting 0 beds. [phone]             [facility] is posting 0 beds. [phone]      [facility] is posting 0 beds. [phone]            [facility] is posting 0 beds. [phone]               [facility] is posting 4 beds. Mixed unit 12+. Low acuity only. [phone] Per website @7:21am      [facility] is positing 1 bed. No aggression.  [phone] Per website @7:00am          [facility] is posting 0 beds. [phone]      [facility] is posting 0 bed. Low acuity only. [phone]        [facility] is posting 4 beds. Low acuity. No current aggression. [phone] Per website @7:00am       [facility] is posting 0 bed. Low acuity. [phone]        [facility] is posting 2 beds. 72 HH preferred. Low acuity. [phone] Per [place] @8:03am, closed today     [facility] is posting 4 beds. Low acuity. [phone] Per website @3:46am      [facility] is posting 2 beds. Vol only, No Hx of aggression, violence, or assault. No sexual offenders. No 72 hr holds.  Per website @12:50am      [facility] is posting 7 beds. (Must have the cognitive ability to do programming. No aggressive or violent behavior or recent HX in the last 2 yrs. MH must be primary.) [phone]       [facility] is posting 0 beds. Low  acuity only. Violence and aggression capped. [phone] Per call @7:48am     [facility] is posting 0 beds. Low acuity, Neg Covid. [phone]     [facility] is posting 2 beds. Covid neg. Vol only. Combined adolescent and adult unit. No aggressive or violent behavior. No registered sex offenders. [phone]; Per call at 7:21 am to [place], they have 2 beds      [facility] posting 2 beds. Negative covid. Per website @6:30am        [facility] is posting 2 beds. [phone] no wounds, lines, drains, C-paps, tubes and must be able to care for themselves; no hx of aggression Per call @7:59am, no beds available today.     [facility] is posting 12 beds. Call for details. [phone] Per call @7:44am beds available, did not state how many.     [facility] is posting 3 beds. Mixed unit.  [phone] Per call @7:55am, currently reviewing a Pt. Intake to call after 10am       Pt remains on work list pending appropriate bed availability.

## 2023-06-19 NOTE — PLAN OF CARE
"1700  Paged provider about pts discharge home. Pt needs outpatient psychiatry and nothing has been set up for that. Pt did not get seen by a care coordinator today. Provider said a care coordinator was updated in care rounds. Pt mom also called and brought up the concern that she doesn't have oxygen at her house in [place]. Provider said she can figure that out with her primary care provider.     1745  Called pts mom back and she said that she would bring the condenser to her grand daughters house. She also has a tank that is half full that she can bring for the drive to her daughters. Pt is going to go get her oxygen filled tomorrow. However, pts mom stated that \"I got my eyes dilated earlier today and it's still painful to drive so it's still going to be a little bit before I get over there.      1844  Pts mom is leaving [place] now to  her daughter. Mom is bringing the oxygen she has left and the condenser. Pt is going to stay at her daughters place that is in the [facility]. Pt states she will call the medical supply place tomorrow. Pt will be discharged once mom gets her.   "

## 2023-06-20 RX ORDER — OXYCODONE AND ACETAMINOPHEN 10; 325 MG/1; MG/1
1 TABLET ORAL EVERY 6 HOURS PRN
Qty: 120 TABLET | Refills: 0 | Status: SHIPPED | OUTPATIENT
Start: 2023-06-20 | End: 2023-08-15

## 2023-06-20 RX ORDER — MORPHINE SULFATE 15 MG/1
15 TABLET, FILM COATED, EXTENDED RELEASE ORAL 2 TIMES DAILY
Qty: 60 TABLET | Refills: 0 | Status: SHIPPED | OUTPATIENT
Start: 2023-06-20 | End: 2023-08-15

## 2023-06-20 NOTE — TELEPHONE ENCOUNTER
R:  No appropriate [facility] bed is available.    [facility] Access Inpatient Bed Call Log 6/19/23 @9:20 PM    Intake has called facilities that have not updated their bed status within the last 12 hours.              [facility] is posting 0 beds.               [facility] is posting 0 beds. [phone] Per call @ 7:22am no beds available.     [facility] is posting 0 beds. [phone]               [facility] is posting 0 beds. [phone] Per call @ 7:24am at [facility] can cb later for an update.      [facility] is posting 0 beds. [phone]             [facility] is posting 0 beds. [phone]             [facility] is posting 0 beds. [phone]             [facility] is posting 0 beds. [phone]      [facility] is posting 0 beds. [phone]            [facility] is posting 0 beds. [phone]               [facility] is posting 4 beds. Mixed unit 12+. Low acuity only. [phone] Per website @7:08am      [facility] is positing 0 bed. No aggression.  [phone] Per website @7:09am          [facility] is posting 0 beds. [phone]      [facility] is posting 1 bed. Low acuity only. [phone] per call @ 7:25am       [facility] is posting 0 beds. Low acuity. No current aggression. [phone] Per website @7:09am       [facility] is posting 0 bed. Low acuity. [phone] Per call @ 7:25am     [facility] is posting  beds. 72 HH preferred. Low acuity. [phone] per website @ 7:10am     [facility] is posting 4 beds. Low acuity. [phone] Per call @ 7:25am     [facility] is posting 1 beds. Vol only, No Hx of aggression, violence, or assault. No sexual offenders. No 72 hr holds. [phone]      [facility] is posting 7 beds. (Must have the cognitive ability to do programming. No aggressive or violent behavior or recent HX in the last 2  yrs. MH must be primary.) [phone] Per call @7:32am beds available.     [facility] is posting 0 beds. Low acuity only. Violence and aggression capped. [phone]      [facility] is posting 0 beds. Low acuity, Neg Covid. [phone] Per call @7:34am at [facility] is posting 4 beds. Covid neg. Vol only. Combined adolescent and adult unit. No aggressive or violent behavior. No registered sex offenders. [phone]; Per call @ 7:35am [facility]     [facility] posting 5 beds. Negative covid. Per website @ 7:12 am      [facility] is posting  2 beds. [phone] no wounds, lines, drains, C-paps, tubes and must be able to care for themselves; no hx of aggression Per call @ 7:41am     [facility] is posting 12 beds. Call for details. [phone] Per call @7:49am to cb after 8am.     [facility] is posting 2 beds. Mixed unit.  [phone]                               Pt remains on work list pending appropriate bed availability.

## 2023-06-20 NOTE — TELEPHONE ENCOUNTER
Routing refill request to provider for review/approval because:    Requested Prescriptions   Pending Prescriptions Disp Refills    oxyCODONE-acetaminophen (PERCOCET)  MG per tablet 120 tablet 0     Sig: Take 1 tablet by mouth every 6 hours as needed for severe pain       There is no refill protocol information for this order

## 2023-06-20 NOTE — PLAN OF CARE
Patient AVS was discussed with patient. Discharge medication received by patient.   Patient left the hospital with ella Mendoza at 08:21pm.

## 2023-06-21 ENCOUNTER — MYC MEDICAL ADVICE (OUTPATIENT)
Dept: CARDIOLOGY | Facility: CLINIC | Age: 48
End: 2023-06-21
Payer: COMMERCIAL

## 2023-06-21 DIAGNOSIS — I27.20 PULMONARY HYPERTENSION (H): ICD-10-CM

## 2023-06-21 RX ORDER — TADALAFIL 20 MG/1
20 TABLET ORAL DAILY
Qty: 30 TABLET | Refills: 11 | Status: SHIPPED | OUTPATIENT
Start: 2023-06-21 | End: 2023-09-18

## 2023-06-21 RX ORDER — TADALAFIL 20 MG/1
20 TABLET ORAL EVERY 24 HOURS
Qty: 90 TABLET | Refills: 3 | OUTPATIENT
Start: 2023-06-21

## 2023-06-21 NOTE — TELEPHONE ENCOUNTER
Pt requested refill of tadalafil.  Script was ordered and sent to  pharmacy in [place], [place] per pt request.    Tristan Tovar RN on 6/21/2023 at 12:08 PM

## 2023-06-23 DIAGNOSIS — F43.81 PROLONGED GRIEF DISORDER: ICD-10-CM

## 2023-06-23 RX ORDER — ALPRAZOLAM 0.5 MG
0.5 TABLET ORAL 3 TIMES DAILY
Qty: 20 TABLET | Refills: 0 | Status: SHIPPED | OUTPATIENT
Start: 2023-06-23 | End: 2023-06-30

## 2023-06-23 NOTE — TELEPHONE ENCOUNTER
Requested Prescriptions   Pending Prescriptions Disp Refills    ALPRAZolam (XANAX) 0.5 MG tablet 20 tablet 0     Sig: Take 1 tablet (0.5 mg) by mouth 3 times daily       There is no refill protocol information for this order

## 2023-06-30 ENCOUNTER — OFFICE VISIT (OUTPATIENT)
Dept: FAMILY MEDICINE | Facility: CLINIC | Age: 48
End: 2023-06-30
Payer: COMMERCIAL

## 2023-06-30 VITALS
SYSTOLIC BLOOD PRESSURE: 116 MMHG | TEMPERATURE: 97.2 F | HEIGHT: 60 IN | DIASTOLIC BLOOD PRESSURE: 74 MMHG | OXYGEN SATURATION: 100 % | BODY MASS INDEX: 20.52 KG/M2 | HEART RATE: 72 BPM | RESPIRATION RATE: 18 BRPM | WEIGHT: 104.5 LBS

## 2023-06-30 DIAGNOSIS — F43.81 PROLONGED GRIEF DISORDER: ICD-10-CM

## 2023-06-30 DIAGNOSIS — F11.90 CHRONIC, CONTINUOUS USE OF OPIOIDS: Primary | ICD-10-CM

## 2023-06-30 PROCEDURE — 99214 OFFICE O/P EST MOD 30 MIN: CPT | Performed by: FAMILY MEDICINE

## 2023-06-30 RX ORDER — QUETIAPINE FUMARATE 25 MG/1
25 TABLET, FILM COATED ORAL AT BEDTIME
Qty: 90 TABLET | Refills: 0 | Status: SHIPPED | OUTPATIENT
Start: 2023-06-30 | End: 2023-08-15

## 2023-06-30 RX ORDER — ALPRAZOLAM 0.5 MG
0.5 TABLET ORAL 3 TIMES DAILY
Qty: 90 TABLET | Refills: 0 | Status: SHIPPED | OUTPATIENT
Start: 2023-06-30 | End: 2023-08-15

## 2023-06-30 ASSESSMENT — PATIENT HEALTH QUESTIONNAIRE - PHQ9
SUM OF ALL RESPONSES TO PHQ QUESTIONS 1-9: 12
SUM OF ALL RESPONSES TO PHQ QUESTIONS 1-9: 12
10. IF YOU CHECKED OFF ANY PROBLEMS, HOW DIFFICULT HAVE THESE PROBLEMS MADE IT FOR YOU TO DO YOUR WORK, TAKE CARE OF THINGS AT HOME, OR GET ALONG WITH OTHER PEOPLE: SOMEWHAT DIFFICULT

## 2023-06-30 ASSESSMENT — PAIN SCALES - GENERAL: PAINLEVEL: MILD PAIN (3)

## 2023-06-30 ASSESSMENT — ANXIETY QUESTIONNAIRES
GAD7 TOTAL SCORE: 8
5. BEING SO RESTLESS THAT IT IS HARD TO SIT STILL: SEVERAL DAYS
2. NOT BEING ABLE TO STOP OR CONTROL WORRYING: MORE THAN HALF THE DAYS
6. BECOMING EASILY ANNOYED OR IRRITABLE: NOT AT ALL
GAD7 TOTAL SCORE: 8
4. TROUBLE RELAXING: SEVERAL DAYS
IF YOU CHECKED OFF ANY PROBLEMS ON THIS QUESTIONNAIRE, HOW DIFFICULT HAVE THESE PROBLEMS MADE IT FOR YOU TO DO YOUR WORK, TAKE CARE OF THINGS AT HOME, OR GET ALONG WITH OTHER PEOPLE: SOMEWHAT DIFFICULT
3. WORRYING TOO MUCH ABOUT DIFFERENT THINGS: MORE THAN HALF THE DAYS
1. FEELING NERVOUS, ANXIOUS, OR ON EDGE: MORE THAN HALF THE DAYS
7. FEELING AFRAID AS IF SOMETHING AWFUL MIGHT HAPPEN: NOT AT ALL

## 2023-06-30 NOTE — PATIENT INSTRUCTIONS
Crisis Text Line  http://www.crisistextline.org     The Crisis Text Line serves anyone, in any type of crisis, providing access to free, 24/7 support and information via the medium people already use and trust:     Here's how it works:  Text 251-871 from anywhere in the [place], anytime, about any type of crisis.  A live, trained Crisis Counselor receives the text and responds quickly.  The volunteer Crisis Counselor will help you move from a 'hot moment to a cool moment'

## 2023-06-30 NOTE — PROGRESS NOTES
Assessment & Plan     Prolonged grief disorder  Anali Loya is a 47 year old female admitted on 6/15/2023. She has pulmonary hypertension, interstitial lung disease on 4-8L O2, polymyositis, tobacco use, anxiety who is admitted for suicidal ideation and hallucinations. Concern for acute episode of paranoia. Patient was admitted to the medical floor. She had a sitter at bedside. Psychiatry was consulted and managed psych medications. Paranoia improved and patient remained calm and cooperative. Psychiatry okay with discharge home with family.   She follows up today and is feeling better.  She continues on Prozac 20 mg once daily and Seroquel 25 mg at bedtime.  She denies ongoing auditory hallucinations.  She still has underlying anxiety which she is using the Xanax 0.5 mg 3 times daily.  We discussed the interaction of this increased dose with her chronic narcotics, we will continue to monitor.  Hopefully will be able to decrease the Xanax once the Prozac and Seroquel has been fully effective.    She is working with community based psychiatry and therapist.  She has a call to schedule initial appointments.  We discussed that if she is running into delays and needs additional support she can follow-up here.  If she feels acutely unsafe she will follow-up in the emergency department again.    - FLUoxetine (PROZAC) 20 MG capsule; Take 1 capsule (20 mg) by mouth daily  - QUEtiapine (SEROQUEL) 25 MG tablet; Take 1 tablet (25 mg) by mouth At Bedtime  - ALPRAZolam (XANAX) 0.5 MG tablet; Take 1 tablet (0.5 mg) by mouth 3 times daily    Chronic, continuous use of opioids  Meka is a 47-year-old female with chronic pain associated with rheumatoid arthritis.  In the treatment of her rheumatoid arthritis she developed interstitial lung disease and polymyositis.  She has been on chronic narcotic pain medication for many years.  We have discussed weaning her off of the long-acting narcotic and changing her to 1 short acting  narcotic prior to beginning to try to taper her chronic narcotics.  She is agreeable to this plan in the future but due to the significant grief due to recent loss of family members we will delay that transition until her our next visit.      Prescription drug management       MED REC REQUIRED  Post Medication Reconciliation Status:  Discharge medications reconciled, continue medications without change    Depression Screening Follow Up        6/30/2023     2:40 PM   PHQ   PHQ-9 Total Score 12   Q9: Thoughts of better off dead/self-harm past 2 weeks Not at all         Follow Up Actions Taken  Crisis resource information provided in After Visit Summary  Patient has experienced a recent significant life event.  Patient counseled, no additional follow up at this time.  Referred patient back to PCP  Started patient on anti-depressant.             Cristian Fagan MD  [facility]    Thierno Briones is a 47 year old, presenting for the following health issues:  ER F/U        6/30/2023     3:08 PM   Additional Questions   Roomed by Mercy BAXTER         6/30/2023     3:08 PM   Patient Reported Additional Medications   Patient reports taking the following new medications Omeprazole     HPI     ED/UC Followup:    Facility:  [facility] Emergency room  Date of visit: 6-  Reason for visit: Suicidal  Current Status: improvement      [facility]  Hospitalist Discharge Summary       Date of Admission:  6/15/2023  Date of Discharge:  6/19/2023  Discharging Provider: Fredrick Cobb MD  Discharge Service: Hospitalist Service, GOLD TEAM 18        Discharge Diagnoses  Depression   Suicide ideation   Paranoia   Confusion   Anxiety            Clinically Significant Risk Factors                Follow-ups Needed After Discharge  Follow-up Appointments     Adult [facility] Follow-up and recommended labs and tests      Follow up with primary care  "provider, Cristian Fagan, within 7 days for   hospital follow- up.  No follow up labs or test are needed.       Appointments on [place] and/or [facility] (with [facility] or [facility]   provider or service). Call [phone] if you haven't heard regarding   these appointments within 7 days of discharge.                  Unresulted Labs Ordered in the Past 30 Days of this Admission      No orders found from 5/16/2023 to 6/16/2023.                   Discharge Disposition   Discharged to home  Condition at discharge: Stable           Hospital Course  Anali Loya is a 47 year old female admitted on 6/15/2023. She has pulmonary hypertension, interstitial lung disease on 4-8L O2, polymyositis, tobacco use, anxiety who is admitted for suicidal ideation and hallucinations. Concern for acute episode of paranoia. Patient was admitted to the medical floor. She had a sitter at bedside. Psychiatry was consulted and managed psych medications. Paranoia improved and patient remained calm and cooperative. Psychiatry okay with discharge home with family.      Suicidal ideation  Hallucinations, auditory  Paranoia  Worsening anxiety  Psychosis, acute  - Pt was pleasant today, mood is stable.   - Continues with sitter at bedside.   - Psychiatry following the patient.   - Psychiatry new recommendations:  \"Continue 1:1 observation. Continue Prozac and Seroquel in the same doses. Continue PRN Haldol. Increase Xanax to 0.5 mg TID scheduled. Have  arrange outpatient psychiatric follow-up upon discharge\".     Pulmonary HTN, chronic  Chronic hypoxic respiratory failure  Interstitial lung disease  - continue O2 supplementation 4-8L as needed  - monitor weights daily, lasix to be ordered as needed  - continue tadalafil 20mg every other day     Chronic pain  - Restarted on previous pain regimen.   Underweight  - Recommended follow-up as outpatient.            Consultations This Hospital Stay   PSYCHIATRY IP CONSULT  SOCIAL WORK IP " CONSULT  PSYCHIATRY IP CONSULT  SOCIAL WORK IP CONSULT  PSYCHIATRY IP CONSULT           Code Status   Full Code           Time Spent on this Encounter   I, Fredrick Cobb MD, personally saw the patient today and spent greater than 30 minutes discharging this patient.     Fredrick Cobb MD  [facility] MED SURG ORTHOPEDIC  [address] [address]  Phone: [phone]  Fax: [phone]  ______________________________________________________________________           Physical Exam  Vital Signs: Temp: 97  F (36.1  C) Temp src: Oral BP: 103/65 Pulse: 68   Resp: 16 SpO2: 100 % O2 Device: Nasal cannula Oxygen Delivery: 4 LPM  Weight: 93 lbs 11.13 oz  General Appearance:  Alert, room air, no acute distress.   Respiratory: Clear lungs.   Cardiovascular: RRR, no murmur.   GI: Abdomen soft. + BS, no tenderness to palpation.   Other:  Alert, oriented, pleasant, moving all extremities.                Primary Care Physician   Cristian Fagan           Discharge Orders          Reason for your hospital stay     47 year old female admitted on 6/15/2023. She has pulmonary hypertension, interstitial lung disease on 4-8L O2, polymyositis, tobacco use, anxiety who is admitted for suicidal ideation, hallucinations.          Activity     Your activity upon discharge: activity as tolerated          Adult [facility] Follow-up and recommended labs and tests     Follow up with primary care provider, Cristian Fagan, within 7 days for hospital follow- up.  No follow up labs or test are needed.       Appointments on [place] and/or [facility] (with [facility] or [facility] provider or service). Call [phone] if you haven't heard regarding these appointments within 7 days of discharge.          Diet     Follow this diet upon discharge: Orders Placed This Encounter      Combination Diet Regular Diet; Safe Tray - with utensils               Significant Results and Procedures      Results for orders placed  or performed during the hospital encounter of 02/10/23   XR Chest Port 1 View     Narrative     EXAM: XR CHEST PORT 1 VIEW  LOCATION: [facility]  DATE/TIME: 2/10/2023 6:28 PM     INDICATION: Shortness of breath.  COMPARISON: Chest CT 10/04/2021. Chest radiograph 12/15/2016.        Impression     IMPRESSION: Diffuse interstitial opacities throughout the lungs appear similar to the prior CT scan and are compatible with the known interstitial fibrosis. A vague rounded opacity overlying the left midlung corresponds to the known nodule in this   location. No definite new superimposed airspace opacities. No pleural effusions or pneumothorax. Nonenlarged cardiac silhouette.      *Note: Due to a large number of results and/or encounters for the requested time period, some results have not been displayed. A complete set of results can be found in Results Review.               Discharge Medications       Current Discharge Medication List            START taking these medications     Details   FLUoxetine (PROZAC) 20 MG capsule Take 1 capsule (20 mg) by mouth daily  Qty: 30 capsule, Refills: 0     Associated Diagnoses: Prolonged grief disorder       QUEtiapine (SEROQUEL) 25 MG tablet Take 1 tablet (25 mg) by mouth At Bedtime  Qty: 30 tablet, Refills: 0     Associated Diagnoses: Prolonged grief disorder                CONTINUE these medications which have CHANGED     Details   ALPRAZolam (XANAX) 0.5 MG tablet Take 1 tablet (0.5 mg) by mouth 3 times daily  Qty: 20 tablet, Refills: 0     Associated Diagnoses: Prolonged grief disorder                CONTINUE these medications which have NOT CHANGED     Details   furosemide (LASIX) 20 MG tablet Take 1 tablet (20 mg) by mouth as needed (swelling)  Qty: 90 tablet, Refills: 3     Associated Diagnoses: Pulmonary hypertension (H)       morphine (MS CONTIN) 15 MG CR tablet Take 1 tablet (15 mg) by mouth 2 times daily  Qty: 60 tablet, Refills: 0      Associated Diagnoses: Spondylosis of lumbosacral region without myelopathy or radiculopathy       !! order for DME Oxygen: Patient requires supplemental Oxygen 4 LPM via nasal canula at rest and 6 LPM with activity. Please provide patient with portability capability. Okay to spot check patient on oxygen device, to keep sats above 90%. Please provider with home concentrator that can go up to 10 LPM. Oxygen will be for a lifetime.  Qty: 1 Device, Refills: 0     Associated Diagnoses: Hypoxia       !! order for DME Please provide patient with 2  liquid oxygen tanks for portability. Spot check patient on liquid oxygen. Titrate oxygen to maintain saturations above 88%.  Qty: 2 Device, Refills: 0     Associated Diagnoses: ILD (interstitial lung disease) (H)       oxyCODONE-acetaminophen (PERCOCET)  MG per tablet Take 1 tablet by mouth every 6 hours as needed for severe pain  Qty: 120 tablet, Refills: 0     Associated Diagnoses: Spondylosis of lumbosacral region without myelopathy or radiculopathy       sertraline (ZOLOFT) 25 MG tablet Take 1 tablet (25 mg) by mouth daily  Qty: 30 tablet, Refills: 3     Associated Diagnoses: Anxiety; Grief reaction       tadalafil, PAH, 20 MG TABS Take 1 tablet (20 mg) by mouth daily  Qty: 30 tablet, Refills: 11     Comments: Updated prescription. Disregard 40mg that was sent  Associated Diagnoses: Pulmonary hypertension (H)       VENTOLIN  (90 Base) MCG/ACT inhaler INHALE ONE TO TWO PUFFS INTO THE LUNGS EVERY 4 HOURS AS NEEDED FOR SHORTNESS OF BREATH / DYSPNEA  Qty: 18 g, Refills: 9     Associated Diagnoses: SOB (shortness of breath)        !! - Potential duplicate medications found. Please discuss with provider.            Patient Active Problem List   Diagnosis     Personal history of tobacco use, presenting hazards to health     Abnormal blood chemistry     Abnormal maternal glucose tolerance, antepartum     Inflammatory arthritis     HYPERLIPIDEMIA LDL GOAL <130     SOB  (shortness of breath)     Fibrosis of lung (H)     Anxiety     Tobacco abuse     S/P bunionectomy     ILD (interstitial lung disease) (H)     Lung nodule     Pneumothorax of left lung after biopsy     Pneumothorax after biopsy     Hypoxia     Pulmonary hypertension (H)     ASCUS of cervix with negative high risk HPV     Primary pulmonary hypertension (H)     Chronic, continuous use of opioids     Iron deficiency     Anemia     Spondylosis of lumbosacral region without myelopathy or radiculopathy     Polymyositis, organ involvement unspecified (H)     Hallucinations       Current Outpatient Medications   Medication Sig Dispense Refill     ALPRAZolam (XANAX) 0.5 MG tablet Take 1 tablet (0.5 mg) by mouth 3 times daily 20 tablet 0     FLUoxetine (PROZAC) 20 MG capsule Take 1 capsule (20 mg) by mouth daily 30 capsule 0     furosemide (LASIX) 20 MG tablet Take 1 tablet (20 mg) by mouth as needed (swelling) 90 tablet 3     morphine (MS CONTIN) 15 MG CR tablet Take 1 tablet (15 mg) by mouth 2 times daily 60 tablet 0     order for DME Oxygen: Patient requires supplemental Oxygen 4 LPM via nasal canula at rest and 6 LPM with activity. Please provide patient with portability capability. Okay to spot check patient on oxygen device, to keep sats above 90%. Please provider with home concentrator that can go up to 10 LPM. Oxygen will be for a lifetime. 1 Device 0     order for DME Please provide patient with 2  liquid oxygen tanks for portability. Spot check patient on liquid oxygen. Titrate oxygen to maintain saturations above 88%. 2 Device 0     oxyCODONE-acetaminophen (PERCOCET)  MG per tablet Take 1 tablet by mouth every 6 hours as needed for severe pain 120 tablet 0     QUEtiapine (SEROQUEL) 25 MG tablet Take 1 tablet (25 mg) by mouth At Bedtime 30 tablet 0     tadalafil, PAH, 20 MG TABS Take 1 tablet (20 mg) by mouth daily 30 tablet 11     VENTOLIN  (90 Base) MCG/ACT inhaler INHALE ONE TO TWO PUFFS INTO THE  "LUNGS EVERY 4 HOURS AS NEEDED FOR SHORTNESS OF BREATH / DYSPNEA (Patient taking differently: Inhale 1-2 puffs into the lungs every 4 hours as needed for shortness of breath or wheezing) 18 g 9     naloxone (NARCAN) 4 MG/0.1ML nasal spray Spray 1 spray (4 mg) into one nostril alternating nostrils as needed for opioid reversal every 2-3 minutes until assistance arrives (Patient not taking: Reported on 6/30/2023) 0.2 mL 0     sertraline (ZOLOFT) 25 MG tablet Take 1 tablet (25 mg) by mouth daily (Patient not taking: Reported on 6/30/2023) 30 tablet 3           Review of Systems   CONSTITUTIONAL: NEGATIVE for fever, chills, change in weight  ENT/MOUTH: NEGATIVE for ear, mouth and throat problems  RESP: NEGATIVE for significant cough or SOB  CV: NEGATIVE for chest pain, palpitations or peripheral edema  PSYCHIATRIC: POSITIVE foranxiety, depressed mood, Hx anxiety, Hx depression, Hx panic attacks, Hx of hallucinations, stress due to ex  death  and NEGATIVE foragitation, alcohol abuse, delusions, hallucinations, obsessive thoughts, thoughts of hurting someone else and thoughts of self harm  ROS otherwise negative      Objective    /74 (BP Location: Left arm, Patient Position: Chair)   Pulse 72   Temp 97.2  F (36.2  C) (Temporal)   Resp 18   Ht 1.53 m (5' 0.25\")   Wt 47.4 kg (104 lb 8 oz)   LMP  (LMP Unknown)   SpO2 100%   BMI 20.24 kg/m    Body mass index is 20.24 kg/m .  Physical Exam   GENERAL: healthy, alert, no distress, frail and fatigued  NECK: no adenopathy, no asymmetry, masses, or scars and thyroid normal to palpation  RESP: lungs clear to auscultation - no rales, rhonchi or wheezes  CV: regular rate and rhythm, normal S1 S2, no S3 or S4, no murmur, click or rub, no peripheral edema and peripheral pulses strong  ABDOMEN: soft, nontender, no hepatosplenomegaly, no masses and bowel sounds normal  MS: no gross musculoskeletal defects noted, no edema  PSYCH: mentation appears normal, affect flat, " fatigued, judgement and insight intact and appearance well groomed    Admission on 06/15/2023, Discharged on 06/19/2023   Component Date Value Ref Range Status     Sodium 06/15/2023 138  136 - 145 mmol/L Final     Potassium 06/15/2023 3.7  3.4 - 5.3 mmol/L Final     Chloride 06/15/2023 101  98 - 107 mmol/L Final     Carbon Dioxide (CO2) 06/15/2023 27  22 - 29 mmol/L Final     Anion Gap 06/15/2023 10  7 - 15 mmol/L Final     Urea Nitrogen 06/15/2023 5.5 (L)  6.0 - 20.0 mg/dL Final     Creatinine 06/15/2023 0.62  0.51 - 0.95 mg/dL Final     Calcium 06/15/2023 9.2  8.6 - 10.0 mg/dL Final     Glucose 06/15/2023 92  70 - 99 mg/dL Final     GFR Estimate 06/15/2023 >90  >60 mL/min/1.73m2 Final    eGFR calculated using 2021 CKD-EPI equation.     WBC Count 06/15/2023 8.9  4.0 - 11.0 10e3/uL Final     RBC Count 06/15/2023 4.47  3.80 - 5.20 10e6/uL Final     Hemoglobin 06/15/2023 12.7  11.7 - 15.7 g/dL Final     Hematocrit 06/15/2023 40.3  35.0 - 47.0 % Final     MCV 06/15/2023 90  78 - 100 fL Final     MCH 06/15/2023 28.4  26.5 - 33.0 pg Final     MCHC 06/15/2023 31.5  31.5 - 36.5 g/dL Final     RDW 06/15/2023 13.2  10.0 - 15.0 % Final     Platelet Count 06/15/2023 321  150 - 450 10e3/uL Final     Color Urine 06/15/2023 Straw  Colorless, Straw, Light Yellow, Yellow Final     Appearance Urine 06/15/2023 Clear  Clear Final     Glucose Urine 06/15/2023 Negative  Negative mg/dL Final     Bilirubin Urine 06/15/2023 Negative  Negative Final     Ketones Urine 06/15/2023 Negative  Negative mg/dL Final     Specific Gravity Urine 06/15/2023 1.005  1.003 - 1.035 Final     Blood Urine 06/15/2023 Negative  Negative Final     pH Urine 06/15/2023 7.5 (H)  5.0 - 7.0 Final     Protein Albumin Urine 06/15/2023 Negative  Negative mg/dL Final     Urobilinogen Urine 06/15/2023 Normal  Normal, 2.0 mg/dL Final     Nitrite Urine 06/15/2023 Negative  Negative Final     Leukocyte Esterase Urine 06/15/2023 Negative  Negative Final     Mucus Urine  06/15/2023 Present (A)  None Seen /LPF Final     RBC Urine 06/15/2023 1  <=2 /HPF Final     WBC Urine 06/15/2023 1  <=5 /HPF Final     Squamous Epithelials Urine 06/15/2023 2 (H)  <=1 /HPF Final     Amphetamines Urine 06/15/2023 Screen Negative  Screen Negative Final    Cutoff for a negative amphetamine is less than 500 ng/mL.     Barbituates Urine 06/15/2023 Screen Negative  Screen Negative Final    Cutoff for a negative barbiturate is less than 200 ng/mL.     Benzodiazepine Urine 06/15/2023 Screen Negative  Screen Negative Final    Cutoff for a negative benzodiazepine is less than 100 ng/mL.     Cannabinoids Urine 06/15/2023 Screen Negative  Screen Negative Final    Cutoff for a negative cannabinoid is less than 50 ng/mL.     Cocaine Urine 06/15/2023 Screen Negative  Screen Negative Final    Cutoff for a negative cocaine is less than 300 ng/mL.     Ethanol Urine 06/15/2023 Screen Negative  Screen Negative Final    Cutoff for a negative urine ethanol is less than 0.05 g/dL.     Opiates Urine 06/15/2023 Screen Negative  Screen Negative Final    Cutoff for a negative opiate is less than 300 ng/mL.     PCP Urine 06/15/2023 Screen Negative  Screen Negative Final    Cutoff for a negative PCP is less than 25 ng/mL.                   Answers for HPI/ROS submitted by the patient on 6/30/2023  If you checked off any problems, how difficult have these problems made it for you to do your work, take care of things at home, or get along with other people?: Somewhat difficult  PHQ9 TOTAL SCORE: 12  MOLLY 7 TOTAL SCORE: 8

## 2023-06-30 NOTE — TELEPHONE ENCOUNTER
Flonase Prescription approved per [facility] Refill Protocol.  NAREN Schuler       - - -

## 2023-07-03 PROBLEM — F43.81 PROLONGED GRIEF DISORDER: Status: ACTIVE | Noted: 2023-07-03

## 2023-07-31 ENCOUNTER — MYC REFILL (OUTPATIENT)
Dept: FAMILY MEDICINE | Facility: CLINIC | Age: 48
End: 2023-07-31
Payer: COMMERCIAL

## 2023-07-31 DIAGNOSIS — M47.817 SPONDYLOSIS OF LUMBOSACRAL REGION WITHOUT MYELOPATHY OR RADICULOPATHY: ICD-10-CM

## 2023-07-31 DIAGNOSIS — F43.81 PROLONGED GRIEF DISORDER: ICD-10-CM

## 2023-07-31 RX ORDER — OXYCODONE AND ACETAMINOPHEN 10; 325 MG/1; MG/1
1 TABLET ORAL EVERY 6 HOURS PRN
Qty: 120 TABLET | Refills: 0 | OUTPATIENT
Start: 2023-07-31

## 2023-07-31 RX ORDER — ALPRAZOLAM 0.5 MG
0.5 TABLET ORAL 3 TIMES DAILY
Qty: 90 TABLET | Refills: 0 | OUTPATIENT
Start: 2023-07-31

## 2023-07-31 NOTE — TELEPHONE ENCOUNTER
Patient discharged for hospitalization with medication adjustment. Will need clinic follow up prior to additional refills.Cristian Fagan MD

## 2023-08-15 ENCOUNTER — HOSPITAL ENCOUNTER (INPATIENT)
Facility: CLINIC | Age: 48
LOS: 5 days | Discharge: HOME | End: 2023-08-20
Attending: STUDENT IN AN ORGANIZED HEALTH CARE EDUCATION/TRAINING PROGRAM | Admitting: HOSPITALIST
Payer: COMMERCIAL

## 2023-08-15 ENCOUNTER — APPOINTMENT (OUTPATIENT)
Dept: CT IMAGING | Facility: CLINIC | Age: 48
End: 2023-08-15
Attending: STUDENT IN AN ORGANIZED HEALTH CARE EDUCATION/TRAINING PROGRAM
Payer: COMMERCIAL

## 2023-08-15 DIAGNOSIS — J96.21 ACUTE ON CHRONIC RESPIRATORY FAILURE WITH HYPOXIA (H): ICD-10-CM

## 2023-08-15 DIAGNOSIS — J96.01 ACUTE RESPIRATORY FAILURE WITH HYPOXIA (H): ICD-10-CM

## 2023-08-15 DIAGNOSIS — R79.89 ELEVATED BRAIN NATRIURETIC PEPTIDE (BNP) LEVEL: ICD-10-CM

## 2023-08-15 DIAGNOSIS — J18.9 PNEUMONIA OF BOTH LUNGS DUE TO INFECTIOUS ORGANISM, UNSPECIFIED PART OF LUNG: ICD-10-CM

## 2023-08-15 DIAGNOSIS — R93.89 ABNORMAL CT OF THE CHEST: ICD-10-CM

## 2023-08-15 DIAGNOSIS — J84.9 ILD (INTERSTITIAL LUNG DISEASE) (H): ICD-10-CM

## 2023-08-15 DIAGNOSIS — M47.817 SPONDYLOSIS OF LUMBOSACRAL REGION WITHOUT MYELOPATHY OR RADICULOPATHY: ICD-10-CM

## 2023-08-15 DIAGNOSIS — J84.9 ILD (INTERSTITIAL LUNG DISEASE) (H): Primary | ICD-10-CM

## 2023-08-15 LAB
ALBUMIN SERPL BCG-MCNC: 3.9 G/DL (ref 3.5–5.2)
ALP SERPL-CCNC: 78 U/L (ref 35–104)
ALT SERPL W P-5'-P-CCNC: 15 U/L (ref 0–50)
ANION GAP SERPL CALCULATED.3IONS-SCNC: 16 MMOL/L (ref 7–15)
APTT PPP: 34 SECONDS (ref 22–38)
AST SERPL W P-5'-P-CCNC: 21 U/L (ref 0–45)
BASE EXCESS BLDV CALC-SCNC: -0.6 MMOL/L (ref -7.7–1.9)
BASOPHILS # BLD AUTO: 0.1 10E3/UL (ref 0–0.2)
BASOPHILS NFR BLD AUTO: 1 %
BILIRUB SERPL-MCNC: 0.4 MG/DL
BUN SERPL-MCNC: 13.6 MG/DL (ref 6–20)
CALCIUM SERPL-MCNC: 9 MG/DL (ref 8.6–10)
CHLORIDE SERPL-SCNC: 94 MMOL/L (ref 98–107)
CREAT SERPL-MCNC: 0.86 MG/DL (ref 0.51–0.95)
DEPRECATED HCO3 PLAS-SCNC: 22 MMOL/L (ref 22–29)
EOSINOPHIL # BLD AUTO: 0.1 10E3/UL (ref 0–0.7)
EOSINOPHIL NFR BLD AUTO: 1 %
ERYTHROCYTE [DISTWIDTH] IN BLOOD BY AUTOMATED COUNT: 14.6 % (ref 10–15)
FLUAV RNA SPEC QL NAA+PROBE: NEGATIVE
FLUBV RNA RESP QL NAA+PROBE: NEGATIVE
GFR SERPL CREATININE-BSD FRML MDRD: 83 ML/MIN/1.73M2
GLUCOSE SERPL-MCNC: 127 MG/DL (ref 70–99)
HCO3 BLDV-SCNC: 26 MMOL/L (ref 21–28)
HCT VFR BLD AUTO: 38.7 % (ref 35–47)
HGB BLD-MCNC: 12 G/DL (ref 11.7–15.7)
IMM GRANULOCYTES # BLD: 0.1 10E3/UL
IMM GRANULOCYTES NFR BLD: 1 %
INR PPP: 1.22 (ref 0.85–1.15)
LACTATE SERPL-SCNC: 1 MMOL/L (ref 0.7–2)
LACTATE SERPL-SCNC: 3 MMOL/L (ref 0.7–2)
LYMPHOCYTES # BLD AUTO: 2.6 10E3/UL (ref 0.8–5.3)
LYMPHOCYTES NFR BLD AUTO: 19 %
MAGNESIUM SERPL-MCNC: 1.7 MG/DL (ref 1.7–2.3)
MCH RBC QN AUTO: 28.4 PG (ref 26.5–33)
MCHC RBC AUTO-ENTMCNC: 31 G/DL (ref 31.5–36.5)
MCV RBC AUTO: 92 FL (ref 78–100)
MONOCYTES # BLD AUTO: 1.1 10E3/UL (ref 0–1.3)
MONOCYTES NFR BLD AUTO: 8 %
NEUTROPHILS # BLD AUTO: 9.7 10E3/UL (ref 1.6–8.3)
NEUTROPHILS NFR BLD AUTO: 70 %
NRBC # BLD AUTO: 0.1 10E3/UL
NRBC BLD AUTO-RTO: 0 /100
NT-PROBNP SERPL-MCNC: ABNORMAL PG/ML (ref 0–450)
O2/TOTAL GAS SETTING VFR VENT: 68 %
PCO2 BLDV: 51 MM HG (ref 40–50)
PH BLDV: 7.32 [PH] (ref 7.32–7.43)
PLATELET # BLD AUTO: 476 10E3/UL (ref 150–450)
PO2 BLDV: 19 MM HG (ref 25–47)
POTASSIUM SERPL-SCNC: 4.3 MMOL/L (ref 3.4–5.3)
PROT SERPL-MCNC: 7.5 G/DL (ref 6.4–8.3)
RBC # BLD AUTO: 4.23 10E6/UL (ref 3.8–5.2)
RSV RNA SPEC NAA+PROBE: NEGATIVE
SARS-COV-2 RNA RESP QL NAA+PROBE: NEGATIVE
SODIUM SERPL-SCNC: 132 MMOL/L (ref 136–145)
TROPONIN T SERPL HS-MCNC: 8 NG/L
TROPONIN T SERPL HS-MCNC: 9 NG/L
WBC # BLD AUTO: 13.8 10E3/UL (ref 4–11)

## 2023-08-15 PROCEDURE — 93010 ELECTROCARDIOGRAM REPORT: CPT | Performed by: STUDENT IN AN ORGANIZED HEALTH CARE EDUCATION/TRAINING PROGRAM

## 2023-08-15 PROCEDURE — 80053 COMPREHEN METABOLIC PANEL: CPT | Performed by: STUDENT IN AN ORGANIZED HEALTH CARE EDUCATION/TRAINING PROGRAM

## 2023-08-15 PROCEDURE — 99285 EMERGENCY DEPT VISIT HI MDM: CPT | Mod: 25 | Performed by: STUDENT IN AN ORGANIZED HEALTH CARE EDUCATION/TRAINING PROGRAM

## 2023-08-15 PROCEDURE — 36415 COLL VENOUS BLD VENIPUNCTURE: CPT | Performed by: STUDENT IN AN ORGANIZED HEALTH CARE EDUCATION/TRAINING PROGRAM

## 2023-08-15 PROCEDURE — 74177 CT ABD & PELVIS W/CONTRAST: CPT

## 2023-08-15 PROCEDURE — 96361 HYDRATE IV INFUSION ADD-ON: CPT

## 2023-08-15 PROCEDURE — 93005 ELECTROCARDIOGRAM TRACING: CPT

## 2023-08-15 PROCEDURE — 87040 BLOOD CULTURE FOR BACTERIA: CPT | Performed by: STUDENT IN AN ORGANIZED HEALTH CARE EDUCATION/TRAINING PROGRAM

## 2023-08-15 PROCEDURE — 250N000009 HC RX 250: Performed by: STUDENT IN AN ORGANIZED HEALTH CARE EDUCATION/TRAINING PROGRAM

## 2023-08-15 PROCEDURE — 250N000011 HC RX IP 250 OP 636: Performed by: STUDENT IN AN ORGANIZED HEALTH CARE EDUCATION/TRAINING PROGRAM

## 2023-08-15 PROCEDURE — 200N000001 HC R&B ICU

## 2023-08-15 PROCEDURE — 250N000011 HC RX IP 250 OP 636: Mod: JZ | Performed by: HOSPITALIST

## 2023-08-15 PROCEDURE — 96375 TX/PRO/DX INJ NEW DRUG ADDON: CPT

## 2023-08-15 PROCEDURE — 250N000013 HC RX MED GY IP 250 OP 250 PS 637: Performed by: HOSPITALIST

## 2023-08-15 PROCEDURE — 93005 ELECTROCARDIOGRAM TRACING: CPT | Mod: 76

## 2023-08-15 PROCEDURE — 250N000013 HC RX MED GY IP 250 OP 250 PS 637: Performed by: FAMILY MEDICINE

## 2023-08-15 PROCEDURE — 99291 CRITICAL CARE FIRST HOUR: CPT | Mod: 25

## 2023-08-15 PROCEDURE — 258N000003 HC RX IP 258 OP 636: Performed by: STUDENT IN AN ORGANIZED HEALTH CARE EDUCATION/TRAINING PROGRAM

## 2023-08-15 PROCEDURE — 85014 HEMATOCRIT: CPT | Performed by: STUDENT IN AN ORGANIZED HEALTH CARE EDUCATION/TRAINING PROGRAM

## 2023-08-15 PROCEDURE — 99207 PR NOT IN PERSON INPATIENT CONSULT STATISTICAL MARKER: CPT | Performed by: HOSPITALIST

## 2023-08-15 PROCEDURE — 85610 PROTHROMBIN TIME: CPT | Performed by: STUDENT IN AN ORGANIZED HEALTH CARE EDUCATION/TRAINING PROGRAM

## 2023-08-15 PROCEDURE — 85730 THROMBOPLASTIN TIME PARTIAL: CPT | Performed by: STUDENT IN AN ORGANIZED HEALTH CARE EDUCATION/TRAINING PROGRAM

## 2023-08-15 PROCEDURE — 999N000157 HC STATISTIC RCP TIME EA 10 MIN

## 2023-08-15 PROCEDURE — 99223 1ST HOSP IP/OBS HIGH 75: CPT | Mod: AI | Performed by: HOSPITALIST

## 2023-08-15 PROCEDURE — 87637 SARSCOV2&INF A&B&RSV AMP PRB: CPT | Performed by: STUDENT IN AN ORGANIZED HEALTH CARE EDUCATION/TRAINING PROGRAM

## 2023-08-15 PROCEDURE — 83605 ASSAY OF LACTIC ACID: CPT | Performed by: STUDENT IN AN ORGANIZED HEALTH CARE EDUCATION/TRAINING PROGRAM

## 2023-08-15 PROCEDURE — 83880 ASSAY OF NATRIURETIC PEPTIDE: CPT | Performed by: STUDENT IN AN ORGANIZED HEALTH CARE EDUCATION/TRAINING PROGRAM

## 2023-08-15 PROCEDURE — 82803 BLOOD GASES ANY COMBINATION: CPT | Performed by: STUDENT IN AN ORGANIZED HEALTH CARE EDUCATION/TRAINING PROGRAM

## 2023-08-15 PROCEDURE — 84484 ASSAY OF TROPONIN QUANT: CPT | Performed by: STUDENT IN AN ORGANIZED HEALTH CARE EDUCATION/TRAINING PROGRAM

## 2023-08-15 PROCEDURE — 83735 ASSAY OF MAGNESIUM: CPT | Performed by: STUDENT IN AN ORGANIZED HEALTH CARE EDUCATION/TRAINING PROGRAM

## 2023-08-15 PROCEDURE — 96365 THER/PROPH/DIAG IV INF INIT: CPT | Mod: 59

## 2023-08-15 RX ORDER — TRAZODONE HYDROCHLORIDE 50 MG/1
50-100 TABLET, FILM COATED ORAL
Status: DISCONTINUED | OUTPATIENT
Start: 2023-08-15 | End: 2023-08-20 | Stop reason: HOSPADM

## 2023-08-15 RX ORDER — CEFTRIAXONE 2 G/1
2 INJECTION, POWDER, FOR SOLUTION INTRAMUSCULAR; INTRAVENOUS EVERY 24 HOURS
Status: DISCONTINUED | OUTPATIENT
Start: 2023-08-15 | End: 2023-08-19

## 2023-08-15 RX ORDER — HYDROXYZINE PAMOATE 25 MG/1
25 CAPSULE ORAL 4 TIMES DAILY PRN
COMMUNITY
Start: 2023-08-08 | End: 2023-09-18

## 2023-08-15 RX ORDER — LIDOCAINE 40 MG/G
CREAM TOPICAL
Status: DISCONTINUED | OUTPATIENT
Start: 2023-08-15 | End: 2023-08-20 | Stop reason: HOSPADM

## 2023-08-15 RX ORDER — CLONAZEPAM 0.25 MG/1
0.25 TABLET, ORALLY DISINTEGRATING ORAL 3 TIMES DAILY PRN
Status: DISCONTINUED | OUTPATIENT
Start: 2023-08-15 | End: 2023-08-20 | Stop reason: HOSPADM

## 2023-08-15 RX ORDER — AMOXICILLIN 250 MG
2 CAPSULE ORAL 2 TIMES DAILY
Status: DISCONTINUED | OUTPATIENT
Start: 2023-08-15 | End: 2023-08-20 | Stop reason: HOSPADM

## 2023-08-15 RX ORDER — PANTOPRAZOLE SODIUM 40 MG/1
40 TABLET, DELAYED RELEASE ORAL DAILY
COMMUNITY
Start: 2023-07-18 | End: 2023-09-10

## 2023-08-15 RX ORDER — ONDANSETRON 4 MG/1
4 TABLET, ORALLY DISINTEGRATING ORAL EVERY 6 HOURS PRN
Status: DISCONTINUED | OUTPATIENT
Start: 2023-08-15 | End: 2023-08-20 | Stop reason: HOSPADM

## 2023-08-15 RX ORDER — CLONAZEPAM 0.5 MG/1
0.25 TABLET ORAL 3 TIMES DAILY PRN
COMMUNITY
Start: 2023-08-08

## 2023-08-15 RX ORDER — NICOTINE POLACRILEX 4 MG
15-30 LOZENGE BUCCAL
Status: DISCONTINUED | OUTPATIENT
Start: 2023-08-15 | End: 2023-08-20 | Stop reason: HOSPADM

## 2023-08-15 RX ORDER — CLONAZEPAM 0.5 MG/1
0.25 TABLET ORAL 3 TIMES DAILY PRN
Status: DISCONTINUED | OUTPATIENT
Start: 2023-08-15 | End: 2023-08-15

## 2023-08-15 RX ORDER — IOPAMIDOL 755 MG/ML
500 INJECTION, SOLUTION INTRAVASCULAR ONCE
Status: COMPLETED | OUTPATIENT
Start: 2023-08-15 | End: 2023-08-15

## 2023-08-15 RX ORDER — BUPRENORPHINE AND NALOXONE 4; 1 MG/1; MG/1
1 FILM, SOLUBLE BUCCAL; SUBLINGUAL 3 TIMES DAILY
Status: DISCONTINUED | OUTPATIENT
Start: 2023-08-15 | End: 2023-08-20 | Stop reason: HOSPADM

## 2023-08-15 RX ORDER — ONDANSETRON 2 MG/ML
4 INJECTION INTRAMUSCULAR; INTRAVENOUS EVERY 6 HOURS PRN
Status: DISCONTINUED | OUTPATIENT
Start: 2023-08-15 | End: 2023-08-20 | Stop reason: HOSPADM

## 2023-08-15 RX ORDER — SENNOSIDES A AND B 8.6 MG/1
1 TABLET, FILM COATED ORAL 2 TIMES DAILY PRN
COMMUNITY
Start: 2023-07-26

## 2023-08-15 RX ORDER — TADALAFIL 20 MG/1
20 TABLET ORAL DAILY
Status: DISCONTINUED | OUTPATIENT
Start: 2023-08-16 | End: 2023-08-20 | Stop reason: HOSPADM

## 2023-08-15 RX ORDER — SENNOSIDES 8.6 MG
1-2 TABLET ORAL 2 TIMES DAILY PRN
Status: DISCONTINUED | OUTPATIENT
Start: 2023-08-15 | End: 2023-08-20 | Stop reason: HOSPADM

## 2023-08-15 RX ORDER — SERTRALINE HYDROCHLORIDE 100 MG/1
150 TABLET, FILM COATED ORAL EVERY MORNING
COMMUNITY
Start: 2023-07-26

## 2023-08-15 RX ORDER — AMOXICILLIN 250 MG
1 CAPSULE ORAL 2 TIMES DAILY
Status: DISCONTINUED | OUTPATIENT
Start: 2023-08-15 | End: 2023-08-20 | Stop reason: HOSPADM

## 2023-08-15 RX ORDER — AZITHROMYCIN 500 MG/1
500 INJECTION, POWDER, LYOPHILIZED, FOR SOLUTION INTRAVENOUS EVERY 24 HOURS
Status: DISCONTINUED | OUTPATIENT
Start: 2023-08-15 | End: 2023-08-19

## 2023-08-15 RX ORDER — DEXTROSE MONOHYDRATE 25 G/50ML
25-50 INJECTION, SOLUTION INTRAVENOUS
Status: DISCONTINUED | OUTPATIENT
Start: 2023-08-15 | End: 2023-08-20 | Stop reason: HOSPADM

## 2023-08-15 RX ORDER — OLANZAPINE 2.5 MG/1
10 TABLET, FILM COATED ORAL AT BEDTIME
Status: DISCONTINUED | OUTPATIENT
Start: 2023-08-15 | End: 2023-08-20 | Stop reason: HOSPADM

## 2023-08-15 RX ORDER — FUROSEMIDE 10 MG/ML
40 INJECTION INTRAMUSCULAR; INTRAVENOUS DAILY
Status: DISCONTINUED | OUTPATIENT
Start: 2023-08-15 | End: 2023-08-19

## 2023-08-15 RX ORDER — PANTOPRAZOLE SODIUM 40 MG/1
40 TABLET, DELAYED RELEASE ORAL DAILY
Status: DISCONTINUED | OUTPATIENT
Start: 2023-08-15 | End: 2023-08-20 | Stop reason: HOSPADM

## 2023-08-15 RX ORDER — METHYLPREDNISOLONE SODIUM SUCCINATE 125 MG/2ML
125 INJECTION, POWDER, LYOPHILIZED, FOR SOLUTION INTRAMUSCULAR; INTRAVENOUS ONCE
Status: COMPLETED | OUTPATIENT
Start: 2023-08-15 | End: 2023-08-15

## 2023-08-15 RX ORDER — METHYLPREDNISOLONE SODIUM SUCCINATE 125 MG/2ML
60 INJECTION, POWDER, LYOPHILIZED, FOR SOLUTION INTRAMUSCULAR; INTRAVENOUS EVERY 12 HOURS
Status: DISCONTINUED | OUTPATIENT
Start: 2023-08-15 | End: 2023-08-18

## 2023-08-15 RX ORDER — SERTRALINE HYDROCHLORIDE 100 MG/1
100 TABLET, FILM COATED ORAL DAILY
Status: DISCONTINUED | OUTPATIENT
Start: 2023-08-16 | End: 2023-08-20 | Stop reason: HOSPADM

## 2023-08-15 RX ORDER — POLYETHYLENE GLYCOL 3350 17 G/17G
17 POWDER, FOR SOLUTION ORAL DAILY PRN
Status: DISCONTINUED | OUTPATIENT
Start: 2023-08-15 | End: 2023-08-20 | Stop reason: HOSPADM

## 2023-08-15 RX ORDER — TRAZODONE HYDROCHLORIDE 50 MG/1
50-100 TABLET, FILM COATED ORAL
COMMUNITY
End: 2023-09-18

## 2023-08-15 RX ORDER — OLANZAPINE 10 MG/1
5 TABLET ORAL AT BEDTIME
COMMUNITY
Start: 2023-08-08 | End: 2023-09-18

## 2023-08-15 RX ORDER — POLYETHYLENE GLYCOL 3350 17 G/17G
1 POWDER, FOR SOLUTION ORAL DAILY PRN
COMMUNITY
Start: 2023-07-26

## 2023-08-15 RX ORDER — ENOXAPARIN SODIUM 100 MG/ML
40 INJECTION SUBCUTANEOUS EVERY 24 HOURS
Status: DISCONTINUED | OUTPATIENT
Start: 2023-08-15 | End: 2023-08-20 | Stop reason: HOSPADM

## 2023-08-15 RX ORDER — HYDROXYZINE HYDROCHLORIDE 25 MG/1
25 TABLET, FILM COATED ORAL 4 TIMES DAILY PRN
Status: DISCONTINUED | OUTPATIENT
Start: 2023-08-15 | End: 2023-08-20 | Stop reason: HOSPADM

## 2023-08-15 RX ORDER — FUROSEMIDE 10 MG/ML
40 INJECTION INTRAMUSCULAR; INTRAVENOUS EVERY 12 HOURS
Status: DISCONTINUED | OUTPATIENT
Start: 2023-08-15 | End: 2023-08-15

## 2023-08-15 RX ORDER — FUROSEMIDE 10 MG/ML
20 INJECTION INTRAMUSCULAR; INTRAVENOUS ONCE
Status: COMPLETED | OUTPATIENT
Start: 2023-08-15 | End: 2023-08-15

## 2023-08-15 RX ORDER — AMPICILLIN AND SULBACTAM 2; 1 G/1; G/1
3 INJECTION, POWDER, FOR SOLUTION INTRAMUSCULAR; INTRAVENOUS ONCE
Status: COMPLETED | OUTPATIENT
Start: 2023-08-15 | End: 2023-08-15

## 2023-08-15 RX ORDER — VITAMIN B COMPLEX
25 TABLET ORAL DAILY
Status: DISCONTINUED | OUTPATIENT
Start: 2023-08-16 | End: 2023-08-20 | Stop reason: HOSPADM

## 2023-08-15 RX ORDER — BUPRENORPHINE HYDROCHLORIDE AND NALOXONE HYDROCHLORIDE DIHYDRATE 2; .5 MG/1; MG/1
1 TABLET SUBLINGUAL 3 TIMES DAILY
Status: ON HOLD | COMMUNITY
Start: 2023-08-08 | End: 2024-03-05

## 2023-08-15 RX ADMIN — FUROSEMIDE 20 MG: 10 INJECTION, SOLUTION INTRAVENOUS at 15:52

## 2023-08-15 RX ADMIN — ENOXAPARIN SODIUM 40 MG: 40 INJECTION SUBCUTANEOUS at 22:02

## 2023-08-15 RX ADMIN — IOPAMIDOL 85 ML: 755 INJECTION, SOLUTION INTRAVENOUS at 14:18

## 2023-08-15 RX ADMIN — BUPRENORPHINE HYDROCHLORIDE, NALOXONE HYDROCHLORIDE 1 FILM: 4; 1 FILM, SOLUBLE BUCCAL; SUBLINGUAL at 22:01

## 2023-08-15 RX ADMIN — METHYLPREDNISOLONE SODIUM SUCCINATE 62.5 MG: 125 INJECTION, POWDER, FOR SOLUTION INTRAMUSCULAR; INTRAVENOUS at 22:02

## 2023-08-15 RX ADMIN — CEFTRIAXONE SODIUM 2 G: 2 INJECTION, POWDER, FOR SOLUTION INTRAMUSCULAR; INTRAVENOUS at 22:02

## 2023-08-15 RX ADMIN — SENNOSIDES AND DOCUSATE SODIUM 2 TABLET: 50; 8.6 TABLET ORAL at 22:01

## 2023-08-15 RX ADMIN — AMPICILLIN SODIUM AND SULBACTAM SODIUM 3 G: 2; 1 INJECTION, POWDER, FOR SOLUTION INTRAMUSCULAR; INTRAVENOUS at 13:58

## 2023-08-15 RX ADMIN — SODIUM CHLORIDE 70 ML: 9 INJECTION, SOLUTION INTRAVENOUS at 14:19

## 2023-08-15 RX ADMIN — PANTOPRAZOLE SODIUM 40 MG: 40 TABLET, DELAYED RELEASE ORAL at 22:02

## 2023-08-15 RX ADMIN — FUROSEMIDE 40 MG: 10 INJECTION, SOLUTION INTRAVENOUS at 22:02

## 2023-08-15 RX ADMIN — CLONAZEPAM 0.25 MG: 0.25 TABLET, ORALLY DISINTEGRATING ORAL at 22:40

## 2023-08-15 RX ADMIN — METHYLPREDNISOLONE SODIUM SUCCINATE 125 MG: 125 INJECTION, POWDER, FOR SOLUTION INTRAMUSCULAR; INTRAVENOUS at 15:54

## 2023-08-15 RX ADMIN — AZITHROMYCIN MONOHYDRATE 500 MG: 500 INJECTION, POWDER, LYOPHILIZED, FOR SOLUTION INTRAVENOUS at 22:40

## 2023-08-15 RX ADMIN — OLANZAPINE 10 MG: 2.5 TABLET, FILM COATED ORAL at 22:01

## 2023-08-15 RX ADMIN — SODIUM CHLORIDE 250 ML: 9 INJECTION, SOLUTION INTRAVENOUS at 14:01

## 2023-08-15 ASSESSMENT — ACTIVITIES OF DAILY LIVING (ADL)
ADLS_ACUITY_SCORE: 37
TOILETING_ISSUES: NO
WALKING_OR_CLIMBING_STAIRS: AMBULATION DIFFICULTY, ASSISTANCE 1 PERSON
ADLS_ACUITY_SCORE: 37
FALL_HISTORY_WITHIN_LAST_SIX_MONTHS: NO
DRESSING/BATHING_DIFFICULTY: NO
DOING_ERRANDS_INDEPENDENTLY_DIFFICULTY: NO
WEAR_GLASSES_OR_BLIND: YES
DIFFICULTY_EATING/SWALLOWING: NO
CHANGE_IN_FUNCTIONAL_STATUS_SINCE_ONSET_OF_CURRENT_ILLNESS/INJURY: NO
TRANSFERRING: 0-->INDEPENDENT
ADLS_ACUITY_SCORE: 37
TRANSFERRING: 0-->ASSISTANCE NEEDED (DEVELOPMENTALLY APPROPRIATE)
CONCENTRATING,_REMEMBERING_OR_MAKING_DECISIONS_DIFFICULTY: YES
ADLS_ACUITY_SCORE: 26
WALKING_OR_CLIMBING_STAIRS_DIFFICULTY: YES
ADLS_ACUITY_SCORE: 24

## 2023-08-15 NOTE — MEDICATION SCRIBE - ADMISSION MEDICATION HISTORY
Medication Scribe Admission Medication History    Admission medication history is complete. The information provided in this note is only as accurate as the sources available at the time of the update.    Medication reconciliation/reorder completed by provider prior to medication history? No    Information Source(s): Patient via in-person    Pertinent Information: n/a    Changes made to PTA medication list:  Added: buprenorphine/naloxone, D3, olanzapine, miralax, senna, clonazepam, hydroxyzine, trazodone  Deleted: xanax, prozac, morphine, percocet, seroquel  Changed: sertraline from 25mg to 100mg    Medication Affordability:  Not including over the counter (OTC) medications, was there a time in the past 3 months when you did not take your medications as prescribed because of cost?: No    Allergies reviewed with patient and updates made in EHR: yes    Medication History Completed By: JUAN CARLOS HAYNES 8/15/2023 3:24 PM    Prior to Admission medications    Medication Sig Last Dose Taking? Auth Provider Long Term End Date   buprenorphine-naloxone (SUBOXONE) 2-0.5 MG SUBL sublingual tablet Place 2 tablets under the tongue 3 times daily 8/15/2023 at 0730 Yes Reported, Patient     CHOLECALCIFEROL 25 MCG (1000 UT) tablet Take 1,000 Units by mouth daily 8/15/2023 at 0730 Yes Reported, Patient     clonazePAM (KLONOPIN) 0.5 MG tablet Take 0.25 mg by mouth 3 times daily as needed for anxiety 8/15/2023 at 0730 Yes Reported, Patient     furosemide (LASIX) 20 MG tablet Take 1 tablet (20 mg) by mouth as needed (swelling) More than a month at unkn Yes Jania Woo PA Yes    hydrOXYzine (VISTARIL) 25 MG capsule Take 25 mg by mouth 4 times daily as needed for anxiety Past Week at unkn Yes Reported, Patient     OLANZapine (ZYPREXA) 10 MG tablet Take 10 mg by mouth At Bedtime 8/14/2023 at hs Yes Reported, Patient     pantoprazole (PROTONIX) 40 MG EC tablet Take 40 mg by mouth daily Unknown at unkn Yes Reported, Patient     polyethylene  glycol (MIRALAX) 17 GM/Dose powder Take 1 Capful by mouth daily as needed for constipation 8/14/2023 at am Yes Reported, Patient     senna (SENOKOT) 8.6 MG tablet Take 1-2 tablets by mouth 2 times daily as needed for constipation 8/15/2023 at 0730 #1 Yes Reported, Patient     sertraline (ZOLOFT) 100 MG tablet Take 100 mg by mouth daily 8/15/2023 at 0730 Yes Reported, Patient Yes    tadalafil, PAH, 20 MG TABS Take 1 tablet (20 mg) by mouth daily 8/15/2023 at 0730 Yes Callie Ledesma MD Yes    traZODone (DESYREL) 50 MG tablet Take  mg by mouth nightly as needed for sleep Past Month at hs Yes Reported, Patient     VENTOLIN  (90 Base) MCG/ACT inhaler INHALE ONE TO TWO PUFFS INTO THE LUNGS EVERY 4 HOURS AS NEEDED FOR SHORTNESS OF BREATH / DYSPNEA  Patient taking differently: Inhale 1-2 puffs into the lungs every 4 hours as needed for shortness of breath or wheezing 8/15/2023 at 1130 Yes Ignacio Loya MD Yes    naloxone (NARCAN) 4 MG/0.1ML nasal spray Spray 1 spray (4 mg) into one nostril alternating nostrils as needed for opioid reversal every 2-3 minutes until assistance arrives  Patient not taking: Reported on 6/30/2023  at never used  Cristian Fagan MD Yes    order for DME Oxygen: Patient requires supplemental Oxygen 4 LPM via nasal canula at rest and 6 LPM with activity. Please provide patient with portability capability. Okay to spot check patient on oxygen device, to keep sats above 90%. Please provider with home concentrator that can go up to 10 LPM. Oxygen will be for a lifetime.  at current  Dawson Upton MD     order for DME Please provide patient with 2  liquid oxygen tanks for portability. Spot check patient on liquid oxygen. Titrate oxygen to maintain saturations above 88%.  at current  Perlman, David Morris, MD

## 2023-08-15 NOTE — ED PROVIDER NOTES
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 she had robust urine output and improvement of symptoms.  I think her decompensation is very likely multifactorial and she would benefit from simultaneous antibiotics, steroids, and diuresis over the next several days while titrating respiratory support.  Case was discussed with hospitalist, Dr. Ferrari, who agrees to admit the patient.  She will be admitted to the ICU given oxygen requirements still around 8 L.  Patient with continually improving hemodynamics until the time of admission.      Current Discharge Medication List          Final diagnoses:   Acute respiratory failure with hypoxia (H)   ILD (interstitial lung disease) (H)   Pneumonia of both lungs due to infectious organism, unspecified part of lung   Elevated brain natriuretic peptide (BNP) level   Abnormal CT of the chest       8/15/2023   [facility] EMERGENCY DEPT       Mario Kelley MD  08/15/23 [id number]

## 2023-08-15 NOTE — PROGRESS NOTES
6 years on O2 base is 3-4L with rest 6-8 with activity. Primary O2 goals are home roughly around 88%. Heart DrNoris Is (Christina). Pt does 6 min. Walk studies at [facility]. Unable to get SPO2 to read accurately even with forehead probe on. VBG and other labs will be ran to check for O2 levels. ABG will be drawn if labs come back severely abnormal. Pts color and lips are better than on arrival. 12L oxymask at this time

## 2023-08-15 NOTE — ED NOTES
Writer with to CT.  Used slide board to minimize pt movement. Pt oxygen sats dropped from 88% to 74% with correlated lip cyanosis and c/o increased SOB.  Oxygen turned to 25 lmp via oxy mask resulting in o2 stabilizing at 82% for the duration of the CT.  Pt recovered on once head of bed up and back on wall oxygen.

## 2023-08-15 NOTE — ED NOTES
Unable to alma using external cath.  Transferred to bedside toilet.  O2 recovered once resting back in bed.

## 2023-08-16 ENCOUNTER — APPOINTMENT (OUTPATIENT)
Dept: CARDIOLOGY | Facility: CLINIC | Age: 48
End: 2023-08-16
Attending: FAMILY MEDICINE
Payer: COMMERCIAL

## 2023-08-16 PROBLEM — G89.29 CHRONIC PAIN: Status: ACTIVE | Noted: 2023-08-16

## 2023-08-16 LAB
ANION GAP SERPL CALCULATED.3IONS-SCNC: 11 MMOL/L (ref 7–15)
BASE EXCESS BLDV CALC-SCNC: 2.5 MMOL/L (ref -7.7–1.9)
BASOPHILS # BLD AUTO: 0 10E3/UL (ref 0–0.2)
BASOPHILS NFR BLD AUTO: 0 %
BI-PLANE LVEF ECHO: NORMAL
BUN SERPL-MCNC: 14.7 MG/DL (ref 6–20)
CALCIUM SERPL-MCNC: 8.4 MG/DL (ref 8.6–10)
CHLORIDE SERPL-SCNC: 97 MMOL/L (ref 98–107)
CK SERPL-CCNC: 40 U/L (ref 26–192)
CREAT SERPL-MCNC: 0.76 MG/DL (ref 0.51–0.95)
DEPRECATED HCO3 PLAS-SCNC: 27 MMOL/L (ref 22–29)
EOSINOPHIL # BLD AUTO: 0 10E3/UL (ref 0–0.7)
EOSINOPHIL NFR BLD AUTO: 0 %
ERYTHROCYTE [DISTWIDTH] IN BLOOD BY AUTOMATED COUNT: 14.7 % (ref 10–15)
GFR SERPL CREATININE-BSD FRML MDRD: >90 ML/MIN/1.73M2
GLUCOSE BLDC GLUCOMTR-MCNC: 164 MG/DL (ref 70–99)
GLUCOSE BLDC GLUCOMTR-MCNC: 167 MG/DL (ref 70–99)
GLUCOSE BLDC GLUCOMTR-MCNC: 168 MG/DL (ref 70–99)
GLUCOSE BLDC GLUCOMTR-MCNC: 179 MG/DL (ref 70–99)
GLUCOSE SERPL-MCNC: 217 MG/DL (ref 70–99)
HCO3 BLDV-SCNC: 28 MMOL/L (ref 21–28)
HCT VFR BLD AUTO: 39.3 % (ref 35–47)
HGB BLD-MCNC: 12.7 G/DL (ref 11.7–15.7)
HOLD SPECIMEN: NORMAL
IMM GRANULOCYTES # BLD: 0.1 10E3/UL
IMM GRANULOCYTES NFR BLD: 1 %
LYMPHOCYTES # BLD AUTO: 1.3 10E3/UL (ref 0.8–5.3)
LYMPHOCYTES NFR BLD AUTO: 10 %
MAGNESIUM SERPL-MCNC: 1.9 MG/DL (ref 1.7–2.3)
MCH RBC QN AUTO: 28.6 PG (ref 26.5–33)
MCHC RBC AUTO-ENTMCNC: 32.3 G/DL (ref 31.5–36.5)
MCV RBC AUTO: 89 FL (ref 78–100)
MONOCYTES # BLD AUTO: 0.3 10E3/UL (ref 0–1.3)
MONOCYTES NFR BLD AUTO: 3 %
NEUTROPHILS # BLD AUTO: 11.6 10E3/UL (ref 1.6–8.3)
NEUTROPHILS NFR BLD AUTO: 86 %
NRBC # BLD AUTO: 0 10E3/UL
NRBC BLD AUTO-RTO: 0 /100
O2/TOTAL GAS SETTING VFR VENT: 90 %
PCO2 BLDV: 45 MM HG (ref 40–50)
PH BLDV: 7.4 [PH] (ref 7.32–7.43)
PLATELET # BLD AUTO: 479 10E3/UL (ref 150–450)
PO2 BLDV: 63 MM HG (ref 25–47)
POTASSIUM SERPL-SCNC: 4.1 MMOL/L (ref 3.4–5.3)
RBC # BLD AUTO: 4.44 10E6/UL (ref 3.8–5.2)
SODIUM SERPL-SCNC: 135 MMOL/L (ref 136–145)
WBC # BLD AUTO: 13.4 10E3/UL (ref 4–11)

## 2023-08-16 PROCEDURE — 255N000002 HC RX 255 OP 636: Performed by: INTERNAL MEDICINE

## 2023-08-16 PROCEDURE — 87486 CHLMYD PNEUM DNA AMP PROBE: CPT | Performed by: FAMILY MEDICINE

## 2023-08-16 PROCEDURE — 999N000157 HC STATISTIC RCP TIME EA 10 MIN

## 2023-08-16 PROCEDURE — 82803 BLOOD GASES ANY COMBINATION: CPT | Performed by: HOSPITALIST

## 2023-08-16 PROCEDURE — 36415 COLL VENOUS BLD VENIPUNCTURE: CPT | Performed by: HOSPITALIST

## 2023-08-16 PROCEDURE — 999N000208 ECHOCARDIOGRAM COMPLETE

## 2023-08-16 PROCEDURE — 250N000013 HC RX MED GY IP 250 OP 250 PS 637: Performed by: FAMILY MEDICINE

## 2023-08-16 PROCEDURE — 99233 SBSQ HOSP IP/OBS HIGH 50: CPT | Performed by: FAMILY MEDICINE

## 2023-08-16 PROCEDURE — 3E033XZ INTRODUCTION OF VASOPRESSOR INTO PERIPHERAL VEIN, PERCUTANEOUS APPROACH: ICD-10-PCS | Performed by: INTERNAL MEDICINE

## 2023-08-16 PROCEDURE — 250N000013 HC RX MED GY IP 250 OP 250 PS 637: Performed by: HOSPITALIST

## 2023-08-16 PROCEDURE — 87633 RESP VIRUS 12-25 TARGETS: CPT | Performed by: FAMILY MEDICINE

## 2023-08-16 PROCEDURE — 85025 COMPLETE CBC W/AUTO DIFF WBC: CPT | Performed by: HOSPITALIST

## 2023-08-16 PROCEDURE — 93306 TTE W/DOPPLER COMPLETE: CPT | Mod: 26 | Performed by: INTERNAL MEDICINE

## 2023-08-16 PROCEDURE — 82550 ASSAY OF CK (CPK): CPT | Performed by: FAMILY MEDICINE

## 2023-08-16 PROCEDURE — 250N000011 HC RX IP 250 OP 636: Performed by: HOSPITALIST

## 2023-08-16 PROCEDURE — 83735 ASSAY OF MAGNESIUM: CPT | Performed by: HOSPITALIST

## 2023-08-16 PROCEDURE — 82310 ASSAY OF CALCIUM: CPT | Performed by: HOSPITALIST

## 2023-08-16 PROCEDURE — 200N000001 HC R&B ICU

## 2023-08-16 RX ORDER — IBUPROFEN 600 MG/1
600 TABLET, FILM COATED ORAL EVERY 6 HOURS PRN
Status: DISCONTINUED | OUTPATIENT
Start: 2023-08-16 | End: 2023-08-20 | Stop reason: HOSPADM

## 2023-08-16 RX ORDER — NOREPINEPHRINE BITARTRATE 0.02 MG/ML
.01-.6 INJECTION, SOLUTION INTRAVENOUS CONTINUOUS
Status: DISCONTINUED | OUTPATIENT
Start: 2023-08-16 | End: 2023-08-16

## 2023-08-16 RX ORDER — ROPIVACAINE IN 0.9% SOD CHL/PF 0.1 %
.03-.125 PLASTIC BAG, INJECTION (ML) EPIDURAL CONTINUOUS
Status: DISCONTINUED | OUTPATIENT
Start: 2023-08-16 | End: 2023-08-16

## 2023-08-16 RX ADMIN — BUPRENORPHINE HYDROCHLORIDE, NALOXONE HYDROCHLORIDE 1 FILM: 4; 1 FILM, SOLUBLE BUCCAL; SUBLINGUAL at 08:58

## 2023-08-16 RX ADMIN — ENOXAPARIN SODIUM 40 MG: 40 INJECTION SUBCUTANEOUS at 20:08

## 2023-08-16 RX ADMIN — CLONAZEPAM 0.25 MG: 0.25 TABLET, ORALLY DISINTEGRATING ORAL at 13:57

## 2023-08-16 RX ADMIN — HUMAN ALBUMIN MICROSPHERES AND PERFLUTREN 6 ML: 10; .22 INJECTION, SOLUTION INTRAVENOUS at 12:48

## 2023-08-16 RX ADMIN — AZITHROMYCIN MONOHYDRATE 500 MG: 500 INJECTION, POWDER, LYOPHILIZED, FOR SOLUTION INTRAVENOUS at 20:59

## 2023-08-16 RX ADMIN — SENNOSIDES AND DOCUSATE SODIUM 1 TABLET: 50; 8.6 TABLET ORAL at 20:07

## 2023-08-16 RX ADMIN — SENNOSIDES AND DOCUSATE SODIUM 2 TABLET: 50; 8.6 TABLET ORAL at 08:22

## 2023-08-16 RX ADMIN — Medication 25 MCG: at 08:57

## 2023-08-16 RX ADMIN — BUPRENORPHINE HYDROCHLORIDE, NALOXONE HYDROCHLORIDE 1 FILM: 4; 1 FILM, SOLUBLE BUCCAL; SUBLINGUAL at 20:07

## 2023-08-16 RX ADMIN — PANTOPRAZOLE SODIUM 40 MG: 40 TABLET, DELAYED RELEASE ORAL at 08:23

## 2023-08-16 RX ADMIN — SERTRALINE HYDROCHLORIDE 100 MG: 100 TABLET ORAL at 08:23

## 2023-08-16 RX ADMIN — METHYLPREDNISOLONE SODIUM SUCCINATE 62.5 MG: 125 INJECTION, POWDER, FOR SOLUTION INTRAMUSCULAR; INTRAVENOUS at 20:07

## 2023-08-16 RX ADMIN — METHYLPREDNISOLONE SODIUM SUCCINATE 62.5 MG: 125 INJECTION, POWDER, FOR SOLUTION INTRAMUSCULAR; INTRAVENOUS at 08:23

## 2023-08-16 RX ADMIN — BUPRENORPHINE HYDROCHLORIDE, NALOXONE HYDROCHLORIDE 1 FILM: 4; 1 FILM, SOLUBLE BUCCAL; SUBLINGUAL at 13:57

## 2023-08-16 RX ADMIN — TADALAFIL 20 MG: 20 TABLET ORAL at 11:46

## 2023-08-16 RX ADMIN — CLONAZEPAM 0.25 MG: 0.25 TABLET, ORALLY DISINTEGRATING ORAL at 08:57

## 2023-08-16 RX ADMIN — CEFTRIAXONE SODIUM 2 G: 2 INJECTION, POWDER, FOR SOLUTION INTRAMUSCULAR; INTRAVENOUS at 20:08

## 2023-08-16 RX ADMIN — CLONAZEPAM 0.25 MG: 0.25 TABLET, ORALLY DISINTEGRATING ORAL at 20:51

## 2023-08-16 RX ADMIN — IBUPROFEN 600 MG: 200 TABLET, FILM COATED ORAL at 17:43

## 2023-08-16 RX ADMIN — OLANZAPINE 10 MG: 2.5 TABLET, FILM COATED ORAL at 20:07

## 2023-08-16 ASSESSMENT — ACTIVITIES OF DAILY LIVING (ADL)
ADLS_ACUITY_SCORE: 26
ADLS_ACUITY_SCORE: 26
ADLS_ACUITY_SCORE: 27
ADLS_ACUITY_SCORE: 26

## 2023-08-16 NOTE — PROGRESS NOTES
Tele-ICU cross-cover note:    Notified regarding hypotension, started peripheral norepinephrine    Modified BiPAP pressure to 10/5.    Ignacio Carter M.D.  Pulmonary & Critical Care  Pager: Click Here to page

## 2023-08-16 NOTE — PROGRESS NOTES
This morning pt has been weaned from 85% to 65%. She has tolerated well SPO2 has remained stable at 93-94% during changes. Will continue to wean throughout the day.

## 2023-08-16 NOTE — PLAN OF CARE
Goal Outcome Evaluation:      Plan of Care Reviewed With: patient    Overall Patient Progress: no changeOverall Patient Progress: no change    Patient is on HFNC at 90% and 35L with O2 92-96%. Few hours overnight patient was increased to HFNC 100% and 40L due to sats being 88-90%. BiPAP order placed if needed.  Blood pressures soft overnight but MAPs remained above 65. This morning blood pressures and MAPs are stable. Levophed ordered if needed. Patient denies any pain. SOB with activity to bedside commode. Good UOP. Lungs are diminished in the bases with crackles throughout.

## 2023-08-16 NOTE — PROGRESS NOTES
S-(situation): Patient arrives to room 218 via cart from ED.     B-(background): Abnormal CT of the chest, interstitial lung disease.     A-(assessment): Patient alert and oriented. Vital signs stable. Expiratory wheezes in lower lobes with crackles. 83-85% on >10L oxyask on admission. HFNC placed with settings 90% and 35L, sats now in the low 90s. Solumedrol and IV lasix given. NSR on telemetry. SBA to bedside commode, will de-stat and take at least 15 minutes to recover.  Electrolytes recheck in AM.     R-(recommendations): Orders reviewed with patient. Will monitor patient per MD orders.     Inpatient nursing criteria listed below were met:    Health care directives status obtained and documented: No  VTE ordered/documented: No  Skin issues/needs documented:NA  Isolation addressed and Signage used: NA  Fall Prevention: Care plan updated Yes Education given and documented Yes  Care Plan initiated and Co-Morbidities added: Yes  Education Assessment documented:Yes  Admission Education Documented: Yes  If present CAUTI/CLABI Education done: NA  New medication patient education completed and documented (Possible Side Effects of Common Medications handout): Yes  Allergies Reviewed: Yes  Admission Medication Reconciliation completed: Yes  Home medications if not able to send immediately home with family stored here: NA  Reminder note placed in discharge instructions regarding home meds: NA  Individualized care needs/preferences addressed and charted: Yes  Provider Notified that patient has arrived to the unit: Yes

## 2023-08-16 NOTE — PROGRESS NOTES
"CLINICAL NUTRITION SERVICES - ASSESSMENT NOTE     Nutrition Prescription    RECOMMENDATIONS FOR MDs/PROVIDERS TO ORDER:  None at this time    Malnutrition Status:    Unable to determine due to lack of NFPE, nutrition hx PTA - will meet with pt as able    Recommendations already ordered by Registered Dietitian (RD):  Will offer Ensure enlive (any flavor) TID with each meal to supplement oral intake     Future/Additional Recommendations:  Will follow to monitor oral intake, weight changes, GI symptoms/BMs, and nutritional supplement tolerance      REASON FOR ASSESSMENT  Anali Loya is a/an 47 year old female assessed by the dietitian for: identified at risk via screening criteria    MST score of 2: recent weight loss of 2-13 lbs, poor appetite noted    NUTRITION HISTORY  Pt admitted with SOB, uses 4 L of O2 at baseline. Has felt more SOB than usual, particularly with exertion, the past four days PTA. Does also have some abdominal distension and fullness. Pt feels this is constipation but still having small BMs.     Found to have acute on chronic respiratory failure with hypoxemia, community-acquired pneumonia of both lungs    Dxhx of interstitial lung disease, hyperlipidemia, fibrosis of lung, lung nodule, pulmonary HTN, pneumothorax of left lung after biopsy, polymyositis, hx of tobacco use, ASCUS of cervix with negative high risk HPV, chronic continuous use of opioids, iron def anemia, spondylosis of lumbosacral region without myelopathy or radiculopathy, prolonged grief disorder, hallucinations, abnormal maternal glucose tolerance antepartum, inflammatory arthritis, anxiety, s/p bunionectomy    CURRENT NUTRITION ORDERS  Diet: Orders Placed This Encounter      Combination Diet Regular Diet Adult    Intake/Tolerance: 220 mL of Ensure this AM, 25% of breakfast tray. Appetite rated as \"fair.\" Updated intake for lunch is much better at 75% with great PO fluid intake. Appetite improved to \"good.\" GI is abnormal - " "constipation, abdominal discomfort noted. Last BM was 8/13 per flow sheet. No BM since admission, pt is receiving senna *update - pt did have 2 BMs today - small, loose, tan, and smear, loose.     LABS  Labs reviewed - sodium low at 135, chloride low, calcium low, elevated BGs, elevated BNP    MEDICATIONS  Medications reviewed - IV zithromax, sublingual suboxone, IV rocephin, lovenox injection, IV lasix (currently held), solu-medrol, zyprexa, protonix, senna, zoloft, tadalafil, vitamin D3, PRN clonazepam last given today 8/16    ANTHROPOMETRICS  Height: 154.9 cm (5' 1\")  Most Recent Weight: 49.4 kg (108 lb 12.8 oz) via standing scale   IBW: 105 lbs  BMI: Normal BMI  Weight History:   Wt Readings from Last 15 Encounters:   08/16/23 49.4 kg (108 lb 12.8 oz)   06/30/23 47.4 kg (104 lb 8 oz)   06/16/23 42.5 kg (93 lb 11.1 oz)   05/11/23 44.2 kg (97 lb 8 oz)   03/16/23 47 kg (103 lb 11.2 oz)   02/10/23 48.2 kg (106 lb 3.2 oz)   02/06/23 48.1 kg (106 lb)   10/20/22 48.8 kg (107 lb 9.6 oz)   04/25/22 46.4 kg (102 lb 3.2 oz)   01/13/22 46.1 kg (101 lb 11.2 oz)   07/05/21 46.7 kg (103 lb)   05/25/21 50.7 kg (111 lb 12.8 oz)   11/25/20 47.4 kg (104 lb 8 oz)   08/19/20 47.6 kg (105 lb)   03/11/20 51 kg (112 lb 8 oz)   Weight has been stable overall, no weight loss noted. Slight increase recently.     Dosing Weight: 49.4 kg using actual BW    ASSESSED NUTRITION NEEDS  Estimated Energy Needs: 1,482-1,729 kcals/day (30 - 35 kcals/kg )  Justification: Increased needs and Repletion  Estimated Protein Needs: 59-74 grams protein/day (1.2 - 1.5 grams of pro/kg)  Justification: Hypercatabolism with acute illness, Increased needs, and Repletion  Estimated Fluid Needs: 1,482 mL/day (30 mL/kg)   Justification: Maintenance    PHYSICAL FINDINGS  See malnutrition section below.    MALNUTRITION  % Intake: Unable to assess PTA - improving during admission  % Weight Loss: None noted  Subcutaneous Fat Loss: Unable to assess  Muscle Loss: Unable " to assess  Fluid Accumulation/Edema: None noted  Malnutrition Diagnosis: Unable to determine due to lack of NFPE, nutrition hx PTA    NUTRITION DIAGNOSIS  Inadequate oral intake related to poor appetite, acute on chronic illness as evidenced by intake of 25% earlier in admission and increased needs above baseline      INTERVENTIONS  Implementation  Nutrition Education: Will be provided if education needs arise     Collaboration with other providers  Medical food supplement therapy - will offer Ensure enlive TID with each meal, plan to meet with pt to determine flavor preference - will provide variety of flavors for now    Goals  Patient to consume % of nutritionally adequate meal trays TID, or the equivalent with supplements/snacks  Pt weight will remain stable during admission  Pt will tolerate nutritional supplement      Monitoring/Evaluation  Progress toward goals will be monitored and evaluated per protocol.  Nieves Skelton RD, LD  Clinical Dietitian  Office: [phone]  [facility] pager: [phone]
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
## 2023-08-16 NOTE — PROGRESS NOTES
[facility]    Medicine Progress Note - Hospitalist Service    Date of Admission:  8/15/2023    Assessment & Plan   Patient is a 47-year-old female with past medical history of interstitial lung disease and severe pulmonary hypertension on 4 to 8 L nasal cannula chronically, inflammatory arthritis, polymyositis, chronic pain, depression, anxiety, PTSD, GERD, and ongoing tobacco use who presented with worsening shortness of breath and was found to be in acute on chronic respiratory failure with hypoxia.  Work-up initially was unclear but showed concern for interstitial lung exacerbation with possible superimposed infection versus pulmonary edema.  IV Lasix given in the ED but patient subsequently had hypotension and further diuresis efforts have been held.  She was started on IV steroids, Rocephin, and azithromycin as well as high flow nasal cannula oxygen and is currently intermediate overflow status in the ICU.  She remains at high risk for decompensation including need for BiPAP or intubation and requires ongoing close monitoring on intermediate overflow status.    Principal Problem:    Acute on chronic respiratory failure with hypoxia (H)    Abnormal CT of the chest    Assessment: Developing after patient had upper respiratory infection for approximately 2 to 3 days.  Chest x-ray shows worsening of interstitial lung disease with concern for overlying infection versus pulmonary edema.  Patient did need high flow nasal cannula 40 L 100% FiO2 and is currently weaned down to 35 L and 75% FiO2 at rest but will desaturate into the 70s with any significant activity.  She denies any new symptoms and states her breathing feels much better at rest than when she arrived.  Currently on Solu-Medrol 62.5 mg twice daily, IV Rocephin and azithromycin    Plan:   -Discussed case with Dr. Jacobs, pulmonologist on-call for [facility], who was kind enough to review the patient's case in detail.  He does  agree with ongoing IV Solu-Medrol at current dosing but would have low threshold for increasing to 500 mg or even the 1000 mg a day if patient were to further decompensate respiratorily.  -Proceed with viral respiratory PCR  -Continue Rocephin and azithromycin  -Continue high flow nasal cannula oxygen and titrate as appropriate  -We will follow-up with Dr. Jacobs tomorrow for rediscussion of progress and ongoing management.    Active Problems:    ILD (interstitial lung disease) with concerns for acute exacerbation    Assessment: Patient has known significant interstitial lung disease at baseline currently on 4 to 8 L nasal cannula depending on level of activity.  She has not seen pulmonology since 2019.  She has not been able to quit smoking and therefore is not a lung transplant candidate.  CT scan does show concern for worsening interstitial lung disease and possible acute exacerbation.    Plan:   -Proceed with ongoing dexamethasone 62.5 mg twice daily  -Ongoing high flow nasal cannula oxygen with titration as needed  -We discussion with pulmonology team tomorrow as above      Pneumonia of both lungs due to infectious organism, unspecified part of lung - possible    Assessment: Possible superimposed infection overlying suspected interstitial lung disease exacerbation.  Patient was having upper respiratory symptoms for 2 to 3 days before breathing symptoms began indicating possible viral component    Plan:   -Continue Rocephin and azithromycin for possible superimposed bacterial infection  -Obtain viral PCR to evaluate for underlying viral etiology      Severe Pulmonary hypertension (H)    Assessment: Patient follows with Dr. Ledesma at [facility] and currently is receiving tadalafil 20 mg daily.      Plan:   -continue tadalafil without change       Elevated brain natriuretic peptide (BNP) level    Assessment: BNP was elevated at 11,955 at time of presentation which is notably increased compared to 4168 when last  checked in 2016.  Attempted diuresis in the ED but patient subsequently had hypotension and further diuresis has been held.  Echocardiogram has been performed this morning and is currently pending.    Plan:   -Hold any further diuresis at this time  -Monitor for echocardiogram results  -Recheck BNP tomorrow to evaluate stability  -We will reach out to [facility] cardiology team if there are questions regarding this going forward      Personal history of tobacco use, presenting hazards to health    Assessment: Patient reports she has cut down significantly in the last 1 to 2 months and will try and only take a few puffs off of each cigarette and is trying to cut down to only smoking every few days.  She does continue to live in an environment in which smoking occurs.  She is not interested in nicotine replacement at this time    Plan:   -Encouraged ongoing efforts at complete cessation as if she can accomplish this there is hope she could get on the lung transplant list in the future  -Monitor for any signs of nicotine withdrawal      Inflammatory arthritis    Polymyositis, organ involvement unspecified (H)    Assessment: Patient was diagnosed with seronegative inflammatory arthritis in approximately 2008 and has tried multiple treatments including Plaquenil, sulfasalazine and methotrexate without tolerance and patient has not been following with rheumatoid arthritis in recent years.  Does have chronic pain related to her ongoing arthritis and is currently using Suboxone which is prescribed via pain clinic.    Plan:   -Continue use of Suboxone as per chronic pain clinic recommendation during this stay  -Initiate conversation with patient as to whether or not she would be interested in following up with rheumatology as an outpatient going forward and she is undecided at this time but we will continue this conversation going forward      Chronic, continuous use of opioids    Chronic pain    Assessment: Secondary to  inflammatory pain as above    Plan:   -Continue with chronic Suboxone 3 times a day      Recurrent major depressive disorder (H)      MOLLY (generalized anxiety disorder)    PTSD (post-traumatic stress disorder)    Assessment: Patient had recent inpatient hospitalization in June of this year for suicidal thoughts, hallucinations.  She reports that following this hospitalization for her symptoms have been much better controlled and she denies any suicidal thoughts recently    Plan:   -Continue home regimen of sertraline 100 mg daily, Zyprexa 10 mg at bedtime, clonazepam 0.25 mg 3 times a day as needed for anxiety, Vistaril 25 mg 4 times a day as needed for anxiety and monitor for symptom stability       Gastroesophageal reflux disease without esophagitis    Assessment: On Protonix 40 mg daily without breakthrough symptoms    Plan:   -Continue home regimen without change       Diet: Combination Diet Regular Diet Adult  Snacks/Supplements Adult: Ensure Enlive; With Meals    DVT Prophylaxis: Enoxaparin (Lovenox) SQ  Berman Catheter: Not present  Lines: None     Cardiac Monitoring: None  Code Status: Full Code      Clinically Significant Risk Factors Present on Admission          # Hypocalcemia: Lowest Ca = 8.4 mg/dL in last 2 days, will monitor and replace as appropriate      # Coagulation Defect: INR = 1.22 (Ref range: 0.85 - 1.15) and/or PTT = 34 Seconds (Ref range: 22 - 38 Seconds), will monitor for bleeding      # Non-Invasive mechanical ventilation: current O2 Device: High Flow Nasal Cannula (HFNC)  # Acute hypoxic respiratory failure: continue supplemental O2 as needed                Disposition Plan      Expected Discharge Date: 08/17/2023                  Jia Ferrari MD  Hospitalist Service  [facility]  Securely message with [employer] (more info)  Text page via [facility] Paging/Directory   ______________________________________________________________________    Interval  History   Patient presented to the floor on 10 L oxy mask but had significant hypoxemia into the 80s and required fairly rapid transition to high flow nasal cannula 40 L and 100% and oxygen remained stable at baseline but with ongoing desaturations with any activity.  She was able to get some sleep overnight and today feels that her breathing is doing slightly better at rest with a high flow nasal cannula support she continues to have significant shortness of breath with anything more than mild movement around the bed.  Has ongoing cough which is only occasionally productive of a white to yellowish sputum.  She denies any new symptoms.  No new nursing concerns.    Physical Exam   Vital Signs: Temp: 97.5  F (36.4  C) Temp src: Oral BP: 100/68 Pulse: 82   Resp: 19 SpO2: 93 % O2 Device: High Flow Nasal Cannula (HFNC) Oxygen Delivery: 35 LPM  Weight: 108 lbs 12.8 oz    Constitutional: awake, alert, cooperative, no apparent distress, and appears stated age  Respiratory: Decreased breath sounds diffusely, patient does have coarse crackles present in bilateral lower and mid lung bases posteriorly but apexes are clear, no wheezing  Cardiovascular: Regular rate and rhythm  GI: Bowel sounds are present, abdomen is soft and nondistended  Musculoskeletal: no lower extremity pitting edema present  Neurologic: Awake, alert, oriented to name, place and situation.  Patient does get easily anxious and overwhelmed when discussing expected recovery and overall prognosis but is able to meaningfully participate in a conversation    Medical Decision Making       60 MINUTES SPENT BY ME on the date of service doing chart review, history, exam, documentation & further activities per the note.      Data     I have personally reviewed the following data over the past 24 hrs:    13.4 (H)  \   12.7   / 479 (H)     135 (L) 97 (L) 14.7 /  167 (H)   4.1 27 0.76 \

## 2023-08-16 NOTE — PLAN OF CARE
Goal Outcome Evaluation:      Plan of Care Reviewed With: patient    Overall Patient Progress: no changeOverall Patient Progress: no change    Outcome Evaluation: VSS, afebrile.  O2 needs remain dependent on HFNC, down to 65% and 35 LPM at rest and pt desats to mid-70s with activity up to commode despite pre-oxygenating with 100% FiO2.  O2 sats recovered within about 2 minutes back to 90-95% once resting in bed.  Pt c/o low back pain, t-pump added.  Pt requested prn clonazapam to be given with scheduled suboxone this morning and this afternoon.  Pt tearful after discussing slow progression of plan of care with provider, emotional support and reassurance provided.  Pt has fair appetite, appreciates Ensure being sent with meals.  Voiding without difficulty.  Pt c/o constipation, had 2x small loose BMs.  Continuing to monitor and provide supportive cares.

## 2023-08-16 NOTE — PROGRESS NOTES
Pt has required more FIO2 during the day. With any activity the pt becomes SOB and hypoxic requiring an increase throughout the day. We will begin to wean again as tolerated. Due to underlying lung diseases, pt's high use of O2 at home, sat goals might need to be reevaluated.

## 2023-08-17 ENCOUNTER — APPOINTMENT (OUTPATIENT)
Dept: PHYSICAL THERAPY | Facility: CLINIC | Age: 48
End: 2023-08-17
Attending: FAMILY MEDICINE
Payer: COMMERCIAL

## 2023-08-17 ENCOUNTER — APPOINTMENT (OUTPATIENT)
Dept: OCCUPATIONAL THERAPY | Facility: CLINIC | Age: 48
End: 2023-08-17
Attending: FAMILY MEDICINE
Payer: COMMERCIAL

## 2023-08-17 LAB
ANION GAP SERPL CALCULATED.3IONS-SCNC: 11 MMOL/L (ref 7–15)
BASE EXCESS BLDV CALC-SCNC: 5.2 MMOL/L (ref -7.7–1.9)
BUN SERPL-MCNC: 20.2 MG/DL (ref 6–20)
C PNEUM DNA SPEC QL NAA+PROBE: NOT DETECTED
CALCIUM SERPL-MCNC: 9 MG/DL (ref 8.6–10)
CHLORIDE SERPL-SCNC: 98 MMOL/L (ref 98–107)
CREAT SERPL-MCNC: 0.72 MG/DL (ref 0.51–0.95)
CRP SERPL-MCNC: 11.52 MG/L
DEPRECATED HCO3 PLAS-SCNC: 28 MMOL/L (ref 22–29)
ERYTHROCYTE [DISTWIDTH] IN BLOOD BY AUTOMATED COUNT: 15.3 % (ref 10–15)
FLUAV H1 2009 PAND RNA SPEC QL NAA+PROBE: NOT DETECTED
FLUAV H1 RNA SPEC QL NAA+PROBE: NOT DETECTED
FLUAV H3 RNA SPEC QL NAA+PROBE: NOT DETECTED
FLUAV RNA SPEC QL NAA+PROBE: NOT DETECTED
FLUBV RNA SPEC QL NAA+PROBE: NOT DETECTED
GFR SERPL CREATININE-BSD FRML MDRD: >90 ML/MIN/1.73M2
GLUCOSE BLDC GLUCOMTR-MCNC: 129 MG/DL (ref 70–99)
GLUCOSE BLDC GLUCOMTR-MCNC: 147 MG/DL (ref 70–99)
GLUCOSE BLDC GLUCOMTR-MCNC: 148 MG/DL (ref 70–99)
GLUCOSE SERPL-MCNC: 165 MG/DL (ref 70–99)
HADV DNA SPEC QL NAA+PROBE: NOT DETECTED
HCO3 BLDV-SCNC: 32 MMOL/L (ref 21–28)
HCOV PNL SPEC NAA+PROBE: NOT DETECTED
HCT VFR BLD AUTO: 36.7 % (ref 35–47)
HGB BLD-MCNC: 11.7 G/DL (ref 11.7–15.7)
HMPV RNA SPEC QL NAA+PROBE: NOT DETECTED
HPIV1 RNA SPEC QL NAA+PROBE: NOT DETECTED
HPIV2 RNA SPEC QL NAA+PROBE: NOT DETECTED
HPIV3 RNA SPEC QL NAA+PROBE: NOT DETECTED
HPIV4 RNA SPEC QL NAA+PROBE: NOT DETECTED
M PNEUMO DNA SPEC QL NAA+PROBE: NOT DETECTED
MAGNESIUM SERPL-MCNC: 2 MG/DL (ref 1.7–2.3)
MCH RBC QN AUTO: 29.3 PG (ref 26.5–33)
MCHC RBC AUTO-ENTMCNC: 31.9 G/DL (ref 31.5–36.5)
MCV RBC AUTO: 92 FL (ref 78–100)
NT-PROBNP SERPL-MCNC: 1759 PG/ML (ref 0–450)
O2/TOTAL GAS SETTING VFR VENT: 80 %
PCO2 BLDV: 56 MM HG (ref 40–50)
PH BLDV: 7.37 [PH] (ref 7.32–7.43)
PLATELET # BLD AUTO: 421 10E3/UL (ref 150–450)
PO2 BLDV: 43 MM HG (ref 25–47)
POTASSIUM SERPL-SCNC: 4.4 MMOL/L (ref 3.4–5.3)
RBC # BLD AUTO: 4 10E6/UL (ref 3.8–5.2)
RSV RNA SPEC QL NAA+PROBE: NOT DETECTED
RSV RNA SPEC QL NAA+PROBE: NOT DETECTED
RV+EV RNA SPEC QL NAA+PROBE: NOT DETECTED
SODIUM SERPL-SCNC: 137 MMOL/L (ref 136–145)
WBC # BLD AUTO: 22.1 10E3/UL (ref 4–11)

## 2023-08-17 PROCEDURE — 97163 PT EVAL HIGH COMPLEX 45 MIN: CPT | Mod: GP | Performed by: PHYSICAL THERAPIST

## 2023-08-17 PROCEDURE — 97110 THERAPEUTIC EXERCISES: CPT | Mod: GO

## 2023-08-17 PROCEDURE — 97165 OT EVAL LOW COMPLEX 30 MIN: CPT | Mod: GO

## 2023-08-17 PROCEDURE — 200N000001 HC R&B ICU

## 2023-08-17 PROCEDURE — 83880 ASSAY OF NATRIURETIC PEPTIDE: CPT | Performed by: FAMILY MEDICINE

## 2023-08-17 PROCEDURE — 82803 BLOOD GASES ANY COMBINATION: CPT | Performed by: FAMILY MEDICINE

## 2023-08-17 PROCEDURE — 250N000011 HC RX IP 250 OP 636: Mod: JZ | Performed by: HOSPITALIST

## 2023-08-17 PROCEDURE — 250N000013 HC RX MED GY IP 250 OP 250 PS 637: Performed by: FAMILY MEDICINE

## 2023-08-17 PROCEDURE — 80048 BASIC METABOLIC PNL TOTAL CA: CPT | Performed by: FAMILY MEDICINE

## 2023-08-17 PROCEDURE — 36415 COLL VENOUS BLD VENIPUNCTURE: CPT | Performed by: FAMILY MEDICINE

## 2023-08-17 PROCEDURE — 250N000013 HC RX MED GY IP 250 OP 250 PS 637: Performed by: HOSPITALIST

## 2023-08-17 PROCEDURE — 83735 ASSAY OF MAGNESIUM: CPT | Performed by: FAMILY MEDICINE

## 2023-08-17 PROCEDURE — 85027 COMPLETE CBC AUTOMATED: CPT | Performed by: FAMILY MEDICINE

## 2023-08-17 PROCEDURE — 86140 C-REACTIVE PROTEIN: CPT | Performed by: FAMILY MEDICINE

## 2023-08-17 PROCEDURE — 99233 SBSQ HOSP IP/OBS HIGH 50: CPT | Performed by: FAMILY MEDICINE

## 2023-08-17 PROCEDURE — 97110 THERAPEUTIC EXERCISES: CPT | Mod: GP | Performed by: PHYSICAL THERAPIST

## 2023-08-17 RX ADMIN — SENNOSIDES AND DOCUSATE SODIUM 2 TABLET: 50; 8.6 TABLET ORAL at 20:07

## 2023-08-17 RX ADMIN — METHYLPREDNISOLONE SODIUM SUCCINATE 62.5 MG: 125 INJECTION, POWDER, FOR SOLUTION INTRAMUSCULAR; INTRAVENOUS at 08:09

## 2023-08-17 RX ADMIN — HYDROXYZINE HYDROCHLORIDE 25 MG: 25 TABLET ORAL at 15:11

## 2023-08-17 RX ADMIN — BUPRENORPHINE HYDROCHLORIDE, NALOXONE HYDROCHLORIDE 1 FILM: 4; 1 FILM, SOLUBLE BUCCAL; SUBLINGUAL at 08:09

## 2023-08-17 RX ADMIN — CEFTRIAXONE SODIUM 2 G: 2 INJECTION, POWDER, FOR SOLUTION INTRAMUSCULAR; INTRAVENOUS at 20:08

## 2023-08-17 RX ADMIN — METHYLPREDNISOLONE SODIUM SUCCINATE 62.5 MG: 125 INJECTION, POWDER, FOR SOLUTION INTRAMUSCULAR; INTRAVENOUS at 20:07

## 2023-08-17 RX ADMIN — AZITHROMYCIN MONOHYDRATE 500 MG: 500 INJECTION, POWDER, LYOPHILIZED, FOR SOLUTION INTRAVENOUS at 21:11

## 2023-08-17 RX ADMIN — BUPRENORPHINE HYDROCHLORIDE, NALOXONE HYDROCHLORIDE 1 FILM: 4; 1 FILM, SOLUBLE BUCCAL; SUBLINGUAL at 13:43

## 2023-08-17 RX ADMIN — ENOXAPARIN SODIUM 40 MG: 40 INJECTION SUBCUTANEOUS at 20:07

## 2023-08-17 RX ADMIN — OLANZAPINE 10 MG: 2.5 TABLET, FILM COATED ORAL at 20:09

## 2023-08-17 RX ADMIN — CLONAZEPAM 0.25 MG: 0.25 TABLET, ORALLY DISINTEGRATING ORAL at 20:52

## 2023-08-17 RX ADMIN — Medication 25 MCG: at 08:10

## 2023-08-17 RX ADMIN — SENNOSIDES AND DOCUSATE SODIUM 1 TABLET: 50; 8.6 TABLET ORAL at 08:10

## 2023-08-17 RX ADMIN — IBUPROFEN 600 MG: 200 TABLET, FILM COATED ORAL at 04:40

## 2023-08-17 RX ADMIN — IBUPROFEN 600 MG: 200 TABLET, FILM COATED ORAL at 21:32

## 2023-08-17 RX ADMIN — PANTOPRAZOLE SODIUM 40 MG: 40 TABLET, DELAYED RELEASE ORAL at 08:09

## 2023-08-17 RX ADMIN — SERTRALINE HYDROCHLORIDE 100 MG: 100 TABLET ORAL at 08:13

## 2023-08-17 RX ADMIN — CLONAZEPAM 0.25 MG: 0.25 TABLET, ORALLY DISINTEGRATING ORAL at 08:12

## 2023-08-17 RX ADMIN — TADALAFIL 20 MG: 20 TABLET ORAL at 08:13

## 2023-08-17 RX ADMIN — CLONAZEPAM 0.25 MG: 0.25 TABLET, ORALLY DISINTEGRATING ORAL at 13:44

## 2023-08-17 RX ADMIN — BUPRENORPHINE HYDROCHLORIDE, NALOXONE HYDROCHLORIDE 1 FILM: 4; 1 FILM, SOLUBLE BUCCAL; SUBLINGUAL at 20:07

## 2023-08-17 ASSESSMENT — ACTIVITIES OF DAILY LIVING (ADL)
ADLS_ACUITY_SCORE: 26
PREVIOUS_RESPONSIBILITIES: MEAL PREP;HOUSEKEEPING;LAUNDRY;SHOPPING;MEDICATION MANAGEMENT;FINANCES;DRIVING
ADLS_ACUITY_SCORE: 26

## 2023-08-17 NOTE — PROGRESS NOTES
08/17/23 1101   Appointment Info   Signing Clinician's Name / Credentials (PT) Gayatri Quezada, PT, DPT, CLT-HALIE       Present no   Living Environment   People in Home child(janessa), adult;parent(s)   Current Living Arrangements house   Home Accessibility stairs to enter home;stairs within home   Number of Stairs, Main Entrance 3   Stair Railings, Main Entrance none   Number of Stairs, Within Home, Primary greater than 10 stairs   Stair Railings, Within Home, Primary railing on right side (ascending)   Transportation Anticipated family or friend will provide   Living Environment Comments Lives in house with parents and two adult children ages 19 and 25.  No use of AD at baseline   Self-Care   Usual Activity Tolerance moderate   Current Activity Tolerance poor   Regular Exercise No   Equipment Currently Used at Home none   Fall history within last six months no   Activity/Exercise/Self-Care Comment 4L O2 at baseline, turns it up to 6L when going up/down stairs   General Information   Onset of Illness/Injury or Date of Surgery 08/17/23   Referring Physician Jia Ferrari MD   Pertinent History of Current Problem (include personal factors and/or comorbidities that impact the POC) Patient is a 47-year-old female with past medical history of interstitial lung disease and severe pulmonary hypertension on 4 to 8 L nasal cannula chronically, inflammatory arthritis, polymyositis, chronic pain, depression, anxiety, PTSD, GERD, and ongoing tobacco use who presented with worsening shortness of breath and was found to be in acute on chronic respiratory failure with hypoxia.  Work-up initially was unclear but showed concern for interstitial lung exacerbation with possible superimposed infection versus pulmonary edema.  IV Lasix given in the ED but patient subsequently had hypotension and further diuresis efforts have been held.  She was started on IV steroids, Rocephin, and azithromycin as well  as high flow nasal cannula oxygen and is currently intermediate overflow status in the ICU.  She remains at high risk for decompensation including need for BiPAP or intubation and requires ongoing close monitoring on intermediate overflow status.   Weight-Bearing Status - LUE full weight-bearing   Weight-Bearing Status - RUE full weight-bearing   Weight-Bearing Status - LLE full weight-bearing   Weight-Bearing Status - RLE full weight-bearing   General Observations PT Orders: Eval & treat as indicated: ICU early mobility protocol   Cognition   Affect/Mental Status (Cognition) WNL   Orientation Status (Cognition) oriented x 4   Follows Commands (Cognition) WNL   Pain Assessment   Patient Currently in Pain No   Integumentary/Edema   Integumentary/Edema no deficits were identifed   Posture    Posture Not impaired   Range of Motion (ROM)   Range of Motion ROM is WFL   Strength (Manual Muscle Testing)   Strength (Manual Muscle Testing) strength is WFL   Strength Comments 4/5 B LEs   Bed Mobility   Bed Mobility supine-sit;sit-supine   Supine-Sit Bowie (Bed Mobility) independent   Sit-Supine [place] (Bed Mobility) independent   Comment, (Bed Mobility) Completes bed mobility IND, HOB flat and no use of bed rails, similar to home set up   Transfers   Transfers bed-chair transfer;sit-stand transfer;toilet transfer   Bed-Chair Transfer   Bed-Chair Bow[place] (Transfers) independent   Sit-Stand Transfer   Sit-Stand [place] (Transfers) independent   Toilet Transfer   Bowie Level (Toilet Transfer) independent   Type (Toilet Transfer) sit-stand;stand-sit   Gait/Stairs (Locomotion)   Comment, (Gait/Stairs) Gait not assessed today, stairs not assessed today due to medical status.   Balance   Balance Comments Balance not formally assessed, able to perform transfers IND without use of AD   Sensory Examination   Sensory Perception WFL   Sensory Perception Comments Reports numbness/tingling in B hands,  otherwise no sensory changes reported   Coordination   Coordination no deficits were identified   Muscle Tone   Muscle Tone no deficits were identified   Clinical Impression   Criteria for Skilled Therapeutic Intervention Yes, treatment indicated   PT Diagnosis (PT) Generalized weakness, reduced activity tolerance   Influenced by the following impairments Generalized weakness, reduced activity tolerance   Functional limitations due to impairments Ambulation, stairs, mobility   Clinical Presentation (PT Evaluation Complexity) Unstable/Unpredictable   Clinical Presentation Rationale Based on observation, history, evaluation and clinical judgment.   Clinical Decision Making (Complexity) high complexity   Planned Therapy Interventions (PT) balance training;bed mobility training;gait training;home exercise program;manual therapy techniques;neuromuscular re-education;patient/family education;postural re-education;ROM (range of motion);stair training;strengthening;stretching;transfer training;progressive activity/exercise;risk factor education;home program guidelines   Risk & Benefits of therapy have been explained evaluation/treatment results reviewed;care plan/treatment goals reviewed;risks/benefits reviewed;current/potential barriers reviewed;participants voiced agreement with care plan;participants included;patient   Clinical Impression Comments 46 y/o female hospitalized after presenting to ED with SOB and found to be in acute on chronic respiratory failure with hypoxia.  Currently intermediate overflow status in the ICU and remains at high risk for decompensation.  She lives at home with her parents and 2 adult children.  IND at baseline, no AD, uses 4L O2 at baseline but will turn it up to 6L O2 for stairs.  Currently on bedrest status, currently requiring 30.9L O2 during session today.  Patient will benefit from PT to address mobility deficits, progress as tolerated as medical status improves, and continue to  assess functional mobility for discharge recommendations.   PT Total Evaluation Time   PT Eval, High Complexity Minutes (34900) 25   Physical Therapy Goals   PT Frequency 6x/week   PT Predicted Duration/Target Date for Goal Attainment 08/21/23   PT Goals Gait;Stairs   PT: Gait Independent;Greater than 200 feet   PT: Stairs Independent;Greater than 10 stairs;Rail on right   Interventions   Interventions Quick Adds Therapeutic Procedure   Therapeutic Procedure/Exercise   Ther. Procedure: strength, endurance, ROM, flexibillity Minutes (11280) 10   Symptoms Noted During/After Treatment none   Treatment Detail/Skilled Intervention PT: Completed seated strengthening exercises bilaterally including hip flexion, hip abduction/adduction, LAQs, ankle pumps, ankle circles x 10 reps.  Able to maintain O2 sats at 88% or higher throughout.  Patient on 30.9 L FiO2 throughout.  Reported no SOB, no feelings of dizziness.   PT Discharge Planning   PT Plan 1/6 Thurs; progress as patient tolerates   PT Discharge Recommendation (DC Rec)   (Too early to determine DC recommendation; will continue to re-asssess)   PT Rationale for DC Rec Patient from home, lives with parents and 2 adult children.  IND at baseline, no AD.  On 4L O2 at baseline, turns it up to 6L O2 for stairs.  Due to medical status, unable to assess ambulation or stairs or make discharge recommendation, currently requiring 30.9L O2 during session today, O2 sats maintained at 88% or higher.   PT Brief overview of current status Currently requiring 30.9 L O2, demonstrates IND with bed mobility and transfers, unable to assess ambulation and stairs today due to medical status.   Total Session Time   Timed Code Treatment Minutes 10   Total Session Time (sum of timed and untimed services) 35         Gayatri Quezada, PT, DPT, CLT-HALIE [facility]  Physical Therapist     Phone: [phone]  Email: [email or web address]

## 2023-08-17 NOTE — PROGRESS NOTES
08/17/23 1100   Appointment Info   Signing Clinician's Name / Credentials (OT) Marilyn Monsalve, OTR/L       Present no   Living Environment   People in Home child(janessa), adult   Current Living Arrangements house   Home Accessibility stairs to enter home;stairs within home   Number of Stairs, Main Entrance 3   Stair Railings, Main Entrance none   Number of Stairs, Within Home, Primary greater than 10 stairs  (12-13)   Stair Railings, Within Home, Primary railing on right side (ascending)   Transportation Anticipated family or friend will provide   Living Environment Comments Stairs up to the bedroom, no AE use for mobility at baseline   Self-Care   Usual Activity Tolerance moderate   Current Activity Tolerance poor   Equipment Currently Used at Home none   Fall history within last six months no   Activity/Exercise/Self-Care Comment 4L of O2 at baseline, reporting turning it up to 6L when completing stairs   Instrumental Activities of Daily Living (IADL)   Previous Responsibilities meal prep;housekeeping;laundry;shopping;medication management;finances;driving   General Information   Onset of Illness/Injury or Date of Surgery 08/15/23   Referring Physician Jia Ferrari MD   Existing Precautions/Restrictions oxygen therapy device and L/min  (4 L)   Left Upper Extremity (Weight-bearing Status) full weight-bearing (FWB)   Right Upper Extremity (Weight-bearing Status) full weight-bearing (FWB)   Left Lower Extremity (Weight-bearing Status) full weight-bearing (FWB)   Right Lower Extremity (Weight-bearing Status) full weight-bearing (FWB)   General Observations and Info Patient is a 47-year-old female with past medical history of interstitial lung disease and severe pulmonary hypertension on 4 to 8 L nasal cannula chronically, inflammatory arthritis, polymyositis, chronic pain, depression, anxiety, PTSD, GERD, and ongoing tobacco use who presented with worsening shortness of breath and  was found to be in acute on chronic respiratory failure with hypoxia.  Patient is on 30LPM currently but destating with activity. Orders: bed rest/up to commode only per nursing. Patient up to 39L during activity with writer. OT orders: evaluate and treat.   Cognitive Status Examination   Orientation Status orientation to person, place and time   Cognitive Status Comments No overt cognitive deficits, anticipated baseline. Patient stating she is receiving outside mental health supports (group therapy) prior to admission.   Sensory   Sensory Comments numbness at baseline in UEs, reporting this is not new with admission   Pain Assessment   Patient Currently in Pain No   Posture   Posture not impaired   Range of Motion Comprehensive   General Range of Motion no range of motion deficits identified   Strength Comprehensive (MMT)   General Manual Muscle Testing (MMT) Assessment upper extremity strength deficits identified   Upper Extremity (Manual Muscle Testing)   Comment, MMT: Upper Extremity 4/5 during MMT for UB, 5/5  strength   Muscle Tone Assessment   Muscle Tone Quick Adds No deficits were identified   Coordination   Upper Extremity Coordination No deficits were identified   Gross Motor Coordination No deficits were identified   Fine Motor Coordination No deficits were identified   Bed Mobility   Bed Mobility supine-sit;sit-supine   Supine-Sit [place] (Bed Mobility) independent   Sit-Supine [place] (Bed Mobility) independent   Transfers   Transfers sit-stand transfer;toilet transfer   Sit-Stand Transfer   Sit-Stand [place] (Transfers) independent   Toilet Transfer   Type (Toilet Transfer) stand-sit;sit-stand   [place] Level (Toilet Transfer) modified independence   Toilet Transfer Comments using bedside commode d/t current 02 needs   Balance   Balance Comments Unable to assess at this time   Activities of Daily Living   BADL Assessment/Intervention upper body dressing;lower body  dressing;grooming   Upper Body Dressing Assessment/Training   Comment, (Upper Body Dressing) Sitting EOB   [place] Level (Upper Body Dressing) independent   Lower Body Dressing Assessment/Training   Comment, (Lower Body Dressing) siting EOB   [place] Level (Lower Body Dressing) independent   Grooming Assessment/Training   Comment, (Grooming) Due to 02 needs and limited to bed activity only, would require set-up for ADL engagement   Clinical Impression   Criteria for Skilled Therapeutic Interventions Met (OT) Yes, treatment indicated   OT Diagnosis Impaired activity tolerance required for functional engagement in ADLs   OT Problem List-Impairments impacting ADL activity tolerance impaired;problems related to   ADL comments/analysis Completing ADLs with independence seated at EOB. Reviewed EC pacing and PLB with patient during completion. Patient being on 39L O2 during engagement, stats above 90% throughout. Did not have to use PLB but able to practice with writer WFL.   Assessment of Occupational Performance 1-3 Performance Deficits   Planned Therapy Interventions (OT) strengthening;home program guidelines;progressive activity/exercise;risk factor education   Intervention Comments Patient would benefit from skilled IP OT during admission for further activity tolerance progression and strength progression prior to discharge   Clinical Decision Making Complexity (OT) low complexity   Risk & Benefits of therapy have been explained evaluation/treatment results reviewed;care plan/treatment goals reviewed;risks/benefits reviewed;participants voiced agreement with care plan;current/potential barriers reviewed;participants included;patient   OT Total Evaluation Time   OT Eval, Low Complexity Minutes (16866) 30   Interventions   Interventions Quick Adds Therapeutic Procedures/Exercise   Therapeutic Procedures/Exercise   Therapeutic Procedure: strength, endurance, ROM, flexibillity minutes (75757) 8   Symptoms Noted  During/After Treatment fatigue   Treatment Detail/Skilled Intervention OT completed UB AROM exercises sitting EOB; completing shoulder flexion/extension, pronation/supination at 90 degrees, bicep/tricepts. Completing 30 seconds each side x2 sets. Patient being on 39L 02 during ex's with writer, stats above 90% throughout. PLB techniques practiced throughout given ex's. Tolerated well.   OT Discharge Planning   OT Plan 1/5 THURS, activity tolerance progression   OT Discharge Recommendation (DC Rec) home with assist  (anticipated with medical progression)   OT Rationale for DC Rec Patient is previously from home with family, patient PLOF IND with ADLs and IADL based tasks. Baseline is on 4-8L of oxygen. Patient has 10+ stairs to get up to bedroom, otherwise main level living. No AE use at baseline. Currently, patient is IND with transfers/ADLs. Current limitation being increased oxygen needs to tolerate activity with patient being on 30-39 Liters in order to maintain stats above 90%. Anticipate with medical progression patient will be able to return home with continued family supports. Would benefit from ongoing OT services during IP stay to assist with strength and endurance and functional EC techniques.   OT Brief overview of current status Paitent is on 30-39L O2 with activity and/up to the commode,  IND in mobility and engagement in ADLS, limited by activity tolerance d/t oxygen levels destating and needing significant increased oxygen compared to baseline. PLB and EC technqiues reviewed and completed.     Thank you for the referral.   BETZAIDA Nazario/L   [facility]    Email: [email or web address]  Phone: [phone]

## 2023-08-17 NOTE — PLAN OF CARE
Goal Outcome Evaluation:      Plan of Care Reviewed With: patient    Overall Patient Progress: no changeOverall Patient Progress: no change    Outcome Evaluation: Patient remains on high flow oxygen 80% and 35L. With activity, fio2 increased to 100%. Lowest sats have ranged from 66% to 81% with activity. Pt does not appear to be overally symptomatic when up. She is able to recover within a few minutes.  Ibuprofen given for generalized discomfort. Respiratory panel collected. Awaiting results.

## 2023-08-17 NOTE — PROGRESS NOTES
[facility]    Medicine Progress Note - Hospitalist Service    Date of Admission:  8/15/2023    Assessment & Plan     Patient is a 47-year-old female with past medical history of interstitial lung disease and severe pulmonary hypertension on 4 to 8 L nasal cannula chronically, inflammatory arthritis, polymyositis, chronic pain, depression, anxiety, PTSD, GERD, and ongoing tobacco use who presented with worsening shortness of breath and was found to be in acute on chronic respiratory failure with hypoxia.  Work-up initially was unclear but showed concern for interstitial lung exacerbation with possible superimposed infection versus pulmonary edema.  IV Lasix given in the ED but patient subsequently had hypotension and further diuresis efforts have been held.  She was started on IV steroids, Rocephin, and azithromycin as well as high flow nasal cannula oxygen and is currently intermediate overflow status in the ICU.  She has started to have some improvement throughout the day today in her HFNC needs but ongoing minimal tolerance to any activity.  She will remain ICU overflow overnight tonight but anticipate transition to Med/Surg floor tomorrow if her oxygen needs continue to lessen.      Principal Problem:    Acute on chronic respiratory failure with hypoxia (H)    Abnormal CT of the chest    Assessment: Developing after patient had upper respiratory infection for approximately 2 to 3 days.  Chest x-ray shows worsening of interstitial lung disease with concern for overlying infection versus pulmonary edema.  Patient did need high flow nasal cannula 40 L 100% FiO2 and is currently weaned down to 30 L and 70% FiO2 at rest but will desaturate into the 70s with any significant activity.  She denies any new symptoms and states her breathing feels much better at rest than when she arrived.  Currently on Solu-Medrol 62.5 mg twice daily, IV Rocephin and azithromycin    Plan:   -Rediscussed case  with Dr. Jacobs, pulmonologist on-call for [facility], and will continue with current IV Solu-Medrol dosing but would have low threshold for increasing to 500 mg or even the 1000 mg a day if patient were to decompensate respiratorily.  Patient will also need current dose for approximately 7 days and then if improving would consider transition to 60 mg daily x7 days and decrease prednisone by 10 mg every 7 days until off.  If patient ends up requiring higher dosing, would need to rediscuss with on call pulmonologist best slow wean regimen depending on level of initial steroid need.   -Continue Rocephin and azithromycin  -Continue high flow nasal cannula oxygen and titrate as appropriate    Active Problems:    ILD (interstitial lung disease) with concerns for acute exacerbation    Assessment: Patient has known significant interstitial lung disease at baseline currently on 4 to 8 L nasal cannula depending on level of activity.  She has not seen pulmonology since 2019.  She has not been able to quit smoking and therefore is not a lung transplant candidate at this time.  CT scan does show concern for worsening interstitial lung disease and possible acute exacerbation.    Plan:   -Proceed with ongoing dexamethasone 62.5 mg twice daily  -Ongoing high flow nasal cannula oxygen with titration as needed  -Discussed progressive worsening and prognosis with patient, her mother and one of her sons.  Patient is very motivated to quit smoking completely (had been down to a couple a day) and family is supportive of no longer smoking in the house or around her.  She would like to follow up with the ILD team at [facility] following discharge and referral has been placed.        Pneumonia of both lungs due to infectious organism, unspecified part of lung - possible    Assessment: Possible superimposed infection overlying suspected interstitial lung disease exacerbation.  Patient was having upper respiratory symptoms for 2 to 3 days before  breathing symptoms began indicating possible viral component but viral PCR is negative.      Plan:   -Continue Rocephin and azithromycin for possible superimposed bacterial infection      Severe Pulmonary hypertension (H)    Assessment: Patient follows with Dr. Ledesma at [facility] and currently is receiving tadalafil 20 mg daily.      Plan:   -continue tadalafil without change       Elevated brain natriuretic peptide (BNP) level    Assessment: BNP was elevated at 11,955 at time of presentation which is notably increased compared to 4168 when last checked in 2016.  Attempted diuresis in the ED but patient subsequently had hypotension and further diuresis has been held.  Echocardiogram shows ongoing known right sided heart failure, low normal left EF and grade 1 diastolic left ventricular dysfunction present.  Recheck BNP today is down to 1,759    Plan:   -Hold any further diuresis at this time  -Recheck BNP tomorrow to evaluate for true baseline closer to time of discharge  -We will reach out to [facility] cardiology team if there are questions regarding this going forward      Personal history of tobacco use, presenting hazards to health    Assessment: Patient reports she has cut down significantly in the last 1 to 2 months and will try and only take a few puffs off of each cigarette and is trying to cut down significantly.  She does continue to live in an environment in which smoking occurs.  She is not interested in nicotine replacement at this time    Plan:   -Encouraged ongoing efforts at complete cessation as if she can accomplish this there is hope she could get on the lung transplant list in the future.  Patient and family are now very interested in figuring out how she can quit completely and no no exposure going forward.   -Monitor for any signs of nicotine withdrawal      Inflammatory arthritis    Polymyositis, organ involvement unspecified (H)    Assessment: Patient was diagnosed with seronegative inflammatory  arthritis in approximately 2008 and has tried multiple treatments including Plaquenil, sulfasalazine and methotrexate without tolerance and patient has not been following with rheumatoid arthritis in recent years.  Does have chronic pain related to her ongoing arthritis and is currently using Suboxone which is prescribed via pain clinic.    Plan:   -Continue use of Suboxone as per chronic pain clinic recommendation during this stay  -Initiate conversation with patient as to whether or not she would be interested in following up with rheumatology as an outpatient going forward and she is undecided at this time but we will continue this conversation going forward      Chronic, continuous use of opioids    Chronic pain    Assessment: Secondary to inflammatory pain as above    Plan:   -Continue with chronic Suboxone 3 times a day      Recurrent major depressive disorder (H)      MOLLY (generalized anxiety disorder)    PTSD (post-traumatic stress disorder)    Assessment: Patient had recent inpatient hospitalization in June of this year for suicidal thoughts, hallucinations.  She reports that following this hospitalization for her symptoms have been much better controlled and she denies any suicidal thoughts recently    Plan:   -Continue home regimen of sertraline 100 mg daily, Zyprexa 10 mg at bedtime, clonazepam 0.25 mg 3 times a day as needed for anxiety, Vistaril 25 mg 4 times a day as needed for anxiety and monitor for symptom stability       Gastroesophageal reflux disease without esophagitis    Assessment: On Protonix 40 mg daily without breakthrough symptoms    Plan:   -Continue home regimen without change       Diet: Combination Diet Regular Diet Adult  Snacks/Supplements Adult: Ensure Enlive; With Meals    DVT Prophylaxis: Enoxaparin (Lovenox) SQ  Berman Catheter: Not present  Lines: None     Cardiac Monitoring: None  Code Status: Full Code      Clinically Significant Risk Factors          # Hypocalcemia: Lowest Ca  = 8.4 mg/dL in last 2 days, will monitor and replace as appropriate      # Coagulation Defect: INR = 1.22 (Ref range: 0.85 - 1.15) and/or PTT = 34 Seconds (Ref range: 22 - 38 Seconds), will monitor for bleeding                      Disposition Plan     Expected Discharge Date: 08/17/2023                  Jia Ferrari MD  Hospitalist Service  [facility]  Securely message with [employer] (more info)  Text page via [employer] Paging/Directory   ______________________________________________________________________    Interval History   Patient was able to sleep fairly well overnight, has noticed perhaps slight improvement in activity tolerance when she is in the bed but still desaturates greatly when getting out of the bed.  Patient has no cough, nasal symptoms are gone.  Appetite is good.  No new symptoms or concerns.     Physical Exam   Vital Signs: Temp: 97.7  F (36.5  C) Temp src: Oral BP: 104/71 Pulse: 60   Resp: 14 SpO2: 94 % O2 Device: High Flow Nasal Cannula (HFNC) Oxygen Delivery: 20 LPM  Weight: 108 lbs 12.8 oz    Constitutional: awake, alert, cooperative, no apparent distress, and appears stated age  Respiratory: No increased work of breathing, severely diminished air exchange, course crackles in bilateral lower and mid lung fields   Cardiovascular: RRR without murmur  GI: bowel sounds present, abdomen soft  Neurologic: Awake, alert, oriented to name, place and situation     Medical Decision Making       55 MINUTES SPENT BY ME on the date of service doing chart review, history, exam, documentation & further activities per the note.      Data     I have personally reviewed the following data over the past 24 hrs:    22.1 (H)  \   11.7   / 421     137 98 20.2 (H) /  147 (H)   4.4 28 0.72 \     Trop: N/A BNP: 1,759 (H)     Procal: N/A CRP: 11.52 (H) Lactic Acid: N/A

## 2023-08-17 NOTE — PROGRESS NOTES
Pulmonary Consult e - note     Dr Peterson called me to discuss general pulmonary cares for Ms Loya.  Ms Loya has pulmonary fibrosis.  The etiology is unclear but could be connective tissue disease related.  She had previously been diagnosed with seronegative RA.  Had tried methotrexate and cellcept.  Did not tolerate either of them.      Prior to this admission, she had viral symptoms.  Now admitted with hypoxemia.  Chest CT shows diffuse fibrotic changes along with some consolidative opacities.  She is being treated for a community acquired pneumonia.  She is currently on solumedrol at a dose just above 2 mg/kg.  I think this should be adequate to treat a possible flare of ILD.  Today, Dr. Ferrari let me know the patient is feeling a little better.  Her O2 sats are slightly better.      If she gets better to the point of discharge, I think it would be best that she is seen in the ILD clinic in [place] given the extent of her disease.      A tentative steroid plan for now could be her current solumedrol dose for about 1 week.  As long as there is continued improvement, this could be changed to 60 mg of prednisone.  With a taper of steroids over 2 months and follow up in the ILD clinic at that time.  She should be on bactrim for PJP prophylaxis    Please touch base with the pulmonary team closer to discharge to make sure the steroid taper listed above would still be appropriate, acknowledging there may be a change in her clinical course prior to discharge.       Kevin Jacobs MD

## 2023-08-17 NOTE — PLAN OF CARE
"  Problem: Plan of Care - These are the overarching goals to be used throughout the patient stay.    Goal: Plan of Care Review  Description: The Plan of Care Review/Shift note should be completed every shift.  The Outcome Evaluation is a brief statement about your assessment that the patient is improving, declining, or no change.  This information will be displayed automatically on your shift note.  Outcome: Progressing  Goal: Patient-Specific Goal (Individualized)  Description: You can add care plan individualizations to a care plan. Examples of Individualization might be:  \"Parent requests to be called daily at 9am for status\", \"I have a hard time hearing out of my right ear\", or \"Do not touch me to wake me up as it startles me\".  Outcome: Progressing  Goal: Absence of Hospital-Acquired Illness or Injury  Outcome: Progressing  Intervention: Identify and Manage Fall Risk  Recent Flowsheet Documentation  Taken 8/17/2023 0400 by Alek Valiente RN  Safety Promotion/Fall Prevention:   activity supervised   clutter free environment maintained   nonskid shoes/slippers when out of bed   room near nurse's station   room organization consistent   safety round/check completed   supervised activity  Intervention: Prevent Skin Injury  Recent Flowsheet Documentation  Taken 8/17/2023 0400 by Alek Valiente RN  Body Position: position changed independently  Intervention: Prevent and Manage VTE (Venous Thromboembolism) Risk  Recent Flowsheet Documentation  Taken 8/17/2023 0400 by Alek Valiente RN  VTE Prevention/Management: other (see comments)  Goal: Optimal Comfort and Wellbeing  Outcome: Progressing  Intervention: Provide Person-Centered Care  Recent Flowsheet Documentation  Taken 8/17/2023 0400 by Alek Valiente RN  Trust Relationship/Rapport: care explained  Goal: Readiness for Transition of Care  Outcome: Progressing     Problem: Pain Acute  Goal: Optimal Pain Control and Function  Outcome: Progressing  Intervention: " Prevent or Manage Pain  Recent Flowsheet Documentation  Taken 8/17/2023 0400 by Alek Valiente RN  Medication Review/Management: medications reviewed     Problem: Gas Exchange Impaired  Goal: Optimal Gas Exchange  Outcome: Progressing  Intervention: Optimize Oxygenation and Ventilation  Recent Flowsheet Documentation  Taken 8/17/2023 0400 by Alek Valiente, RN  Head of Bed (HOB) Positioning: HOB at 20-30 degrees     Problem: Oral Intake Inadequate  Goal: Improved Oral Intake  Outcome: Progressing       Oxygen saturations have been stable on current high flow settings at 80 percent Fi02 and 30 LPM, Pt has been able to maintain oxygen saturations in the mid 90's but does desat with activity.   She has been up to the bedside commode once.   Telemetry has shown a sinus bradycardia most of the shift with rates as low as 42 while sleeping, heart rate at this time is in the 70's.  Blood pressure has been stable.   Lung sounds are coarse with inspiratory and expiratory wheezing.   Will continue to monitor and follow plan of care.

## 2023-08-17 NOTE — PROGRESS NOTES
Patient is doing well on HFNC and weaning down but still his significant desats and shortness of breath with activity. Currently on 25L 60%.   RT will continue to follow and assess.   Complex Repair And Ftsg Text: The defect edges were debeveled with a #15 scalpel blade.  The primary defect was closed partially with a complex linear closure.  Given the location of the defect, shape of the defect and the proximity to free margins a full thickness skin graft was deemed most appropriate to repair the remaining defect.  The graft was trimmed to fit the size of the remaining defect.  The graft was then placed in the primary defect, oriented appropriately, and sutured into place.

## 2023-08-17 NOTE — PLAN OF CARE
Problem: Gas Exchange Impaired  Goal: Optimal Gas Exchange  Intervention: Optimize Oxygenation and Ventilation  Recent Flowsheet Documentation  Taken 8/17/2023 1600 by Christal Gonzales RN  Head of Bed (HOB) Positioning: HOB at 45 degrees  Alert and oriented, anxious.   Shortness of breath and desats with activity, HFNC at 60% FiO2 25L  Lungs diminished, few crackles.   No edema note.

## 2023-08-18 ENCOUNTER — TELEPHONE (OUTPATIENT)
Dept: PULMONOLOGY | Facility: CLINIC | Age: 48
End: 2023-08-18
Payer: COMMERCIAL

## 2023-08-18 ENCOUNTER — APPOINTMENT (OUTPATIENT)
Dept: PHYSICAL THERAPY | Facility: CLINIC | Age: 48
End: 2023-08-18
Payer: COMMERCIAL

## 2023-08-18 ENCOUNTER — APPOINTMENT (OUTPATIENT)
Dept: OCCUPATIONAL THERAPY | Facility: CLINIC | Age: 48
End: 2023-08-18
Payer: COMMERCIAL

## 2023-08-18 LAB
ANION GAP SERPL CALCULATED.3IONS-SCNC: 9 MMOL/L (ref 7–15)
BASE EXCESS BLDV CALC-SCNC: 6.4 MMOL/L (ref -7.7–1.9)
BUN SERPL-MCNC: 26.7 MG/DL (ref 6–20)
CALCIUM SERPL-MCNC: 8.9 MG/DL (ref 8.6–10)
CHLORIDE SERPL-SCNC: 99 MMOL/L (ref 98–107)
CREAT SERPL-MCNC: 0.75 MG/DL (ref 0.51–0.95)
CRP SERPL-MCNC: 6.33 MG/L
DEPRECATED HCO3 PLAS-SCNC: 30 MMOL/L (ref 22–29)
ERYTHROCYTE [DISTWIDTH] IN BLOOD BY AUTOMATED COUNT: 15.9 % (ref 10–15)
GFR SERPL CREATININE-BSD FRML MDRD: >90 ML/MIN/1.73M2
GLUCOSE BLDC GLUCOMTR-MCNC: 111 MG/DL (ref 70–99)
GLUCOSE BLDC GLUCOMTR-MCNC: 112 MG/DL (ref 70–99)
GLUCOSE BLDC GLUCOMTR-MCNC: 140 MG/DL (ref 70–99)
GLUCOSE SERPL-MCNC: 115 MG/DL (ref 70–99)
HCO3 BLDV-SCNC: 34 MMOL/L (ref 21–28)
HCT VFR BLD AUTO: 34.3 % (ref 35–47)
HGB BLD-MCNC: 10.8 G/DL (ref 11.7–15.7)
MAGNESIUM SERPL-MCNC: 2.1 MG/DL (ref 1.7–2.3)
MCH RBC QN AUTO: 29 PG (ref 26.5–33)
MCHC RBC AUTO-ENTMCNC: 31.5 G/DL (ref 31.5–36.5)
MCV RBC AUTO: 92 FL (ref 78–100)
O2/TOTAL GAS SETTING VFR VENT: 80 %
PCO2 BLDV: 58 MM HG (ref 40–50)
PH BLDV: 7.38 [PH] (ref 7.32–7.43)
PLATELET # BLD AUTO: 397 10E3/UL (ref 150–450)
PO2 BLDV: 65 MM HG (ref 25–47)
POTASSIUM SERPL-SCNC: 4.7 MMOL/L (ref 3.4–5.3)
RBC # BLD AUTO: 3.72 10E6/UL (ref 3.8–5.2)
SODIUM SERPL-SCNC: 138 MMOL/L (ref 136–145)
WBC # BLD AUTO: 17.3 10E3/UL (ref 4–11)

## 2023-08-18 PROCEDURE — 97110 THERAPEUTIC EXERCISES: CPT | Mod: GO | Performed by: SPECIALIST/TECHNOLOGIST

## 2023-08-18 PROCEDURE — 120N000001 HC R&B MED SURG/OB

## 2023-08-18 PROCEDURE — 85027 COMPLETE CBC AUTOMATED: CPT | Performed by: FAMILY MEDICINE

## 2023-08-18 PROCEDURE — 250N000013 HC RX MED GY IP 250 OP 250 PS 637: Performed by: HOSPITALIST

## 2023-08-18 PROCEDURE — 97530 THERAPEUTIC ACTIVITIES: CPT | Mod: GP | Performed by: PHYSICAL THERAPIST

## 2023-08-18 PROCEDURE — 86140 C-REACTIVE PROTEIN: CPT | Performed by: FAMILY MEDICINE

## 2023-08-18 PROCEDURE — 250N000011 HC RX IP 250 OP 636: Mod: JZ | Performed by: FAMILY MEDICINE

## 2023-08-18 PROCEDURE — 83735 ASSAY OF MAGNESIUM: CPT | Performed by: FAMILY MEDICINE

## 2023-08-18 PROCEDURE — 80048 BASIC METABOLIC PNL TOTAL CA: CPT | Performed by: FAMILY MEDICINE

## 2023-08-18 PROCEDURE — 36415 COLL VENOUS BLD VENIPUNCTURE: CPT | Performed by: FAMILY MEDICINE

## 2023-08-18 PROCEDURE — 99233 SBSQ HOSP IP/OBS HIGH 50: CPT | Performed by: FAMILY MEDICINE

## 2023-08-18 PROCEDURE — 82803 BLOOD GASES ANY COMBINATION: CPT | Performed by: FAMILY MEDICINE

## 2023-08-18 PROCEDURE — 250N000011 HC RX IP 250 OP 636: Performed by: HOSPITALIST

## 2023-08-18 PROCEDURE — 250N000013 HC RX MED GY IP 250 OP 250 PS 637: Performed by: FAMILY MEDICINE

## 2023-08-18 RX ORDER — METHYLPREDNISOLONE SODIUM SUCCINATE 125 MG/2ML
60 INJECTION, POWDER, LYOPHILIZED, FOR SOLUTION INTRAMUSCULAR; INTRAVENOUS EVERY 12 HOURS
Status: COMPLETED | OUTPATIENT
Start: 2023-08-18 | End: 2023-08-18

## 2023-08-18 RX ORDER — PREDNISONE 20 MG/1
60 TABLET ORAL DAILY
Status: DISCONTINUED | OUTPATIENT
Start: 2023-08-19 | End: 2023-08-20 | Stop reason: HOSPADM

## 2023-08-18 RX ADMIN — BUPRENORPHINE HYDROCHLORIDE, NALOXONE HYDROCHLORIDE 1 FILM: 4; 1 FILM, SOLUBLE BUCCAL; SUBLINGUAL at 20:14

## 2023-08-18 RX ADMIN — CLONAZEPAM 0.25 MG: 0.25 TABLET, ORALLY DISINTEGRATING ORAL at 20:20

## 2023-08-18 RX ADMIN — BUPRENORPHINE HYDROCHLORIDE, NALOXONE HYDROCHLORIDE 1 FILM: 4; 1 FILM, SOLUBLE BUCCAL; SUBLINGUAL at 14:03

## 2023-08-18 RX ADMIN — CLONAZEPAM 0.25 MG: 0.25 TABLET, ORALLY DISINTEGRATING ORAL at 08:38

## 2023-08-18 RX ADMIN — Medication 25 MCG: at 08:20

## 2023-08-18 RX ADMIN — SENNOSIDES AND DOCUSATE SODIUM 1 TABLET: 50; 8.6 TABLET ORAL at 08:20

## 2023-08-18 RX ADMIN — PANTOPRAZOLE SODIUM 40 MG: 40 TABLET, DELAYED RELEASE ORAL at 08:20

## 2023-08-18 RX ADMIN — OLANZAPINE 10 MG: 2.5 TABLET, FILM COATED ORAL at 21:39

## 2023-08-18 RX ADMIN — METHYLPREDNISOLONE SODIUM SUCCINATE 62.5 MG: 125 INJECTION, POWDER, FOR SOLUTION INTRAMUSCULAR; INTRAVENOUS at 20:14

## 2023-08-18 RX ADMIN — METHYLPREDNISOLONE SODIUM SUCCINATE 62.5 MG: 125 INJECTION, POWDER, FOR SOLUTION INTRAMUSCULAR; INTRAVENOUS at 08:19

## 2023-08-18 RX ADMIN — TADALAFIL 20 MG: 20 TABLET ORAL at 08:21

## 2023-08-18 RX ADMIN — CEFTRIAXONE SODIUM 2 G: 2 INJECTION, POWDER, FOR SOLUTION INTRAMUSCULAR; INTRAVENOUS at 20:25

## 2023-08-18 RX ADMIN — BUPRENORPHINE HYDROCHLORIDE, NALOXONE HYDROCHLORIDE 1 FILM: 4; 1 FILM, SOLUBLE BUCCAL; SUBLINGUAL at 08:19

## 2023-08-18 RX ADMIN — SENNOSIDES AND DOCUSATE SODIUM 1 TABLET: 50; 8.6 TABLET ORAL at 20:20

## 2023-08-18 RX ADMIN — CLONAZEPAM 0.25 MG: 0.25 TABLET, ORALLY DISINTEGRATING ORAL at 14:08

## 2023-08-18 RX ADMIN — SERTRALINE HYDROCHLORIDE 100 MG: 100 TABLET ORAL at 08:19

## 2023-08-18 RX ADMIN — IBUPROFEN 600 MG: 200 TABLET, FILM COATED ORAL at 05:24

## 2023-08-18 RX ADMIN — IBUPROFEN 600 MG: 200 TABLET, FILM COATED ORAL at 11:36

## 2023-08-18 RX ADMIN — ENOXAPARIN SODIUM 40 MG: 40 INJECTION SUBCUTANEOUS at 20:14

## 2023-08-18 RX ADMIN — AZITHROMYCIN MONOHYDRATE 500 MG: 500 INJECTION, POWDER, LYOPHILIZED, FOR SOLUTION INTRAVENOUS at 22:31

## 2023-08-18 ASSESSMENT — ACTIVITIES OF DAILY LIVING (ADL)
ADLS_ACUITY_SCORE: 26

## 2023-08-18 NOTE — PROGRESS NOTES
"SPIRITUAL HEALTH SERVICES Progress Note    I visited Anali  on the Medical Surgical Unit at [facility].      Patient/Family Understanding of Illness and Goals of Care - Anali understands her illness.    Meaning, Beliefs, and Spirituality - Anali was open to my introduction and to emotional and spiritual support. She stated, concerning her illness, \"It's out of my hands,\" implying that is is in God's hands.  I offered her a blessing.     Plan of Care - I will continue to follow patient and be available for any ongoing support needs until her discharge.     Perfecto Hernandez, Ph.D,   Spiritual Health Services  [address] [place] [address] [phone]  [email or web address]        "

## 2023-08-18 NOTE — DISCHARGE INSTRUCTIONS
The [facility] will be calling you to schedule your appointment with them following your discharge. If you do not hear from them by Tuesday, please call them at [phone] (select option [phone]).    If as you go home and continue your prednisone taper or after you are off the prednisone completely you notice worsening of your breathing again and you have not yet established care with the [facility], please call the [facility] [phone] and ask for a message to be sent to Dr. Jacobs, pulmonologist, informing him of this fact and asking what to do next.  If you have established care with the [facility], please contact your provider there instead.    A message has been sent to your care team regarding your hospital follow-up appointment to get this scheduled for sooner than Sept. 15th. The clinic will be calling you in 1 - 2 business days, if you don't hear from them by Wednesday please give them a phone at [phone]

## 2023-08-18 NOTE — PLAN OF CARE
Goal Outcome Evaluation:      Plan of Care Reviewed With: patient    Overall Patient Progress: improvingOverall Patient Progress: improving    Major shift events: transitioned off of HFNC  Neuro: alert and oriented x4. Anxiety controlled with prn klonopin.   CMS: intact, has trace edema to hands  Pulmonary: lung sounds diminished throughout with coarseness in bases. On 4.5L NC at rest and 6L with activity. Able to walk to bathroom without becoming symptomatic.   CV: SR/SB  GI: bowels active. Good appetite. X1 BM today.   : adequate urine output  Skin: no change  Pain: chronic back and neck pain controlled with scheduled methadone and prn ibuprofen.   Activity: up standby assist in room  Hygiene: independent. Wants to shower this evening after transfer to floor  Lines/Tubes/Drains: PIV   Drips: saline locked    Plan: transfer to medical floor. Continue to monitor respiratory status.

## 2023-08-18 NOTE — PLAN OF CARE
"  Problem: Plan of Care - These are the overarching goals to be used throughout the patient stay.    Goal: Plan of Care Review  Description: The Plan of Care Review/Shift note should be completed every shift.  The Outcome Evaluation is a brief statement about your assessment that the patient is improving, declining, or no change.  This information will be displayed automatically on your shift note.  Outcome: Progressing  Goal: Patient-Specific Goal (Individualized)  Description: You can add care plan individualizations to a care plan. Examples of Individualization might be:  \"Parent requests to be called daily at 9am for status\", \"I have a hard time hearing out of my right ear\", or \"Do not touch me to wake me up as it startles me\".  Outcome: Progressing  Goal: Absence of Hospital-Acquired Illness or Injury  Outcome: Progressing  Intervention: Identify and Manage Fall Risk  Recent Flowsheet Documentation  Taken 8/18/2023 0400 by Alek Valiente RN  Safety Promotion/Fall Prevention:   activity supervised   nonskid shoes/slippers when out of bed   room near nurse's station   room organization consistent   safety round/check completed   supervised activity  Taken 8/17/2023 2100 by Alek Valiente RN  Safety Promotion/Fall Prevention:   activity supervised   nonskid shoes/slippers when out of bed   room near nurse's station   room organization consistent   safety round/check completed   supervised activity  Intervention: Prevent Skin Injury  Recent Flowsheet Documentation  Taken 8/18/2023 0400 by Alek Valiente RN  Body Position: position changed independently  Taken 8/17/2023 2100 by Alek Valiente RN  Body Position: position changed independently  Goal: Optimal Comfort and Wellbeing  Outcome: Progressing  Intervention: Monitor Pain and Promote Comfort  Recent Flowsheet Documentation  Taken 8/18/2023 0515 by Alek Valiente RN  Pain Management Interventions: medication (see MAR)  Taken 8/17/2023 2000 by Rocco" NAREN Gaston  Pain Management Interventions: medication (see MAR)  Intervention: Provide Person-Centered Care  Recent Flowsheet Documentation  Taken 8/18/2023 0400 by Alek Valiente RN  Trust Relationship/Rapport: care explained  Taken 8/17/2023 2100 by Alek Valiente RN  Trust Relationship/Rapport: care explained  Goal: Readiness for Transition of Care  Outcome: Progressing     Problem: Pain Acute  Goal: Optimal Pain Control and Function  Outcome: Progressing  Intervention: Develop Pain Management Plan  Recent Flowsheet Documentation  Taken 8/18/2023 0515 by Alek Valiente RN  Pain Management Interventions: medication (see MAR)  Taken 8/17/2023 2000 by Alek Valiente RN  Pain Management Interventions: medication (see MAR)  Intervention: Prevent or Manage Pain  Recent Flowsheet Documentation  Taken 8/18/2023 0400 by Alek Valiente RN  Medication Review/Management: medications reviewed  Taken 8/17/2023 2100 by Alek Valiente RN  Medication Review/Management: medications reviewed     Problem: Gas Exchange Impaired  Goal: Optimal Gas Exchange  Outcome: Progressing  Intervention: Optimize Oxygenation and Ventilation  Recent Flowsheet Documentation  Taken 8/18/2023 0400 by Alek Valiente RN  Head of Bed (HOB) Positioning: HOB at 30-45 degrees  Taken 8/17/2023 2100 by Alek Valiente RN  Head of Bed (HOB) Positioning: HOB at 30-45 degrees     Problem: Oral Intake Inadequate  Goal: Improved Oral Intake  Outcome: Progressing      Oxygen saturations remained stable throughout the shift on 80 percent Fio2 and 30 LPM of flow.   Oxygen is now titrated down to 60 percent and 20 LPM of flow and Pt is maintaining saturations in the low 90's.   Lung sounds are coarse with inspiratory and expiratory wheezes in the left lower.   Blood pressures have been stable.   Telemetry has been sinus rhythm with sinus bradycardia at times.   She has been up to the bed side commode.   Will continue with plan of care.

## 2023-08-18 NOTE — CONFIDENTIAL NOTE
ILD New Patient Referral: Pre visit communication    Patient: Anali Loya  Reason for Referral: ILD  Referring Physician: Kevin Jacobs MD   Referring Clinic/Contact: Phone#: [facility]    Chest CT scan: YES. Date- HRCT 10/2021. [facility]  Biopsy: Unknown  PFT's:YES. Date-3/2023. Location[facility].  Additional testing: NO    Current symptoms: Hypoxia  Current related prescriptions: YES. solu-MEDROL 8/15/2023. tadalafil - Pulm HTN    Supplemental oxygen? YES.     In review of apparent records and conversation with patient; recommend first visit with ILD provider be conducted :  In person visit  Testing needed: CT scan only  CT CHEST PE ABDOMEN PELVIS W CONTRAST 8/15/2023     Additional notes:      Martha Vasquez RN

## 2023-08-18 NOTE — PLAN OF CARE
Goal Outcome Evaluation: [id number]      Plan of Care Reviewed With: patient    Overall Patient Progress: no changeOverall Patient Progress: no change    Outcome Evaluation: Pt needs 6L O2 via NC. Had shower. No complaint of pain. SBA

## 2023-08-18 NOTE — PROGRESS NOTES
[facility]    Medicine Progress Note - Hospitalist Service    Date of Admission:  8/15/2023    Assessment & Plan   Patient is a 47-year-old female with past medical history of interstitial lung disease and severe pulmonary hypertension on 4 to 8 L nasal cannula chronically, inflammatory arthritis, polymyositis, chronic pain, depression, anxiety, PTSD, GERD, and ongoing tobacco use who presented with worsening shortness of breath and was found to be in acute on chronic respiratory failure with hypoxia.  Work-up initially was unclear but showed concern for interstitial lung exacerbation with possible superimposed infection versus pulmonary edema.  IV Lasix given in the ED but patient subsequently had hypotension and further diuresis efforts have been held.  She was started on IV steroids, Rocephin, and azithromycin as well as high flow nasal cannula oxygen and initially was in the ICU on intermediate overflow status as she was requiring almost near max settings of high flow.  She has had progressive improvement and today has transition to 6 L nasal cannula at rest and 8 L with activity with notable improvement in activity tolerance.  She will transition to the MedSur floor.  In discussion with Dr. Jacobs, we will plan to transition to prednisone 60 mg daily and monitor closely for ongoing respiratory improvement with hopeful discharge home in the next few days with outpatient follow-up with the interstitial lung disease clinic    Principal Problem:    Acute on chronic respiratory failure with hypoxia (H)    Abnormal CT of the chest    Assessment: Developing after patient had upper respiratory infection for approximately 2 to 3 days.  Chest x-ray shows worsening of interstitial lung disease with concern for overlying infection versus pulmonary edema.  Patient did need high flow nasal cannula 40 L 100% FiO2 initially but has now been weaned off and has been doing well on 6 L nasal  cannula at rest, 8 L with activity.  She denies any new symptoms and states her breathing feels much better and she is getting closer to her normal baseline in recent months.  Currently on Solu-Medrol 62.5 mg twice daily, IV Rocephin and azithromycin    Plan:   -Rediscussed case with Dr. Jacobs, pulmonologist on-call for [facility], and given her rapid improvement and today being day 4 of IV steroids would plan to transition to prednisone 60 mg daily tomorrow morning and monitor closely for ongoing clinical improvement.  If patient does continue to improve the recommended regimen would be 60 mg x 5 days, 40 mg x 5 days, 20 mg x 5 days then stop.  She does not need PJP prophylaxis for this short course.  If at any time she started noticing worsening during the titration process or following discontinuation would need reconsideration of prednisone dose adjustment.  -Continue Rocephin and azithromycin, day 4 of antibiotics.  Consider transition to PO antibiotics toomrrow if she continues to improve.    -Continue to titrate oxygen back towards previous home regimen    Active Problems:    ILD (interstitial lung disease) with concerns for acute exacerbation    Assessment: Patient has known significant interstitial lung disease at baseline currently on 4 to 8 L nasal cannula depending on level of activity.  She has not seen pulmonology since 2019.  She has not been able to quit smoking and therefore is not a lung transplant candidate at this time.  CT scan does show concern for worsening interstitial lung disease and possible acute exacerbation.    Plan:   -Continue with dexamethasone 62.5 mg twice daily today but transition to prednisone 60 mg daily tomorrow morning as outlined above  -Following discussion during this stay regarding disease progression over the past few years and what her goals are going forward, patient is very motivated to quit smoking completely (had been down to a couple a day) and family is supportive of  no longer smoking in the house or around her.  She would like to follow up with the ILD team at [facility] following discharge and see if she would be a candidate for lung transplant and referral has been placed.        Pneumonia of both lungs due to infectious organism, unspecified part of lung - possible    Assessment: Possible superimposed infection overlying suspected interstitial lung disease exacerbation.  Patient was having upper respiratory symptoms for 2 to 3 days before breathing symptoms began indicating possible viral component but viral PCR is negative.  Upper respiratory symptoms are now resolving     Plan:   -Continue Rocephin and azithromycin for possible superimposed bacterial infection  -Start titration of steroids as outlined above      Severe Pulmonary hypertension (H)    Assessment: Patient follows with Dr. Ledesma at [facility] and currently is receiving tadalafil 20 mg daily.      Plan:   -continue tadalafil without change       Elevated brain natriuretic peptide (BNP) level    Assessment: BNP was elevated at 11,955 at time of presentation which is notably increased compared to 4168 when last checked in 2016.  Attempted diuresis in the ED but patient subsequently had hypotension and further diuresis has been held.  Echocardiogram shows ongoing known right sided heart failure, low normal left EF and grade 1 diastolic left ventricular dysfunction present.  Recheck BNP on 8/17 is down to 1,759    Plan:   -Continue patient's tadalafil as above  -Hold any further diuresis at this time  -Recheck BNP tomorrow to evaluate for true baseline closer to time of discharge  -We will reach out to [facility] cardiology team if there are questions regarding this going forward      Personal history of tobacco use, presenting hazards to health    Assessment: Patient reports she has cut down significantly in the last 1 to 2 months and will try and only take a few puffs off of each cigarette and is trying to cut down more but  can't seem to quite quit.  She does continue to live in an environment in which smoking occurs but now she and family are very motivated for her to not be around tobacco at all    Plan:   -Encouraged ongoing efforts at complete cessation as if she can accomplish this there is hope she could get on the lung transplant list in the future.  Patient and family are now very interested in figuring out how she can quit completely and no no exposure going forward.   -Monitor for any signs of nicotine withdrawal  -Patient is not interested in pharmacological assistance to quit at this time (nicotine patch, medication) but would consider it in the future if she cannot succeed       Inflammatory arthritis    Polymyositis, organ involvement unspecified (H)    Assessment: Patient was diagnosed with seronegative inflammatory arthritis in approximately 2008 and has tried multiple treatments including Plaquenil, sulfasalazine and methotrexate without tolerance and patient has not been following with rheumatoid arthritis in recent years.  Does have chronic pain related to her ongoing arthritis and is currently using Suboxone which is prescribed via pain clinic.    Plan:   -Continue use of Suboxone as per chronic pain clinic recommendation during this stay  -Initiate conversation with patient as to whether or not she would be interested in following up with rheumatology as an outpatient going forward and she is undecided at this time       Chronic, continuous use of opioids    Chronic pain    Assessment: Secondary to inflammatory pain as above    Plan:   -Continue with chronic Suboxone 3 times a day      Recurrent major depressive disorder (H)      MOLLY (generalized anxiety disorder)    PTSD (post-traumatic stress disorder)    Assessment: Patient had recent inpatient hospitalization in June of this year for suicidal thoughts, hallucinations.  She reports that following this hospitalization for her symptoms have been much better  controlled and she denies any suicidal thoughts recently    Plan:   -Continue home regimen of sertraline 100 mg daily, Zyprexa 10 mg at bedtime, clonazepam 0.25 mg 3 times a day as needed for anxiety, Vistaril 25 mg 4 times a day as needed for anxiety and monitor for symptom stability       Gastroesophageal reflux disease without esophagitis    Assessment: On Protonix 40 mg daily without breakthrough symptoms    Plan:   -Continue home regimen without change       Diet: Combination Diet Regular Diet Adult  Snacks/Supplements Adult: Ensure Enlive; With Meals    DVT Prophylaxis: Enoxaparin (Lovenox) SQ  Berman Catheter: Not present  Lines: None     Cardiac Monitoring: None  Code Status: Full Code      Clinically Significant Risk Factors                                    Disposition Plan   Anticipated to return home in 2-4 days depending on ongoing progress    Jia Ferrari MD  Hospitalist Service  [facility]  Securely message with [employer] (more info)  Text page via [employer] Paging/Directory   ______________________________________________________________________    Interval History   Patient continues to more rapidly improvement has been weaned down to 6 L nasal cannula oxygen this morning with significant reduction in hypoxia with exertion noted by patient and nursing staff.  Appetite continues to improve.  Energy level is also notably better.  Patient denies any new symptoms and is very pleased with her progress.  No new nursing concerns.    Physical Exam   Vital Signs: Temp: 97.9  F (36.6  C) Temp src: Oral BP: 120/74 Pulse: 74   Resp: 25 SpO2: 91 % O2 Device: Nasal cannula Oxygen Delivery: 6 LPM  Weight: 108 lbs 12.8 oz    Constitutional: awake, alert, cooperative, no apparent distress, and appears stated age  Respiratory: No increased work of breathing, ongoing decreased breath sounds with crackles present in the mid and lower lung fields  Cardiovascular: Regular rate and  rhythm  GI: Bowel sounds present, abdomen is soft and nondistended  Musculoskeletal: no lower extremity pitting edema present  Neurologic: Awake, alert, oriented to name, place and situation    Medical Decision Making       55 MINUTES SPENT BY ME on the date of service doing chart review, history, exam, documentation & further activities per the note.      Data     I have personally reviewed the following data over the past 24 hrs:    17.3 (H)  \   10.8 (L)   / 397     138 99 26.7 (H) /  140 (H)   4.7 30 (H) 0.75 \     Procal: N/A CRP: 6.33 (H) Lactic Acid: N/A

## 2023-08-19 ENCOUNTER — APPOINTMENT (OUTPATIENT)
Dept: PHYSICAL THERAPY | Facility: CLINIC | Age: 48
End: 2023-08-19
Payer: COMMERCIAL

## 2023-08-19 ENCOUNTER — APPOINTMENT (OUTPATIENT)
Dept: OCCUPATIONAL THERAPY | Facility: CLINIC | Age: 48
End: 2023-08-19
Payer: COMMERCIAL

## 2023-08-19 PROBLEM — J81.0 ACUTE PULMONARY EDEMA (H): Status: ACTIVE | Noted: 2023-08-19

## 2023-08-19 PROBLEM — I50.812 CHRONIC RIGHT HEART FAILURE (H): Status: ACTIVE | Noted: 2023-08-19

## 2023-08-19 LAB
ANION GAP SERPL CALCULATED.3IONS-SCNC: 8 MMOL/L (ref 7–15)
BASE EXCESS BLDV CALC-SCNC: 7.6 MMOL/L (ref -7.7–1.9)
BUN SERPL-MCNC: 29.9 MG/DL (ref 6–20)
CALCIUM SERPL-MCNC: 8.8 MG/DL (ref 8.6–10)
CHLORIDE SERPL-SCNC: 100 MMOL/L (ref 98–107)
CREAT SERPL-MCNC: 0.69 MG/DL (ref 0.51–0.95)
CRP SERPL-MCNC: 4.67 MG/L
DEPRECATED HCO3 PLAS-SCNC: 31 MMOL/L (ref 22–29)
GFR SERPL CREATININE-BSD FRML MDRD: >90 ML/MIN/1.73M2
GLUCOSE BLDC GLUCOMTR-MCNC: 115 MG/DL (ref 70–99)
GLUCOSE BLDC GLUCOMTR-MCNC: 134 MG/DL (ref 70–99)
GLUCOSE BLDC GLUCOMTR-MCNC: 150 MG/DL (ref 70–99)
GLUCOSE BLDC GLUCOMTR-MCNC: 98 MG/DL (ref 70–99)
GLUCOSE SERPL-MCNC: 107 MG/DL (ref 70–99)
HCO3 BLDV-SCNC: 34 MMOL/L (ref 21–28)
HGB BLD-MCNC: 10.7 G/DL (ref 11.7–15.7)
MAGNESIUM SERPL-MCNC: 2.4 MG/DL (ref 1.7–2.3)
O2/TOTAL GAS SETTING VFR VENT: 40 %
PCO2 BLDV: 55 MM HG (ref 40–50)
PH BLDV: 7.4 [PH] (ref 7.32–7.43)
PO2 BLDV: 57 MM HG (ref 25–47)
POTASSIUM SERPL-SCNC: 4.5 MMOL/L (ref 3.4–5.3)
SODIUM SERPL-SCNC: 139 MMOL/L (ref 136–145)

## 2023-08-19 PROCEDURE — 250N000012 HC RX MED GY IP 250 OP 636 PS 637: Performed by: FAMILY MEDICINE

## 2023-08-19 PROCEDURE — 86140 C-REACTIVE PROTEIN: CPT | Performed by: FAMILY MEDICINE

## 2023-08-19 PROCEDURE — 250N000011 HC RX IP 250 OP 636: Mod: JZ | Performed by: HOSPITALIST

## 2023-08-19 PROCEDURE — 97530 THERAPEUTIC ACTIVITIES: CPT | Mod: GP | Performed by: PHYSICAL THERAPIST

## 2023-08-19 PROCEDURE — 85018 HEMOGLOBIN: CPT | Performed by: FAMILY MEDICINE

## 2023-08-19 PROCEDURE — 80048 BASIC METABOLIC PNL TOTAL CA: CPT | Performed by: FAMILY MEDICINE

## 2023-08-19 PROCEDURE — 250N000013 HC RX MED GY IP 250 OP 250 PS 637: Performed by: PEDIATRICS

## 2023-08-19 PROCEDURE — 97110 THERAPEUTIC EXERCISES: CPT | Mod: GP | Performed by: PHYSICAL THERAPIST

## 2023-08-19 PROCEDURE — 99233 SBSQ HOSP IP/OBS HIGH 50: CPT | Performed by: PEDIATRICS

## 2023-08-19 PROCEDURE — 82803 BLOOD GASES ANY COMBINATION: CPT | Performed by: FAMILY MEDICINE

## 2023-08-19 PROCEDURE — 250N000013 HC RX MED GY IP 250 OP 250 PS 637: Performed by: FAMILY MEDICINE

## 2023-08-19 PROCEDURE — 97110 THERAPEUTIC EXERCISES: CPT | Mod: GO | Performed by: SPECIALIST/TECHNOLOGIST

## 2023-08-19 PROCEDURE — 83735 ASSAY OF MAGNESIUM: CPT | Performed by: FAMILY MEDICINE

## 2023-08-19 PROCEDURE — 36415 COLL VENOUS BLD VENIPUNCTURE: CPT | Performed by: FAMILY MEDICINE

## 2023-08-19 PROCEDURE — 250N000013 HC RX MED GY IP 250 OP 250 PS 637: Performed by: HOSPITALIST

## 2023-08-19 PROCEDURE — 120N000001 HC R&B MED SURG/OB

## 2023-08-19 RX ORDER — CEFDINIR 300 MG/1
600 CAPSULE ORAL EVERY EVENING
Status: DISCONTINUED | OUTPATIENT
Start: 2023-08-19 | End: 2023-08-20 | Stop reason: HOSPADM

## 2023-08-19 RX ORDER — AZITHROMYCIN 250 MG/1
500 TABLET, FILM COATED ORAL EVERY EVENING
Status: COMPLETED | OUTPATIENT
Start: 2023-08-19 | End: 2023-08-19

## 2023-08-19 RX ADMIN — CEFDINIR 600 MG: 300 CAPSULE ORAL at 19:54

## 2023-08-19 RX ADMIN — PANTOPRAZOLE SODIUM 40 MG: 40 TABLET, DELAYED RELEASE ORAL at 08:29

## 2023-08-19 RX ADMIN — CLONAZEPAM 0.25 MG: 0.25 TABLET, ORALLY DISINTEGRATING ORAL at 19:55

## 2023-08-19 RX ADMIN — BUPRENORPHINE HYDROCHLORIDE, NALOXONE HYDROCHLORIDE 1 FILM: 4; 1 FILM, SOLUBLE BUCCAL; SUBLINGUAL at 20:00

## 2023-08-19 RX ADMIN — BUPRENORPHINE HYDROCHLORIDE, NALOXONE HYDROCHLORIDE 1 FILM: 4; 1 FILM, SOLUBLE BUCCAL; SUBLINGUAL at 13:49

## 2023-08-19 RX ADMIN — AZITHROMYCIN 500 MG: 250 TABLET, FILM COATED ORAL at 19:54

## 2023-08-19 RX ADMIN — SENNOSIDES AND DOCUSATE SODIUM 1 TABLET: 50; 8.6 TABLET ORAL at 20:00

## 2023-08-19 RX ADMIN — Medication 25 MCG: at 08:29

## 2023-08-19 RX ADMIN — BUPRENORPHINE HYDROCHLORIDE, NALOXONE HYDROCHLORIDE 1 FILM: 4; 1 FILM, SOLUBLE BUCCAL; SUBLINGUAL at 08:29

## 2023-08-19 RX ADMIN — CLONAZEPAM 0.25 MG: 0.25 TABLET, ORALLY DISINTEGRATING ORAL at 13:48

## 2023-08-19 RX ADMIN — ENOXAPARIN SODIUM 40 MG: 40 INJECTION SUBCUTANEOUS at 20:00

## 2023-08-19 RX ADMIN — SERTRALINE HYDROCHLORIDE 100 MG: 100 TABLET ORAL at 08:29

## 2023-08-19 RX ADMIN — SENNOSIDES AND DOCUSATE SODIUM 1 TABLET: 50; 8.6 TABLET ORAL at 08:29

## 2023-08-19 RX ADMIN — PREDNISONE 60 MG: 20 TABLET ORAL at 08:29

## 2023-08-19 RX ADMIN — TADALAFIL 20 MG: 20 TABLET ORAL at 08:30

## 2023-08-19 RX ADMIN — CLONAZEPAM 0.25 MG: 0.25 TABLET, ORALLY DISINTEGRATING ORAL at 08:29

## 2023-08-19 RX ADMIN — OLANZAPINE 10 MG: 2.5 TABLET, FILM COATED ORAL at 21:06

## 2023-08-19 RX ADMIN — IBUPROFEN 600 MG: 200 TABLET, FILM COATED ORAL at 04:50

## 2023-08-19 ASSESSMENT — ACTIVITIES OF DAILY LIVING (ADL)
ADLS_ACUITY_SCORE: 26
ADLS_ACUITY_SCORE: 26
DEPENDENT_IADLS:: CLEANING
ADLS_ACUITY_SCORE: 26

## 2023-08-19 NOTE — PLAN OF CARE
Goal Outcome Evaluation:      Plan of Care Reviewed With: patient    Overall Patient Progress: improvingOverall Patient Progress: improving    Outcome Evaluation: Pt sleeping overnight between cares. PRN clonazepam given for anxiety. Ambulating with SBA. O2 sats maintained on 5-6L O2 via NC. Pt desats with activity and requires after a few minutes.  &150. Lung sounds coarse.  IV SL between ABX doses.

## 2023-08-19 NOTE — PLAN OF CARE
Goal Outcome Evaluation:      Plan of Care Reviewed With: patient    Overall Patient Progress: improvingOverall Patient Progress: improving    Outcome Evaluation: Pt on O2 at 4L via NC and needs 6L via NC while on activity. SBA to bed and chair. Afebrile. Lung sound coarse on base. Stool sample for occult need to collect.

## 2023-08-19 NOTE — PROGRESS NOTES
[facility]    Medicine Progress Note - Hospitalist Service    Date of Admission:  8/15/2023    Assessment & Plan   Patient is a 47-year-old female with interstitial lung disease, severe pulmonary hypertension, chronic hypoxic and hypercapnic respiratory failure treated with 4-8 L nasal cannula oxygen supplementation chronically, inflammatory arthritis, polymyositis, chronic pain, depression, anxiety, PTSD, GERD, and ongoing tobacco use who presented with worsening shortness of breath and was admitted due to concern for acute on chronic respiratory failure with hypoxia and hypercapnia.  She continues to improve.    Work-up initially was unclear but showed concern for interstitial lung exacerbation with possible superimposed infection versus pulmonary edema.  IV Lasix given in the ED but patient subsequently had hypotension and further diuresis efforts have been held.   She will transition to the MedSurg floor.  In discussion with Dr. Jacobs, we will plan to transition to prednisone 60 mg daily and monitor closely for ongoing respiratory improvement with hopeful discharge home in the next few days with outpatient follow-up with the interstitial lung disease clinic    ILD (interstitial lung disease) with suspected acute exacerbation  Acute on chronic respiratory failure with hypoxia and hypercapnia  Possible community-acquired pneumonia  Chronic tobacco use  Patient has known chronic interstitial lung disease with chronic respiratory failure treated with 4 to 8 L nasal cannula oxygen supplementation at baseline.  She has not seen pulmonology since 2019.  She has not been able to quit smoking previously.  She reportedly has not previously been a lung transplant candidate.  She presented with worsening shortness of breath coinciding with recent upper respiratory infection symptoms.  Radiographic findings were suspicious for worsening interstitial lung disease with possible overlying  infection and/or pulmonary edema.  Case was discussed with pulmonology by telephone because inpatient pulmonology consultation is not available at this hospital.  She has been treated with IV steroids, IV antibiotics, and high flow nasal cannula oxygen oxygen supplementation.   Smoking cessation has been encouraged and she has expressed intent to try to quit smoking.  She has had progressive improvement and today has recovered to her recent baseline oxygen requirement of 4 L nasal cannula at rest increasing to 6-8 L with activity with notable improvement in activity tolerance, and she reports being close to if not at her recent baseline prior to this hospitalization.  Hypercapnia with respiratory acidosis is stable and now appears to be at her baseline.  -transition today from IV steroids to prednisone 60 mg daily with subsequent taper of 60 mg for 5 days, then 40 mg for 5 days, then 20 mg for 5 days and then stop.  She does not need PJP prophylaxis for this short course.  If at any time she started noticing worsening during the titration process or following discontinuation would need reconsideration of prednisone dose adjustment.  -Continue antibiotics but switch from Rocephin and azithromycin to oral cefdinir and azithromycin, day 5 of antibiotics.     -Continue to titrate oxygen back to previous home regimen as tolerated  -Smoking cessation encouraged  -Anticipate outpatient follow-up in interstitial lung disease clinic at the [facility] after discharge    Severe Pulmonary hypertension  Chronic right heart failure  Possible acute pulmonary edema  Grade 1 diastolic dysfunction  Patient follows for severe pulmonary hypertension and chronic right heart failure with Dr. Ledesma at [facility] and is treated with tadalafil 20 mg daily.  In addition to clinical presentation with dyspnea and severe hypoxia, BNP was significantly elevated and radiographic findings were concerning for pulmonary edema.   Echocardiogram confirmed known chronic right heart failure and pulmonary hypertension with incidental finding of grade 1 diastolic dysfunction.  After receiving an initial dose of IV Lasix in the ER, she became hypotensive, so IV Lasix was not continued.  She has improved clinically and recheck BNP on 8/17 decreased to 1,759 compared with 11,955 at time of admission.  -continue chronic dose of tadalafil  -Resume chronic dose of Lasix 20 mg daily as needed for swelling and/or weight gain    Anemia, unspecified type  Hemoglobin was normal at 12-13 at admission.  During hospitalization, hemoglobin has dropped about 2 g and has stabilized at 10-11 over the last 24 hours.  Active bleeding has not been evident.  Worsening anemia could exacerbate exertional dyspnea.  -Ordered recheck of hemoglobin  -Ordered stool testing for occult blood    Inflammatory arthritis  Polymyositis, organ involvement unspecified  Patient was diagnosed with seronegative inflammatory arthritis in approximately 2008 and has tried multiple treatments including Plaquenil, sulfasalazine and methotrexate without tolerance. Patient has not been following with Rheumatology in recent years.    -Treat joint symptoms symptomatically  -patient is undecided as to whether she wishes to pursue rheumatology follow-up    Chronic pain    Chronic, continuous use of opioids  She has chronic joint pain due to inflammatory polyarthritis and is followed in pain clinic to manage her chronic joint pain.  Chronic pain has been treated with Suboxone via Pain clinic.  Chronic joint pain persists but is stable.  -Continue chronic Suboxone dose    Recurrent major depressive disorder  MOLLY (generalized anxiety disorder)  PTSD (post-traumatic stress disorder)  Patient has history of chronic mental illness with recent inpatient Psychiatric hospitalization in June of this year for suicidal thoughts and hallucinations.  She reports that following that hospitalization with  adjustment in her medications, her symptoms have been much better controlled.  She has denied any recent suicidal thoughts during this hospitalization.  -Continue chronic regimen of sertraline 100 mg daily, Zyprexa 10 mg at bedtime, clonazepam 0.25 mg 3 times a day as needed for anxiety, Vistaril 25 mg 4 times a day as needed for anxiety and monitor for symptom stability     Gastroesophageal reflux disease without esophagitis  Chronic GERD is treated with Protonix 40 mg daily without breakthrough symptoms during hospitalization.  -Continue chronic PPI regimen       Diet: Combination Diet Regular Diet Adult  Snacks/Supplements Adult: Ensure Enlive; With Meals    DVT Prophylaxis: Enoxaparin (Lovenox) SQ  Berman Catheter: Not present  Lines: None     Cardiac Monitoring: None  Code Status: Full Code      Clinically Significant Risk Factors                                    Disposition Plan         Anticipate discharge home once medically stable, possibly 1 to 2 days    Nikunj Viera MD  Hospitalist Service  [facility]  Securely message with [employer] (more info)  Text page via [employer] Paging/Directory   ______________________________________________________________________    Interval History   There were no significant overnight events.  Overall, she feels better.  She feels like her breathing is reapproaching her normal baseline.  She is tolerating advancing activity and was able to climb some stairs today.  She was transition from high flow nasal cannula to low-flow nasal cannula yesterday morning and now this morning is only requiring 4 L nasal cannula at rest which is her normal baseline.  She reports that with activity her oxygen saturations transiently dropped into the 60s today but improved with transient increase in oxygen supplementation and rest.  She wonders whether steroids could cause her to have worsening insomnia although she has been sleeping well in the hospital so far.   She has no other concerns.  She remains afebrile and hemodynamically stable.  She is voiding spontaneously with adequate urine output.  She is tolerating oral intake.    Physical Exam   Vital Signs: Temp: 97.7  F (36.5  C) Temp src: Oral BP: 117/70 Pulse: 57   Resp: 18 SpO2: 94 % O2 Device: Nasal cannula with humidification Oxygen Delivery: 4 LPM  Patient Vitals for the past 24 hrs:   BP Temp Temp src Pulse Resp SpO2 Weight   08/19/23 1042 117/70 97.7  F (36.5  C) Oral 57 18 94 % --   08/19/23 1040 (P) 117/70 -- -- (P) 57 (P) 18 (P) 92 % --   08/19/23 0733 131/75 97.4  F (36.3  C) Oral 57 20 94 % --   08/19/23 0651 -- -- -- -- -- 92 % --   08/19/23 0232 120/66 97.6  F (36.4  C) Oral 62 20 94 % --   08/19/23 0100 -- -- -- -- -- -- 51.6 kg (113 lb 12.8 oz)   08/18/23 2237 118/71 98.2  F (36.8  C) Oral 67 20 91 % --   08/18/23 2020 -- -- -- -- -- 90 % --   08/18/23 2000 107/69 98.6  F (37  C) Oral 67 20 -- --   08/18/23 1525 120/74 97.9  F (36.6  C) Oral 74 25 91 % --   08/18/23 1410 109/69 -- -- 68 24 95 % --   08/18/23 1300 -- 97.7  F (36.5  C) Oral -- -- -- --     Weight: 113 lbs 12.8 oz Body mass index is 21.5 kg/m .  Vitals:    08/15/23 2029 08/16/23 0651 08/19/23 0100   Weight: 50.6 kg (111 lb 8 oz) 49.4 kg (108 lb 12.8 oz) 51.6 kg (113 lb 12.8 oz)       Intake/Output Summary (Last 24 hours) at 8/19/2023 1145  Last data filed at 8/19/2023 0900  Gross per 24 hour   Intake 1080 ml   Output 800 ml   Net 280 ml     General Appearance: Chronically ill-appearing woman without signs of acute distress while sitting up in a chair  Respiratory: Normal respiratory effort, easily speaks full sentences, symmetric breath sounds with prominent inspiratory crackles at the lung bases bilaterally, no wheezes  Cardiovascular: Regular rate and rhythm, good radial pulse, normal capillary refill     Medical Decision Making       57 MINUTES SPENT BY ME on the date of service doing chart review, history, exam, documentation & further  activities per the note.      Data     I have personally reviewed the following data over the past 24 hrs:    N/A  \   10.7 (L)   / N/A     139 100 29.9 (H) /  98   4.5 31 (H) 0.69 \     Procal: N/A CRP: 4.67 Lactic Acid: N/A         Blood sugars ranged  over the last day  Blood cultures remain negative  Magnesium 2.4    Venous Blood Gas  Recent Labs   Lab 08/19/23  0604 08/18/23  0518 08/17/23  0519 08/16/23  0109   PHV 7.40 7.38 7.37 7.40   PCO2V 55* 58* 56* 45   PO2V 57* 65* 43 63*   HCO3V 34* 34* 32* 28   TETO 7.6* 6.4* 5.2* 2.5*   O2PER 40 80 80 90     Recent Labs   Lab 08/19/23  0733 08/19/23  0605 08/19/23  0229 08/18/23  0847 08/18/23  0518 08/17/23  0802 08/17/23  0519 08/16/23  0816 08/16/23  0510 08/15/23  1350   WBC  --   --   --   --  17.3*  --  22.1*  --  13.4* 13.8*   HGB  --  10.7*  --   --  10.8*  --  11.7  --  12.7 12.0   MCV  --   --   --   --  92  --  92  --  89 92   PLT  --   --   --   --  397  --  421  --  479* 476*   INR  --   --   --   --   --   --   --   --   --  1.22*   NA  --  139  --   --  138  --  137  --  135* 132*   POTASSIUM  --  4.5  --   --  4.7  --  4.4  --  4.1 4.3   CHLORIDE  --  100  --   --  99  --  98  --  97* 94*   CO2  --  31*  --   --  30*  --  28  --  27 22   BUN  --  29.9*  --   --  26.7*  --  20.2*  --  14.7 13.6   CR  --  0.69  --   --  0.75  --  0.72  --  0.76 0.86   ANIONGAP  --  8  --   --  9  --  11  --  11 16*   MARCIN  --  8.8  --   --  8.9  --  9.0  --  8.4* 9.0   GLC 98 107* 150*   < > 115*   < > 165*   < > 217* 127*   ALBUMIN  --   --   --   --   --   --   --   --   --  3.9   PROTTOTAL  --   --   --   --   --   --   --   --   --  7.5   BILITOTAL  --   --   --   --   --   --   --   --   --  0.4   ALKPHOS  --   --   --   --   --   --   --   --   --  78   ALT  --   --   --   --   --   --   --   --   --  15   AST  --   --   --   --   --   --   --   --   --  21    < > = values in this interval not displayed.

## 2023-08-19 NOTE — CONSULTS
Care Management Initial Consult    General Information  Assessment completed with: Patient,    Type of CM/SW Visit: Initial Assessment    Primary Care Provider verified and updated as needed: Yes   Readmission within the last 30 days: no previous admission in last 30 days      Reason for Consult: discharge planning, other (see comments) (Elevated risk for hospital readmission)  Advance Care Planning: Advance Care Planning Reviewed: no concerns identified          Communication Assessment  Patient's communication style: spoken language (English or Bilingual)    Hearing Difficulty or Deaf: no   Wear Glasses or Blind: yes    Cognitive  Cognitive/Neuro/Behavioral: WDL                      Living Environment:   People in home: parent(s), child(janessa), adult     Current living Arrangements: house      Able to return to prior arrangements: yes       Family/Social Support:  Care provided by: self, child(janessa), parent(s)  Provides care for: no one  Marital Status: Single  Children, Parent(s), Other (specify) (other relatives)          Description of Support System: Supportive, Involved    Support Assessment: Adequate family and caregiver support, Adequate social supports    Current Resources:   Patient receiving home care services: No     Community Resources: County Worker, [facility],  Mental Health  Equipment currently used at home: none  Supplies currently used at home:      Employment/Financial:  Employment Status: disabled        Financial Concerns: No concerns identified           Does the patient's insurance plan have a 3 day qualifying hospital stay waiver?  No    Lifestyle & Psychosocial Needs:  Social Determinants of Health     Tobacco Use: High Risk (6/30/2023)    Patient History     Smoking Tobacco Use: Some Days     Smokeless Tobacco Use: Former     Passive Exposure: Not on file   Alcohol Use: Not on file   Financial Resource Strain: Not on file   Food Insecurity: Not on file   Transportation Needs: Not on  "file   Physical Activity: Not on file   Stress: Not on file   Social Connections: Not on file   Intimate Partner Violence: Not on file   Depression: At risk (6/30/2023)    PHQ-2     PHQ-2 Score: 4   Housing Stability: Not on file       Functional Status:  Prior to admission patient needed assistance:   Dependent ADLs:: Independent  Dependent IADLs:: Cleaning  Assesssment of Functional Status: At functional baseline    Mental Health Status:  Mental Health Status: Current Concern  Mental Health Management: Medication, Group Therapy, Psychiatrist    Chemical Dependency Status:  Chemical Dependency Status: No Current Concerns             Values/Beliefs:  Spiritual, Cultural Beliefs, Faith Practices, Values that affect care: no               Additional Information:  Care Management consulted for discharge planning and elevated readmission risk.      Writer visited with patient and reviewed the information above.  Patient currently lives with her parents and her two adult son, ages 19 and 24 live there also.  Patient stated that she is now working with her [facility] , Sofia, to find 1-level housing for her and her sons.      Patient has an [facility] psychiatrist (from [facility]) who she see virtually for visits and prescribes her mental health medications.  Patient has diagnosis of recurrent major depressive disorder, anxiety, prolonged grief disorder and PTSD.  Patient stated she just finished a daily support group program through [employer].  Patient stated she has connections and knows who to call if she is having any mental health concerns.  Patient reported she may start individual therapy.  Patient reports she is doing well with her mental health, currently.      Patient wears chronic oxygen at 4L-8L at baseline.  She reports she gets her oxygen/supplies from [employer] and the oxygen is \"Pure Air- liquid oxygen\".  Family to bring in portable tank from home for transport home.  "     Patient denies having any discharge needs at this time.  Discussed elevated risk for readmission.  Patient ok with referral to [facility] for follow up appointment scheduling and handoff to clinic care coordination.      Care Management will continue to follow until hospital discharge.      MEREDITH Serrano  [facility]   [phone]

## 2023-08-20 ENCOUNTER — TELEPHONE (OUTPATIENT)
Dept: FAMILY MEDICINE | Facility: CLINIC | Age: 48
End: 2023-08-20
Payer: COMMERCIAL

## 2023-08-20 VITALS
BODY MASS INDEX: 20.92 KG/M2 | HEIGHT: 61 IN | RESPIRATION RATE: 12 BRPM | WEIGHT: 110.8 LBS | SYSTOLIC BLOOD PRESSURE: 134 MMHG | TEMPERATURE: 98 F | HEART RATE: 46 BPM | DIASTOLIC BLOOD PRESSURE: 75 MMHG | OXYGEN SATURATION: 94 %

## 2023-08-20 LAB
BACTERIA BLD CULT: NO GROWTH
BACTERIA BLD CULT: NO GROWTH
BASE EXCESS BLDV CALC-SCNC: 9.9 MMOL/L (ref -7.7–1.9)
GLUCOSE BLDC GLUCOMTR-MCNC: 82 MG/DL (ref 70–99)
HCO3 BLDV-SCNC: 36 MMOL/L (ref 21–28)
HEMOCCULT STL QL: NEGATIVE
HGB BLD-MCNC: 11.3 G/DL (ref 11.7–15.7)
MAGNESIUM SERPL-MCNC: 2.4 MG/DL (ref 1.7–2.3)
O2/TOTAL GAS SETTING VFR VENT: 36 %
PCO2 BLDV: 53 MM HG (ref 40–50)
PH BLDV: 7.44 [PH] (ref 7.32–7.43)
PO2 BLDV: 55 MM HG (ref 25–47)
POTASSIUM SERPL-SCNC: 4.1 MMOL/L (ref 3.4–5.3)

## 2023-08-20 PROCEDURE — 250N000013 HC RX MED GY IP 250 OP 250 PS 637: Performed by: HOSPITALIST

## 2023-08-20 PROCEDURE — 82272 OCCULT BLD FECES 1-3 TESTS: CPT | Performed by: PEDIATRICS

## 2023-08-20 PROCEDURE — 250N000012 HC RX MED GY IP 250 OP 636 PS 637: Performed by: FAMILY MEDICINE

## 2023-08-20 PROCEDURE — 84132 ASSAY OF SERUM POTASSIUM: CPT | Performed by: FAMILY MEDICINE

## 2023-08-20 PROCEDURE — 85018 HEMOGLOBIN: CPT | Performed by: PEDIATRICS

## 2023-08-20 PROCEDURE — 36415 COLL VENOUS BLD VENIPUNCTURE: CPT | Performed by: FAMILY MEDICINE

## 2023-08-20 PROCEDURE — 82803 BLOOD GASES ANY COMBINATION: CPT | Performed by: PEDIATRICS

## 2023-08-20 PROCEDURE — 83735 ASSAY OF MAGNESIUM: CPT | Performed by: FAMILY MEDICINE

## 2023-08-20 PROCEDURE — 99239 HOSP IP/OBS DSCHRG MGMT >30: CPT | Performed by: PEDIATRICS

## 2023-08-20 PROCEDURE — 250N000013 HC RX MED GY IP 250 OP 250 PS 637: Performed by: FAMILY MEDICINE

## 2023-08-20 RX ORDER — PREDNISONE 20 MG/1
TABLET ORAL
Qty: 24 TABLET | Refills: 0 | Status: SHIPPED | OUTPATIENT
Start: 2023-08-21 | End: 2023-09-01

## 2023-08-20 RX ORDER — CEFDINIR 300 MG/1
600 CAPSULE ORAL EVERY EVENING
Qty: 10 CAPSULE | Refills: 0 | Status: SHIPPED | OUTPATIENT
Start: 2023-08-20 | End: 2023-08-25

## 2023-08-20 RX ADMIN — CLONAZEPAM 0.25 MG: 0.25 TABLET, ORALLY DISINTEGRATING ORAL at 03:30

## 2023-08-20 RX ADMIN — TADALAFIL 20 MG: 20 TABLET ORAL at 08:07

## 2023-08-20 RX ADMIN — SERTRALINE HYDROCHLORIDE 100 MG: 100 TABLET ORAL at 08:06

## 2023-08-20 RX ADMIN — SENNOSIDES AND DOCUSATE SODIUM 1 TABLET: 50; 8.6 TABLET ORAL at 08:06

## 2023-08-20 RX ADMIN — CLONAZEPAM 0.25 MG: 0.25 TABLET, ORALLY DISINTEGRATING ORAL at 10:00

## 2023-08-20 RX ADMIN — PANTOPRAZOLE SODIUM 40 MG: 40 TABLET, DELAYED RELEASE ORAL at 08:06

## 2023-08-20 RX ADMIN — Medication 25 MCG: at 08:07

## 2023-08-20 RX ADMIN — PREDNISONE 60 MG: 20 TABLET ORAL at 08:07

## 2023-08-20 RX ADMIN — BUPRENORPHINE HYDROCHLORIDE, NALOXONE HYDROCHLORIDE 1 FILM: 4; 1 FILM, SOLUBLE BUCCAL; SUBLINGUAL at 08:07

## 2023-08-20 ASSESSMENT — ACTIVITIES OF DAILY LIVING (ADL)
ADLS_ACUITY_SCORE: 26

## 2023-08-20 NOTE — PROGRESS NOTES
S-(situation): Patient discharged to home via wheelchair with significant other.    B-(background): Admitted d/t Pneumonia, ILD (interstitial lung disease) with concerns for acute exacerbation     A-(assessment): Pt on her baseline with 4L NC O2 needed at rest and 6L while doing activity. No complaint of pain. SBA to bathroom. Afebrile.    R-(recommendations): Discharge instructions reviewed with patient. Listed belongings gathered and returned to patient.          Discharge Nursing Criteria:     Care Plan and Patient education resolved: Yes    New Medications- pt has been educated about purpose and side effects: Yes    Vaccines  Influenza status verified at discharge:  No    Intentionally Retained Items No  MISC  Prescriptions if needed, hard copies sent with patient  NA  Home medications returned to patient: Yes  Medication Bin checked and emptied on discharge Yes  Patient reports post-discharge pain management plan is effective: Yes

## 2023-08-20 NOTE — PROGRESS NOTES
Care Management Discharge Note    Discharge Date: 08/20/2023       Discharge Disposition: Home    Discharge Services: County Worker    Discharge DME: None    Discharge Transportation: family or friend will provide    Private pay costs discussed: Not applicable    Does the patient's insurance plan have a 3 day qualifying hospital stay waiver?  No    PAS Confirmation Code:    Patient/family educated on Medicare website which has current facility and service quality ratings: no    Education Provided on the Discharge Plan: Yes  Persons Notified of Discharge Plans: Patient  Patient/Family in Agreement with the Plan: yes    Handoff Referral Completed: Yes    Additional Information:  Per MD at IDT rounds pt is medically stable for discharge. Patient has no identifiable needs from care management team at this time, follow-up appointment made by CM team. Family to provide transport.    Plan: home with family  Transport: Family    MARIA EUGENIA Ellis  Care Transitions Registered Nurse  Tele: [phone]

## 2023-08-20 NOTE — TELEPHONE ENCOUNTER
Reason for Call:  Appointment Request    Patient requesting this type of appt:  Hospital/ED Follow-Up     Requested provider: Cristian Fagan    Reason patient unable to be scheduled: Not within requested timeframe    When does patient want to be seen/preferred time:  before 8/30    Comments: F/U - [facility]     Pt has a Hospital F/U scheduled for 9/14 @ 3:40pm was hoping to get this moved up sooner to be within the 7-10 day window after 8/20 discharge.    Could we send this information to you in [employer] or would you prefer to receive a phone call?:   No preference   Okay to leave a detailed message?: No at Cell number on file:    Telephone Information:   Mobile [phone]       Call taken on 8/20/2023 at 8:57 AM by MARGARITO DE GUZMAN

## 2023-08-20 NOTE — PLAN OF CARE
Goal Outcome Evaluation:        Overall Patient Progress: improving    Outcome Evaluation: Pt's VSS. Afebrile. Weaned O2 to 4L via nasal cannula. Up with SBA. Reported anxiety, given Klonopin. Pt hoping to discharge today.

## 2023-08-20 NOTE — PROGRESS NOTES
4 hour shift note [id number]    O2 via nasal cannula at 6L. Dyspnea with exertion.   Lung sounds coarse bilaterally, independent with IS up to 1500.  Klonipin x1 at HS.   No BM to collect stool specimen.

## 2023-08-20 NOTE — PROGRESS NOTES
Physical Therapy Discharge Summary    Reason for therapy discharge:    Discharged to home.    Progress towards therapy goal(s). See goals on Care Plan in [facility] electronic health record for goal details.  Goals partially met.  Barriers to achieving goals:   discharge from facility.    Therapy recommendation(s):    Mobility with O2 support to maintain functional status, Pacing/energy conservation      Dayami Monk................... PT, DPT, CLT   8/20/2023, 10:09 AM  [phone]

## 2023-08-20 NOTE — DISCHARGE SUMMARY
[facility]  Hospitalist Discharge Summary      Date of Admission:  8/15/2023  Date of Discharge:  8/20/2023  Discharging Provider: Nikunj Viera MD  Discharge Service: Hospitalist Service    Discharge Diagnoses   Principal Problem:    ILD (interstitial lung disease) with concerns for acute exacerbation  Active Problems:    Acute on chronic respiratory failure with hypoxia and hypercapnia (H)    Pneumonia of both lungs due to infectious organism, unspecified part of lung    Inflammatory arthritis    Pulmonary hypertension (H)    Chronic, continuous use of opioids    Polymyositis, organ involvement unspecified (H)    Recurrent major depressive disorder (H)    MOLLY (generalized anxiety disorder)    PTSD (post-traumatic stress disorder)    Chronic pain    Acute pulmonary edema (H)    Chronic right heart failure (H)    Personal history of tobacco use, presenting hazards to health    Hyperlipidemia LDL goal <130    Gastroesophageal reflux disease without esophagitis    Clinically Significant Risk Factors          Follow-ups Needed After Discharge   Follow-up Appointments     Follow-up and recommended labs and tests       Follow up with primary care provider, Cristian Fagan, within 7 days for   hospital follow- up.      Follow up with Pulmonology and ILD clinic at [facility]  per their   recommendation.            Unresulted Labs Ordered in the Past 30 Days of this Admission       Date and Time Order Name Status Description    8/15/2023  1:40 PM Blood Culture Peripheral Blood Preliminary     8/15/2023  1:40 PM Blood Culture Peripheral Blood Preliminary         These results will be followed up by PCP    Discharge Disposition   Discharged to home  Condition at discharge: Stable    Hospital Course   Patient is a 47-year-old female with interstitial lung disease, severe pulmonary hypertension, chronic hypoxic and hypercapnic respiratory failure treated with 4-8 L nasal cannula oxygen  supplementation chronically, inflammatory arthritis, polymyositis, chronic pain, depression, anxiety, PTSD, GERD, and ongoing tobacco use who presented with worsening shortness of breath and was admitted due to concern for acute on chronic respiratory failure with hypoxia and hypercapnia.     ILD (interstitial lung disease) with acute exacerbation  Acute on chronic respiratory failure with hypoxia and hypercapnia  Possible community-acquired pneumonia  Chronic tobacco use  Patient has known chronic interstitial lung disease with chronic respiratory failure treated with 4 to 8 L nasal cannula oxygen supplementation at baseline.  She had not had Pulmonology follow-up since 2019.  She has continued to smoke cigarettes.   She presented with worsening shortness of breath coinciding with recent upper respiratory infection symptoms.  She was severely hypoxic worsened from her normal baseline.  She was hypercapnic although maintained normal pH.  Radiographic findings were suspicious for worsening interstitial lung disease with possible overlying infection and/or pulmonary edema.  Respiratory PCR panel testing was negative.  Testing for COVID-19, influenza, and RSV was negative.  Blood cultures were negative.  Case was discussed with pulmonology by telephone because inpatient pulmonology consultation was not available at this hospital.  She was treated with IV steroids, IV antibiotics, and high flow nasal cannula oxygen oxygen supplementation.   Smoking cessation was encouraged and she expressed intent to quit smoking.  She progressively improved with notable improvement in activity tolerance and was able to ambulate short distances and climb stairs by discharge.  Oxygenation recovered to her recent baseline oxygen requirement of 4 L nasal cannula at rest (increasing to 6-8 L with activity) by discharge.  Hypercapnia with respiratory acidosis remained stable.  At discharge she was advised to complete a treatment course of  antibiotics and a tapering course of prednisone.  Outpatient follow-up with pulmonology in interstitial lung disease clinic was recommended and referral provided.    Severe Pulmonary hypertension  Chronic right heart failure  Possible acute pulmonary edema  Grade 1 diastolic dysfunction  Sinus bradycardia  Patient follows for severe pulmonary hypertension and chronic right heart failure with Dr. Ledesma at [facility] and has been chronically treated with tadalafil 20 mg daily.  In addition to clinical presentation with dyspnea and severe hypoxia, BNP was significantly elevated and radiographic findings were concerning for pulmonary edema.  Echocardiogram confirmed known chronic right heart failure and pulmonary hypertension with incidental finding of grade 1 diastolic dysfunction.  She had cardiac monitoring during hospitalization demonstrating intermittent sinus bradycardia with heart rate as low as 42 but had no other arrhythmias.  She was not overtly symptomatic from bradycardia and it did not require any intervention.  After receiving an initial dose of IV Lasix in the ER due to initial concern for acute pulmonary edema, she became hypotensive, so IV Lasix was not continued.  Chronic tadalafil was continued.  She improved clinically without ongoing diuretic therapy and recheck BNP had decreased to 1,759 compared with 11,955 at time of admission.  At discharge she was advised to to continue her chronic dose of Lasix 20 mg daily as needed for swelling and/or weight gain.    Anemia, unspecified type  Hemoglobin was normal at 12-13 at admission.  During hospitalization, hemoglobin dropped about 2 g and has stabilized at 10-11.  Active bleeding was not evident and stool testing for occult blood was negative.  Worsening anemia may have exacerbated exertional dyspnea.  Outpatient follow-up with her PCP for reevaluation of anemia was recommended.    Inflammatory arthritis  Polymyositis, organ involvement  unspecified  Patient was diagnosed with seronegative inflammatory arthritis in approximately 2008 and had previously tried multiple disease modifying antirheumatic treatments including Plaquenil, sulfasalazine and methotrexate without tolerance. Patient had not been following with Rheumatology in recent years and was considering during hospitalization whether to reestablish care with rheumatology.      Chronic pain    Chronic, continuous use of opioids  She has chronic joint pain due to inflammatory polyarthritis and is followed in pain clinic to manage her chronic joint pain.  Chronic pain has been treated with Suboxone via Pain clinic.  Chronic joint pain persisted during hospitalization but was stable while continuing her chronic dose of Suboxone.    Recurrent major depressive disorder  OMLLY (generalized anxiety disorder)  PTSD (post-traumatic stress disorder)  Patient has history of chronic mental illness with inpatient Psychiatric hospitalization in June 2023 for suicidal thoughts and hallucinations.  She reported that following that hospitalization with adjustment in her medications, her symptoms had been much better controlled.  She denied any recent suicidal thoughts during this hospitalization.  Chronic regimen of sertraline 100 mg daily, Zyprexa 10 mg at bedtime, clonazepam 0.25 mg 3 times a day as needed for anxiety, and Vistaril 25 mg 4 times a day as needed for anxiety was continued.       Gastroesophageal reflux disease without esophagitis  Chronic GERD was treated with Protonix 40 mg daily which was continued during hospitalization.      Consultations This Hospital Stay   CARE MANAGEMENT / SOCIAL WORK IP CONSULT  PHYSICAL THERAPY ADULT IP CONSULT  OCCUPATIONAL THERAPY ADULT IP CONSULT    Code Status   Full Code    Time Spent on this Encounter   INikunj MD, personally saw the patient today and spent greater than 30 minutes discharging this patient.       Nikunj Viera MD  [facility]  [facility] MEDICAL SURGICAL  [address] [place] [address]  Phone: [phone]  ______________________________________________________________________    Physical Exam   Vital Signs: Temp: 98  F (36.7  C) Temp src: Oral BP: 134/75 Pulse: (!) 46   Resp: 12 SpO2: 94 % O2 Device: Nasal cannula with humidification Oxygen Delivery: 4 LPM  Weight: 110 lbs 12.8 oz  General Appearance: Chronically ill-appearing woman without signs of acute distress sitting in a chair  Respiratory: Normal respiratory effort, crackles present at the lung bases bilaterally        Primary Care Physician   Cristian Fagan    Discharge Orders      Adult Pulmonary Medicine  Referral      Primary Care - Care Coordination Referral      Reason for your hospital stay    Hospitalized due to worsening breathing problems and improved     Follow-up and recommended labs and tests     Follow up with primary care provider, Cristian Fagan, within 7 days for hospital follow- up.      Follow up with Pulmonology and ILD clinic at [facility]  per their recommendation.     Activity    Your activity upon discharge: activity as tolerated     Diet    Follow this diet upon discharge: Orders Placed This Encounter      Snacks/Supplements Adult: Ensure Enlive; With Meals      Combination Diet Regular Diet Adult       Significant Results and Procedures   Most Recent 3 CBC's:  Recent Labs   Lab Test 08/20/23  0608 08/19/23  0605 08/18/23  0518 08/17/23  0519 08/16/23  0510   WBC  --   --  17.3* 22.1* 13.4*   HGB 11.3* 10.7* 10.8* 11.7 12.7   MCV  --   --  92 92 89   PLT  --   --  397 421 479*     Most Recent 3 BMP's:  Recent Labs   Lab Test 08/20/23  0735 08/20/23  0608 08/19/23  2117 08/19/23  1629 08/19/23  0733 08/19/23  0605 08/18/23  0847 08/18/23  0518 08/17/23  0802 08/17/23  0519   NA  --   --   --   --   --  139  --  138  --  137   POTASSIUM  --  4.1  --   --   --  4.5  --  4.7  --  4.4   CHLORIDE  --   --   --   --   --  100  --  99   --  98   CO2  --   --   --   --   --  31*  --  30*  --  28   BUN  --   --   --   --   --  29.9*  --  26.7*  --  20.2*   CR  --   --   --   --   --  0.69  --  0.75  --  0.72   ANIONGAP  --   --   --   --   --  8  --  9  --  11   MARCIN  --   --   --   --   --  8.8  --  8.9  --  9.0   GLC 82  --  115* 134*   < > 107*   < > 115*   < > 165*    < > = values in this interval not displayed.     Most Recent 2 LFT's:  Recent Labs   Lab Test 08/15/23  1350 06/13/23  1340   AST 21 14   ALT 15 7   ALKPHOS 78 59   BILITOTAL 0.4 0.4     7-Day Micro Results       Collected Updated Procedure Result Status      08/16/2023 1744 08/17/2023 0040 Respiratory Panel PCR [[id number]]    Swab from Nasopharyngeal    Final result Component Value   Adenovirus Not Detected   Coronavirus Not Detected   This test detects Coronavirus 229E, HKU1, NL63 and OC43 but does not distinguish between them. It does not detect MERS ( Respiratory Syndrome), SARS (Severe Acute Respiratory Syndrome) or 2019-nCoV (Novel 2019) Coronavirus.   Human Metapneumovirus Not Detected   Human Rhin/Enterovirus Not Detected   Influenza A Not Detected   Influenza A, H1 Not Detected   Influenza A 2009 H1N1 Not Detected   Influenza A, H3 Not Detected   Influenza B Not Detected   Parainfluenza Virus 1 Not Detected   Parainfluenza Virus 2 Not Detected   Parainfluenza Virus 3 Not Detected   Parainfluenza Virus 4 Not Detected   Respiratory Syncytial Virus A Not Detected   Respiratory Syncytial Virus B Not Detected   Chlamydia Pneumoniae Not Detected   Mycoplasma Pneumoniae Not Detected            08/15/2023 1412 08/19/2023 2004 Blood Culture Peripheral Blood [[id number]]   Peripheral Blood    Preliminary result Component Value   Culture No growth after 4 days  [P]                08/15/2023 1402 08/15/2023 1443 Symptomatic Influenza A/B, RSV, & SARS-CoV2 PCR (COVID-19) Nose [[id number]]    Swab from Nose    Final result Component Value   Influenza A PCR Negative    Influenza B PCR Negative   RSV PCR Negative   SARS CoV2 PCR Negative   NEGATIVE: SARS-CoV-2 (COVID-19) RNA not detected, presumed negative.            08/15/2023 1350 08/19/2023 2004 Blood Culture Peripheral Blood [[id number]]    Peripheral Blood    Preliminary result Component Value   Culture No growth after 4 days  [P]                      Most Recent ABG:No lab results found.,   Results for orders placed or performed during the hospital encounter of 08/15/23   CT Chest (PE) Abdomen Pelvis w Contrast    Narrative    CT CHEST PE ABDOMEN PELVIS W CONTRAST 8/15/2023 2:49 PM    CLINICAL HISTORY: Acute hypoxia, rule out PE, also with some abdominal  distention and discomfort  TECHNIQUE: CT angiogram chest and routine CT abdomen pelvis with IV  contrast. Arterial phase through the chest and venous phase through  the abdomen and pelvis. 2D and 3D MIP reconstructions were preformed  by the CT technologist. Dose reduction techniques were used.     CONTRAST: Isovue-370, 85mL    COMPARISON: 2/10/2023 chest x-ray and 10/4/2021 chest CT    FINDINGS:  ANGIOGRAM CHEST: No pulmonary emboli. Enlarged central pulmonary  artery, consistent with pulmonary arterial hypertension. Thoracic  aorta is negative for dissection. Enlargement of the right ventricle  relative to the left, likely related to chronic pulmonary arterial  hypertension.    LUNGS AND PLEURA: Extensive reticular opacities, honeycombing and  traction bronchiectasis throughout the lungs, progressed since  10/4/2021. Superimposed groundglass opacities predominantly in the  upper lobes and superior segments of the lower lobes could represent  superimposed pulmonary edema, pneumonia or acute exacerbation  interstitial lung disease. Stable indeterminate left upper lobe mass  measuring 2.6 x 2.1 cm, previously measuring 2.5 x 2.1 cm (series 6,  image 130). Stable left lower lobe pleural-based 0.8 cm nodule (series  6, image 209). No pleural effusion or  pneumothorax.    MEDIASTINUM/AXILLAE: Stable mediastinal and hilar lymphadenopathy. For  example, a 1.4 cm right paratracheal lymph node (series 4, image 93)  and 1.6 cm prevascular space lymph node (image 102) are stable. No  thoracic aortic aneurysm. Mild coronary artery desiccation. No  pericardial effusion.    HEPATOBILIARY: Mild hepatomegaly. Mild gallbladder wall thickening. No  focal hepatic masses. No calcified gallstones or biliary ductal  dilatation.    PANCREAS: Normal.    SPLEEN: Normal.    ADRENAL GLANDS: Normal.    KIDNEYS/BLADDER: Normal.    BOWEL: Normal with no obstruction or acute inflammatory change.  Nothing for appendicitis.    LYMPH NODES: No lymphadenopathy in the abdomen and pelvis.    PELVIC ORGANS: No pelvic or adnexal masses.    OTHER: Small amount of free fluid in the pelvis. No abdominal aortic  aneurysm.    MUSCULOSKELETAL: Bilateral L5 pars defects with mild anterolisthesis  of L5 on S1. No suspicious lesions in the bones.      Impression    IMPRESSION:  1.  No pulmonary emboli.  2.  Interstitial fibrosis with findings CT findings suspicious for  UIP, with progression of pulmonary fibrosis since the CT on 10/4/2021.  3.  Superimposed groundglass opacities in the lung apices could  represent superimposed pneumonia, pulmonary edema or acute  exacerbation of interstitial lung disease.  4.  Indeterminate pulmonary masses with the largest being a 2.6 cm  mass in the left upper lobe, not significantly changed since 2021 at  least, suggesting a nonaggressive etiology. Pulmonology follow-up  recommended.  5.  Findings consistent with pulmonary arterial hypertension with  enlarged pulmonary artery and right ventricle.  6.  Stable mediastinal and hilar lymphadenopathy, nonspecific and  likely reactive.  7.  Mild hepatomegaly which may be related to hepatic venous  congestion from elevated right heart pressures.  8.  Mild gallbladder wall thickening, nonspecific and presumably  related to hepatic  dysfunction. Right upper quadrant ultrasound can be  considered.    MAEGAN DIAZ MD         SYSTEM ID:  [id number]   Echocardiogram Complete     Value    Biplane LVEF 52%    Narrative    [id number]  [facility]  [id number]  [id number]^GERRY^MISSY^JAMAAL     [facility]  Echocardiography Laboratory  [address] [place] [address]     Name: TYREL SQUIRES  MRN: [id number]  : 1975  Study Date: 2023 12:12 PM  Age: 47 yrs  Gender: Female  Patient Location: [facility]  Reason For Study: Pulmonary Hypertension, Respiratory Failure  History: hyperlipidemia, htn, tobacco, increase bnp  Ordering Physician: MISSY GARRETT  Referring Physician: negar Fagan  Performed By: Sunitha Jacobo     BSA: 1.5 m2  Height: 61 in  Weight: 108 lb  HR: 61  BP: 100/68 mmHg  ______________________________________________________________________________  Procedure  Complete Portable Echo Adult. Optison (NDC #1393-7654) given intravenously.  ______________________________________________________________________________  Interpretation Summary     Left ventricular systolic function is low normal.  Biplane LVEF is 52%.  Flattened septum is consistent with RV pressure overload.  RVSP est 39mmHg plus RAP  Mildly decreased right ventricular systolic function  Compared to prior study, changes are noted.  ______________________________________________________________________________  Left Ventricle  The left ventricle is normal in size. Grade I or early diastolic dysfunction.  Left ventricular systolic function is low normal. Biplane LVEF is 52%.  Flattened septum is consistent with RV pressure overload.     Right Ventricle  The right ventricle is mildly dilated. The right ventricle is not well  visualized. Mildly decreased right ventricular systolic function.     Atria  The left atrium is not well visualized. Normal left atrial size. Right atrium  not well visualized.     Mitral Valve  The mitral  valve is normal in structure and function. There is trace mitral  regurgitation. There is no mitral valve stenosis.     Tricuspid Valve  The tricuspid valve is not well visualized, but is grossly normal. The right  ventricular systolic pressure is approximated at 39.9 mmHg plus the right  atrial pressure. Mild (35-45mmHg) pulmonary hypertension is present.     Aortic Valve  The aortic valve is not well visualized. The aortic valve is trileaflet. No  aortic stenosis is present.     Pulmonic Valve  The pulmonic valve is not well visualized.     Vessels  Normal size aorta. Dilation of the inferior vena cava is present with normal  respiratory variation in diameter.     Pericardium  There is no pericardial effusion.     ______________________________________________________________________________  MMode/2D Measurements & Calculations  IVSd: 0.70 cm  LVIDd: 4.8 cm  LVIDs: 3.6 cm  LVPWd: 0.80 cm  FS: 25.0 %  LV mass(C)d: 116.6 grams  LV mass(C)dI: 80.2 grams/m2     Ao root diam: 2.7 cm  LA dimension: 3.2 cm  asc Aorta Diam: 2.7 cm  LA/Ao: 1.2  LA Volume (BP): 25.0 ml  LA Volume Index (BP): 17.2 ml/m2  RWT: 0.33  TAPSE: 1.7 cm     Doppler Measurements & Calculations  MV E max felipe: 48.0 cm/sec  MV A max felipe: 55.3 cm/sec  MV E/A: 0.87     MV dec time: 0.30 sec  Ao V2 max: 112.0 cm/sec  Ao max P.0 mmHg  LV V1 max P.5 mmHg  LV V1 max: 78.8 cm/sec  PA V2 max: 57.3 cm/sec  PA max P.3 mmHg  PA acc time: 0.10 sec  TR max felipe: 316.0 cm/sec  TR max P.9 mmHg  AV Felipe Ratio (DI): 0.70  Medial E/e': 9.6  RV S Felipe: 9.0 cm/sec     ______________________________________________________________________________  Report approved by: Zion Reyez 2023 02:44 PM           *Note: Due to a large number of results and/or encounters for the requested time period, some results have not been displayed. A complete set of results can be found in Results Review.       Discharge Medications   Current Discharge Medication  List        START taking these medications    Details   cefdinir (OMNICEF) 300 MG capsule Take 2 capsules (600 mg) by mouth every evening for 5 days  Qty: 10 capsule, Refills: 0    Associated Diagnoses: ILD (interstitial lung disease) (H)      predniSONE (DELTASONE) 20 MG tablet Take 3 tablets (60 mg) by mouth daily for 3 days, THEN 2 tablets (40 mg) daily for 5 days, THEN 1 tablet (20 mg) daily for 5 days.  Qty: 24 tablet, Refills: 0    Associated Diagnoses: ILD (interstitial lung disease) (H)           CONTINUE these medications which have NOT CHANGED    Details   buprenorphine-naloxone (SUBOXONE) 2-0.5 MG SUBL sublingual tablet Place 2 tablets under the tongue 3 times daily      CHOLECALCIFEROL 25 MCG (1000 UT) tablet Take 1,000 Units by mouth daily      clonazePAM (KLONOPIN) 0.5 MG tablet Take 0.25 mg by mouth 3 times daily as needed for anxiety      furosemide (LASIX) 20 MG tablet Take 1 tablet (20 mg) by mouth as needed (swelling)  Qty: 90 tablet, Refills: 3    Associated Diagnoses: Pulmonary hypertension (H)      hydrOXYzine (VISTARIL) 25 MG capsule Take 25 mg by mouth 4 times daily as needed for anxiety      OLANZapine (ZYPREXA) 10 MG tablet Take 10 mg by mouth At Bedtime      pantoprazole (PROTONIX) 40 MG EC tablet Take 40 mg by mouth daily      polyethylene glycol (MIRALAX) 17 GM/Dose powder Take 1 Capful by mouth daily as needed for constipation      senna (SENOKOT) 8.6 MG tablet Take 1-2 tablets by mouth 2 times daily as needed for constipation      sertraline (ZOLOFT) 100 MG tablet Take 100 mg by mouth daily      tadalafil, PAH, 20 MG TABS Take 1 tablet (20 mg) by mouth daily  Qty: 30 tablet, Refills: 11    Associated Diagnoses: Pulmonary hypertension (H)      traZODone (DESYREL) 50 MG tablet Take  mg by mouth nightly as needed for sleep      VENTOLIN  (90 Base) MCG/ACT inhaler INHALE ONE TO TWO PUFFS INTO THE LUNGS EVERY 4 HOURS AS NEEDED FOR SHORTNESS OF BREATH / DYSPNEA  Qty: 18 g,  Refills: 9    Associated Diagnoses: SOB (shortness of breath)      naloxone (NARCAN) 4 MG/0.1ML nasal spray Spray 1 spray (4 mg) into one nostril alternating nostrils as needed for opioid reversal every 2-3 minutes until assistance arrives  Qty: 0.2 mL, Refills: 0    Associated Diagnoses: Chronic, continuous use of opioids      !! order for DME Oxygen: Patient requires supplemental Oxygen 4 LPM via nasal canula at rest and 6 LPM with activity. Please provide patient with portability capability. Okay to spot check patient on oxygen device, to keep sats above 90%. Please provider with home concentrator that can go up to 10 LPM. Oxygen will be for a lifetime.  Qty: 1 Device, Refills: 0    Associated Diagnoses: Hypoxia      !! order for DME Please provide patient with 2  liquid oxygen tanks for portability. Spot check patient on liquid oxygen. Titrate oxygen to maintain saturations above 88%.  Qty: 2 Device, Refills: 0    Associated Diagnoses: ILD (interstitial lung disease) (H)       !! - Potential duplicate medications found. Please discuss with provider.        Allergies   Allergies   Allergen Reactions    Cellcept [Mycophenolate] GI Disturbance    Formoterol Other (See Comments)     syncope    Imuran [Azathioprine] GI Disturbance    Methotrexate Other (See Comments)     Lung toxicity

## 2023-08-21 ENCOUNTER — PATIENT OUTREACH (OUTPATIENT)
Dept: CARE COORDINATION | Facility: CLINIC | Age: 48
End: 2023-08-21
Payer: COMMERCIAL

## 2023-08-21 DIAGNOSIS — J84.9 ILD (INTERSTITIAL LUNG DISEASE) (H): Primary | ICD-10-CM

## 2023-08-21 ASSESSMENT — ACTIVITIES OF DAILY LIVING (ADL): DEPENDENT_IADLS:: CLEANING

## 2023-08-21 NOTE — TELEPHONE ENCOUNTER
OK for workin 9/1/23 @ 2:20 virtual slots for 40 min. Please call patient  to schedule time.  Electronically signed by Cristian Fagan MD

## 2023-08-21 NOTE — PROGRESS NOTES
Clinic Care Coordination Contact  [facility]: Post-Discharge Note  SITUATION                                                      Admission:    Admission Date: 08/15/23   Reason for Admission: ILD (interstitial lung disease) with concerns for acute exacerbation  Discharge:   Discharge Date: 08/20/23  Discharge Diagnosis: ILD (interstitial lung disease) with concerns for acute exacerbation    BACKGROUND                                                      Per hospital discharge summary and inpatient provider notes:    Hospital Course  Patient is a 47-year-old female with interstitial lung disease, severe pulmonary hypertension, chronic hypoxic and hypercapnic respiratory failure treated with 4-8 L nasal cannula oxygen supplementation chronically, inflammatory arthritis, polymyositis, chronic pain, depression, anxiety, PTSD, GERD, and ongoing tobacco use who presented with worsening shortness of breath and was admitted due to concern for acute on chronic respiratory failure with hypoxia and hypercapnia.      ILD (interstitial lung disease) with acute exacerbation  Acute on chronic respiratory failure with hypoxia and hypercapnia  Possible community-acquired pneumonia  Chronic tobacco use  Patient has known chronic interstitial lung disease with chronic respiratory failure treated with 4 to 8 L nasal cannula oxygen supplementation at baseline.  She had not had Pulmonology follow-up since 2019.  She has continued to smoke cigarettes.   She presented with worsening shortness of breath coinciding with recent upper respiratory infection symptoms.  She was severely hypoxic worsened from her normal baseline.  She was hypercapnic although maintained normal pH.  Radiographic findings were suspicious for worsening interstitial lung disease with possible overlying infection and/or pulmonary edema.  Respiratory PCR panel testing was negative.  Testing for COVID-19, influenza, and RSV was negative.  Blood cultures were  negative.  Case was discussed with pulmonology by telephone because inpatient pulmonology consultation was not available at this hospital.  She was treated with IV steroids, IV antibiotics, and high flow nasal cannula oxygen oxygen supplementation.   Smoking cessation was encouraged and she expressed intent to quit smoking.  She progressively improved with notable improvement in activity tolerance and was able to ambulate short distances and climb stairs by discharge.  Oxygenation recovered to her recent baseline oxygen requirement of 4 L nasal cannula at rest (increasing to 6-8 L with activity) by discharge.  Hypercapnia with respiratory acidosis remained stable.  At discharge she was advised to complete a treatment course of antibiotics and a tapering course of prednisone.  Outpatient follow-up with pulmonology in interstitial lung disease clinic was recommended and referral provided.     Severe Pulmonary hypertension  Chronic right heart failure  Possible acute pulmonary edema  Grade 1 diastolic dysfunction  Sinus bradycardia  Patient follows for severe pulmonary hypertension and chronic right heart failure with Dr. Ledesma at [facility] and has been chronically treated with tadalafil 20 mg daily.  In addition to clinical presentation with dyspnea and severe hypoxia, BNP was significantly elevated and radiographic findings were concerning for pulmonary edema.  Echocardiogram confirmed known chronic right heart failure and pulmonary hypertension with incidental finding of grade 1 diastolic dysfunction.  She had cardiac monitoring during hospitalization demonstrating intermittent sinus bradycardia with heart rate as low as 42 but had no other arrhythmias.  She was not overtly symptomatic from bradycardia and it did not require any intervention.  After receiving an initial dose of IV Lasix in the ER due to initial concern for acute pulmonary edema, she became hypotensive, so IV Lasix was not continued.  Chronic  tadalafil was continued.  She improved clinically without ongoing diuretic therapy and recheck BNP had decreased to 1,759 compared with 11,955 at time of admission.  At discharge she was advised to to continue her chronic dose of Lasix 20 mg daily as needed for swelling and/or weight gain.     Anemia, unspecified type  Hemoglobin was normal at 12-13 at admission.  During hospitalization, hemoglobin dropped about 2 g and has stabilized at 10-11.  Active bleeding was not evident and stool testing for occult blood was negative.  Worsening anemia may have exacerbated exertional dyspnea.  Outpatient follow-up with her PCP for reevaluation of anemia was recommended.     Inflammatory arthritis  Polymyositis, organ involvement unspecified  Patient was diagnosed with seronegative inflammatory arthritis in approximately 2008 and had previously tried multiple disease modifying antirheumatic treatments including Plaquenil, sulfasalazine and methotrexate without tolerance. Patient had not been following with Rheumatology in recent years and was considering during hospitalization whether to reestablish care with rheumatology.       Chronic pain    Chronic, continuous use of opioids  She has chronic joint pain due to inflammatory polyarthritis and is followed in pain clinic to manage her chronic joint pain.  Chronic pain has been treated with Suboxone via Pain clinic.  Chronic joint pain persisted during hospitalization but was stable while continuing her chronic dose of Suboxone.     Recurrent major depressive disorder  MOLLY (generalized anxiety disorder)  PTSD (post-traumatic stress disorder)  Patient has history of chronic mental illness with inpatient Psychiatric hospitalization in June 2023 for suicidal thoughts and hallucinations.  She reported that following that hospitalization with adjustment in her medications, her symptoms had been much better controlled.  She denied any recent suicidal thoughts during this  hospitalization.  Chronic regimen of sertraline 100 mg daily, Zyprexa 10 mg at bedtime, clonazepam 0.25 mg 3 times a day as needed for anxiety, and Vistaril 25 mg 4 times a day as needed for anxiety was continued.       Gastroesophageal reflux disease without esophagitis  Chronic GERD was treated with Protonix 40 mg daily which was continued during hospitalization.      ASSESSMENT           Discharge Assessment  How are you doing now that you are home?: per patient report, she is doing OK. she said she still has issues with her breathing when she is walking around. patient said she did get a call today and the [facility] is going to try and get her in next week with pulmonolgy. patient said she also has a follow up appt with PCP on 9/1.  How are your symptoms? (Red Flag symptoms escalate to triage hotline per guidelines): Unchanged  Do you feel your condition is stable enough to be safe at home until your provider visit?: Yes  Does the patient have their discharge instructions? : Yes  Does the patient have questions regarding their discharge instructions? : No  Were you started on any new medications or were there changes to any of your previous medications? : Yes  Does the patient have all of their medications?: Yes  Do you have questions regarding any of your medications? : No  Do you have all of your needed medical supplies or equipment (DME)?  (i.e. oxygen tank, CPAP, cane, etc.): Yes  Discharge follow-up appointment scheduled within 14 calendar days? : Yes  Discharge Follow Up Appointment Date: 09/01/23  Discharge Follow Up Appointment Scheduled with?: Primary Care Provider         Post-op (Clinicians Only)  Did the patient have surgery or a procedure: No  Fever: No  Chills: No  Eating & Drinking: eating and drinking without complaints/concerns  PO Intake: regular diet  Bowel Function: normal  Urinary Status: voiding without complaint/concerns      PLAN                                                       Outpatient Plan:      Discharge Orders          Adult Pulmonary Medicine [facility] Referral       Primary Care - Care Coordination Referral       Reason for your hospital stay     Hospitalized due to worsening breathing problems and improved          Follow-up and recommended labs and tests      Follow up with primary care provider, Cristian Fagan, within 7 days for hospital follow- up.      Follow up with Pulmonology and ILD clinic at [facility]  per their recommendation.          Activity     Your activity upon discharge: activity as tolerated          Diet     Follow this diet upon discharge: Orders Placed This Encounter      Snacks/Supplements Adult: Ensure Enlive; With Meals      Combination Diet Regular Diet Adult       Future Appointments   Date Time Provider Department Center   8/29/2023  6:40 AM [email or web address]   8/29/2023  7:00 AM UC PFL A [facility]   8/29/2023  7:30 AM UC PFL 6 MINUTE WALK 1 [facility]   8/29/2023  8:00 AM Mango Marinelli MD [facility]   9/1/2023  2:20 PM Cristian Fagan MD [facility]   9/18/2023 11:00 AM UC LAB [facility]R [facility]   9/18/2023 11:30 [facility]   9/18/2023 12:30 PM UC PFL 6 MINUTE WALK 1 [facility]   9/18/2023  1:30 PM Callie Ledesma MD [facility]   10/9/2023  4:40 PM Cristian Fagan MD [facility]         For any urgent concerns, please contact our 24 hour nurse triage line: [phone] ([phone])       Patient declines care coordination at this time.     Luciana López RN

## 2023-08-29 ENCOUNTER — OFFICE VISIT (OUTPATIENT)
Dept: PULMONOLOGY | Facility: CLINIC | Age: 48
End: 2023-08-29
Attending: INTERNAL MEDICINE
Payer: COMMERCIAL

## 2023-08-29 ENCOUNTER — ANCILLARY PROCEDURE (OUTPATIENT)
Dept: CT IMAGING | Facility: CLINIC | Age: 48
End: 2023-08-29
Attending: INTERNAL MEDICINE
Payer: COMMERCIAL

## 2023-08-29 VITALS
DIASTOLIC BLOOD PRESSURE: 82 MMHG | BODY MASS INDEX: 20.6 KG/M2 | WEIGHT: 109 LBS | OXYGEN SATURATION: 100 % | SYSTOLIC BLOOD PRESSURE: 152 MMHG | HEART RATE: 63 BPM

## 2023-08-29 DIAGNOSIS — J84.9 ILD (INTERSTITIAL LUNG DISEASE) (H): ICD-10-CM

## 2023-08-29 DIAGNOSIS — M13.80 SERONEGATIVE ARTHRITIS: ICD-10-CM

## 2023-08-29 DIAGNOSIS — F11.91 OPIOID USE DISORDER IN REMISSION: ICD-10-CM

## 2023-08-29 DIAGNOSIS — I27.20 PULMONARY HYPERTENSION (H): ICD-10-CM

## 2023-08-29 DIAGNOSIS — Z72.0 CURRENT TOBACCO USE: ICD-10-CM

## 2023-08-29 DIAGNOSIS — J84.9 ILD (INTERSTITIAL LUNG DISEASE) (H): Primary | ICD-10-CM

## 2023-08-29 LAB
6 MIN WALK (FT): 875 FT
6 MIN WALK (M): 267 M

## 2023-08-29 PROCEDURE — 71250 CT THORAX DX C-: CPT | Performed by: RADIOLOGY

## 2023-08-29 PROCEDURE — 94375 RESPIRATORY FLOW VOLUME LOOP: CPT | Performed by: INTERNAL MEDICINE

## 2023-08-29 PROCEDURE — 94729 DIFFUSING CAPACITY: CPT | Performed by: INTERNAL MEDICINE

## 2023-08-29 PROCEDURE — 94618 PULMONARY STRESS TESTING: CPT | Performed by: INTERNAL MEDICINE

## 2023-08-29 PROCEDURE — G0463 HOSPITAL OUTPT CLINIC VISIT: HCPCS | Performed by: INTERNAL MEDICINE

## 2023-08-29 PROCEDURE — 99205 OFFICE O/P NEW HI 60 MIN: CPT | Performed by: INTERNAL MEDICINE

## 2023-08-29 PROCEDURE — 94726 PLETHYSMOGRAPHY LUNG VOLUMES: CPT | Performed by: INTERNAL MEDICINE

## 2023-08-29 RX ORDER — PREDNISONE 10 MG/1
TABLET ORAL
Qty: 14 TABLET | Refills: 0 | Status: SHIPPED | OUTPATIENT
Start: 2023-08-29 | End: 2023-09-01
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
## 2023-08-29 NOTE — NURSING NOTE
Chief Complaint   Patient presents with    Interstitial Lung Disease (ILD)     Hospital d/c     Vitals were taken and medications were reconciled.     LEONEL Lehman
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
## 2023-08-29 NOTE — PATIENT INSTRUCTIONS
Prednisone 20 mg for another 4 days, then 10 mg for another 4 days, then 5 mg for another 4 days and then stop  Complete abstinence from smoking and all other recreational substances is required  Please move to a new home where you are not continuously exposed to smoking  We will discuss the new medication OFEV-at our team meeting and inform you of the decision  Follow up with Rheumatology and Pulmonary Hypertension clinic  Try to wean off opioids completely-follow with your psychiatrist  For transplantation, we need you to be abstinent from all forms of substance abuse and be compliant with your medications.    See you in 3 months.    Mango Marinelli MD FCCP  Associate Professor of Medicine  Medical Director, Lung Transplantation Program  Pulmonary, Allergy & Critical Care Division  [facility]

## 2023-08-30 LAB
DLCOCOR-%PRED-PRE: 24 %
DLCOCOR-PRE: 4.72 ML/MIN/MMHG
DLCOUNC-%PRED-PRE: 22 %
DLCOUNC-PRE: 4.38 ML/MIN/MMHG
DLCOUNC-PRED: 19.58 ML/MIN/MMHG
ERV-%PRED-PRE: 176 %
ERV-PRE: 1.73 L
ERV-PRED: 0.98 L
EXPTIME-PRE: 8.24 SEC
FEF2575-%PRED-PRE: 40 %
FEF2575-PRE: 1.05 L/SEC
FEF2575-PRED: 2.57 L/SEC
FEFMAX-%PRED-PRE: 88 %
FEFMAX-PRE: 5.73 L/SEC
FEFMAX-PRED: 6.45 L/SEC
FEV1-%PRED-PRE: 79 %
FEV1-PRE: 1.92 L
FEV1FEV6-PRE: 72 %
FEV1FEV6-PRED: 83 %
FEV1FVC-PRE: 67 %
FEV1FVC-PRED: 82 %
FEV1SVC-PRE: 61 %
FEV1SVC-PRED: 70 %
FIFMAX-PRE: 5.12 L/SEC
FRCPLETH-%PRED-PRE: 124 %
FRCPLETH-PRE: 2.97 L
FRCPLETH-PRED: 2.39 L
FVC-%PRED-PRE: 96 %
FVC-PRE: 2.87 L
FVC-PRED: 2.97 L
IC-%PRED-PRE: 59 %
IC-PRE: 1.41 L
IC-PRED: 2.37 L
RVPLETH-%PRED-PRE: 94 %
RVPLETH-PRE: 1.24 L
RVPLETH-PRED: 1.31 L
TLCPLETH-%PRED-PRE: 91 %
TLCPLETH-PRE: 4.37 L
TLCPLETH-PRED: 4.77 L
VA-%PRED-PRE: 70 %
VA-PRE: 3.14 L
VC-%PRED-PRE: 89 %
VC-PRE: 3.14 L
VC-PRED: 3.49 L

## 2023-09-01 ENCOUNTER — OFFICE VISIT (OUTPATIENT)
Dept: FAMILY MEDICINE | Facility: CLINIC | Age: 48
End: 2023-09-01
Payer: COMMERCIAL

## 2023-09-01 VITALS
HEIGHT: 61 IN | WEIGHT: 108.6 LBS | BODY MASS INDEX: 20.5 KG/M2 | DIASTOLIC BLOOD PRESSURE: 80 MMHG | RESPIRATION RATE: 20 BRPM | SYSTOLIC BLOOD PRESSURE: 138 MMHG | HEART RATE: 76 BPM | TEMPERATURE: 97.2 F | OXYGEN SATURATION: 95 %

## 2023-09-01 DIAGNOSIS — J84.9 ILD (INTERSTITIAL LUNG DISEASE) (H): ICD-10-CM

## 2023-09-01 DIAGNOSIS — D64.9 ANEMIA, UNSPECIFIED TYPE: Primary | ICD-10-CM

## 2023-09-01 LAB
ERYTHROCYTE [DISTWIDTH] IN BLOOD BY AUTOMATED COUNT: 15.6 % (ref 10–15)
FERRITIN SERPL-MCNC: 47 NG/ML (ref 6–175)
FOLATE SERPL-MCNC: 15.6 NG/ML (ref 4.6–34.8)
HCT VFR BLD AUTO: 43.6 % (ref 35–47)
HGB BLD-MCNC: 13.5 G/DL (ref 11.7–15.7)
IRON BINDING CAPACITY (ROCHE): 336 UG/DL (ref 240–430)
IRON SATN MFR SERPL: 29 % (ref 15–46)
IRON SERPL-MCNC: 97 UG/DL (ref 37–145)
MCH RBC QN AUTO: 29 PG (ref 26.5–33)
MCHC RBC AUTO-ENTMCNC: 31 G/DL (ref 31.5–36.5)
MCV RBC AUTO: 94 FL (ref 78–100)
PLATELET # BLD AUTO: 299 10E3/UL (ref 150–450)
RBC # BLD AUTO: 4.65 10E6/UL (ref 3.8–5.2)
VIT B12 SERPL-MCNC: 542 PG/ML (ref 232–1245)
WBC # BLD AUTO: 14 10E3/UL (ref 4–11)

## 2023-09-01 PROCEDURE — 99495 TRANSJ CARE MGMT MOD F2F 14D: CPT | Performed by: FAMILY MEDICINE

## 2023-09-01 PROCEDURE — 85027 COMPLETE CBC AUTOMATED: CPT | Performed by: FAMILY MEDICINE

## 2023-09-01 PROCEDURE — 82746 ASSAY OF FOLIC ACID SERUM: CPT | Performed by: FAMILY MEDICINE

## 2023-09-01 PROCEDURE — 36415 COLL VENOUS BLD VENIPUNCTURE: CPT | Performed by: FAMILY MEDICINE

## 2023-09-01 PROCEDURE — 83550 IRON BINDING TEST: CPT | Performed by: FAMILY MEDICINE

## 2023-09-01 PROCEDURE — 82728 ASSAY OF FERRITIN: CPT | Performed by: FAMILY MEDICINE

## 2023-09-01 PROCEDURE — 83540 ASSAY OF IRON: CPT | Performed by: FAMILY MEDICINE

## 2023-09-01 PROCEDURE — 82607 VITAMIN B-12: CPT | Performed by: FAMILY MEDICINE

## 2023-09-01 RX ORDER — PREDNISONE 10 MG/1
TABLET ORAL
Qty: 14 TABLET | Refills: 0 | Status: SHIPPED | OUTPATIENT
Start: 2023-09-01 | End: 2023-09-10

## 2023-09-01 ASSESSMENT — ANXIETY QUESTIONNAIRES
GAD7 TOTAL SCORE: 5
3. WORRYING TOO MUCH ABOUT DIFFERENT THINGS: SEVERAL DAYS
6. BECOMING EASILY ANNOYED OR IRRITABLE: NOT AT ALL
4. TROUBLE RELAXING: SEVERAL DAYS
2. NOT BEING ABLE TO STOP OR CONTROL WORRYING: SEVERAL DAYS
GAD7 TOTAL SCORE: 5
5. BEING SO RESTLESS THAT IT IS HARD TO SIT STILL: SEVERAL DAYS
1. FEELING NERVOUS, ANXIOUS, OR ON EDGE: SEVERAL DAYS
IF YOU CHECKED OFF ANY PROBLEMS ON THIS QUESTIONNAIRE, HOW DIFFICULT HAVE THESE PROBLEMS MADE IT FOR YOU TO DO YOUR WORK, TAKE CARE OF THINGS AT HOME, OR GET ALONG WITH OTHER PEOPLE: SOMEWHAT DIFFICULT
7. FEELING AFRAID AS IF SOMETHING AWFUL MIGHT HAPPEN: NOT AT ALL

## 2023-09-01 ASSESSMENT — PATIENT HEALTH QUESTIONNAIRE - PHQ9
10. IF YOU CHECKED OFF ANY PROBLEMS, HOW DIFFICULT HAVE THESE PROBLEMS MADE IT FOR YOU TO DO YOUR WORK, TAKE CARE OF THINGS AT HOME, OR GET ALONG WITH OTHER PEOPLE: NOT DIFFICULT AT ALL
SUM OF ALL RESPONSES TO PHQ QUESTIONS 1-9: 9
SUM OF ALL RESPONSES TO PHQ QUESTIONS 1-9: 9

## 2023-09-01 ASSESSMENT — PAIN SCALES - GENERAL: PAINLEVEL: NO PAIN (0)

## 2023-09-01 NOTE — PROGRESS NOTES
Assessment & Plan     ILD (interstitial lung disease) (H)  Chronic, severe. O2 dependent. Working with Pulmonology for consideration for advance therapies including lung transplantation. Aggressive monitoring with PFT and 6 MW schedule. She feels at her baseline. She has not smoked since discharge. She has met with pulmonology since discharge and plan of care outlined by Dr. Marinelli.   - predniSONE (DELTASONE) 10 MG tablet; Take 20 mg (2 tabs) by mouth for 4 days,Take 10 mg (1 tab)  by mouth for 4 days,  Take 5 mg (1/2 tab) for 4 days, then stop.    Anemia, unspecified type  Acute noted at discharge with hemoglobin down to 11.7. Repeat today at baseline 13.5. Iron studies normal. B12, folate normal. The current medical regimen is effective;  continue present plan and medications.   - CBC with platelets; Future  - Iron and iron binding capacity; Future  - Ferritin; Future  - Vitamin B12; Future  - Folate; Future  - CBC with platelets  - Iron and iron binding capacity  - Ferritin  - Vitamin B12  - Folate    Ordering of each unique test  Prescription drug management       MED REC REQUIRED  Post Medication Reconciliation Status:  Discharge medications reconciled and changed, see notes/orders  Regular exercise        Cristian Fagan MD  [facility]    Thierno Briones is a 47 year old, presenting for the following health issues:  Hospital F/U      9/1/2023     2:14 PM   Additional Questions   Roomed by [facility]         9/1/2023     2:14 PM   Patient Reported Additional Medications   Patient reports taking the following new medications nexium 20 mg       HPI         8/21/2023    12:14 PM   Post Discharge Outreach   Admission Date 8/15/2023   Reason for Admission ILD (interstitial lung disease) with concerns for acute exacerbation   Discharge Date 8/20/2023   Discharge Diagnosis ILD (interstitial lung disease) with concerns for acute exacerbation   How are you doing now that you are home? per  patient report, she is doing OK. she said she still has issues with her breathing when she is walking around. patient said she did get a call today and the [facility] is going to try and get her in next week with pulmonolgy. patient said she also has a follow up appt with PCP on 9/1.   How are your symptoms? (Red Flag symptoms escalate to triage hotline per guidelines) Unchanged   Do you feel your condition is stable enough to be safe at home until your provider visit? Yes   Does the patient have their discharge instructions?  Yes   Does the patient have questions regarding their discharge instructions?  No   Were you started on any new medications or were there changes to any of your previous medications?  Yes   Does the patient have all of their medications? Yes   Do you have questions regarding any of your medications?  No   Do you have all of your needed medical supplies or equipment (DME)?  (i.e. oxygen tank, CPAP, cane, etc.) Yes   Discharge follow-up appointment scheduled within 14 calendar days?  Yes   Discharge Follow Up Appointment Date 9/1/2023   Discharge Follow Up Appointment Scheduled with? Primary Care Provider     Hospital Follow-up Visit:    Hospital/Nursing Home/ Rehab Facility: [facility]  Date of Admission: 8/15/2023  Date of Discharge: 8/19/2023  Reason(s) for Admission: interstitial lung disease    Was your hospitalization related to COVID-19? No   Problems taking medications regularly:  None  Medication changes since discharge: None  Problems adhering to non-medication therapy:  None    Summary of hospitalization:  [facility] discharge summary reviewed  Diagnostic Tests/Treatments reviewed.  Follow up needed: Recheck Hgb  Other Healthcare Providers Involved in Patient s Care:         Specialist appointment - Pulmonolgy, Cardiology, Pain, psychiatry  Update since discharge: improved.       [facility]  Hospitalist Discharge  Summary       Date of Admission:  8/15/2023  Date of Discharge:  8/20/2023  Discharging Provider: Nikunj Viera MD  Discharge Service: Hospitalist Service        Discharge Diagnoses  Principal Problem:    ILD (interstitial lung disease) with concerns for acute exacerbation  Active Problems:    Acute on chronic respiratory failure with hypoxia and hypercapnia (H)    Pneumonia of both lungs due to infectious organism, unspecified part of lung    Inflammatory arthritis    Pulmonary hypertension (H)    Chronic, continuous use of opioids    Polymyositis, organ involvement unspecified (H)    Recurrent major depressive disorder (H)    MOLLY (generalized anxiety disorder)    PTSD (post-traumatic stress disorder)    Chronic pain    Acute pulmonary edema (H)    Chronic right heart failure (H)    Personal history of tobacco use, presenting hazards to health    Hyperlipidemia LDL goal <130    Gastroesophageal reflux disease without esophagitis           Clinically Significant Risk Factors                Follow-ups Needed After Discharge  Follow-up Appointments     Follow-up and recommended labs and tests       Follow up with primary care provider, Cristian Fagan, within 7 days for   hospital follow- up.      Follow up with Pulmonology and ILD clinic at [facility]  per their   recommendation.             Unresulted Labs Ordered in the Past 30 Days of this Admission         Date and Time Order Name Status Description     8/15/2023  1:40 PM Blood Culture Peripheral Blood Preliminary       8/15/2023  1:40 PM Blood Culture Peripheral Blood Preliminary            These results will be followed up by PCP           Discharge Disposition   Discharged to home  Condition at discharge: Stable           Hospital Course  Patient is a 47-year-old female with interstitial lung disease, severe pulmonary hypertension, chronic hypoxic and hypercapnic respiratory failure treated with 4-8 L nasal cannula oxygen supplementation chronically,  inflammatory arthritis, polymyositis, chronic pain, depression, anxiety, PTSD, GERD, and ongoing tobacco use who presented with worsening shortness of breath and was admitted due to concern for acute on chronic respiratory failure with hypoxia and hypercapnia.      ILD (interstitial lung disease) with acute exacerbation  Acute on chronic respiratory failure with hypoxia and hypercapnia  Possible community-acquired pneumonia  Chronic tobacco use  Patient has known chronic interstitial lung disease with chronic respiratory failure treated with 4 to 8 L nasal cannula oxygen supplementation at baseline.  She had not had Pulmonology follow-up since 2019.  She has continued to smoke cigarettes.   She presented with worsening shortness of breath coinciding with recent upper respiratory infection symptoms.  She was severely hypoxic worsened from her normal baseline.  She was hypercapnic although maintained normal pH.  Radiographic findings were suspicious for worsening interstitial lung disease with possible overlying infection and/or pulmonary edema.  Respiratory PCR panel testing was negative.  Testing for COVID-19, influenza, and RSV was negative.  Blood cultures were negative.  Case was discussed with pulmonology by telephone because inpatient pulmonology consultation was not available at this hospital.  She was treated with IV steroids, IV antibiotics, and high flow nasal cannula oxygen oxygen supplementation.   Smoking cessation was encouraged and she expressed intent to quit smoking.  She progressively improved with notable improvement in activity tolerance and was able to ambulate short distances and climb stairs by discharge.  Oxygenation recovered to her recent baseline oxygen requirement of 4 L nasal cannula at rest (increasing to 6-8 L with activity) by discharge.  Hypercapnia with respiratory acidosis remained stable.  At discharge she was advised to complete a treatment course of antibiotics and a tapering  course of prednisone.  Outpatient follow-up with pulmonology in interstitial lung disease clinic was recommended and referral provided.     Severe Pulmonary hypertension  Chronic right heart failure  Possible acute pulmonary edema  Grade 1 diastolic dysfunction  Sinus bradycardia  Patient follows for severe pulmonary hypertension and chronic right heart failure with Dr. Ledesma at [facility] and has been chronically treated with tadalafil 20 mg daily.  In addition to clinical presentation with dyspnea and severe hypoxia, BNP was significantly elevated and radiographic findings were concerning for pulmonary edema.  Echocardiogram confirmed known chronic right heart failure and pulmonary hypertension with incidental finding of grade 1 diastolic dysfunction.  She had cardiac monitoring during hospitalization demonstrating intermittent sinus bradycardia with heart rate as low as 42 but had no other arrhythmias.  She was not overtly symptomatic from bradycardia and it did not require any intervention.  After receiving an initial dose of IV Lasix in the ER due to initial concern for acute pulmonary edema, she became hypotensive, so IV Lasix was not continued.  Chronic tadalafil was continued.  She improved clinically without ongoing diuretic therapy and recheck BNP had decreased to 1,759 compared with 11,955 at time of admission.  At discharge she was advised to to continue her chronic dose of Lasix 20 mg daily as needed for swelling and/or weight gain.     Anemia, unspecified type  Hemoglobin was normal at 12-13 at admission.  During hospitalization, hemoglobin dropped about 2 g and has stabilized at 10-11.  Active bleeding was not evident and stool testing for occult blood was negative.  Worsening anemia may have exacerbated exertional dyspnea.  Outpatient follow-up with her PCP for reevaluation of anemia was recommended.     Inflammatory arthritis  Polymyositis, organ involvement unspecified  Patient was diagnosed with  seronegative inflammatory arthritis in approximately 2008 and had previously tried multiple disease modifying antirheumatic treatments including Plaquenil, sulfasalazine and methotrexate without tolerance. Patient had not been following with Rheumatology in recent years and was considering during hospitalization whether to reestablish care with rheumatology.       Chronic pain    Chronic, continuous use of opioids  She has chronic joint pain due to inflammatory polyarthritis and is followed in pain clinic to manage her chronic joint pain.  Chronic pain has been treated with Suboxone via Pain clinic.  Chronic joint pain persisted during hospitalization but was stable while continuing her chronic dose of Suboxone.     Recurrent major depressive disorder  MOLLY (generalized anxiety disorder)  PTSD (post-traumatic stress disorder)  Patient has history of chronic mental illness with inpatient Psychiatric hospitalization in June 2023 for suicidal thoughts and hallucinations.  She reported that following that hospitalization with adjustment in her medications, her symptoms had been much better controlled.  She denied any recent suicidal thoughts during this hospitalization.  Chronic regimen of sertraline 100 mg daily, Zyprexa 10 mg at bedtime, clonazepam 0.25 mg 3 times a day as needed for anxiety, and Vistaril 25 mg 4 times a day as needed for anxiety was continued.       Gastroesophageal reflux disease without esophagitis  Chronic GERD was treated with Protonix 40 mg daily which was continued during hospitalization.             Consultations This Hospital Stay   CARE MANAGEMENT / SOCIAL WORK IP CONSULT  PHYSICAL THERAPY ADULT IP CONSULT  OCCUPATIONAL THERAPY ADULT IP CONSULT           Code Status   Full Code           Time Spent on this Encounter   I, Nikunj Viera MD, personally saw the patient today and spent greater than 30 minutes discharging this patient.     Nikunj Viera MD  [facility]  MEDICAL SURGICAL  [address]   [place] [address]  Phone: [phone]  ______________________________________________________________________           Physical Exam  Vital Signs: Temp: 98  F (36.7  C) Temp src: Oral BP: 134/75 Pulse: (!) 46   Resp: 12 SpO2: 94 % O2 Device: Nasal cannula with humidification Oxygen Delivery: 4 LPM  Weight: 110 lbs 12.8 oz  General Appearance:  Chronically ill-appearing woman without signs of acute distress sitting in a chair  Respiratory: Normal respiratory effort, crackles present at the lung bases bilaterally              Primary Care Physician   Cristian Fagan           Discharge Orders          Adult Pulmonary Medicine  Referral       Primary Care - Care Coordination Referral       Reason for your hospital stay     Hospitalized due to worsening breathing problems and improved          Follow-up and recommended labs and tests      Follow up with primary care provider, Cristian Fagan, within 7 days for hospital follow- up.      Follow up with Pulmonology and ILD clinic at [facility]  per their recommendation.          Activity     Your activity upon discharge: activity as tolerated          Diet     Follow this diet upon discharge: Orders Placed This Encounter      Snacks/Supplements Adult: Ensure Enlive; With Meals      Combination Diet Regular Diet Adult           Subsequent Follow up with Pulmonology on 8/29/23.      [facility] Interstitial Lung Disease Program              Date of Visit: 08/28/23   Clinic Location: [facility]        ASSESSMENT:  ILD: Suspect severe fibrotic NSIP in the setting of seronegative inflammatory arthritis-all serologies negative last checked 2017. Review of imaging suggests severe disease, with worsening fibrosis since 2017.   Pulmonary hypertension-likely Group 3 and group 1.  CTD: Seronegative inflammatory arthritis.   Patulous esophagus: With GERD.  Tobacco abuse-Intermittent cessation.   Opioid use-in  remission     PLAN:  Disease monitoring: Severe disease with progressive fibrosis on imaging. PFTs are pseudo-preserved but with severe diffusion impairment ( 20%). Her disease warrants close monitoring with spirometry, DLCO as needed, and 6MWT-will see her in 3 months.  Immunomodulatory therapy and monitoring: Off Methotrexate, Cellcept and AZA now ( reports A/E-mostly GI) since 2017. Based on imaging, I don't think she will obtain any benefit from further immunosuppression. For prednisone ( currently at 20 mg since discharge from hospital), will taper by 5 mg every 4 days with plans to stop.   Antifibrotic therapy: Possibly a candidate for Nintedanib-for progressive fibrotic ILD-discuss at MDD conference next week.   Oxygen use: Currently using 4 L at rest, upto 10 L with exertion.  Discussed smoking cessation-did not tolerate nicotine patches-planning to quit on her own and reports feeling motivated about it. 5 minutes spent on smoking cessation discussion today.  Pulmonary hypertension: Failed tyvaso, now on Adcirca 20 mg po daily-tolerating well but unsure of benefit. She should follow with Dr Ledesma at the [facility] clinic.  Pulmonary rehabilitation: Discuss at next visit, when she has recovered from her recent illness.  Lung Transplantation: Discussed briefly-given irreversible fibrosis and age, lung transplantation would be considered curative. However, ongoing intermittent tobacco use, exposure to tobacco at home ( mother still smokes), non-compliance, and opioid use are contraindications. Discussed all of these in detail and the importance of close monitoring and follow up. She verbalized understanding.   For seronegative arthritis, suspected CTD, discussed the importance of rheumatology follow up.  GERD therapy:Continue Nexium daily , she reports benefit  Immunization/preventive care: Plan to discuss with primary care provider. She reports being up to date with pap smear, but is due for mammogram.        Orders Placed This Encounter   Procedures    General PFT Lab (Please always keep checked)    Pulmonary Function Test    6 minute walk test      I spent 60  minutes reviewing chart, reviewing test results, talking with and examining patient, formulating plan, and documentation on the day of the encounter.  Mango Marinelli MD [facility]  Associate Professor of Medicine  Medical Director, Lung Transplantation Program  Pulmonary, Allergy & Critical Care Division  [facility]      Plan of care communicated with patient           Patient Active Problem List   Diagnosis    Personal history of tobacco use, presenting hazards to health    Abnormal blood chemistry    Abnormal maternal glucose tolerance, antepartum    Inflammatory arthritis    Hyperlipidemia LDL goal <130    SOB (shortness of breath)    Fibrosis of lung (H)    Anxiety    Tobacco abuse    S/P bunionectomy    ILD (interstitial lung disease) with concerns for acute exacerbation    Lung nodule    Pneumothorax of left lung after biopsy    Pneumothorax after biopsy    Hypoxia    Pulmonary hypertension (H)    ASCUS of cervix with negative high risk HPV    Primary pulmonary hypertension (H)    Chronic, continuous use of opioids    Iron deficiency    Anemia    Spondylosis of lumbosacral region without myelopathy or radiculopathy    Polymyositis, organ involvement unspecified (H)    Hallucinations    Prolonged grief disorder    Abnormal CT of the chest    Elevated brain natriuretic peptide (BNP) level    Acute on chronic respiratory failure with hypoxia and hypercapnia (H)    Pneumonia of both lungs due to infectious organism, unspecified part of lung    Recurrent major depressive disorder (H)    OMLLY (generalized anxiety disorder)    PTSD (post-traumatic stress disorder)    Gastroesophageal reflux disease without esophagitis    Chronic pain    Acute pulmonary edema (H)    Chronic right heart failure (H)    Seronegative arthritis    Current tobacco  use    Opioid use disorder in remission       Current Outpatient Medications   Medication Sig Dispense Refill    buprenorphine-naloxone (SUBOXONE) 2-0.5 MG SUBL sublingual tablet Place 2 tablets under the tongue 3 times daily      CHOLECALCIFEROL 25 MCG (1000 UT) tablet Take 1,000 Units by mouth daily      clonazePAM (KLONOPIN) 0.5 MG tablet Take 0.25 mg by mouth 3 times daily as needed for anxiety      furosemide (LASIX) 20 MG tablet Take 1 tablet (20 mg) by mouth as needed (swelling) 90 tablet 3    hydrOXYzine (VISTARIL) 25 MG capsule Take 25 mg by mouth 4 times daily as needed for anxiety      naloxone (NARCAN) 4 MG/0.1ML nasal spray Spray 1 spray (4 mg) into one nostril alternating nostrils as needed for opioid reversal every 2-3 minutes until assistance arrives 0.2 mL 0    OLANZapine (ZYPREXA) 10 MG tablet Take 10 mg by mouth At Bedtime      order for DME Oxygen: Patient requires supplemental Oxygen 4 LPM via nasal canula at rest and 6 LPM with activity. Please provide patient with portability capability. Okay to spot check patient on oxygen device, to keep sats above 90%. Please provider with home concentrator that can go up to 10 LPM. Oxygen will be for a lifetime. 1 Device 0    order for DME Please provide patient with 2  liquid oxygen tanks for portability. Spot check patient on liquid oxygen. Titrate oxygen to maintain saturations above 88%. 2 Device 0    polyethylene glycol (MIRALAX) 17 GM/Dose powder Take 1 Capful by mouth daily as needed for constipation      predniSONE (DELTASONE) 20 MG tablet Take 3 tablets (60 mg) by mouth daily for 3 days, THEN 2 tablets (40 mg) daily for 5 days, THEN 1 tablet (20 mg) daily for 5 days. 24 tablet 0    senna (SENOKOT) 8.6 MG tablet Take 1-2 tablets by mouth 2 times daily as needed for constipation      sertraline (ZOLOFT) 100 MG tablet Take 100 mg by mouth daily      tadalafil, PAH, 20 MG TABS Take 1 tablet (20 mg) by mouth daily 30 tablet 11    traZODone (DESYREL) 50  "MG tablet Take  mg by mouth nightly as needed for sleep      VENTOLIN  (90 Base) MCG/ACT inhaler INHALE ONE TO TWO PUFFS INTO THE LUNGS EVERY 4 HOURS AS NEEDED FOR SHORTNESS OF BREATH / DYSPNEA (Patient taking differently: Inhale 1-2 puffs into the lungs every 4 hours as needed for shortness of breath or wheezing) 18 g 9    pantoprazole (PROTONIX) 40 MG EC tablet Take 40 mg by mouth daily (Patient not taking: Reported on 9/1/2023)             Review of Systems   CONSTITUTIONAL: NEGATIVE for fever, chills, change in weight  ENT/MOUTH: NEGATIVE for ear, mouth and throat problems  RESP:POSITIVE for dyspnea on exertion, SOB/dyspnea, and ILD  CV: NEGATIVE for chest pain, palpitations or peripheral edema  ROS otherwise negative      Objective    /80 (BP Location: Right arm, Patient Position: Chair)   Pulse 76   Temp 97.2  F (36.2  C) (Temporal)   Resp 20   Ht 1.543 m (5' 0.75\")   Wt 49.3 kg (108 lb 9.6 oz)   LMP  (LMP Unknown)   SpO2 95%   BMI 20.69 kg/m    Body mass index is 20.69 kg/m .  Physical Exam   GENERAL: healthy, alert, no distress, and frail  NECK: no adenopathy, no asymmetry, masses, or scars and thyroid normal to palpation  RESP: no rales , no rhonchi, no wheezes, and decreased breath sounds bibasilar  CV: regular rate and rhythm, normal S1 S2, no S3 or S4, no murmur, click or rub, no peripheral edema and peripheral pulses strong  ABDOMEN: soft, nontender, no hepatosplenomegaly, no masses and bowel sounds normal  MS: no gross musculoskeletal defects noted, no edema  PSYCH: mentation appears normal, affect normal/bright    Office Visit on 08/29/2023   Component Date Value Ref Range Status    6 min walk (FT) 08/29/2023 875  1,614 ft Final    6 Min Walk (M) 08/29/2023 267  492 m Final     Results for orders placed or performed in visit on 09/01/23   CBC with platelets     Status: Abnormal   Result Value Ref Range    WBC Count 14.0 (H) 4.0 - 11.0 10e3/uL    RBC Count 4.65 3.80 - 5.20 " 10e6/uL    Hemoglobin 13.5 11.7 - 15.7 g/dL    Hematocrit 43.6 35.0 - 47.0 %    MCV 94 78 - 100 fL    MCH 29.0 26.5 - 33.0 pg    MCHC 31.0 (L) 31.5 - 36.5 g/dL    RDW 15.6 (H) 10.0 - 15.0 %    Platelet Count 299 150 - 450 10e3/uL   Iron and iron binding capacity     Status: Normal   Result Value Ref Range    Iron 97 37 - 145 ug/dL    Iron Binding Capacity 336 240 - 430 ug/dL    Iron Sat Index 29 15 - 46 %   Ferritin     Status: Normal   Result Value Ref Range    Ferritin 47 6 - 175 ng/mL   Vitamin B12     Status: Normal   Result Value Ref Range    Vitamin B12 542 232 - 1,245 pg/mL   Folate     Status: Normal   Result Value Ref Range    Folic Acid 15.6 4.6 - 34.8 ng/mL                   Answers submitted by the patient for this visit:  Patient Health Questionnaire (Submitted on 9/1/2023)  If you checked off any problems, how difficult have these problems made it for you to do your work, take care of things at home, or get along with other people?: Not difficult at all  PHQ9 TOTAL SCORE: 9  MOLLY-7 (Submitted on 9/1/2023)  MOLLY 7 TOTAL SCORE: 5

## 2023-09-06 DIAGNOSIS — I27.20 PULMONARY HYPERTENSION (H): ICD-10-CM

## 2023-09-06 RX ORDER — FUROSEMIDE 20 MG
20 TABLET ORAL PRN
Qty: 90 TABLET | Refills: 3 | Status: SHIPPED | OUTPATIENT
Start: 2023-09-06 | End: 2024-09-17

## 2023-09-06 NOTE — TELEPHONE ENCOUNTER
"historical    Requested Prescriptions   Pending Prescriptions Disp Refills    furosemide (LASIX) 20 MG tablet 90 tablet 3     Sig: Take 1 tablet (20 mg) by mouth as needed (swelling)       Diuretics (Including Combos) Protocol Passed - 9/6/2023  8:35 AM        Passed - Blood pressure under 140/90 in past 12 months     BP Readings from Last 3 Encounters:   09/01/23 138/80   08/29/23 (!) 152/82   08/20/23 134/75                 Passed - Recent (12 mo) or future (30 days) visit within the authorizing provider's specialty     Patient has had an office visit with the authorizing provider or a provider within the authorizing providers department within the previous 12 mos or has a future within next 30 days. See \"Patient Info\" tab in inbasket, or \"Choose Columns\" in Meds & Orders section of the refill encounter.              Passed - Medication is active on med list        Passed - Patient is age 18 or older        Passed - No active pregancy on record        Passed - Normal serum creatinine on file in past 12 months     Recent Labs   Lab Test 08/19/23  0605   CR 0.69              Passed - Normal serum potassium on file in past 12 months     Recent Labs   Lab Test 08/20/23  0608   POTASSIUM 4.1                    Passed - Normal serum sodium on file in past 12 months     Recent Labs   Lab Test 08/19/23  0605                 Passed - No positive pregnancy test in past 12 months             "

## 2023-09-10 ENCOUNTER — APPOINTMENT (OUTPATIENT)
Dept: GENERAL RADIOLOGY | Facility: CLINIC | Age: 48
End: 2023-09-10
Attending: EMERGENCY MEDICINE
Payer: COMMERCIAL

## 2023-09-10 ENCOUNTER — HOSPITAL ENCOUNTER (INPATIENT)
Facility: CLINIC | Age: 48
LOS: 4 days | Discharge: HOME | End: 2023-09-14
Attending: EMERGENCY MEDICINE | Admitting: PEDIATRICS
Payer: COMMERCIAL

## 2023-09-10 DIAGNOSIS — J15.1 PNEUMONIA DUE TO PSEUDOMONAS SPECIES, UNSPECIFIED LATERALITY, UNSPECIFIED PART OF LUNG (H): ICD-10-CM

## 2023-09-10 DIAGNOSIS — R63.6 UNDERWEIGHT: ICD-10-CM

## 2023-09-10 DIAGNOSIS — J96.01 ACUTE HYPOXEMIC RESPIRATORY FAILURE (H): ICD-10-CM

## 2023-09-10 DIAGNOSIS — J96.21 ACUTE AND CHRONIC RESPIRATORY FAILURE WITH HYPOXIA (H): ICD-10-CM

## 2023-09-10 DIAGNOSIS — J84.9 ILD (INTERSTITIAL LUNG DISEASE) (H): Primary | ICD-10-CM

## 2023-09-10 DIAGNOSIS — J96.22 ACUTE ON CHRONIC RESPIRATORY FAILURE WITH HYPOXIA AND HYPERCAPNIA (H): ICD-10-CM

## 2023-09-10 DIAGNOSIS — J96.21 ACUTE ON CHRONIC RESPIRATORY FAILURE WITH HYPOXIA AND HYPERCAPNIA (H): ICD-10-CM

## 2023-09-10 LAB
ALBUMIN SERPL BCG-MCNC: 4 G/DL (ref 3.5–5.2)
ALP SERPL-CCNC: 65 U/L (ref 35–104)
ALT SERPL W P-5'-P-CCNC: 14 U/L (ref 0–50)
ANION GAP SERPL CALCULATED.3IONS-SCNC: 13 MMOL/L (ref 7–15)
AST SERPL W P-5'-P-CCNC: 20 U/L (ref 0–45)
BASOPHILS # BLD AUTO: 0.1 10E3/UL (ref 0–0.2)
BASOPHILS NFR BLD AUTO: 1 %
BILIRUB SERPL-MCNC: 0.3 MG/DL
BUN SERPL-MCNC: 22.6 MG/DL (ref 6–20)
CALCIUM SERPL-MCNC: 8.9 MG/DL (ref 8.6–10)
CHLORIDE SERPL-SCNC: 95 MMOL/L (ref 98–107)
CREAT SERPL-MCNC: 0.78 MG/DL (ref 0.51–0.95)
D DIMER PPP FEU-MCNC: <0.27 UG/ML FEU (ref 0–0.5)
DEPRECATED HCO3 PLAS-SCNC: 26 MMOL/L (ref 22–29)
EGFRCR SERPLBLD CKD-EPI 2021: >90 ML/MIN/1.73M2
EOSINOPHIL # BLD AUTO: 0 10E3/UL (ref 0–0.7)
EOSINOPHIL NFR BLD AUTO: 0 %
ERYTHROCYTE [DISTWIDTH] IN BLOOD BY AUTOMATED COUNT: 15.9 % (ref 10–15)
FLUAV RNA SPEC QL NAA+PROBE: NEGATIVE
FLUBV RNA RESP QL NAA+PROBE: NEGATIVE
GLUCOSE SERPL-MCNC: 99 MG/DL (ref 70–99)
HCT VFR BLD AUTO: 41.9 % (ref 35–47)
HGB BLD-MCNC: 13 G/DL (ref 11.7–15.7)
IMM GRANULOCYTES # BLD: 0.1 10E3/UL
IMM GRANULOCYTES NFR BLD: 1 %
LIPASE SERPL-CCNC: 14 U/L (ref 13–60)
LYMPHOCYTES # BLD AUTO: 0.7 10E3/UL (ref 0.8–5.3)
LYMPHOCYTES NFR BLD AUTO: 5 %
MAGNESIUM SERPL-MCNC: 2.3 MG/DL (ref 1.7–2.3)
MCH RBC QN AUTO: 28.6 PG (ref 26.5–33)
MCHC RBC AUTO-ENTMCNC: 31 G/DL (ref 31.5–36.5)
MCV RBC AUTO: 92 FL (ref 78–100)
MONOCYTES # BLD AUTO: 0.7 10E3/UL (ref 0–1.3)
MONOCYTES NFR BLD AUTO: 5 %
NEUTROPHILS # BLD AUTO: 13.5 10E3/UL (ref 1.6–8.3)
NEUTROPHILS NFR BLD AUTO: 88 %
NRBC # BLD AUTO: 0 10E3/UL
NRBC BLD AUTO-RTO: 0 /100
NT-PROBNP SERPL-MCNC: 2919 PG/ML (ref 0–450)
PLATELET # BLD AUTO: 259 10E3/UL (ref 150–450)
POTASSIUM SERPL-SCNC: 5 MMOL/L (ref 3.4–5.3)
PROCALCITONIN SERPL IA-MCNC: 0.25 NG/ML
PROT SERPL-MCNC: 7.4 G/DL (ref 6.4–8.3)
RBC # BLD AUTO: 4.55 10E6/UL (ref 3.8–5.2)
RSV RNA SPEC NAA+PROBE: NEGATIVE
SARS-COV-2 RNA RESP QL NAA+PROBE: NEGATIVE
SODIUM SERPL-SCNC: 134 MMOL/L (ref 136–145)
TROPONIN T SERPL HS-MCNC: 8 NG/L
WBC # BLD AUTO: 15.1 10E3/UL (ref 4–11)

## 2023-09-10 PROCEDURE — 93010 ELECTROCARDIOGRAM REPORT: CPT | Performed by: EMERGENCY MEDICINE

## 2023-09-10 PROCEDURE — 87205 SMEAR GRAM STAIN: CPT | Performed by: NURSE PRACTITIONER

## 2023-09-10 PROCEDURE — 85025 COMPLETE CBC W/AUTO DIFF WBC: CPT | Performed by: EMERGENCY MEDICINE

## 2023-09-10 PROCEDURE — 36415 COLL VENOUS BLD VENIPUNCTURE: CPT | Performed by: EMERGENCY MEDICINE

## 2023-09-10 PROCEDURE — 250N000011 HC RX IP 250 OP 636: Mod: JZ | Performed by: EMERGENCY MEDICINE

## 2023-09-10 PROCEDURE — 250N000013 HC RX MED GY IP 250 OP 250 PS 637: Performed by: NURSE PRACTITIONER

## 2023-09-10 PROCEDURE — 84145 PROCALCITONIN (PCT): CPT | Performed by: EMERGENCY MEDICINE

## 2023-09-10 PROCEDURE — 84484 ASSAY OF TROPONIN QUANT: CPT | Performed by: EMERGENCY MEDICINE

## 2023-09-10 PROCEDURE — 250N000009 HC RX 250: Performed by: EMERGENCY MEDICINE

## 2023-09-10 PROCEDURE — 83880 ASSAY OF NATRIURETIC PEPTIDE: CPT | Performed by: EMERGENCY MEDICINE

## 2023-09-10 PROCEDURE — 120N000001 HC R&B MED SURG/OB

## 2023-09-10 PROCEDURE — 87077 CULTURE AEROBIC IDENTIFY: CPT | Performed by: NURSE PRACTITIONER

## 2023-09-10 PROCEDURE — 80053 COMPREHEN METABOLIC PANEL: CPT | Performed by: EMERGENCY MEDICINE

## 2023-09-10 PROCEDURE — 250N000009 HC RX 250: Performed by: NURSE PRACTITIONER

## 2023-09-10 PROCEDURE — 94640 AIRWAY INHALATION TREATMENT: CPT | Mod: 76

## 2023-09-10 PROCEDURE — 96374 THER/PROPH/DIAG INJ IV PUSH: CPT

## 2023-09-10 PROCEDURE — 99285 EMERGENCY DEPT VISIT HI MDM: CPT | Mod: 25

## 2023-09-10 PROCEDURE — 71045 X-RAY EXAM CHEST 1 VIEW: CPT

## 2023-09-10 PROCEDURE — 87637 SARSCOV2&INF A&B&RSV AMP PRB: CPT | Performed by: EMERGENCY MEDICINE

## 2023-09-10 PROCEDURE — 94640 AIRWAY INHALATION TREATMENT: CPT

## 2023-09-10 PROCEDURE — 83690 ASSAY OF LIPASE: CPT | Performed by: EMERGENCY MEDICINE

## 2023-09-10 PROCEDURE — 250N000013 HC RX MED GY IP 250 OP 250 PS 637: Performed by: EMERGENCY MEDICINE

## 2023-09-10 PROCEDURE — 99223 1ST HOSP IP/OBS HIGH 75: CPT | Performed by: NURSE PRACTITIONER

## 2023-09-10 PROCEDURE — 83735 ASSAY OF MAGNESIUM: CPT | Performed by: PEDIATRICS

## 2023-09-10 PROCEDURE — 93005 ELECTROCARDIOGRAM TRACING: CPT

## 2023-09-10 PROCEDURE — 99285 EMERGENCY DEPT VISIT HI MDM: CPT | Mod: 25 | Performed by: EMERGENCY MEDICINE

## 2023-09-10 PROCEDURE — 250N000011 HC RX IP 250 OP 636: Mod: JZ | Performed by: NURSE PRACTITIONER

## 2023-09-10 PROCEDURE — 85379 FIBRIN DEGRADATION QUANT: CPT | Performed by: EMERGENCY MEDICINE

## 2023-09-10 RX ORDER — ACETAMINOPHEN 650 MG/1
650 SUPPOSITORY RECTAL EVERY 6 HOURS PRN
Status: DISCONTINUED | OUTPATIENT
Start: 2023-09-10 | End: 2023-09-14 | Stop reason: HOSPADM

## 2023-09-10 RX ORDER — OLANZAPINE 2.5 MG/1
5 TABLET, FILM COATED ORAL AT BEDTIME
Status: DISCONTINUED | OUTPATIENT
Start: 2023-09-10 | End: 2023-09-14 | Stop reason: HOSPADM

## 2023-09-10 RX ORDER — POLYETHYLENE GLYCOL 3350 17 G/17G
17 POWDER, FOR SOLUTION ORAL DAILY
Status: DISCONTINUED | OUTPATIENT
Start: 2023-09-10 | End: 2023-09-14 | Stop reason: HOSPADM

## 2023-09-10 RX ORDER — LIDOCAINE 40 MG/G
CREAM TOPICAL
Status: DISCONTINUED | OUTPATIENT
Start: 2023-09-10 | End: 2023-09-14 | Stop reason: HOSPADM

## 2023-09-10 RX ORDER — CLONAZEPAM 0.25 MG/1
0.25 TABLET, ORALLY DISINTEGRATING ORAL 3 TIMES DAILY PRN
Status: DISCONTINUED | OUTPATIENT
Start: 2023-09-10 | End: 2023-09-14 | Stop reason: HOSPADM

## 2023-09-10 RX ORDER — CLONAZEPAM 0.5 MG/1
0.25 TABLET ORAL 3 TIMES DAILY PRN
Status: DISCONTINUED | OUTPATIENT
Start: 2023-09-10 | End: 2023-09-10

## 2023-09-10 RX ORDER — AZITHROMYCIN 250 MG/1
500 TABLET, FILM COATED ORAL DAILY
Status: DISCONTINUED | OUTPATIENT
Start: 2023-09-10 | End: 2023-09-13

## 2023-09-10 RX ORDER — IPRATROPIUM BROMIDE AND ALBUTEROL SULFATE 2.5; .5 MG/3ML; MG/3ML
3 SOLUTION RESPIRATORY (INHALATION)
Status: DISCONTINUED | OUTPATIENT
Start: 2023-09-10 | End: 2023-09-14 | Stop reason: HOSPADM

## 2023-09-10 RX ORDER — ACETAMINOPHEN 325 MG/1
650 TABLET ORAL EVERY 6 HOURS PRN
Status: DISCONTINUED | OUTPATIENT
Start: 2023-09-10 | End: 2023-09-14 | Stop reason: HOSPADM

## 2023-09-10 RX ORDER — TADALAFIL 20 MG/1
20 TABLET ORAL DAILY
Status: DISCONTINUED | OUTPATIENT
Start: 2023-09-11 | End: 2023-09-14 | Stop reason: HOSPADM

## 2023-09-10 RX ORDER — ALBUTEROL SULFATE 0.83 MG/ML
2.5 SOLUTION RESPIRATORY (INHALATION) EVERY 6 HOURS PRN
Status: DISCONTINUED | OUTPATIENT
Start: 2023-09-10 | End: 2023-09-14 | Stop reason: HOSPADM

## 2023-09-10 RX ORDER — PROCHLORPERAZINE 25 MG
25 SUPPOSITORY, RECTAL RECTAL EVERY 12 HOURS PRN
Status: DISCONTINUED | OUTPATIENT
Start: 2023-09-10 | End: 2023-09-14 | Stop reason: HOSPADM

## 2023-09-10 RX ORDER — PROCHLORPERAZINE MALEATE 5 MG
10 TABLET ORAL EVERY 6 HOURS PRN
Status: DISCONTINUED | OUTPATIENT
Start: 2023-09-10 | End: 2023-09-14 | Stop reason: HOSPADM

## 2023-09-10 RX ORDER — BISACODYL 10 MG
10 SUPPOSITORY, RECTAL RECTAL DAILY PRN
Status: DISCONTINUED | OUTPATIENT
Start: 2023-09-10 | End: 2023-09-14 | Stop reason: HOSPADM

## 2023-09-10 RX ORDER — TRAZODONE HYDROCHLORIDE 50 MG/1
50-100 TABLET, FILM COATED ORAL
Status: DISCONTINUED | OUTPATIENT
Start: 2023-09-10 | End: 2023-09-14 | Stop reason: HOSPADM

## 2023-09-10 RX ORDER — ALBUTEROL SULFATE 0.83 MG/ML
2.5 SOLUTION RESPIRATORY (INHALATION)
Status: DISCONTINUED | OUTPATIENT
Start: 2023-09-10 | End: 2023-09-10

## 2023-09-10 RX ORDER — HYDROXYZINE HYDROCHLORIDE 25 MG/1
25 TABLET, FILM COATED ORAL 4 TIMES DAILY PRN
Status: DISCONTINUED | OUTPATIENT
Start: 2023-09-10 | End: 2023-09-14 | Stop reason: HOSPADM

## 2023-09-10 RX ORDER — ALBUTEROL SULFATE 0.83 MG/ML
2.5 SOLUTION RESPIRATORY (INHALATION) ONCE
Status: COMPLETED | OUTPATIENT
Start: 2023-09-10 | End: 2023-09-10

## 2023-09-10 RX ORDER — SERTRALINE HYDROCHLORIDE 100 MG/1
100 TABLET, FILM COATED ORAL DAILY
Status: DISCONTINUED | OUTPATIENT
Start: 2023-09-11 | End: 2023-09-14 | Stop reason: HOSPADM

## 2023-09-10 RX ORDER — SENNOSIDES 8.6 MG
1 TABLET ORAL 2 TIMES DAILY PRN
Status: DISCONTINUED | OUTPATIENT
Start: 2023-09-10 | End: 2023-09-14 | Stop reason: HOSPADM

## 2023-09-10 RX ORDER — ONDANSETRON 2 MG/ML
4 INJECTION INTRAMUSCULAR; INTRAVENOUS EVERY 6 HOURS PRN
Status: DISCONTINUED | OUTPATIENT
Start: 2023-09-10 | End: 2023-09-14 | Stop reason: HOSPADM

## 2023-09-10 RX ORDER — BUPRENORPHINE AND NALOXONE 4; 1 MG/1; MG/1
1 FILM, SOLUBLE BUCCAL; SUBLINGUAL 3 TIMES DAILY
Status: DISCONTINUED | OUTPATIENT
Start: 2023-09-10 | End: 2023-09-11

## 2023-09-10 RX ORDER — PREDNISONE 20 MG/1
40 TABLET ORAL DAILY
Status: DISCONTINUED | OUTPATIENT
Start: 2023-09-11 | End: 2023-09-11

## 2023-09-10 RX ORDER — VITAMIN B COMPLEX
25 TABLET ORAL DAILY
Status: DISCONTINUED | OUTPATIENT
Start: 2023-09-11 | End: 2023-09-14 | Stop reason: HOSPADM

## 2023-09-10 RX ORDER — CEFTRIAXONE 2 G/1
2 INJECTION, POWDER, FOR SOLUTION INTRAMUSCULAR; INTRAVENOUS EVERY 24 HOURS
Status: DISCONTINUED | OUTPATIENT
Start: 2023-09-10 | End: 2023-09-13

## 2023-09-10 RX ORDER — METHYLPREDNISOLONE SODIUM SUCCINATE 125 MG/2ML
125 INJECTION, POWDER, LYOPHILIZED, FOR SOLUTION INTRAMUSCULAR; INTRAVENOUS ONCE
Status: COMPLETED | OUTPATIENT
Start: 2023-09-10 | End: 2023-09-10

## 2023-09-10 RX ORDER — ONDANSETRON 4 MG/1
4 TABLET, ORALLY DISINTEGRATING ORAL EVERY 6 HOURS PRN
Status: DISCONTINUED | OUTPATIENT
Start: 2023-09-10 | End: 2023-09-14 | Stop reason: HOSPADM

## 2023-09-10 RX ORDER — PANTOPRAZOLE SODIUM 40 MG/1
40 TABLET, DELAYED RELEASE ORAL
Status: DISCONTINUED | OUTPATIENT
Start: 2023-09-11 | End: 2023-09-14 | Stop reason: HOSPADM

## 2023-09-10 RX ORDER — IPRATROPIUM BROMIDE AND ALBUTEROL SULFATE 2.5; .5 MG/3ML; MG/3ML
3 SOLUTION RESPIRATORY (INHALATION) ONCE
Status: COMPLETED | OUTPATIENT
Start: 2023-09-10 | End: 2023-09-10

## 2023-09-10 RX ORDER — GUAIFENESIN 600 MG/1
600 TABLET, EXTENDED RELEASE ORAL 2 TIMES DAILY
Status: DISCONTINUED | OUTPATIENT
Start: 2023-09-10 | End: 2023-09-14 | Stop reason: HOSPADM

## 2023-09-10 RX ADMIN — GUAIFENESIN 600 MG: 600 TABLET ORAL at 20:34

## 2023-09-10 RX ADMIN — CLONAZEPAM 0.25 MG: 0.25 TABLET, ORALLY DISINTEGRATING ORAL at 15:24

## 2023-09-10 RX ADMIN — CLONAZEPAM 0.25 MG: 0.25 TABLET, ORALLY DISINTEGRATING ORAL at 23:45

## 2023-09-10 RX ADMIN — ALBUTEROL SULFATE 2.5 MG: 2.5 SOLUTION RESPIRATORY (INHALATION) at 12:53

## 2023-09-10 RX ADMIN — OLANZAPINE 5 MG: 2.5 TABLET, FILM COATED ORAL at 20:34

## 2023-09-10 RX ADMIN — POLYETHYLENE GLYCOL 3350 17 G: 17 POWDER, FOR SOLUTION ORAL at 17:40

## 2023-09-10 RX ADMIN — IPRATROPIUM BROMIDE AND ALBUTEROL SULFATE 3 ML: 2.5; .5 SOLUTION RESPIRATORY (INHALATION) at 20:46

## 2023-09-10 RX ADMIN — BUPRENORPHINE HYDROCHLORIDE, NALOXONE HYDROCHLORIDE 1 FILM: 4; 1 FILM, SOLUBLE BUCCAL; SUBLINGUAL at 20:33

## 2023-09-10 RX ADMIN — IPRATROPIUM BROMIDE AND ALBUTEROL SULFATE 3 ML: .5; 3 SOLUTION RESPIRATORY (INHALATION) at 12:53

## 2023-09-10 RX ADMIN — CEFTRIAXONE SODIUM 2 G: 2 INJECTION, POWDER, FOR SOLUTION INTRAMUSCULAR; INTRAVENOUS at 17:40

## 2023-09-10 RX ADMIN — BUPRENORPHINE HYDROCHLORIDE, NALOXONE HYDROCHLORIDE 1 FILM: 4; 1 FILM, SOLUBLE BUCCAL; SUBLINGUAL at 15:25

## 2023-09-10 RX ADMIN — METHYLPREDNISOLONE SODIUM SUCCINATE 125 MG: 125 INJECTION, POWDER, FOR SOLUTION INTRAMUSCULAR; INTRAVENOUS at 13:09

## 2023-09-10 RX ADMIN — IPRATROPIUM BROMIDE AND ALBUTEROL SULFATE 3 ML: 2.5; .5 SOLUTION RESPIRATORY (INHALATION) at 17:11

## 2023-09-10 RX ADMIN — AZITHROMYCIN 500 MG: 250 TABLET, FILM COATED ORAL at 17:41

## 2023-09-10 RX ADMIN — HYDROXYZINE HYDROCHLORIDE 25 MG: 25 TABLET ORAL at 20:45

## 2023-09-10 ASSESSMENT — ACTIVITIES OF DAILY LIVING (ADL)
DOING_ERRANDS_INDEPENDENTLY_DIFFICULTY: NO
CONCENTRATING,_REMEMBERING_OR_MAKING_DECISIONS_DIFFICULTY: YES
CHANGE_IN_FUNCTIONAL_STATUS_SINCE_ONSET_OF_CURRENT_ILLNESS/INJURY: NO
VISION_MANAGEMENT: GLASSES
WEAR_GLASSES_OR_BLIND: YES
ADLS_ACUITY_SCORE: 24
FALL_HISTORY_WITHIN_LAST_SIX_MONTHS: NO
WALKING_OR_CLIMBING_STAIRS_DIFFICULTY: YES
WALKING_OR_CLIMBING_STAIRS: STAIR CLIMBING DIFFICULTY, ASSISTANCE 1 PERSON
ADLS_ACUITY_SCORE: 24
TOILETING_ISSUES: NO
DIFFICULTY_EATING/SWALLOWING: NO
TRANSFERRING: 0-->INDEPENDENT
TRANSFERRING: 0-->ASSISTANCE NEEDED (DEVELOPMENTALLY APPROPRIATE)
DRESSING/BATHING_DIFFICULTY: NO
ADLS_ACUITY_SCORE: 37
ADLS_ACUITY_SCORE: 24
ADLS_ACUITY_SCORE: 24
ADLS_ACUITY_SCORE: 37

## 2023-09-10 NOTE — ED NOTES
Pt has multiple lung issues and has today had to increase her home oxygen use with more difficulty recovering and continued low oxygen levels at home, she reports that her oxygen need has been 6-8 liters since her last hospitalization, she does see pulmonology and uses high flow while in the ER frequently
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
## 2023-09-10 NOTE — ED NOTES
Pt was given water per provider ok, she was given warm blanket and lights dimmed to rest, she reports she is comfortable at this time, she has plan for her mom to bring home medication that can not be provided by our pharmacy and is requesting to have her afternoon medications, medication scribe will verify and plan to administer after verification

## 2023-09-10 NOTE — DISCHARGE INSTRUCTIONS
Someone from Dr. Araujo's office should be calling to schedule your follow-up within the next 24-48 hours, if you do not get a callback call [phone].

## 2023-09-10 NOTE — PLAN OF CARE
Goal Outcome Evaluation:    S-(situation): Patient arrives to room 269 via cart from ED    B-(background): hypoxic resp failure.     A-(assessment): lung sounds diminished and coarse throughout, on HFNC 50%30L and 10L oxymask when up to bathroom, constipated-enema given and on scheduled miralax, good appetite    R-(recommendations): Orders reviewed with patient. Will monitor patient per MD orders.     Inpatient nursing criteria listed below were met:    Health care directives status obtained and documented: Yes  VTE ordered/documented: Yes  Skin issues/needs documented:NA  Isolation addressed and Signage used: NA  Fall Prevention: Care plan updated Yes Education given and documented Yes  Care Plan initiated and Co-Morbidities added: Yes  Education Assessment documented:Yes  Admission Education Documented: Yes  If present CAUTI/CLABI Education done: NA  New medication patient education completed and documented (Possible Side Effects of Common Medications handout): Yes  Allergies Reviewed: Yes  Admission Medication Reconciliation completed: Yes  Home medications if not able to send immediately home with family stored here: yes    Reminder note placed in discharge instructions regarding home meds: Yes  Individualized care needs/preferences addressed and charted: Yes  Provider Notified that patient has arrived to the unit: Yes

## 2023-09-10 NOTE — MEDICATION SCRIBE - ADMISSION MEDICATION HISTORY
Medication Scribe Admission Medication History    Admission medication history is complete. The information provided in this note is only as accurate as the sources available at the time of the update.    Medication reconciliation/reorder completed by provider prior to medication history? No    Information Source(s): Patient via in-person    Pertinent Information: pt will have Tadalafil brought from home    Changes made to PTA medication list:  Added: Nexium  Deleted: Protonix  Changed: Zyprexa to 5mg hs (tapering off), updated prednisone sig to current taper    Medication Affordability:  Not including over the counter (OTC) medications, was there a time in the past 3 months when you did not take your medications as prescribed because of cost?: No    Allergies reviewed with patient and updates made in EHR: yes    Medication History Completed By: JUAN CARLOS HAYNES 9/10/2023 2:15 PM    Prior to Admission medications    Medication Sig Last Dose Taking? Auth Provider Long Term End Date   buprenorphine-naloxone (SUBOXONE) 2-0.5 MG SUBL sublingual tablet Place 2 tablets under the tongue 3 times daily 9/10/2023 at 0800 Yes Reported, Patient     CHOLECALCIFEROL 25 MCG (1000 UT) tablet Take 1,000 Units by mouth daily 9/10/2023 at 0800 Yes Reported, Patient     clonazePAM (KLONOPIN) 0.5 MG tablet Take 0.25 mg by mouth 3 times daily as needed for anxiety 9/10/2023 at 0800 Yes Reported, Patient     esomeprazole (NEXIUM) 20 MG DR capsule Take 20 mg by mouth every morning (before breakfast) Take 30-60 minutes before eating. 9/10/2023 at 0800 Yes Reported, Patient     furosemide (LASIX) 20 MG tablet Take 1 tablet (20 mg) by mouth as needed (swelling) 9/9/2023 at 1100 Yes Cristian Fagan MD Yes    hydrOXYzine (VISTARIL) 25 MG capsule Take 25 mg by mouth 4 times daily as needed for anxiety Past Month at unkn Yes Reported, Patient     OLANZapine (ZYPREXA) 10 MG tablet Take 5 mg by mouth At Bedtime 9/9/2023 at hs Yes Reported, Patient      order for DME Oxygen: Patient requires supplemental Oxygen 4 LPM via nasal canula at rest and 6 LPM with activity. Please provide patient with portability capability. Okay to spot check patient on oxygen device, to keep sats above 90%. Please provider with home concentrator that can go up to 10 LPM. Oxygen will be for a lifetime. 9/10/2023 at yes Yes Dawson Upton MD     order for DME Please provide patient with 2  liquid oxygen tanks for portability. Spot check patient on liquid oxygen. Titrate oxygen to maintain saturations above 88%. 9/10/2023 at yes Yes Perlman, David Morris, MD     polyethylene glycol (MIRALAX) 17 GM/Dose powder Take 1 Capful by mouth daily as needed for constipation 9/9/2023 at am Yes Reported, Patient     predniSONE (DELTASONE) 10 MG tablet Take 20 mg (2 tabs) by mouth for 4 days,Take 10 mg (1 tab)  by mouth for 4 days,  Take 5 mg (1/2 tab) for 4 days, then stop.  Patient taking differently: Take 10 mg by mouth daily 09/10/23 and 09/11/23,  Take 5 mg (1/2 tab) for 4 days, then stop. 9/10/2023 at 0800 Yes Cristian Fagan MD No    senna (SENOKOT) 8.6 MG tablet Take 1 tablet by mouth 2 times daily as needed for constipation 9/9/2023 at hs Yes Reported, Patient     sertraline (ZOLOFT) 100 MG tablet Take 100 mg by mouth daily 9/10/2023 at am Yes Reported, Patient Yes    tadalafil, PAH, 20 MG TABS Take 1 tablet (20 mg) by mouth daily 9/10/2023 at am Yes Callie Ledesma MD Yes    traZODone (DESYREL) 50 MG tablet Take  mg by mouth nightly as needed for sleep Past Month at hs Yes Reported, Patient     VENTOLIN  (90 Base) MCG/ACT inhaler INHALE ONE TO TWO PUFFS INTO THE LUNGS EVERY 4 HOURS AS NEEDED FOR SHORTNESS OF BREATH / DYSPNEA  Patient taking differently: Inhale 1-2 puffs into the lungs every 4 hours as needed for shortness of breath or wheezing 9/10/2023 at am Yes Ignacio Loya MD Yes    naloxone (NARCAN) 4 MG/0.1ML nasal spray Spray 1 spray (4 mg) into one nostril  alternating nostrils as needed for opioid reversal every 2-3 minutes until assistance arrives at home at never used  Cristian Fagan MD Yes
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
## 2023-09-11 LAB
ALBUMIN SERPL BCG-MCNC: 3.7 G/DL (ref 3.5–5.2)
ALP SERPL-CCNC: 57 U/L (ref 35–104)
ALT SERPL W P-5'-P-CCNC: 13 U/L (ref 0–50)
ANION GAP SERPL CALCULATED.3IONS-SCNC: 9 MMOL/L (ref 7–15)
AST SERPL W P-5'-P-CCNC: 15 U/L (ref 0–45)
BASOPHILS # BLD AUTO: 0 10E3/UL (ref 0–0.2)
BASOPHILS NFR BLD AUTO: 0 %
BILIRUB SERPL-MCNC: 0.3 MG/DL
BUN SERPL-MCNC: 14.6 MG/DL (ref 6–20)
CALCIUM SERPL-MCNC: 8.9 MG/DL (ref 8.6–10)
CHLORIDE SERPL-SCNC: 95 MMOL/L (ref 98–107)
CREAT SERPL-MCNC: 0.57 MG/DL (ref 0.51–0.95)
CRP SERPL-MCNC: 101.18 MG/L
DEPRECATED HCO3 PLAS-SCNC: 29 MMOL/L (ref 22–29)
EGFRCR SERPLBLD CKD-EPI 2021: >90 ML/MIN/1.73M2
EOSINOPHIL # BLD AUTO: 0.1 10E3/UL (ref 0–0.7)
EOSINOPHIL NFR BLD AUTO: 1 %
ERYTHROCYTE [DISTWIDTH] IN BLOOD BY AUTOMATED COUNT: 15.6 % (ref 10–15)
GLUCOSE SERPL-MCNC: 99 MG/DL (ref 70–99)
HCT VFR BLD AUTO: 37.9 % (ref 35–47)
HGB BLD-MCNC: 11.9 G/DL (ref 11.7–15.7)
IMM GRANULOCYTES # BLD: 0.1 10E3/UL
IMM GRANULOCYTES NFR BLD: 1 %
LYMPHOCYTES # BLD AUTO: 1.7 10E3/UL (ref 0.8–5.3)
LYMPHOCYTES NFR BLD AUTO: 11 %
MAGNESIUM SERPL-MCNC: 2.2 MG/DL (ref 1.7–2.3)
MCH RBC QN AUTO: 28.7 PG (ref 26.5–33)
MCHC RBC AUTO-ENTMCNC: 31.4 G/DL (ref 31.5–36.5)
MCV RBC AUTO: 92 FL (ref 78–100)
MONOCYTES # BLD AUTO: 1 10E3/UL (ref 0–1.3)
MONOCYTES NFR BLD AUTO: 6 %
NEUTROPHILS # BLD AUTO: 12.2 10E3/UL (ref 1.6–8.3)
NEUTROPHILS NFR BLD AUTO: 81 %
NRBC # BLD AUTO: 0 10E3/UL
NRBC BLD AUTO-RTO: 0 /100
PLATELET # BLD AUTO: 246 10E3/UL (ref 150–450)
POTASSIUM SERPL-SCNC: 4.5 MMOL/L (ref 3.4–5.3)
PROT SERPL-MCNC: 7 G/DL (ref 6.4–8.3)
RBC # BLD AUTO: 4.14 10E6/UL (ref 3.8–5.2)
SODIUM SERPL-SCNC: 133 MMOL/L (ref 136–145)
WBC # BLD AUTO: 15.1 10E3/UL (ref 4–11)

## 2023-09-11 PROCEDURE — 80053 COMPREHEN METABOLIC PANEL: CPT | Performed by: NURSE PRACTITIONER

## 2023-09-11 PROCEDURE — 250N000013 HC RX MED GY IP 250 OP 250 PS 637: Performed by: EMERGENCY MEDICINE

## 2023-09-11 PROCEDURE — 85004 AUTOMATED DIFF WBC COUNT: CPT | Performed by: NURSE PRACTITIONER

## 2023-09-11 PROCEDURE — 999N000215 HC STATISTIC HFNC ADULT NON-CPAP

## 2023-09-11 PROCEDURE — 99233 SBSQ HOSP IP/OBS HIGH 50: CPT | Performed by: FAMILY MEDICINE

## 2023-09-11 PROCEDURE — 250N000013 HC RX MED GY IP 250 OP 250 PS 637: Performed by: FAMILY MEDICINE

## 2023-09-11 PROCEDURE — 83735 ASSAY OF MAGNESIUM: CPT | Performed by: PEDIATRICS

## 2023-09-11 PROCEDURE — 94640 AIRWAY INHALATION TREATMENT: CPT | Mod: 76

## 2023-09-11 PROCEDURE — 250N000009 HC RX 250: Performed by: NURSE PRACTITIONER

## 2023-09-11 PROCEDURE — 120N000001 HC R&B MED SURG/OB

## 2023-09-11 PROCEDURE — 36415 COLL VENOUS BLD VENIPUNCTURE: CPT | Performed by: NURSE PRACTITIONER

## 2023-09-11 PROCEDURE — 272N000054 HC CANNULA HIGH FLOW, ADULT

## 2023-09-11 PROCEDURE — 94640 AIRWAY INHALATION TREATMENT: CPT

## 2023-09-11 PROCEDURE — 250N000011 HC RX IP 250 OP 636: Mod: JZ | Performed by: NURSE PRACTITIONER

## 2023-09-11 PROCEDURE — 250N000011 HC RX IP 250 OP 636: Mod: JZ | Performed by: FAMILY MEDICINE

## 2023-09-11 PROCEDURE — 86140 C-REACTIVE PROTEIN: CPT | Performed by: FAMILY MEDICINE

## 2023-09-11 PROCEDURE — 250N000013 HC RX MED GY IP 250 OP 250 PS 637: Performed by: NURSE PRACTITIONER

## 2023-09-11 RX ORDER — BUPRENORPHINE AND NALOXONE 2; .5 MG/1; MG/1
1 FILM, SOLUBLE BUCCAL; SUBLINGUAL 3 TIMES DAILY
Status: DISCONTINUED | OUTPATIENT
Start: 2023-09-11 | End: 2023-09-14 | Stop reason: HOSPADM

## 2023-09-11 RX ORDER — ENOXAPARIN SODIUM 100 MG/ML
40 INJECTION SUBCUTANEOUS EVERY 24 HOURS
Status: DISCONTINUED | OUTPATIENT
Start: 2023-09-11 | End: 2023-09-14 | Stop reason: HOSPADM

## 2023-09-11 RX ORDER — METHYLPREDNISOLONE SODIUM SUCCINATE 125 MG/2ML
60 INJECTION, POWDER, LYOPHILIZED, FOR SOLUTION INTRAMUSCULAR; INTRAVENOUS EVERY 6 HOURS
Status: DISCONTINUED | OUTPATIENT
Start: 2023-09-11 | End: 2023-09-13

## 2023-09-11 RX ORDER — BENZONATATE 100 MG/1
100 CAPSULE ORAL 3 TIMES DAILY PRN
Status: DISCONTINUED | OUTPATIENT
Start: 2023-09-11 | End: 2023-09-14 | Stop reason: HOSPADM

## 2023-09-11 RX ORDER — BUPRENORPHINE AND NALOXONE 4; 1 MG/1; MG/1
1 FILM, SOLUBLE BUCCAL; SUBLINGUAL 3 TIMES DAILY
Status: DISCONTINUED | OUTPATIENT
Start: 2023-09-11 | End: 2023-09-14 | Stop reason: HOSPADM

## 2023-09-11 RX ADMIN — CLONAZEPAM 0.25 MG: 0.25 TABLET, ORALLY DISINTEGRATING ORAL at 15:02

## 2023-09-11 RX ADMIN — IPRATROPIUM BROMIDE AND ALBUTEROL SULFATE 3 ML: 2.5; .5 SOLUTION RESPIRATORY (INHALATION) at 17:31

## 2023-09-11 RX ADMIN — METHYLPREDNISOLONE SODIUM SUCCINATE 62.5 MG: 125 INJECTION, POWDER, FOR SOLUTION INTRAMUSCULAR; INTRAVENOUS at 14:09

## 2023-09-11 RX ADMIN — IPRATROPIUM BROMIDE AND ALBUTEROL SULFATE 3 ML: 2.5; .5 SOLUTION RESPIRATORY (INHALATION) at 11:35

## 2023-09-11 RX ADMIN — METHYLPREDNISOLONE SODIUM SUCCINATE 62.5 MG: 125 INJECTION, POWDER, FOR SOLUTION INTRAMUSCULAR; INTRAVENOUS at 08:17

## 2023-09-11 RX ADMIN — IPRATROPIUM BROMIDE AND ALBUTEROL SULFATE 3 ML: 2.5; .5 SOLUTION RESPIRATORY (INHALATION) at 20:05

## 2023-09-11 RX ADMIN — GUAIFENESIN 600 MG: 600 TABLET ORAL at 20:05

## 2023-09-11 RX ADMIN — BENZONATATE 100 MG: 100 CAPSULE ORAL at 13:36

## 2023-09-11 RX ADMIN — BUPRENORPHINE HYDROCHLORIDE, NALOXONE HYDROCHLORIDE 1 FILM: 4; 1 FILM, SOLUBLE BUCCAL; SUBLINGUAL at 08:15

## 2023-09-11 RX ADMIN — AZITHROMYCIN 500 MG: 250 TABLET, FILM COATED ORAL at 08:15

## 2023-09-11 RX ADMIN — TADALAFIL 20 MG: 20 TABLET ORAL at 08:18

## 2023-09-11 RX ADMIN — CEFTRIAXONE SODIUM 2 G: 2 INJECTION, POWDER, FOR SOLUTION INTRAMUSCULAR; INTRAVENOUS at 17:35

## 2023-09-11 RX ADMIN — METHYLPREDNISOLONE SODIUM SUCCINATE 62.5 MG: 125 INJECTION, POWDER, FOR SOLUTION INTRAMUSCULAR; INTRAVENOUS at 20:01

## 2023-09-11 RX ADMIN — SERTRALINE HYDROCHLORIDE 100 MG: 100 TABLET ORAL at 08:15

## 2023-09-11 RX ADMIN — HYDROXYZINE HYDROCHLORIDE 25 MG: 25 TABLET ORAL at 04:31

## 2023-09-11 RX ADMIN — CLONAZEPAM 0.25 MG: 0.25 TABLET, ORALLY DISINTEGRATING ORAL at 21:11

## 2023-09-11 RX ADMIN — Medication 25 MCG: at 08:15

## 2023-09-11 RX ADMIN — BUPRENORPHINE AND NALOXONE 1 FILM: 4; 1 FILM, SOLUBLE BUCCAL; SUBLINGUAL at 20:05

## 2023-09-11 RX ADMIN — GUAIFENESIN 600 MG: 600 TABLET ORAL at 08:15

## 2023-09-11 RX ADMIN — HYDROXYZINE HYDROCHLORIDE 25 MG: 25 TABLET ORAL at 12:06

## 2023-09-11 RX ADMIN — IPRATROPIUM BROMIDE AND ALBUTEROL SULFATE 3 ML: 2.5; .5 SOLUTION RESPIRATORY (INHALATION) at 08:24

## 2023-09-11 RX ADMIN — OLANZAPINE 5 MG: 2.5 TABLET, FILM COATED ORAL at 20:05

## 2023-09-11 RX ADMIN — POLYETHYLENE GLYCOL 3350 17 G: 17 POWDER, FOR SOLUTION ORAL at 08:15

## 2023-09-11 RX ADMIN — ENOXAPARIN SODIUM 40 MG: 40 INJECTION SUBCUTANEOUS at 20:09

## 2023-09-11 RX ADMIN — ACETAMINOPHEN 650 MG: 325 TABLET, FILM COATED ORAL at 04:31

## 2023-09-11 RX ADMIN — PANTOPRAZOLE SODIUM 40 MG: 40 TABLET, DELAYED RELEASE ORAL at 07:26

## 2023-09-11 RX ADMIN — BUPRENORPHINE HYDROCHLORIDE, NALOXONE HYDROCHLORIDE 1 FILM: 4; 1 FILM, SOLUBLE BUCCAL; SUBLINGUAL at 14:09

## 2023-09-11 RX ADMIN — ACETAMINOPHEN 650 MG: 325 TABLET, FILM COATED ORAL at 13:36

## 2023-09-11 RX ADMIN — CLONAZEPAM 0.25 MG: 0.25 TABLET, ORALLY DISINTEGRATING ORAL at 08:15

## 2023-09-11 RX ADMIN — ACETAMINOPHEN 650 MG: 325 TABLET, FILM COATED ORAL at 20:05

## 2023-09-11 RX ADMIN — BUPRENORPHINE AND NALOXONE 1 FILM: 2; .5 FILM, SOLUBLE BUCCAL; SUBLINGUAL at 20:05

## 2023-09-11 ASSESSMENT — ACTIVITIES OF DAILY LIVING (ADL)
ADLS_ACUITY_SCORE: 24
DEPENDENT_IADLS:: CLEANING;SHOPPING
ADLS_ACUITY_SCORE: 24

## 2023-09-11 NOTE — PLAN OF CARE
Goal Outcome Evaluation:      Plan of Care Reviewed With: patient    Overall Patient Progress: no changeOverall Patient Progress: no change    Outcome Evaluation: Pt A&Ox4. VSS on HFNC 50% and 25L. D-sats upon ambulation, requires an additional 20% prior to standing. PRN atarax x1, klonopin x2 for anxiety. LS have crackles throughout. IV rocephin q24h. SL otherwise. Lovenox added for VTE d/t limited mobility with high flow. Bmx1 today.

## 2023-09-11 NOTE — CONSULTS
Care Management Initial Consult    General Information  Assessment completed with: Patient-Ellen     Primary Care Provider verified and updated as needed:     Readmission within the last 30 days:  YES    8/15/23-8/20/23 on 8/30/23 -[facility]         Reason for Consult: discharge planning  Advance Care Planning:            Communication Assessment  Patient's communication style: spoken language (English or Bilingual)    Hearing Difficulty or Deaf: no   Wear Glasses or Blind: yes    Cognitive  Cognitive/Neuro/Behavioral: WDL                      Living Environment:   People in home: parent(s), child(janessa)  Parents: Matthew and Patricia   Adult children are ages 19, 25, and 33 years old   Current living Arrangements: house        Patient discussed the struggles around living at her parents and trying to find her own place which is difficult with the lack of financial support.     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   Financial Concerns:   Currently on MA     Does the patient's insurance plan have a 3 day qualifying hospital stay waiver?  Yes   Will the waiver be used for post-acute placement? No    Lifestyle & Psychosocial Needs:  Social Determinants of Health     Tobacco Use: Medium Risk (9/10/2023)    Patient History     Smoking Tobacco Use: Former     Smokeless Tobacco Use: Former     Passive Exposure: Not on file   Alcohol Use: Not on file   Financial Resource Strain: Not on file    Food Insecurity: Not on file   Transportation Needs: Not on file   Physical Activity: Not on file   Stress: Not on file   Social Connections: Not on file   Intimate Partner Violence: Not on file   Depression: At risk (9/1/2023)    PHQ-2     PHQ-2 Score: 3   Housing Stability: Not on file       Functional Status:  Prior to admission patient needed assistance:   Dependent ADLs:: Independent  Dependent IADLs:: Cleaning, Shopping       Mental Health Status:  Mental Health Status: Current Concern   Actively in Outpatient Psych treatment. Reports her medical issues have increased her anxiety    Mental Health Management:  History of prior diagnoses of generalized anxiety disorder,  major depressive disorder, and PTSD with 2 recent prior psychiatric hospitalization: July 2023 for suicidal thoughts, and June for hallucinations/suicidal thoughts. MI commitment was pursued and supported     Patient has been attending the [facility] program at [facility]     Chemical Dependency Status:  Chemical Dependency Status: No Current Concerns   She has prior CD treatment when she was 30 years old for alcohol use    Values/Beliefs:  Spiritual, Cultural Beliefs, [employer] Practices, Values that affect care: no               Additional Information:  Referral received due to elevated risk score and hospital re-admission:     8/15/2023 - 8/20/2023 (5 days)  [facility]    Due to remote staffing,  place call to the Patient to complete assessment.     Patient reported that she lives with her parents and 2 adult children. States she walks independently at baseline.     Was recently transitioned to a NEW O2 Provider on 09/07/23:   [facility] [phone] Fax: [phone]    Patient reports her Mother continues to smoke cigarettes which has made it difficult to quit herself, but states she has done well overall. Patient voiced desire to live on her own and has been  working with her [facility]  to find alternative living options. Patient stated her finances have made it difficult to live on her own.     [facility] : Sofia # [phone]  CM left a VM per Pt's request.     Has been attending a Mental Health [facility] hospital program at [facility] since her recent inpatient psychiatric hospitalization.    7/17/2023 due to concern for worsening depression, paranoia, poor sleep, and hallucinations.     Patient states she is hopeful she can return back home when medically stable.   Plans to continue to attend Outpatient Mental Health appointments in [place] and work with her [facility]  to find alternative living options in the near future.     Addendum : 1620    [facility] # [phone]    Sofia confirmed she is the Patient's- Mental Health Civil Commitment .   She is ordered to provide case management for the patient to ensure she abides by her doctors orders.   CM updated Sofia with the known discharge plan to date. Sofia advised CM to notify her if there are changes or if the patient decides to refuse cares at any point.   Patient is ordered to follow medical recommendations and orders according to her court ordered commitment.     PLAN: CM will continue to follow as Pt's condition evolves to assist with discharge plan as needed.     Will update Sofia # [phone]   Civil Commitment  upon discharge       MEREDITH Tompkins  [facility] [phone]   [facility]  [phone]

## 2023-09-11 NOTE — PROGRESS NOTES
[facility]    Medicine Progress Note - Hospitalist Service    Date of Admission:  9/10/2023    Assessment & Plan   Anali Loya is a 47 year old female with progressively worsening ILD who continues to smoke intermittently who presents on 9/10/2023 with acute on chronic respiratory failure requiring HFNC supplementation caused by URI causing acute flare of her chronic interstitial lung disease and pulmonary hypertension.  Patient was started on IV steroids and antibiotics with clinical stability overnight but no improvement yet noted.      Hypoxic respiratory failure  Chronic respiratory failure with hypoxia  Interstitial lung disease  Concern for community-acquired pneumonia  Patient had recent URI symptoms with progressive worsening lower respiratory symptoms, concerning for pneumonia although none obviously visible on radiographic imaging.  RSV, COVID and flu tested negative in the ED.  Patient's baseline oxygen is 4-6L at rest and 8 L oxygen with activity, administered by nasal cannula.  Of note, patient was hospitalized 8/14-8/20 for similar clinical picture and was on a very prolonged steroid taper with plans for ILD clinic follow up in upcoming weeks.    -Supportive respiratory therapy care with HFNC oxygen as needed to maintain SPO2 greater than 90%  -Continue Rocephin and azithromycin for possible bacterial infection  -Perform viral PCR to evaluation for viral etiology that may cause prolonged recovergy  -Continue DuoNebs 4 times daily with as needed albuterol  -Increase Solumedrol to 60 mg every 6 hours for total of 240 mg daily of steroid - if patient has respiratory worsening would need to increase to 500-1000 mg IV steroids as has been previously recommended for suspected ILD flare during her last stay and would need to rediscuss with pulmonology team if that is necessary.    -Will see if patient would qualify for [employer] [id number].comource [id number] for improved pressure support with  "ambulation at baseline given her ongoing functional decline  -STRONGLY emphasized the need for absolute cessation of tobacco going forward if her goal is to get on a lung transplant list going forward.     Severe pulmonary hypertension  Follows with Dr. Ledesma at [facility], chronic and appears stable.    -Continue PTA tadalafil 20 mg daily    Elevated BNP  BNP of 2919 with known severe pulmonary hypertension and right sided heart failure.  Patient has a history of becoming hypotensive with aggressive IV diuresis.    -Continue home dose of 20 mg Lasix and monitor for symptoms stability     Chronic pain  Chronic opioid use  Secondary to inflammatory pain of arthritis and polymyositis.  Currently treated with Suboxone prescribed by her psychiatrist.  Patient does not feel dose is appropriate currently as she is having more pain as she is having to be immobile due to her breathing issues.    -Continue Suboxone 3 times daily but discussed with patient's prescriber, Dr. Bonilla, who does approve increasing to 6 mg/1.5-2.5 mg dosing three times a day during this stay and will continue this increased dose at time of discharge.  Patient will need to inform Dr. Bonilla when she is discharged from the hospital so an outpatient order can be placed.      Depression  Anxiety  PTSD  Hospitalization in June 2023 for suicidal thoughts and hallucinations.  Which she reports is much better controlled and she has been without suicidal thoughts.  Having some increased anxiety as the seriousness of her breathing and that she could die in the next 6 months even if she does everything correctly is being fully processed.    Continue PTA regimen of sertraline, Zyprexa, clonazepam, and Vistaril.    GERD  Symptoms well controlled with home regimen   -Continue PTA Protonix    Constipation  Reports no bowel movement for \"nearly 1 week\" prior to admission.  Was given an enema with good results and has had several bowel movement since admission.  "   -Continue Daily bowel regimen with senna and MiraLAX now and at time of discharge      Diet: Combination Diet Regular Diet; Low Saturated Fat Na <2400mg Diet, Low Saturated Fat Diet    DVT Prophylaxis: Enoxaparin (Lovenox) SQ  Berman Catheter: Not present  Lines: None     Cardiac Monitoring: None  Code Status: Full Code      Clinically Significant Risk Factors Present on Admission                    # Non-Invasive mechanical ventilation: current O2 Device: High Flow Nasal Cannula (HFNC)  # Acute hypoxic respiratory failure: continue supplemental O2 as needed                Disposition Plan      Expected Discharge Date: 09/12/2023                  Jia Ferrari MD  Hospitalist Service  [facility]  Securely message with [employer] (more info)  Text page via [facility] Paging/Directory   ______________________________________________________________________    Interval History   Patient remains on HFNC 30-40 L and 50-60% FiO2, desaturates into the 70s with activity.  No fevers.  Blood pressures low normal but stable.  Patient feels no better but no worse than when she presented.  No new symptoms.  No nursing concerns.     Physical Exam   Vital Signs: Temp: 97.8  F (36.6  C) Temp src: Oral BP: 95/55 Pulse: 70   Resp: 24 SpO2: 90 % O2 Device: High Flow Nasal Cannula (HFNC) Oxygen Delivery: 30 LPM  Weight: 112 lbs 3.2 oz    Constitutional: awake, alert, cooperative, no apparent distress at rest without movement, and appears stated age  Respiratory: mild tachypnea in the low 20s even at rest but not using accessory muscles, diminished air movement with crackles present in bilateral lower and mid lung fields   Cardiovascular: RRR  GI: bowel sounds present in all 4 quadrants, abdomen soft and non-tender  Neurologic: Awake, alert, oriented to name, place and situation     Medical Decision Making       50 MINUTES SPENT BY ME on the date of service doing chart review, history, exam,  documentation & further activities per the note.      Data     I have personally reviewed the following data over the past 24 hrs:    15.1 (H)  \   11.9   / 246     133 (L) 95 (L) 14.6 /  99   4.5 29 0.57 \     ALT: 13 AST: 15 AP: 57 TBILI: 0.3   ALB: 3.7 TOT PROTEIN: 7.0 LIPASE: N/A     Procal: N/A CRP: 101.18 (H) Lactic Acid: N/A

## 2023-09-11 NOTE — PROGRESS NOTES
Discussed the possibility of patient using a [id number] at home with the patient. Patient is in agreement with trial. Order faxed to [employer] today at 1000. Awaiting call from a [employer] rep.

## 2023-09-11 NOTE — PROVIDER NOTIFICATION
Provider contacted regarding pt's reluctance to use bedside commode. HFNC does not reach bathroom, and pt will d-sat into the 70's upon ambulation with 15L facemask. Provider spoke with pt and educated on importance of utilizing HFNC, especially upon ambulation. Pt is now agreeable to use commode.

## 2023-09-11 NOTE — PLAN OF CARE
"Goal Outcome Evaluation:      Plan of Care Reviewed With: patient    Overall Patient Progress: no changeOverall Patient Progress: no change    Outcome Evaluation: Pt on a HFNC, FiO2 of 50%, at 30L/min of O2, SPO2 90-93%. Using a 15L/min of O2 via face mask when going to the bathroom, SPO2 reaching 70s-80s. No BM output this shift. PRN Atarax and PRN Clonazepam utilized for anxiety as per patient's request. Sputum sample sent for culture and result came back abnormal, see results. IS done, reached 1000mL level and tolerated well.    BP 95/55 (BP Location: Left arm)   Pulse 70   Temp 97.8  F (36.6  C) (Oral)   Resp 24   Ht 1.562 m (5' 1.5\")   Wt 52.4 kg (115 lb 9.6 oz)   LMP  (LMP Unknown)   SpO2 93%   BMI 21.49 kg/m      "

## 2023-09-12 LAB
ALBUMIN SERPL BCG-MCNC: 3.8 G/DL (ref 3.5–5.2)
ALP SERPL-CCNC: 58 U/L (ref 35–104)
ALT SERPL W P-5'-P-CCNC: 15 U/L (ref 0–50)
ANION GAP SERPL CALCULATED.3IONS-SCNC: 13 MMOL/L (ref 7–15)
AST SERPL W P-5'-P-CCNC: 16 U/L (ref 0–45)
BASOPHILS # BLD AUTO: 0 10E3/UL (ref 0–0.2)
BASOPHILS NFR BLD AUTO: 0 %
BILIRUB SERPL-MCNC: 0.2 MG/DL
BUN SERPL-MCNC: 16.4 MG/DL (ref 6–20)
CALCIUM SERPL-MCNC: 9.1 MG/DL (ref 8.6–10)
CHLORIDE SERPL-SCNC: 95 MMOL/L (ref 98–107)
CREAT SERPL-MCNC: 0.5 MG/DL (ref 0.51–0.95)
CRP SERPL-MCNC: 39.11 MG/L
DEPRECATED HCO3 PLAS-SCNC: 25 MMOL/L (ref 22–29)
EGFRCR SERPLBLD CKD-EPI 2021: >90 ML/MIN/1.73M2
EOSINOPHIL # BLD AUTO: 0 10E3/UL (ref 0–0.7)
EOSINOPHIL NFR BLD AUTO: 0 %
ERYTHROCYTE [DISTWIDTH] IN BLOOD BY AUTOMATED COUNT: 15.4 % (ref 10–15)
GLUCOSE SERPL-MCNC: 235 MG/DL (ref 70–99)
HCT VFR BLD AUTO: 37.8 % (ref 35–47)
HGB BLD-MCNC: 11.9 G/DL (ref 11.7–15.7)
IMM GRANULOCYTES # BLD: 0.1 10E3/UL
IMM GRANULOCYTES NFR BLD: 1 %
LYMPHOCYTES # BLD AUTO: 0.7 10E3/UL (ref 0.8–5.3)
LYMPHOCYTES NFR BLD AUTO: 4 %
MAGNESIUM SERPL-MCNC: 2.1 MG/DL (ref 1.7–2.3)
MCH RBC QN AUTO: 28.6 PG (ref 26.5–33)
MCHC RBC AUTO-ENTMCNC: 31.5 G/DL (ref 31.5–36.5)
MCV RBC AUTO: 91 FL (ref 78–100)
MONOCYTES # BLD AUTO: 0.3 10E3/UL (ref 0–1.3)
MONOCYTES NFR BLD AUTO: 2 %
NEUTROPHILS # BLD AUTO: 14 10E3/UL (ref 1.6–8.3)
NEUTROPHILS NFR BLD AUTO: 93 %
NRBC # BLD AUTO: 0 10E3/UL
NRBC BLD AUTO-RTO: 0 /100
PLATELET # BLD AUTO: 282 10E3/UL (ref 150–450)
POTASSIUM SERPL-SCNC: 4.5 MMOL/L (ref 3.4–5.3)
PROT SERPL-MCNC: 7.3 G/DL (ref 6.4–8.3)
RBC # BLD AUTO: 4.16 10E6/UL (ref 3.8–5.2)
SODIUM SERPL-SCNC: 133 MMOL/L (ref 136–145)
WBC # BLD AUTO: 15.1 10E3/UL (ref 4–11)

## 2023-09-12 PROCEDURE — 99232 SBSQ HOSP IP/OBS MODERATE 35: CPT | Performed by: FAMILY MEDICINE

## 2023-09-12 PROCEDURE — 83735 ASSAY OF MAGNESIUM: CPT | Performed by: PEDIATRICS

## 2023-09-12 PROCEDURE — 36415 COLL VENOUS BLD VENIPUNCTURE: CPT | Performed by: FAMILY MEDICINE

## 2023-09-12 PROCEDURE — 250N000013 HC RX MED GY IP 250 OP 250 PS 637: Performed by: INTERNAL MEDICINE

## 2023-09-12 PROCEDURE — 80053 COMPREHEN METABOLIC PANEL: CPT | Performed by: NURSE PRACTITIONER

## 2023-09-12 PROCEDURE — 85025 COMPLETE CBC W/AUTO DIFF WBC: CPT | Performed by: NURSE PRACTITIONER

## 2023-09-12 PROCEDURE — 94640 AIRWAY INHALATION TREATMENT: CPT

## 2023-09-12 PROCEDURE — 86140 C-REACTIVE PROTEIN: CPT | Performed by: FAMILY MEDICINE

## 2023-09-12 PROCEDURE — 250N000013 HC RX MED GY IP 250 OP 250 PS 637: Performed by: FAMILY MEDICINE

## 2023-09-12 PROCEDURE — 120N000001 HC R&B MED SURG/OB

## 2023-09-12 PROCEDURE — 250N000009 HC RX 250: Performed by: NURSE PRACTITIONER

## 2023-09-12 PROCEDURE — 250N000011 HC RX IP 250 OP 636: Mod: JZ | Performed by: NURSE PRACTITIONER

## 2023-09-12 PROCEDURE — 94640 AIRWAY INHALATION TREATMENT: CPT | Mod: 76

## 2023-09-12 PROCEDURE — 250N000013 HC RX MED GY IP 250 OP 250 PS 637: Performed by: EMERGENCY MEDICINE

## 2023-09-12 PROCEDURE — 250N000011 HC RX IP 250 OP 636: Mod: JZ | Performed by: FAMILY MEDICINE

## 2023-09-12 PROCEDURE — 250N000013 HC RX MED GY IP 250 OP 250 PS 637: Performed by: NURSE PRACTITIONER

## 2023-09-12 RX ORDER — LANOLIN ALCOHOL/MO/W.PET/CERES
3 CREAM (GRAM) TOPICAL
Status: DISCONTINUED | OUTPATIENT
Start: 2023-09-12 | End: 2023-09-14 | Stop reason: HOSPADM

## 2023-09-12 RX ADMIN — METHYLPREDNISOLONE SODIUM SUCCINATE 62.5 MG: 125 INJECTION, POWDER, FOR SOLUTION INTRAMUSCULAR; INTRAVENOUS at 08:07

## 2023-09-12 RX ADMIN — POLYETHYLENE GLYCOL 3350 17 G: 17 POWDER, FOR SOLUTION ORAL at 08:03

## 2023-09-12 RX ADMIN — PANTOPRAZOLE SODIUM 40 MG: 40 TABLET, DELAYED RELEASE ORAL at 06:49

## 2023-09-12 RX ADMIN — GUAIFENESIN 600 MG: 600 TABLET ORAL at 20:31

## 2023-09-12 RX ADMIN — BUPRENORPHINE AND NALOXONE 1 FILM: 4; 1 FILM, SOLUBLE BUCCAL; SUBLINGUAL at 20:30

## 2023-09-12 RX ADMIN — TADALAFIL 20 MG: 20 TABLET ORAL at 08:05

## 2023-09-12 RX ADMIN — HYDROXYZINE HYDROCHLORIDE 25 MG: 25 TABLET ORAL at 19:43

## 2023-09-12 RX ADMIN — Medication 25 MCG: at 08:04

## 2023-09-12 RX ADMIN — SERTRALINE HYDROCHLORIDE 100 MG: 100 TABLET ORAL at 08:04

## 2023-09-12 RX ADMIN — ACETAMINOPHEN 650 MG: 325 TABLET, FILM COATED ORAL at 22:08

## 2023-09-12 RX ADMIN — METHYLPREDNISOLONE SODIUM SUCCINATE 62.5 MG: 125 INJECTION, POWDER, FOR SOLUTION INTRAMUSCULAR; INTRAVENOUS at 01:51

## 2023-09-12 RX ADMIN — OLANZAPINE 5 MG: 2.5 TABLET, FILM COATED ORAL at 20:30

## 2023-09-12 RX ADMIN — Medication 1 MG: at 01:56

## 2023-09-12 RX ADMIN — CEFTRIAXONE SODIUM 2 G: 2 INJECTION, POWDER, FOR SOLUTION INTRAMUSCULAR; INTRAVENOUS at 16:09

## 2023-09-12 RX ADMIN — ACETAMINOPHEN 650 MG: 325 TABLET, FILM COATED ORAL at 01:56

## 2023-09-12 RX ADMIN — ACETAMINOPHEN 650 MG: 325 TABLET, FILM COATED ORAL at 10:14

## 2023-09-12 RX ADMIN — CLONAZEPAM 0.25 MG: 0.25 TABLET, ORALLY DISINTEGRATING ORAL at 06:49

## 2023-09-12 RX ADMIN — BUPRENORPHINE AND NALOXONE 1 FILM: 2; .5 FILM, SOLUBLE BUCCAL; SUBLINGUAL at 20:30

## 2023-09-12 RX ADMIN — AZITHROMYCIN 500 MG: 250 TABLET, FILM COATED ORAL at 08:04

## 2023-09-12 RX ADMIN — BUPRENORPHINE AND NALOXONE 1 FILM: 4; 1 FILM, SOLUBLE BUCCAL; SUBLINGUAL at 08:03

## 2023-09-12 RX ADMIN — ENOXAPARIN SODIUM 40 MG: 40 INJECTION SUBCUTANEOUS at 20:30

## 2023-09-12 RX ADMIN — GUAIFENESIN 600 MG: 600 TABLET ORAL at 08:04

## 2023-09-12 RX ADMIN — IPRATROPIUM BROMIDE AND ALBUTEROL SULFATE 3 ML: 2.5; .5 SOLUTION RESPIRATORY (INHALATION) at 17:48

## 2023-09-12 RX ADMIN — METHYLPREDNISOLONE SODIUM SUCCINATE 62.5 MG: 125 INJECTION, POWDER, FOR SOLUTION INTRAMUSCULAR; INTRAVENOUS at 19:43

## 2023-09-12 RX ADMIN — BUPRENORPHINE AND NALOXONE 1 FILM: 2; .5 FILM, SOLUBLE BUCCAL; SUBLINGUAL at 08:03

## 2023-09-12 RX ADMIN — CLONAZEPAM 0.25 MG: 0.25 TABLET, ORALLY DISINTEGRATING ORAL at 12:55

## 2023-09-12 RX ADMIN — BUPRENORPHINE AND NALOXONE 1 FILM: 4; 1 FILM, SOLUBLE BUCCAL; SUBLINGUAL at 16:05

## 2023-09-12 RX ADMIN — MELATONIN 3 MG: at 23:09

## 2023-09-12 RX ADMIN — CLONAZEPAM 0.25 MG: 0.25 TABLET, ORALLY DISINTEGRATING ORAL at 19:43

## 2023-09-12 RX ADMIN — IPRATROPIUM BROMIDE AND ALBUTEROL SULFATE 3 ML: 2.5; .5 SOLUTION RESPIRATORY (INHALATION) at 20:30

## 2023-09-12 RX ADMIN — BUPRENORPHINE AND NALOXONE 1 FILM: 2; .5 FILM, SOLUBLE BUCCAL; SUBLINGUAL at 16:05

## 2023-09-12 RX ADMIN — METHYLPREDNISOLONE SODIUM SUCCINATE 62.5 MG: 125 INJECTION, POWDER, FOR SOLUTION INTRAMUSCULAR; INTRAVENOUS at 16:08

## 2023-09-12 RX ADMIN — ACETAMINOPHEN 650 MG: 325 TABLET, FILM COATED ORAL at 16:07

## 2023-09-12 RX ADMIN — IPRATROPIUM BROMIDE AND ALBUTEROL SULFATE 3 ML: 2.5; .5 SOLUTION RESPIRATORY (INHALATION) at 12:39

## 2023-09-12 ASSESSMENT — ACTIVITIES OF DAILY LIVING (ADL)
ADLS_ACUITY_SCORE: 24

## 2023-09-12 NOTE — PROGRESS NOTES
S: RT Note  B: ILD  A: Pt weaned to NC 5 LPM which is in her baseline range.   Nebs given as ordered w/o change.  Pt will  be set up with a [id number] at home on day of discharge.   R:RT to follow

## 2023-09-12 NOTE — PLAN OF CARE
Goal Outcome Evaluation:       Patient is currently on oxymizer at 6L with O2 around 90%. Independent to bedside commode with slight dyspnea on exertion. Klonopin given x1 for anxiety. Scheduled suboxone given and PRN tylenol for back pain and headache. Lungs are coarse/diminished. Tolerating diet. K+ and Mg rechecked in the morning.

## 2023-09-12 NOTE — PLAN OF CARE
Goal Outcome Evaluation:      Plan of Care Reviewed With: patient    Overall Patient Progress: no changeOverall Patient Progress: no change    Outcome Evaluation: Pt A&Ox4. VSS on HFNC 60% FiO2 and 25L.  Desats when OOB, increased to 80% during activity OOB with effectiveness. Lungs coarse. PRN klonopin given x1 for anxiety, tylenol x2 for headache, and melatonin for sleep. Up to BSC with SBA.

## 2023-09-12 NOTE — PROGRESS NOTES
[facility]    Medicine Progress Note - Hospitalist Service    Date of Admission:  9/10/2023    Assessment & Plan   Anali Loya is a 47 year old female with progressively worsening ILD who continues to smoke intermittently who presents on 9/10/2023 with acute on chronic respiratory failure requiring HFNC supplementation caused by URI causing acute flare of her chronic interstitial lung disease and pulmonary hypertension.  Patient was started on IV steroids and antibiotics with clinical stability overnight but no improvement yet noted.      Hypoxic respiratory failure  Chronic respiratory failure with hypoxia  Interstitial lung disease  Concern for community-acquired pneumonia  Patient had recent URI symptoms with progressive worsening lower respiratory symptoms, concerning for pneumonia although none obviously visible on radiographic imaging.  RSV, COVID and flu tested negative in the ED.  Patient's baseline oxygen is 4-6L at rest and 8 L oxygen with activity, administered by nasal cannula.  Of note, patient was hospitalized 8/14-8/20 for similar clinical picture and was on a very prolonged steroid taper with plans for ILD clinic follow up in upcoming weeks.    -Supportive respiratory therapy care with HFNC oxygen as needed to maintain SPO2 greater than 90%  -Continue Rocephin and azithromycin for possible bacterial infection  -Perform viral PCR to evaluation for viral etiology that may cause prolonged recovergy  -Continue DuoNebs 4 times daily with as needed albuterol  -Increase Solumedrol to 60 mg every 6 hours for total of 240 mg daily of steroid - if patient has respiratory worsening would need to increase to 500-1000 mg IV steroids as has been previously recommended for suspected ILD flare during her last stay and would need to rediscuss with pulmonology team if that is necessary.    -Will see if patient would qualify for [employer] 2000 for improved pressure support with  "ambulation at baseline given her ongoing functional decline  -STRONGLY emphasized the need for absolute cessation of tobacco going forward if her goal is to get on a lung transplant list going forward.     Severe pulmonary hypertension  Follows with Dr. Ledesma at [facility], chronic and appears stable.    -Continue PTA tadalafil 20 mg daily    Elevated BNP  BNP of 2919 with known severe pulmonary hypertension and right sided heart failure.  Patient has a history of becoming hypotensive with aggressive IV diuresis.    -Continue home dose of 20 mg Lasix and monitor for symptoms stability     Chronic pain  Chronic opioid use  Secondary to inflammatory pain of arthritis and polymyositis.  Currently treated with Suboxone prescribed by her psychiatrist.  Patient does not feel dose is appropriate currently as she is having more pain as she is having to be immobile due to her breathing issues.    -Continue Suboxone 3 times daily but discussed with patient's prescriber, Dr. Bonilla, who does approve increasing to 6 mg/1.5-2.5 mg dosing three times a day during this stay and will continue this increased dose at time of discharge.  Patient will need to inform Dr. Bonilla when she is discharged from the hospital so an outpatient order can be placed.      Depression  Anxiety  PTSD  Hospitalization in June 2023 for suicidal thoughts and hallucinations.  Which she reports is much better controlled and she has been without suicidal thoughts.  Having some increased anxiety as the seriousness of her breathing and that she could die in the next 6 months even if she does everything correctly is being fully processed.    Continue PTA regimen of sertraline, Zyprexa, clonazepam, and Vistaril.    GERD  Symptoms well controlled with home regimen   -Continue PTA Protonix    Constipation  Reports no bowel movement for \"nearly 1 week\" prior to admission.  Was given an enema with good results and has had several bowel movement since admission.  "   -Continue Daily bowel regimen with senna and MiraLAX now and at time of discharge        Diet: Combination Diet Regular Diet; Low Saturated Fat Na <2400mg Diet, Low Saturated Fat Diet    DVT Prophylaxis: Enoxaparin (Lovenox) SQ  Berman Catheter: Not present  Lines: None     Cardiac Monitoring: None  Code Status: Full Code      Clinically Significant Risk Factors                                    Disposition Plan   Anticipate discharge home in the next 1-3 days as patient continues to clinically improve and safe discharge plan is in place.    Jia Ferrari MD  Hospitalist Service  [facility]  Securely message with [employer] (more info)  Text page via [facility] Paging/Directory   ______________________________________________________________________    Interval History   Patient has been afebrile, pulse normal, blood pressures stable.  Has been weaning down on HFNC throughout the day and transitioned to NC oxygen this afternoon and so far is tolerating it well.  Meeting with [id number] representative this afternoon for home use.  Patient reports her breathing is feeling overall better but she is feeling overwhelmed by this second respiratory worsening and how much her life is changing.  No new nursing concerns.     Physical Exam   Vital Signs: Temp: 98  F (36.7  C) Temp src: Oral BP: 121/73 Pulse: 66   Resp: 18 SpO2: 98 % O2 Device: High Flow Nasal Cannula (HFNC) Oxygen Delivery: 15 LPM  Weight: 113 lbs 14.4 oz    Constitutional: awake, alert, cooperative, no apparent distress at rest, and appears stated age  Respiratory: no increased work of breathing at rest, ongoing coarse crackles diffusely worse at bilateral bases.  No wheezing  Cardiovascular: RRR  Neurologic: Awake, alert, oriented to name, place and situation     Medical Decision Making       40 MINUTES SPENT BY ME on the date of service doing chart review, history, exam, documentation & further activities per the note.       Data     I have personally reviewed the following data over the past 24 hrs:    15.1 (H)  \   11.9   / 282     133 (L) 95 (L) 16.4 /  235 (H)   4.5 25 0.50 (L) \     ALT: 15 AST: 16 AP: 58 TBILI: 0.2   ALB: 3.8 TOT PROTEIN: 7.3 LIPASE: N/A     Procal: N/A CRP: 39.11 (H) Lactic Acid: N/A

## 2023-09-13 ENCOUNTER — TELEPHONE (OUTPATIENT)
Dept: PULMONOLOGY | Facility: CLINIC | Age: 48
End: 2023-09-13
Payer: COMMERCIAL

## 2023-09-13 LAB
ALBUMIN SERPL BCG-MCNC: 3.8 G/DL (ref 3.5–5.2)
ALP SERPL-CCNC: 59 U/L (ref 35–104)
ALT SERPL W P-5'-P-CCNC: 15 U/L (ref 0–50)
ANION GAP SERPL CALCULATED.3IONS-SCNC: 12 MMOL/L (ref 7–15)
AST SERPL W P-5'-P-CCNC: 13 U/L (ref 0–45)
BASOPHILS # BLD AUTO: 0 10E3/UL (ref 0–0.2)
BASOPHILS NFR BLD AUTO: 0 %
BILIRUB SERPL-MCNC: 0.2 MG/DL
BUN SERPL-MCNC: 19.1 MG/DL (ref 6–20)
C PNEUM DNA SPEC QL NAA+PROBE: NOT DETECTED
CALCIUM SERPL-MCNC: 9.5 MG/DL (ref 8.6–10)
CHLORIDE SERPL-SCNC: 97 MMOL/L (ref 98–107)
CREAT SERPL-MCNC: 0.63 MG/DL (ref 0.51–0.95)
CRP SERPL-MCNC: 18.68 MG/L
DEPRECATED HCO3 PLAS-SCNC: 28 MMOL/L (ref 22–29)
EGFRCR SERPLBLD CKD-EPI 2021: >90 ML/MIN/1.73M2
EOSINOPHIL # BLD AUTO: 0 10E3/UL (ref 0–0.7)
EOSINOPHIL NFR BLD AUTO: 0 %
ERYTHROCYTE [DISTWIDTH] IN BLOOD BY AUTOMATED COUNT: 15.6 % (ref 10–15)
FLUAV H1 2009 PAND RNA SPEC QL NAA+PROBE: NOT DETECTED
FLUAV H1 RNA SPEC QL NAA+PROBE: NOT DETECTED
FLUAV H3 RNA SPEC QL NAA+PROBE: NOT DETECTED
FLUAV RNA SPEC QL NAA+PROBE: NOT DETECTED
FLUBV RNA SPEC QL NAA+PROBE: NOT DETECTED
GLUCOSE SERPL-MCNC: 138 MG/DL (ref 70–99)
HADV DNA SPEC QL NAA+PROBE: NOT DETECTED
HCOV PNL SPEC NAA+PROBE: NOT DETECTED
HCT VFR BLD AUTO: 40.1 % (ref 35–47)
HGB BLD-MCNC: 12.4 G/DL (ref 11.7–15.7)
HMPV RNA SPEC QL NAA+PROBE: NOT DETECTED
HPIV1 RNA SPEC QL NAA+PROBE: NOT DETECTED
HPIV2 RNA SPEC QL NAA+PROBE: NOT DETECTED
HPIV3 RNA SPEC QL NAA+PROBE: NOT DETECTED
HPIV4 RNA SPEC QL NAA+PROBE: NOT DETECTED
IMM GRANULOCYTES # BLD: 0.2 10E3/UL
IMM GRANULOCYTES NFR BLD: 1 %
LYMPHOCYTES # BLD AUTO: 1 10E3/UL (ref 0.8–5.3)
LYMPHOCYTES NFR BLD AUTO: 6 %
M PNEUMO DNA SPEC QL NAA+PROBE: NOT DETECTED
MAGNESIUM SERPL-MCNC: 2.1 MG/DL (ref 1.7–2.3)
MCH RBC QN AUTO: 28.3 PG (ref 26.5–33)
MCHC RBC AUTO-ENTMCNC: 30.9 G/DL (ref 31.5–36.5)
MCV RBC AUTO: 92 FL (ref 78–100)
MONOCYTES # BLD AUTO: 0.5 10E3/UL (ref 0–1.3)
MONOCYTES NFR BLD AUTO: 3 %
NEUTROPHILS # BLD AUTO: 16.1 10E3/UL (ref 1.6–8.3)
NEUTROPHILS NFR BLD AUTO: 90 %
NRBC # BLD AUTO: 0 10E3/UL
NRBC BLD AUTO-RTO: 0 /100
PLATELET # BLD AUTO: 296 10E3/UL (ref 150–450)
POTASSIUM SERPL-SCNC: 4.4 MMOL/L (ref 3.4–5.3)
PROT SERPL-MCNC: 7.3 G/DL (ref 6.4–8.3)
RBC # BLD AUTO: 4.38 10E6/UL (ref 3.8–5.2)
RSV RNA SPEC QL NAA+PROBE: NOT DETECTED
RSV RNA SPEC QL NAA+PROBE: NOT DETECTED
RV+EV RNA SPEC QL NAA+PROBE: NOT DETECTED
SODIUM SERPL-SCNC: 137 MMOL/L (ref 136–145)
WBC # BLD AUTO: 17.7 10E3/UL (ref 4–11)

## 2023-09-13 PROCEDURE — 120N000001 HC R&B MED SURG/OB

## 2023-09-13 PROCEDURE — 250N000013 HC RX MED GY IP 250 OP 250 PS 637: Performed by: EMERGENCY MEDICINE

## 2023-09-13 PROCEDURE — 250N000012 HC RX MED GY IP 250 OP 636 PS 637: Performed by: FAMILY MEDICINE

## 2023-09-13 PROCEDURE — 250N000013 HC RX MED GY IP 250 OP 250 PS 637: Performed by: FAMILY MEDICINE

## 2023-09-13 PROCEDURE — 250N000011 HC RX IP 250 OP 636: Mod: JZ | Performed by: FAMILY MEDICINE

## 2023-09-13 PROCEDURE — 86140 C-REACTIVE PROTEIN: CPT | Performed by: FAMILY MEDICINE

## 2023-09-13 PROCEDURE — 250N000009 HC RX 250: Performed by: NURSE PRACTITIONER

## 2023-09-13 PROCEDURE — 250N000013 HC RX MED GY IP 250 OP 250 PS 637: Performed by: NURSE PRACTITIONER

## 2023-09-13 PROCEDURE — 85025 COMPLETE CBC W/AUTO DIFF WBC: CPT | Performed by: NURSE PRACTITIONER

## 2023-09-13 PROCEDURE — 83735 ASSAY OF MAGNESIUM: CPT | Performed by: FAMILY MEDICINE

## 2023-09-13 PROCEDURE — 36415 COLL VENOUS BLD VENIPUNCTURE: CPT | Performed by: NURSE PRACTITIONER

## 2023-09-13 PROCEDURE — 87633 RESP VIRUS 12-25 TARGETS: CPT | Performed by: FAMILY MEDICINE

## 2023-09-13 PROCEDURE — 80053 COMPREHEN METABOLIC PANEL: CPT | Performed by: NURSE PRACTITIONER

## 2023-09-13 PROCEDURE — 250N000013 HC RX MED GY IP 250 OP 250 PS 637: Performed by: INTERNAL MEDICINE

## 2023-09-13 PROCEDURE — 94640 AIRWAY INHALATION TREATMENT: CPT | Mod: 76

## 2023-09-13 PROCEDURE — 99233 SBSQ HOSP IP/OBS HIGH 50: CPT | Performed by: FAMILY MEDICINE

## 2023-09-13 RX ORDER — LEVOFLOXACIN 750 MG/1
750 TABLET, FILM COATED ORAL DAILY
Status: DISCONTINUED | OUTPATIENT
Start: 2023-09-13 | End: 2023-09-14 | Stop reason: HOSPADM

## 2023-09-13 RX ORDER — IBUPROFEN 200 MG
400 TABLET ORAL ONCE
Status: COMPLETED | OUTPATIENT
Start: 2023-09-13 | End: 2023-09-13

## 2023-09-13 RX ORDER — IBUPROFEN 200 MG
400 TABLET ORAL EVERY 6 HOURS PRN
Status: DISCONTINUED | OUTPATIENT
Start: 2023-09-13 | End: 2023-09-14 | Stop reason: HOSPADM

## 2023-09-13 RX ORDER — PREDNISONE 20 MG/1
40 TABLET ORAL 2 TIMES DAILY WITH MEALS
Status: DISCONTINUED | OUTPATIENT
Start: 2023-09-13 | End: 2023-09-13

## 2023-09-13 RX ADMIN — BUPRENORPHINE AND NALOXONE 1 FILM: 2; .5 FILM, SOLUBLE BUCCAL; SUBLINGUAL at 20:05

## 2023-09-13 RX ADMIN — BUPRENORPHINE AND NALOXONE 1 FILM: 2; .5 FILM, SOLUBLE BUCCAL; SUBLINGUAL at 13:48

## 2023-09-13 RX ADMIN — BUPRENORPHINE AND NALOXONE 1 FILM: 2; .5 FILM, SOLUBLE BUCCAL; SUBLINGUAL at 08:12

## 2023-09-13 RX ADMIN — BUPRENORPHINE AND NALOXONE 1 FILM: 4; 1 FILM, SOLUBLE BUCCAL; SUBLINGUAL at 20:04

## 2023-09-13 RX ADMIN — IBUPROFEN 400 MG: 200 TABLET, FILM COATED ORAL at 07:31

## 2023-09-13 RX ADMIN — AZITHROMYCIN 500 MG: 250 TABLET, FILM COATED ORAL at 08:12

## 2023-09-13 RX ADMIN — CLONAZEPAM 0.25 MG: 0.25 TABLET, ORALLY DISINTEGRATING ORAL at 06:46

## 2023-09-13 RX ADMIN — ACETAMINOPHEN 650 MG: 325 TABLET, FILM COATED ORAL at 20:11

## 2023-09-13 RX ADMIN — HYDROXYZINE HYDROCHLORIDE 25 MG: 25 TABLET ORAL at 13:48

## 2023-09-13 RX ADMIN — ENOXAPARIN SODIUM 40 MG: 40 INJECTION SUBCUTANEOUS at 20:04

## 2023-09-13 RX ADMIN — IPRATROPIUM BROMIDE AND ALBUTEROL SULFATE 3 ML: 2.5; .5 SOLUTION RESPIRATORY (INHALATION) at 13:18

## 2023-09-13 RX ADMIN — ACETAMINOPHEN 650 MG: 325 TABLET, FILM COATED ORAL at 13:48

## 2023-09-13 RX ADMIN — OLANZAPINE 5 MG: 2.5 TABLET, FILM COATED ORAL at 20:04

## 2023-09-13 RX ADMIN — PANTOPRAZOLE SODIUM 40 MG: 40 TABLET, DELAYED RELEASE ORAL at 06:41

## 2023-09-13 RX ADMIN — GUAIFENESIN 600 MG: 600 TABLET ORAL at 20:04

## 2023-09-13 RX ADMIN — CLONAZEPAM 0.25 MG: 0.25 TABLET, ORALLY DISINTEGRATING ORAL at 14:55

## 2023-09-13 RX ADMIN — Medication 25 MCG: at 08:12

## 2023-09-13 RX ADMIN — BUPRENORPHINE AND NALOXONE 1 FILM: 4; 1 FILM, SOLUBLE BUCCAL; SUBLINGUAL at 13:48

## 2023-09-13 RX ADMIN — IPRATROPIUM BROMIDE AND ALBUTEROL SULFATE 3 ML: 2.5; .5 SOLUTION RESPIRATORY (INHALATION) at 07:47

## 2023-09-13 RX ADMIN — BUPRENORPHINE AND NALOXONE 1 FILM: 4; 1 FILM, SOLUBLE BUCCAL; SUBLINGUAL at 08:12

## 2023-09-13 RX ADMIN — LEVOFLOXACIN 750 MG: 750 TABLET, FILM COATED ORAL at 13:48

## 2023-09-13 RX ADMIN — MELATONIN 3 MG: at 20:54

## 2023-09-13 RX ADMIN — ACETAMINOPHEN 650 MG: 325 TABLET, FILM COATED ORAL at 05:46

## 2023-09-13 RX ADMIN — POLYETHYLENE GLYCOL 3350 17 G: 17 POWDER, FOR SOLUTION ORAL at 08:12

## 2023-09-13 RX ADMIN — HYDROXYZINE HYDROCHLORIDE 25 MG: 25 TABLET ORAL at 05:46

## 2023-09-13 RX ADMIN — PREDNISONE 30 MG: 5 TABLET ORAL at 17:17

## 2023-09-13 RX ADMIN — IBUPROFEN 400 MG: 200 TABLET, FILM COATED ORAL at 15:28

## 2023-09-13 RX ADMIN — IPRATROPIUM BROMIDE AND ALBUTEROL SULFATE 3 ML: 2.5; .5 SOLUTION RESPIRATORY (INHALATION) at 17:16

## 2023-09-13 RX ADMIN — GUAIFENESIN 600 MG: 600 TABLET ORAL at 08:12

## 2023-09-13 RX ADMIN — SERTRALINE HYDROCHLORIDE 100 MG: 100 TABLET ORAL at 08:12

## 2023-09-13 RX ADMIN — METHYLPREDNISOLONE SODIUM SUCCINATE 62.5 MG: 125 INJECTION, POWDER, FOR SOLUTION INTRAMUSCULAR; INTRAVENOUS at 02:51

## 2023-09-13 RX ADMIN — PREDNISONE 40 MG: 20 TABLET ORAL at 07:47

## 2023-09-13 RX ADMIN — IPRATROPIUM BROMIDE AND ALBUTEROL SULFATE 3 ML: 2.5; .5 SOLUTION RESPIRATORY (INHALATION) at 20:04

## 2023-09-13 RX ADMIN — TADALAFIL 20 MG: 20 TABLET ORAL at 08:11

## 2023-09-13 RX ADMIN — CLONAZEPAM 0.25 MG: 0.25 TABLET, ORALLY DISINTEGRATING ORAL at 20:54

## 2023-09-13 ASSESSMENT — ACTIVITIES OF DAILY LIVING (ADL)
ADLS_ACUITY_SCORE: 24

## 2023-09-13 NOTE — PLAN OF CARE
Goal Outcome Evaluation:      Plan of Care Reviewed With: patient    Overall Patient Progress: improvingOverall Patient Progress: improving    Outcome Evaluation: Pt A/Ox4. VVS on 6 L of NC. O2 above 90's at rest but will drop to high 70's-low 80's when ambulating to bathroom. Pt's Lungs course, given scheduled duoneb. Given PRN Tylenol for back pain. Also given PRN Melatonin for sleep. Potassium and Magnesium protocols ran, recheck tomorrow morning. Pt has lost one pound since yesterday morning. Patient complained of a headache this morning. Ibuprofen ordered and administered.

## 2023-09-13 NOTE — PROGRESS NOTES
[facility]    Medicine Progress Note - Hospitalist Service    Date of Admission:  9/10/2023    Assessment & Plan     Anali Loya is a 47 year old female with progressively worsening ILD who continues to smoke intermittently who presents on 9/10/2023 with acute on chronic respiratory failure requiring HFNC supplementation caused by URI causing acute flare of her chronic interstitial lung disease and pulmonary hypertension.  Patient was started on IV steroids and antibiotics with progressive improvement noted.  Sputum culture has grown out enterobacter and non-lactose fermenting gram negative bacilli with sensitivities pending.  Patient is now back to 6L NC and has been set up to start [id number] pressure support at time of discharge.  Hopeful discharge tomorrow once appropriate PO antibiotic plan is known and patient's respiratory remains stable to improving.      Discussed with Dr. Marinelli, patient's ILD specialist, and will transition patient to PO prednisone and ongoing antibiotic therapy to cover organisms grown.  At time of discharge home, patient will discharge on prednisone 30 mg daily and decrease by 5 mg every 7 days until she is on 10 mg and then remain on that dose until follow up with Dr. Marinelli in December.  Following antibiotic completion patient will start azithromycin three times weekly to attempt to prevent recurrent flares in the upcoming months and this plan will be re-evaluated in December at upcoming appointment.      Hypoxic respiratory failure  Chronic respiratory failure with hypoxia  Interstitial lung disease  Concern for community-acquired pneumonia  Patient had recent URI symptoms with progressive worsening lower respiratory symptoms and HFNC needs, concerning for pneumonia although none obviously visible on radiographic imaging.  RSV, COVID and flu tested negative in the ED.  Patient's baseline oxygen is 4-6L at rest and 8 L oxygen with activity, administered  by nasal cannula.  Of note, patient was hospitalized 8/14-8/20 for similar clinical picture and was on a very prolonged steroid taper and has established care with ILD clinic in recent past.  Now has sputum culture results as above, on Rocephin and azithromycin.  Viral PCR negative   -Supportive respiratory therapy care to maintain SPO2 greater than 90%  -Continue Rocephin and azithromycin for lower bacterial infection and await sputum culture results.  -Continue DuoNebs 4 times daily with as needed albuterol  -transition to Prednisone 40 mg daily with plans for further reduction to 30 mg at time of discharge and very slow taper weekly as above with patient remaining on 10 mg following taper until ILD clinic follow up.  -Patient does qualify for [id number] for improved pressure support with ambulation at baseline given her ongoing functional decline and they will meet patient at her house following discharge   -emphasized the need for absolute cessation of tobacco going forward if her goal is to get on a lung transplant list going forward.     Severe pulmonary hypertension  Follows with Dr. Ledesma at [facility], chronic and appears stable.    -Continue PTA tadalafil 20 mg daily    Elevated BNP  BNP of 2919 with known severe pulmonary hypertension and right sided heart failure.  Patient has a history of becoming hypotensive with aggressive IV diuresis.    -Continue home dose of 20 mg Lasix and monitor for symptoms stability     Chronic pain  Chronic opioid use  Secondary to inflammatory pain of arthritis and polymyositis.  Currently treated with Suboxone prescribed by her psychiatrist.  Patient does not feel dose is appropriate currently as she is having more pain as she is having to be immobile due to her breathing issues and following discussion with Dr. Bonilla, her suboxone prescriber, she was increased to 6 mg/1.5-2.5 mg dosing three times a day during this stay and will continue this increased dose at time of  "discharge.    -Patient will need to inform Dr. Bonilla when she is discharged from the hospital so an outpatient order can be placed.      Depression  Anxiety  PTSD  Hospitalization in June 2023 for suicidal thoughts and hallucinations.  Depression and anxiety are much better controlled and she has been without suicidal thoughts.  Having some increased anxiety as the seriousness of her breathing and that she could die in the next 6 months even if she does everything correctly is being fully processed.    Continue PTA regimen of sertraline, Zyprexa, clonazepam, and Vistaril.    GERD  Symptoms well controlled with home regimen   -Continue PTA Protonix    Constipation  Reports no bowel movement for \"nearly 1 week\" prior to admission.  Was given an enema with good results and has had several bowel movement since admission.    -Continue Daily bowel regimen with senna and MiraLAX now and at time of discharge        Diet: Combination Diet Regular Diet; Low Saturated Fat Na <2400mg Diet, Low Saturated Fat Diet    DVT Prophylaxis: Enoxaparin (Lovenox) SQ  Berman Catheter: Not present  Lines: None     Cardiac Monitoring: None  Code Status: Full Code      Clinically Significant Risk Factors                                    Disposition Plan   Anticipate discharge home tomorrow once sputum cultures and known and patient continues to improve.        Jia Ferrari MD  Hospitalist Service  [facility]  Securely message with [employer] (more info)  Text page via [employer] Paging/Directory   ______________________________________________________________________    Interval History   Patient has been afebrile, blood pressures stable and normal, oxygen has been on 6L continuously since last evening - does have transient dropping into the 70-low 80s with activity but asymptomatic and patient recovers quickly after resting.  She still doesn't feel well overall and is frustrated this is taking longer " to recover from than the last flare but admits her breathing is still feeling better every day.  No new symptoms or concerns per patient.  No new nursing concerns.    Physical Exam   Vital Signs: Temp: 97.6  F (36.4  C) Temp src: Oral BP: 137/87 Pulse: 63   Resp: 16 SpO2: 96 % O2 Device: Nasal cannula Oxygen Delivery: 6 LPM  Weight: 112 lbs 8 oz    Constitutional: awake, alert, cooperative, no acute distress at rest  Respiratory: no increased work of breathing, ongoing decreased air movement with fine crackles diffusely but most notably in the lower and mid lungs but improved from yesterday   Cardiovascular: RRR  Musculoskeletal: no lower extremity pitting edema present    Medical Decision Making       55 MINUTES SPENT BY ME on the date of service doing chart review, history, exam, documentation & further activities per the note.      Data     I have personally reviewed the following data over the past 24 hrs:    17.7 (H)  \   12.4   / 296     137 97 (L) 19.1 /  138 (H)   4.4 28 0.63 \     ALT: 15 AST: 13 AP: 59 TBILI: 0.2   ALB: 3.8 TOT PROTEIN: 7.3 LIPASE: N/A     Procal: N/A CRP: 18.68 (H) Lactic Acid: N/A

## 2023-09-13 NOTE — PROGRESS NOTES
RT spoke with Tram from [employer] regarding patients oxygen needs- They will drop two oxygen e tanks off for discharge tomorrow 9/14 that patient will use for getting home. When we know when patient is supposed to be discharged we need to give Tram a call at [phone] so they can arrange to set up patient's new home concentrator at her house.    [employer] may need an updated prescription for home oxygen. ( Must be 48 hours from discharge)          Spoke with Sydni from [employer] 9/13- Sydni was here yesterday and went over life 2000 ventilation system with patient. They adjusted settings and will work closely with patient once she is home on setting goals for wearing the device and making the patient the most comfortable. As of right now there is no required amount patient needs to wear device but [facility] will work with Anali to make sure she is getting the most out of her device as patient would likely see a huge benefit from wearing device daily.  [facility] would appreciate an update on when patient is discharging so they can plan to meet patient at her home to get her set up after discharge.

## 2023-09-13 NOTE — TELEPHONE ENCOUNTER
----- Message from Mango Marinelli MD sent at 9/13/2023 12:45 PM CDT -----  Regarding: RE: would like call today  Spoke to Dr Ferrari-she would be discharged on prednisone 30 mg and then taper by 5 mg every 7 days, and azithro 3 times/ week for prophylaxis. Unfortunately she continues to smoke periodically. Keep appointment for December. She can call us in the interim if she needs assistance.      ----- Message -----  From: Erica Rudd, NAREN  Sent: 9/13/2023   8:32 AM CDT  To: Mango Marinelli MD  Subject: FW: would like call today                        Hi Dr Marinelli,    I spoke to the provider. She wanted to let us know that she in the hospital for acute respiratory pneumonia. She is treating her with 2 antibiotics and did increase her Prednisone. You should be able to see the records. She is improving.     She was wanting to first just notify us she is in the hospital and to get your guidance on Prednisone taper.    If you want to call her back on her cell phone or I can do it too? Just let me know.    Carley Rudd RN  ILD Care Coordinator  [phone]     ----- Message -----  From: Carley Mora RN  Sent: 9/13/2023   8:08 AM CDT  To: [facility]m [facility] Nurses  Subject: would like call today                            [facility] Florecita Ferrari, [facility] [place].   Cell: [phone]    Would like to speak with Dr. Marinelli regarding pt and plan of care today.  She is inpatient at this time.

## 2023-09-13 NOTE — PROGRESS NOTES
SPIRITUAL HEALTH SERVICES Progress Note    I visited Anali, on the Medical Surgical Unit at [facility].      Patient/Family Understanding of Illness and Goals of Care - Anali understands her illness.  She reported that she has been feeling weakened by it, emotionally and spiritually, but has begun to gather strength to fight for a possible transplant in the future.      Meaning, Beliefs, and Spirituality - Anali was open to emotional and spiritual support.  She reported that she sees how God has been working to pull her care team together recently to facilitate her recovery.  I said a prayer for her and offered her a blessing.    Plan of Care - I will continue to follow patient and be available for any ongoing support needs until her discharge.     Perfecto Hernandez, Ph.D,   Spiritual Health Services  [address] [place], [place] [address] [phone]  [email or web address]

## 2023-09-13 NOTE — PROGRESS NOTES
Patient's sputum culture organisms have been identified as Enterobacter cloacae and Pseudomonas fluorescens.  In discussion with IP pharmacy team, the enterobacter has good coverage from current antibiotic regimen but Pseudomonas likely does not but statistically should be sensitive to fluoroquinolones.  Will transition to PO Levaquin 750 mg daily starting this afternoon and given her severe ILD and ongoing immunosuppression plan for a 14-21 day course going forward.    Electronically Signed:  Jia Ferrari MD

## 2023-09-14 ENCOUNTER — TELEPHONE (OUTPATIENT)
Dept: FAMILY MEDICINE | Facility: CLINIC | Age: 48
End: 2023-09-14

## 2023-09-14 ENCOUNTER — MYC MEDICAL ADVICE (OUTPATIENT)
Dept: FAMILY MEDICINE | Facility: CLINIC | Age: 48
End: 2023-09-14

## 2023-09-14 VITALS
HEART RATE: 67 BPM | HEIGHT: 62 IN | TEMPERATURE: 98 F | RESPIRATION RATE: 18 BRPM | SYSTOLIC BLOOD PRESSURE: 143 MMHG | WEIGHT: 112.9 LBS | BODY MASS INDEX: 20.78 KG/M2 | DIASTOLIC BLOOD PRESSURE: 85 MMHG | OXYGEN SATURATION: 96 %

## 2023-09-14 LAB
ALBUMIN SERPL BCG-MCNC: 3.8 G/DL (ref 3.5–5.2)
ALP SERPL-CCNC: 54 U/L (ref 35–104)
ALT SERPL W P-5'-P-CCNC: 13 U/L (ref 0–50)
ANION GAP SERPL CALCULATED.3IONS-SCNC: 8 MMOL/L (ref 7–15)
AST SERPL W P-5'-P-CCNC: 11 U/L (ref 0–45)
BACTERIA SPT CULT: ABNORMAL
BASOPHILS # BLD AUTO: 0 10E3/UL (ref 0–0.2)
BASOPHILS NFR BLD AUTO: 0 %
BILIRUB SERPL-MCNC: <0.2 MG/DL
BUN SERPL-MCNC: 20.7 MG/DL (ref 6–20)
CALCIUM SERPL-MCNC: 9.1 MG/DL (ref 8.6–10)
CHLORIDE SERPL-SCNC: 100 MMOL/L (ref 98–107)
CREAT SERPL-MCNC: 0.74 MG/DL (ref 0.51–0.95)
CRP SERPL-MCNC: 8.55 MG/L
DEPRECATED HCO3 PLAS-SCNC: 30 MMOL/L (ref 22–29)
EGFRCR SERPLBLD CKD-EPI 2021: >90 ML/MIN/1.73M2
EOSINOPHIL # BLD AUTO: 0 10E3/UL (ref 0–0.7)
EOSINOPHIL NFR BLD AUTO: 0 %
ERYTHROCYTE [DISTWIDTH] IN BLOOD BY AUTOMATED COUNT: 15.8 % (ref 10–15)
GLUCOSE SERPL-MCNC: 108 MG/DL (ref 70–99)
GRAM STAIN RESULT: ABNORMAL
HCT VFR BLD AUTO: 39.3 % (ref 35–47)
HGB BLD-MCNC: 12.1 G/DL (ref 11.7–15.7)
IMM GRANULOCYTES # BLD: 0.1 10E3/UL
IMM GRANULOCYTES NFR BLD: 1 %
LYMPHOCYTES # BLD AUTO: 2.2 10E3/UL (ref 0.8–5.3)
LYMPHOCYTES NFR BLD AUTO: 17 %
MAGNESIUM SERPL-MCNC: 2.1 MG/DL (ref 1.7–2.3)
MCH RBC QN AUTO: 28.3 PG (ref 26.5–33)
MCHC RBC AUTO-ENTMCNC: 30.8 G/DL (ref 31.5–36.5)
MCV RBC AUTO: 92 FL (ref 78–100)
MONOCYTES # BLD AUTO: 0.9 10E3/UL (ref 0–1.3)
MONOCYTES NFR BLD AUTO: 7 %
NEUTROPHILS # BLD AUTO: 9.9 10E3/UL (ref 1.6–8.3)
NEUTROPHILS NFR BLD AUTO: 75 %
NRBC # BLD AUTO: 0 10E3/UL
NRBC BLD AUTO-RTO: 0 /100
PLATELET # BLD AUTO: 307 10E3/UL (ref 150–450)
POTASSIUM SERPL-SCNC: 4 MMOL/L (ref 3.4–5.3)
PROT SERPL-MCNC: 6.9 G/DL (ref 6.4–8.3)
RBC # BLD AUTO: 4.27 10E6/UL (ref 3.8–5.2)
SODIUM SERPL-SCNC: 138 MMOL/L (ref 136–145)
WBC # BLD AUTO: 13.2 10E3/UL (ref 4–11)

## 2023-09-14 PROCEDURE — 83735 ASSAY OF MAGNESIUM: CPT | Performed by: FAMILY MEDICINE

## 2023-09-14 PROCEDURE — 250N000013 HC RX MED GY IP 250 OP 250 PS 637: Performed by: EMERGENCY MEDICINE

## 2023-09-14 PROCEDURE — 250N000013 HC RX MED GY IP 250 OP 250 PS 637: Performed by: FAMILY MEDICINE

## 2023-09-14 PROCEDURE — 85025 COMPLETE CBC W/AUTO DIFF WBC: CPT | Performed by: NURSE PRACTITIONER

## 2023-09-14 PROCEDURE — 86140 C-REACTIVE PROTEIN: CPT | Performed by: FAMILY MEDICINE

## 2023-09-14 PROCEDURE — 80053 COMPREHEN METABOLIC PANEL: CPT | Performed by: NURSE PRACTITIONER

## 2023-09-14 PROCEDURE — 36415 COLL VENOUS BLD VENIPUNCTURE: CPT | Performed by: NURSE PRACTITIONER

## 2023-09-14 PROCEDURE — 94640 AIRWAY INHALATION TREATMENT: CPT

## 2023-09-14 PROCEDURE — 250N000012 HC RX MED GY IP 250 OP 636 PS 637: Performed by: FAMILY MEDICINE

## 2023-09-14 PROCEDURE — 99239 HOSP IP/OBS DSCHRG MGMT >30: CPT | Performed by: FAMILY MEDICINE

## 2023-09-14 PROCEDURE — 250N000013 HC RX MED GY IP 250 OP 250 PS 637: Performed by: NURSE PRACTITIONER

## 2023-09-14 PROCEDURE — 250N000009 HC RX 250: Performed by: NURSE PRACTITIONER

## 2023-09-14 RX ORDER — PEDIATRIC NUTRIT, IRON/DHA/ARA 4G/150KCAL
8 POWDER (GRAM) ORAL 3 TIMES DAILY
Qty: 720 ML | Refills: 0 | Status: SHIPPED | OUTPATIENT
Start: 2023-09-14 | End: 2023-09-14

## 2023-09-14 RX ORDER — LEVOFLOXACIN 750 MG/1
750 TABLET, FILM COATED ORAL DAILY
Qty: 20 TABLET | Refills: 0 | Status: SHIPPED | OUTPATIENT
Start: 2023-09-15 | End: 2023-10-05

## 2023-09-14 RX ORDER — IPRATROPIUM BROMIDE AND ALBUTEROL SULFATE 2.5; .5 MG/3ML; MG/3ML
3 SOLUTION RESPIRATORY (INHALATION) 4 TIMES DAILY
Qty: 360 ML | Refills: 0 | Status: SHIPPED | OUTPATIENT
Start: 2023-09-14

## 2023-09-14 RX ORDER — ACETAMINOPHEN 325 MG/1
325-650 TABLET ORAL EVERY 4 HOURS PRN
COMMUNITY
Start: 2023-09-14 | End: 2024-09-17

## 2023-09-14 RX ORDER — AZITHROMYCIN 250 MG/1
250 TABLET, FILM COATED ORAL
Qty: 30 TABLET | Refills: 0 | Status: SHIPPED | OUTPATIENT
Start: 2023-10-06 | End: 2023-11-20

## 2023-09-14 RX ORDER — PEDIATRIC NUTRIT, IRON/DHA/ARA 4G/150KCAL
8 POWDER (GRAM) ORAL 3 TIMES DAILY
Qty: 720 ML | Refills: 0 | Status: SHIPPED | OUTPATIENT
Start: 2023-09-14 | End: 2023-09-25

## 2023-09-14 RX ORDER — GUAIFENESIN 600 MG/1
600 TABLET, EXTENDED RELEASE ORAL 2 TIMES DAILY
Qty: 14 TABLET | Refills: 0 | Status: SHIPPED | OUTPATIENT
Start: 2023-09-14 | End: 2023-09-21

## 2023-09-14 RX ORDER — PREDNISONE 10 MG/1
30 TABLET ORAL DAILY
Qty: 119 TABLET | Refills: 0 | Status: SHIPPED | OUTPATIENT
Start: 2023-09-15 | End: 2023-10-23

## 2023-09-14 RX ADMIN — TADALAFIL 20 MG: 20 TABLET ORAL at 08:02

## 2023-09-14 RX ADMIN — Medication 25 MCG: at 08:01

## 2023-09-14 RX ADMIN — IPRATROPIUM BROMIDE AND ALBUTEROL SULFATE 3 ML: 2.5; .5 SOLUTION RESPIRATORY (INHALATION) at 07:55

## 2023-09-14 RX ADMIN — SERTRALINE HYDROCHLORIDE 100 MG: 100 TABLET ORAL at 08:01

## 2023-09-14 RX ADMIN — GUAIFENESIN 600 MG: 600 TABLET ORAL at 08:00

## 2023-09-14 RX ADMIN — BUPRENORPHINE AND NALOXONE 1 FILM: 4; 1 FILM, SOLUBLE BUCCAL; SUBLINGUAL at 08:01

## 2023-09-14 RX ADMIN — PREDNISONE 30 MG: 5 TABLET ORAL at 08:00

## 2023-09-14 RX ADMIN — BUPRENORPHINE AND NALOXONE 1 FILM: 2; .5 FILM, SOLUBLE BUCCAL; SUBLINGUAL at 08:01

## 2023-09-14 RX ADMIN — LEVOFLOXACIN 750 MG: 750 TABLET, FILM COATED ORAL at 08:00

## 2023-09-14 RX ADMIN — CLONAZEPAM 0.25 MG: 0.25 TABLET, ORALLY DISINTEGRATING ORAL at 06:04

## 2023-09-14 RX ADMIN — ACETAMINOPHEN 650 MG: 325 TABLET, FILM COATED ORAL at 09:47

## 2023-09-14 RX ADMIN — ACETAMINOPHEN 650 MG: 325 TABLET, FILM COATED ORAL at 03:06

## 2023-09-14 RX ADMIN — PANTOPRAZOLE SODIUM 40 MG: 40 TABLET, DELAYED RELEASE ORAL at 06:05

## 2023-09-14 ASSESSMENT — ACTIVITIES OF DAILY LIVING (ADL)
ADLS_ACUITY_SCORE: 24

## 2023-09-14 NOTE — PROGRESS NOTES
..Patient has been assessed for Home Oxygen needs.  Oxygen readings:   *   RA - at rest  Pulse oximetry SPO2 86 %  *   O2 at  5 liters/minute (at rest) ...SPO2 95 %  *   O2 at  6 liters/minute (during activity/with exercise) ...SPO2 91 %

## 2023-09-14 NOTE — PROGRESS NOTES
Oxygen and Nebulizer Certification Documentation  I certify that this patient, Anali Loya has been under my care (or a nurse practitioner or physican's assistant working with me). This is the face-to-face encounter for oxygen medical necessity.      At the time of this encounter supplemental oxygen and nebulizer is reasonable and necessary and is expected to improve the patient's condition in a home setting.       Patient has continued oxygen desaturation due to ILD J84.9.    If portability is ordered, is the patient mobile within the home? yes    Electronically Signed:  Jia Ferrari MD

## 2023-09-14 NOTE — DISCHARGE SUMMARY
[facility]  Hospitalist Discharge Summary      Date of Admission:  9/10/2023  Date of Discharge:  9/14/2023  Discharging Provider: Jia Ferrari MD  Discharge Service: Hospitalist Service    Discharge Diagnoses   Principal Problem:    Acute hypoxemic respiratory failure (H)    Clinically Significant Risk Factors          Follow-ups Needed After Discharge   Follow-up Appointments     Follow-up and recommended labs and tests       Follow up with primary care provider, Cristian Fagan, within 7-14 days for   hospital follow up.    Follow up with Dr. Marinelli, interstitial lung specialist, in December as   scheduled.          Discharge Disposition   Discharged to home  Condition at discharge: Stable     Hospital Course   Anali Loya is a 47 year old female with progressively worsening ILD and severe pulmonary HTN who continues to smoke intermittently who presents on 9/10/2023 with acute on chronic respiratory failure requiring HFNC supplementation caused by bacterial lower respiratory infection causing acute flare of her chronic interstitial lung disease.  Patient was started on IV steroids and antibiotics with progressive improvement noted.  Sputum culture has grown out enterobacter and pseudomonas which are both sensitive to Levaquin and patient has been transitioned to PO Levaquin with good tolerance and started on slow prednisone taper.  Patient is now back baseline 4-6L NC at rest and has been set up to start [id number] pressure support at time of discharge.  She will discharge home with ongoing 21 day course of Levaquin to be followed by three times weekly azithromycin as well as prednisone 30 mg daily for 7 days followed by a taper by 5 mg every 7 days until she is on 10 mg and then remain on that dose until follow up with Dr. Marinelli in December.      Hypoxic respiratory failure  Chronic respiratory failure with hypoxia  Interstitial lung disease  Concern for  community-acquired pneumonia  Patient's baseline oxygen is 4-6L at rest and 8 L oxygen with activity, administered by nasal cannula.  Patient had recent URI symptoms with progressive worsening lower respiratory symptoms, leukocytosis, and HFNC needs, concerning for lower respiratory bacterial infection.  RSV, COVID and flu tested negative in the ED.  Viral PCR negative.  Of note, patient was hospitalized 8/14-8/20 for similar clinical picture and was on a very prolonged steroid taper and has established care with ILD clinic in recent past.  Now has sputum culture results as above, on Levaquin, and is back to baseline oxygen   -Discharge with ongoing baseline 4-6 L NC with start of [id number] today upon arrival home.    -Levaquin 750 mg daily for another 20 days, total course of 21 days given patient's pseudomonas infection, severe ILD at baseline and immunosuppression with ongoing prednisone.   -Continue DuoNebs 4 times daily with as needed albuterol - home nebulizer ordered   -Prednisone 30 mg at time of discharge and very slow taper weekly as above with patient remaining on 10 mg following taper until ILD clinic follow up.  -emphasized the need for absolute cessation of tobacco going forward if her goal is to get on a lung transplant list going forward.     Severe pulmonary hypertension  Follows with Dr. Ledesma at [facility], chronic and appears stable.    -Continue PTA tadalafil 20 mg daily at discharge    Elevated BNP  BNP of 2919 with known severe pulmonary hypertension and right sided heart failure.  Patient has a history of becoming hypotensive with aggressive IV diuresis.    -Continue home dose of 20 mg Lasix at discharge     Chronic pain  Chronic opioid use  Secondary to inflammatory pain of arthritis and polymyositis.  Currently treated with Suboxone prescribed by her psychiatrist.  Patient does not feel dose is appropriate currently as she is having more pain as she is having to be immobile due to her  "breathing issues and following discussion with Dr. Bonilla, her suboxone prescriber, she was increased to 6 mg/1.5-2.5 mg dosing three times a day during this stay and will continue this increased dose at time of discharge.    -Patient will need to inform Dr. Bonilla so an outpatient order can be placed.      Depression  Anxiety  PTSD  Hospitalization in June 2023 for suicidal thoughts and hallucinations.  Depression and anxiety are much better controlled and she has been without suicidal thoughts.  Having some increased anxiety as the seriousness of her breathing and that she could die in the next 6 months even if she does everything correctly is being fully processed.    -Continue PTA regimen of sertraline, Zyprexa, clonazepam, and Vistaril at time of discharge     GERD  Symptoms well controlled with home regimen   -Continue PTA Protonix    Constipation  Reported no bowel movement for \"nearly 1 week\" prior to admission.  Was given an enema with good results and has had several bowel movement since admission.    -patient will need to consider ongoing Miralax daily to help with constipation symptoms going forward.      Consultations This Hospital Stay   CARE MANAGEMENT / SOCIAL WORK IP CONSULT    Code Status   Full Code    Time Spent on this Encounter   I, Jia Ferrari MD, personally saw the patient today and spent greater than 30 minutes discharging this patient.       Jia Ferrari MD  [address] SURGICAL  [address] [place] [address]  Phone: [phone]  ______________________________________________________________________    Physical Exam   Vital Signs: Temp: 98.4  F (36.9  C) Temp src: Oral BP: 133/86 Pulse: 59   Resp: 20 SpO2: (!) 86 % O2 Device: None (Room air) Oxygen Delivery: 5 LPM  Weight: 112 lbs 14.4 oz  Constitutional: awake, alert, cooperative, no apparent distress, and appears stated age  Respiratory: No increased work of breathing, " decreased but improving air exchange, ongoing fine crackles in bilateral lower and mid lung fields   Cardiovascular: RRR   GI: bowel sounds present, abdomen soft and non-tender  Neurologic: Awake, alert, oriented to name, place and situation        Primary Care Physician   Cristian Fagan    Discharge Orders      Reason for your hospital stay    Bacterial lower tract infection (pneumonia vs bronchitis) causing respiratory failure.  You are improving on steroids and antibiotics and will be going home with ongoing Levaquin for antibiotic and prednisone for steroids.  Remember:  1.  When you are done with your Levaquin antibiotic, start taking the azithromycin antibiotic every Monday, Wednesday and Friday to try to decrease the chance of another infection happening.  2.  Start the prednisone at 30 mg daily for 7 days, then 25 mg daily for 7 days, then 20 mg daily for 7 days, then 15 mg daily for 7 days, then 10 mg daily until you see Dr. Marinelli again in December.  If as you are going down in your steroids you notice worsening of your breathing starting to occur, please connect with Dr. Marinelli and his medical team about what to do next.  3.  [employer] will be meeting you at your home to help set up your device - please use this faithfully to help improving your oxygen levels and your functionality  4.  You MUST completely quit smoking if your goal is to try to get a lung transplant.    Enjoy going home and I hope you will not need any more hospitalizations in the next few months!     Follow-up and recommended labs and tests     Follow up with primary care provider, Cristian Fagan, within 7-14 days for hospital follow up.    Follow up with Dr. Marinelli, interstitial lung specialist, in December as scheduled.     Activity    Your activity upon discharge: activity as tolerated     Oxygen Adult/Peds     Compressor with Reusable Nebulizer    With tubing equipment     Diet    Follow this diet upon discharge: Regular diet        Significant Results and Procedures   Results for orders placed or performed during the hospital encounter of 09/10/23   XR Chest Port 1 View    Narrative    EXAM: XR CHEST PORT 1 VIEW  LOCATION: [facility]  DATE: 9/10/2023    INDICATION: Dyspnea.  COMPARISON: Chest radiograph 07/12/2023. Chest CT 10/04/2021.      Impression    IMPRESSION:    Diffuse interstitial opacities throughout the lungs compatible with the known UIP pattern of pulmonary fibrosis.    A vague rounded opacity within the left midlung appears similar to the prior exam and is compatible with the known nodule in this location. No definite new airspace opacities.    No pleural effusions or pneumothorax.    Stable, nonenlarged cardiomediastinal silhouette.     *Note: Due to a large number of results and/or encounters for the requested time period, some results have not been displayed. A complete set of results can be found in Results Review.       Discharge Medications   Current Discharge Medication List        START taking these medications    Details   acetaminophen (TYLENOL) 325 MG tablet       azithromycin (ZITHROMAX) 250 MG tablet Take 1 tablet (250 mg) by mouth three times a week - start AFTER you are finished with your Levaquin antibiotic  Qty: 30 tablet, Refills: 0    Associated Diagnoses: ILD (interstitial lung disease) (H)      guaiFENesin (MUCINEX) 600 MG 12 hr tablet Take 1 tablet (600 mg) by mouth 2 times daily for 7 days  Qty: 14 tablet, Refills: 0    Associated Diagnoses: Pneumonia due to Pseudomonas species, unspecified laterality, unspecified part of lung (H)      ipratropium - albuterol 0.5 mg/2.5 mg/3 mL (DUONEB) 0.5-2.5 (3) MG/3ML neb solution Take 1 vial (3 mLs) by nebulization 4 times daily  Qty: 360 mL, Refills: 0    Associated Diagnoses: ILD (interstitial lung disease) (H); Acute and chronic respiratory failure with hypoxia (H)      levofloxacin (LEVAQUIN) 750 MG tablet Take 1 tablet (750  mg) by mouth daily for 20 days  Qty: 20 tablet, Refills: 0    Associated Diagnoses: Acute and chronic respiratory failure with hypoxia (H); Pneumonia due to Pseudomonas species, unspecified laterality, unspecified part of lung (H)      predniSONE (DELTASONE) 10 MG tablet Take 3 tablets (30 mg) by mouth daily X 7 days, then 25 mg daily x7 days, then 20 mg daily x7 days, then 15 mg daily x7 days, then 10 mg daily  Qty: 119 tablet, Refills: 0    Associated Diagnoses: ILD (interstitial lung disease) (H)           CONTINUE these medications which have NOT CHANGED    Details   buprenorphine-naloxone (SUBOXONE) 2-0.5 MG SUBL sublingual tablet Place 2 tablets under the tongue 3 times daily      CHOLECALCIFEROL 25 MCG (1000 UT) tablet Take 1,000 Units by mouth daily      clonazePAM (KLONOPIN) 0.5 MG tablet Take 0.25 mg by mouth 3 times daily as needed for anxiety      esomeprazole (NEXIUM) 20 MG DR capsule Take 20 mg by mouth every morning (before breakfast) Take 30-60 minutes before eating.      furosemide (LASIX) 20 MG tablet Take 1 tablet (20 mg) by mouth as needed (swelling)  Qty: 90 tablet, Refills: 3    Associated Diagnoses: Pulmonary hypertension (H)      hydrOXYzine (VISTARIL) 25 MG capsule Take 25 mg by mouth 4 times daily as needed for anxiety      OLANZapine (ZYPREXA) 10 MG tablet Take 5 mg by mouth At Bedtime      !! order for DME Oxygen: Patient requires supplemental Oxygen 4 LPM via nasal canula at rest and 6 LPM with activity. Please provide patient with portability capability. Okay to spot check patient on oxygen device, to keep sats above 90%. Please provider with home concentrator that can go up to 10 LPM. Oxygen will be for a lifetime.  Qty: 1 Device, Refills: 0    Associated Diagnoses: Hypoxia      !! order for DME Please provide patient with 2  liquid oxygen tanks for portability. Spot check patient on liquid oxygen. Titrate oxygen to maintain saturations above 88%.  Qty: 2 Device, Refills: 0     Associated Diagnoses: ILD (interstitial lung disease) (H)      polyethylene glycol (MIRALAX) 17 GM/Dose powder Take 1 Capful by mouth daily as needed for constipation      senna (SENOKOT) 8.6 MG tablet Take 1 tablet by mouth 2 times daily as needed for constipation      sertraline (ZOLOFT) 100 MG tablet Take 100 mg by mouth daily      tadalafil, PAH, 20 MG TABS Take 1 tablet (20 mg) by mouth daily  Qty: 30 tablet, Refills: 11    Associated Diagnoses: Pulmonary hypertension (H)      traZODone (DESYREL) 50 MG tablet Take  mg by mouth nightly as needed for sleep      VENTOLIN  (90 Base) MCG/ACT inhaler INHALE ONE TO TWO PUFFS INTO THE LUNGS EVERY 4 HOURS AS NEEDED FOR SHORTNESS OF BREATH / DYSPNEA  Qty: 18 g, Refills: 9    Associated Diagnoses: SOB (shortness of breath)      naloxone (NARCAN) 4 MG/0.1ML nasal spray Spray 1 spray (4 mg) into one nostril alternating nostrils as needed for opioid reversal every 2-3 minutes until assistance arrives  Qty: 0.2 mL, Refills: 0    Associated Diagnoses: Chronic, continuous use of opioids       !! - Potential duplicate medications found. Please discuss with provider.        Allergies   Allergies   Allergen Reactions    Cellcept [Mycophenolate] GI Disturbance    Formoterol Other (See Comments)     syncope    Imuran [Azathioprine] GI Disturbance    Methotrexate Other (See Comments)     Lung toxicity

## 2023-09-14 NOTE — PLAN OF CARE
Goal Outcome Evaluation:      Plan of Care Reviewed With: patient    Overall Patient Progress: improvingOverall Patient Progress: improving    Outcome Evaluation: Pt A&Ox4. VSS with O2 at 5L. Lungs coarse/diminished. Klonopin given x1, tylenol x2 for back pain/headache and melatonin for sleep. Up independently.

## 2023-09-14 NOTE — PROGRESS NOTES
Care Management Discharge Note    Discharge Date: 09/14/2023    Discharge Disposition: Home    Discharge Services: [facility] Worker - On Civil Commitment through [facility] [place] Sofia [phone] Phone    Discharge DME: New [employer] oxygen machine    Discharge Transportation: family or friend will provide    Private pay costs discussed: Not applicable    Does the patient's insurance plan have a 3 day qualifying hospital stay waiver?  No    PAS Confirmation Code:  N.A    Patient/family educated on Medicare website which has current facility and service quality ratings: no    Education Provided on the Discharge Plan: No  Persons Notified of Discharge Plans: Patient and Sofia (Civil Commitment worker)  Patient/Family in Agreement with the Plan: yes    Handoff Referral Completed: Yes    Additional Information:  Patient medically ready for discharge today. Plan is for patient to discharge home with family.    Patient is under Civil Commitment through [facility] worker is Sofia [phone]. Left voicemail for Sofia notifying her of patient's discharge.    Patient is High Risk for re-admission. Follow up appt with PCP scheduled on 10/9/23.     Family transport.    MEREDITH Brown  [facility] [phone]/ [facility] [phone]  Care Management

## 2023-09-14 NOTE — PROGRESS NOTES
S-(situation): Patient discharged to home via wheelchair with mother    B-(background): Respiratory failure    A-(assessment): A&Ox4. VSS. Complaints of back pain and headache, improved with suboxone and tylenol.     R-(recommendations): Discharge instructions reviewed with patient. Listed belongings gathered and returned to patient. O2 tank arrived and will meet at her home to setup new O2 service.         Discharge Nursing Criteria:     Care Plan and Patient education resolved: Yes    New Medications- pt has been educated about purpose and side effects: Yes    Vaccines  Influenza status verified at discharge:  Yes        MISC  Prescriptions if needed, hard copies sent with patient  NA  Home medications returned to patient: Yes  Medication Bin checked and emptied on discharge Yes  Patient reports post-discharge pain management plan is effective: Yes    Herminio Lua RN

## 2023-09-14 NOTE — TELEPHONE ENCOUNTER
Reason for Call:  Appointment Request    Patient requesting this type of appt:  Hospital/ED Follow-Up     Requested provider: Cristian Fagan    Reason patient unable to be scheduled: Not within requested timeframe    When does patient want to be seen/preferred time: 3-7 days    Comments: Pt would like call back if pcp can see pt within 7-10 days for hospital follow up. No current openings within time frame.    Could we send this information to you in [employer] or would you prefer to receive a phone call?:   Patient would prefer a phone call   Okay to leave a detailed message?: No at Home number on file [phone] (home)    Call taken on 9/14/2023 at 10:56 AM by Tram Farah

## 2023-09-14 NOTE — PLAN OF CARE
Goal Outcome Evaluation:      Plan of Care Reviewed With: patient    Overall Patient Progress: improvingOverall Patient Progress: improving    Outcome Evaluation: Pt A&O; VS on 6L  O2. LS coarse. PRN Tylenol, Atarax, Klonopin and Advil given for pain and anxiety. Antibiotic changed to Levaquin PO d/t cultures. K+ and Mag AM recheck. Up independent. Expected to discharge to home 9/14.

## 2023-09-15 ENCOUNTER — PATIENT OUTREACH (OUTPATIENT)
Dept: CARE COORDINATION | Facility: CLINIC | Age: 48
End: 2023-09-15
Payer: COMMERCIAL

## 2023-09-15 ASSESSMENT — ACTIVITIES OF DAILY LIVING (ADL): DEPENDENT_IADLS:: CLEANING

## 2023-09-15 NOTE — TELEPHONE ENCOUNTER
Please verify with patient if order for nebulizer was filled prior to discharge from hospital yesterday.  Cristian Fagan MD

## 2023-09-15 NOTE — PROGRESS NOTES
Clinic Care Coordination Contact  [facility]: Post-Discharge Note  SITUATION                                                      Admission:    Admission Date: 09/10/23   Reason for Admission: Acute hypoxemic respiratory failure  Discharge:   Discharge Date: 09/14/23  Discharge Diagnosis: Acute hypoxemic respiratory failure    BACKGROUND                                                      Per hospital discharge summary and inpatient provider notes:    Hospital Course  Anali Loya is a 47 year old female with progressively worsening ILD and severe pulmonary HTN who continues to smoke intermittently who presents on 9/10/2023 with acute on chronic respiratory failure requiring HFNC supplementation caused by bacterial lower respiratory infection causing acute flare of her chronic interstitial lung disease.  Patient was started on IV steroids and antibiotics with progressive improvement noted.  Sputum culture has grown out enterobacter and pseudomonas which are both sensitive to Levaquin and patient has been transitioned to PO Levaquin with good tolerance and started on slow prednisone taper.  Patient is now back baseline 4-6L NC at rest and has been set up to start [id number] pressure support at time of discharge.  She will discharge home with ongoing 21 day course of Levaquin to be followed by three times weekly azithromycin as well as prednisone 30 mg daily for 7 days followed by a taper by 5 mg every 7 days until she is on 10 mg and then remain on that dose until follow up with Dr. Marinelli in December.       Hypoxic respiratory failure  Chronic respiratory failure with hypoxia  Interstitial lung disease  Concern for community-acquired pneumonia  Patient's baseline oxygen is 4-6L at rest and 8 L oxygen with activity, administered by nasal cannula.  Patient had recent URI symptoms with progressive worsening lower respiratory symptoms, leukocytosis, and HFNC needs, concerning for lower respiratory bacterial  infection.  RSV, COVID and flu tested negative in the ED.  Viral PCR negative.  Of note, patient was hospitalized 8/14-8/20 for similar clinical picture and was on a very prolonged steroid taper and has established care with ILD clinic in recent past.  Now has sputum culture results as above, on Levaquin, and is back to baseline oxygen   -Discharge with ongoing baseline 4-6 L NC with start of [id number] today upon arrival home.    -Levaquin 750 mg daily for another 20 days, total course of 21 days given patient's pseudomonas infection, severe ILD at baseline and immunosuppression with ongoing prednisone.   -Continue DuoNebs 4 times daily with as needed albuterol - home nebulizer ordered   -Prednisone 30 mg at time of discharge and very slow taper weekly as above with patient remaining on 10 mg following taper until ILD clinic follow up.  -emphasized the need for absolute cessation of tobacco going forward if her goal is to get on a lung transplant list going forward.      Severe pulmonary hypertension  Follows with Dr. Ledesma at [facility], chronic and appears stable.    -Continue PTA tadalafil 20 mg daily at discharge     Elevated BNP  BNP of 2919 with known severe pulmonary hypertension and right sided heart failure.  Patient has a history of becoming hypotensive with aggressive IV diuresis.    -Continue home dose of 20 mg Lasix at discharge      Chronic pain  Chronic opioid use  Secondary to inflammatory pain of arthritis and polymyositis.  Currently treated with Suboxone prescribed by her psychiatrist.  Patient does not feel dose is appropriate currently as she is having more pain as she is having to be immobile due to her breathing issues and following discussion with Dr. Bonilla, her suboxone prescriber, she was increased to 6 mg/1.5-2.5 mg dosing three times a day during this stay and will continue this increased dose at time of discharge.    -Patient will need to inform Dr. Bonilla so an outpatient order can be  "placed.       Depression  Anxiety  PTSD  Hospitalization in June 2023 for suicidal thoughts and hallucinations.  Depression and anxiety are much better controlled and she has been without suicidal thoughts.  Having some increased anxiety as the seriousness of her breathing and that she could die in the next 6 months even if she does everything correctly is being fully processed.    -Continue PTA regimen of sertraline, Zyprexa, clonazepam, and Vistaril at time of discharge      GERD  Symptoms well controlled with home regimen   -Continue PTA Protonix     Constipation  Reported no bowel movement for \"nearly 1 week\" prior to admission.  Was given an enema with good results and has had several bowel movement since admission.    -patient will need to consider ongoing Miralax daily to help with constipation symptoms going forward.      ASSESSMENT           Discharge Assessment  How are you doing now that you are home?: per patient report, she said she is doing OK. she said they are working on getting a new neb machine for her. patient has a follow up with PCP on 9/22. patient does report she is having a hard time sleeping being on the increased steroids. she is wondering about something to help her sleep. RN CC will send a message to PCP.  How are your symptoms? (Red Flag symptoms escalate to triage hotline per guidelines): Improved  Do you feel your condition is stable enough to be safe at home until your provider visit?: Yes  Does the patient have their discharge instructions? : Yes  Does the patient have questions regarding their discharge instructions? : No  Were you started on any new medications or were there changes to any of your previous medications? : Yes  Does the patient have all of their medications?: Yes  Do you have questions regarding any of your medications? : No  Do you have all of your needed medical supplies or equipment (DME)?  (i.e. oxygen tank, CPAP, cane, etc.): Yes  Discharge follow-up appointment " scheduled within 14 calendar days? : Yes  Discharge Follow Up Appointment Date: 09/22/23  Discharge Follow Up Appointment Scheduled with?: Primary Care Provider         Post-op (Clinicians Only)  Did the patient have surgery or a procedure: No  Fever: No  Chills: No  Eating & Drinking: eating and drinking without complaints/concerns  PO Intake: regular diet  Bowel Function: normal  Urinary Status: voiding without complaint/concerns      PLAN                                                      Outpatient Plan:      Discharge Orders          Reason for your hospital stay     Bacterial lower tract infection (pneumonia vs bronchitis) causing respiratory failure.  You are improving on steroids and antibiotics and will be going home with ongoing Levaquin for antibiotic and prednisone for steroids.  Remember:  1.  When you are done with your Levaquin antibiotic, start taking the azithromycin antibiotic every Monday, Wednesday and Friday to try to decrease the chance of another infection happening.  2.  Start the prednisone at 30 mg daily for 7 days, then 25 mg daily for 7 days, then 20 mg daily for 7 days, then 15 mg daily for 7 days, then 10 mg daily until you see Dr. Marinelli again in December.  If as you are going down in your steroids you notice worsening of your breathing starting to occur, please connect with Dr. Marinelli and his medical team about what to do next.  3.  [employer] will be meeting you at your home to help set up your device - please use this faithfully to help improving your oxygen levels and your functionality  4.  You MUST completely quit smoking if your goal is to try to get a lung transplant.    Enjoy going home and I hope you will not need any more hospitalizations in the next few months!          Follow-up and recommended labs and tests      Follow up with primary care provider, Cristian Fagan, within 7-14 days for hospital follow up.    Follow up with Dr. Marinelli, interstitial lung specialist, in  December as scheduled.          Activity     Your activity upon discharge: activity as tolerated      Oxygen Adult/Peds          Compressor with Reusable Nebulizer     With tubing equipment          Diet     Follow this diet upon discharge: Regular diet       Future Appointments   Date Time Provider Department Center   9/18/2023 11:00 AM UC LAB UCLABR [facility]   9/18/2023 11:30 AM [email or web address] [facility]   9/18/2023 12:30 PM UC PFL 6 MINUTE WALK 1 [facility]   9/18/2023  1:30 PM Callie Ledesma MD [facility]   9/22/2023  2:20 PM Cristian Fagan MD [facility]   10/9/2023  4:40 PM Cristian Fagan MD [facility]   12/5/2023  8:00 AM UC PFL 6 MINUTE WALK 2 [facility]   12/5/2023  9:00 AM UC PFL D [facility]   12/5/2023 10:00 AM Mango Marinelli MD [facility]         For any urgent concerns, please contact our 24 hour nurse triage line: [phone] ([phone])       Patient declined care coordination at this time.     Luciana López RN

## 2023-09-18 ENCOUNTER — LAB (OUTPATIENT)
Dept: LAB | Facility: CLINIC | Age: 48
End: 2023-09-18
Payer: COMMERCIAL

## 2023-09-18 ENCOUNTER — OFFICE VISIT (OUTPATIENT)
Dept: CARDIOLOGY | Facility: CLINIC | Age: 48
End: 2023-09-18
Attending: INTERNAL MEDICINE
Payer: COMMERCIAL

## 2023-09-18 ENCOUNTER — ANCILLARY PROCEDURE (OUTPATIENT)
Dept: CARDIOLOGY | Facility: CLINIC | Age: 48
End: 2023-09-18
Attending: PHYSICIAN ASSISTANT
Payer: COMMERCIAL

## 2023-09-18 VITALS
BODY MASS INDEX: 22.15 KG/M2 | SYSTOLIC BLOOD PRESSURE: 123 MMHG | WEIGHT: 117.3 LBS | HEIGHT: 61 IN | DIASTOLIC BLOOD PRESSURE: 88 MMHG | OXYGEN SATURATION: 100 % | HEART RATE: 72 BPM

## 2023-09-18 DIAGNOSIS — R06.09 DOE (DYSPNEA ON EXERTION): ICD-10-CM

## 2023-09-18 DIAGNOSIS — I27.20 PULMONARY HYPERTENSION (H): ICD-10-CM

## 2023-09-18 LAB
6 MIN WALK (FT): 365 FT
6 MIN WALK (M): 111 M
ALBUMIN SERPL BCG-MCNC: 4.4 G/DL (ref 3.5–5.2)
ALP SERPL-CCNC: 58 U/L (ref 35–104)
ALT SERPL W P-5'-P-CCNC: 13 U/L (ref 0–50)
ANION GAP SERPL CALCULATED.3IONS-SCNC: 9 MMOL/L (ref 7–15)
AST SERPL W P-5'-P-CCNC: 15 U/L (ref 0–45)
BILIRUB SERPL-MCNC: 0.2 MG/DL
BUN SERPL-MCNC: 20.9 MG/DL (ref 6–20)
CALCIUM SERPL-MCNC: 9.7 MG/DL (ref 8.6–10)
CHLORIDE SERPL-SCNC: 99 MMOL/L (ref 98–107)
CREAT SERPL-MCNC: 0.89 MG/DL (ref 0.51–0.95)
DEPRECATED HCO3 PLAS-SCNC: 32 MMOL/L (ref 22–29)
EGFRCR SERPLBLD CKD-EPI 2021: 80 ML/MIN/1.73M2
ERYTHROCYTE [DISTWIDTH] IN BLOOD BY AUTOMATED COUNT: 15.6 % (ref 10–15)
GLUCOSE SERPL-MCNC: 121 MG/DL (ref 70–99)
HCT VFR BLD AUTO: 47.1 % (ref 35–47)
HGB BLD-MCNC: 14.5 G/DL (ref 11.7–15.7)
LVEF ECHO: NORMAL
MCH RBC QN AUTO: 28.6 PG (ref 26.5–33)
MCHC RBC AUTO-ENTMCNC: 30.8 G/DL (ref 31.5–36.5)
MCV RBC AUTO: 93 FL (ref 78–100)
NT-PROBNP SERPL-MCNC: 1145 PG/ML (ref 0–450)
PLATELET # BLD AUTO: 413 10E3/UL (ref 150–450)
POTASSIUM SERPL-SCNC: 4.5 MMOL/L (ref 3.4–5.3)
PROT SERPL-MCNC: 7.9 G/DL (ref 6.4–8.3)
RBC # BLD AUTO: 5.07 10E6/UL (ref 3.8–5.2)
SODIUM SERPL-SCNC: 140 MMOL/L (ref 136–145)
WBC # BLD AUTO: 13.9 10E3/UL (ref 4–11)

## 2023-09-18 PROCEDURE — 80053 COMPREHEN METABOLIC PANEL: CPT | Performed by: PATHOLOGY

## 2023-09-18 PROCEDURE — 94618 PULMONARY STRESS TESTING: CPT | Performed by: INTERNAL MEDICINE

## 2023-09-18 PROCEDURE — 93306 TTE W/DOPPLER COMPLETE: CPT | Mod: GC | Performed by: INTERNAL MEDICINE

## 2023-09-18 PROCEDURE — 99215 OFFICE O/P EST HI 40 MIN: CPT | Mod: 25 | Performed by: INTERNAL MEDICINE

## 2023-09-18 PROCEDURE — 83880 ASSAY OF NATRIURETIC PEPTIDE: CPT | Performed by: PATHOLOGY

## 2023-09-18 PROCEDURE — G0463 HOSPITAL OUTPT CLINIC VISIT: HCPCS | Performed by: INTERNAL MEDICINE

## 2023-09-18 PROCEDURE — 85027 COMPLETE CBC AUTOMATED: CPT | Performed by: PATHOLOGY

## 2023-09-18 PROCEDURE — 36415 COLL VENOUS BLD VENIPUNCTURE: CPT | Performed by: PATHOLOGY

## 2023-09-18 RX ORDER — LIDOCAINE 40 MG/G
CREAM TOPICAL
Status: CANCELLED | OUTPATIENT
Start: 2023-09-18

## 2023-09-18 RX ORDER — DIGOXIN 125 MCG
125 TABLET ORAL DAILY
Qty: 90 TABLET | Refills: 3 | Status: SHIPPED | OUTPATIENT
Start: 2023-09-18 | End: 2023-09-18 | Stop reason: ALTCHOICE

## 2023-09-18 RX ORDER — TADALAFIL 20 MG/1
40 TABLET ORAL DAILY
Qty: 60 TABLET | Refills: 11 | Status: SHIPPED | OUTPATIENT
Start: 2023-09-18 | End: 2023-09-18

## 2023-09-18 RX ORDER — TADALAFIL 20 MG/1
40 TABLET ORAL DAILY
Qty: 60 TABLET | Refills: 11 | Status: SHIPPED | OUTPATIENT
Start: 2023-09-18 | End: 2024-01-26

## 2023-09-18 ASSESSMENT — PAIN SCALES - GENERAL: PAINLEVEL: NO PAIN (0)

## 2023-09-18 NOTE — NURSING NOTE
Chief Complaint   Patient presents with    Follow Up     Return PH       Vitals were taken, medications reconciled.    Kusum Carlson, EMT   1:30 PM

## 2023-09-18 NOTE — PATIENT INSTRUCTIONS
Medication Changes:  INCREASE tadalafil to 40mg (2 tablets) daily    Follow up Appointment Information:  Follow-up in November with right heart catheterization with Dr. Ledesma or with another provider and follow-up with either joselito araujo     Results:    We are located on the third floor of [facility] on the [facility].  Our address is     [address] [place] [address] on [address] [place] [address]    Thank you for allowing us to be a part of your care here at the [facility]    If you have questions or concerns please contact us at:    Tristan Tovar, RN    Nurse Coordinator       Pulmonary Hypertension     [facility]    (Phone)[phone]       RODY Daly   (Prior Authorizations)    ()  Clinic   Clinic   Pulmonary Hypertension   Pulmonary Hypertension  [facility]  (P)[phone]    (P) [phone]  (F) [phone]              ** Please note that you will NOT receive a reminder call regarding your scheduled testing, reminder calls are for provider appointments only.  If you are scheduled for testing within the [facility] system you may receive a call regarding pre-registration for billing purposes only.**     Remember to weigh yourself daily after voiding and before you consume any food or beverages and log the numbers.  If you have gained/lost 2 pounds overnight or 5 pounds in a week contact us immediately for medication adjustments or further instructions.   **Please call us immediately if you have any syncope, chest pain, edema, or decline in your functional status.    Support Group:  Pulmonary Hypertension Association  Https://www.phassociation.org/  **Look at the Events Tab** They even have Support Groups that you can call into    [facility]  Support Group  Second Saturday of the Month from 1-3 PM   Location: [address] [place], [place] [address] [address]  Leader: Vale Delgado   Phone: [phone]  Email: [email or web address]     Great Videos about Pulmonary Hypertension!!  Scan ME!    Website: [email or web address]

## 2023-09-18 NOTE — LETTER
2023      RE: Ellen Loya  [address]  [facility] [id number]       Dear Colleague,    Thank you for the opportunity to participate in the care of your patient, Ellen Loya, at the [facility] HEART [facility]. Please see a copy of my visit note below.    Service Date: 2023     Ignacio Loya MD   [facility]   [address] [place] [address]      RE: Ellen Loya   MRN: [id number]   : 1975      Dear Dr. Loya:      We had the pleasure of seeing Ms. Ellen Loya in our Pulmonary Hypertension Clinic.  As you know, she is a very pleasant 47 year old female with polymyositis, interstitial lung disease and pulmonary arterial hypertension.  She is currently on Adcirca 20 mg daily.  She did not tolerate Tyvaso due to worsening V/Q mismatch.    Ms. Loya unfortunately is not doing well in the last year.  She is having worsening exertional shortness of breath.  She is functional class III.  She is able to still do her activities of daily living independently.  She can walk up to a block.  She can climb only 1 flight of stairs but gets winded at the end.  Her oxygen requirements have significantly increased.  She is currently on 6 L at rest and 8 to 10 L with exertion.  She feels chest tightness and pressure when her oxygen level is low.  She has not had any exertional presyncope or syncope.  No lower extremity swelling.  She does have gained weight but attributes this more to prednisone.    She has a 2 hospitalization 1 in August and then more recently last week.  During her hospital ligation in August, she was intubated briefly.  Both of them were thought to be due to her pneumonia and her ILD exacerbation.  She is currently on Levaquin and tapering dose of prednisone.  She has established care with Dr. Marinelli in the ILD clinic.  She is being considered for transplant if she is tobacco free for  a minimum of 6 months.    PAST MEDICAL HISTORY:   1.  Polymyositis and dermatomyositis.   2.  Interstitial lung disease.   3.  Pulmonary arterial hypertension.   4.  Arthritis.      MEDICATIONS:    Current Outpatient Medications   Medication Sig     acetaminophen (TYLENOL) 325 MG tablet      [START ON 10/6/2023] azithromycin (ZITHROMAX) 250 MG tablet Take 1 tablet (250 mg) by mouth three times a week - start AFTER you are finished with your Levaquin antibiotic     buprenorphine-naloxone (SUBOXONE) 2-0.5 MG SUBL sublingual tablet Place 1 tablet under the tongue 3 times daily     CHOLECALCIFEROL 25 MCG (1000 UT) tablet Take 1,000 Units by mouth daily     clonazePAM (KLONOPIN) 0.5 MG tablet Take 0.25 mg by mouth 3 times daily as needed for anxiety     esomeprazole (NEXIUM) 20 MG DR capsule Take 20 mg by mouth every morning (before breakfast) Take 30-60 minutes before eating.     furosemide (LASIX) 20 MG tablet Take 1 tablet (20 mg) by mouth as needed (swelling)     guaiFENesin (MUCINEX) 600 MG 12 hr tablet Take 1 tablet (600 mg) by mouth 2 times daily for 7 days     ipratropium - albuterol 0.5 mg/2.5 mg/3 mL (DUONEB) 0.5-2.5 (3) MG/3ML neb solution Take 1 vial (3 mLs) by nebulization 4 times daily     levofloxacin (LEVAQUIN) 750 MG tablet Take 1 tablet (750 mg) by mouth daily for 20 days     naloxone (NARCAN) 4 MG/0.1ML nasal spray Spray 1 spray (4 mg) into one nostril alternating nostrils as needed for opioid reversal every 2-3 minutes until assistance arrives     Nutritional Supplements (ENSURE ENLIVE) LIQD Take 8 fluid ounces by mouth 3 times daily     order for DME Oxygen: Patient requires supplemental Oxygen 4 LPM via nasal canula at rest and 6 LPM with activity. Please provide patient with portability capability. Okay to spot check patient on oxygen device, to keep sats above 90%. Please provider with home concentrator that can go up to 10 LPM. Oxygen will be for a lifetime.     order for DME Please provide  "patient with 2  liquid oxygen tanks for portability. Spot check patient on liquid oxygen. Titrate oxygen to maintain saturations above 88%.     polyethylene glycol (MIRALAX) 17 GM/Dose powder Take 1 Capful by mouth daily as needed for constipation     predniSONE (DELTASONE) 10 MG tablet Take 3 tablets (30 mg) by mouth daily X 7 days, then 25 mg daily x7 days, then 20 mg daily x7 days, then 15 mg daily x7 days, then 10 mg daily     senna (SENOKOT) 8.6 MG tablet Take 1 tablet by mouth 2 times daily as needed for constipation     sertraline (ZOLOFT) 100 MG tablet Take 100 mg by mouth daily     tadalafil, PAH, 20 MG TABS Take 1 tablet (20 mg) by mouth daily     VENTOLIN  (90 Base) MCG/ACT inhaler INHALE ONE TO TWO PUFFS INTO THE LUNGS EVERY 4 HOURS AS NEEDED FOR SHORTNESS OF BREATH / DYSPNEA (Patient taking differently: Inhale 1-2 puffs into the lungs every 4 hours as needed for shortness of breath or wheezing)     No current facility-administered medications for this visit.     REVIEW OF SYSTEMS:  A detailed 10-point review of systems was obtained as described in the History of Present Illness.  All other systems reviewed and are negative.      PHYSICAL EXAMINATION:    /88 (BP Location: Right arm, Patient Position: Sitting, Cuff Size: Adult Regular)   Pulse 72   Ht 1.562 m (5' 1.5\")   Wt 53.2 kg (117 lb 4.8 oz)   LMP  (LMP Unknown)   SpO2 100%   BMI 21.81 kg/m     General: appears comfortable, alert and articulate, on O2  Head: normocephalic, atraumatic  Eyes: anicteric sclera, EOMI  Neck: no adenopathy  Orophyarynx: moist mucosa, no lesions, dentition intact  Heart: regular, normal S1/S2, no murmur, gallop, rub, estimated JVP 8cmH2O  Lungs: scattered crackles, squeeking   Abdomen: soft, non-tender, bowel sounds present, no hepatosplenomegaly  Extremities: +clubbing, no cyanosis or edema  Neurological: normal speech and affect, no gross motor deficits     Recent Results (from the past 168 hour(s)) "   Comprehensive metabolic panel    Collection Time: 09/12/23  6:30 AM   Result Value Ref Range    Sodium 133 (L) 136 - 145 mmol/L    Potassium 4.5 3.4 - 5.3 mmol/L    Chloride 95 (L) 98 - 107 mmol/L    Carbon Dioxide (CO2) 25 22 - 29 mmol/L    Anion Gap 13 7 - 15 mmol/L    Urea Nitrogen 16.4 6.0 - 20.0 mg/dL    Creatinine 0.50 (L) 0.51 - 0.95 mg/dL    Calcium 9.1 8.6 - 10.0 mg/dL    Glucose 235 (H) 70 - 99 mg/dL    Alkaline Phosphatase 58 35 - 104 U/L    AST 16 0 - 45 U/L    ALT 15 0 - 50 U/L    Protein Total 7.3 6.4 - 8.3 g/dL    Albumin 3.8 3.5 - 5.2 g/dL    Bilirubin Total 0.2 <=1.2 mg/dL    GFR Estimate >90 >60 mL/min/1.73m2   Magnesium    Collection Time: 09/12/23  6:30 AM   Result Value Ref Range    Magnesium 2.1 1.7 - 2.3 mg/dL   CRP inflammation    Collection Time: 09/12/23  6:30 AM   Result Value Ref Range    CRP Inflammation 39.11 (H) <5.00 mg/L   CBC with platelets and differential    Collection Time: 09/12/23  6:30 AM   Result Value Ref Range    WBC Count 15.1 (H) 4.0 - 11.0 10e3/uL    RBC Count 4.16 3.80 - 5.20 10e6/uL    Hemoglobin 11.9 11.7 - 15.7 g/dL    Hematocrit 37.8 35.0 - 47.0 %    MCV 91 78 - 100 fL    MCH 28.6 26.5 - 33.0 pg    MCHC 31.5 31.5 - 36.5 g/dL    RDW 15.4 (H) 10.0 - 15.0 %    Platelet Count 282 150 - 450 10e3/uL    % Neutrophils 93 %    % Lymphocytes 4 %    % Monocytes 2 %    % Eosinophils 0 %    % Basophils 0 %    % Immature Granulocytes 1 %    NRBCs per 100 WBC 0 <1 /100    Absolute Neutrophils 14.0 (H) 1.6 - 8.3 10e3/uL    Absolute Lymphocytes 0.7 (L) 0.8 - 5.3 10e3/uL    Absolute Monocytes 0.3 0.0 - 1.3 10e3/uL    Absolute Eosinophils 0.0 0.0 - 0.7 10e3/uL    Absolute Basophils 0.0 0.0 - 0.2 10e3/uL    Absolute Immature Granulocytes 0.1 <=0.4 10e3/uL    Absolute NRBCs 0.0 10e3/uL   Comprehensive metabolic panel    Collection Time: 09/13/23  6:07 AM   Result Value Ref Range    Sodium 137 136 - 145 mmol/L    Potassium 4.4 3.4 - 5.3 mmol/L    Chloride 97 (L) 98 - 107 mmol/L     Carbon Dioxide (CO2) 28 22 - 29 mmol/L    Anion Gap 12 7 - 15 mmol/L    Urea Nitrogen 19.1 6.0 - 20.0 mg/dL    Creatinine 0.63 0.51 - 0.95 mg/dL    Calcium 9.5 8.6 - 10.0 mg/dL    Glucose 138 (H) 70 - 99 mg/dL    Alkaline Phosphatase 59 35 - 104 U/L    AST 13 0 - 45 U/L    ALT 15 0 - 50 U/L    Protein Total 7.3 6.4 - 8.3 g/dL    Albumin 3.8 3.5 - 5.2 g/dL    Bilirubin Total 0.2 <=1.2 mg/dL    GFR Estimate >90 >60 mL/min/1.73m2   Magnesium    Collection Time: 09/13/23  6:07 AM   Result Value Ref Range    Magnesium 2.1 1.7 - 2.3 mg/dL   CRP inflammation    Collection Time: 09/13/23  6:07 AM   Result Value Ref Range    CRP Inflammation 18.68 (H) <5.00 mg/L   CBC with platelets and differential    Collection Time: 09/13/23  6:07 AM   Result Value Ref Range    WBC Count 17.7 (H) 4.0 - 11.0 10e3/uL    RBC Count 4.38 3.80 - 5.20 10e6/uL    Hemoglobin 12.4 11.7 - 15.7 g/dL    Hematocrit 40.1 35.0 - 47.0 %    MCV 92 78 - 100 fL    MCH 28.3 26.5 - 33.0 pg    MCHC 30.9 (L) 31.5 - 36.5 g/dL    RDW 15.6 (H) 10.0 - 15.0 %    Platelet Count 296 150 - 450 10e3/uL    % Neutrophils 90 %    % Lymphocytes 6 %    % Monocytes 3 %    % Eosinophils 0 %    % Basophils 0 %    % Immature Granulocytes 1 %    NRBCs per 100 WBC 0 <1 /100    Absolute Neutrophils 16.1 (H) 1.6 - 8.3 10e3/uL    Absolute Lymphocytes 1.0 0.8 - 5.3 10e3/uL    Absolute Monocytes 0.5 0.0 - 1.3 10e3/uL    Absolute Eosinophils 0.0 0.0 - 0.7 10e3/uL    Absolute Basophils 0.0 0.0 - 0.2 10e3/uL    Absolute Immature Granulocytes 0.2 <=0.4 10e3/uL    Absolute NRBCs 0.0 10e3/uL   Respiratory Panel PCR    Collection Time: 09/13/23  9:48 AM    Specimen: Nasopharyngeal; Swab   Result Value Ref Range    Adenovirus Not Detected Not Detected    Coronavirus Not Detected Not Detected    Human Metapneumovirus Not Detected Not Detected    Human Rhin/Enterovirus Not Detected Not Detected    Influenza A Not Detected Not Detected    Influenza A, H1 Not Detected Not Detected    Influenza A  2009 H1N1 Not Detected Not Detected    Influenza A, H3 Not Detected Not Detected    Influenza B Not Detected Not Detected    Parainfluenza Virus 1 Not Detected Not Detected    Parainfluenza Virus 2 Not Detected Not Detected    Parainfluenza Virus 3 Not Detected Not Detected    Parainfluenza Virus 4 Not Detected Not Detected    Respiratory Syncytial Virus A Not Detected Not Detected    Respiratory Syncytial Virus B Not Detected Not Detected    Chlamydia Pneumoniae Not Detected Not Detected    Mycoplasma Pneumoniae Not Detected Not Detected   Comprehensive metabolic panel    Collection Time: 09/14/23  7:05 AM   Result Value Ref Range    Sodium 138 136 - 145 mmol/L    Potassium 4.0 3.4 - 5.3 mmol/L    Chloride 100 98 - 107 mmol/L    Carbon Dioxide (CO2) 30 (H) 22 - 29 mmol/L    Anion Gap 8 7 - 15 mmol/L    Urea Nitrogen 20.7 (H) 6.0 - 20.0 mg/dL    Creatinine 0.74 0.51 - 0.95 mg/dL    Calcium 9.1 8.6 - 10.0 mg/dL    Glucose 108 (H) 70 - 99 mg/dL    Alkaline Phosphatase 54 35 - 104 U/L    AST 11 0 - 45 U/L    ALT 13 0 - 50 U/L    Protein Total 6.9 6.4 - 8.3 g/dL    Albumin 3.8 3.5 - 5.2 g/dL    Bilirubin Total <0.2 <=1.2 mg/dL    GFR Estimate >90 >60 mL/min/1.73m2   Magnesium    Collection Time: 09/14/23  7:05 AM   Result Value Ref Range    Magnesium 2.1 1.7 - 2.3 mg/dL   CRP inflammation    Collection Time: 09/14/23  7:05 AM   Result Value Ref Range    CRP Inflammation 8.55 (H) <5.00 mg/L   CBC with platelets and differential    Collection Time: 09/14/23  7:05 AM   Result Value Ref Range    WBC Count 13.2 (H) 4.0 - 11.0 10e3/uL    RBC Count 4.27 3.80 - 5.20 10e6/uL    Hemoglobin 12.1 11.7 - 15.7 g/dL    Hematocrit 39.3 35.0 - 47.0 %    MCV 92 78 - 100 fL    MCH 28.3 26.5 - 33.0 pg    MCHC 30.8 (L) 31.5 - 36.5 g/dL    RDW 15.8 (H) 10.0 - 15.0 %    Platelet Count 307 150 - 450 10e3/uL    % Neutrophils 75 %    % Lymphocytes 17 %    % Monocytes 7 %    % Eosinophils 0 %    % Basophils 0 %    % Immature Granulocytes 1 %     NRBCs per 100 WBC 0 <1 /100    Absolute Neutrophils 9.9 (H) 1.6 - 8.3 10e3/uL    Absolute Lymphocytes 2.2 0.8 - 5.3 10e3/uL    Absolute Monocytes 0.9 0.0 - 1.3 10e3/uL    Absolute Eosinophils 0.0 0.0 - 0.7 10e3/uL    Absolute Basophils 0.0 0.0 - 0.2 10e3/uL    Absolute Immature Granulocytes 0.1 <=0.4 10e3/uL    Absolute NRBCs 0.0 10e3/uL   6 minute walk test (O2 Titration)    Collection Time: 09/18/23 12:00 AM   Result Value Ref Range    6 min walk (FT) 365 1,604 ft    6 Min Walk (M) 111 489 m   Comprehensive Metabolic Panel    Collection Time: 09/18/23 11:00 AM   Result Value Ref Range    Sodium 140 136 - 145 mmol/L    Potassium 4.5 3.4 - 5.3 mmol/L    Chloride 99 98 - 107 mmol/L    Carbon Dioxide (CO2) 32 (H) 22 - 29 mmol/L    Anion Gap 9 7 - 15 mmol/L    Urea Nitrogen 20.9 (H) 6.0 - 20.0 mg/dL    Creatinine 0.89 0.51 - 0.95 mg/dL    Calcium 9.7 8.6 - 10.0 mg/dL    Glucose 121 (H) 70 - 99 mg/dL    Alkaline Phosphatase 58 35 - 104 U/L    AST 15 0 - 45 U/L    ALT 13 0 - 50 U/L    Protein Total 7.9 6.4 - 8.3 g/dL    Albumin 4.4 3.5 - 5.2 g/dL    Bilirubin Total 0.2 <=1.2 mg/dL    GFR Estimate 80 >60 mL/min/1.73m2   N terminal pro BNP outpatient    Collection Time: 09/18/23 11:00 AM   Result Value Ref Range    N Terminal Pro BNP Outpatient 1,145 (H) 0 - 450 pg/mL   CBC with platelets    Collection Time: 09/18/23 11:00 AM   Result Value Ref Range    WBC Count 13.9 (H) 4.0 - 11.0 10e3/uL    RBC Count 5.07 3.80 - 5.20 10e6/uL    Hemoglobin 14.5 11.7 - 15.7 g/dL    Hematocrit 47.1 (H) 35.0 - 47.0 %    MCV 93 78 - 100 fL    MCH 28.6 26.5 - 33.0 pg    MCHC 30.8 (L) 31.5 - 36.5 g/dL    RDW 15.6 (H) 10.0 - 15.0 %    Platelet Count 413 150 - 450 10e3/uL   Echocardiogram Complete    Collection Time: 09/18/23 12:24 PM   Result Value Ref Range    LVEF  55-60%    General PFT Lab (Please always keep checked)    Collection Time: 09/18/23 12:32 PM   Result Value Ref Range    FIO2-Pre 60.00 %     Echo (09/2023)  Global and regional  left ventricular function is normal with an EF of 55-60%.  Right ventricular function, chamber size, wall motion, and thickness are  normal.  No significant valvular abnormalities present.  Pulmonary artery systolic pressure is normal.  IVC diameter <2.1 cm collapsing >50% with sniff suggests a normal RA pressure  of 3 mmHg.  No pericardial effusion is present.  This study was compared with the study from 8/16/2023 .  No complete evaluation of PH was possible in this study but based on the  indirect signgs it appears that PA pressures are lower compared to prior  study.    RHC (04/2019)  RA 5  RV 36/5  PA 35/15 (22)  PCWP 15  CO/CI 4.6/3.03  PA saturation 72.5%  PVR 1.52 ANGLIN    6MWT (09/2023)  She walked only 110 m.  She required 10 L of oxygen to maintain her saturation more than 90%.     ASSESSMENT AND PLAN:      Ms. Ellen Loya is a very pleasant 47 year old female with polymyositis, interstitial lung, and pulmonary hypertension who returns today for follow-up.    Pulmonary hypertension due to interstitial lung disease -     Clinically, she has deteriorated in the last year.  She is functional class III.  Her oxygen requirements have significant increase.  Her most recent echocardiogram was read as normal RV size and function however to my eyes her right ventricle looks more dilated with mildly reduced function.  Her NT proBNP has also significantly increased.  I am worried that she is having progression of her pulmonary hypertension due to progression of her interstitial lung disease.  I have also recommended her to have a repeat right heart catheterization.    In the interim, I have recommended her to increase her tadalafil to 40 mg daily.  Of note she was on 40 mg in the past however she self reduced herself as she was feeling better.  She was previously on digoxin however since she has been a started on azithromycin due to the interaction we will hold off on this.       Interstitial lung disease secondary to  polymyositis -     She has reestablished care with our ILD clinic.  She has seen Dr. Marinelli recently.  She is currently on tapering dose of prednisone.  She is also on Levaquin and azithromycin for superadded infection.  She has stopped smoking recently.  Hopefully she will continue to not smoke so that she can be considered for lung transplantation.    I discussed with her the overall worsening trajectory of her disease course and poor prognosis if she is not a candidate for lung transplantation.  She understands importance of smoking cessation.    She will return to see us in 6 weeks with a repeat right heart catheterization.  She will call us in the interim should any further worsening symptoms.     It was a pleasure seeing Ms. Ellen Loya in our Pulmonary Hypertension Clinic. We thank you for allowing us to participate in her care.     Total time today was 46 minutes reviewing notes, imaging, labs, patient visit, orders and documentation    Sincerely,  Callie Ledesma MD   [facility]  Heart Failure, Transplant, and Mechanical Circulatory Support Cardiology   Cardiovascular Division  [facility]   [phone]               Please do not hesitate to contact me if you have any questions/concerns.     Sincerely,     Callie Ledesma MD

## 2023-09-18 NOTE — NURSING NOTE
Patient educated to the following during visit and educated to contact RN or MD for any questions.     Plan reviewed with patient:   INCREASE Tadalafil to 40mg po daily. Patient reports she has been getting this from [facility] in [place]. Updates [facility] in November with Dr. Ledesma scheduled        Reviewed Med list and verified all medications with patient.   Lab:  results reviewed in clinic. Patient demonstrated understanding.   Diet: Patient instructed regarding a heart healthy diet, including discussion of reduced fat and sodium intake. Patient demonstrated understanding of this information and agreed to call with further questions or concerns  Completed AVS  Follow-up orders placed      Patient verbalized understanding of plan and follow-up and agreed to call with further questions or concerns.        Lilliana Lewis RN

## 2023-09-18 NOTE — TELEPHONE ENCOUNTER
Patient reports she has been receiving tadalafil from [facility]    Lilliana Lewis RN on 9/18/2023 at 2:23 PM

## 2023-09-18 NOTE — PROGRESS NOTES
Service Date: 2023     Ignacio Loya MD   [address] [address]      RE: Ellen Loya   MRN: [id number]   : 1975      Dear Dr. Loya:      We had the pleasure of seeing Ms. Ellen Loya in our Pulmonary Hypertension Clinic.  As you know, she is a very pleasant 47 year old female with polymyositis, interstitial lung disease and pulmonary arterial hypertension.  She is currently on Adcirca 20 mg daily.  She did not tolerate Tyvaso due to worsening V/Q mismatch.    Ms. Loya unfortunately is not doing well in the last year.  She is having worsening exertional shortness of breath.  She is functional class III.  She is able to still do her activities of daily living independently.  She can walk up to a block.  She can climb only 1 flight of stairs but gets winded at the end.  Her oxygen requirements have significantly increased.  She is currently on 6 L at rest and 8 to 10 L with exertion.  She feels chest tightness and pressure when her oxygen level is low.  She has not had any exertional presyncope or syncope.  No lower extremity swelling.  She does have gained weight but attributes this more to prednisone.    She has a 2 hospitalization 1 in August and then more recently last week.  During her hospital ligation in August, she was intubated briefly.  Both of them were thought to be due to her pneumonia and her ILD exacerbation.  She is currently on Levaquin and tapering dose of prednisone.  She has established care with Dr. Marinelli in the ILD clinic.  She is being considered for transplant if she is tobacco free for a minimum of 6 months.    PAST MEDICAL HISTORY:   1.  Polymyositis and dermatomyositis.   2.  Interstitial lung disease.   3.  Pulmonary arterial hypertension.   4.  Arthritis.      MEDICATIONS:    Current Outpatient Medications   Medication Sig    acetaminophen (TYLENOL) 325 MG tablet     [START ON 10/6/2023] azithromycin (ZITHROMAX) 250  MG tablet Take 1 tablet (250 mg) by mouth three times a week - start AFTER you are finished with your Levaquin antibiotic    buprenorphine-naloxone (SUBOXONE) 2-0.5 MG SUBL sublingual tablet Place 1 tablet under the tongue 3 times daily    CHOLECALCIFEROL 25 MCG (1000 UT) tablet Take 1,000 Units by mouth daily    clonazePAM (KLONOPIN) 0.5 MG tablet Take 0.25 mg by mouth 3 times daily as needed for anxiety    esomeprazole (NEXIUM) 20 MG DR capsule Take 20 mg by mouth every morning (before breakfast) Take 30-60 minutes before eating.    furosemide (LASIX) 20 MG tablet Take 1 tablet (20 mg) by mouth as needed (swelling)    guaiFENesin (MUCINEX) 600 MG 12 hr tablet Take 1 tablet (600 mg) by mouth 2 times daily for 7 days    ipratropium - albuterol 0.5 mg/2.5 mg/3 mL (DUONEB) 0.5-2.5 (3) MG/3ML neb solution Take 1 vial (3 mLs) by nebulization 4 times daily    levofloxacin (LEVAQUIN) 750 MG tablet Take 1 tablet (750 mg) by mouth daily for 20 days    naloxone (NARCAN) 4 MG/0.1ML nasal spray Spray 1 spray (4 mg) into one nostril alternating nostrils as needed for opioid reversal every 2-3 minutes until assistance arrives    Nutritional Supplements (ENSURE ENLIVE) LIQD Take 8 fluid ounces by mouth 3 times daily    order for DME Oxygen: Patient requires supplemental Oxygen 4 LPM via nasal canula at rest and 6 LPM with activity. Please provide patient with portability capability. Okay to spot check patient on oxygen device, to keep sats above 90%. Please provider with home concentrator that can go up to 10 LPM. Oxygen will be for a lifetime.    order for DME Please provide patient with 2  liquid oxygen tanks for portability. Spot check patient on liquid oxygen. Titrate oxygen to maintain saturations above 88%.    polyethylene glycol (MIRALAX) 17 GM/Dose powder Take 1 Capful by mouth daily as needed for constipation    predniSONE (DELTASONE) 10 MG tablet Take 3 tablets (30 mg) by mouth daily X 7 days, then 25 mg daily x7 days,  "then 20 mg daily x7 days, then 15 mg daily x7 days, then 10 mg daily    senna (SENOKOT) 8.6 MG tablet Take 1 tablet by mouth 2 times daily as needed for constipation    sertraline (ZOLOFT) 100 MG tablet Take 100 mg by mouth daily    tadalafil, PAH, 20 MG TABS Take 1 tablet (20 mg) by mouth daily    VENTOLIN  (90 Base) MCG/ACT inhaler INHALE ONE TO TWO PUFFS INTO THE LUNGS EVERY 4 HOURS AS NEEDED FOR SHORTNESS OF BREATH / DYSPNEA (Patient taking differently: Inhale 1-2 puffs into the lungs every 4 hours as needed for shortness of breath or wheezing)     No current facility-administered medications for this visit.     REVIEW OF SYSTEMS:  A detailed 10-point review of systems was obtained as described in the History of Present Illness.  All other systems reviewed and are negative.      PHYSICAL EXAMINATION:    /88 (BP Location: Right arm, Patient Position: Sitting, Cuff Size: Adult Regular)   Pulse 72   Ht 1.562 m (5' 1.5\")   Wt 53.2 kg (117 lb 4.8 oz)   LMP  (LMP Unknown)   SpO2 100%   BMI 21.81 kg/m     General: appears comfortable, alert and articulate, on O2  Head: normocephalic, atraumatic  Eyes: anicteric sclera, EOMI  Neck: no adenopathy  Orophyarynx: moist mucosa, no lesions, dentition intact  Heart: regular, normal S1/S2, no murmur, gallop, rub, estimated JVP 8cmH2O  Lungs: scattered crackles, squeeking   Abdomen: soft, non-tender, bowel sounds present, no hepatosplenomegaly  Extremities: +clubbing, no cyanosis or edema  Neurological: normal speech and affect, no gross motor deficits     Recent Results (from the past 168 hour(s))   Comprehensive metabolic panel    Collection Time: 09/12/23  6:30 AM   Result Value Ref Range    Sodium 133 (L) 136 - 145 mmol/L    Potassium 4.5 3.4 - 5.3 mmol/L    Chloride 95 (L) 98 - 107 mmol/L    Carbon Dioxide (CO2) 25 22 - 29 mmol/L    Anion Gap 13 7 - 15 mmol/L    Urea Nitrogen 16.4 6.0 - 20.0 mg/dL    Creatinine 0.50 (L) 0.51 - 0.95 mg/dL    Calcium 9.1 " 8.6 - 10.0 mg/dL    Glucose 235 (H) 70 - 99 mg/dL    Alkaline Phosphatase 58 35 - 104 U/L    AST 16 0 - 45 U/L    ALT 15 0 - 50 U/L    Protein Total 7.3 6.4 - 8.3 g/dL    Albumin 3.8 3.5 - 5.2 g/dL    Bilirubin Total 0.2 <=1.2 mg/dL    GFR Estimate >90 >60 mL/min/1.73m2   Magnesium    Collection Time: 09/12/23  6:30 AM   Result Value Ref Range    Magnesium 2.1 1.7 - 2.3 mg/dL   CRP inflammation    Collection Time: 09/12/23  6:30 AM   Result Value Ref Range    CRP Inflammation 39.11 (H) <5.00 mg/L   CBC with platelets and differential    Collection Time: 09/12/23  6:30 AM   Result Value Ref Range    WBC Count 15.1 (H) 4.0 - 11.0 10e3/uL    RBC Count 4.16 3.80 - 5.20 10e6/uL    Hemoglobin 11.9 11.7 - 15.7 g/dL    Hematocrit 37.8 35.0 - 47.0 %    MCV 91 78 - 100 fL    MCH 28.6 26.5 - 33.0 pg    MCHC 31.5 31.5 - 36.5 g/dL    RDW 15.4 (H) 10.0 - 15.0 %    Platelet Count 282 150 - 450 10e3/uL    % Neutrophils 93 %    % Lymphocytes 4 %    % Monocytes 2 %    % Eosinophils 0 %    % Basophils 0 %    % Immature Granulocytes 1 %    NRBCs per 100 WBC 0 <1 /100    Absolute Neutrophils 14.0 (H) 1.6 - 8.3 10e3/uL    Absolute Lymphocytes 0.7 (L) 0.8 - 5.3 10e3/uL    Absolute Monocytes 0.3 0.0 - 1.3 10e3/uL    Absolute Eosinophils 0.0 0.0 - 0.7 10e3/uL    Absolute Basophils 0.0 0.0 - 0.2 10e3/uL    Absolute Immature Granulocytes 0.1 <=0.4 10e3/uL    Absolute NRBCs 0.0 10e3/uL   Comprehensive metabolic panel    Collection Time: 09/13/23  6:07 AM   Result Value Ref Range    Sodium 137 136 - 145 mmol/L    Potassium 4.4 3.4 - 5.3 mmol/L    Chloride 97 (L) 98 - 107 mmol/L    Carbon Dioxide (CO2) 28 22 - 29 mmol/L    Anion Gap 12 7 - 15 mmol/L    Urea Nitrogen 19.1 6.0 - 20.0 mg/dL    Creatinine 0.63 0.51 - 0.95 mg/dL    Calcium 9.5 8.6 - 10.0 mg/dL    Glucose 138 (H) 70 - 99 mg/dL    Alkaline Phosphatase 59 35 - 104 U/L    AST 13 0 - 45 U/L    ALT 15 0 - 50 U/L    Protein Total 7.3 6.4 - 8.3 g/dL    Albumin 3.8 3.5 - 5.2 g/dL    Bilirubin  Total 0.2 <=1.2 mg/dL    GFR Estimate >90 >60 mL/min/1.73m2   Magnesium    Collection Time: 09/13/23  6:07 AM   Result Value Ref Range    Magnesium 2.1 1.7 - 2.3 mg/dL   CRP inflammation    Collection Time: 09/13/23  6:07 AM   Result Value Ref Range    CRP Inflammation 18.68 (H) <5.00 mg/L   CBC with platelets and differential    Collection Time: 09/13/23  6:07 AM   Result Value Ref Range    WBC Count 17.7 (H) 4.0 - 11.0 10e3/uL    RBC Count 4.38 3.80 - 5.20 10e6/uL    Hemoglobin 12.4 11.7 - 15.7 g/dL    Hematocrit 40.1 35.0 - 47.0 %    MCV 92 78 - 100 fL    MCH 28.3 26.5 - 33.0 pg    MCHC 30.9 (L) 31.5 - 36.5 g/dL    RDW 15.6 (H) 10.0 - 15.0 %    Platelet Count 296 150 - 450 10e3/uL    % Neutrophils 90 %    % Lymphocytes 6 %    % Monocytes 3 %    % Eosinophils 0 %    % Basophils 0 %    % Immature Granulocytes 1 %    NRBCs per 100 WBC 0 <1 /100    Absolute Neutrophils 16.1 (H) 1.6 - 8.3 10e3/uL    Absolute Lymphocytes 1.0 0.8 - 5.3 10e3/uL    Absolute Monocytes 0.5 0.0 - 1.3 10e3/uL    Absolute Eosinophils 0.0 0.0 - 0.7 10e3/uL    Absolute Basophils 0.0 0.0 - 0.2 10e3/uL    Absolute Immature Granulocytes 0.2 <=0.4 10e3/uL    Absolute NRBCs 0.0 10e3/uL   Respiratory Panel PCR    Collection Time: 09/13/23  9:48 AM    Specimen: Nasopharyngeal; Swab   Result Value Ref Range    Adenovirus Not Detected Not Detected    Coronavirus Not Detected Not Detected    Human Metapneumovirus Not Detected Not Detected    Human Rhin/Enterovirus Not Detected Not Detected    Influenza A Not Detected Not Detected    Influenza A, H1 Not Detected Not Detected    Influenza A 2009 H1N1 Not Detected Not Detected    Influenza A, H3 Not Detected Not Detected    Influenza B Not Detected Not Detected    Parainfluenza Virus 1 Not Detected Not Detected    Parainfluenza Virus 2 Not Detected Not Detected    Parainfluenza Virus 3 Not Detected Not Detected    Parainfluenza Virus 4 Not Detected Not Detected    Respiratory Syncytial Virus A Not Detected  Not Detected    Respiratory Syncytial Virus B Not Detected Not Detected    Chlamydia Pneumoniae Not Detected Not Detected    Mycoplasma Pneumoniae Not Detected Not Detected   Comprehensive metabolic panel    Collection Time: 09/14/23  7:05 AM   Result Value Ref Range    Sodium 138 136 - 145 mmol/L    Potassium 4.0 3.4 - 5.3 mmol/L    Chloride 100 98 - 107 mmol/L    Carbon Dioxide (CO2) 30 (H) 22 - 29 mmol/L    Anion Gap 8 7 - 15 mmol/L    Urea Nitrogen 20.7 (H) 6.0 - 20.0 mg/dL    Creatinine 0.74 0.51 - 0.95 mg/dL    Calcium 9.1 8.6 - 10.0 mg/dL    Glucose 108 (H) 70 - 99 mg/dL    Alkaline Phosphatase 54 35 - 104 U/L    AST 11 0 - 45 U/L    ALT 13 0 - 50 U/L    Protein Total 6.9 6.4 - 8.3 g/dL    Albumin 3.8 3.5 - 5.2 g/dL    Bilirubin Total <0.2 <=1.2 mg/dL    GFR Estimate >90 >60 mL/min/1.73m2   Magnesium    Collection Time: 09/14/23  7:05 AM   Result Value Ref Range    Magnesium 2.1 1.7 - 2.3 mg/dL   CRP inflammation    Collection Time: 09/14/23  7:05 AM   Result Value Ref Range    CRP Inflammation 8.55 (H) <5.00 mg/L   CBC with platelets and differential    Collection Time: 09/14/23  7:05 AM   Result Value Ref Range    WBC Count 13.2 (H) 4.0 - 11.0 10e3/uL    RBC Count 4.27 3.80 - 5.20 10e6/uL    Hemoglobin 12.1 11.7 - 15.7 g/dL    Hematocrit 39.3 35.0 - 47.0 %    MCV 92 78 - 100 fL    MCH 28.3 26.5 - 33.0 pg    MCHC 30.8 (L) 31.5 - 36.5 g/dL    RDW 15.8 (H) 10.0 - 15.0 %    Platelet Count 307 150 - 450 10e3/uL    % Neutrophils 75 %    % Lymphocytes 17 %    % Monocytes 7 %    % Eosinophils 0 %    % Basophils 0 %    % Immature Granulocytes 1 %    NRBCs per 100 WBC 0 <1 /100    Absolute Neutrophils 9.9 (H) 1.6 - 8.3 10e3/uL    Absolute Lymphocytes 2.2 0.8 - 5.3 10e3/uL    Absolute Monocytes 0.9 0.0 - 1.3 10e3/uL    Absolute Eosinophils 0.0 0.0 - 0.7 10e3/uL    Absolute Basophils 0.0 0.0 - 0.2 10e3/uL    Absolute Immature Granulocytes 0.1 <=0.4 10e3/uL    Absolute NRBCs 0.0 10e3/uL   6 minute walk test (O2  Titration)    Collection Time: 09/18/23 12:00 AM   Result Value Ref Range    6 min walk (FT) 365 1,604 ft    6 Min Walk (M) 111 489 m   Comprehensive Metabolic Panel    Collection Time: 09/18/23 11:00 AM   Result Value Ref Range    Sodium 140 136 - 145 mmol/L    Potassium 4.5 3.4 - 5.3 mmol/L    Chloride 99 98 - 107 mmol/L    Carbon Dioxide (CO2) 32 (H) 22 - 29 mmol/L    Anion Gap 9 7 - 15 mmol/L    Urea Nitrogen 20.9 (H) 6.0 - 20.0 mg/dL    Creatinine 0.89 0.51 - 0.95 mg/dL    Calcium 9.7 8.6 - 10.0 mg/dL    Glucose 121 (H) 70 - 99 mg/dL    Alkaline Phosphatase 58 35 - 104 U/L    AST 15 0 - 45 U/L    ALT 13 0 - 50 U/L    Protein Total 7.9 6.4 - 8.3 g/dL    Albumin 4.4 3.5 - 5.2 g/dL    Bilirubin Total 0.2 <=1.2 mg/dL    GFR Estimate 80 >60 mL/min/1.73m2   N terminal pro BNP outpatient    Collection Time: 09/18/23 11:00 AM   Result Value Ref Range    N Terminal Pro BNP Outpatient 1,145 (H) 0 - 450 pg/mL   CBC with platelets    Collection Time: 09/18/23 11:00 AM   Result Value Ref Range    WBC Count 13.9 (H) 4.0 - 11.0 10e3/uL    RBC Count 5.07 3.80 - 5.20 10e6/uL    Hemoglobin 14.5 11.7 - 15.7 g/dL    Hematocrit 47.1 (H) 35.0 - 47.0 %    MCV 93 78 - 100 fL    MCH 28.6 26.5 - 33.0 pg    MCHC 30.8 (L) 31.5 - 36.5 g/dL    RDW 15.6 (H) 10.0 - 15.0 %    Platelet Count 413 150 - 450 10e3/uL   Echocardiogram Complete    Collection Time: 09/18/23 12:24 PM   Result Value Ref Range    LVEF  55-60%    General PFT Lab (Please always keep checked)    Collection Time: 09/18/23 12:32 PM   Result Value Ref Range    FIO2-Pre 60.00 %     Echo (09/2023)  Global and regional left ventricular function is normal with an EF of 55-60%.  Right ventricular function, chamber size, wall motion, and thickness are  normal.  No significant valvular abnormalities present.  Pulmonary artery systolic pressure is normal.  IVC diameter <2.1 cm collapsing >50% with sniff suggests a normal RA pressure  of 3 mmHg.  No pericardial effusion is  present.  This study was compared with the study from 8/16/2023 .  No complete evaluation of PH was possible in this study but based on the  indirect signgs it appears that PA pressures are lower compared to prior  study.    RHC (04/2019)  RA 5  RV 36/5  PA 35/15 (22)  PCWP 15  CO/CI 4.6/3.03  PA saturation 72.5%  PVR 1.52 ANGLIN    6MWT (09/2023)  She walked only 110 m.  She required 10 L of oxygen to maintain her saturation more than 90%.     ASSESSMENT AND PLAN:      Ms. Ellen Loya is a very pleasant 47 year old female with polymyositis, interstitial lung, and pulmonary hypertension who returns today for follow-up.    Pulmonary hypertension due to interstitial lung disease -     Clinically, she has deteriorated in the last year.  She is functional class III.  Her oxygen requirements have significant increase.  Her most recent echocardiogram was read as normal RV size and function however to my eyes her right ventricle looks more dilated with mildly reduced function.  Her NT proBNP has also significantly increased.  I am worried that she is having progression of her pulmonary hypertension due to interstitial lung disease.  I have recommended her to have a repeat right heart catheterization.    In the interim, I have recommended her to increase her tadalafil to 40 mg daily.  Of note she was on 40 mg in the past however she self reduced herself as she was feeling better.  She was previously on digoxin. Since she is on azithromycin now, due to the interaction, we will hold off on this.       Interstitial lung disease secondary to polymyositis -     She has reestablished care with our ILD clinic.  She has seen Dr. Marinelli recently.  She is currently on tapering dose of prednisone.  She is also on Levaquin and azithromycin for superadded infection.  She has stopped smoking recently.  Hopefully she will continue to not smoke so that she can be considered for lung transplantation.    I discussed with her the overall  worsening trajectory of her disease course and poor prognosis if she is not a candidate for lung transplantation.  She understands importance of smoking cessation.    She will return to see us in 6 weeks with a repeat right heart catheterization.  She will call us in the interim should any further worsening symptoms.     It was a pleasure seeing Ms. Ellen Loya in our Pulmonary Hypertension Clinic. We thank you for allowing us to participate in her care.     Total time today was 46 minutes reviewing notes, imaging, labs, patient visit, orders and documentation    Sincerely,  Callie Ledesma MD   [facility]  Heart Failure, Transplant, and Mechanical Circulatory Support Cardiology   Cardiovascular Division  [facility]   [phone]    Addendum:

## 2023-09-19 LAB — FIO2-PRE: 60 %

## 2023-09-22 ENCOUNTER — OFFICE VISIT (OUTPATIENT)
Dept: FAMILY MEDICINE | Facility: CLINIC | Age: 48
End: 2023-09-22
Payer: COMMERCIAL

## 2023-09-22 VITALS
WEIGHT: 113 LBS | BODY MASS INDEX: 20.8 KG/M2 | RESPIRATION RATE: 16 BRPM | HEART RATE: 102 BPM | SYSTOLIC BLOOD PRESSURE: 132 MMHG | TEMPERATURE: 98.8 F | DIASTOLIC BLOOD PRESSURE: 82 MMHG | HEIGHT: 62 IN | OXYGEN SATURATION: 91 %

## 2023-09-22 DIAGNOSIS — J84.9 ILD (INTERSTITIAL LUNG DISEASE) (H): ICD-10-CM

## 2023-09-22 DIAGNOSIS — F13.20 MODERATE BENZODIAZEPINE USE DISORDER (H): Primary | ICD-10-CM

## 2023-09-22 DIAGNOSIS — J96.22 ACUTE ON CHRONIC RESPIRATORY FAILURE WITH HYPOXIA AND HYPERCAPNIA (H): ICD-10-CM

## 2023-09-22 DIAGNOSIS — F33.1 MODERATE EPISODE OF RECURRENT MAJOR DEPRESSIVE DISORDER (H): ICD-10-CM

## 2023-09-22 DIAGNOSIS — J96.21 ACUTE ON CHRONIC RESPIRATORY FAILURE WITH HYPOXIA AND HYPERCAPNIA (H): ICD-10-CM

## 2023-09-22 DIAGNOSIS — J96.01 ACUTE HYPOXEMIC RESPIRATORY FAILURE (H): ICD-10-CM

## 2023-09-22 PROCEDURE — 99495 TRANSJ CARE MGMT MOD F2F 14D: CPT | Performed by: FAMILY MEDICINE

## 2023-09-22 ASSESSMENT — ANXIETY QUESTIONNAIRES
6. BECOMING EASILY ANNOYED OR IRRITABLE: SEVERAL DAYS
GAD7 TOTAL SCORE: 13
GAD7 TOTAL SCORE: 13
7. FEELING AFRAID AS IF SOMETHING AWFUL MIGHT HAPPEN: NEARLY EVERY DAY
5. BEING SO RESTLESS THAT IT IS HARD TO SIT STILL: SEVERAL DAYS
3. WORRYING TOO MUCH ABOUT DIFFERENT THINGS: SEVERAL DAYS
1. FEELING NERVOUS, ANXIOUS, OR ON EDGE: NEARLY EVERY DAY
IF YOU CHECKED OFF ANY PROBLEMS ON THIS QUESTIONNAIRE, HOW DIFFICULT HAVE THESE PROBLEMS MADE IT FOR YOU TO DO YOUR WORK, TAKE CARE OF THINGS AT HOME, OR GET ALONG WITH OTHER PEOPLE: SOMEWHAT DIFFICULT
2. NOT BEING ABLE TO STOP OR CONTROL WORRYING: MORE THAN HALF THE DAYS
4. TROUBLE RELAXING: MORE THAN HALF THE DAYS

## 2023-09-22 NOTE — PROGRESS NOTES
Assessment & Plan     Acute on chronic respiratory failure with hypoxia and hypercapnia (H)  Anali Loya is a 47 year old female with progressively worsening ILD and severe pulmonary HTN who continues to smoke intermittently who presents on 9/10/2023 with acute on chronic respiratory failure requiring HFNC supplementation caused by bacterial lower respiratory infection causing acute flare of her chronic interstitial lung disease.  Patient was started on IV steroids and antibiotics with progressive improvement noted.  Sputum culture has grown out enterobacter and pseudomonas which are both sensitive to Levaquin and patient has been transitioned to PO Levaquin with good tolerance and started on slow prednisone taper.  Patient is now back baseline 4-6L NC at rest and has been set up to start [id number] pressure support at time of discharge.  She will discharge home with ongoing 21 day course of Levaquin to be followed by three times weekly azithromycin as well as prednisone 30 mg daily for 7 days followed by a taper by 5 mg every 7 days until she is on 10 mg and then remain on that dose until follow up with Dr. Marinelli in December.      She follows up today. Stable. She was seen by Cardiology last week. Increase of Tadalafil. Plan for RHC in November. She remains dyspneic at rest. She is on 4-6 L O2 with desaturations with exertion. She is currently on Levaquin and prednisone taper. She has not started Azithromycin yet.     VSS, Lungs with fine rales throughout, no ronchi. Heart tachy but no murmur and no edema. She is no longer smoking. We discussed vaccinations for influenza and pneumonia which she declines.     The current medical regimen is effective;  continue present plan and medications. Plan follow up with me in 3 weeks. Follow up with psychiatry and Cardiology as scheduled and with Pulmonology in December.    Acute hypoxemic respiratory failure (H)  As above    ILD (interstitial lung disease) (H)  Same as  above.    Moderate episode of recurrent major depressive disorder (H)  Chronic, stable. No psychotic features. She is following with psychiatry. The current medical regimen is effective;  continue present plan and medications.     Moderate benzodiazepine use disorder (H)  Chronic Klonopin use due to severe anxiety associated with air hunger. The current medical regimen is effective;  continue present plan and medications.            MED REC REQUIRED  Post Medication Reconciliation Status:  Discharge medications reconciled, continue medications without change      Cristian Fagan MD  [facility]    Thierno Briones is a 47 year old, presenting for the following health issues:  Hospital F/U      9/22/2023     2:10 PM   Additional Questions   Roomed by Sravani Loya       [facility]         9/15/2023     1:30 PM   Post Discharge Outreach   Admission Date 9/10/2023   Reason for Admission Acute hypoxemic respiratory failure   Discharge Date 9/14/2023   Discharge Diagnosis Acute hypoxemic respiratory failure   How are you doing now that you are home? per patient report, she said she is doing OK. she said they are working on getting a new neb machine for her. patient has a follow up with PCP on 9/22. patient does report she is having a hard time sleeping being on the increased steroids. she is wondering about something to help her sleep. RN CC will send a message to PCP.   How are your symptoms? (Red Flag symptoms escalate to triage hotline per guidelines) Improved   Do you feel your condition is stable enough to be safe at home until your provider visit? Yes   Does the patient have their discharge instructions?  Yes   Does the patient have questions regarding their discharge instructions?  No   Were you started on any new medications or were there changes to any of your previous medications?  Yes   Does the patient have all of their medications? Yes   Do you have questions regarding any of your  medications?  No   Do you have all of your needed medical supplies or equipment (DME)?  (i.e. oxygen tank, CPAP, cane, etc.) Yes   Discharge follow-up appointment scheduled within 14 calendar days?  Yes   Discharge Follow Up Appointment Date 9/22/2023   Discharge Follow Up Appointment Scheduled with? Primary Care Provider     Hospital Follow-up Visit:    Hospital/Nursing Home/IP Rehab Facility: [facility]  Date of Admission: 9/10/23  Date of Discharge: 9/14/23  Reason(s) for Admission: Acute hypoxemic respiratory failure    Was your hospitalization related to COVID-19? No   Problems taking medications regularly:  None  Medication changes since discharge: None  Problems adhering to non-medication therapy:  None    Summary of hospitalization:  [facility] hospital discharge summary reviewed  Diagnostic Tests/Treatments reviewed.  Follow up needed: none  Other Healthcare Providers Involved in Patient s Care:         Specialist appointment - psychiatry, cardiology, pulmonology  Update since discharge: stable.       [facility]  Hospitalist Discharge Summary       Date of Admission:  9/10/2023  Date of Discharge:  9/14/2023  Discharging Provider: Jia Ferrari MD  Discharge Service: Hospitalist Service        Discharge Diagnoses  Principal Problem:    Acute hypoxemic respiratory failure (H)           Clinically Significant Risk Factors                Follow-ups Needed After Discharge  Follow-up Appointments     Follow-up and recommended labs and tests       Follow up with primary care provider, Cristian Fagan, within 7-14 days for   hospital follow up.    Follow up with Dr. Marinelli, interstitial lung specialist, in December as   scheduled.                Discharge Disposition   Discharged to home  Condition at discharge: Stable            Hospital Course  Anali Loya is a 47 year old female with progressively worsening ILD and severe pulmonary HTN  who continues to smoke intermittently who presents on 9/10/2023 with acute on chronic respiratory failure requiring HFNC supplementation caused by bacterial lower respiratory infection causing acute flare of her chronic interstitial lung disease.  Patient was started on IV steroids and antibiotics with progressive improvement noted.  Sputum culture has grown out enterobacter and pseudomonas which are both sensitive to Levaquin and patient has been transitioned to PO Levaquin with good tolerance and started on slow prednisone taper.  Patient is now back baseline 4-6L NC at rest and has been set up to start [id number] pressure support at time of discharge.  She will discharge home with ongoing 21 day course of Levaquin to be followed by three times weekly azithromycin as well as prednisone 30 mg daily for 7 days followed by a taper by 5 mg every 7 days until she is on 10 mg and then remain on that dose until follow up with Dr. Marinelli in December.       Hypoxic respiratory failure  Chronic respiratory failure with hypoxia  Interstitial lung disease  Concern for community-acquired pneumonia  Patient's baseline oxygen is 4-6L at rest and 8 L oxygen with activity, administered by nasal cannula.  Patient had recent URI symptoms with progressive worsening lower respiratory symptoms, leukocytosis, and HFNC needs, concerning for lower respiratory bacterial infection.  RSV, COVID and flu tested negative in the ED.  Viral PCR negative.  Of note, patient was hospitalized 8/14-8/20 for similar clinical picture and was on a very prolonged steroid taper and has established care with ILD clinic in recent past.  Now has sputum culture results as above, on Levaquin, and is back to baseline oxygen   -Discharge with ongoing baseline 4-6 L NC with start of [id number] today upon arrival home.    -Levaquin 750 mg daily for another 20 days, total course of 21 days given patient's pseudomonas infection, severe ILD at baseline and  immunosuppression with ongoing prednisone.   -Continue DuoNebs 4 times daily with as needed albuterol - home nebulizer ordered   -Prednisone 30 mg at time of discharge and very slow taper weekly as above with patient remaining on 10 mg following taper until ILD clinic follow up.  -emphasized the need for absolute cessation of tobacco going forward if her goal is to get on a lung transplant list going forward.      Severe pulmonary hypertension  Follows with Dr. Ledesma at [facility], chronic and appears stable.    -Continue PTA tadalafil 20 mg daily at discharge     Elevated BNP  BNP of 2919 with known severe pulmonary hypertension and right sided heart failure.  Patient has a history of becoming hypotensive with aggressive IV diuresis.    -Continue home dose of 20 mg Lasix at discharge      Chronic pain  Chronic opioid use  Secondary to inflammatory pain of arthritis and polymyositis.  Currently treated with Suboxone prescribed by her psychiatrist.  Patient does not feel dose is appropriate currently as she is having more pain as she is having to be immobile due to her breathing issues and following discussion with Dr. Bonilla, her suboxone prescriber, she was increased to 6 mg/1.5-2.5 mg dosing three times a day during this stay and will continue this increased dose at time of discharge.    -Patient will need to inform Dr. Bonilla so an outpatient order can be placed.       Depression  Anxiety  PTSD  Hospitalization in June 2023 for suicidal thoughts and hallucinations.  Depression and anxiety are much better controlled and she has been without suicidal thoughts.  Having some increased anxiety as the seriousness of her breathing and that she could die in the next 6 months even if she does everything correctly is being fully processed.    -Continue PTA regimen of sertraline, Zyprexa, clonazepam, and Vistaril at time of discharge      GERD  Symptoms well controlled with home regimen   -Continue PTA Protonix    "  Constipation  Reported no bowel movement for \"nearly 1 week\" prior to admission.  Was given an enema with good results and has had several bowel movement since admission.    -patient will need to consider ongoing Miralax daily to help with constipation symptoms going forward.             Consultations This Hospital Stay   CARE MANAGEMENT / SOCIAL WORK IP CONSULT           Code Status   Full Code           Time Spent on this Encounter   I, Jia Ferrari MD, personally saw the patient today and spent greater than 30 minutes discharging this patient.     Jia Ferrari MD  [address] MEDICAL SURGICAL  [address] [facility]   [place] [address]  Phone: [phone]  ______________________________________________________________________           Physical Exam  Vital Signs: Temp: 98.4  F (36.9  C) Temp src: Oral BP: 133/86 Pulse: 59   Resp: 20 SpO2: (!) 86 % O2 Device: None (Room air) Oxygen Delivery: 5 LPM  Weight: 112 lbs 14.4 oz  Constitutional: awake, alert, cooperative, no apparent distress, and appears stated age  Respiratory: No increased work of breathing, decreased but improving air exchange, ongoing fine crackles in bilateral lower and mid lung fields   Cardiovascular: RRR   GI: bowel sounds present, abdomen soft and non-tender  Neurologic: Awake, alert, oriented to name, place and situation              Primary Care Physician   Cristian Fagan           Discharge Orders          Reason for your hospital stay     Bacterial lower tract infection (pneumonia vs bronchitis) causing respiratory failure.  You are improving on steroids and antibiotics and will be going home with ongoing Levaquin for antibiotic and prednisone for steroids.  Remember:  1.  When you are done with your Levaquin antibiotic, start taking the azithromycin antibiotic every Monday, Wednesday and Friday to try to decrease the chance of another infection happening.  2.  Start the prednisone at 30 mg " daily for 7 days, then 25 mg daily for 7 days, then 20 mg daily for 7 days, then 15 mg daily for 7 days, then 10 mg daily until you see Dr. Marinelli again in December.  If as you are going down in your steroids you notice worsening of your breathing starting to occur, please connect with Dr. Marinelli and his medical team about what to do next.  3.  [employer] will be meeting you at your home to help set up your device - please use this faithfully to help improving your oxygen levels and your functionality  4.  You MUST completely quit smoking if your goal is to try to get a lung transplant.    Enjoy going home and I hope you will not need any more hospitalizations in the next few months!          Follow-up and recommended labs and tests      Follow up with primary care provider, Cristian Fagan, within 7-14 days for hospital follow up.    Follow up with Dr. Marinelli, interstitial lung specialist, in December as scheduled.          Activity     Your activity upon discharge: activity as tolerated      Oxygen Adult/Peds          Compressor with Reusable Nebulizer     With tubing equipment          Diet     Follow this diet upon discharge: Regular diet               Significant Results and Procedures      Results for orders placed or performed during the hospital encounter of 09/10/23   XR Chest Port 1 View     Narrative     EXAM: XR CHEST PORT 1 VIEW  LOCATION: [facility]  DATE: 9/10/2023     INDICATION: Dyspnea.  COMPARISON: Chest radiograph 07/12/2023. Chest CT 10/04/2021.        Impression     IMPRESSION:     Diffuse interstitial opacities throughout the lungs compatible with the known UIP pattern of pulmonary fibrosis.     A vague rounded opacity within the left midlung appears similar to the prior exam and is compatible with the known nodule in this location. No definite new airspace opacities.     No pleural effusions or pneumothorax.     Stable, nonenlarged cardiomediastinal silhouette.       *Note: Due to a large number of results and/or encounters for the requested time period, some results have not been displayed. A complete set of results can be found in Results Review.               Discharge Medications  Current Discharge Medication List               START taking these medications     Details   acetaminophen (TYLENOL) 325 MG tablet         azithromycin (ZITHROMAX) 250 MG tablet Take 1 tablet (250 mg) by mouth three times a week - start AFTER you are finished with your Levaquin antibiotic  Qty: 30 tablet, Refills: 0     Associated Diagnoses: ILD (interstitial lung disease) (H)       guaiFENesin (MUCINEX) 600 MG 12 hr tablet Take 1 tablet (600 mg) by mouth 2 times daily for 7 days  Qty: 14 tablet, Refills: 0     Associated Diagnoses: Pneumonia due to Pseudomonas species, unspecified laterality, unspecified part of lung (H)       ipratropium - albuterol 0.5 mg/2.5 mg/3 mL (DUONEB) 0.5-2.5 (3) MG/3ML neb solution Take 1 vial (3 mLs) by nebulization 4 times daily  Qty: 360 mL, Refills: 0     Associated Diagnoses: ILD (interstitial lung disease) (H); Acute and chronic respiratory failure with hypoxia (H)       levofloxacin (LEVAQUIN) 750 MG tablet Take 1 tablet (750 mg) by mouth daily for 20 days  Qty: 20 tablet, Refills: 0     Associated Diagnoses: Acute and chronic respiratory failure with hypoxia (H); Pneumonia due to Pseudomonas species, unspecified laterality, unspecified part of lung (H)       predniSONE (DELTASONE) 10 MG tablet Take 3 tablets (30 mg) by mouth daily X 7 days, then 25 mg daily x7 days, then 20 mg daily x7 days, then 15 mg daily x7 days, then 10 mg daily  Qty: 119 tablet, Refills: 0     Associated Diagnoses: ILD (interstitial lung disease) (H)                   CONTINUE these medications which have NOT CHANGED     Details   buprenorphine-naloxone (SUBOXONE) 2-0.5 MG SUBL sublingual tablet Place 2 tablets under the tongue 3 times daily       CHOLECALCIFEROL 25 MCG (1000 UT) tablet  Take 1,000 Units by mouth daily       clonazePAM (KLONOPIN) 0.5 MG tablet Take 0.25 mg by mouth 3 times daily as needed for anxiety       esomeprazole (NEXIUM) 20 MG DR capsule Take 20 mg by mouth every morning (before breakfast) Take 30-60 minutes before eating.       furosemide (LASIX) 20 MG tablet Take 1 tablet (20 mg) by mouth as needed (swelling)  Qty: 90 tablet, Refills: 3     Associated Diagnoses: Pulmonary hypertension (H)       hydrOXYzine (VISTARIL) 25 MG capsule Take 25 mg by mouth 4 times daily as needed for anxiety       OLANZapine (ZYPREXA) 10 MG tablet Take 5 mg by mouth At Bedtime       !! order for DME Oxygen: Patient requires supplemental Oxygen 4 LPM via nasal canula at rest and 6 LPM with activity. Please provide patient with portability capability. Okay to spot check patient on oxygen device, to keep sats above 90%. Please provider with home concentrator that can go up to 10 LPM. Oxygen will be for a lifetime.  Qty: 1 Device, Refills: 0     Associated Diagnoses: Hypoxia       !! order for DME Please provide patient with 2  liquid oxygen tanks for portability. Spot check patient on liquid oxygen. Titrate oxygen to maintain saturations above 88%.  Qty: 2 Device, Refills: 0     Associated Diagnoses: ILD (interstitial lung disease) (H)       polyethylene glycol (MIRALAX) 17 GM/Dose powder Take 1 Capful by mouth daily as needed for constipation       senna (SENOKOT) 8.6 MG tablet Take 1 tablet by mouth 2 times daily as needed for constipation       sertraline (ZOLOFT) 100 MG tablet Take 100 mg by mouth daily       tadalafil, PAH, 20 MG TABS Take 1 tablet (20 mg) by mouth daily  Qty: 30 tablet, Refills: 11     Associated Diagnoses: Pulmonary hypertension (H)       traZODone (DESYREL) 50 MG tablet Take  mg by mouth nightly as needed for sleep       VENTOLIN  (90 Base) MCG/ACT inhaler INHALE ONE TO TWO PUFFS INTO THE LUNGS EVERY 4 HOURS AS NEEDED FOR SHORTNESS OF BREATH / DYSPNEA  Qty: 18  g, Refills: 9     Associated Diagnoses: SOB (shortness of breath)       naloxone (NARCAN) 4 MG/0.1ML nasal spray Spray 1 spray (4 mg) into one nostril alternating nostrils as needed for opioid reversal every 2-3 minutes until assistance arrives  Qty: 0.2 mL, Refills: 0     Associated Diagnoses: Chronic, continuous use of opioids        !! - Potential duplicate medications found. Please discuss with provider.          Plan of care communicated with patient             Patient Active Problem List   Diagnosis    Personal history of tobacco use, presenting hazards to health    Abnormal blood chemistry    Abnormal maternal glucose tolerance, antepartum    Inflammatory arthritis    Hyperlipidemia LDL goal <130    SOB (shortness of breath)    Fibrosis of lung (H)    Anxiety    Tobacco abuse    S/P bunionectomy    ILD (interstitial lung disease) with concerns for acute exacerbation    Lung nodule    Pneumothorax of left lung after biopsy    Pneumothorax after biopsy    Hypoxia    Pulmonary hypertension (H)    ASCUS of cervix with negative high risk HPV    Primary pulmonary hypertension (H)    Chronic, continuous use of opioids    Iron deficiency    Anemia    Spondylosis of lumbosacral region without myelopathy or radiculopathy    Polymyositis, organ involvement unspecified (H)    Hallucinations    Prolonged grief disorder    Abnormal CT of the chest    Elevated brain natriuretic peptide (BNP) level    Acute on chronic respiratory failure with hypoxia and hypercapnia (H)    Pneumonia of both lungs due to infectious organism, unspecified part of lung    Recurrent major depressive disorder (H)    MOLLY (generalized anxiety disorder)    PTSD (post-traumatic stress disorder)    Gastroesophageal reflux disease without esophagitis    Chronic pain    Acute pulmonary edema (H)    Chronic right heart failure (H)    Seronegative arthritis    Current tobacco use    Opioid use disorder in remission    Acute hypoxemic respiratory failure  (H)    Moderate benzodiazepine use disorder (H)     Current Outpatient Medications   Medication Sig Dispense Refill    acetaminophen (TYLENOL) 325 MG tablet       buprenorphine-naloxone (SUBOXONE) 2-0.5 MG SUBL sublingual tablet Place 1 tablet under the tongue 3 times daily      CHOLECALCIFEROL 25 MCG (1000 UT) tablet Take 1,000 Units by mouth daily      clonazePAM (KLONOPIN) 0.5 MG tablet Take 0.25 mg by mouth 3 times daily as needed for anxiety      esomeprazole (NEXIUM) 20 MG DR capsule Take 20 mg by mouth every morning (before breakfast) Take 30-60 minutes before eating.      furosemide (LASIX) 20 MG tablet Take 1 tablet (20 mg) by mouth as needed (swelling) 90 tablet 3    ipratropium - albuterol 0.5 mg/2.5 mg/3 mL (DUONEB) 0.5-2.5 (3) MG/3ML neb solution Take 1 vial (3 mLs) by nebulization 4 times daily 360 mL 0    levofloxacin (LEVAQUIN) 750 MG tablet Take 1 tablet (750 mg) by mouth daily for 20 days 20 tablet 0    Nutritional Supplements (ENSURE ENLIVE) LIQD Take 8 fluid ounces by mouth 3 times daily 720 mL 0    order for DME Oxygen: Patient requires supplemental Oxygen 4 LPM via nasal canula at rest and 6 LPM with activity. Please provide patient with portability capability. Okay to spot check patient on oxygen device, to keep sats above 90%. Please provider with home concentrator that can go up to 10 LPM. Oxygen will be for a lifetime. 1 Device 0    order for DME Please provide patient with 2  liquid oxygen tanks for portability. Spot check patient on liquid oxygen. Titrate oxygen to maintain saturations above 88%. 2 Device 0    polyethylene glycol (MIRALAX) 17 GM/Dose powder Take 1 Capful by mouth daily as needed for constipation      predniSONE (DELTASONE) 10 MG tablet Take 3 tablets (30 mg) by mouth daily X 7 days, then 25 mg daily x7 days, then 20 mg daily x7 days, then 15 mg daily x7 days, then 10 mg daily 119 tablet 0    senna (SENOKOT) 8.6 MG tablet Take 1 tablet by mouth 2 times daily as needed for  "constipation      sertraline (ZOLOFT) 100 MG tablet Take 100 mg by mouth daily      tadalafil, PAH, 20 MG TABS Take 2 tablets (40 mg) by mouth daily 60 tablet 11    VENTOLIN  (90 Base) MCG/ACT inhaler INHALE ONE TO TWO PUFFS INTO THE LUNGS EVERY 4 HOURS AS NEEDED FOR SHORTNESS OF BREATH / DYSPNEA (Patient taking differently: Inhale 1-2 puffs into the lungs every 4 hours as needed for shortness of breath or wheezing) 18 g 9    [START ON 10/6/2023] azithromycin (ZITHROMAX) 250 MG tablet Take 1 tablet (250 mg) by mouth three times a week - start AFTER you are finished with your Levaquin antibiotic (Patient not taking: Reported on 9/22/2023) 30 tablet 0    naloxone (NARCAN) 4 MG/0.1ML nasal spray Spray 1 spray (4 mg) into one nostril alternating nostrils as needed for opioid reversal every 2-3 minutes until assistance arrives (Patient not taking: Reported on 9/22/2023) 0.2 mL 0           Review of Systems   CONSTITUTIONAL: NEGATIVE for fever, chills, change in weight  ENT/MOUTH: NEGATIVE for ear, mouth and throat problems  RESP:POSITIVE for dyspnea on exertion, SOB/dyspnea, and ILD  CV: NEGATIVE for chest pain, palpitations or peripheral edema  ROS otherwise negative      Objective    /82   Pulse 102   Temp 98.8  F (37.1  C) (Temporal)   Resp 16   Ht 1.562 m (5' 1.5\")   Wt 51.3 kg (113 lb)   LMP  (LMP Unknown)   SpO2 91%   BMI 21.01 kg/m    Body mass index is 21.01 kg/m .  Physical Exam   GENERAL: healthy, alert and no distress  NECK: no adenopathy, no asymmetry, masses, or scars and thyroid normal to palpation  RESP: no rhonchi, no wheezes, and rales throughout  CV: regular rate and rhythm, normal S1 S2, no S3 or S4, no murmur, click or rub, no peripheral edema and peripheral pulses strong  ABDOMEN: soft, nontender, no hepatosplenomegaly, no masses and bowel sounds normal  MS: no gross musculoskeletal defects noted, no edema    Orders Only on 09/18/2023   Component Date Value Ref Range Status    6 " min walk (FT) 09/18/2023 365  1,604 ft Final    6 Min Walk (M) 09/18/2023 111  489 m Final    FIO2-Pre 09/18/2023 60.00  % Final

## 2023-09-22 NOTE — COMMUNITY RESOURCES LIST (ENGLISH)
09/22/2023   [facility]  N/A  For additional resource needs, please contact your health insurance member services or your primary care team.  Phone: [phone]   Email: N/A   Address: [address] [place] [address]   Hours: N/A        Food and Nutrition       Food pantry  1  [facility] Distance: 13.21 miles      Pickup   [address] [place] [address]  Language: English  Hours: Thu 12:00 PM - 4:00 PM  Fees: Free   Phone: (344) 791-3861 Email: [email or web address] Website: [email or web address]     2  [facility] Distance: 16.98 miles      Pickup   [address] [place] [address]  Language: English  Hours: Mon 1:00 PM - 4:00 PM , Mon 6:00 PM - 8:00 PM , Thu 10:00 AM - 3:00 PM  Fees: Free   Phone: (393) 304-2475 Email: carlos@[employer] Website: http://www.Hello Universe.org/          Important Numbers & Websites       [facility]   [address] [place], [place] [address]way.org  Poison Control   (413) 145-9160 [place]  Suicide and Crisis Lifeline   [address] [place], [place] [address]line.org  Childhelp [place] Child Abuse Hotline   348.511.2106 Childhelphotline.org  [place] Sexual Assault Hotline   (194) 741-5891 (HOPE) Rainn.org  National Runaway Safeline   (633) 653-2266 (RUNAWAY) [facility].org  Pregnancy & Postpartum Support [place]   Call/text 415-551-3539 Ppsupportmn.org  Substance Abuse National Helpline ([facility]   894-697-HELP (4815) Findtreatment.gov  Emergency Services   911

## 2023-09-22 NOTE — COMMUNITY RESOURCES LIST (ENGLISH)
09/22/2023   [facility]  N/A  For questions about this resource list or additional care needs, please contact your primary care clinic or care manager.  Phone: [phone]   Email: N/A   Address: [address] [place] [address]   Hours: N/A        Food and Nutrition       Food pantry  1  [facility] Distance: 13.21 miles      Pickup   [address] [place] [address]  Language: English  Hours: Thu 12:00 PM - 4:00 PM  Fees: Free   Phone: (264) 827-4156 Email: [email or web address] Website: [email or web address]     2  [facility] Distance: 16.98 miles      Pickup   [address] [address]  Language: English  Hours: Mon 1:00 PM - 4:00 PM , Mon 6:00 PM - 8:00 PM , Thu 10:00 AM - 3:00 PM  Fees: Free   Phone: (541) 401-8152 Email: [email or web address]@[email or web address] Website: http://www.M[employer]isanteChugwater[employer] YES[employer] Happener.org/          Important Numbers & Websites       Emergency Services   911  [facility] Services   311  Poison Control   (440) 758-7070  Suicide Prevention Lifeline   (473) 256-1375 (TALK)  Child Abuse Hotline   (652) 783-5419 (4-A-Child)  Sexual Assault Hotline   (669) 125-2598 (HOPE)  National Runaway Safeline   (449) 565-6967 (RUNAWAY)  All-Options Talkline   (210) 317-3166  Substance Abuse Referral   (511) 596-4574 (HELP)

## 2023-09-24 ENCOUNTER — MYC MEDICAL ADVICE (OUTPATIENT)
Dept: FAMILY MEDICINE | Facility: CLINIC | Age: 48
End: 2023-09-24
Payer: COMMERCIAL

## 2023-09-24 DIAGNOSIS — R63.6 UNDERWEIGHT: ICD-10-CM

## 2023-09-24 DIAGNOSIS — J84.9 ILD (INTERSTITIAL LUNG DISEASE) (H): ICD-10-CM

## 2023-09-25 RX ORDER — PEDIATRIC NUTRIT, IRON/DHA/ARA 4G/150KCAL
8 POWDER (GRAM) ORAL 3 TIMES DAILY
Qty: 720 ML | Refills: 0 | Status: SHIPPED | OUTPATIENT
Start: 2023-09-25 | End: 2024-04-22

## 2023-10-02 NOTE — TELEPHONE ENCOUNTER
Percocet 10-325mg      Last Written Prescription Date:  3/7/18  Last Fill Quantity: 60,   # refills: 0  Last Office Visit: 10/11/17  Future Office visit:       Routing refill request to provider for review/approval because:  Drug not on the [facility], [facility] or [facility] refill protocol or controlled substance    
Signed original RX delivered to [facility]. Mariah,         
00:00

## 2023-10-09 ENCOUNTER — OFFICE VISIT (OUTPATIENT)
Dept: FAMILY MEDICINE | Facility: CLINIC | Age: 48
End: 2023-10-09
Payer: COMMERCIAL

## 2023-10-09 VITALS
DIASTOLIC BLOOD PRESSURE: 66 MMHG | BODY MASS INDEX: 21.66 KG/M2 | HEART RATE: 50 BPM | RESPIRATION RATE: 12 BRPM | WEIGHT: 116.5 LBS | TEMPERATURE: 97.6 F | SYSTOLIC BLOOD PRESSURE: 108 MMHG | OXYGEN SATURATION: 99 %

## 2023-10-09 DIAGNOSIS — J96.22 ACUTE ON CHRONIC RESPIRATORY FAILURE WITH HYPOXIA AND HYPERCAPNIA (H): ICD-10-CM

## 2023-10-09 DIAGNOSIS — R63.6 UNDERWEIGHT: ICD-10-CM

## 2023-10-09 DIAGNOSIS — J96.21 ACUTE ON CHRONIC RESPIRATORY FAILURE WITH HYPOXIA AND HYPERCAPNIA (H): ICD-10-CM

## 2023-10-09 DIAGNOSIS — J84.9 ILD (INTERSTITIAL LUNG DISEASE) (H): Primary | ICD-10-CM

## 2023-10-09 PROCEDURE — 99213 OFFICE O/P EST LOW 20 MIN: CPT | Performed by: FAMILY MEDICINE

## 2023-10-09 ASSESSMENT — ANXIETY QUESTIONNAIRES
2. NOT BEING ABLE TO STOP OR CONTROL WORRYING: MORE THAN HALF THE DAYS
5. BEING SO RESTLESS THAT IT IS HARD TO SIT STILL: SEVERAL DAYS
4. TROUBLE RELAXING: SEVERAL DAYS
6. BECOMING EASILY ANNOYED OR IRRITABLE: SEVERAL DAYS
GAD7 TOTAL SCORE: 11
3. WORRYING TOO MUCH ABOUT DIFFERENT THINGS: MORE THAN HALF THE DAYS
GAD7 TOTAL SCORE: 11
7. FEELING AFRAID AS IF SOMETHING AWFUL MIGHT HAPPEN: MORE THAN HALF THE DAYS
1. FEELING NERVOUS, ANXIOUS, OR ON EDGE: MORE THAN HALF THE DAYS
IF YOU CHECKED OFF ANY PROBLEMS ON THIS QUESTIONNAIRE, HOW DIFFICULT HAVE THESE PROBLEMS MADE IT FOR YOU TO DO YOUR WORK, TAKE CARE OF THINGS AT HOME, OR GET ALONG WITH OTHER PEOPLE: SOMEWHAT DIFFICULT

## 2023-10-09 NOTE — PROGRESS NOTES
Assessment & Plan     ILD (interstitial lung disease) with concerns for acute exacerbation  Chronic, stable.  Anali is a 47-year-old female with interstitial lung disease due to immunotherapy for rheumatoid arthritis.  She is currently followed by pulmonology and cardiology.  She was seen 3 weeks ago after discharge from the hospital for acute on chronic respiratory failure.  She has completed her Levaquin as recommended and is now taking azithromycin prophylactically.  Her prednisone taper is down to 10 mg daily where she is to stay.  She continues on 4 to 6 L of oxygen continuously.  In general feels she is feeling a little bit better.  Her weight is up due to improved appetite.  She has no persistent cough.    Her lungs show decreased breath sounds inspiratory and expiratory rales throughout.  Cardiac exam is regular.    She has follow-up with pulmonology later next month and cardiology.  She is going to be attending an event at the [facility] for interstitial lung disease and transplantation.  She is no longer smoking.  Plan to continue current cares and close monitoring to prevent recurrent hospitalization.    Underweight  Chronic underweight due to interstitial lung disease and increased work of breathing.  Her weight is up slightly today.  Her body mass index is 21.6.  Continue with nutritional supplementation.    Acute on chronic respiratory failure with hypoxia and hypercapnia (H)  Anali is a 47-year-old female with interstitial lung disease due to immunotherapy for rheumatoid arthritis.  She is currently followed by pulmonology and cardiology.  She was seen 3 weeks ago after discharge from the hospital for acute on chronic respiratory failure.  She has completed her Levaquin as recommended and is now taking azithromycin prophylactically.  Her prednisone taper is down to 10 mg daily where she is to stay.  She continues on 4 to 6 L of oxygen continuously.  In general feels she is feeling a little  bit better.  Her weight is up due to improved appetite.  She has no persistent cough.    Her lungs show decreased breath sounds inspiratory and expiratory rales throughout.  Cardiac exam is regular.    She has follow-up with pulmonology later next month and cardiology.  She is going to be attending an event at the [facility] for interstitial lung disease and transplantation.  She is no longer smoking.  Plan to continue current cares and close monitoring to prevent recurrent hospitalization.  Again offered vaccinations for influenza, COVID, RSV, pneumonia and TDA P.  Patient declines these at this time.             Regular exercise   Follow-up Visit   Expected date:  Nov 09, 2023 (Approximate)      Follow Up Appointment Details:     Follow-up with whom?: Me    Follow-Up for what?: Chronic Disease f/u    Chronic Disease f/u: General (Other)    Additional Details: Chronic ILD    How?: In Person                       Cristian Fagan MD  [facility]    Thierno Briones is a 47 year old, presenting for the following health issues:  No chief complaint on file.        10/9/2023     4:32 PM   Additional Questions   Roomed by Ivis lincoln       History of Present Illness       Back Pain:  She presents for follow up of back pain. Patient's back pain is a chronic problem.  Location of back pain:  Right lower back, left lower back, left side of neck and right hip  Description of back pain: dull ache  Back pain spreads: left buttocks    Since patient first noticed back pain, pain is: always present, but gets better and worse  Does back pain interfere with her job:  Not applicable       Mental Health Follow-up:  Patient presents to follow-up on Depression & Anxiety.Patient's depression since last visit has been:  No change  The patient is not having other symptoms associated with depression.  Patient's anxiety since last visit has been:  Worse  The patient is not having other symptoms associated with  anxiety.  Any significant life events: housing concerns, grief or loss and health concerns  Patient is feeling anxious or having panic attacks.  Patient has no concerns about alcohol or drug use.    Reason for visit:  Follow up    She eats 0-1 servings of fruits and vegetables daily.She consumes 1 sweetened beverage(s) daily.She exercises with enough effort to increase her heart rate 10 to 19 minutes per day.  She exercises with enough effort to increase her heart rate 3 or less days per week.   She is taking medications regularly.         Patient Active Problem List   Diagnosis    Personal history of tobacco use, presenting hazards to health    Abnormal blood chemistry    Abnormal maternal glucose tolerance, antepartum    Inflammatory arthritis    Hyperlipidemia LDL goal <130    SOB (shortness of breath)    Fibrosis of lung (H)    Anxiety    Tobacco abuse    S/P bunionectomy    ILD (interstitial lung disease) with concerns for acute exacerbation    Lung nodule    Pneumothorax of left lung after biopsy    Pneumothorax after biopsy    Hypoxia    Pulmonary hypertension (H)    ASCUS of cervix with negative high risk HPV    Primary pulmonary hypertension (H)    Chronic, continuous use of opioids    Iron deficiency    Anemia    Spondylosis of lumbosacral region without myelopathy or radiculopathy    Polymyositis, organ involvement unspecified (H)    Hallucinations    Prolonged grief disorder    Abnormal CT of the chest    Elevated brain natriuretic peptide (BNP) level    Acute on chronic respiratory failure with hypoxia and hypercapnia (H)    Pneumonia of both lungs due to infectious organism, unspecified part of lung    Recurrent major depressive disorder (H24)    MOLLY (generalized anxiety disorder)    PTSD (post-traumatic stress disorder)    Gastroesophageal reflux disease without esophagitis    Chronic pain    Acute pulmonary edema (H)    Chronic right heart failure (H)    Seronegative arthritis    Current tobacco use     Opioid use disorder in remission    Acute hypoxemic respiratory failure (H)    Moderate benzodiazepine use disorder (H)       Current Outpatient Medications   Medication Sig Dispense Refill    acetaminophen (TYLENOL) 325 MG tablet       azithromycin (ZITHROMAX) 250 MG tablet Take 1 tablet (250 mg) by mouth three times a week - start AFTER you are finished with your Levaquin antibiotic 30 tablet 0    buprenorphine-naloxone (SUBOXONE) 2-0.5 MG SUBL sublingual tablet Place 1 tablet under the tongue 3 times daily      CHOLECALCIFEROL 25 MCG (1000 UT) tablet Take 1,000 Units by mouth daily      clonazePAM (KLONOPIN) 0.5 MG tablet Take 0.25 mg by mouth 3 times daily as needed for anxiety      esomeprazole (NEXIUM) 20 MG DR capsule Take 20 mg by mouth every morning (before breakfast) Take 30-60 minutes before eating.      furosemide (LASIX) 20 MG tablet Take 1 tablet (20 mg) by mouth as needed (swelling) 90 tablet 3    ipratropium - albuterol 0.5 mg/2.5 mg/3 mL (DUONEB) 0.5-2.5 (3) MG/3ML neb solution Take 1 vial (3 mLs) by nebulization 4 times daily 360 mL 0    Nutritional Supplements (ENSURE ENLIVE) LIQD Take 8 fluid ounces by mouth 3 times daily 720 mL 0    order for DME Oxygen: Patient requires supplemental Oxygen 4 LPM via nasal canula at rest and 6 LPM with activity. Please provide patient with portability capability. Okay to spot check patient on oxygen device, to keep sats above 90%. Please provider with home concentrator that can go up to 10 LPM. Oxygen will be for a lifetime. 1 Device 0    order for DME Please provide patient with 2  liquid oxygen tanks for portability. Spot check patient on liquid oxygen. Titrate oxygen to maintain saturations above 88%. 2 Device 0    polyethylene glycol (MIRALAX) 17 GM/Dose powder Take 1 Capful by mouth daily as needed for constipation      predniSONE (DELTASONE) 10 MG tablet Take 3 tablets (30 mg) by mouth daily X 7 days, then 25 mg daily x7 days, then 20 mg daily x7 days,  then 15 mg daily x7 days, then 10 mg daily 119 tablet 0    senna (SENOKOT) 8.6 MG tablet Take 1 tablet by mouth 2 times daily as needed for constipation      sertraline (ZOLOFT) 100 MG tablet Take 100 mg by mouth daily      tadalafil, PAH, 20 MG TABS Take 2 tablets (40 mg) by mouth daily 60 tablet 11    VENTOLIN  (90 Base) MCG/ACT inhaler INHALE ONE TO TWO PUFFS INTO THE LUNGS EVERY 4 HOURS AS NEEDED FOR SHORTNESS OF BREATH / DYSPNEA (Patient taking differently: Inhale 1-2 puffs into the lungs every 4 hours as needed for shortness of breath or wheezing) 18 g 9    naloxone (NARCAN) 4 MG/0.1ML nasal spray Spray 1 spray (4 mg) into one nostril alternating nostrils as needed for opioid reversal every 2-3 minutes until assistance arrives (Patient not taking: Reported on 9/22/2023) 0.2 mL 0       Health Maintenance Due   Topic Date Due    HF ACTION PLAN  Never done    COPD ACTION PLAN  Never done    DEPRESSION ACTION PLAN  Never done    MAMMO SCREENING  Never done    HEPATITIS B IMMUNIZATION (1 of 3 - 3-dose series) Never done    COVID-19 Vaccine (1) Never done    HEPATITIS A IMMUNIZATION (1 of 2 - Risk 2-dose series) Never done    Pneumococcal Vaccine: Pediatrics (0 to 5 Years) and At-Risk Patients (6 to 64 Years) (2 - PPSV23 or PCV20) 06/19/2019    DTAP/TDAP/TD IMMUNIZATION (3 - Td or Tdap) 04/14/2020    INFLUENZA VACCINE (1) 09/01/2023    CONTROLLED SUBSTANCE AGREEMENT FOR CHRONIC PAIN MANAGEMENT  10/20/2023           Review of Systems   CONSTITUTIONAL: NEGATIVE for fever, chills, change in weight  ENT/MOUTH: NEGATIVE for ear, mouth and throat problems  RESP:POSITIVE for cough-non productive, dyspnea on exertion, SOB/dyspnea, wheezing, and history of ILD and NEGATIVE for hemoptysis, pleurisy, and sputum  CV: NEGATIVE for chest pain, palpitations or peripheral edema  ROS otherwise negative      Objective    /66   Pulse 50   Temp 97.6  F (36.4  C)   Resp 12   Wt 52.8 kg (116 lb 8 oz)   LMP  (LMP  Unknown)   SpO2 99%   BMI 21.66 kg/m    Body mass index is 21.66 kg/m .  Physical Exam   GENERAL: healthy, alert, no distress, and frail  NECK: no adenopathy, no asymmetry, masses, or scars and thyroid normal to palpation  RESP: no rhonchi, rales bilateral, and decreased breath sounds bibasilar  CV: regular rate and rhythm, normal S1 S2, no S3 or S4, no murmur, click or rub, no peripheral edema and peripheral pulses strong  ABDOMEN: soft, nontender, no hepatosplenomegaly, no masses and bowel sounds normal  MS: no gross musculoskeletal defects noted, no edema    Orders Only on 09/18/2023   Component Date Value Ref Range Status    6 min walk (FT) 09/18/2023 365  1,604 ft Final    6 Min Walk (M) 09/18/2023 111  489 m Final    FIO2-Pre 09/18/2023 60.00  % Final

## 2023-10-22 DIAGNOSIS — J84.9 ILD (INTERSTITIAL LUNG DISEASE) (H): ICD-10-CM

## 2023-10-23 RX ORDER — PREDNISONE 10 MG/1
10 TABLET ORAL DAILY
Qty: 90 TABLET | Refills: 0 | Status: SHIPPED | OUTPATIENT
Start: 2023-10-23 | End: 2024-01-24

## 2023-11-01 DIAGNOSIS — R06.02 SOB (SHORTNESS OF BREATH): ICD-10-CM

## 2023-11-01 RX ORDER — ALBUTEROL SULFATE 90 UG/1
1-2 AEROSOL, METERED RESPIRATORY (INHALATION) EVERY 4 HOURS PRN
Qty: 18 G | Refills: 3 | Status: SHIPPED | OUTPATIENT
Start: 2023-11-01 | End: 2024-05-20

## 2023-11-08 ENCOUNTER — HOSPITAL ENCOUNTER (OUTPATIENT)
Facility: CLINIC | Age: 48
Discharge: HOME | End: 2023-11-08
Attending: INTERNAL MEDICINE | Admitting: INTERNAL MEDICINE
Payer: COMMERCIAL

## 2023-11-08 ENCOUNTER — ALLIED HEALTH/NURSE VISIT (OUTPATIENT)
Dept: RESEARCH | Facility: CLINIC | Age: 48
End: 2023-11-08

## 2023-11-08 ENCOUNTER — APPOINTMENT (OUTPATIENT)
Dept: MEDSURG UNIT | Facility: CLINIC | Age: 48
End: 2023-11-08
Attending: INTERNAL MEDICINE
Payer: COMMERCIAL

## 2023-11-08 ENCOUNTER — TELEPHONE (OUTPATIENT)
Dept: CARDIOLOGY | Facility: CLINIC | Age: 48
End: 2023-11-08

## 2023-11-08 ENCOUNTER — APPOINTMENT (OUTPATIENT)
Dept: LAB | Facility: CLINIC | Age: 48
End: 2023-11-08
Attending: INTERNAL MEDICINE
Payer: COMMERCIAL

## 2023-11-08 VITALS
DIASTOLIC BLOOD PRESSURE: 83 MMHG | RESPIRATION RATE: 18 BRPM | SYSTOLIC BLOOD PRESSURE: 129 MMHG | WEIGHT: 117.3 LBS | HEART RATE: 56 BPM | TEMPERATURE: 97.9 F | HEIGHT: 62 IN | OXYGEN SATURATION: 92 % | BODY MASS INDEX: 21.59 KG/M2

## 2023-11-08 DIAGNOSIS — I27.20 PULMONARY HYPERTENSION (H): ICD-10-CM

## 2023-11-08 DIAGNOSIS — Z00.6 RESEARCH STUDY PATIENT: Primary | ICD-10-CM

## 2023-11-08 LAB
ALBUMIN SERPL BCG-MCNC: 4.2 G/DL (ref 3.5–5.2)
ALP SERPL-CCNC: 50 U/L (ref 35–104)
ALT SERPL W P-5'-P-CCNC: 27 U/L (ref 0–50)
ANION GAP SERPL CALCULATED.3IONS-SCNC: 9 MMOL/L (ref 7–15)
AST SERPL W P-5'-P-CCNC: 29 U/L (ref 0–45)
BASOPHILS # BLD AUTO: 0.1 10E3/UL (ref 0–0.2)
BASOPHILS NFR BLD AUTO: 1 %
BILIRUB SERPL-MCNC: 0.2 MG/DL
BUN SERPL-MCNC: 13 MG/DL (ref 6–20)
CALCIUM SERPL-MCNC: 9.4 MG/DL (ref 8.6–10)
CHLORIDE SERPL-SCNC: 100 MMOL/L (ref 98–107)
CREAT SERPL-MCNC: 0.76 MG/DL (ref 0.51–0.95)
DEPRECATED HCO3 PLAS-SCNC: 30 MMOL/L (ref 22–29)
EGFRCR SERPLBLD CKD-EPI 2021: >90 ML/MIN/1.73M2
EOSINOPHIL # BLD AUTO: 0.1 10E3/UL (ref 0–0.7)
EOSINOPHIL NFR BLD AUTO: 2 %
ERYTHROCYTE [DISTWIDTH] IN BLOOD BY AUTOMATED COUNT: 14.2 % (ref 10–15)
GLUCOSE SERPL-MCNC: 94 MG/DL (ref 70–99)
HCG INTACT+B SERPL-ACNC: 1 MIU/ML
HCT VFR BLD AUTO: 41.3 % (ref 35–47)
HGB BLD-MCNC: 12.8 G/DL (ref 11.7–15.7)
IMM GRANULOCYTES # BLD: 0.1 10E3/UL
IMM GRANULOCYTES NFR BLD: 1 %
INR PPP: 0.99 (ref 0.85–1.15)
LYMPHOCYTES # BLD AUTO: 1.6 10E3/UL (ref 0.8–5.3)
LYMPHOCYTES NFR BLD AUTO: 18 %
MCH RBC QN AUTO: 28.8 PG (ref 26.5–33)
MCHC RBC AUTO-ENTMCNC: 31 G/DL (ref 31.5–36.5)
MCV RBC AUTO: 93 FL (ref 78–100)
MONOCYTES # BLD AUTO: 0.5 10E3/UL (ref 0–1.3)
MONOCYTES NFR BLD AUTO: 5 %
NEUTROPHILS # BLD AUTO: 6.4 10E3/UL (ref 1.6–8.3)
NEUTROPHILS NFR BLD AUTO: 73 %
NRBC # BLD AUTO: 0 10E3/UL
NRBC BLD AUTO-RTO: 0 /100
NT-PROBNP SERPL-MCNC: 209 PG/ML (ref 0–450)
PLATELET # BLD AUTO: 263 10E3/UL (ref 150–450)
POTASSIUM SERPL-SCNC: 4.4 MMOL/L (ref 3.4–5.3)
PROT SERPL-MCNC: 7.3 G/DL (ref 6.4–8.3)
RBC # BLD AUTO: 4.44 10E6/UL (ref 3.8–5.2)
SODIUM SERPL-SCNC: 139 MMOL/L (ref 135–145)
WBC # BLD AUTO: 8.7 10E3/UL (ref 4–11)

## 2023-11-08 PROCEDURE — 85025 COMPLETE CBC W/AUTO DIFF WBC: CPT | Performed by: INTERNAL MEDICINE

## 2023-11-08 PROCEDURE — 93451 RIGHT HEART CATH: CPT | Mod: 26 | Performed by: INTERNAL MEDICINE

## 2023-11-08 PROCEDURE — C1751 CATH, INF, PER/CENT/MIDLINE: HCPCS | Performed by: INTERNAL MEDICINE

## 2023-11-08 PROCEDURE — 84702 CHORIONIC GONADOTROPIN TEST: CPT | Performed by: INTERNAL MEDICINE

## 2023-11-08 PROCEDURE — 83880 ASSAY OF NATRIURETIC PEPTIDE: CPT | Performed by: INTERNAL MEDICINE

## 2023-11-08 PROCEDURE — 85610 PROTHROMBIN TIME: CPT | Performed by: INTERNAL MEDICINE

## 2023-11-08 PROCEDURE — 80053 COMPREHEN METABOLIC PANEL: CPT | Performed by: INTERNAL MEDICINE

## 2023-11-08 PROCEDURE — 93451 RIGHT HEART CATH: CPT | Performed by: INTERNAL MEDICINE

## 2023-11-08 PROCEDURE — 36415 COLL VENOUS BLD VENIPUNCTURE: CPT | Performed by: INTERNAL MEDICINE

## 2023-11-08 PROCEDURE — C1894 INTRO/SHEATH, NON-LASER: HCPCS | Performed by: INTERNAL MEDICINE

## 2023-11-08 PROCEDURE — 999N000142 HC STATISTIC PROCEDURE PREP ONLY

## 2023-11-08 PROCEDURE — 250N000009 HC RX 250: Performed by: INTERNAL MEDICINE

## 2023-11-08 PROCEDURE — 999N000132 HC STATISTIC PP CARE STAGE 1

## 2023-11-08 PROCEDURE — 272N000001 HC OR GENERAL SUPPLY STERILE: Performed by: INTERNAL MEDICINE

## 2023-11-08 RX ORDER — LIDOCAINE 40 MG/G
CREAM TOPICAL
Status: COMPLETED | OUTPATIENT
Start: 2023-11-08 | End: 2023-11-08

## 2023-11-08 RX ADMIN — LIDOCAINE: 40 CREAM TOPICAL at 10:52

## 2023-11-08 ASSESSMENT — ACTIVITIES OF DAILY LIVING (ADL)
ADLS_ACUITY_SCORE: 37
ADLS_ACUITY_SCORE: 37

## 2023-11-08 NOTE — TELEPHONE ENCOUNTER
11/8/2023  @ 2:47 PM -  Opsumit 10 mg (30/30) - new start     PA Initiation  Medication: Opsumit 10 mg (30/30) - new start   Insurance Company: [employer] - Phone [phone] Fax [phone]  Pharmacy Filling the Rx: [facility] - [place], [place] - [address] [place] [address]  Filling Pharmacy Phone:    Filling Pharmacy Fax:    Start Date: 11/8/2023  via [facility], key [id number], w/ RHC dated : 11/8/23; Dx Code: I27.0 OOP to ILD.  Addtl questions: Are there any contraindications with preferred medications OR drug interactions and/or adverse experiences with other medications used for the same indication as the requested product? (Please Note: Type N/A if the patient does not have contraindications,drug interactions, or an adverse experience.) Adcirca 20 mg (12/2016 - stopped d/t heartburn);  Tyvaso (3/8/17 -stopped d/t VQ mismatch)    Pt with Reproductive potential : LMP in chart dating back to  February 2023, pt must be without period for 12 consecutive mos to be considered postmenopausal.      ----------------------------  Insurance: [id number] / [employer]  BIN: [id number]  PCN: MA  RX GRP#: [id number]  ID#: [id number]       Leda HUMPHREY CMA- Prior Auths  Cardiology/Pulmonary Hypertension

## 2023-11-08 NOTE — PROGRESS NOTES
She is a 47-year-old female with autoimmune interstitial lung disease and mild pulmonary hypertension.  She is currently on tadalafil 40 mg daily.  We saw her in clinic few weeks ago.  At the time she was having significant clinical worsening.  Her oxygen requirements were increased.  Her NT proBNP was significant elevated.  Repeat echocardiogram showed right ventricular dilatation and dysfunction.  In light of this we recommended to have a right heart catheterization which she had completed today.    Her right heart catheterization showed significant worsening of her PA pressure, PVR and cardiac output.  Her biventricular filling pressures are normal.    She did not tolerate inhaled treprostinil in the past.  As she has autoimmune ILD, we will start her on macitentan with close monitoring.  Endothelin receptor antagonist can worsen VQ mismatch in patients with interstitial lung disease.  However autoimmune ILD patients do respond to these therapies.  I had a clear discussion with surgery.  There is a 50% chance that she may get better or there is a 50% chance that it does not help her or make her feel worse.  If she were to have worsening hypoxemia or increased shortness of breath this is not permanent and should resolve with discontinuation of endothelin dose of antagonist.  She understands the risk and benefits and would like to try this.    We will also continue her on.  She takes diuretics only as needed.  She will continue to use 5 to 6 L of supplemental oxygen with exertion and at rest.    Ultimately, lung transplantation will be the best long-term option for her for her.  She was struggling with smoking up until recently.  She states that she has stopped smoking recently.  Hopefully she will continue to do so and will be eligible for lung transplantation in the near future.    She will return to see us in a month after starting macitentan.  She will call us in the interim if any further worsening  symptoms.    It was a pleasure meeting Ms. Ellen Loya in our pulmonary hypertension hemodynamic lab at [facility].  We thank you for involving us in her care.  Please do not as recall is in the interim you have any further questions.    Total time today was 25 minutes reviewing notes, imaging, labs, patient visit, orders and documentation.    Sincerely,  Callie Ledesma MD   Center for Pulmonary Hypertension  Heart Failure, Transplant, and Mechanical Circulatory Support Cardiology   Cardiovascular Division  [facility]   [phone]

## 2023-11-08 NOTE — TELEPHONE ENCOUNTER
11/8/2023  @ 2:41 PM -  Opsumit 10 mg (30/30) - new start - enrollment, & [place] appeal approval faxed to [employer] via Right[employer]ax @ 438 pm.             Andria PH: [phone] FX: [phone] 442 pm               Leda HUMPHREY CMA- Prior Auths  Cardiology/Pulmonary Hypertension

## 2023-11-08 NOTE — DISCHARGE INSTRUCTIONS
[facility]                        Interventional Cardiology  Discharge Instructions   Post Right Heart Cath      AFTER YOU GO HOME:  DO drink plenty of fluids  DO resume your regular diet and medications unless otherwise instructed by your Primary Physician  Do Not scrub the procedure site vigorously  No lotion or powder to the puncture site for 3 days    CALL YOUR PRIMARY PHYSICIAN IF: You may resume all normal activity.  Monitor neck site for bleeding, swelling, or voice changes. If you notice bleeding or swelling immediately apply pressure to the site and call number below to speak with Cardiology Fellow.  If you experience any changes in your breathing you should call your doctor immediately or come to the closest Emergency Department.  Do not drive yourself.    ADDITIONAL INSTRUCTIONS: Medications: You are to resume all home medications including anticoagulation therapy unless otherwise advised by your primary cardiologist or nurse coordinator.    Follow Up: Per your primary cardiology team    If you have any questions or concerns regarding your procedure site please call [phone] at anytime and ask for Cardiology Fellow on call.  They are available 24 hours a day.  You may also contact the Cardiology Clinic after hours number at [phone].                                                       Telephone Numbers [phone] Monday-Friday 8:00 am to 4:30 pm    [phone] After 4:30 pm Monday-Friday, Weekends & Holidays  Ask for Interventional Cardiologist on call. Someone is on call 24 hours/day   [facility] toll free number [phone] Monday-Friday 8:00 am to 4:30 pm   [facility] Emergency Dept [phone]

## 2023-11-08 NOTE — PROGRESS NOTES
Pt arrives to  for RHC. Consent is signed. Labs in process. Discharge instructions reviewed with pt pre procedure, pt verbalizes understanding.

## 2023-11-08 NOTE — LETTER
"  Cardiology/Pulmonary Hypertension  [address]  [place] [address]  Phone [phone]  Fax [phone]    2023      ** URGENT - PLEASE EXPEDITE!!**        [employer] /Member Appeals&Grievances     Re:  Anali Loya  [address]        :  1975  [place]  [address]      ID #: [id number]  FAX:   [phone]       Auth #: [id number]    To Whom It May Concern,     I am writing in reference to a mutual patient, Anali Loya, 1975.  Please note:  This Prior Auth/ clinical questions were submitted on 11/10/23, and was denied on 23 stating \"[employer] had not received a response request for information\".      Our patient is in need of a prior authorization to initiate therapy for her Pulmonary Hypertension on the medication Opsumit (macitentan).  Ms. Loya has been followed at the [facility] for her Pulmonary Hypertension since 9/10/2018.    Ms. Loya was diagnosed with Pulmonary Arterial Hypertension by the [facility] (ICD-10 code I27.0) which was most recently re-confirmed via Right Heart Catheterization.  On 23, Ms. Loya underwent a right heart catheterization at the [facility] which demonstrated severe pulmonary arterial hypertension, (RA 8, PA 60/20, mean 31, PCW: 15, PVR 4.87, CI 2.18).  The patient underwent a vasodilator challenge on 16 and did not respond with a greater than 20% reduction in the mean pulmonary artery pressure.     Unfortunately, Ms. Loya has experienced increased fatigue, shortness of breath, dyspnea and increased limitations with activity due to her Pulmonary Hypertension.  She is currently on tadalafil 40 mg q day and has failed Tyvaso DPI due to a worsening V/Q mismatch.  Therefore, I have prescribed and am requesting you approve Ms. Loya to receive Opsumit (macitentan), as part of her Pulmonary Hypertension care plan.  If it is " necessary to deny this drug, it will lead to consistent heart failure and deterioration in regards to her Pulmonary Hypertension as well as unstable hemodynamics, which will be detrimental to her health.      Please note that Ms. Loya is a World Health Organization Group 1 and NYHA Class 3.  If you have further questions or concerns please contact my Clinical  Leda Epstein CMA at [phone].    Graciously,     Dileep Falk MD   of Medicine  Pulmonary Hypertension   [facility]  Cardiovascular Division

## 2023-11-08 NOTE — DISCHARGE SUMMARY
Pt discharged to home. VSS on 4-6L NC (baseline). RIJ site C/D/I, negative for a hematoma. Up ambulating and voiding w/o difficulty. Tolerating PO intake. Discharge instructions reviewed and all questions answered with NAREN Mars. Pt escorted to the front entrance via wheelchair where a medical company picked pt up.

## 2023-11-08 NOTE — PROGRESS NOTES
Pt returns to 2a s/p RHC. Right neck site CDI, soft, flat, non tender. Discharge instructions were reviewed with pt pre procedure, pt verbalizes understanding. Pt tolerating apple juice. Dr Ledesma currently at bedside speaking with pt.

## 2023-11-08 NOTE — PROGRESS NOTES
[facility]  Cardiac Function Monitoring  Subjects scheduled for right heart catheterization (RHC) will undergo measurement with the Cardiac Performance System (CPS) non-invasive device for a brief period before the RHC procedure.      IRB: [id number]  PI: Damien Bailey MD, PhD - Phone: [phone]  Research Nurse Coordinator: Elle Russo RN - Phone: [phone] Pager: [phone]  : Izzy Vasques, CRC - Phone: [phone]; Gracie May, CRC - Phone: [phone]; Ellen Cunningham, CRC - Phone: [phone]; Clarissa Shin, [facility] [phone]     Patient was approached for possible participation for the above study. Inclusion and exclusion criteria reviewed. Patient meets all of the inclusion criteria and does not meet any of the exclusion criteria. The current approved IRB consent form was discussed and explained to the patient.  It was discussed that involvement with the study is voluntary and refusal to participate would not involve penalty or decrease benefits at which the patient is entitled, and the subject may discontinue their involvement at any time without penalty or loss in benefits. The consent form was reviewed including purpose, research hypothesis, nature of the research, risk & benefits. Also reviewed were confidentiality issues, compensation for injury, and alternative procedures available. The patient was given time to review and ask any questions about the consent. Patient was shown contact information for PI and study staff in consent for future questions. Patient verbalized understanding of consent and study by restating the purpose, procedures, duration, risk, confidentiality of PHI, and voluntarily participation. Questions and concerns were addressed. Patient printed, signed and dated the consent/HIPAA form prior to study involvement. A copy was given to the patient for their records.         Subject Consent/HIPAA: SIGNED ON 8 Nov 2023     Nadja Shin

## 2023-11-08 NOTE — TELEPHONE ENCOUNTER
----- Message from Lilliana Lewis RN sent at 11/8/2023  1:14 PM CST -----  Regarding: Callie Brito MD would like patient to start on :   Opsumit (macitentan) - [: [employer]].    Dx Code:  I27.2 oop to ILD  WHO Group :   3   FC:  III    Reproductive Status   (for Opsumit & Adempas): Female of NON-Reproductive Potential :  Postmenopausal female (12 mos of spontaneous amenorrhea)      Consent / REMS forms signed?  Yes,  Forms scanned to LRR on Date:  11/8     Appointments needed :  1MO follow-up with RK after starting    11/8/2023  @ 3:05 PM -  Pt with Reproductive potential : LMP in chart dating back to  February 2023, pt must be without period for 12 consecutive mos to be considered postmenopausal.    Leda HUMPHREY CMA- Prior Auths  Cardiology/Pulmonary Hypertension

## 2023-11-10 ENCOUNTER — TELEPHONE (OUTPATIENT)
Dept: PULMONOLOGY | Facility: CLINIC | Age: 48
End: 2023-11-10
Payer: COMMERCIAL

## 2023-11-10 NOTE — TELEPHONE ENCOUNTER
Team,      She is scheduled with me in December. Can we try to scheduled her with me for Nov 28th clinic now that it is opened? Please ensure we have a PFT and a Six minute walk test for her. ILD clinic.      Juliana         Previous Messages       ----- Message -----  From: Mango Marinelli MD  Sent: 11/8/2023   5:33 PM CST  To: Callie Ledesma MD    Hi Callie,    I have been keeping a close eye on her through chart reviews.  In between she ot hospitalized and I had coordinated some care with her doctors there. I worry about her too. I wanted to offer txp at my first visit itself but her smoking, psych issues and her less than ideal social situation is what has prevented us from helping her with txp so far. I hope I can move things forward when I see her soon. I would love to help her, and she was willing to do whatever it takes too. I will keep you posted. Thank you for the heads up.    Juliana      ----- Message -----  From: Callie Ledesma MD  Sent: 11/8/2023   2:31 PM CST  To: Mango Marinelli MD    [facility],    This is a mutual patient of us.  I know her for a long time.  She has autoimmune ILD and pulmonary hypertension.  Her pulmonary hypertension is now significantly worse in the last 4 years.  She is on tadalafil monotherapy.  I Ivette try off label use of macitentan.  However there is a only 50% chance that she would respond to this.    I am overall worried about her.  She states that she has stopped smoking.  Hopefully she will be a candidate for lung transplant in the near future.  I think this is the best long-term option for her if she is a candidate    Just want to bring her on your radar.    Thank you    TT

## 2023-11-13 ENCOUNTER — OFFICE VISIT (OUTPATIENT)
Dept: FAMILY MEDICINE | Facility: CLINIC | Age: 48
End: 2023-11-13
Payer: COMMERCIAL

## 2023-11-13 VITALS
DIASTOLIC BLOOD PRESSURE: 80 MMHG | SYSTOLIC BLOOD PRESSURE: 110 MMHG | BODY MASS INDEX: 22.49 KG/M2 | OXYGEN SATURATION: 96 % | TEMPERATURE: 99.3 F | WEIGHT: 121 LBS | HEART RATE: 72 BPM | RESPIRATION RATE: 10 BRPM

## 2023-11-13 DIAGNOSIS — I27.20 PULMONARY HYPERTENSION (H): ICD-10-CM

## 2023-11-13 DIAGNOSIS — F33.3 SEVERE EPISODE OF RECURRENT MAJOR DEPRESSIVE DISORDER, WITH PSYCHOTIC FEATURES (H): Primary | ICD-10-CM

## 2023-11-13 DIAGNOSIS — J84.9 ILD (INTERSTITIAL LUNG DISEASE) (H): ICD-10-CM

## 2023-11-13 DIAGNOSIS — R06.09 DOE (DYSPNEA ON EXERTION): Primary | ICD-10-CM

## 2023-11-13 PROCEDURE — 99213 OFFICE O/P EST LOW 20 MIN: CPT | Performed by: FAMILY MEDICINE

## 2023-11-13 ASSESSMENT — ANXIETY QUESTIONNAIRES
4. TROUBLE RELAXING: SEVERAL DAYS
GAD7 TOTAL SCORE: 7
1. FEELING NERVOUS, ANXIOUS, OR ON EDGE: MORE THAN HALF THE DAYS
5. BEING SO RESTLESS THAT IT IS HARD TO SIT STILL: NOT AT ALL
IF YOU CHECKED OFF ANY PROBLEMS ON THIS QUESTIONNAIRE, HOW DIFFICULT HAVE THESE PROBLEMS MADE IT FOR YOU TO DO YOUR WORK, TAKE CARE OF THINGS AT HOME, OR GET ALONG WITH OTHER PEOPLE: SOMEWHAT DIFFICULT
GAD7 TOTAL SCORE: 7
7. FEELING AFRAID AS IF SOMETHING AWFUL MIGHT HAPPEN: MORE THAN HALF THE DAYS
3. WORRYING TOO MUCH ABOUT DIFFERENT THINGS: SEVERAL DAYS
6. BECOMING EASILY ANNOYED OR IRRITABLE: NOT AT ALL
2. NOT BEING ABLE TO STOP OR CONTROL WORRYING: SEVERAL DAYS

## 2023-11-13 ASSESSMENT — PATIENT HEALTH QUESTIONNAIRE - PHQ9
10. IF YOU CHECKED OFF ANY PROBLEMS, HOW DIFFICULT HAVE THESE PROBLEMS MADE IT FOR YOU TO DO YOUR WORK, TAKE CARE OF THINGS AT HOME, OR GET ALONG WITH OTHER PEOPLE: SOMEWHAT DIFFICULT
SUM OF ALL RESPONSES TO PHQ QUESTIONS 1-9: 9
SUM OF ALL RESPONSES TO PHQ QUESTIONS 1-9: 9

## 2023-11-13 NOTE — TELEPHONE ENCOUNTER
11/13/2023  @ 9:49 AM -  Opsumit 10 mg (30/30) - DENIED   PRIOR AUTHORIZATION DENIED    Medication: OPSUMIT 10 MG PO TABS  Insurance Company: [employer] - Phone [phone] Fax [phone]  Denial Date:  11/11/23   Denial Rational:     Appeal Information:     Patient Notified: *            Leda HUMPHREY CMA- Prior Auths  Cardiology/Pulmonary Hypertension

## 2023-11-13 NOTE — TELEPHONE ENCOUNTER
11/13/2023  @ 10:06 AM -  Opsumit 10 mg (30/30) - APPEAL - PA timed out in CMM waiting on RHC signature -  FAXED TO [facility] APPEALS @ [phone] WITH RHC.       RHC dated: 11/8/23  RA - 8  PA (> 20 MM/hG) - 60, 20,   31  PCW (<15) - 15  PVR (> 3, [place] Units) - 4.87  C.I. (< 2.5) - 2.18         Leda HUMPHREY CMA- Prior Auths  Cardiology/Pulmonary Hypertension

## 2023-11-13 NOTE — PROGRESS NOTES
Assessment & Plan     Severe episode of recurrent major depressive disorder, with psychotic features (H)  Chronic, stable. Followed by psychiatry. The current medical regimen is effective;  continue present plan and medications.     ILD (interstitial lung disease) (H)  Chronic, stable. No smoking for nearly 2 months. She is followed by pulmonology and cardiology. Currently considering for lung transplant. Discussed vaccinations again and declines.   Bibasilar inspiratory rales. The current medical regimen is effective;  continue present plan and medications.   - PRIMARY CARE FOLLOW-UP SCHEDULING; Future              Follow-up Visit   Expected date:  Jan 13, 2024 (Approximate)      Follow Up Appointment Details:     Follow-up with whom?: Me    Follow-Up for what?: Chronic Disease f/u    Chronic Disease f/u:  General (Other)  COPD       How?: In Person                       Cristian Fagan MD  [facility]    Thierno Briones is a 47 year old, presenting for the following health issues:  Pulmonary Hypertension      11/13/2023     2:52 PM   Additional Questions   Roomed by Ivis lincoln       History of Present Illness       Back Pain:  She presents for follow up of back pain. Patient's back pain is a chronic problem.  Location of back pain:  Right lower back, left lower back and left side of neck  Description of back pain: dull ache  Back pain spreads: nowhere    Since patient first noticed back pain, pain is: gradually worsening  Does back pain interfere with her job:  Not applicable       Mental Health Follow-up:  Patient presents to follow-up on Depression & Anxiety.Patient's depression since last visit has been:  No change  The patient is not having other symptoms associated with depression.  Patient's anxiety since last visit has been:  No change  The patient is not having other symptoms associated with anxiety.  Any significant life events: financial concerns, housing concerns, grief or loss  and health concerns  Patient is feeling anxious or having panic attacks.  Patient has no concerns about alcohol or drug use.    Vascular Disease:  She presents for follow up of vascular disease.     She never takes nitroglycerin. She is not taking daily aspirin.    Reason for visit:  Follow up    She eats 0-1 servings of fruits and vegetables daily.She consumes 1 sweetened beverage(s) daily.She exercises with enough effort to increase her heart rate 9 or less minutes per day.  She exercises with enough effort to increase her heart rate 3 or less days per week.   She is taking medications regularly.         Patient Active Problem List   Diagnosis    Personal history of tobacco use, presenting hazards to health    Abnormal blood chemistry    Abnormal maternal glucose tolerance, antepartum    Inflammatory arthritis    Hyperlipidemia LDL goal <130    SOB (shortness of breath)    Fibrosis of lung (H)    Anxiety    Tobacco abuse    S/P bunionectomy    ILD (interstitial lung disease) with concerns for acute exacerbation    Lung nodule    Pneumothorax of left lung after biopsy    Pneumothorax after biopsy    Hypoxia    Pulmonary hypertension (H)    ASCUS of cervix with negative high risk HPV    Primary pulmonary hypertension (H)    Chronic, continuous use of opioids    Iron deficiency    Anemia    Spondylosis of lumbosacral region without myelopathy or radiculopathy    Polymyositis, organ involvement unspecified (H)    Hallucinations    Prolonged grief disorder    Abnormal CT of the chest    Elevated brain natriuretic peptide (BNP) level    Acute on chronic respiratory failure with hypoxia and hypercapnia (H)    Pneumonia of both lungs due to infectious organism, unspecified part of lung    Recurrent major depressive disorder (H24)    MOLLY (generalized anxiety disorder)    PTSD (post-traumatic stress disorder)    Gastroesophageal reflux disease without esophagitis    Chronic pain    Acute pulmonary edema (H)    Chronic right  heart failure (H)    Seronegative arthritis    Current tobacco use    Opioid use disorder in remission    Acute hypoxemic respiratory failure (H)    Moderate benzodiazepine use disorder (H)    Severe episode of recurrent major depressive disorder, with psychotic features (H)       Current Outpatient Medications   Medication Sig Dispense Refill    acetaminophen (TYLENOL) 325 MG tablet       albuterol (VENTOLIN HFA) 108 (90 Base) MCG/ACT inhaler Inhale 1-2 puffs into the lungs every 4 hours as needed for shortness of breath or wheezing 18 g 3    azithromycin (ZITHROMAX) 250 MG tablet Take 1 tablet (250 mg) by mouth three times a week - start AFTER you are finished with your Levaquin antibiotic 30 tablet 0    buprenorphine-naloxone (SUBOXONE) 2-0.5 MG SUBL sublingual tablet Place 1 tablet under the tongue 3 times daily      CHOLECALCIFEROL 25 MCG (1000 UT) tablet Take 1,000 Units by mouth daily      clonazePAM (KLONOPIN) 0.5 MG tablet Take 0.25 mg by mouth 3 times daily as needed for anxiety      esomeprazole (NEXIUM) 20 MG DR capsule Take 20 mg by mouth every morning (before breakfast) Take 30-60 minutes before eating.      furosemide (LASIX) 20 MG tablet Take 1 tablet (20 mg) by mouth as needed (swelling) 90 tablet 3    ipratropium - albuterol 0.5 mg/2.5 mg/3 mL (DUONEB) 0.5-2.5 (3) MG/3ML neb solution Take 1 vial (3 mLs) by nebulization 4 times daily 360 mL 0    naloxone (NARCAN) 4 MG/0.1ML nasal spray Spray 1 spray (4 mg) into one nostril alternating nostrils as needed for opioid reversal every 2-3 minutes until assistance arrives 0.2 mL 0    Nutritional Supplements (ENSURE ENLIVE) LIQD Take 8 fluid ounces by mouth 3 times daily 720 mL 0    order for DME Oxygen: Patient requires supplemental Oxygen 4 LPM via nasal canula at rest and 6 LPM with activity. Please provide patient with portability capability. Okay to spot check patient on oxygen device, to keep sats above 90%. Please provider with home concentrator that  can go up to 10 LPM. Oxygen will be for a lifetime. 1 Device 0    order for DME Please provide patient with 2  liquid oxygen tanks for portability. Spot check patient on liquid oxygen. Titrate oxygen to maintain saturations above 88%. 2 Device 0    polyethylene glycol (MIRALAX) 17 GM/Dose powder Take 1 Capful by mouth daily as needed for constipation      predniSONE (DELTASONE) 10 MG tablet Take 1 tablet (10 mg) by mouth daily 90 tablet 0    senna (SENOKOT) 8.6 MG tablet Take 1 tablet by mouth 2 times daily as needed for constipation      sertraline (ZOLOFT) 100 MG tablet Take 100 mg by mouth daily      tadalafil, PAH, 20 MG TABS Take 2 tablets (40 mg) by mouth daily 60 tablet 11             Review of Systems   CONSTITUTIONAL: NEGATIVE for fever, chills, change in weight  ENT/MOUTH: NEGATIVE for ear, mouth and throat problems  RESP:POSITIVE for cough-non productive, dyspnea on exertion, and SOB/dyspnea and NEGATIVE for sputum   CV: NEGATIVE for chest pain, palpitations or peripheral edema  ROS otherwise negative      Objective    /80   Pulse 72   Temp 99.3  F (37.4  C)   Resp 10   Wt 54.9 kg (121 lb)   LMP  (LMP Unknown)   SpO2 96%   BMI 22.49 kg/m    Body mass index is 22.49 kg/m .  Physical Exam   GENERAL: healthy, alert and no distress  NECK: no adenopathy, no asymmetry, masses, or scars and thyroid normal to palpation  RESP: rales bibasilar  CV: regular rate and rhythm, normal S1 S2, no S3 or S4, no murmur, click or rub, no peripheral edema and peripheral pulses strong  ABDOMEN: soft, nontender, no hepatosplenomegaly, no masses and bowel sounds normal  MS: no gross musculoskeletal defects noted, no edema

## 2023-11-15 NOTE — TELEPHONE ENCOUNTER
11/15/2023  @ 3:49 PM -  Opsumit 10 mg (30/30) - new start -APPEAL APPROVED -     MEDICATION APPEAL APPROVED    Medication: OPSUMIT 10 MG PO TABS  Authorization Effective Date: 10/13/2023  Authorization Expiration Date: 11/13/2024  Approved Dose/Quantity: 30/30  Reference #:     Appeal Insurance Company: [employer]  Expected CoPay: $       CoPay Card Available:    Financial Assistance Needed: *  Filling Pharmacy: [address]  Patient Notified: Y  Comments:                Leda HUMPHREY CMA- Prior Auths  Cardiology/Pulmonary Hypertension

## 2023-11-18 DIAGNOSIS — J84.9 ILD (INTERSTITIAL LUNG DISEASE) (H): ICD-10-CM

## 2023-11-20 RX ORDER — AZITHROMYCIN 250 MG/1
TABLET, FILM COATED ORAL
Qty: 30 TABLET | Refills: 0 | Status: SHIPPED | OUTPATIENT
Start: 2023-11-20 | End: 2024-01-11

## 2023-11-22 DIAGNOSIS — I27.20 PULMONARY HYPERTENSION (H): Primary | ICD-10-CM

## 2023-11-22 DIAGNOSIS — R06.02 SOB (SHORTNESS OF BREATH): ICD-10-CM

## 2023-11-27 NOTE — TELEPHONE ENCOUNTER
"11/27/2023  @ 4:22 PM -  Status update per MAP portal:  \"[id number] - Pharmacy left a message for her today, pending call back.\"       Leda HUMPHREY CMA- Prior Auths  Cardiology/Pulmonary Hypertension     "

## 2023-12-12 ENCOUNTER — OFFICE VISIT (OUTPATIENT)
Dept: PULMONOLOGY | Facility: CLINIC | Age: 48
End: 2023-12-12
Attending: INTERNAL MEDICINE
Payer: COMMERCIAL

## 2023-12-12 VITALS
DIASTOLIC BLOOD PRESSURE: 77 MMHG | BODY MASS INDEX: 24.73 KG/M2 | OXYGEN SATURATION: 91 % | HEART RATE: 59 BPM | SYSTOLIC BLOOD PRESSURE: 120 MMHG | HEIGHT: 61 IN | WEIGHT: 131 LBS

## 2023-12-12 DIAGNOSIS — J84.10 FIBROSIS OF LUNG (H): ICD-10-CM

## 2023-12-12 DIAGNOSIS — J84.9 ILD (INTERSTITIAL LUNG DISEASE) (H): ICD-10-CM

## 2023-12-12 DIAGNOSIS — J84.10 PULMONARY FIBROSIS (H): ICD-10-CM

## 2023-12-12 DIAGNOSIS — J84.9 ILD (INTERSTITIAL LUNG DISEASE) (H): Primary | ICD-10-CM

## 2023-12-12 LAB
6 MIN WALK (FT): 860 FT
6 MIN WALK (M): 262 M
EXPTIME-PRE: 7.79 SEC
FEF2575-%PRED-PRE: 42 %
FEF2575-PRE: 1.07 L/SEC
FEF2575-PRED: 2.5 L/SEC
FEFMAX-%PRED-PRE: 75 %
FEFMAX-PRE: 4.79 L/SEC
FEFMAX-PRED: 6.35 L/SEC
FEV1-%PRED-PRE: 61 %
FEV1-PRE: 1.46 L
FEV1FEV6-PRE: 75 %
FEV1FEV6-PRED: 83 %
FEV1FVC-PRE: 75 %
FEV1FVC-PRED: 82 %
FIFMAX-PRE: 4.17 L/SEC
FVC-%PRED-PRE: 67 %
FVC-PRE: 1.94 L
FVC-PRED: 2.89 L

## 2023-12-12 PROCEDURE — 99215 OFFICE O/P EST HI 40 MIN: CPT | Mod: 25 | Performed by: INTERNAL MEDICINE

## 2023-12-12 PROCEDURE — 94618 PULMONARY STRESS TESTING: CPT | Performed by: INTERNAL MEDICINE

## 2023-12-12 PROCEDURE — G0463 HOSPITAL OUTPT CLINIC VISIT: HCPCS | Performed by: INTERNAL MEDICINE

## 2023-12-12 PROCEDURE — 94375 RESPIRATORY FLOW VOLUME LOOP: CPT | Performed by: INTERNAL MEDICINE

## 2023-12-12 ASSESSMENT — PAIN SCALES - GENERAL: PAINLEVEL: NO PAIN (0)
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
## 2023-12-12 NOTE — NURSING NOTE
Chief Complaint   Patient presents with    Interstitial Lung Disease (ILD)     ILD Follow up      Vitals were taken and medications were reconciled.     Alisson HILLA  11:17 AM

## 2023-12-13 ENCOUNTER — TELEPHONE (OUTPATIENT)
Dept: TRANSPLANT | Facility: CLINIC | Age: 48
End: 2023-12-13
Payer: COMMERCIAL

## 2023-12-14 NOTE — TELEPHONE ENCOUNTER
PA Initiation    Medication: OFEV 150 MG PO CAPS  Insurance Company: [facility] - Phone [phone] Fax [phone]  Pharmacy Filling the Rx: [facility] - [place], [place] - [facility] [address]  Filling Pharmacy Phone: [phone]  Filling Pharmacy Fax:    Start Date: 12/13/2023    Key: [id number]

## 2023-12-14 NOTE — TELEPHONE ENCOUNTER
Prior Authorization Approval    Medication: OFEV 150 MG PO CAPS  Authorization Effective Date: 11/13/2023  Authorization Expiration Date: 12/12/2024  Approved Dose/Quantity: #60 per 30 days  Reference #: Key: [id number]   Insurance Company: [employer] - Phone [phone] Fax [phone]  Expected CoPay: $ 0  CoPay Card Available:    NA  Financial Assistance Needed: No  [facility] [place], [place] - [address]  Pharmacy Notified: Yes  [facility] Not[facility]

## 2023-12-14 NOTE — TELEPHONE ENCOUNTER
Called pt and left vm with a call back number wondering how she is tolerating her increase in tadalafil.    Called the pt again regarding her medication adjustment.  She claimed no problems at this time.  She was wondering about a pulmonary referral that had been discussed at her appointment this past week.  My chart message sent.    Tristan Tovar RN on 3/24/2023 at 1:27 PM     unknown

## 2023-12-20 ENCOUNTER — LAB (OUTPATIENT)
Dept: LAB | Facility: CLINIC | Age: 48
End: 2023-12-20
Payer: COMMERCIAL

## 2023-12-20 DIAGNOSIS — I27.20 PULMONARY HYPERTENSION (H): ICD-10-CM

## 2023-12-20 DIAGNOSIS — J84.10 PULMONARY FIBROSIS (H): ICD-10-CM

## 2023-12-20 DIAGNOSIS — R06.09 DOE (DYSPNEA ON EXERTION): ICD-10-CM

## 2023-12-20 LAB
ALBUMIN SERPL BCG-MCNC: 4.4 G/DL (ref 3.5–5.2)
ALP SERPL-CCNC: 59 U/L (ref 40–150)
ALT SERPL W P-5'-P-CCNC: 36 U/L (ref 0–50)
AST SERPL W P-5'-P-CCNC: 36 U/L (ref 0–45)
BILIRUB DIRECT SERPL-MCNC: <0.2 MG/DL (ref 0–0.3)
BILIRUB SERPL-MCNC: 0.3 MG/DL
HCG SERPL QL: NEGATIVE
PROT SERPL-MCNC: 7.4 G/DL (ref 6.4–8.3)

## 2023-12-20 PROCEDURE — 36415 COLL VENOUS BLD VENIPUNCTURE: CPT

## 2023-12-20 PROCEDURE — 80076 HEPATIC FUNCTION PANEL: CPT

## 2023-12-20 PROCEDURE — 84703 CHORIONIC GONADOTROPIN ASSAY: CPT

## 2023-12-27 ENCOUNTER — TRANSCRIBE ORDERS (OUTPATIENT)
Dept: TRANSPLANT | Facility: CLINIC | Age: 48
End: 2023-12-27
Payer: COMMERCIAL

## 2024-01-03 ENCOUNTER — HOSPITAL ENCOUNTER (OUTPATIENT)
Dept: CARDIAC REHAB | Facility: CLINIC | Age: 49
Discharge: HOME | End: 2024-01-03
Attending: INTERNAL MEDICINE
Payer: COMMERCIAL

## 2024-01-03 DIAGNOSIS — J84.10 PULMONARY FIBROSIS (H): ICD-10-CM

## 2024-01-03 PROCEDURE — G0238 OTH RESP PROC, INDIV: HCPCS

## 2024-01-07 NOTE — DISCHARGE INSTRUCTIONS
[facility]                        Interventional Cardiology  Discharge Instructions   Post Right Heart Cath      AFTER YOU GO HOME:    DO drink plenty of fluids    DO resume your regular diet and medications unless otherwise instructed by your Primary Physician    Do Not scrub the procedure site vigorously    No lotion or powder to the puncture site for 3 days    CALL YOUR PRIMARY PHYSICIAN IF: You may resume all normal activity.  Monitor neck site for bleeding, swelling, or voice changes. If you notice bleeding or swelling immediately apply pressure to the site and call number below to speak with Cardiology Fellow.  If you experience any changes in your breathing you should call your doctor immediately or come to the closest Emergency Department.  Do not drive yourself.    ADDITIONAL INSTRUCTIONS: Medications: You are to resume all home medications including anticoagulation therapy unless otherwise advised by your primary cardiologist or nurse coordinator.    Follow Up: Per your primary cardiology team    If you have any questions or concerns regarding your procedure site please call [phone] at anytime and ask for Cardiology Fellow on call.  They are available 24 hours a day.  You may also contact the Cardiology Clinic after hours number at [phone].                                                       Telephone Numbers [phone] Monday-Friday 8:00 am to 4:30 pm    [phone] After 4:30 pm Monday-Friday, Weekends & Holidays  Ask for Interventional Cardiologist on call. Someone is on call 24 hours/day   [facility] toll free number [phone] Monday-Friday 8:00 am to 4:30 pm   [facility] Emergency Dept [phone]                 
Unscheduled

## 2024-01-09 ENCOUNTER — HOSPITAL ENCOUNTER (OUTPATIENT)
Dept: CARDIAC REHAB | Facility: CLINIC | Age: 49
Discharge: HOME | End: 2024-01-09
Attending: INTERNAL MEDICINE
Payer: COMMERCIAL

## 2024-01-09 PROCEDURE — G0239 OTH RESP PROC, GROUP: HCPCS

## 2024-01-10 DIAGNOSIS — J84.9 ILD (INTERSTITIAL LUNG DISEASE) (H): ICD-10-CM

## 2024-01-11 RX ORDER — AZITHROMYCIN 250 MG/1
TABLET, FILM COATED ORAL
Qty: 30 TABLET | Refills: 0 | Status: ON HOLD | OUTPATIENT
Start: 2024-01-11 | End: 2024-03-05

## 2024-01-22 ENCOUNTER — TELEPHONE (OUTPATIENT)
Dept: CARDIOLOGY | Facility: CLINIC | Age: 49
End: 2024-01-22
Payer: COMMERCIAL

## 2024-01-22 NOTE — TELEPHONE ENCOUNTER
1/22/2024  @ 9:17 AM -  tadalafil 20 mg PAH (60/30) - renew - expires:  1/17/24    PA Initiation  Medication: tadalafil 20 mg PAH (60/30) - renew - expires:  1/17/24  Insurance Company: [employer] - Phone [phone] Fax [phone]  Pharmacy Filling the Rx: [address]  Filling Pharmacy Phone:    Filling Pharmacy Fax:    Start Date: 1/22/2024  via JOURDAN, key [id number], w/ [facility] dated : 11/8/23; Dx Code: I27.2 (OOP to ILD) ; see downloaded PA-LRR  ----------------------------  Insurance: DUSTIN/EVELINA  BIN: [id number]  PCN: [id number]  RX GRP#: [facility]  ID#: [id number]         Leda HUMPHREY CMA- Prior Auths  Cardiology/Pulmonary Hypertension

## 2024-01-22 NOTE — TELEPHONE ENCOUNTER
1/22/2024  @ 2:21 PM -  tadalafil 20 mg PAH (60/30) - renew - Approved today  Your request has been approved  Authorization Expiration Date: 1/21/2025  - [No letter received with [facility] approval alert as of this note.]    Prior Authorization Approval    Medication: TADALAFIL (PAH) 20 MG PO TABS  Authorization Effective Date: 1/22/2024  Authorization Expiration Date: 1/21/2025  Approved Dose/Quantity: 60/30  Reference #: n/a [see approval letter]   Insurance Company: [employer] - Phone [phone] Fax [phone]  Expected CoPay: $    CoPay Card Available:      Financial Assistance Needed:  *Undetermined as of the date of this note   Which Pharmacy is filling the prescription: [facility] [place] [facility] [place]   Pharmacy Notified: y  Patient Notified: y    Updated Snapshot, added to PA Calendar; faxed to : [facility], [place] [facility] [address]  [address]   [place] [address]  Phone: [phone] Fax: [phone]        Leda HUMPHREY CMA- Prior Auths  Cardiology/Pulmonary Hypertension

## 2024-01-23 ENCOUNTER — HOSPITAL ENCOUNTER (OUTPATIENT)
Dept: CARDIAC REHAB | Facility: CLINIC | Age: 49
Discharge: HOME | End: 2024-01-23
Attending: INTERNAL MEDICINE
Payer: COMMERCIAL

## 2024-01-23 ENCOUNTER — LAB (OUTPATIENT)
Dept: LAB | Facility: CLINIC | Age: 49
End: 2024-01-23
Payer: COMMERCIAL

## 2024-01-23 DIAGNOSIS — J84.9 ILD (INTERSTITIAL LUNG DISEASE) (H): ICD-10-CM

## 2024-01-23 DIAGNOSIS — R06.09 DOE (DYSPNEA ON EXERTION): ICD-10-CM

## 2024-01-23 DIAGNOSIS — I27.20 PULMONARY HYPERTENSION (H): ICD-10-CM

## 2024-01-23 DIAGNOSIS — J84.10 PULMONARY FIBROSIS (H): ICD-10-CM

## 2024-01-23 LAB
ALBUMIN SERPL BCG-MCNC: 4.5 G/DL (ref 3.5–5.2)
ALP SERPL-CCNC: 54 U/L (ref 40–150)
ALT SERPL W P-5'-P-CCNC: 30 U/L (ref 0–50)
AST SERPL W P-5'-P-CCNC: 29 U/L (ref 0–45)
BILIRUB DIRECT SERPL-MCNC: <0.2 MG/DL (ref 0–0.3)
BILIRUB SERPL-MCNC: 0.3 MG/DL
HCG SERPL QL: NEGATIVE
PROT SERPL-MCNC: 7.8 G/DL (ref 6.4–8.3)

## 2024-01-23 PROCEDURE — 80076 HEPATIC FUNCTION PANEL: CPT

## 2024-01-23 PROCEDURE — 36415 COLL VENOUS BLD VENIPUNCTURE: CPT

## 2024-01-23 PROCEDURE — G0239 OTH RESP PROC, GROUP: HCPCS

## 2024-01-23 PROCEDURE — 84703 CHORIONIC GONADOTROPIN ASSAY: CPT

## 2024-01-24 RX ORDER — PREDNISONE 10 MG/1
10 TABLET ORAL DAILY
Qty: 90 TABLET | Refills: 0 | Status: ON HOLD | OUTPATIENT
Start: 2024-01-24 | End: 2024-03-06

## 2024-01-26 ENCOUNTER — TELEPHONE (OUTPATIENT)
Dept: CARDIOLOGY | Facility: CLINIC | Age: 49
End: 2024-01-26
Payer: COMMERCIAL

## 2024-01-26 DIAGNOSIS — I27.20 PULMONARY HYPERTENSION (H): ICD-10-CM

## 2024-01-26 RX ORDER — TADALAFIL 20 MG/1
40 TABLET ORAL DAILY
Qty: 60 TABLET | Refills: 11 | Status: SHIPPED | OUTPATIENT
Start: 2024-01-26

## 2024-01-26 NOTE — TELEPHONE ENCOUNTER
Prior Authorization Retail Medication Request    Medication/Dose: Tadalafil  Diagnosis and ICD code (if different than what is on RX):    New/renewal/insurance change PA/secondary ins. PA:  Previously Tried and Failed:    Rationale:      Insurance   Primary: DUSTIN BOYCE  Insurance ID:  [id number]    Secondary (if applicable):  Insurance ID:      Pharmacy Information (if different than what is on RX)  Name:  [facility]  Phone:  [phone]  Fax [phone]      Thank you,  Lesley Dias, Pharmacy Tech  [facility]

## 2024-01-30 ENCOUNTER — HOSPITAL ENCOUNTER (OUTPATIENT)
Dept: CARDIAC REHAB | Facility: CLINIC | Age: 49
Discharge: HOME | End: 2024-01-30
Attending: INTERNAL MEDICINE
Payer: COMMERCIAL

## 2024-01-30 PROCEDURE — G0239 OTH RESP PROC, GROUP: HCPCS

## 2024-02-07 NOTE — TELEPHONE ENCOUNTER
Prior Authorization Not Needed per Insurance    Medication: TADALAFIL (PAH) 20 MG PO TABS  Insurance Company: [employer] - Phone [phone] Fax [phone]  Expected CoPay: $    Pharmacy Filling the Rx: [facility] [address]   Pharmacy Notified: YES  Patient Notified: **Instructed pharmacy to notify patient when script is ready to /ship.**

## 2024-02-13 ENCOUNTER — OFFICE VISIT (OUTPATIENT)
Dept: PULMONOLOGY | Facility: CLINIC | Age: 49
End: 2024-02-13
Payer: COMMERCIAL

## 2024-02-13 ENCOUNTER — OFFICE VISIT (OUTPATIENT)
Dept: PULMONOLOGY | Facility: CLINIC | Age: 49
End: 2024-02-13
Attending: INTERNAL MEDICINE
Payer: COMMERCIAL

## 2024-02-13 VITALS
DIASTOLIC BLOOD PRESSURE: 74 MMHG | OXYGEN SATURATION: 100 % | SYSTOLIC BLOOD PRESSURE: 129 MMHG | BODY MASS INDEX: 24.92 KG/M2 | HEART RATE: 48 BPM | HEIGHT: 61 IN | WEIGHT: 132 LBS

## 2024-02-13 DIAGNOSIS — J84.9 ILD (INTERSTITIAL LUNG DISEASE) (H): ICD-10-CM

## 2024-02-13 DIAGNOSIS — J84.9 ILD (INTERSTITIAL LUNG DISEASE) (H): Primary | ICD-10-CM

## 2024-02-13 DIAGNOSIS — I27.20 PULMONARY HYPERTENSION (H): ICD-10-CM

## 2024-02-13 DIAGNOSIS — Z79.60 LONG-TERM USE OF IMMUNOSUPPRESSANT MEDICATION: ICD-10-CM

## 2024-02-13 DIAGNOSIS — J96.11 CHRONIC HYPOXEMIC RESPIRATORY FAILURE (H): ICD-10-CM

## 2024-02-13 LAB
6 MIN WALK (FT): 1000 FT
6 MIN WALK (M): 305 M

## 2024-02-13 PROCEDURE — 94729 DIFFUSING CAPACITY: CPT | Performed by: INTERNAL MEDICINE

## 2024-02-13 PROCEDURE — 94375 RESPIRATORY FLOW VOLUME LOOP: CPT | Performed by: INTERNAL MEDICINE

## 2024-02-13 PROCEDURE — 94618 PULMONARY STRESS TESTING: CPT | Performed by: INTERNAL MEDICINE

## 2024-02-13 PROCEDURE — 99215 OFFICE O/P EST HI 40 MIN: CPT | Performed by: INTERNAL MEDICINE

## 2024-02-13 PROCEDURE — G0463 HOSPITAL OUTPT CLINIC VISIT: HCPCS | Performed by: INTERNAL MEDICINE

## 2024-02-13 RX ORDER — PREDNISONE 2.5 MG/1
TABLET ORAL
Qty: 70 TABLET | Refills: 3 | Status: ON HOLD | OUTPATIENT
Start: 2024-02-13 | End: 2024-03-06

## 2024-02-13 NOTE — NURSING NOTE
Chief Complaint   Patient presents with    RECHECK     Return Interstitial Lung     Medications reviewed and vital signs taken.   Queta Heredia, CMA
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
## 2024-02-13 NOTE — PATIENT INSTRUCTIONS
Reduce prednisone from 10 mg for 7.5 mg starting tomorrow. Stay on 7.5 mg for 2 weeks, and then reduce to 5 mg after that. Stay on 5 mg prednisone till you come back to see me.  Remain in pulmonary rehab-stay active  Continue Ofev  Abstain from smoking or anything inhalational. No recreational substances.    Mango Marinelli MD [facility]  Associate Professor of Medicine  Division of Pulmonary, Allergy & Critical Care   Center for Lung Science & Health  [facility]

## 2024-02-14 LAB
DLCOUNC-%PRED-PRE: 25 %
DLCOUNC-PRE: 4.88 ML/MIN/MMHG
DLCOUNC-PRED: 19.22 ML/MIN/MMHG
ERV-%PRED-PRE: 33 %
ERV-PRE: 0.36 L
ERV-PRED: 1.06 L
EXPTIME-PRE: 7.4 SEC
FEF2575-%PRED-PRE: 41 %
FEF2575-PRE: 1.03 L/SEC
FEF2575-PRED: 2.49 L/SEC
FEFMAX-%PRED-PRE: 78 %
FEFMAX-PRE: 4.97 L/SEC
FEFMAX-PRED: 6.34 L/SEC
FEV1-%PRED-PRE: 57 %
FEV1-PRE: 1.37 L
FEV1FEV6-PRE: 76 %
FEV1FEV6-PRED: 83 %
FEV1FVC-PRE: 78 %
FEV1FVC-PRED: 82 %
FEV1SVC-PRE: 66 %
FEV1SVC-PRED: 77 %
FIFMAX-PRE: 3.24 L/SEC
FVC-%PRED-PRE: 61 %
FVC-PRE: 1.76 L
FVC-PRED: 2.89 L
IC-%PRED-PRE: 80 %
IC-PRE: 1.7 L
IC-PRED: 2.12 L
VA-%PRED-PRE: 76 %
VA-PRE: 3.33 L
VC-%PRED-PRE: 66 %
VC-PRE: 2.06 L
VC-PRED: 3.09 L

## 2024-02-15 ENCOUNTER — DOCUMENTATION ONLY (OUTPATIENT)
Dept: TRANSPLANT | Facility: CLINIC | Age: 49
End: 2024-02-15
Payer: COMMERCIAL

## 2024-02-15 ENCOUNTER — TELEPHONE (OUTPATIENT)
Dept: TRANSPLANT | Facility: CLINIC | Age: 49
End: 2024-02-15
Payer: COMMERCIAL

## 2024-02-15 NOTE — TELEPHONE ENCOUNTER
Late documentation:   Transplant coordinator received call from Dr Marinelli ILD clinic with new lung transplant referral on 2/13/2024.   Referral order placed on 2/13/2024 by Dr Marinelli.   Intake team notified and patient assigned to transplant coordinator: Nikia Kendrick  Once intake completed additional follow up by transplant coordinator to occur within next two weeks.

## 2024-02-20 ENCOUNTER — HOSPITAL ENCOUNTER (OUTPATIENT)
Dept: CARDIAC REHAB | Facility: CLINIC | Age: 49
Discharge: HOME | End: 2024-02-20
Attending: INTERNAL MEDICINE
Payer: COMMERCIAL

## 2024-02-20 PROCEDURE — G0238 OTH RESP PROC, INDIV: HCPCS

## 2024-02-29 DIAGNOSIS — J84.9 ILD (INTERSTITIAL LUNG DISEASE) (H): ICD-10-CM

## 2024-02-29 NOTE — TELEPHONE ENCOUNTER
Please call patient for more information.     Elle Shabazz RN BSN  [facility]

## 2024-03-01 NOTE — TELEPHONE ENCOUNTER
RN Triage    Patient Contact    Attempt # 1    Was call answered?  No.  Left message on voicemail with information to call me back.    Please triage regarding her need for zithromax when she calls back.    Lesley Hale RN on 3/1/2024 at 8:51 AM

## 2024-03-02 ENCOUNTER — APPOINTMENT (OUTPATIENT)
Dept: CT IMAGING | Facility: CLINIC | Age: 49
End: 2024-03-02
Attending: EMERGENCY MEDICINE
Payer: COMMERCIAL

## 2024-03-02 ENCOUNTER — HOSPITAL ENCOUNTER (INPATIENT)
Facility: CLINIC | Age: 49
LOS: 4 days | Discharge: HOME HEALTH | End: 2024-03-06
Attending: EMERGENCY MEDICINE | Admitting: INTERNAL MEDICINE
Payer: COMMERCIAL

## 2024-03-02 DIAGNOSIS — R40.0 SOMNOLENCE: ICD-10-CM

## 2024-03-02 DIAGNOSIS — I27.20 PULMONARY HYPERTENSION (H): ICD-10-CM

## 2024-03-02 DIAGNOSIS — R06.02 SOB (SHORTNESS OF BREATH): ICD-10-CM

## 2024-03-02 DIAGNOSIS — J10.1 INFLUENZA A: ICD-10-CM

## 2024-03-02 DIAGNOSIS — I49.8 BIGEMINY: ICD-10-CM

## 2024-03-02 DIAGNOSIS — J96.02 ACUTE RESPIRATORY FAILURE WITH HYPERCAPNIA (H): ICD-10-CM

## 2024-03-02 PROBLEM — G93.40 ENCEPHALOPATHY: Status: ACTIVE | Noted: 2024-03-02

## 2024-03-02 PROBLEM — J96.22 ACUTE ON CHRONIC RESPIRATORY FAILURE WITH HYPOXIA AND HYPERCAPNIA (H): Status: ACTIVE | Noted: 2023-08-15

## 2024-03-02 PROBLEM — J96.21 ACUTE ON CHRONIC RESPIRATORY FAILURE WITH HYPOXIA AND HYPERCAPNIA (H): Status: ACTIVE | Noted: 2023-08-15

## 2024-03-02 LAB
ALBUMIN SERPL BCG-MCNC: 4.3 G/DL (ref 3.5–5.2)
ALP SERPL-CCNC: 63 U/L (ref 40–150)
ALT SERPL W P-5'-P-CCNC: 36 U/L (ref 0–50)
AMMONIA PLAS-SCNC: 77 UMOL/L (ref 11–51)
AMMONIA PLAS-SCNC: 81 UMOL/L (ref 11–51)
ANION GAP SERPL CALCULATED.3IONS-SCNC: 10 MMOL/L (ref 7–15)
AST SERPL W P-5'-P-CCNC: 42 U/L (ref 0–45)
BASE EXCESS BLDV CALC-SCNC: 3.9 MMOL/L (ref -3–3)
BASE EXCESS BLDV CALC-SCNC: 4.1 MMOL/L (ref -3–3)
BASE EXCESS BLDV CALC-SCNC: 6.3 MMOL/L (ref -3–3)
BASOPHILS # BLD AUTO: 0 10E3/UL (ref 0–0.2)
BASOPHILS NFR BLD AUTO: 0 %
BILIRUB SERPL-MCNC: 0.3 MG/DL
BUN SERPL-MCNC: 11.5 MG/DL (ref 6–20)
CALCIUM SERPL-MCNC: 9.2 MG/DL (ref 8.6–10)
CHLORIDE SERPL-SCNC: 94 MMOL/L (ref 98–107)
CREAT SERPL-MCNC: 0.46 MG/DL (ref 0.51–0.95)
D DIMER PPP FEU-MCNC: 0.52 UG/ML FEU (ref 0–0.5)
DEPRECATED HCO3 PLAS-SCNC: 30 MMOL/L (ref 22–29)
EGFRCR SERPLBLD CKD-EPI 2021: >90 ML/MIN/1.73M2
EOSINOPHIL # BLD AUTO: 0.1 10E3/UL (ref 0–0.7)
EOSINOPHIL NFR BLD AUTO: 1 %
ERYTHROCYTE [DISTWIDTH] IN BLOOD BY AUTOMATED COUNT: 14.4 % (ref 10–15)
FLUAV RNA SPEC QL NAA+PROBE: POSITIVE
FLUBV RNA RESP QL NAA+PROBE: NEGATIVE
GLUCOSE BLDC GLUCOMTR-MCNC: 100 MG/DL (ref 70–99)
GLUCOSE SERPL-MCNC: 121 MG/DL (ref 70–99)
HCO3 BLDV-SCNC: 33 MMOL/L (ref 21–28)
HCO3 BLDV-SCNC: 35 MMOL/L (ref 21–28)
HCO3 BLDV-SCNC: 35 MMOL/L (ref 21–28)
HCT VFR BLD AUTO: 44.3 % (ref 35–47)
HGB BLD-MCNC: 14.3 G/DL (ref 11.7–15.7)
IMM GRANULOCYTES # BLD: 0 10E3/UL
IMM GRANULOCYTES NFR BLD: 0 %
LACTATE SERPL-SCNC: 0.9 MMOL/L (ref 0.7–2)
LIPASE SERPL-CCNC: 8 U/L (ref 13–60)
LYMPHOCYTES # BLD AUTO: 0.2 10E3/UL (ref 0–5.3)
LYMPHOCYTES NFR BLD AUTO: 3 %
MAGNESIUM SERPL-MCNC: 1.8 MG/DL (ref 1.7–2.3)
MCH RBC QN AUTO: 29.7 PG (ref 26.5–33)
MCHC RBC AUTO-ENTMCNC: 32.3 G/DL (ref 31.5–36.5)
MCV RBC AUTO: 92 FL (ref 78–100)
MONOCYTES # BLD AUTO: 0.5 10E3/UL (ref 0–1.3)
MONOCYTES NFR BLD AUTO: 6 %
NEUTROPHILS # BLD AUTO: 6.5 10E3/UL (ref 1.6–8.3)
NEUTROPHILS NFR BLD AUTO: 89 %
NRBC # BLD AUTO: 0 10E3/UL
NRBC BLD AUTO-RTO: 0 /100
O2/TOTAL GAS SETTING VFR VENT: 44 %
O2/TOTAL GAS SETTING VFR VENT: 50 %
O2/TOTAL GAS SETTING VFR VENT: 50 %
OXYHGB MFR BLDV: 77 % (ref 70–75)
OXYHGB MFR BLDV: 90 % (ref 70–75)
OXYHGB MFR BLDV: 96 % (ref 70–75)
PCO2 BLDV: 68 MM HG (ref 40–50)
PCO2 BLDV: 70 MM HG (ref 40–50)
PCO2 BLDV: 81 MM HG (ref 40–50)
PH BLDV: 7.24 [PH] (ref 7.32–7.43)
PH BLDV: 7.29 [PH] (ref 7.32–7.43)
PH BLDV: 7.31 [PH] (ref 7.32–7.43)
PHOSPHATE SERPL-MCNC: 2.2 MG/DL (ref 2.5–4.5)
PLATELET # BLD AUTO: 177 10E3/UL (ref 150–450)
PO2 BLDV: 101 MM HG (ref 25–47)
PO2 BLDV: 46 MM HG (ref 25–47)
PO2 BLDV: 65 MM HG (ref 25–47)
POTASSIUM SERPL-SCNC: 4.1 MMOL/L (ref 3.4–5.3)
PROCALCITONIN SERPL IA-MCNC: 0.32 NG/ML
PROT SERPL-MCNC: 7.7 G/DL (ref 6.4–8.3)
RBC # BLD AUTO: 4.81 10E6/UL (ref 3.8–5.2)
RSV RNA SPEC NAA+PROBE: NEGATIVE
SAO2 % BLDV: 79.4 % (ref 70–75)
SAO2 % BLDV: 92.4 % (ref 70–75)
SAO2 % BLDV: 98.1 % (ref 70–75)
SARS-COV-2 RNA RESP QL NAA+PROBE: NEGATIVE
SODIUM SERPL-SCNC: 134 MMOL/L (ref 135–145)
T4 FREE SERPL-MCNC: 0.8 NG/DL (ref 0.9–1.7)
TSH SERPL DL<=0.005 MIU/L-ACNC: 0.25 UIU/ML (ref 0.3–4.2)
WBC # BLD AUTO: 7.3 10E3/UL (ref 4–11)

## 2024-03-02 PROCEDURE — 258N000003 HC RX IP 258 OP 636: Performed by: EMERGENCY MEDICINE

## 2024-03-02 PROCEDURE — 5A09457 ASSISTANCE WITH RESPIRATORY VENTILATION, 24-96 CONSECUTIVE HOURS, CONTINUOUS POSITIVE AIRWAY PRESSURE: ICD-10-PCS | Performed by: INTERNAL MEDICINE

## 2024-03-02 PROCEDURE — 94660 CPAP INITIATION&MGMT: CPT

## 2024-03-02 PROCEDURE — 82140 ASSAY OF AMMONIA: CPT | Performed by: EMERGENCY MEDICINE

## 2024-03-02 PROCEDURE — 250N000011 HC RX IP 250 OP 636: Performed by: EMERGENCY MEDICINE

## 2024-03-02 PROCEDURE — 87637 SARSCOV2&INF A&B&RSV AMP PRB: CPT | Performed by: EMERGENCY MEDICINE

## 2024-03-02 PROCEDURE — 85379 FIBRIN DEGRADATION QUANT: CPT | Performed by: EMERGENCY MEDICINE

## 2024-03-02 PROCEDURE — 3E033XZ INTRODUCTION OF VASOPRESSOR INTO PERIPHERAL VEIN, PERCUTANEOUS APPROACH: ICD-10-PCS | Performed by: INTERNAL MEDICINE

## 2024-03-02 PROCEDURE — 80053 COMPREHEN METABOLIC PANEL: CPT | Performed by: EMERGENCY MEDICINE

## 2024-03-02 PROCEDURE — 83735 ASSAY OF MAGNESIUM: CPT | Performed by: EMERGENCY MEDICINE

## 2024-03-02 PROCEDURE — 96375 TX/PRO/DX INJ NEW DRUG ADDON: CPT

## 2024-03-02 PROCEDURE — 84100 ASSAY OF PHOSPHORUS: CPT | Performed by: EMERGENCY MEDICINE

## 2024-03-02 PROCEDURE — 258N000003 HC RX IP 258 OP 636: Performed by: INTERNAL MEDICINE

## 2024-03-02 PROCEDURE — 250N000011 HC RX IP 250 OP 636: Performed by: INTERNAL MEDICINE

## 2024-03-02 PROCEDURE — 83690 ASSAY OF LIPASE: CPT | Performed by: EMERGENCY MEDICINE

## 2024-03-02 PROCEDURE — 36415 COLL VENOUS BLD VENIPUNCTURE: CPT | Performed by: EMERGENCY MEDICINE

## 2024-03-02 PROCEDURE — 82805 BLOOD GASES W/O2 SATURATION: CPT | Performed by: EMERGENCY MEDICINE

## 2024-03-02 PROCEDURE — 99291 CRITICAL CARE FIRST HOUR: CPT | Mod: 25

## 2024-03-02 PROCEDURE — 999N000157 HC STATISTIC RCP TIME EA 10 MIN

## 2024-03-02 PROCEDURE — 96374 THER/PROPH/DIAG INJ IV PUSH: CPT | Mod: 59

## 2024-03-02 PROCEDURE — 83605 ASSAY OF LACTIC ACID: CPT | Performed by: EMERGENCY MEDICINE

## 2024-03-02 PROCEDURE — 250N000009 HC RX 250: Performed by: EMERGENCY MEDICINE

## 2024-03-02 PROCEDURE — 70450 CT HEAD/BRAIN W/O DYE: CPT

## 2024-03-02 PROCEDURE — 200N000001 HC R&B ICU

## 2024-03-02 PROCEDURE — 99285 EMERGENCY DEPT VISIT HI MDM: CPT | Mod: 25 | Performed by: EMERGENCY MEDICINE

## 2024-03-02 PROCEDURE — 250N000013 HC RX MED GY IP 250 OP 250 PS 637: Performed by: EMERGENCY MEDICINE

## 2024-03-02 PROCEDURE — 84145 PROCALCITONIN (PCT): CPT | Performed by: EMERGENCY MEDICINE

## 2024-03-02 PROCEDURE — 99292 CRITICAL CARE ADDL 30 MIN: CPT

## 2024-03-02 PROCEDURE — 84443 ASSAY THYROID STIM HORMONE: CPT | Performed by: EMERGENCY MEDICINE

## 2024-03-02 PROCEDURE — 84439 ASSAY OF FREE THYROXINE: CPT | Performed by: EMERGENCY MEDICINE

## 2024-03-02 PROCEDURE — 85025 COMPLETE CBC W/AUTO DIFF WBC: CPT | Performed by: EMERGENCY MEDICINE

## 2024-03-02 PROCEDURE — 96361 HYDRATE IV INFUSION ADD-ON: CPT

## 2024-03-02 PROCEDURE — 71275 CT ANGIOGRAPHY CHEST: CPT

## 2024-03-02 RX ORDER — IPRATROPIUM BROMIDE AND ALBUTEROL SULFATE 2.5; .5 MG/3ML; MG/3ML
3 SOLUTION RESPIRATORY (INHALATION) EVERY 4 HOURS PRN
Status: DISCONTINUED | OUTPATIENT
Start: 2024-03-03 | End: 2024-03-06 | Stop reason: HOSPADM

## 2024-03-02 RX ORDER — ONDANSETRON 2 MG/ML
4 INJECTION INTRAMUSCULAR; INTRAVENOUS ONCE
Status: DISCONTINUED | OUTPATIENT
Start: 2024-03-02 | End: 2024-03-02

## 2024-03-02 RX ORDER — PANTOPRAZOLE SODIUM 40 MG/1
40 TABLET, DELAYED RELEASE ORAL
Status: DISCONTINUED | OUTPATIENT
Start: 2024-03-03 | End: 2024-03-03

## 2024-03-02 RX ORDER — VITAMIN B COMPLEX
25 TABLET ORAL DAILY
Status: DISCONTINUED | OUTPATIENT
Start: 2024-03-03 | End: 2024-03-04

## 2024-03-02 RX ORDER — DIPHENHYDRAMINE HYDROCHLORIDE 50 MG/ML
25 INJECTION INTRAMUSCULAR; INTRAVENOUS ONCE
Status: COMPLETED | OUTPATIENT
Start: 2024-03-02 | End: 2024-03-02

## 2024-03-02 RX ORDER — METOCLOPRAMIDE HYDROCHLORIDE 5 MG/ML
10 INJECTION INTRAMUSCULAR; INTRAVENOUS ONCE
Status: COMPLETED | OUTPATIENT
Start: 2024-03-02 | End: 2024-03-02

## 2024-03-02 RX ORDER — SENNOSIDES 8.6 MG
1 TABLET ORAL 2 TIMES DAILY PRN
Status: DISCONTINUED | OUTPATIENT
Start: 2024-03-02 | End: 2024-03-04

## 2024-03-02 RX ORDER — SODIUM CHLORIDE 9 MG/ML
INJECTION, SOLUTION INTRAVENOUS CONTINUOUS
Status: DISCONTINUED | OUTPATIENT
Start: 2024-03-02 | End: 2024-03-02

## 2024-03-02 RX ORDER — KETOROLAC TROMETHAMINE 30 MG/ML
30 INJECTION, SOLUTION INTRAMUSCULAR; INTRAVENOUS ONCE
Status: COMPLETED | OUTPATIENT
Start: 2024-03-02 | End: 2024-03-02

## 2024-03-02 RX ORDER — DEXTROSE MONOHYDRATE 25 G/50ML
25-50 INJECTION, SOLUTION INTRAVENOUS
Status: DISCONTINUED | OUTPATIENT
Start: 2024-03-02 | End: 2024-03-06 | Stop reason: HOSPADM

## 2024-03-02 RX ORDER — IOPAMIDOL 755 MG/ML
500 INJECTION, SOLUTION INTRAVASCULAR ONCE
Status: COMPLETED | OUTPATIENT
Start: 2024-03-02 | End: 2024-03-02

## 2024-03-02 RX ORDER — CLONAZEPAM 0.5 MG/1
0.25 TABLET ORAL 3 TIMES DAILY PRN
Status: DISCONTINUED | OUTPATIENT
Start: 2024-03-02 | End: 2024-03-02

## 2024-03-02 RX ORDER — IPRATROPIUM BROMIDE AND ALBUTEROL SULFATE 2.5; .5 MG/3ML; MG/3ML
3 SOLUTION RESPIRATORY (INHALATION) 4 TIMES DAILY
Status: DISCONTINUED | OUTPATIENT
Start: 2024-03-02 | End: 2024-03-02

## 2024-03-02 RX ORDER — TADALAFIL 20 MG/1
40 TABLET ORAL DAILY
Status: DISCONTINUED | OUTPATIENT
Start: 2024-03-03 | End: 2024-03-04

## 2024-03-02 RX ORDER — BUPRENORPHINE AND NALOXONE 4; 1 MG/1; MG/1
2 FILM, SOLUBLE BUCCAL; SUBLINGUAL ONCE
Status: COMPLETED | OUTPATIENT
Start: 2024-03-02 | End: 2024-03-02

## 2024-03-02 RX ORDER — ENOXAPARIN SODIUM 100 MG/ML
40 INJECTION SUBCUTANEOUS EVERY 24 HOURS
Status: DISCONTINUED | OUTPATIENT
Start: 2024-03-02 | End: 2024-03-06 | Stop reason: HOSPADM

## 2024-03-02 RX ORDER — OSELTAMIVIR PHOSPHATE 75 MG/1
75 CAPSULE ORAL 2 TIMES DAILY
Status: DISCONTINUED | OUTPATIENT
Start: 2024-03-03 | End: 2024-03-04

## 2024-03-02 RX ORDER — BUPRENORPHINE HYDROCHLORIDE, NALOXONE HYDROCHLORIDE 8; 2 MG/1; MG/1
1 FILM, SOLUBLE BUCCAL; SUBLINGUAL 3 TIMES DAILY
COMMUNITY
Start: 2024-02-23

## 2024-03-02 RX ORDER — ONDANSETRON 2 MG/ML
4 INJECTION INTRAMUSCULAR; INTRAVENOUS EVERY 6 HOURS PRN
Status: DISCONTINUED | OUTPATIENT
Start: 2024-03-02 | End: 2024-03-06 | Stop reason: HOSPADM

## 2024-03-02 RX ORDER — POLYETHYLENE GLYCOL 3350 17 G/17G
17 POWDER, FOR SOLUTION ORAL DAILY PRN
Status: DISCONTINUED | OUTPATIENT
Start: 2024-03-02 | End: 2024-03-06 | Stop reason: HOSPADM

## 2024-03-02 RX ORDER — CLONAZEPAM 0.5 MG/1
0.5 TABLET ORAL 3 TIMES DAILY PRN
Status: DISCONTINUED | OUTPATIENT
Start: 2024-03-02 | End: 2024-03-06 | Stop reason: HOSPADM

## 2024-03-02 RX ORDER — ONDANSETRON 4 MG/1
4 TABLET, ORALLY DISINTEGRATING ORAL EVERY 6 HOURS PRN
Status: DISCONTINUED | OUTPATIENT
Start: 2024-03-02 | End: 2024-03-06 | Stop reason: HOSPADM

## 2024-03-02 RX ORDER — SODIUM CHLORIDE, SODIUM LACTATE, POTASSIUM CHLORIDE, CALCIUM CHLORIDE 600; 310; 30; 20 MG/100ML; MG/100ML; MG/100ML; MG/100ML
INJECTION, SOLUTION INTRAVENOUS CONTINUOUS
Status: DISCONTINUED | OUTPATIENT
Start: 2024-03-02 | End: 2024-03-05

## 2024-03-02 RX ORDER — NICOTINE POLACRILEX 4 MG
15-30 LOZENGE BUCCAL
Status: DISCONTINUED | OUTPATIENT
Start: 2024-03-02 | End: 2024-03-06 | Stop reason: HOSPADM

## 2024-03-02 RX ORDER — OSELTAMIVIR PHOSPHATE 75 MG/1
75 CAPSULE ORAL ONCE
Status: COMPLETED | OUTPATIENT
Start: 2024-03-02 | End: 2024-03-02

## 2024-03-02 RX ADMIN — SODIUM CHLORIDE, POTASSIUM CHLORIDE, SODIUM LACTATE AND CALCIUM CHLORIDE: 600; 310; 30; 20 INJECTION, SOLUTION INTRAVENOUS at 22:08

## 2024-03-02 RX ADMIN — SODIUM CHLORIDE: 9 INJECTION, SOLUTION INTRAVENOUS at 16:21

## 2024-03-02 RX ADMIN — IOPAMIDOL 65 ML: 755 INJECTION, SOLUTION INTRAVENOUS at 16:53

## 2024-03-02 RX ADMIN — BUPRENORPHINE HYDROCHLORIDE, NALOXONE HYDROCHLORIDE 2 FILM: 4; 1 FILM, SOLUBLE BUCCAL; SUBLINGUAL at 16:16

## 2024-03-02 RX ADMIN — DIPHENHYDRAMINE HYDROCHLORIDE 25 MG: 50 INJECTION, SOLUTION INTRAMUSCULAR; INTRAVENOUS at 16:17

## 2024-03-02 RX ADMIN — KETOROLAC TROMETHAMINE 30 MG: 30 INJECTION, SOLUTION INTRAMUSCULAR; INTRAVENOUS at 19:08

## 2024-03-02 RX ADMIN — SODIUM CHLORIDE 70 ML: 9 INJECTION, SOLUTION INTRAVENOUS at 16:53

## 2024-03-02 RX ADMIN — METOCLOPRAMIDE 10 MG: 5 INJECTION, SOLUTION INTRAMUSCULAR; INTRAVENOUS at 16:19

## 2024-03-02 RX ADMIN — ENOXAPARIN SODIUM 40 MG: 40 INJECTION SUBCUTANEOUS at 23:15

## 2024-03-02 ASSESSMENT — ACTIVITIES OF DAILY LIVING (ADL)
FALL_HISTORY_WITHIN_LAST_SIX_MONTHS: NO
ADLS_ACUITY_SCORE: 40
CHANGE_IN_FUNCTIONAL_STATUS_SINCE_ONSET_OF_CURRENT_ILLNESS/INJURY: NO
ADLS_ACUITY_SCORE: 23
WEAR_GLASSES_OR_BLIND: NO
ADLS_ACUITY_SCORE: 40
DRESSING/BATHING_DIFFICULTY: NO
ADLS_ACUITY_SCORE: 26
TOILETING_ISSUES: NO
DIFFICULTY_COMMUNICATING: NO
ADLS_ACUITY_SCORE: 40
WALKING_OR_CLIMBING_STAIRS_DIFFICULTY: NO
CONCENTRATING,_REMEMBERING_OR_MAKING_DECISIONS_DIFFICULTY: NO
DOING_ERRANDS_INDEPENDENTLY_DIFFICULTY: NO
DIFFICULTY_EATING/SWALLOWING: NO
HEARING_DIFFICULTY_OR_DEAF: NO
ADLS_ACUITY_SCORE: 40

## 2024-03-02 ASSESSMENT — COLUMBIA-SUICIDE SEVERITY RATING SCALE - C-SSRS
2. HAVE YOU ACTUALLY HAD ANY THOUGHTS OF KILLING YOURSELF IN THE PAST MONTH?: NO
1. IN THE PAST MONTH, HAVE YOU WISHED YOU WERE DEAD OR WISHED YOU COULD GO TO SLEEP AND NOT WAKE UP?: NO
6. HAVE YOU EVER DONE ANYTHING, STARTED TO DO ANYTHING, OR PREPARED TO DO ANYTHING TO END YOUR LIFE?: NO

## 2024-03-02 NOTE — ED NOTES
Pt placed on monitor. Note bigeminy. EKG done . MD aware. Pt very sleepy after benadryl reglan and suboxone. .

## 2024-03-02 NOTE — ED TRIAGE NOTES
Pt comes in by EMS w/ SOB. PT has hx of cystic fibrosis and is normally on 6L at home, but was PT put her on 8L at home by her family. She has had a productive cough for a few days.

## 2024-03-02 NOTE — MEDICATION SCRIBE - ADMISSION MEDICATION HISTORY
Medication Scribe Admission Medication History    Admission medication history is complete. The information provided in this note is only as accurate as the sources available at the time of the update.    Information Source(s):  Friend  via phone    Pertinent Information: Patient unable to legibly converse currently. Phone call made to patient's contact who resides with patient and reports that patient self manages her own medications. The friend is able to confirm current medications for patients with RX bottles at home.     Changes made to PTA medication list:  Added: Suboxone 8-2 mg film added from reconcile list, patient's friend confirms current stock of medications at home   Deleted: None  Changed: None    Allergies reviewed with patient and updates made in EHR: no    Medication History Completed By: ANNA FELIZ 3/2/2024 5:04 PM    PTA Med List   Medication Sig Note Last Dose    albuterol (VENTOLIN HFA) 108 (90 Base) MCG/ACT inhaler Inhale 1-2 puffs into the lungs every 4 hours as needed for shortness of breath or wheezing  3/1/2024 at hs    azithromycin (ZITHROMAX) 250 MG tablet TAKE ONE TABLET BY MOUTH THREE TIMES A WEEK **START AFTER YOU'RE FINISHED WITH YOUR LEVOFLOXACIN ANTIBIOTIC** (Patient taking differently: Take 250 mg by mouth Three times a week   Take 1 tablet three times a week, starting after finished with Levofloxacin) 3/2/2024: Unsure when completed; RX bottle empty  Unknown at unknown    buprenorphine-naloxone (SUBOXONE) 2-0.5 MG SUBL sublingual tablet Place 1 tablet under the tongue 3 times daily  Unknown at unknown    CHOLECALCIFEROL 25 MCG (1000 UT) tablet Take 1,000 Units by mouth daily  Unknown at unknown    clonazePAM (KLONOPIN) 0.5 MG tablet Take 0.25 mg by mouth 3 times daily as needed for anxiety 3/2/2024: RX bottle empty  Unknown at unknown    esomeprazole (NEXIUM) 20 MG DR capsule Take 20 mg by mouth every morning (before breakfast) Take 30-60 minutes before eating.  Unknown at  unknown    furosemide (LASIX) 20 MG tablet Take 1 tablet (20 mg) by mouth as needed (swelling)  Unknown at unknown    macitentan (OPSUMIT) 10 MG tablet Take 1 tablet (10 mg) by mouth daily Make sure you are completing your monthly mandatory labs for pregnancy.  Unknown at unknown    nintedanib (OFEV) 150 MG capsule Take 1 capsule (150 mg) by mouth 2 times daily  Unknown at unknown    order for DME Oxygen: Patient requires supplemental Oxygen 4 LPM via nasal canula at rest and 6 LPM with activity. Please provide patient with portability capability. Okay to spot check patient on oxygen device, to keep sats above 90%. Please provider with home concentrator that can go up to 10 LPM. Oxygen will be for a lifetime.  3/2/2024 at current    predniSONE (DELTASONE) 10 MG tablet TAKE ONE TABLET BY MOUTH DAILY  Unknown at unknown    predniSONE (DELTASONE) 2.5 MG tablet 7.5 mg by mouth daily for 2 weeks, and then reduce to 5 mg daily, thereafter.  Unknown at unknown    senna (SENOKOT) 8.6 MG tablet Take 1 tablet by mouth 2 times daily as needed for constipation  Unknown at unknown    sertraline (ZOLOFT) 100 MG tablet Take 100 mg by mouth daily 3/2/2024: 5 tablets remaining in RX bottle  Unknown at unknown    SUBOXONE 8-2 MG per film Place 1 Film under the tongue 3 times daily 3/2/2024: 2 full unopened boxes and 8 films in third box remaining  Unknown at unknown    tadalafil, PAH, 20 MG TABS Take 2 tablets (40 mg) by mouth daily  Unknown at unknown

## 2024-03-03 ENCOUNTER — ANESTHESIA (OUTPATIENT)
Dept: INTENSIVE CARE | Facility: CLINIC | Age: 49
End: 2024-03-03
Payer: COMMERCIAL

## 2024-03-03 ENCOUNTER — ANESTHESIA EVENT (OUTPATIENT)
Dept: INTENSIVE CARE | Facility: CLINIC | Age: 49
End: 2024-03-03
Payer: COMMERCIAL

## 2024-03-03 ENCOUNTER — APPOINTMENT (OUTPATIENT)
Dept: GENERAL RADIOLOGY | Facility: CLINIC | Age: 49
End: 2024-03-03
Attending: FAMILY MEDICINE
Payer: COMMERCIAL

## 2024-03-03 PROBLEM — F41.1 GAD (GENERALIZED ANXIETY DISORDER): Status: ACTIVE | Noted: 2023-08-16

## 2024-03-03 PROBLEM — F43.10 PTSD (POST-TRAUMATIC STRESS DISORDER): Status: ACTIVE | Noted: 2023-08-16

## 2024-03-03 PROBLEM — K92.2 ACUTE UPPER GI BLEED: Status: ACTIVE | Noted: 2024-03-03

## 2024-03-03 PROBLEM — J96.02 ACUTE RESPIRATORY FAILURE WITH HYPERCAPNIA (H): Status: RESOLVED | Noted: 2024-03-02 | Resolved: 2024-03-03

## 2024-03-03 PROBLEM — F33.9 RECURRENT MAJOR DEPRESSIVE DISORDER (H): Status: ACTIVE | Noted: 2023-08-16

## 2024-03-03 PROBLEM — K21.9 GASTROESOPHAGEAL REFLUX DISEASE WITHOUT ESOPHAGITIS: Status: ACTIVE | Noted: 2023-08-16

## 2024-03-03 PROBLEM — I27.0 PRIMARY PULMONARY HYPERTENSION (H): Status: ACTIVE | Noted: 2019-03-11

## 2024-03-03 LAB
ALBUMIN UR-MCNC: 100 MG/DL
ALLEN'S TEST: ABNORMAL
ALLEN'S TEST: ABNORMAL
ALLEN'S TEST: YES
ANION GAP SERPL CALCULATED.3IONS-SCNC: 10 MMOL/L (ref 7–15)
APPEARANCE UR: CLEAR
BASE EXCESS BLDA CALC-SCNC: 4.8 MMOL/L (ref -3–3)
BASE EXCESS BLDA CALC-SCNC: 5.3 MMOL/L (ref -3–3)
BASE EXCESS BLDA CALC-SCNC: 6.2 MMOL/L (ref -3–3)
BASE EXCESS BLDV CALC-SCNC: 2.1 MMOL/L (ref -3–3)
BASE EXCESS BLDV CALC-SCNC: 2.4 MMOL/L (ref -3–3)
BASE EXCESS BLDV CALC-SCNC: 2.9 MMOL/L (ref -3–3)
BASE EXCESS BLDV CALC-SCNC: 4.1 MMOL/L (ref -3–3)
BILIRUB UR QL STRIP: NEGATIVE
BUN SERPL-MCNC: 10.8 MG/DL (ref 6–20)
CALCIUM SERPL-MCNC: 8.3 MG/DL (ref 8.6–10)
CHLORIDE SERPL-SCNC: 97 MMOL/L (ref 98–107)
COHGB MFR BLD: 97.4 % (ref 96–97)
COHGB MFR BLD: 98.8 % (ref 96–97)
COHGB MFR BLD: 99.1 % (ref 96–97)
COLOR UR AUTO: YELLOW
CREAT SERPL-MCNC: 0.45 MG/DL (ref 0.51–0.95)
CRP SERPL-MCNC: 62.02 MG/L
DEPRECATED HCO3 PLAS-SCNC: 26 MMOL/L (ref 22–29)
EGFRCR SERPLBLD CKD-EPI 2021: >90 ML/MIN/1.73M2
ERYTHROCYTE [DISTWIDTH] IN BLOOD BY AUTOMATED COUNT: 14.4 % (ref 10–15)
ERYTHROCYTE [DISTWIDTH] IN BLOOD BY AUTOMATED COUNT: 14.6 % (ref 10–15)
GASTROCULT GAST QL: POSITIVE
GLUCOSE BLDC GLUCOMTR-MCNC: 110 MG/DL (ref 70–99)
GLUCOSE BLDC GLUCOMTR-MCNC: 130 MG/DL (ref 70–99)
GLUCOSE SERPL-MCNC: 105 MG/DL (ref 70–99)
GLUCOSE SERPL-MCNC: 110 MG/DL (ref 70–99)
GLUCOSE UR STRIP-MCNC: NEGATIVE MG/DL
HCO3 BLD-SCNC: 32 MMOL/L (ref 21–28)
HCO3 BLD-SCNC: 33 MMOL/L (ref 21–28)
HCO3 BLD-SCNC: 33 MMOL/L (ref 21–28)
HCO3 BLDV-SCNC: 31 MMOL/L (ref 21–28)
HCO3 BLDV-SCNC: 32 MMOL/L (ref 21–28)
HCO3 BLDV-SCNC: 33 MMOL/L (ref 21–28)
HCO3 BLDV-SCNC: 35 MMOL/L (ref 21–28)
HCT VFR BLD AUTO: 43 % (ref 35–47)
HCT VFR BLD AUTO: 44.5 % (ref 35–47)
HGB BLD-MCNC: 13 G/DL (ref 11.7–15.7)
HGB BLD-MCNC: 13 G/DL (ref 11.7–15.7)
HGB BLD-MCNC: 13.1 G/DL (ref 11.7–15.7)
HGB BLD-MCNC: 13.4 G/DL (ref 11.7–15.7)
HGB BLD-MCNC: 13.6 G/DL (ref 11.7–15.7)
HGB UR QL STRIP: NEGATIVE
HOLD SPECIMEN: NORMAL
KETONES UR STRIP-MCNC: 20 MG/DL
LEUKOCYTE ESTERASE UR QL STRIP: NEGATIVE
MCH RBC QN AUTO: 29.5 PG (ref 26.5–33)
MCH RBC QN AUTO: 29.6 PG (ref 26.5–33)
MCHC RBC AUTO-ENTMCNC: 30.6 G/DL (ref 31.5–36.5)
MCHC RBC AUTO-ENTMCNC: 31.2 G/DL (ref 31.5–36.5)
MCV RBC AUTO: 95 FL (ref 78–100)
MCV RBC AUTO: 97 FL (ref 78–100)
MUCOUS THREADS #/AREA URNS LPF: PRESENT /LPF
NITRATE UR QL: NEGATIVE
O2/TOTAL GAS SETTING VFR VENT: 40 %
O2/TOTAL GAS SETTING VFR VENT: 45 %
O2/TOTAL GAS SETTING VFR VENT: 50 %
OXYHGB MFR BLDV: 50 % (ref 70–75)
OXYHGB MFR BLDV: 84 % (ref 70–75)
OXYHGB MFR BLDV: 90 % (ref 70–75)
OXYHGB MFR BLDV: 93 % (ref 70–75)
PCO2 BLD: 57 MM HG (ref 35–45)
PCO2 BLD: 58 MM HG (ref 35–45)
PCO2 BLD: 61 MM HG (ref 35–45)
PCO2 BLDV: 68 MM HG (ref 40–50)
PCO2 BLDV: 74 MM HG (ref 40–50)
PCO2 BLDV: 84 MM HG (ref 40–50)
PCO2 BLDV: 85 MM HG (ref 40–50)
PH BLD: 7.34 [PH] (ref 7.35–7.45)
PH BLD: 7.36 [PH] (ref 7.35–7.45)
PH BLD: 7.37 [PH] (ref 7.35–7.45)
PH BLDV: 7.2 [PH] (ref 7.32–7.43)
PH BLDV: 7.22 [PH] (ref 7.32–7.43)
PH BLDV: 7.25 [PH] (ref 7.32–7.43)
PH BLDV: 7.27 [PH] (ref 7.32–7.43)
PH GAST: ABNORMAL [PH]
PH UR STRIP: 6 [PH] (ref 5–7)
PHOSPHATE SERPL-MCNC: 2.5 MG/DL (ref 2.5–4.5)
PLATELET # BLD AUTO: 142 10E3/UL (ref 150–450)
PLATELET # BLD AUTO: 146 10E3/UL (ref 150–450)
PO2 BLD: 109 MM HG (ref 80–105)
PO2 BLD: 112 MM HG (ref 80–105)
PO2 BLD: 84 MM HG (ref 80–105)
PO2 BLDV: 29 MM HG (ref 25–47)
PO2 BLDV: 51 MM HG (ref 25–47)
PO2 BLDV: 64 MM HG (ref 25–47)
PO2 BLDV: 81 MM HG (ref 25–47)
POTASSIUM SERPL-SCNC: 4.1 MMOL/L (ref 3.4–5.3)
POTASSIUM SERPL-SCNC: 4.2 MMOL/L (ref 3.4–5.3)
PROCALCITONIN SERPL IA-MCNC: 0.28 NG/ML
RBC # BLD AUTO: 4.55 10E6/UL (ref 3.8–5.2)
RBC # BLD AUTO: 4.59 10E6/UL (ref 3.8–5.2)
RBC URINE: 14 /HPF
SAO2 % BLDA: 96 % (ref 92–100)
SAO2 % BLDA: 97 % (ref 92–100)
SAO2 % BLDA: 98 % (ref 92–100)
SAO2 % BLDV: 51.1 % (ref 70–75)
SAO2 % BLDV: 85.6 % (ref 70–75)
SAO2 % BLDV: 91.8 % (ref 70–75)
SAO2 % BLDV: 95.2 % (ref 70–75)
SODIUM SERPL-SCNC: 133 MMOL/L (ref 135–145)
SP GR UR STRIP: 1.04 (ref 1–1.03)
SQUAMOUS EPITHELIAL: <1 /HPF
UROBILINOGEN UR STRIP-MCNC: NORMAL MG/DL
WBC # BLD AUTO: 5.2 10E3/UL (ref 4–11)
WBC # BLD AUTO: 5.6 10E3/UL (ref 4–11)
WBC URINE: 5 /HPF

## 2024-03-03 PROCEDURE — 250N000011 HC RX IP 250 OP 636: Performed by: FAMILY MEDICINE

## 2024-03-03 PROCEDURE — C9113 INJ PANTOPRAZOLE SODIUM, VIA: HCPCS | Performed by: INTERNAL MEDICINE

## 2024-03-03 PROCEDURE — 84132 ASSAY OF SERUM POTASSIUM: CPT | Performed by: INTERNAL MEDICINE

## 2024-03-03 PROCEDURE — 250N000009 HC RX 250: Performed by: INTERNAL MEDICINE

## 2024-03-03 PROCEDURE — 36415 COLL VENOUS BLD VENIPUNCTURE: CPT | Performed by: INTERNAL MEDICINE

## 2024-03-03 PROCEDURE — 999N000157 HC STATISTIC RCP TIME EA 10 MIN

## 2024-03-03 PROCEDURE — 84145 PROCALCITONIN (PCT): CPT | Performed by: FAMILY MEDICINE

## 2024-03-03 PROCEDURE — 99207 PR NOT IN PERSON INPATIENT CONSULT STATISTICAL MARKER: CPT | Mod: 95 | Performed by: INTERNAL MEDICINE

## 2024-03-03 PROCEDURE — 370N000003 HC ANESTHESIA WARD SERVICE: Performed by: NURSE ANESTHETIST, CERTIFIED REGISTERED

## 2024-03-03 PROCEDURE — C9113 INJ PANTOPRAZOLE SODIUM, VIA: HCPCS | Performed by: FAMILY MEDICINE

## 2024-03-03 PROCEDURE — 999N000156 HC STATISTIC RCP CONSULT EA 30 MIN

## 2024-03-03 PROCEDURE — 84100 ASSAY OF PHOSPHORUS: CPT | Performed by: INTERNAL MEDICINE

## 2024-03-03 PROCEDURE — 81001 URINALYSIS AUTO W/SCOPE: CPT | Performed by: FAMILY MEDICINE

## 2024-03-03 PROCEDURE — 82947 ASSAY GLUCOSE BLOOD QUANT: CPT | Performed by: INTERNAL MEDICINE

## 2024-03-03 PROCEDURE — 80048 BASIC METABOLIC PNL TOTAL CA: CPT | Performed by: INTERNAL MEDICINE

## 2024-03-03 PROCEDURE — 250N000009 HC RX 250: Performed by: FAMILY MEDICINE

## 2024-03-03 PROCEDURE — 99207 PR NO BILLABLE SERVICE THIS VISIT: CPT | Performed by: FAMILY MEDICINE

## 2024-03-03 PROCEDURE — 999N000065 XR CHEST 1 VIEW

## 2024-03-03 PROCEDURE — 86140 C-REACTIVE PROTEIN: CPT | Performed by: FAMILY MEDICINE

## 2024-03-03 PROCEDURE — 94660 CPAP INITIATION&MGMT: CPT

## 2024-03-03 PROCEDURE — 36415 COLL VENOUS BLD VENIPUNCTURE: CPT | Performed by: FAMILY MEDICINE

## 2024-03-03 PROCEDURE — 36600 WITHDRAWAL OF ARTERIAL BLOOD: CPT

## 2024-03-03 PROCEDURE — 200N000001 HC R&B ICU

## 2024-03-03 PROCEDURE — 85018 HEMOGLOBIN: CPT | Performed by: FAMILY MEDICINE

## 2024-03-03 PROCEDURE — 250N000011 HC RX IP 250 OP 636: Performed by: INTERNAL MEDICINE

## 2024-03-03 PROCEDURE — 82805 BLOOD GASES W/O2 SATURATION: CPT | Performed by: FAMILY MEDICINE

## 2024-03-03 PROCEDURE — 82805 BLOOD GASES W/O2 SATURATION: CPT | Performed by: INTERNAL MEDICINE

## 2024-03-03 PROCEDURE — 36569 INSJ PICC 5 YR+ W/O IMAGING: CPT

## 2024-03-03 PROCEDURE — 272N000460 HC KIT, 6 FRENCH TRIPLE LUMEN SOLO POWER PICC

## 2024-03-03 PROCEDURE — 85027 COMPLETE CBC AUTOMATED: CPT | Performed by: INTERNAL MEDICINE

## 2024-03-03 PROCEDURE — 82271 OCCULT BLOOD OTHER SOURCES: CPT | Performed by: FAMILY MEDICINE

## 2024-03-03 PROCEDURE — 250N000013 HC RX MED GY IP 250 OP 250 PS 637: Performed by: INTERNAL MEDICINE

## 2024-03-03 PROCEDURE — 258N000003 HC RX IP 258 OP 636: Performed by: INTERNAL MEDICINE

## 2024-03-03 PROCEDURE — 999N000123 HC STATISTIC OXYGEN O2DAILY TECH TIME

## 2024-03-03 RX ORDER — LORAZEPAM 2 MG/ML
0.5 INJECTION INTRAMUSCULAR EVERY 4 HOURS PRN
Status: DISCONTINUED | OUTPATIENT
Start: 2024-03-03 | End: 2024-03-05

## 2024-03-03 RX ORDER — TRAMADOL HYDROCHLORIDE 50 MG/1
50 TABLET ORAL EVERY 6 HOURS PRN
Status: DISCONTINUED | OUTPATIENT
Start: 2024-03-03 | End: 2024-03-05

## 2024-03-03 RX ORDER — DIPHENHYDRAMINE HCL 25 MG
25 CAPSULE ORAL EVERY 6 HOURS PRN
Status: DISCONTINUED | OUTPATIENT
Start: 2024-03-03 | End: 2024-03-06 | Stop reason: HOSPADM

## 2024-03-03 RX ORDER — METHYLPREDNISOLONE SODIUM SUCCINATE 125 MG/2ML
60 INJECTION, POWDER, LYOPHILIZED, FOR SOLUTION INTRAMUSCULAR; INTRAVENOUS EVERY 6 HOURS
Status: DISCONTINUED | OUTPATIENT
Start: 2024-03-03 | End: 2024-03-05

## 2024-03-03 RX ORDER — ACETAMINOPHEN 325 MG/1
650 TABLET ORAL EVERY 4 HOURS PRN
Status: DISCONTINUED | OUTPATIENT
Start: 2024-03-03 | End: 2024-03-06 | Stop reason: HOSPADM

## 2024-03-03 RX ORDER — METHYLPREDNISOLONE SODIUM SUCCINATE 125 MG/2ML
125 INJECTION, POWDER, LYOPHILIZED, FOR SOLUTION INTRAMUSCULAR; INTRAVENOUS ONCE
Status: COMPLETED | OUTPATIENT
Start: 2024-03-03 | End: 2024-03-03

## 2024-03-03 RX ORDER — OXYCODONE HYDROCHLORIDE 5 MG/1
5 TABLET ORAL EVERY 4 HOURS PRN
Status: DISCONTINUED | OUTPATIENT
Start: 2024-03-03 | End: 2024-03-06 | Stop reason: HOSPADM

## 2024-03-03 RX ORDER — LIDOCAINE 40 MG/G
CREAM TOPICAL
Status: ACTIVE | OUTPATIENT
Start: 2024-03-03 | End: 2024-03-06

## 2024-03-03 RX ORDER — FUROSEMIDE 10 MG/ML
10 INJECTION INTRAMUSCULAR; INTRAVENOUS EVERY 8 HOURS
Status: DISCONTINUED | OUTPATIENT
Start: 2024-03-03 | End: 2024-03-05

## 2024-03-03 RX ORDER — KETOROLAC TROMETHAMINE 30 MG/ML
30 INJECTION, SOLUTION INTRAMUSCULAR; INTRAVENOUS EVERY 6 HOURS PRN
Status: DISCONTINUED | OUTPATIENT
Start: 2024-03-03 | End: 2024-03-05

## 2024-03-03 RX ORDER — ROPIVACAINE IN 0.9% SOD CHL/PF 0.1 %
.03-.125 PLASTIC BAG, INJECTION (ML) EPIDURAL CONTINUOUS
Status: DISCONTINUED | OUTPATIENT
Start: 2024-03-03 | End: 2024-03-05

## 2024-03-03 RX ADMIN — ENOXAPARIN SODIUM 40 MG: 40 INJECTION SUBCUTANEOUS at 21:35

## 2024-03-03 RX ADMIN — ONDANSETRON 4 MG: 2 INJECTION INTRAMUSCULAR; INTRAVENOUS at 05:32

## 2024-03-03 RX ADMIN — TRAMADOL HYDROCHLORIDE 50 MG: 50 TABLET, COATED ORAL at 05:23

## 2024-03-03 RX ADMIN — FENTANYL CITRATE 125 MCG/HR: 0.05 INJECTION, SOLUTION INTRAMUSCULAR; INTRAVENOUS at 23:40

## 2024-03-03 RX ADMIN — FUROSEMIDE 10 MG: 10 INJECTION, SOLUTION INTRAVENOUS at 22:45

## 2024-03-03 RX ADMIN — PANTOPRAZOLE SODIUM 40 MG: 40 INJECTION, POWDER, FOR SOLUTION INTRAVENOUS at 21:35

## 2024-03-03 RX ADMIN — FUROSEMIDE 10 MG: 10 INJECTION, SOLUTION INTRAVENOUS at 08:01

## 2024-03-03 RX ADMIN — ACETAMINOPHEN 650 MG: 325 TABLET, FILM COATED ORAL at 05:23

## 2024-03-03 RX ADMIN — KETOROLAC TROMETHAMINE 30 MG: 30 INJECTION, SOLUTION INTRAMUSCULAR at 02:19

## 2024-03-03 RX ADMIN — KETOROLAC TROMETHAMINE 30 MG: 30 INJECTION, SOLUTION INTRAMUSCULAR at 13:44

## 2024-03-03 RX ADMIN — IPRATROPIUM BROMIDE AND ALBUTEROL SULFATE 3 ML: .5; 3 SOLUTION RESPIRATORY (INHALATION) at 00:50

## 2024-03-03 RX ADMIN — METHYLPREDNISOLONE SODIUM SUCCINATE 125 MG: 125 INJECTION, POWDER, FOR SOLUTION INTRAMUSCULAR; INTRAVENOUS at 07:54

## 2024-03-03 RX ADMIN — FENTANYL CITRATE 25 MCG/HR: 0.05 INJECTION, SOLUTION INTRAMUSCULAR; INTRAVENOUS at 08:28

## 2024-03-03 RX ADMIN — LORAZEPAM 0.5 MG: 2 INJECTION INTRAMUSCULAR; INTRAVENOUS at 13:43

## 2024-03-03 RX ADMIN — METHYLPREDNISOLONE SODIUM SUCCINATE 62.5 MG: 125 INJECTION INTRAMUSCULAR; INTRAVENOUS at 13:43

## 2024-03-03 RX ADMIN — PANTOPRAZOLE SODIUM 40 MG: 40 INJECTION, POWDER, FOR SOLUTION INTRAVENOUS at 07:03

## 2024-03-03 RX ADMIN — OXYCODONE HYDROCHLORIDE 5 MG: 5 TABLET ORAL at 02:19

## 2024-03-03 RX ADMIN — SODIUM PHOSPHATE, MONOBASIC, MONOHYDRATE AND SODIUM PHOSPHATE, DIBASIC, ANHYDROUS 9 MMOL: 142; 276 INJECTION, SOLUTION INTRAVENOUS at 00:11

## 2024-03-03 RX ADMIN — LIDOCAINE HYDROCHLORIDE 1 ML: 10 INJECTION, SOLUTION EPIDURAL; INFILTRATION; INTRACAUDAL; PERINEURAL at 12:40

## 2024-03-03 RX ADMIN — FUROSEMIDE 10 MG: 10 INJECTION, SOLUTION INTRAVENOUS at 15:13

## 2024-03-03 RX ADMIN — METHYLPREDNISOLONE SODIUM SUCCINATE 62.5 MG: 125 INJECTION INTRAMUSCULAR; INTRAVENOUS at 19:55

## 2024-03-03 RX ADMIN — NOREPINEPHRINE BITARTRATE 0.03 MCG/KG/MIN: 0.02 INJECTION, SOLUTION INTRAVENOUS at 12:58

## 2024-03-03 ASSESSMENT — ACTIVITIES OF DAILY LIVING (ADL)
ADLS_ACUITY_SCORE: 25
ADLS_ACUITY_SCORE: 26
ADLS_ACUITY_SCORE: 26
ADLS_ACUITY_SCORE: 25
ADLS_ACUITY_SCORE: 26
ADLS_ACUITY_SCORE: 25
ADLS_ACUITY_SCORE: 26

## 2024-03-03 ASSESSMENT — LIFESTYLE VARIABLES: TOBACCO_USE: 1

## 2024-03-03 NOTE — PROCEDURES
"PICC Line Insertion Procedure Note     Pt. Name:   Anali Loya     MRN:          [id number]     Procedure: Insertion of a  Triple Lumen  5 fr  Bard SOLO (valved) Power PICC, Lot number [id number]     Indications: ICU admission on vasopressors     Contraindications : none     Procedure Details:     Patient identified with 2 identifiers and \"Time Out\" conducted.     Central line insertion bundle followed: hand hygiene performed prior to procedure, site cleansed with Cholraprep (CHG), hat, mask, sterile gloves, sterile gown worn, patient draped with maximum barrier head to toe drape, sterile field maintained.     The vein was assessed and found to be compressible and of adequate size.      Lidocaine 1% one ml administered SQ to the insertion site.      Modified Seldinger Technique (MST) used for insertion, one attempt(s) required to access vein.      A 5 Fr PICC was inserted into the basilic vein of the right upper arm.        Catheter threaded without difficulty. Good blood return noted.     Catheter was flushed with 10 cc normal saline.      Catheter secured with Statlock, Biopatch, and Tegaderm dressing applied.     The sharps that are included in the PICC insertion kit were accounted for and disposed of in the sharps container prior to breakdown of the sterile field.     CLABSI prevention brochure left at bedside.     Patient tolerated procedure well.      Patient's primary RN notified PICC is ready for use.       Findings:     Total catheter length  37 cm, with 0 cm exposed. Mid upper arm circumference is 28 cm.      Tip placement verified in the mid SVC by PCXR.    Comments:  The PICC is properly positioned and ready for use.         Elle Galvan RN BSN  V[facility] - [facility]   "

## 2024-03-03 NOTE — PROGRESS NOTES
Patient is a 48 year old female with severe interstitial lung disease and primary pulmonary hypertension on 6L NC at baseline as well as suppressive prednisone and prophylactic azithromycin, GERD, history of tobacco abuse with sobriety since December 2023, chronic pain on Suboxone, depression/anxiety/PTSD who presented with worsening respiratory failure requiring Bipap and has been diagnosed with influenza A.  She remains critically ill at this time.      0705   Patient was changed to AVAPS mode an hour ago with VBG performed recently showing worsening with pH of 7.20 and pCO2 of 84 with patient not awaking easily.  Procalcitonin negative x2.  Daughter Argenis, who is patient's emergency contact and emergency decision maker, is not available at this time.    PLAN:  -Increase AVAPS to RR of 28 but continue tital volume goal of 450.  Recheck VBG in one hour   -Fentanyl drip - start at 25 mcg/hr and titrate up as needed  -IV steroids 125 mg x1 followed by 60 mg every 6 hours  -IV lasix 10 mg every 8 hours to try to keep lungs slightly dry  -nursing to assist in determining who is emergency contact as patient's daughter Argenis is currently unavailable.      0920  Patient more alert and pain better controlled with Fentanyl drip with patient resting comfortably.  VBG shows stability of blood gas but with ongoing uncompensated respiratory acidosis present.  Two sons present at the bedside as well.  Discussed with patient and family current status and what the next 24 hours might bring.  Confirm FULL CODE status and she wants everything and anything done that is necessary because she is too young to die.  Okay for central line placement, vasopressor support if needed.  Aware her case was reviewed and she is not a candidate for lung transplant at this time, even if her lungs failed completely.  Nursing informed patient had no output from IV Lasix but bladder scan is now showing approximately 1000 mL and patient has no urge to void.   Blood pressures continue to trend downward slowly with MAPs still above 65 consistently but now more in the 65-70 range with systolic in the 80-90s.  Patient had one episode of emesis overnight that appears blood tinged with sample still present   PLAN:  -cisneros catheter placed, will perform UA with reflex to UC  -PICC line and art line to be placed  -ABG at 1000  -perform gastric occult on emesis sample  -peripheral Levophed if needed with transition to central line formulation once PICC in place  -will honor FULL CODE status if needed    1035  Patient had 900 mL out with cisneros catheter with urine not appearing concerning for acute infection.  Patient resting more comfortably with Fentanyl drip and most recent VBG shows notable improvement in current AVAPS setting.  pH improved to 7.34 with pCO2 down to 61.  Gastric sample did return positive for blood.  Hemoglobin from this morning was stable, no further blood noted by nursing staff, no stools since start of this shift.    PLAN:  -continue with current AVAPS setting and check ABG at 1400  -increase Protonix to BID and start serial hemoglobin monitoring    1450  Patient much more awake and comfortable on current Fentanyl drip and AVAPS setting.  Blood gas shows compensated acidosis with pCO2 of 57 which is close to patient's previously known baseline.    PLAN:  -continue current AVAPS setting and critical care management including steroids, nebs, Lasix, Fentanyl drip   -if patient has ongoing improvement and stability overnight will attempt weaning of oxygen support tomorrow   -continue PPI for upper gastric bleed and monitor hemoglobin closely     68 minutes has been spent throughout the day on critical care management of this patient as above     Electronically Signed:  Jia Ferrari MD

## 2024-03-03 NOTE — PROVIDER NOTIFICATION
03/03/24 0839   Critical Test Results/Notification   Critical Lab Result (Lab Name and Value) pCO2 85   What Time Did The Lab Notify You? 0840   Provider Notified yes   Date of Provider Notification 03/03/24   Time of Provider Notification 0840   Mechanism of Provider notification page   What Provider Did You Notify? Vega

## 2024-03-03 NOTE — ED NOTES
Pt verbally stated ok to call her mother, Patricia, and give update. Patricia updated on pt status and transfer to ICU, given phone number to ICU for future questions.
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
## 2024-03-03 NOTE — PROGRESS NOTES
S-(situation): Patient arrives to room 218 via cart from ED    B-(background): Influenza A, pneumonia, Pulmonary fibrosis    A-(assessment): Pt is lethargic but arouses to voice and opens her eyes. She is able to answer simple orientation questions appropriately but quickly goes back to sleep. She arrives on 15L oxymask with sats in the low 90's and switched to AVAPS mode on the bipap upon arrival. Fio2 decreased from 50% to 45%. Pt is on 6L home oxygen at baseline. Buttocks has blanchable redness but no other significant skin issues.     R-(recommendations): Orders reviewed with patient. Will monitor patient per MD orders.     Inpatient nursing criteria listed below were met:    Health care directives status obtained and documented: Yes  VTE ordered/documented: Yes  Skin issues/needs documented:Yes  Isolation addressed and Signage used: Yes  Fall Prevention: Care plan updated Yes Education given and documented Yes  Care Plan initiated and Co-Morbidities added: Yes  Education Assessment documented:Yes  Admission Education Documented: Yes  If present CAUTI/CLABI Education done: NA  New medication patient education completed and documented (Possible Side Effects of Common Medications handout): No  Allergies Reviewed: Yes  Admission Medication Reconciliation completed: No  Home medications if not able to send immediately home with family stored here: NA  Reminder note placed in discharge instructions regarding home meds: NA  Individualized care needs/preferences addressed and charted: No  Provider Notified that patient has arrived to the unit: Yes

## 2024-03-03 NOTE — ED PROVIDER NOTES
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 low-lying with slight crowding at the foramen magnum, probably not meeting measurement criteria for Chiari I malformation.   CT Chest Pulmonary Embolism w Contrast    Narrative    EXAM: CT CHEST PULMONARY EMBOLISM W CONTRAST  LOCATION: [facility]  DATE: 3/2/2024    INDICATION: Severe interstitial lung disease. Increased shortness of breath, cough. Elevated D dimer.  COMPARISON: Chest CT 08/29/2023. Chest CT 10/04/2021.  TECHNIQUE: CT chest pulmonary angiogram during arterial phase injection of IV contrast. Multiplanar reformats and MIP reconstructions were performed. Dose reduction techniques were used.   CONTRAST: 65 mL, Isovue 370    FINDINGS:    ANGIOGRAM CHEST: Pulmonary arteries are dilated, with the main pulmonary trunk measuring up to 3.6 cm. No evidence of pulmonary embolism. Thoracic aorta is negative for dissection. Mild chronic enlargement of the right ventricle in comparison to the   left, likely due to chronic pulmonary arterial hypertension.    LUNGS AND PLEURA: Extensive reticular opacities, honeycombing, and traction bronchiectasis throughout the lungs consistent with a UIP pattern of pulmonary fibrosis. Diffuse  bronchial wall thickening with scattered areas of mucous plugging. New patchy   consolidative opacities primarily within the left lower lobe and to a lesser extent the right lower lobe and lingula and worsening groundglass opacities within the upper lobes and superior segments of the lower lobes. Unchanged 2.6 x 2.1 cm left upper   lobe mass (6/#124). No definite new pulmonary mass. No pleural effusions or pneumothorax.    MEDIASTINUM/AXILLAE: Mediastinal and hilar lymphadenopathy appears similar to prior, including a 1.4 cm right paratracheal lymph node (4/#94). No newly enlarged thoracic lymph nodes. Normal cardiac size. No pericardial effusion.    CORONARY ARTERY CALCIFICATION: Mild.    UPPER ABDOMEN: No significant abnormality.    MUSCULOSKELETAL: No acute bony abnormality. Mild multilevel degenerative changes of the spine.      Impression    IMPRESSION:    1.  No evidence of pulmonary embolism.    2.  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    3.  A 2.6 cm solid left upper lobe mass has not significant changed since 2021. Continued pulmonary follow-up is recommended.    4.  Unchanged findings of chronic pulmonary arterial hypertension including pulmonary arterial and right ventricular enlargement.   Blood gas venous   Result Value Ref Range    pH Venous 7.24 (L) 7.32 - 7.43    pCO2 Venous 81 (HH) 40 - 50 mm Hg    pO2 Venous 46 25 - 47 mm Hg    Bicarbonate Venous 35 (H) 21 - 28 mmol/L    Base Excess/Deficit Venous 4.1 (H) -3.0 - 3.0 mmol/L    FIO2 50     Oxyhemoglobin Venous 77 (H) 70 - 75 %    O2 Sat, Venous 79.4 (H) 70.0 - 75.0 %    Narrative    In healthy individuals, oxyhemoglobin (O2Hb) and oxygen saturation (SO2) are approximately equal. In the presence of dyshemoglobins, oxyhemoglobin can be considerably lower than oxygen saturation.   Ammonia (on ice)   Result Value Ref Range     Ammonia 81 (H) 11 - 51 umol/L   Blood gas venous   Result Value Ref Range    pH Venous 7.29 (L) 7.32 - 7.43    pCO2 Venous 68 (H) 40 - 50 mm Hg    pO2 Venous 101 (H) 25 - 47 mm Hg    Bicarbonate Venous 33 (H) 21 - 28 mmol/L    Base Excess/Deficit Venous 3.9 (H) -3.0 - 3.0 mmol/L    FIO2 50     Oxyhemoglobin Venous 96 (H) 70 - 75 %    O2 Sat, Venous 98.1 (H) 70.0 - 75.0 %    Narrative    In healthy individuals, oxyhemoglobin (O2Hb) and oxygen saturation (SO2) are approximately equal. In the presence of dyshemoglobins, oxyhemoglobin can be considerably lower than oxygen saturation.   Glucose by meter   Result Value Ref Range    GLUCOSE BY METER POCT 100 (H) 70 - 99 mg/dL   Glucose by meter   Result Value Ref Range    GLUCOSE BY METER POCT 110 (H) 70 - 99 mg/dL     *Note: Due to a large number of results and/or encounters for the requested time period, some results have not been displayed. A complete set of results can be found in Results Review.       Medications   Vitamin D3 (CHOLECALCIFEROL) tablet 25 mcg (has no administration in time range)   pantoprazole (PROTONIX) EC tablet 40 mg (has no administration in time range)   macitentan (OPSUMIT) tablet 10 mg (has no administration in time range)   naloxone (NARCAN) nasal spray 4 mg (has no administration in time range)   nintedanib (OFEV) capsule 150 mg (150 mg Oral Not Given 3/3/24 0038)   polyethylene glycol (MIRALAX) Packet 17 g (has no administration in time range)   sennosides (SENOKOT) tablet 1 tablet (has no administration in time range)   sertraline (ZOLOFT) tablet 100 mg (has no administration in time range)   tadalafil (PAH) TABS 40 mg (has no administration in time range)   glucose gel 15-30 g (has no administration in time range)     Or   dextrose 50 % injection 25-50 mL (has no administration in time range)     Or   glucagon injection 1 mg (has no administration in time range)   enoxaparin ANTICOAGULANT (LOVENOX) injection 40 mg (40 mg Subcutaneous $Given  3/2/24 2315)   lactated ringers infusion ( Intravenous $New Bag 3/2/24 2208)   ondansetron (ZOFRAN ODT) ODT tab 4 mg (has no administration in time range)     Or   ondansetron (ZOFRAN) injection 4 mg (has no administration in time range)   oseltamivir (TAMIFLU) capsule 75 mg (has no administration in time range)   sodium phosphate 9 mmol in 250 mL NS intermittent infusion (9 mmol Intravenous $New Bag 3/3/24 0011)   clonazePAM (klonoPIN) tablet 0.5 mg (has no administration in time range)   ipratropium - albuterol 0.5 mg/2.5 mg/3 mL (DUONEB) neb solution 3 mL (3 mLs Nebulization $Given 3/3/24 0050)   buprenorphine HCl-naloxone HCl (SUBOXONE) 4-1 MG per film 2 Film (2 Film Sublingual $Given 3/2/24 1616)   metoclopramide (REGLAN) injection 10 mg (10 mg Intravenous $Given 3/2/24 1619)   diphenhydrAMINE (BENADRYL) injection 25 mg (25 mg Intravenous $Given 3/2/24 1617)   iopamidol (ISOVUE-370) solution 500 mL (65 mLs Intravenous $Given 3/2/24 1653)   sodium chloride 0.9 % bag 100mL for CT scan flush use (70 mLs Intravenous $Given 3/2/24 1653)   oseltamivir (TAMIFLU) capsule 75 mg (75 mg Oral Not Given 3/2/24 1753)   ketorolac (TORADOL) injection 30 mg (30 mg Intravenous $Given 3/2/24 1908)       Assessments & Plan     I have reviewed the findings, diagnosis, plan     Current Discharge Medication List          Final diagnoses:   Influenza A   Acute respiratory failure with hypercapnia (H)   Somnolence   Bigeminy     Disposition: Patient admitted to the ICU hospitalist service    Note: Chart documentation done in part with Dragon Voice Recognition software. Although reviewed after completion, some word and grammatical errors may remain.   3/2/2024   [facility] INTENSIVE CARE       Saray Gallardo MD  03/03/24 0159

## 2024-03-03 NOTE — ANESTHESIA PROCEDURE NOTES
Arterial Line Procedure Note    Pre-Procedure   Staff -        CRNA: Diane Duggan APRN CRNA       Performed By: CRNA       Location: ICU       Pre-Anesthestic Checklist: patient identified, IV checked, risks and benefits discussed, informed consent, monitors and equipment checked, pre-op evaluation and at physician/surgeon's request  Timeout:       Correct Patient: Yes        Correct Procedure: Yes        Correct Site: Yes        Correct Position: Yes   Line Placement:   This line was placed Pre Induction starting at 3/3/2024 11:15 AM and ending at 3/3/2024 12:00 PM  Procedure   Procedure: arterial line and new line       Laterality: right       Insertion Site: radial.  Sterile Prep        All elements of maximal sterile barrier technique followed       Patient Prep/Sterile Barriers: hand hygiene, sterile gloves, hat, mask, draped, sterile gown, patient draped, draped       Skin prep: Chloraprep  Insertion/Injection        Technique: ultrasound guided and Seldinger Technique        1. Ultrasound was used to evaluate the access site.       2. Artery evaluated via ultrasound for patency/adequacy.       3. Using real-time ultrasound the needle/catheter was observed entering the artery/vein.       5. The visualized structures were anatomically normal.       6. There were no apparent abnormal pathologic findings.       Catheter size: 20 Ga, 15cm.  Narrative         Secured by: suture       Tegaderm and Biopatch dressing used.       Complications: None apparent,        Arterial waveform: Yes        IBP within 10% of NIBP: Yes   Comments:  Ultra sound guided  right radial a-line placed without difficulty as per note above.  A-line drawing back blood and flushing with ease. Zeroed per ICU RN, good wave form with dicrotic notch, correlates well with manual BP cuff.  Will follow as necessary.

## 2024-03-03 NOTE — PROGRESS NOTES
Tele ICU brief Note    Camera'd in per request.     Pt 49 yo Severe ILD on immunosuppression, pulm HTN, former smoker (only recently quit) admit with acute on  chronic hypoxemic respiratory failure requiring NIV  secondary to influenza A infection. .  Pt on S/t mode with variable tolerance and hypercarbia. Nursing noted 1x emesis episode with BIPAP however doubts aspiration as able to get mask off in time prior to emesis. Pt noted to not get volume thus RT changed to AVAP wih target tv 450 rate 28. On exam pt awake ,alert, Bipap in place with synchrony, TV ranging 350-400 with minimal leak.   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 Dr Marinelli of note.     Darius Roberson MD

## 2024-03-03 NOTE — ANESTHESIA CARE TRANSFER NOTE
Patient: Anali Loya    Procedure: * No procedures listed *       Diagnosis: * No pre-op diagnosis entered *  Diagnosis Additional Information: No value filed.    Anesthesia Type:   No value filed.     Note:      Level of Consciousness: drowsy      Independent Airway: airway patency satisfactory and stable  Dentition: dentition unchanged    Report to RN Given: handoff report given  Patient transferred to: ICU  Comments: Procedure only.   ICU Handoff: Call for PAUSE to initiate/utilize ICU HANDOFF, Identified Patient, Identified Responsible Provider, Reviewed the Pertinent Medical History, Discussed Surgical Course, Reviewed Intra-OP Anesthesia Management and Issues during Anesthesia, Set Expectations for Post Procedure Period and Allowed Opportunity for Questions and Acknowledgement of Understanding  Vitals:  Vitals Value Taken Time   BP 85/52 03/03/24 1207   Temp 97.7  F (36.5  C) 03/03/24 1205   Pulse 73 03/03/24 1219   Resp 29 03/03/24 1219   SpO2 97 % 03/03/24 1219   Vitals shown include unfiled device data.    Electronically Signed By: PURA Chase CRNA  March 3, 2024  12:19 PM

## 2024-03-03 NOTE — PLAN OF CARE
"Goal Outcome Evaluation:      Plan of Care Reviewed With: patient    Overall Patient Progress: decliningOverall Patient Progress: declining    Outcome Evaluation: Please see previous shift notes by this writer and Intensiveist for shift report    /75   Pulse 90   Temp 98.2  F (36.8  C) (Axillary)   Resp 16   Ht 1.549 m (5' 1\")   Wt 60.8 kg (134 lb)   LMP  (LMP Unknown)   SpO2 93%   BMI 25.32 kg/m     "
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
## 2024-03-03 NOTE — PROGRESS NOTES
Called to ER at 1715 by RN and Provider d/t SpO2 in the 70s. Pt lethargic, suspecting worsened CO2 from previous VBG. Pt placed on BiPAP 16/6 initially, but when sleeping tidal volumes were only 150ml. Switch to AVAPS for a more consistent Vt. RR increased from 18 to 24 after 1846 VBG. Current settings are: Vt 450, RR 24, EPAP +6, IPAP 10-28, FiO2 50%.    Venous Blood Gas  Recent Labs   Lab 03/02/24 1846 03/02/24  1540   PHV 7.24* 7.31*   PCO2V 81* 70*   PO2V 46 65*   HCO3V 35* 35*   TETO 4.1* 6.3*   O2PER 50 44

## 2024-03-03 NOTE — PROGRESS NOTES
03/03/24 1659   AVAPS Settings   Min P (cmH20) AVAPS 10   Max P (cmH20) AVAPS 35   Target VT (mL) AVAPS 450   Set Rate (breath/min) 28 breath/min   Timed Inspiration (Sec) 0.9   Rise Time 1   AVAPS Patient Parameters   Respiratory Rate Total (breaths/min) 28   Vte (mL) 685   Peak Inspiratory Pressure (cm H2O) 16   MAP (cm H2O) Patient Parameters 2   Minute Ventilation (L/min) 17.4   Total Leak 60   CPAP/BiPAP/AVAPS/AVAPS AE Alarms   High Pressure (cm H2O) 40 cmH2O   Low Pressure (cm H2O) 2   Low Pressure Delay (sec) 20 sec   Apnea (sec) 3   Lo Min Vent 3   High Rate (breaths/min) 40 breaths/min   Low Rate (breaths/min) 10   Audible Alarm set at (Volume of Alarm) 5   RT Device Skin Assessment   Oxygen Delivery Device CPAP/BiPAP Mask   Interface Face Mask - Small   Site Appearance neck circumference Clean and dry   Site Appearance bridge of nose Clean and dry   Site Appearance occiput Clean and dry   Strap Tightness Finger Allowance between head and device strap   Device Skin Interventions Taken Device type changed   Respiratory WDL   Respiratory WDL X   Rhythm/Pattern, Respiratory assisted mechanically   Breath Sounds   Breath Sounds All Fields   All Lung Fields Breath Sounds diminished   KM Breath Sounds diminished   LLL Breath Sounds diminished   RUL Breath Sounds diminished   RML Breath Sounds diminished   RLL Breath Sounds diminished     Patient is requiring V60 BIPAP in AVAPS mode for ventilatory support

## 2024-03-03 NOTE — PROGRESS NOTES
1026-0330      AM VBG shows worsening PH and CO2 levels.  Pt with 225ml dark brown/Maroon emesis.  Pt placed on NC 6L and given IV zofran.  Pt placed back on Bi-pap AVPAS when nausea subsided.  Pt with very low VT after resuming Bi-pap.  Consulted RT and per on call -RT, trial  S/P mode 12/5 RR 28 FIO2 25%.  Pt then desat to 82% and -210.  Tele ICU consulted and instructions given to put back on AVPAS mode FIO2 50% EPAP 6 .  Pt VT improved to 350-400.  VBG ordered for 0650 this AM.    MD Crowell added on CBC and H&H WNL.  Verbal orders from tele-ICU to lower IVF to 10ml/hr TKO rate

## 2024-03-04 LAB
ALBUMIN SERPL BCG-MCNC: 3.7 G/DL (ref 3.5–5.2)
ALLEN'S TEST: ABNORMAL
ALLEN'S TEST: ABNORMAL
ALP SERPL-CCNC: 44 U/L (ref 40–150)
ALT SERPL W P-5'-P-CCNC: 26 U/L (ref 0–50)
ANION GAP SERPL CALCULATED.3IONS-SCNC: 9 MMOL/L (ref 7–15)
AST SERPL W P-5'-P-CCNC: 28 U/L (ref 0–45)
BASE EXCESS BLDA CALC-SCNC: 7.4 MMOL/L (ref -3–3)
BASE EXCESS BLDA CALC-SCNC: 8.8 MMOL/L (ref -3–3)
BASE EXCESS BLDV CALC-SCNC: 9.8 MMOL/L (ref -3–3)
BILIRUB SERPL-MCNC: 0.2 MG/DL
BUN SERPL-MCNC: 23.4 MG/DL (ref 6–20)
CALCIUM SERPL-MCNC: 8.7 MG/DL (ref 8.6–10)
CHLORIDE SERPL-SCNC: 99 MMOL/L (ref 98–107)
COHGB MFR BLD: 97.8 % (ref 96–97)
COHGB MFR BLD: 98.4 % (ref 96–97)
CREAT SERPL-MCNC: 0.71 MG/DL (ref 0.51–0.95)
CRP SERPL-MCNC: 19.74 MG/L
DEPRECATED HCO3 PLAS-SCNC: 32 MMOL/L (ref 22–29)
EGFRCR SERPLBLD CKD-EPI 2021: >90 ML/MIN/1.73M2
ERYTHROCYTE [DISTWIDTH] IN BLOOD BY AUTOMATED COUNT: 14.2 % (ref 10–15)
GLUCOSE SERPL-MCNC: 145 MG/DL (ref 70–99)
HCO3 BLD-SCNC: 34 MMOL/L (ref 21–28)
HCO3 BLD-SCNC: 35 MMOL/L (ref 21–28)
HCO3 BLDV-SCNC: 36 MMOL/L (ref 21–28)
HCT VFR BLD AUTO: 40 % (ref 35–47)
HGB BLD-MCNC: 12.9 G/DL (ref 11.7–15.7)
MCH RBC QN AUTO: 29.8 PG (ref 26.5–33)
MCHC RBC AUTO-ENTMCNC: 32.3 G/DL (ref 31.5–36.5)
MCV RBC AUTO: 92 FL (ref 78–100)
O2/TOTAL GAS SETTING VFR VENT: 45 %
O2/TOTAL GAS SETTING VFR VENT: 50 %
O2/TOTAL GAS SETTING VFR VENT: 50 %
OXYHGB MFR BLDV: 70 % (ref 70–75)
PCO2 BLD: 52 MM HG (ref 35–45)
PCO2 BLD: 54 MM HG (ref 35–45)
PCO2 BLDV: 57 MM HG (ref 40–50)
PH BLD: 7.41 [PH] (ref 7.35–7.45)
PH BLD: 7.43 [PH] (ref 7.35–7.45)
PH BLDV: 7.41 [PH] (ref 7.32–7.43)
PHOSPHATE SERPL-MCNC: 2.3 MG/DL (ref 2.5–4.5)
PLATELET # BLD AUTO: 216 10E3/UL (ref 150–450)
PO2 BLD: 90 MM HG (ref 80–105)
PO2 BLD: 93 MM HG (ref 80–105)
PO2 BLDV: 38 MM HG (ref 25–47)
POTASSIUM SERPL-SCNC: 4 MMOL/L (ref 3.4–5.3)
PROT SERPL-MCNC: 6.7 G/DL (ref 6.4–8.3)
RBC # BLD AUTO: 4.33 10E6/UL (ref 3.8–5.2)
SAO2 % BLDA: 96 % (ref 92–100)
SAO2 % BLDA: 97 % (ref 92–100)
SAO2 % BLDV: 71.9 % (ref 70–75)
SODIUM SERPL-SCNC: 140 MMOL/L (ref 135–145)
WBC # BLD AUTO: 6.4 10E3/UL (ref 4–11)

## 2024-03-04 PROCEDURE — 82805 BLOOD GASES W/O2 SATURATION: CPT | Performed by: FAMILY MEDICINE

## 2024-03-04 PROCEDURE — 80053 COMPREHEN METABOLIC PANEL: CPT | Performed by: FAMILY MEDICINE

## 2024-03-04 PROCEDURE — 250N000011 HC RX IP 250 OP 636: Performed by: FAMILY MEDICINE

## 2024-03-04 PROCEDURE — 85027 COMPLETE CBC AUTOMATED: CPT | Performed by: FAMILY MEDICINE

## 2024-03-04 PROCEDURE — 200N000001 HC R&B ICU

## 2024-03-04 PROCEDURE — 250N000009 HC RX 250: Performed by: FAMILY MEDICINE

## 2024-03-04 PROCEDURE — 250N000013 HC RX MED GY IP 250 OP 250 PS 637: Performed by: FAMILY MEDICINE

## 2024-03-04 PROCEDURE — 258N000003 HC RX IP 258 OP 636: Performed by: INTERNAL MEDICINE

## 2024-03-04 PROCEDURE — 999N000215 HC STATISTIC HFNC ADULT NON-CPAP

## 2024-03-04 PROCEDURE — 84100 ASSAY OF PHOSPHORUS: CPT | Performed by: INTERNAL MEDICINE

## 2024-03-04 PROCEDURE — 250N000013 HC RX MED GY IP 250 OP 250 PS 637: Performed by: INTERNAL MEDICINE

## 2024-03-04 PROCEDURE — 99233 SBSQ HOSP IP/OBS HIGH 50: CPT | Performed by: FAMILY MEDICINE

## 2024-03-04 PROCEDURE — 258N000003 HC RX IP 258 OP 636: Performed by: FAMILY MEDICINE

## 2024-03-04 PROCEDURE — C9113 INJ PANTOPRAZOLE SODIUM, VIA: HCPCS | Performed by: FAMILY MEDICINE

## 2024-03-04 PROCEDURE — 86140 C-REACTIVE PROTEIN: CPT | Performed by: FAMILY MEDICINE

## 2024-03-04 PROCEDURE — 272N000272 HC CONTINUOUS NEBULIZER MICRO PUMP

## 2024-03-04 PROCEDURE — 250N000011 HC RX IP 250 OP 636: Performed by: INTERNAL MEDICINE

## 2024-03-04 RX ORDER — AMOXICILLIN 250 MG
1 CAPSULE ORAL 2 TIMES DAILY
Status: DISCONTINUED | OUTPATIENT
Start: 2024-03-04 | End: 2024-03-06 | Stop reason: HOSPADM

## 2024-03-04 RX ORDER — PANTOPRAZOLE SODIUM 40 MG/1
40 TABLET, DELAYED RELEASE ORAL
Status: DISCONTINUED | OUTPATIENT
Start: 2024-03-05 | End: 2024-03-06 | Stop reason: HOSPADM

## 2024-03-04 RX ORDER — TADALAFIL 20 MG/1
40 TABLET ORAL DAILY
Status: DISCONTINUED | OUTPATIENT
Start: 2024-03-04 | End: 2024-03-06 | Stop reason: HOSPADM

## 2024-03-04 RX ORDER — CYCLOBENZAPRINE HCL 10 MG
10 TABLET ORAL 3 TIMES DAILY
Status: DISCONTINUED | OUTPATIENT
Start: 2024-03-04 | End: 2024-03-06 | Stop reason: HOSPADM

## 2024-03-04 RX ORDER — SERTRALINE HYDROCHLORIDE 100 MG/1
100 TABLET, FILM COATED ORAL DAILY
Status: DISCONTINUED | OUTPATIENT
Start: 2024-03-04 | End: 2024-03-06 | Stop reason: HOSPADM

## 2024-03-04 RX ORDER — OSELTAMIVIR PHOSPHATE 75 MG/1
75 CAPSULE ORAL 2 TIMES DAILY
Status: DISCONTINUED | OUTPATIENT
Start: 2024-03-04 | End: 2024-03-06 | Stop reason: HOSPADM

## 2024-03-04 RX ORDER — AMOXICILLIN 250 MG
1 CAPSULE ORAL 2 TIMES DAILY PRN
Status: DISCONTINUED | OUTPATIENT
Start: 2024-03-04 | End: 2024-03-06 | Stop reason: HOSPADM

## 2024-03-04 RX ORDER — VITAMIN B COMPLEX
25 TABLET ORAL DAILY
Status: DISCONTINUED | OUTPATIENT
Start: 2024-03-04 | End: 2024-03-06 | Stop reason: HOSPADM

## 2024-03-04 RX ORDER — BISACODYL 10 MG
10 SUPPOSITORY, RECTAL RECTAL DAILY PRN
Status: DISCONTINUED | OUTPATIENT
Start: 2024-03-04 | End: 2024-03-06 | Stop reason: HOSPADM

## 2024-03-04 RX ADMIN — ACETAMINOPHEN 650 MG: 325 TABLET, FILM COATED ORAL at 10:39

## 2024-03-04 RX ADMIN — CYCLOBENZAPRINE 10 MG: 10 TABLET, FILM COATED ORAL at 20:34

## 2024-03-04 RX ADMIN — LORAZEPAM 0.5 MG: 2 INJECTION INTRAMUSCULAR; INTRAVENOUS at 01:33

## 2024-03-04 RX ADMIN — PANTOPRAZOLE SODIUM 40 MG: 40 INJECTION, POWDER, FOR SOLUTION INTRAVENOUS at 20:34

## 2024-03-04 RX ADMIN — CYCLOBENZAPRINE 10 MG: 10 TABLET, FILM COATED ORAL at 10:39

## 2024-03-04 RX ADMIN — SERTRALINE HYDROCHLORIDE 100 MG: 100 TABLET ORAL at 10:37

## 2024-03-04 RX ADMIN — METHYLPREDNISOLONE SODIUM SUCCINATE 62.5 MG: 125 INJECTION INTRAMUSCULAR; INTRAVENOUS at 07:02

## 2024-03-04 RX ADMIN — Medication 25 MCG: at 10:37

## 2024-03-04 RX ADMIN — SENNOSIDES AND DOCUSATE SODIUM 1 TABLET: 8.6; 5 TABLET ORAL at 10:43

## 2024-03-04 RX ADMIN — CYCLOBENZAPRINE 10 MG: 10 TABLET, FILM COATED ORAL at 14:47

## 2024-03-04 RX ADMIN — KETOROLAC TROMETHAMINE 30 MG: 30 INJECTION, SOLUTION INTRAMUSCULAR at 19:35

## 2024-03-04 RX ADMIN — CLONAZEPAM 0.5 MG: 0.5 TABLET ORAL at 19:06

## 2024-03-04 RX ADMIN — TADALAFIL 40 MG: 20 TABLET ORAL at 14:45

## 2024-03-04 RX ADMIN — OSELTAMIVIR PHOSPHATE 75 MG: 75 CAPSULE ORAL at 20:34

## 2024-03-04 RX ADMIN — FENTANYL CITRATE 75 MCG/HR: 0.05 INJECTION, SOLUTION INTRAMUSCULAR; INTRAVENOUS at 14:38

## 2024-03-04 RX ADMIN — PANTOPRAZOLE SODIUM 40 MG: 40 INJECTION, POWDER, FOR SOLUTION INTRAVENOUS at 08:20

## 2024-03-04 RX ADMIN — OXYCODONE HYDROCHLORIDE 5 MG: 5 TABLET ORAL at 16:17

## 2024-03-04 RX ADMIN — METHYLPREDNISOLONE SODIUM SUCCINATE 62.5 MG: 125 INJECTION INTRAMUSCULAR; INTRAVENOUS at 14:44

## 2024-03-04 RX ADMIN — OSELTAMIVIR PHOSPHATE 75 MG: 75 CAPSULE ORAL at 10:38

## 2024-03-04 RX ADMIN — METHYLPREDNISOLONE SODIUM SUCCINATE 62.5 MG: 125 INJECTION INTRAMUSCULAR; INTRAVENOUS at 19:03

## 2024-03-04 RX ADMIN — FUROSEMIDE 10 MG: 10 INJECTION, SOLUTION INTRAVENOUS at 23:34

## 2024-03-04 RX ADMIN — OXYCODONE HYDROCHLORIDE 5 MG: 5 TABLET ORAL at 12:24

## 2024-03-04 RX ADMIN — KETOROLAC TROMETHAMINE 30 MG: 30 INJECTION, SOLUTION INTRAMUSCULAR at 08:24

## 2024-03-04 RX ADMIN — SODIUM PHOSPHATE, MONOBASIC, MONOHYDRATE AND SODIUM PHOSPHATE, DIBASIC, ANHYDROUS 9 MMOL: 142; 276 INJECTION, SOLUTION INTRAVENOUS at 07:02

## 2024-03-04 RX ADMIN — CLONAZEPAM 0.5 MG: 0.5 TABLET ORAL at 12:24

## 2024-03-04 RX ADMIN — ACETAMINOPHEN 650 MG: 325 TABLET, FILM COATED ORAL at 14:47

## 2024-03-04 RX ADMIN — FUROSEMIDE 10 MG: 10 INJECTION, SOLUTION INTRAVENOUS at 14:42

## 2024-03-04 RX ADMIN — OXYCODONE HYDROCHLORIDE 5 MG: 5 TABLET ORAL at 20:35

## 2024-03-04 RX ADMIN — LORAZEPAM 0.5 MG: 2 INJECTION INTRAMUSCULAR; INTRAVENOUS at 08:29

## 2024-03-04 RX ADMIN — METHYLPREDNISOLONE SODIUM SUCCINATE 62.5 MG: 125 INJECTION INTRAMUSCULAR; INTRAVENOUS at 01:17

## 2024-03-04 RX ADMIN — KETOROLAC TROMETHAMINE 30 MG: 30 INJECTION, SOLUTION INTRAMUSCULAR at 02:58

## 2024-03-04 RX ADMIN — SODIUM CHLORIDE, POTASSIUM CHLORIDE, SODIUM LACTATE AND CALCIUM CHLORIDE: 600; 310; 30; 20 INJECTION, SOLUTION INTRAVENOUS at 16:16

## 2024-03-04 RX ADMIN — SENNOSIDES AND DOCUSATE SODIUM 1 TABLET: 8.6; 5 TABLET ORAL at 20:34

## 2024-03-04 RX ADMIN — ENOXAPARIN SODIUM 40 MG: 40 INJECTION SUBCUTANEOUS at 20:37

## 2024-03-04 RX ADMIN — FUROSEMIDE 10 MG: 10 INJECTION, SOLUTION INTRAVENOUS at 07:03

## 2024-03-04 ASSESSMENT — ACTIVITIES OF DAILY LIVING (ADL)
ADLS_ACUITY_SCORE: 25

## 2024-03-04 NOTE — TELEPHONE ENCOUNTER
RN TRIAGE CALL:    Patient Contact    Attempt # 2    Was call answered?  No.  Left message on voicemail with information to call me back.    Writer notes patient was seen in ED to hospital admission for respiratory related illness on 3/2/24.     DONNA Melendez, RN

## 2024-03-04 NOTE — PROGRESS NOTES
Tele-ICU progress note  DOS: 3/4/2024, 10:42 AM      Patient chart reviewed. Ms Loya is a 49 y/o woman with severe interstitial lung disease on 6L/min oxygen at baseline who was admitted 3/2 with acute-on-chronic hypoxemic and hypercarbic respiratory failure secondary to superimposed influenza A.    Temp:  [97.7  F (36.5  C)-98.8  F (37.1  C)] 98  F (36.7  C)  Pulse:  [49-80] 51  Resp:  [6-38] 6  BP: ()/(51-74) 99/70  MAP:  [62 mmHg-90 mmHg] 75 mmHg  Arterial Line BP: ()/(47-73) 87/65  FiO2 (%):  [50 %] 50 %  SpO2:  [91 %-98 %] 96 %     FiO2 (%): 50 %  Resp: (!) 6    Fentanyl 125 -> 100 mcg/hr  NE 0-0.02 mcg/kg/min    ABG 7.41/54/90 on AVAPS -> 7.43/52/92 on HFNC 35L/50%    CRP 62->19.7  WBCs 6.4, Hb stable at 12.9  Procalcitonin 0.28    Assessment/recommendations:  49 y/o woman with severe interstitial lung disease, admitted 3/2 with acute on chronic mixed respiratory failure secondary to influenza A.     # Acute on chronic mixed respiratory failure  # Severe ILD  # Chronic hypoxemia (6L baseline)  - VBG has remained stable after switching from AVAPS/BiPAP to HFNC  - Wean support as tolerated  - Continues on steroid burst, other home meds    # Hypotension  - Has weaned off NE    # Influenza A  # ?CAP  - On oseltamivir  - If she decompensates, consider adding empiric bacterial pneumonia coverage    No additional recommendations from tele at this time.     At the time of my review I was able to access the patient s medical records/chart but not personally see or evaluate the patient. I cannot provide direct medical advice or consultation, but can provide a general opinion for consideration by the in-person treating provider. The contents of this note are not meant to replace or supersede the clinical judgement of the in-person treating provider.     Tele-hub will continue to follow. Please call with questions.    Jimmy Bateman MD, PhD  Surgical critical care  3/4/2024, 10:52 AM

## 2024-03-04 NOTE — ANESTHESIA POSTPROCEDURE EVALUATION
Patient: Anali Loya    Procedure: * No procedures listed *       Anesthesia Type:  No value filed.    Note:  Disposition: ICU            ICU Sign Out: Anesthesiologist/ICU physician sign out WAS performed   Postop Pain Control:    PONV:    Neuro/Psych:    Airway/Respiratory:             Sign Out: Acceptable/Baseline resp. status; O2 supplementation               Oxygen: Nasal Cannula   CV/Hemodynamics:    Other NRE: NONE   DID A NON-ROUTINE EVENT OCCUR? No    Event details/Postop Comments:  Arterial line in place, still working well.   No complications.  I will follow up with the pt if needed.           Last vitals:  Vitals:    03/04/24 1030 03/04/24 1045 03/04/24 1130   BP: 100/63 95/64    Pulse: 63 66 64   Resp: 16 29 12   Temp:      SpO2: 96% 97% 96%       Electronically Signed By: PURA Pyle CRNA  March 4, 2024  11:50 AM

## 2024-03-04 NOTE — PLAN OF CARE
Goal Outcome Evaluation:      Plan of Care Reviewed With: patient    Overall Patient Progress: improvingOverall Patient Progress: improving       4 hour shift note- Patient alert and oriented. Levo remains on at 0.03mcg/kg/min. Fentanyl gtt increased to 100mcg/hr. Berman has adequate output. Sinus rhythm on telemetry. Bipap remains in AVAPS setting, refer to RT notes and flowsheets. PICC line infusing. ART line in place with blood return noted.

## 2024-03-04 NOTE — PLAN OF CARE
Goal Outcome Evaluation:    Plan of Care Reviewed With: patient    Overall Patient Progress: improving    Outcome Evaluation: Pt A&Ox4. RASS of 0. Afebrile. BPs stable on 0.02 mcg/kg/hr of Levophed. Pt continues to report headache and back pain. Fentanyl titrated up to 150 mcg/hr. Pt also given toradol and ativan to help with pain. Bipap on all night in AVAPS mode with FiO2 at 50%. At 0615, pt asked for a break from the bipap. Pt was placed on 5L oxymask and O2sats have been between 90-95%. Art line working well. Berman draining with adequate output.

## 2024-03-04 NOTE — PROGRESS NOTES
Nutrition Therapy Update: Brief Note    Writer noting poor PO intake so far per chart review - was previously NPO and advanced today per MD. Pt has enjoyed Ensure during previous admission.     Will order trial of Ensure enlive (any flavor) once daily with dinner, will adjust based on pt preference/tolerance.     May conduct full initial assessment if intake remains at or below 50% within the next few days and/or with a consult.     Nieves Skelton RD, LD  Clinical Dietitian  Office: [phone]  Weekend pager: [phone]

## 2024-03-04 NOTE — PLAN OF CARE
Goal Outcome Evaluation:      Plan of Care Reviewed With: patient    Overall Patient Progress: improvingOverall Patient Progress: improving    Outcome Evaluation: Pt. A&Ox4. Was lethargic this AM, awake more as ABGs improved, next recheck at 2000. BP began to trend lower, levophed started at 0.03mcg/kg/hr to maintain MAPs >65. BiPap AVAPS mode 50% FiO2, continued settings per RT. Fentanyl gtt started for chronic pain, titrated up to 50 mcg/hr. ART line placed in R wrist, PICC line placed in R. upper arm. Berman placed for retention, adequate UOP. Family at bedside off and on throughout the day, supportive.

## 2024-03-04 NOTE — PROGRESS NOTES
[facility]    Medicine Progress Note - Hospitalist Service    Date of Admission:  3/2/2024    Assessment & Plan   Patient is a 48-year-old female with severe interstitial lung disease and primary pulmonary hypertension on 6 L needed cannula at baseline as well as suppressive prednisone and prophylactic azithromycin, GERD, history of tobacco abuse with sobriety since December 2023, chronic pain on Suboxone management, depression/anxiety/PTSD who presented with worsening respiratory failure and has subsequently been diagnosed with influenza a requiring critical care management into the ICU with AVAPS/BiPAP necessity as well as IV Levophed for vasopressor support.  Patient has been started on IV steroids, IV fentanyl drip and IV Lasix and has had notable improvement in the last 24 hours and currently transition to high flow nasal cannula 35 L and 60% FiO2 with adequate tolerance of respiratory status with VBG stable.  Patient continues to require low-dose Levophed for ongoing blood pressure stability but has been slowly weaning throughout the course of the night with hopeful discontinuation in upcoming hours.  Patient remains critically ill and requires ongoing ICU management at this time.    Principal Problem:    Acute on chronic respiratory failure with hypoxia and hypercapnia (H)    Acute encephalopathy    Influenza A    Assessment:  Influenza A causing acute respiratory failure with severe hypercapnia causing acute encephalopathy.  Patient has been progressively improving over the last 24 hours with AVAPS initiation, IV steroids, IV Lasix, and IV fentanyl drip with notable improvement in air function and reduction in crackles on exam and so far is tolerating transition to high flow nasal cannula well with VBG showing compensated respiratory acidosis similar to patient's baseline.  Patient is now feeling very hungry and thirsty    Plan:   -Continue alternating between high flow nasal  cannula and AVAPS as needed for respiratory support and to maintain compensated respiratory acidosis  -Advance diet as tolerated  -Restart oral medications including Tamiflu 75 mg twice daily  -Continue IV Solu-Medrol 60 mg every 6 hours  -Continue IV Lasix 10 mg every 8 hours    Active Problems:    Acute upper GI bleed    Assessment: Patient had large emesis shortly after admission with gastric occult positive.  Patient currently denies any epigastric abdominal pain and hemoglobin has been stable    Plan:   -Will continue twice daily PPI  -Start with clear liquids and monitor closely for any epigastric symptoms with consideration of Carafate initiation if this is present.      Bigeminy    Assessment: Noted early in this hospital stay with patient in acute respiratory distress.  No sinus dysrhythmias noted since respiratory status is improved    Plan:   -Continue continuous cardiac monitoring to ensure ongoing resolution as respiratory status continues to improve      ILD (interstitial lung disease) with concerns for acute exacerbation    Assessment: Patient on 6 L nasal cannula at baseline, currently on suppressive prednisone therapy and has been slowly tapering down most recently at 6.5 mg daily.  Patient also is on azithromycin 3 times weekly for prophylaxis against bacterial infection.  Patient's goal is to get on the transplant list as soon as she is able, which would be May 2023 if she continues to remain tobacco free    Plan:   -Continue with acute respiratory illness secondary to influenza A treatment as outlined above including steroid burst and anticipate patient will need prolonged taper going forward as well as outpatient pulm neurology follow-up      Primary pulmonary hypertension (H)    Assessment: Patient is on nintedanib and macitentan at baseline which were held so far during this stay due to n.p.o. status    Plan:   -Restart home regimen and proceed with ongoing medical management of respiratory  failure as outlined above  -Patient will need outpatient follow-up with her cardiology team following discharge      Recurrent major depressive disorder (H24)    MOLLY (generalized anxiety disorder)    PTSD (post-traumatic stress disorder)    Assessment: Patient is on sertraline 100 mg daily    Plan:   -Restart home regimen at this time      Gastroesophageal reflux disease without esophagitis    Assessment: Patient is normally on Nexium 20 mg daily.  Does have acute GI bleed as outlined above    Plan:   -Protonix 40 mg twice daily as per hospital formulary with increased dosing secondary to acute GI bleed      Personal history of tobacco use, presenting hazards to health    Assessment: Patient has been tobacco free since December 2023 and is very motivated to continue with cessation as her goal is to get on lung transplant list as soon as possible    Plan:   -Encouragement was given to continue ongoing abstinence          Diet: NPO for Medical/Clinical Reasons Except for: No Exceptions    DVT Prophylaxis: Enoxaparin (Lovenox) SQ  Berman Catheter: PRESENT, indication: Acute retention or obstruction  Lines: PRESENT      PICC 03/03/24 Triple Lumen Right Basilic-Site Assessment: WDL  Arterial Line 03/03/24-Site Assessment: WDL      Cardiac Monitoring: ACTIVE order. Indication: ICU  Code Status: Full Code      Clinically Significant Risk Factors          # Hypocalcemia: Lowest Ca = 8.3 mg/dL in last 2 days, will monitor and replace as appropriate                           Disposition Plan   Unknown          Jia Ferrari MD  Hospitalist Service  [facility]  Securely message with [employer] (more info)  Text page via [employer] Paging/Directory   ______________________________________________________________________    Interval History   Patient continues to improve on AVAPS overnight and has transitioned to HFNC this morning with adequate tolerance and stability of blood gas so far.   Patient feeling much improved - breathing more easily, feeling very hungry and wanting to eat.  Tired and still has an acute headache but better than yesterday.  No new patient concerns.  Patient still needing levophed for blood pressure support but is on Fentanyl 150 mcg/hr for pain support with Levophed support increased when Fentanyl increased.  No new nursing concerns.     Physical Exam   Vital Signs: Temp: 98  F (36.7  C) Temp src: Axillary BP: 106/59 Pulse: 59   Resp: 13 SpO2: 94 % O2 Device: Oxymask Oxygen Delivery: 5 LPM  Weight: 123 lbs 3.2 oz    Constitutional: awake, alert, cooperative, no apparent distress, and appears stated age  Respiratory: patient has improved air movement but still very diminished.  Ongoing fine crackles in bilateral lower lungs but improved air movement in upper lung fields.  No wheezing   Cardiovascular: RRR  GI: bowel sounds present, abdomen soft and non-tender   Skin: no rashes  Musculoskeletal: no lower extremity pitting edema present  Neurologic: Awake, alert, oriented to name, place and situation     Medical Decision Making       52 MINUTES SPENT BY ME on the date of service doing chart review, history, exam, documentation & further activities per the note.      Data     I have personally reviewed the following data over the past 24 hrs:    6.4  \   12.9   / 216     140 99 23.4 (H) /  145 (H)   4.0 32 (H) 0.71 \     ALT: 26 AST: 28 AP: 44 TBILI: 0.2   ALB: 3.7 TOT PROTEIN: 6.7 LIPASE: N/A     Procal: N/A CRP: 19.74 (H) Lactic Acid: N/A

## 2024-03-04 NOTE — PLAN OF CARE
Goal Outcome Evaluation:      Plan of Care Reviewed With: patient      Outcome Evaluation:    S-(situation): End of Shift Note  Neuro: forgetful  CMS: intact  Pulmonary: on HFNC per Respiratory Therapy  CV: BP maps greater than 65.  : Berman intact and patent  Pain: Transitioning off FENTANYL gtt.  Flexeril scheduled and prn tylenol, toradol, and 1 dose of oxycodone.  Pain decreasing and patient able to relax in between care.   Drips: Titrated off levophed.    Plan: continue to titrate of fentanyl

## 2024-03-05 ENCOUNTER — APPOINTMENT (OUTPATIENT)
Dept: PHYSICAL THERAPY | Facility: CLINIC | Age: 49
End: 2024-03-05
Attending: INTERNAL MEDICINE
Payer: COMMERCIAL

## 2024-03-05 ENCOUNTER — APPOINTMENT (OUTPATIENT)
Dept: OCCUPATIONAL THERAPY | Facility: CLINIC | Age: 49
End: 2024-03-05
Payer: COMMERCIAL

## 2024-03-05 LAB
ALBUMIN SERPL BCG-MCNC: 3.6 G/DL (ref 3.5–5.2)
ALP SERPL-CCNC: 40 U/L (ref 40–150)
ALT SERPL W P-5'-P-CCNC: 25 U/L (ref 0–50)
ANION GAP SERPL CALCULATED.3IONS-SCNC: 9 MMOL/L (ref 7–15)
AST SERPL W P-5'-P-CCNC: 23 U/L (ref 0–45)
BASE EXCESS BLDV CALC-SCNC: 9.1 MMOL/L (ref -3–3)
BILIRUB SERPL-MCNC: 0.2 MG/DL
BUN SERPL-MCNC: 25.1 MG/DL (ref 6–20)
CALCIUM SERPL-MCNC: 8.8 MG/DL (ref 8.6–10)
CHLORIDE SERPL-SCNC: 98 MMOL/L (ref 98–107)
CREAT SERPL-MCNC: 0.81 MG/DL (ref 0.51–0.95)
CRP SERPL-MCNC: 9.1 MG/L
DEPRECATED HCO3 PLAS-SCNC: 32 MMOL/L (ref 22–29)
EGFRCR SERPLBLD CKD-EPI 2021: 89 ML/MIN/1.73M2
ERYTHROCYTE [DISTWIDTH] IN BLOOD BY AUTOMATED COUNT: 14.4 % (ref 10–15)
GLUCOSE SERPL-MCNC: 175 MG/DL (ref 70–99)
HCO3 BLDV-SCNC: 36 MMOL/L (ref 21–28)
HCT VFR BLD AUTO: 37.2 % (ref 35–47)
HGB BLD-MCNC: 11.8 G/DL (ref 11.7–15.7)
MCH RBC QN AUTO: 29.8 PG (ref 26.5–33)
MCHC RBC AUTO-ENTMCNC: 31.7 G/DL (ref 31.5–36.5)
MCV RBC AUTO: 94 FL (ref 78–100)
O2/TOTAL GAS SETTING VFR VENT: 40 %
OXYHGB MFR BLDV: 71 % (ref 70–75)
PCO2 BLDV: 57 MM HG (ref 40–50)
PH BLDV: 7.41 [PH] (ref 7.32–7.43)
PHOSPHATE SERPL-MCNC: 2.7 MG/DL (ref 2.5–4.5)
PLATELET # BLD AUTO: 180 10E3/UL (ref 150–450)
PO2 BLDV: 39 MM HG (ref 25–47)
POTASSIUM SERPL-SCNC: 3.9 MMOL/L (ref 3.4–5.3)
PROT SERPL-MCNC: 6.5 G/DL (ref 6.4–8.3)
RBC # BLD AUTO: 3.96 10E6/UL (ref 3.8–5.2)
SAO2 % BLDV: 72.9 % (ref 70–75)
SODIUM SERPL-SCNC: 139 MMOL/L (ref 135–145)
WBC # BLD AUTO: 7.5 10E3/UL (ref 4–11)

## 2024-03-05 PROCEDURE — 250N000011 HC RX IP 250 OP 636: Performed by: FAMILY MEDICINE

## 2024-03-05 PROCEDURE — 97535 SELF CARE MNGMENT TRAINING: CPT | Mod: GO | Performed by: SPECIALIST/TECHNOLOGIST

## 2024-03-05 PROCEDURE — 250N000011 HC RX IP 250 OP 636: Performed by: INTERNAL MEDICINE

## 2024-03-05 PROCEDURE — 82805 BLOOD GASES W/O2 SATURATION: CPT | Performed by: FAMILY MEDICINE

## 2024-03-05 PROCEDURE — 80053 COMPREHEN METABOLIC PANEL: CPT | Performed by: FAMILY MEDICINE

## 2024-03-05 PROCEDURE — 85027 COMPLETE CBC AUTOMATED: CPT | Performed by: FAMILY MEDICINE

## 2024-03-05 PROCEDURE — 999N000157 HC STATISTIC RCP TIME EA 10 MIN

## 2024-03-05 PROCEDURE — 86140 C-REACTIVE PROTEIN: CPT | Performed by: FAMILY MEDICINE

## 2024-03-05 PROCEDURE — 97110 THERAPEUTIC EXERCISES: CPT | Mod: GP | Performed by: PHYSICAL THERAPIST

## 2024-03-05 PROCEDURE — 250N000013 HC RX MED GY IP 250 OP 250 PS 637: Performed by: INTERNAL MEDICINE

## 2024-03-05 PROCEDURE — 97162 PT EVAL MOD COMPLEX 30 MIN: CPT | Mod: GP | Performed by: PHYSICAL THERAPIST

## 2024-03-05 PROCEDURE — 97165 OT EVAL LOW COMPLEX 30 MIN: CPT | Mod: GO | Performed by: SPECIALIST/TECHNOLOGIST

## 2024-03-05 PROCEDURE — 250N000013 HC RX MED GY IP 250 OP 250 PS 637: Performed by: FAMILY MEDICINE

## 2024-03-05 PROCEDURE — 250N000012 HC RX MED GY IP 250 OP 636 PS 637: Performed by: FAMILY MEDICINE

## 2024-03-05 PROCEDURE — 120N000001 HC R&B MED SURG/OB

## 2024-03-05 PROCEDURE — 97116 GAIT TRAINING THERAPY: CPT | Mod: GP | Performed by: PHYSICAL THERAPIST

## 2024-03-05 PROCEDURE — 84100 ASSAY OF PHOSPHORUS: CPT | Performed by: FAMILY MEDICINE

## 2024-03-05 PROCEDURE — 99232 SBSQ HOSP IP/OBS MODERATE 35: CPT | Performed by: FAMILY MEDICINE

## 2024-03-05 RX ORDER — AZITHROMYCIN 250 MG/1
TABLET, FILM COATED ORAL
Qty: 30 TABLET | Refills: 0 | Status: SHIPPED | OUTPATIENT
Start: 2024-03-05 | End: 2024-06-11

## 2024-03-05 RX ORDER — AZITHROMYCIN 250 MG/1
250 TABLET, FILM COATED ORAL
Status: DISCONTINUED | OUTPATIENT
Start: 2024-03-06 | End: 2024-03-06 | Stop reason: HOSPADM

## 2024-03-05 RX ORDER — PREDNISONE 20 MG/1
60 TABLET ORAL DAILY
Status: DISCONTINUED | OUTPATIENT
Start: 2024-03-05 | End: 2024-03-06 | Stop reason: HOSPADM

## 2024-03-05 RX ORDER — BUPRENORPHINE AND NALOXONE 4; 1 MG/1; MG/1
2 FILM, SOLUBLE BUCCAL; SUBLINGUAL 3 TIMES DAILY
Status: DISCONTINUED | OUTPATIENT
Start: 2024-03-05 | End: 2024-03-06 | Stop reason: HOSPADM

## 2024-03-05 RX ORDER — BUPRENORPHINE AND NALOXONE 8; 2 MG/1; MG/1
1 FILM, SOLUBLE BUCCAL; SUBLINGUAL 3 TIMES DAILY
Status: DISCONTINUED | OUTPATIENT
Start: 2024-03-05 | End: 2024-03-05

## 2024-03-05 RX ADMIN — OXYCODONE HYDROCHLORIDE 5 MG: 5 TABLET ORAL at 00:41

## 2024-03-05 RX ADMIN — OXYCODONE HYDROCHLORIDE 5 MG: 5 TABLET ORAL at 09:34

## 2024-03-05 RX ADMIN — METHYLPREDNISOLONE SODIUM SUCCINATE 62.5 MG: 125 INJECTION INTRAMUSCULAR; INTRAVENOUS at 06:44

## 2024-03-05 RX ADMIN — ENOXAPARIN SODIUM 40 MG: 40 INJECTION SUBCUTANEOUS at 20:04

## 2024-03-05 RX ADMIN — Medication 25 MCG: at 08:58

## 2024-03-05 RX ADMIN — SERTRALINE HYDROCHLORIDE 100 MG: 100 TABLET ORAL at 08:58

## 2024-03-05 RX ADMIN — CYCLOBENZAPRINE 10 MG: 10 TABLET, FILM COATED ORAL at 13:33

## 2024-03-05 RX ADMIN — SENNOSIDES AND DOCUSATE SODIUM 1 TABLET: 8.6; 5 TABLET ORAL at 08:58

## 2024-03-05 RX ADMIN — ACETAMINOPHEN 650 MG: 325 TABLET, FILM COATED ORAL at 00:41

## 2024-03-05 RX ADMIN — ACETAMINOPHEN 650 MG: 325 TABLET, FILM COATED ORAL at 23:54

## 2024-03-05 RX ADMIN — ACETAMINOPHEN 650 MG: 325 TABLET, FILM COATED ORAL at 04:44

## 2024-03-05 RX ADMIN — OSELTAMIVIR PHOSPHATE 75 MG: 75 CAPSULE ORAL at 20:04

## 2024-03-05 RX ADMIN — PANTOPRAZOLE SODIUM 40 MG: 40 TABLET, DELAYED RELEASE ORAL at 16:26

## 2024-03-05 RX ADMIN — CLONAZEPAM 0.5 MG: 0.5 TABLET ORAL at 04:47

## 2024-03-05 RX ADMIN — CLONAZEPAM 0.5 MG: 0.5 TABLET ORAL at 20:04

## 2024-03-05 RX ADMIN — TADALAFIL 40 MG: 20 TABLET ORAL at 08:59

## 2024-03-05 RX ADMIN — OSELTAMIVIR PHOSPHATE 75 MG: 75 CAPSULE ORAL at 08:58

## 2024-03-05 RX ADMIN — CYCLOBENZAPRINE 10 MG: 10 TABLET, FILM COATED ORAL at 20:03

## 2024-03-05 RX ADMIN — PANTOPRAZOLE SODIUM 40 MG: 40 TABLET, DELAYED RELEASE ORAL at 06:44

## 2024-03-05 RX ADMIN — OXYCODONE HYDROCHLORIDE 5 MG: 5 TABLET ORAL at 17:51

## 2024-03-05 RX ADMIN — CLONAZEPAM 0.5 MG: 0.5 TABLET ORAL at 11:07

## 2024-03-05 RX ADMIN — PREDNISONE 60 MG: 20 TABLET ORAL at 13:33

## 2024-03-05 RX ADMIN — FUROSEMIDE 10 MG: 10 INJECTION, SOLUTION INTRAVENOUS at 06:44

## 2024-03-05 RX ADMIN — KETOROLAC TROMETHAMINE 30 MG: 30 INJECTION, SOLUTION INTRAMUSCULAR at 06:44

## 2024-03-05 RX ADMIN — SENNOSIDES AND DOCUSATE SODIUM 1 TABLET: 8.6; 5 TABLET ORAL at 20:04

## 2024-03-05 RX ADMIN — METHYLPREDNISOLONE SODIUM SUCCINATE 62.5 MG: 125 INJECTION INTRAMUSCULAR; INTRAVENOUS at 00:41

## 2024-03-05 RX ADMIN — OXYCODONE HYDROCHLORIDE 5 MG: 5 TABLET ORAL at 04:44

## 2024-03-05 RX ADMIN — CYCLOBENZAPRINE 10 MG: 10 TABLET, FILM COATED ORAL at 08:58

## 2024-03-05 RX ADMIN — METHYLPREDNISOLONE SODIUM SUCCINATE 62.5 MG: 125 INJECTION INTRAMUSCULAR; INTRAVENOUS at 12:41

## 2024-03-05 ASSESSMENT — ACTIVITIES OF DAILY LIVING (ADL)
ADLS_ACUITY_SCORE: 25
IADL_COMMENTS: PREVIOUSLY INDEPENDENT
DEPENDENT_IADLS:: CLEANING;SHOPPING
ADLS_ACUITY_SCORE: 25
PREVIOUS_RESPONSIBILITIES: MEAL PREP;HOUSEKEEPING;LAUNDRY;SHOPPING;MEDICATION MANAGEMENT;FINANCES;DRIVING

## 2024-03-05 NOTE — PROGRESS NOTES
"   03/05/24 1500   Appointment Info   Signing Clinician's Name / Credentials (PT) Belle Bennett, PT, DPT       Present no   Living Environment   People in Home parent(s);child(janessa), adult  (20, 25 years old)   Current Living Arrangements house   Home Accessibility stairs within home;stairs to enter home   Number of Stairs, Main Entrance 2   Stair Railings, Main Entrance none   Number of Stairs, Within Home, Primary greater than 10 stairs   Stair Railings, Within Home, Primary railings safe and in good condition   Transportation Anticipated family or friend will provide   Living Environment Comments Bedroom is on the upper level, tub/shower on the main level.  Parents and adult children, someone is always home.  6L at baseline O2, will increase to 8L if needed to vacuum or help with laundry.   Self-Care   Usual Activity Tolerance moderate   Current Activity Tolerance fair   Regular Exercise No   Equipment Currently Used at Home none   Fall history within last six months no   Activity/Exercise/Self-Care Comment IND with all ADLs and has someone with her when she goes places due to needing O2.   General Information   Onset of Illness/Injury or Date of Surgery 03/02/24   Referring Physician Dr. Jia Ferrari   Patient/Family Therapy Goals Statement (PT) Return home with family   Pertinent History of Current Problem (include personal factors and/or comorbidities that impact the POC) Per chart review, \"Patient is a 48-year-old female with severe interstitial lung disease and primary pulmonary hypertension on 6 L needed cannula at baseline as well as suppressive prednisone and prophylactic azithromycin, GERD, history of tobacco abuse with sobriety since December 2023, chronic pain on Suboxone management, depression/anxiety/PTSD who presented with worsening respiratory failure and has subsequently been diagnosed with influenza a requiring critical care management into the ICU with AVAPS/BiPAP " "necessity as well as IV Levophed for vasopressor support.  Patient has been started on IV steroids, IV fentanyl drip and IV Lasix and has had notable improvement in the last 24 hours and currently transition to high flow nasal cannula 35 L and 60% FiO2 with adequate tolerance of respiratory status with VBG stable.  Patient continues to require low-dose Levophed for ongoing blood pressure stability but has been slowly weaning throughout the course of the night with hopeful discontinuation in upcoming hours.  Patient remains critically ill and requires ongoing ICU management at this time.\"  Pt states she has been managing well at home with help of family.   Existing Precautions/Restrictions oxygen therapy device and L/min   Weight-Bearing Status - LUE full weight-bearing   Weight-Bearing Status - RUE full weight-bearing   Weight-Bearing Status - LLE full weight-bearing   Weight-Bearing Status - RLE full weight-bearing   General Observations Resting in bed with 6L of O2.  BP cuff, O2 monitor, and tele on.   Cognition   Affect/Mental Status (Cognition) WNL   Orientation Status (Cognition) oriented x 4   Follows Commands (Cognition) WNL   Pain Assessment   Patient Currently in Pain No   Integumentary/Edema   Integumentary/Edema no deficits were identifed   Posture    Posture Forward head position;Protracted shoulders   Range of Motion (ROM)   Range of Motion ROM is WFL   Strength (Manual Muscle Testing)   Strength (Manual Muscle Testing) strength is WFL   Strength Comments Lack of activity tolerance is more problematic, fatigues quickly.   Bed Mobility   Bed Mobility supine-sit-supine   Supine-Sit-Supine [place] (Bed Mobility) independent   Transfers   Transfers sit-stand transfer   Impairments Contributing to Impaired Transfers impaired balance  (Fatigues quickly)   Sit-Stand Transfer   Sit-Stand [place] (Transfers) contact guard   Comment, (Sit-Stand Transfer) Initial standing wants to keep weight shifted " backwards.   Gait/Stairs (Locomotion)   [place] Level (Gait) contact guard   Assistive Device (Gait) cane, straight;walker, front-wheeled   Distance in Feet (Gait) 2 x 40 ft   Pattern (Gait) step-through   Comment, (Gait/Stairs) Will assess steps tomorrow.   Balance   Balance Comments When assessing standing balance with external perturbations, loss of balance backwards and unable to catch herself.  PT was there with CGA and no issues as pt did not fall, PT was able to correct pt.   Sensory Examination   Sensory Perception patient reports no sensory changes   Coordination   Coordination no deficits were identified   Muscle Tone   Muscle Tone no deficits were identified   Clinical Impression   Criteria for Skilled Therapeutic Intervention Yes, treatment indicated   PT Diagnosis (PT) Impaired activity tolerance, poor balance   Influenced by the following impairments Endurance, fatigue   Functional limitations due to impairments Ambulation, functional mobility   Clinical Presentation (PT Evaluation Complexity) evolving   Clinical Presentation Rationale Clinical judgement, assessment   Clinical Decision Making (Complexity) moderate complexity   Planned Therapy Interventions (PT) balance training;gait training;home exercise program;neuromuscular re-education;patient/family education;strengthening;stair training   Risk & Benefits of therapy have been explained evaluation/treatment results reviewed;care plan/treatment goals reviewed;risks/benefits reviewed;current/potential barriers reviewed;participants voiced agreement with care plan;participants included;patient   Clinical Impression Comments Pt is a 48 year old female who lives with her parents and adult children with 2 steps to enter the home and 12 up to her bedroom.  Pt is on 6L of O2 at baseline level of function and gets around the home well with someone helping when needed.  Pt states she will increase O2 at home to 8L with activities.  Pt does not have a  cane or walker at home.  Pt is demonstrating poor activity tolerance and balance issues.  PT recommending skilled IP PT services to address stairs and balance, along with further eduation on DME needs.  PT recommending home with home PT services to address strength and balance concerns as pt is below baseline for mobility.   PT Total Evaluation Time   PT Eval, Moderate Complexity Minutes (74494) 15   Physical Therapy Goals   PT Frequency 4x/week   PT Predicted Duration/Target Date for Goal Attainment 03/12/24   PT Goals Transfers;Gait;Stairs   PT: Transfers Modified independent;Sit to/from stand;Bed to/from chair;Assistive device   PT: Gait Modified independent;Straight cane;100 feet   PT: Stairs Supervision/stand-by assist;Greater than 10 stairs;Rail on both sides   Interventions   Interventions Quick Adds Gait Training;Therapeutic Procedure   Therapeutic Procedure/Exercise   Ther. Procedure: strength, endurance, ROM, flexibillity Minutes (64419) 10   Symptoms Noted During/After Treatment fatigue  (O2 dropped to 90% from 95% seated 6L of O2)   Treatment Detail/Skilled Intervention Performed seated strength/endurance exercises of marching x 10, LAQ x 10 hip adductor squeezes x 10, and hip abd PT manual resistance x 10.   Gait Training   Gait Training Minutes (46627) 15   Symptoms Noted During/After Treatment (Gait Training) fatigue  (O2 dropped to 87% with cane, 95% with walker.)   Treatment Detail/Skilled Intervention Pt performed bed mobility IND.  Sit to stand was a little less unstable and requiring CGA, with Cane and walker she was SBA. The extra support really helped with the stability standing.  Instructed and educated pt on ambulation with a cane and how to slow down with the turning around.  Ambulated with CGA and cane for 40 ft focusing on balance, PT pulling O2 tank at 6L, dropping to 87% with 30 secs of recovery time with sitting and back to 95%.  PT instructed pt on use of FWW and ambulating SBA x 40  ft.  Pt was on 6L of O2 and maintained O2 at 95%.  Pt was more stable with walker, however either walker or cane is more appropriate for pt to use at this time than no A.D.   Distance in Feet 2 x 40 ft   [place] Level (Gait Training)   (CGA with cane, SBA with walker)   Physical Assistance Level (Gait Training) 1 person assist;supervision   Weight Bearing (Gait Training) full weight-bearing   Assistive Device (Gait Training) rolling walker;straight cane   Pattern Analysis (Gait Training) swing-through gait   Gait Analysis Deviations decreased max   Impairments (Gait Analysis/Training) balance impaired;strength decreased   PT Discharge Planning   PT Plan 1/4 TUES, Stairs tomorrow   PT Discharge Recommendation (DC Rec) home with home care physical therapy   PT Rationale for DC Rec Pt is below baseline level of function.  Her balance and endurance are below normal for daily activities.  Pt is improving with O2, however fatiguing quickly.  Pt would benefit from support from family at home, a cane or walker for stability, and home health PT services to address strength and balance to improve overall function.  Pt will need to perform stairs with PT to assess safety to enter the home.   PT Brief overview of current status 6L of O2 for all activities, only dropped in sats O2 to 87% with walking with cane.  IND with bed mobility. Transfers without A.D. CGA, with A.D. SBA.  Ambulating with cane CGA, with walker SBA.   PT Equipment Needed at Discharge cane, straight;walker, rolling   Total Session Time   Timed Code Treatment Minutes 25   Total Session Time (sum of timed and untimed services) 40     Thank you for your referral.    Belle Bennett, PT, DPT  [facility]  [phone]

## 2024-03-05 NOTE — PROGRESS NOTES
Pt placed on HHFNC this AM per MD request. 45% 35L   Pt is requiring HHFNC for CPAP support at this time. RT will continue to follow and titrate as tolerated. Plan to stay on HHFNC as tolerating, using AVAPS as needed overnight.   Thank you RT

## 2024-03-05 NOTE — TELEPHONE ENCOUNTER
Phoned patient.  Patient stated she is currently in the hospital at this time with influenza A.  She stated she has been on Zithromax for approximately a year and it was Dr. Yves NICOLE who was prescribing this medication to take on a weekly basis.      Patient is requesting a message to be sent to Dr. Yves MD to ask if she should continue taking this medication and if she is to continue, she is requesting a refill (pending with encounter).    Will forward to Dr. Yves MD per patient request.    Olimpia Oakley RN

## 2024-03-05 NOTE — CONSULTS
Care Management Initial Consult    General Information  Assessment completed with: Patient, VM-chart review,    Type of CM/SW Visit: Initial Assessment    Primary Care Provider verified and updated as needed: Yes   Readmission within the last 30 days: no previous admission in last 30 days      Reason for Consult: discharge planning  Advance Care Planning: Advance Care Planning Reviewed: no concerns identified          Communication Assessment  Patient's communication style: spoken language (English or Bilingual)    Hearing Difficulty or Deaf: no   Wear Glasses or Blind: no    Cognitive  Cognitive/Neuro/Behavioral: WDL  Level of Consciousness: alert  Arousal Level: opens eyes spontaneously  Orientation: oriented x 4  Mood/Behavior: calm, cooperative  Best Language: 0 - No aphasia  Speech: clear, logical    Living Environment:   People in home: child(janessa), adult, parent(s)     Current living Arrangements: house      Able to return to prior arrangements:         Family/Social Support:  Care provided by: self, parent(s), child(janessa)  Provides care for: no one  Marital Status: Single  Children, Parent(s)          Description of Support System: Supportive, Involved         Current Resources:   Patient receiving home care services: No     Community Resources: County Worker, County Programs,  Mental Health  Equipment currently used at home: none  Supplies currently used at home: Oxygen Tubing/Supplies    Employment/Financial:  Employment Status: disabled        Financial Concerns: unemployed           Does the patient's insurance plan have a 3 day qualifying hospital stay waiver?  Yes     Which insurance plan 3 day waiver is available? Alternative insurance waiver    Will the waiver be used for post-acute placement? Undetermined at this time    Lifestyle & Psychosocial Needs:  Social Determinants of Health     Food Insecurity: High Risk (9/22/2023)    Food Insecurity     Within the past 12 months, did you worry that your  food would run out before you got money to buy more?: Patient refused     Within the past 12 months, did the food you bought just not last and you didn t have money to get more?: Yes   Depression: Not at risk (11/13/2023)    PHQ-2     PHQ-2 Score: 2   Recent Concern: Depression - At risk (9/1/2023)    PHQ-2     PHQ-2 Score: 3   Housing Stability: Unknown (9/22/2023)    Housing Stability     Do you have housing? : Patient refused     Are you worried about losing your housing?: Patient refused   Tobacco Use: Medium Risk (3/2/2024)    Patient History     Smoking Tobacco Use: Former     Smokeless Tobacco Use: Former     Passive Exposure: Not on file   Financial Resource Strain: Low Risk  (9/22/2023)    Financial Resource Strain     Within the past 12 months, have you or your family members you live with been unable to get utilities (heat, electricity) when it was really needed?: No   Alcohol Use: Not on file   Transportation Needs: Low Risk  (9/22/2023)    Transportation Needs     Within the past 12 months, has lack of transportation kept you from medical appointments, getting your medicines, non-medical meetings or appointments, work, or from getting things that you need?: No   Physical Activity: Not on file   Interpersonal Safety: Low Risk  (9/22/2023)    Interpersonal Safety     Do you feel physically and emotionally safe where you currently live?: Yes     Within the past 12 months, have you been hit, slapped, kicked or otherwise physically hurt by someone?: No     Within the past 12 months, have you been humiliated or emotionally abused in other ways by your partner or ex-partner?: No   Stress: Not on file   Social Connections: Not on file       Functional Status:  Prior to admission patient needed assistance:   Dependent ADLs:: Independent  Dependent IADLs:: Cleaning, Shopping       Mental Health Status:  Mental Health Status: Current Concern  Mental Health Management: Individual Therapy, Medication    Chemical  Dependency Status:  Chemical Dependency Status: No Current Concerns             Values/Beliefs:  Spiritual, Cultural Beliefs, Roman Catholic Practices, Values that affect care: no               Additional Information:  Per MD at IDT rounds pt is NOT medically stable for discharge today, pt is getting closer to discharge per MD. Therapy seeing patient today for evaluation.    Per therapy notes recommending homecare v.s TCU stay.    CM met bedside with pt. She lives with her mom and dad in a home in [place]. There are 3 steps to get into the home and 13 steps up to her bedroom. Patient walks independently at baseline with no equipment. Is on home O2 normally at 6LPM. Per pt portable tanks run out easily so she is not able to be out of her house for long periods of time. Patient has two [facility] workers [facility] and Megan. Patient has been working with Megan on obtaining housing. CM placed call to [facility] to update on hospital admission. Vm left for Sofia at [phone].    CM discussed therapy recommendation, pt goal is to go home and continue to work with [facility] worker to find housing. Pt open to receiving homecare services but declined TCU stay at this time. Pt has no preference with homecare, CM sent referral through the [facility] for PT/OT services. Therapy anticipates pt will continue to improve physically until medically stable for discharge. Definite discharge plan will depend on how pt is closer to discharge.    Patient currently sees pulmonology rehab outpatient, and has a mental health therapist at [facility] in [place]. Therapist name is Jarrod. PCP confirmed, apt made for hospital follow up apt.    UPDATE 3:58pm: Both PT/OT and recommending homecare services. CM sent referal through [facility]. Therapy discussed with pt that if pt does homecare services she cannot continue pulmonary rehab as insurance will not cover this per therapy.     Plan: Home with homecare  PT/OT/RN-for home O2 needs. Ref sent    Transport: Patients mom    Carley Starks RNCM  Care Transitions Registered Nurse  Tele: [phone]

## 2024-03-05 NOTE — PLAN OF CARE
"Goal Outcome Evaluation:      Plan of Care Reviewed With: patient    Overall Patient Progress: improvingOverall Patient Progress: improving    Outcome Evaluation: Pt remianed on HFNC 35L 40% ovenright, pt remained in SPO2 goal of 86-94% with a RR WNL.  Pt felt like she had no SOB overnight.  Fentanyl gtt turned off and pain controlled with Ice and PO pain meds.  1 dose PO Klonipin given for anxiety.  Berman removed per RN driven protocol at 0500 today.  Pt due to void.  K/Phos at goal this am, protocol ran and re-draw scheduled for 0600 on 3/6.  tele shows SB with a rate 48-55.    /62   Pulse (!) 46   Temp 98.1  F (36.7  C) (Oral)   Resp 15   Ht 1.549 m (5' 1\")   Wt 56.3 kg (124 lb 1.6 oz)   LMP  (LMP Unknown)   SpO2 91%   BMI 23.45 kg/m     "

## 2024-03-05 NOTE — PROGRESS NOTES
[facility]    Medicine Progress Note - Hospitalist Service    Date of Admission:  3/2/2024    Assessment & Plan     Patient is a 48-year-old female with severe interstitial lung disease and primary pulmonary hypertension on 6 L needed cannula at baseline as well as suppressive prednisone and prophylactic azithromycin, GERD, history of tobacco abuse with sobriety since December 2023, chronic pain on Suboxone management, depression/anxiety/PTSD who presented with worsening respiratory failure and has subsequently been diagnosed with influenza a requiring critical care management into the ICU with AVAPS/BiPAP necessity as well as IV Levophed for vasopressor support.  Patient was started on IV steroids, IV fentanyl drip and IV Lasix and Tamiflu and over the past few days has had daily improvement and is on HFNC weaning down over the past 24 hours on pressure support needs.  Patient will be transitioned to Med/Surg status, transitioned to PO prednisone, weaned down on oxygen to home regimen of 6L as able.  Will have physical therapy and occupational therapy assess patient today.  Possible discharge home in next 1-2 days if patient continues to rapidly improve.      Principal Problem:    Acute on chronic respiratory failure with hypoxia and hypercapnia (H)    Acute encephalopathy    Influenza A    Assessment:  Influenza A causing acute respiratory failure with severe hypercapnia causing acute encephalopathy.  Patient initially required AVAPS non-invasive ventilator support, IV steroids, IV Lasix, and IV fentanyl drip with notable improvement in air function and has been transitioned to HFNC and PO medications including Tamiflu with patient remaining off AVAPS and Bipap for over 24 hours and now weaning down on HFNC needs in the past 24 hours.      Plan:   -transition to Med/Surg floor today  -Continue to wean HFNC as able and attempt transition to NC and wean back to patient's home regimen  as able  -Continue Tamiflu 75 mg twice daily  -Discussed with patient's pulmonologist, Dr. Marinelli, from [facility] and will transition patient to 60 mg prednisone daily with plans to taper by 10 mg every 7 days until patient is down to 20 mg daily.  Patient should stay at 20 mg daily until follow up appointment with him in upcoming weeks.      Active Problems:    Acute upper GI bleed    Assessment: Patient had large emesis shortly after admission with gastric occult positive.  Patient currently denies any epigastric abdominal pain and hemoglobin has been stable    Plan:   -Will continue twice daily PPI  -continue advancement of diet as patient can tolerate      Bigeminy    Assessment: Noted early in this hospital stay with patient in acute respiratory distress.  No sinus dysrhythmias noted since respiratory status is improved    Plan:   -Continue continuous cardiac monitoring to ensure ongoing resolution as respiratory status continues to improve      ILD (interstitial lung disease) with concerns for acute exacerbation    Assessment: Patient on 6 L nasal cannula at baseline, currently on suppressive prednisone therapy and has been slowly tapering down most recently at 7.5 mg daily.  Patient also is on azithromycin 3 times weekly for prophylaxis against bacterial infection.  Patient's goal is to get on the transplant list as soon as she is able, which would be May 2023 if she continues to remain tobacco free    Plan:   -will stop solumedrol and transition to Prednisone 60 mg daily dosing with outpatient tapering of 10 mg every 7 days down to 20 mg as recommended by patient's pulmonologist as above  -continue three times weekly azithromycin  -outpatient follow up with Dr. Marinelli, ILD specialist       Primary pulmonary hypertension (H)    Assessment: Patient is on nintedanib and macitentan at baseline which were initially held when patient was NPO but have been subsequently restarted.     Plan:   -Continue home regimen without  change   -Patient will need outpatient follow-up with her cardiology team following discharge      Chronic pain syndrome    Assessment:  patient in normally maintained on Suboxone 3 films a day with adequate symptom control.  Due to initial critical illness Suboxone was held and patient was placed on Fentanyl drip with improved tolerance of AVAPS noted and reduction in body aches, muscle soreness and headaches.  Since improving patient was transitioned transiently to PO oxycodone given acute pain but patient is aware that she will need to transition back to Suboxone in the next 1-2 days as her activity tolerance improves and acute pain lessens     Plan:    -continue oxycodone 5 mg every 4 hours as needed for pain  -anticipate restarting Suboxone 8mg/2mg films three times a day in the next 1-2 days as patient's headache, body aches, soreness continues to improve  -continue with flexeril 10 mg every 8 hours  -ongoing tylenol as needed  -avoid NSAID due to GI bleed as above       Recurrent major depressive disorder (H24)    MOLLY (generalized anxiety disorder)    PTSD (post-traumatic stress disorder)    Assessment: Patient is on sertraline 100 mg daily    Plan:   -Restart home regimen at this time      Gastroesophageal reflux disease without esophagitis    Assessment: Patient is normally on Nexium 20 mg daily.  Does have acute GI bleed as outlined above    Plan:   -Protonix 40 mg twice daily as per hospital formulary with increased dosing secondary to acute GI bleed      Personal history of tobacco use, presenting hazards to health    Assessment: Patient has been tobacco free since December 2023 and is very motivated to continue with cessation as her goal is to get on lung transplant list as soon as possible    Plan:   -Encouragement was given to continue ongoing abstinence          Diet: Advance Diet as Tolerated: Regular Diet Adult; Regular Diet Adult  Snacks/Supplements Adult: Ensure Enlive; With Meals    DVT  Prophylaxis: Enoxaparin (Lovenox) SQ  Berman Catheter: Not present  Lines: PRESENT      PICC 03/03/24 Triple Lumen Right Basilic-Site Assessment: WDL      Cardiac Monitoring: ACTIVE order. Indication: ICU  Code Status: Full Code      Clinically Significant Risk Factors                             # Financial/Environmental Concerns: unemployed         Disposition Plan   Anticipate discharge home in the next 1-2 days as patient continues to improve          Jia Ferrari MD  Hospitalist Service  [facility]  Securely message with "[employer] (more info)  Text page via [employer] Paging/Directory   ______________________________________________________________________    Interval History   Patient reports she continues to improve.  She can tell that her breathing is much less labored and not as tight and she has been able to move around the room on high flow nasal cannula with less activity intolerance.  Has been weaned down to 20 L and 40 to 45% this morning and so far is tolerating it well.  Oral intake continues to improve and patient denies any significant worsening epigastric pain.  No new nursing concerns.    Physical Exam   Vital Signs: Temp: 98.4  F (36.9  C) Temp src: Oral BP: 116/80 Pulse: 50   Resp: 11 SpO2: 94 % O2 Device: High Flow Nasal Cannula (HFNC) Oxygen Delivery: 20 LPM  Weight: 124 lbs 1.6 oz    Constitutional: awake, alert, cooperative, no apparent distress at rest or bed mobility, and appears stated age  Respiratory: no increased work of breathing, breath sounds decreased diffusely but improved from previous, ongoing fine crackles in bilateral bases but no upper airway crackles noted and no wheezing present   Cardiovascular: sinus bradycardia to NSR  GI: bowel sound present, abdomen soft and non-tender  Neurologic: Awake, alert, oriented to name, place and situation     Medical Decision Making       40 MINUTES SPENT BY ME on the date of service doing chart review,  history, exam, documentation & further activities per the note.      Data     I have personally reviewed the following data over the past 24 hrs:    7.5  \   11.8   / 180     139 98 25.1 (H) /  175 (H)   3.9 32 (H) 0.81 \     ALT: 25 AST: 23 AP: 40 TBILI: 0.2   ALB: 3.6 TOT PROTEIN: 6.5 LIPASE: N/A     Procal: N/A CRP: 9.10 (H) Lactic Acid: N/A

## 2024-03-05 NOTE — PLAN OF CARE
Goal Outcome Evaluation:      Plan of Care Reviewed With: patient    Overall Patient Progress: improvingOverall Patient Progress: improving    4 hour shift note. Pt is A & O x 4, forgetful but seems to be getting more confused this evening. VSS, afebrile, remains on HFNC at 45% and 35 L, crackles noted to BLL. Patient states breathing feels improved today. Fair appetite for dinner. Up to bedside commode, attemped a BM but just gas. Fentanyl decreased to 25 mcg/kg/hr and prn oxycodone given x 1 for headache/neck pain. Pain down to 5/10. Berman remains in place with 250 mL urine output this shift. Remains NSR on telemetry.

## 2024-03-05 NOTE — PROGRESS NOTES
"   03/05/24 1100   Appointment Info   Signing Clinician's Name / Credentials (OT) Christal Christian OTR/L   Living Environment   People in Home parent(s);child(janessa), adult   Current Living Arrangements house   Home Accessibility stairs within home;stairs to enter home   Number of Stairs, Within Home, Primary greater than 10 stairs   Transportation Anticipated family or friend will provide   Living Environment Comments Upper level bedroom, tub shower on main level. No AD/AE. Between parents/adult children, someone is always home. On 6L O2 at baseline.   Self-Care   Usual Activity Tolerance moderate   Current Activity Tolerance fair   Regular Exercise No   Equipment Currently Used at Home none   Fall history within last six months no   Activity/Exercise/Self-Care Comment Ind with ADLs at baseline   Instrumental Activities of Daily Living (IADL)   Previous Responsibilities meal prep;housekeeping;laundry;shopping;medication management;finances;driving   IADL Comments Previously independent   General Information   Onset of Illness/Injury or Date of Surgery 03/02/24   Referring Physician Dr. Crowell   Patient/Family Therapy Goal Statement (OT) Would like to be able to go home   Additional Occupational Profile Info/Pertinent History of Current Problem Per H&P, \"Patient is a 48-year-old female with severe interstitial lung disease and primary pulmonary hypertension on 6 L needed cannula at baseline as well as suppressive prednisone and prophylactic azithromycin, GERD, history of tobacco abuse with sobriety since December 2023, chronic pain on Suboxone management, depression/anxiety/PTSD who presented with worsening respiratory failure and has subsequently been diagnosed with influenza a requiring critical care management into the ICU with AVAPS/BiPAP necessity as well as IV Levophed for vasopressor support.  Patient has been started on IV steroids, IV fentanyl drip and IV Lasix and has had notable improvement in the last 24 hours " "and currently transition to high flow nasal cannula 35 L and 60% FiO2 with adequate tolerance of respiratory status with VBG stable.  Patient continues to require low-dose Levophed for ongoing blood pressure stability but has been slowly weaning throughout the course of the night with hopeful discontinuation in upcoming hours.  Patient remains critically ill and requires ongoing ICU management at this time.\"   General Observations and Info OT Orders, eval and treat \"ICU\"   Cognitive Status Examination   Orientation Status orientation to person, place and time   Cognitive Status Comments Engages in discussion appropriately, no overt cognitive concerns identified   Range of Motion Comprehensive   Comment, General Range of Motion Bilateral UE AROM WFL   Strength Comprehensive (MMT)   Comment, General Manual Muscle Testing (MMT) Assessment Bilateral UE MMT 4/5   Coordination   Coordination Comments No overt defifcit identified   Bed Mobility   Comment (Bed Mobility) supine to sit eob ind, sit to stand from EOB with SBA no LOB   Transfers   Transfer Comments Transfer bed to chair with SBA, assist for cord management.   Balance   Balance Comments No LOB with short step transfers to chair   Activities of Daily Living   BADL Assessment/Intervention upper body dressing;lower body dressing;grooming;toileting   Upper Body Dressing Assessment/Training   Comment, (Upper Body Dressing) Per clinical judgement, near baseline   Lower Body Dressing Assessment/Training   Comment, (Lower Body Dressing) Ind seated sock management. Per clinical judgement, near baseline   Grooming Assessment/Training   Comment, (Grooming) Ind seated oral cares. Per clinical judgement, near baseline   Toileting   Comment, (Toileting) Per clinical judgement, SBA due to fatigue/SOB but near baseline. Impacted by activity tolerance   Clinical Impression   Criteria for Skilled Therapeutic Interventions Met (OT) Yes, treatment indicated   OT Diagnosis Impaired " sustained participation in daily tasks   Influenced by the following impairments Influenza A, HO cystic fibrosis, resiratory failure exacerbation   OT Problem List-Impairments impacting ADL problems related to;activity tolerance impaired;strength   ADL comments/analysis Impaired sustained participation   Assessment of Occupational Performance 1-3 Performance Deficits   Planned Therapy Interventions (OT) ADL retraining;strengthening;home program guidelines;progressive activity/exercise   Intervention Comments Endurance, Pacing   Clinical Decision Making Complexity (OT) problem focused assessment/low complexity   Risk & Benefits of therapy have been explained evaluation/treatment results reviewed;care plan/treatment goals reviewed;participants included;patient   Clinical Impression Comments Patient presents with below baseline activity tolerance, will benefit from skilled OT for below stated goals   OT Total Evaluation Time   OT Eval, Low Complexity Minutes (11201) 10   OT Goals   Therapy Frequency (OT) 4 times/week   OT Predicted Duration/Target Date for Goal Attainment 03/11/24   OT Goals OT Goal 1   OT: Goal 1 Anali will participate in at least 10 minutes of functional endurance demanding tasks (ADLs or exercise), maintaining 88%+ SpO2 to support return to baseline function   Interventions   Interventions Quick Adds Self-Care/Home Management   Self-Care/Home Management   Self-Care/Home Mgmt/ADL, Compensatory, Meal Prep Minutes (25490) 17   Symptoms Noted During/After Treatment (Meal Preparation/Planning Training) fatigue;shortness of breath;significant change in vital signs   Treatment Detail/Skilled Intervention Anali is supine in bed, agreeable to OT eval and services. Completes seated socks ind, bed mobility to eob ind. Sit to stand with SBA for short step transfer to chair with SBA; assist require for cord management. Setup for oral cares/denture care while seated. Maintains 88-91% with seated activity. Has  good understanding of PLB and energy conservation strategies already. Spo2 78% following transfer back to bed from chair, ind sit EOB>supine, but then started coughing with spo2 decreasing to 67%. Recovers within 30 seconds of instruction for deep breathing. Bed alarm reactivated, call light provided,  nursing enters upon OT exit   OT Discharge Planning   OT Plan 1/4 TUE: Pacing, Endurance with task   OT Discharge Recommendation (DC Rec) home with home care occupational therapy;home with assist;Transitional Care Facility   OT Rationale for DC Rec Here from home with parents, adult children, someone home at all times. PLOF ind with ADL/IADLs, bedroom on upper level, tub shower on main level. Currently on 20L hiflow, down to 78% with activity, would need to do 12 step to get to bedroom. At this time, may benefit from TCU for endurance/further tolerance prior to returning home. With ongoing medical course, may be able to return home with assist, pending PT assessment of mobility and stairs.   OT Brief overview of current status 20L HFNC drops to 78% with acitivity, down to 67% with coughing episode. Seated oral cares maintains 88-91%, SBA with bed to chair transfer. Ind socks, setup for seated oral cares. Understanding of PLB and EC strategies   OT Equipment Needed at Discharge tub bench   Total Session Time   Timed Code Treatment Minutes 17   Total Session Time (sum of timed and untimed services) 27   Thank you for your referral.  Christal Christian OTR/SARAH     [facility]  O: [phone]  E: [email or web address]

## 2024-03-06 ENCOUNTER — APPOINTMENT (OUTPATIENT)
Dept: OCCUPATIONAL THERAPY | Facility: CLINIC | Age: 49
End: 2024-03-06
Payer: COMMERCIAL

## 2024-03-06 ENCOUNTER — APPOINTMENT (OUTPATIENT)
Dept: PHYSICAL THERAPY | Facility: CLINIC | Age: 49
End: 2024-03-06
Payer: COMMERCIAL

## 2024-03-06 VITALS
HEIGHT: 61 IN | RESPIRATION RATE: 18 BRPM | SYSTOLIC BLOOD PRESSURE: 136 MMHG | DIASTOLIC BLOOD PRESSURE: 79 MMHG | BODY MASS INDEX: 24.1 KG/M2 | HEART RATE: 48 BPM | OXYGEN SATURATION: 99 % | WEIGHT: 127.65 LBS | TEMPERATURE: 98.2 F

## 2024-03-06 LAB
ANION GAP SERPL CALCULATED.3IONS-SCNC: 7 MMOL/L (ref 7–15)
BASE EXCESS BLDV CALC-SCNC: 12.3 MMOL/L (ref -3–3)
BUN SERPL-MCNC: 21.7 MG/DL (ref 6–20)
CALCIUM SERPL-MCNC: 9 MG/DL (ref 8.6–10)
CHLORIDE SERPL-SCNC: 100 MMOL/L (ref 98–107)
CREAT SERPL-MCNC: 0.66 MG/DL (ref 0.51–0.95)
CRP SERPL-MCNC: 5.85 MG/L
DEPRECATED HCO3 PLAS-SCNC: 34 MMOL/L (ref 22–29)
EGFRCR SERPLBLD CKD-EPI 2021: >90 ML/MIN/1.73M2
GLUCOSE SERPL-MCNC: 102 MG/DL (ref 70–99)
HCO3 BLDV-SCNC: 38 MMOL/L (ref 21–28)
HOLD SPECIMEN: NORMAL
O2/TOTAL GAS SETTING VFR VENT: 40 %
OXYHGB MFR BLDV: 96 % (ref 70–75)
PCO2 BLDV: 54 MM HG (ref 40–50)
PH BLDV: 7.46 [PH] (ref 7.32–7.43)
PHOSPHATE SERPL-MCNC: 2.3 MG/DL (ref 2.5–4.5)
PO2 BLDV: 82 MM HG (ref 25–47)
POTASSIUM SERPL-SCNC: 3.6 MMOL/L (ref 3.4–5.3)
SAO2 % BLDV: 97.3 % (ref 70–75)
SODIUM SERPL-SCNC: 141 MMOL/L (ref 135–145)

## 2024-03-06 PROCEDURE — 82805 BLOOD GASES W/O2 SATURATION: CPT | Performed by: FAMILY MEDICINE

## 2024-03-06 PROCEDURE — 36415 COLL VENOUS BLD VENIPUNCTURE: CPT | Performed by: FAMILY MEDICINE

## 2024-03-06 PROCEDURE — 97110 THERAPEUTIC EXERCISES: CPT | Mod: GO | Performed by: SPECIALIST/TECHNOLOGIST

## 2024-03-06 PROCEDURE — 86140 C-REACTIVE PROTEIN: CPT | Performed by: FAMILY MEDICINE

## 2024-03-06 PROCEDURE — 999N000157 HC STATISTIC RCP TIME EA 10 MIN

## 2024-03-06 PROCEDURE — 99239 HOSP IP/OBS DSCHRG MGMT >30: CPT | Mod: GC | Performed by: INTERNAL MEDICINE

## 2024-03-06 PROCEDURE — 97116 GAIT TRAINING THERAPY: CPT | Mod: GP | Performed by: PHYSICAL THERAPIST

## 2024-03-06 PROCEDURE — 84100 ASSAY OF PHOSPHORUS: CPT | Performed by: FAMILY MEDICINE

## 2024-03-06 PROCEDURE — 80048 BASIC METABOLIC PNL TOTAL CA: CPT | Performed by: FAMILY MEDICINE

## 2024-03-06 PROCEDURE — 250N000013 HC RX MED GY IP 250 OP 250 PS 637: Performed by: FAMILY MEDICINE

## 2024-03-06 PROCEDURE — 84702 CHORIONIC GONADOTROPIN TEST: CPT | Performed by: INTERNAL MEDICINE

## 2024-03-06 PROCEDURE — 250N000012 HC RX MED GY IP 250 OP 636 PS 637: Performed by: FAMILY MEDICINE

## 2024-03-06 PROCEDURE — 250N000013 HC RX MED GY IP 250 OP 250 PS 637: Performed by: INTERNAL MEDICINE

## 2024-03-06 RX ORDER — OSELTAMIVIR PHOSPHATE 75 MG/1
75 CAPSULE ORAL 2 TIMES DAILY
Qty: 5 CAPSULE | Refills: 0 | Status: SHIPPED | OUTPATIENT
Start: 2024-03-06 | End: 2024-03-09

## 2024-03-06 RX ORDER — PREDNISONE 10 MG/1
TABLET ORAL
Qty: 150 TABLET | Refills: 0 | Status: SHIPPED | OUTPATIENT
Start: 2024-03-07

## 2024-03-06 RX ORDER — LEVOFLOXACIN 750 MG/1
750 TABLET, FILM COATED ORAL DAILY
Qty: 7 TABLET | Refills: 0 | Status: SHIPPED | OUTPATIENT
Start: 2024-03-06 | End: 2024-03-13

## 2024-03-06 RX ORDER — OXYCODONE HYDROCHLORIDE 5 MG/1
5 TABLET ORAL EVERY 6 HOURS PRN
Qty: 15 TABLET | Refills: 0 | Status: SHIPPED | OUTPATIENT
Start: 2024-03-06 | End: 2024-03-15

## 2024-03-06 RX ADMIN — TADALAFIL 40 MG: 20 TABLET ORAL at 08:40

## 2024-03-06 RX ADMIN — POTASSIUM & SODIUM PHOSPHATES POWDER PACK 280-160-250 MG 1 PACKET: 280-160-250 PACK at 12:39

## 2024-03-06 RX ADMIN — AZITHROMYCIN 250 MG: 250 TABLET, FILM COATED ORAL at 08:40

## 2024-03-06 RX ADMIN — OXYCODONE HYDROCHLORIDE 5 MG: 5 TABLET ORAL at 00:01

## 2024-03-06 RX ADMIN — OSELTAMIVIR PHOSPHATE 75 MG: 75 CAPSULE ORAL at 08:40

## 2024-03-06 RX ADMIN — POTASSIUM & SODIUM PHOSPHATES POWDER PACK 280-160-250 MG 1 PACKET: 280-160-250 PACK at 09:32

## 2024-03-06 RX ADMIN — OXYCODONE HYDROCHLORIDE 5 MG: 5 TABLET ORAL at 04:14

## 2024-03-06 RX ADMIN — Medication 25 MCG: at 08:40

## 2024-03-06 RX ADMIN — CYCLOBENZAPRINE 10 MG: 10 TABLET, FILM COATED ORAL at 08:38

## 2024-03-06 RX ADMIN — PANTOPRAZOLE SODIUM 40 MG: 40 TABLET, DELAYED RELEASE ORAL at 07:15

## 2024-03-06 RX ADMIN — OXYCODONE HYDROCHLORIDE 5 MG: 5 TABLET ORAL at 08:50

## 2024-03-06 RX ADMIN — SENNOSIDES AND DOCUSATE SODIUM 1 TABLET: 8.6; 5 TABLET ORAL at 08:39

## 2024-03-06 RX ADMIN — CLONAZEPAM 0.5 MG: 0.5 TABLET ORAL at 08:50

## 2024-03-06 RX ADMIN — PREDNISONE 60 MG: 20 TABLET ORAL at 08:39

## 2024-03-06 RX ADMIN — SERTRALINE HYDROCHLORIDE 100 MG: 100 TABLET ORAL at 08:40

## 2024-03-06 ASSESSMENT — ACTIVITIES OF DAILY LIVING (ADL)
ADLS_ACUITY_SCORE: 24
ADLS_ACUITY_SCORE: 25
ADLS_ACUITY_SCORE: 25
ADLS_ACUITY_SCORE: 24
ADLS_ACUITY_SCORE: 25
ADLS_ACUITY_SCORE: 24
ADLS_ACUITY_SCORE: 24
ADLS_ACUITY_SCORE: 25
ADLS_ACUITY_SCORE: 24

## 2024-03-06 NOTE — PLAN OF CARE
Physical Therapy Discharge Summary    Reason for therapy discharge:    Discharged to home with home therapy.    Progress towards therapy goal(s). See goals on Care Plan in [facility] electronic health record for goal details.  Goals partially met.  Barriers to achieving goals:   discharge from facility.    Therapy recommendation(s):    Continued therapy is recommended.  Rationale/Recommendations:  Activity tolerance, strength.    Thank you for your referral.    Belle Bennett, PT, DPT  [facility]  [phone]

## 2024-03-06 NOTE — PLAN OF CARE
Goal Outcome Evaluation:      Plan of Care Reviewed With: patient    Overall Patient Progress: improvingOverall Patient Progress: improving    Outcome Evaluation: Vss except for bradycardia, HR 45-50s. Using 6Lnc O2 per home regimen, O2 sats mid- to high 90s. Lungs are diminished, rare nonproductive cough. Tylenol and Oxycodone given for headache and neck pain which pt states is chronic. Ambulates to the bathroom with cane and standby assist.

## 2024-03-06 NOTE — PROGRESS NOTES
Care Management Discharge Note    Discharge Date: 03/06/2024     Discharge Disposition: Home Care    Discharge Services: County Worker    Discharge DME: Oxygen, Tub Transfer Bench    Discharge Transportation: family or friend will provide    Private pay costs discussed: Not applicable    Does the patient's insurance plan have a 3 day qualifying hospital stay waiver?  No    PAS Confirmation Code:  N/A    Patient/family educated on Medicare website which has current facility and service quality ratings: yes    Education Provided on the Discharge Plan: Yes  Persons Notified of Discharge Plans: Patient  Patient/Family in Agreement with the Plan: yes    Handoff Referral Completed: Yes    Additional Information:  Patient medically ready for discharge today. Plan is for patient to discharge home. She will receive [facility] (phone: [phone] fax: [phone]) RN, PT/OT. Updated patient and provided handout with [facility] contact #. Patient in agreement with discharge plan.    High Risk for re-admission. Follow up appt with PCP scheduled on 3/15/24. Patient is aware.    Patient's Mom will transport patient home this afteroon.    MEREDITH Brown  [facility] [phone]/ [facility] [phone]  Care Management

## 2024-03-06 NOTE — PLAN OF CARE
Goal Outcome Evaluation:      Plan of Care Reviewed With: patient    Overall Patient Progress: improvingOverall Patient Progress: improving    Outcome Evaluation: Feeling better and oxygen needs are back to baseline. Up independently with oxygen. VSS Chronic pain has been well controlled with Oxycodone and will go back on Suboxone with oxycodone for breakthrough pain. Tolerating small amounts of regular diet with Ensure. Phosphorus was replaced orally for level of 2.3. Clonazapam helped with anxiety. Will discharge to home.    S-(situation): Patient discharged to home via wheelchair to door with father.    B-(background): Respiratory failure, pneumonia, Influenza A    A-(assessment): as above    R-(recommendations): Discharge instructions reviewed with patient. Listed belongings gathered and returned to patient. Will follow up with Dr. Fagan 3-15-24.        Discharge Nursing Criteria:   Patient Education given and documented: (STROKE, CHF, Diabetes): NA       Care Plan and Patient education resolved: Yes    New Medications- pt has been educated about purpose and side effects: Yes    Vaccines  Influenza status verified at discharge:  Yes    MISC  Prescriptions if needed, hard copies sent with patient  Yes  Home medications returned to patient:   Medication Bin checked and emptied on discharge   Patient reports post-discharge pain management plan is effective:

## 2024-03-06 NOTE — PROGRESS NOTES
Patient on HHFNC most the day weaning from 35L to 20L 40% ~0900. Switched to baseline O2 of 6L NC at 1400, tolerating well. HHFNC was moved with pt to new room if needed overnight. RT will remove tomorrow if not.

## 2024-03-06 NOTE — PROGRESS NOTES
Family dropped off patient's debit card. This was given directly to the patient and is now in her possession.

## 2024-03-06 NOTE — PROGRESS NOTES
4 hour shift note:    Pt A&O x4. VSS, marla but WNL for patient. O2 weaned to 4 LPM in order to maintain sat below 94. Pt up independent with SBA and cane. C/O mild headache, but stated wanted to give earlier pain med time to work.

## 2024-03-06 NOTE — PLAN OF CARE
Occupational Therapy Discharge Summary    Reason for therapy discharge:    Discharged to home with home therapy.    Progress towards therapy goal(s). See goals on Care Plan in [facility] electronic health record for goal details.  Goals partially met.  Barriers to achieving goals:   discharge from facility.    Therapy recommendation(s):    Continued therapy is recommended.  Rationale/Recommendations:  Continued progression of functional endurance with respect toO2 demands to support return to functional baseline within I/ADL tasks.    Thank you for your referral.  Christal Christian OTR/L     [facility]  O: [phone]  E: [email or web address]

## 2024-03-06 NOTE — PLAN OF CARE
Goal Outcome Evaluation:      Plan of Care Reviewed With: patient    Overall Patient Progress: improvingOverall Patient Progress: improving    Outcome Evaluation: Patient is alert, oriented. Weaned to 6L NC which is baseline for patient. Ambulates SBA to commode or bathroom with cane. Does desat with activity to 86%. PICC line removed this shift. Transferred to med-surg. IV is SL.

## 2024-03-07 ENCOUNTER — PATIENT OUTREACH (OUTPATIENT)
Dept: CARE COORDINATION | Facility: CLINIC | Age: 49
End: 2024-03-07
Payer: COMMERCIAL

## 2024-03-07 DIAGNOSIS — Z09 HOSPITAL DISCHARGE FOLLOW-UP: ICD-10-CM

## 2024-03-07 NOTE — LETTER
[facility] CARE COORDINATION  [address]   [place] [address]    March 8, 2024    Anali Loya  [address]  [place] [address]      Dear Anali,    I am a clinic care coordinator who works with Cristian Fagan MD with the [facility]. I wanted to introduce myself and provide you with my contact information for you to be able to call me with any questions or concerns. Below is a description of clinic care coordination and how I can further assist you.       The clinic care coordination team is made up of a registered nurse, , financial resource worker and community health worker who understand the health care system. The goal of clinic care coordination is to help you manage your health and improve access to the health care system. Our team works alongside your provider to assist you in determining your health and social needs. We can help you obtain health care and community resources, providing you with necessary information and education. We can work with you through any barriers and develop a care plan that helps coordinate and strengthen the communication between you and your care team.  Our services are voluntary and are offered without charge to you personally.    Please feel free to contact me with any questions or concerns regarding care coordination and what we can offer.      We are focused on providing you with the highest-quality healthcare experience possible.    Sincerely,     Luciana López RN Care Coordination   [facility], [place], [place]  Phone: [phone]

## 2024-03-07 NOTE — PROGRESS NOTES
Clinic Care Coordination Contact  [facility]/Voicemail    Clinical Data: Care Coordinator Outreach    Outreach Documentation Number of Outreach Attempt   3/7/2024  11:27 AM 1       Left message on patient's voicemail with call back information and requested return call.    Plan: Care Coordinator will try to reach patient again in 1-2 business days.    Luciana López RN Care Coordination   [facility]  Email: [email or web address]  Phone: [phone]

## 2024-03-08 NOTE — PROGRESS NOTES
Clinic Care Coordination Contact  [facility]/Voicemail    Clinical Data: Care Coordinator Outreach      Outreach Documentation Number of Outreach Attempt   3/7/2024  11:27 AM 1   3/8/2024   3:26 PM 2       Left message on patient's voicemail with call back information and requested return call.    Plan: Care Coordinator will send care coordination introduction letter with care coordinator contact information and explanation of care coordination services via [email or web address]. Care Coordinator will do no further outreaches at this time.    Luciana López, RN Care Coordination   [facility]  Email: [email or web address]  Phone: [phone]

## 2024-03-11 ENCOUNTER — TELEPHONE (OUTPATIENT)
Dept: FAMILY MEDICINE | Facility: CLINIC | Age: 49
End: 2024-03-11
Payer: COMMERCIAL

## 2024-03-11 DIAGNOSIS — R06.02 SOB (SHORTNESS OF BREATH): ICD-10-CM

## 2024-03-11 DIAGNOSIS — I27.20 PULMONARY HYPERTENSION (H): Primary | ICD-10-CM

## 2024-03-11 LAB — HCG INTACT+B SERPL-ACNC: <1 MIU/ML

## 2024-03-11 NOTE — TELEPHONE ENCOUNTER
Belle, an RN from [facility] care is calling and stated she wanted to pass the message that patient had a referral for home care and when approached by their home care, patient stated she refused home care.  Patient verbalized to Belle that she is doing better and is currently going to the hospital for pulmonary rehabilitation.    Will forward to PCP for MADHU Oakley RN

## 2024-03-12 ENCOUNTER — HOSPITAL ENCOUNTER (OUTPATIENT)
Dept: CARDIAC REHAB | Facility: CLINIC | Age: 49
Discharge: HOME | End: 2024-03-12
Attending: INTERNAL MEDICINE
Payer: COMMERCIAL

## 2024-03-12 PROCEDURE — G0239 OTH RESP PROC, GROUP: HCPCS

## 2024-03-13 ENCOUNTER — TELEPHONE (OUTPATIENT)
Dept: FAMILY MEDICINE | Facility: CLINIC | Age: 49
End: 2024-03-13
Payer: COMMERCIAL

## 2024-03-13 NOTE — TELEPHONE ENCOUNTER
[facility] referral from: Transitions of Care (recent hospital discharge or ED visit)    [facility] referral outreach attempt #2 on March 13, 2024 at 8:23 AM      Outcome: Patient not reachable after several attempts, will route to [facility] Pharmacist/Provider as an FYI.  [facility] scheduling number is .  Thank you for the referral.    Use bebeto carter (kedar) for the carrier/Plan on the flowsheet      [employer] Message Sent    Kendy Castañeda  [facility]

## 2024-03-15 ENCOUNTER — OFFICE VISIT (OUTPATIENT)
Dept: FAMILY MEDICINE | Facility: CLINIC | Age: 49
End: 2024-03-15
Payer: COMMERCIAL

## 2024-03-15 VITALS
SYSTOLIC BLOOD PRESSURE: 128 MMHG | TEMPERATURE: 97.5 F | HEIGHT: 61 IN | HEART RATE: 68 BPM | OXYGEN SATURATION: 96 % | DIASTOLIC BLOOD PRESSURE: 76 MMHG | RESPIRATION RATE: 20 BRPM | BODY MASS INDEX: 25.2 KG/M2 | WEIGHT: 133.5 LBS

## 2024-03-15 DIAGNOSIS — J43.1 PANLOBULAR EMPHYSEMA (H): Primary | ICD-10-CM

## 2024-03-15 DIAGNOSIS — M33.20 POLYMYOSITIS, ORGAN INVOLVEMENT UNSPECIFIED (H): ICD-10-CM

## 2024-03-15 DIAGNOSIS — F13.20 MODERATE BENZODIAZEPINE USE DISORDER (H): ICD-10-CM

## 2024-03-15 DIAGNOSIS — J96.22 ACUTE ON CHRONIC RESPIRATORY FAILURE WITH HYPOXIA AND HYPERCAPNIA (H): ICD-10-CM

## 2024-03-15 DIAGNOSIS — F33.3 SEVERE EPISODE OF RECURRENT MAJOR DEPRESSIVE DISORDER, WITH PSYCHOTIC FEATURES (H): ICD-10-CM

## 2024-03-15 DIAGNOSIS — J96.21 ACUTE ON CHRONIC RESPIRATORY FAILURE WITH HYPOXIA AND HYPERCAPNIA (H): ICD-10-CM

## 2024-03-15 DIAGNOSIS — K21.01 GASTROESOPHAGEAL REFLUX DISEASE WITH ESOPHAGITIS AND HEMORRHAGE: ICD-10-CM

## 2024-03-15 LAB
ALBUMIN SERPL BCG-MCNC: 4.1 G/DL (ref 3.5–5.2)
ALP SERPL-CCNC: 40 U/L (ref 40–150)
ALT SERPL W P-5'-P-CCNC: 24 U/L (ref 0–50)
ANION GAP SERPL CALCULATED.3IONS-SCNC: 8 MMOL/L (ref 7–15)
AST SERPL W P-5'-P-CCNC: 22 U/L (ref 0–45)
BILIRUB SERPL-MCNC: 0.2 MG/DL
BUN SERPL-MCNC: 20.4 MG/DL (ref 6–20)
CALCIUM SERPL-MCNC: 8.8 MG/DL (ref 8.6–10)
CHLORIDE SERPL-SCNC: 106 MMOL/L (ref 98–107)
CREAT SERPL-MCNC: 0.65 MG/DL (ref 0.51–0.95)
DEPRECATED HCO3 PLAS-SCNC: 29 MMOL/L (ref 22–29)
EGFRCR SERPLBLD CKD-EPI 2021: >90 ML/MIN/1.73M2
GLUCOSE SERPL-MCNC: 107 MG/DL (ref 70–99)
PHOSPHATE SERPL-MCNC: 3.5 MG/DL (ref 2.5–4.5)
POTASSIUM SERPL-SCNC: 4.4 MMOL/L (ref 3.4–5.3)
PROT SERPL-MCNC: 6.8 G/DL (ref 6.4–8.3)
SODIUM SERPL-SCNC: 143 MMOL/L (ref 135–145)

## 2024-03-15 PROCEDURE — 99495 TRANSJ CARE MGMT MOD F2F 14D: CPT | Performed by: FAMILY MEDICINE

## 2024-03-15 PROCEDURE — 36415 COLL VENOUS BLD VENIPUNCTURE: CPT | Performed by: FAMILY MEDICINE

## 2024-03-15 PROCEDURE — 80053 COMPREHEN METABOLIC PANEL: CPT | Performed by: FAMILY MEDICINE

## 2024-03-15 PROCEDURE — 84100 ASSAY OF PHOSPHORUS: CPT | Performed by: FAMILY MEDICINE

## 2024-03-15 ASSESSMENT — PATIENT HEALTH QUESTIONNAIRE - PHQ9
SUM OF ALL RESPONSES TO PHQ QUESTIONS 1-9: 14
SUM OF ALL RESPONSES TO PHQ QUESTIONS 1-9: 14
10. IF YOU CHECKED OFF ANY PROBLEMS, HOW DIFFICULT HAVE THESE PROBLEMS MADE IT FOR YOU TO DO YOUR WORK, TAKE CARE OF THINGS AT HOME, OR GET ALONG WITH OTHER PEOPLE: SOMEWHAT DIFFICULT

## 2024-03-15 ASSESSMENT — PAIN SCALES - GENERAL: PAINLEVEL: SEVERE PAIN (6)

## 2024-03-15 NOTE — PROGRESS NOTES
Assessment & Plan     Acute on chronic respiratory failure with hypoxia and hypercapnia (H)  Anali is a 48-year-old female with severe interstitial lung disease and primary pulmonary hypertension on 6 L needed cannula at baseline as well as suppressive prednisone and prophylactic azithromycin, GERD, history of tobacco abuse with sobriety since December 2023, chronic pain on Suboxone management, depression/anxiety/PTSD who presented to ED on 3/2/24 with worsening respiratory failure and has subsequently been diagnosed with influenza a requiring critical care management into the ICU with AVAPS/BiPAP necessity as well as IV Levophed for vasopressor support.  Patient was started on IV steroids, IV fentanyl drip and IV Lasix and Tamiflu and over the past few days has had daily improvement and is on HFNC weaning down over the past 24 hours on pressure support needs.  Patient will be transitioned to Med/Surg status, transitioned to PO prednisone, weaned down on oxygen to home regimen of 6L as able.  Will have physical therapy and occupational therapy assess patient today.  Possible discharge home in next 1-2 days if patient continues to rapidly improve.       Principal Problem:    Acute on chronic respiratory failure with hypoxia and hypercapnia (H)    Acute encephalopathy    Influenza A    Assessment:  Influenza A causing acute respiratory failure with severe hypercapnia causing acute encephalopathy.  Patient initially required AVAPS non-invasive ventilator support, IV steroids, IV Lasix, and IV fentanyl drip with notable improvement in air function and has been transitioned to HFNC and PO medications including Tamiflu with patient remaining off AVAPS and Bipap for over 24 hours and now weaning down on HFNC needs in the past 24 hours.      Plan:   -transition to Med/Surg floor today  -Continue to wean HFNC as able and attempt transition to NC and wean back to patient's home regimen as able  -Continue Tamiflu 75 mg twice  daily  -Discussed with patient's pulmonologist, Dr. Marinelli, from [facility] and will transition patient to 60 mg prednisone daily with plans to taper by 10 mg every 7 days until patient is down to 20 mg daily.  Patient should stay at 20 mg daily until follow up appointment with him in upcoming weeks.      Ele follows up in clinic today.  Unfortunately her discharge summary is not available to review.  We did review all of her transitional medications.  She has completed her oral Levaquin and is now back on her baseline azithromycin 3 times a week.  She is also continuing her prednisone taper.  She is currently on 50 mg daily with a plan to continue to taper 10 mg/week until she is down to 20 mg daily.  She is having some more midepigastric pain.  She was noted to have acute gastritis with Hemoccult positive emesis in the hospital.  She was started on Nexium 20 mg once daily.  We will increase this to twice daily.  Reviewed her discharge lab work and will plan to recheck a metabolic profile and phosphorus again today.  She is scheduled for repeat chest CT per Dr. Marinelli in 1 month and also has her's annual mammogram scheduled next week.    Her exam today she is at her baseline.  She is on 6 L of nasal cannula oxygen with oxygen saturations at 96%.  Blood pressure is 128/76.  Pulse is 68 and regular.  Her lungs show diminished breath sounds and coarse breath sounds throughout.  Cardiac exam demonstrates no murmur.  She has no peripheral edema.  She has midepigastric tenderness with palpation.    Continue current medications and plan continue to taper prednisone as scheduled.  Will increase Nexium to 40 mg twice daily.    She has follow-up appointments with Dr. Marinelli and her psychiatrist later next month.  - Comprehensive metabolic panel (BMP + Alb, Alk Phos, ALT, AST, Total. Bili, TP); Future  - Phosphorus; Future  - Comprehensive metabolic panel (BMP + Alb, Alk Phos, ALT, AST, Total. Bili, TP)  - Phosphorus    Panlobular  "emphysema (H)  Chronic, progressive.  Follow-up with pulmonology and follow-up imaging in 1 month.    Polymyositis, organ involvement unspecified (H)  Chronic, progressive.  Follow-up with pulmonology in 1 month.    Gastroesophageal reflux disease with esophagitis and hemorrhage  Acute on chronic due to high-dose steroids for interstitial lung disease.  Increase Nexium to 40 mg twice daily.    Severe episode of recurrent major depressive disorder, with psychotic features (H)  Chronic, stable.  Recent episode of encephalopathy associated with hypoxic and have hypercapnic respiratory failure.  She is currently being followed closely by psychiatry.  Follow-up with psychiatry in 2 weeks as scheduled.    Moderate benzodiazepine use disorder (H)  Stable.    Ordering of each unique test  Prescription drug management      MED REC REQUIRED  Post Medication Reconciliation Status: discharge medications reconciled and changed, per note/orders  BMI  Estimated body mass index is 25.64 kg/m  as calculated from the following:    Height as of this encounter: 1.537 m (5' 0.5\").    Weight as of this encounter: 60.6 kg (133 lb 8 oz).          Follow-up Visit   Expected date:  Ronald 15, 2024 (Approximate)      Follow Up Appointment Details:     Follow-up with whom?: Me    Follow-Up for what?: Adult Preventive    How?: In Person                       Subjective   Anali is a 48 year old, presenting for the following health issues:  Hospital F/U      3/15/2024     3:40 PM   Additional Questions   Roomed by [facility]     [facility]       Hospital Follow-up Visit:    Hospital/Nursing Home/IP Rehab Facility: [facility]  Date of Admission: 3/2/2024  Date of Discharge: 3/6/2024  Reason(s) for Admission: influenza, pneumonia    Was your hospitalization related to COVID-19? No   Problems taking medications regularly:  None  Medication changes since discharge: None  Problems adhering to non-medication therapy:  None    Summary of " hospitalization:  Discharge summary unavailable  Diagnostic Tests/Treatments reviewed.  Follow up needed: recheck labs  Other Healthcare Providers Involved in Patient s Care:         Specialist appointment - Pulmonology and Cardiology  Update since discharge: improved.         Plan of care communicated with patient           Patient Active Problem List   Diagnosis    Personal history of tobacco use, presenting hazards to health    Abnormal blood chemistry    Abnormal maternal glucose tolerance, antepartum    Inflammatory arthritis    Hyperlipidemia LDL goal <130    SOB (shortness of breath)    Fibrosis of lung (H)    Anxiety    Tobacco abuse    S/P bunionectomy    ILD (interstitial lung disease) with concerns for acute exacerbation    Lung nodule    Pneumothorax of left lung after biopsy    Pneumothorax after biopsy    Hypoxia    Pulmonary hypertension (H)    ASCUS of cervix with negative high risk HPV    Primary pulmonary hypertension (H)    Chronic, continuous use of opioids    Iron deficiency    Anemia    Spondylosis of lumbosacral region without myelopathy or radiculopathy    Polymyositis, organ involvement unspecified (H)    Hallucinations    Prolonged grief disorder    Abnormal CT of the chest    Elevated brain natriuretic peptide (BNP) level    Acute on chronic respiratory failure with hypoxia and hypercapnia (H)    Pneumonia of both lungs due to infectious organism, unspecified part of lung    Recurrent major depressive disorder (H24)    MOLLY (generalized anxiety disorder)    PTSD (post-traumatic stress disorder)    Gastroesophageal reflux disease without esophagitis    Chronic pain    Acute pulmonary edema (H)    Chronic right heart failure (H)    Seronegative arthritis    Current tobacco use    Opioid use disorder in remission    Acute hypoxemic respiratory failure (H)    Moderate benzodiazepine use disorder (H)    Severe episode of recurrent major depressive disorder, with psychotic features (H)     Influenza A    Encephalopathy    Somnolence    Bigeminy    Acute upper GI bleed    Panlobular emphysema (H)       Current Outpatient Medications   Medication Sig Dispense Refill    acetaminophen (TYLENOL) 325 MG tablet       albuterol (VENTOLIN HFA) 108 (90 Base) MCG/ACT inhaler Inhale 1-2 puffs into the lungs every 4 hours as needed for shortness of breath or wheezing 18 g 3    azithromycin (ZITHROMAX) 250 MG tablet TAKE ONE TABLET BY MOUTH THREE TIMES A WEEK **START AFTER YOU'RE FINISHED WITH YOUR LEVOFLOXACIN ANTIBIOTIC** 30 tablet 0    clonazePAM (KLONOPIN) 0.5 MG tablet Take 0.25 mg by mouth 3 times daily as needed for anxiety      esomeprazole (NEXIUM) 20 MG DR capsule Take 20 mg by mouth every morning (before breakfast) Take 30-60 minutes before eating.      furosemide (LASIX) 20 MG tablet Take 1 tablet (20 mg) by mouth as needed (swelling) 90 tablet 3    ipratropium - albuterol 0.5 mg/2.5 mg/3 mL (DUONEB) 0.5-2.5 (3) MG/3ML neb solution Take 1 vial (3 mLs) by nebulization 4 times daily 360 mL 0    macitentan (OPSUMIT) 10 MG tablet Take 1 tablet (10 mg) by mouth daily Make sure you are completing your monthly mandatory labs for pregnancy. 30 tablet 11    nintedanib (OFEV) 150 MG capsule Take 1 capsule (150 mg) by mouth 2 times daily 60 capsule 11    Nutritional Supplements (ENSURE ENLIVE) LIQD Take 8 fluid ounces by mouth 3 times daily 720 mL 0    order for DME Oxygen: Patient requires supplemental Oxygen 4 LPM via nasal canula at rest and 6 LPM with activity. Please provide patient with portability capability. Okay to spot check patient on oxygen device, to keep sats above 90%. Please provider with home concentrator that can go up to 10 LPM. Oxygen will be for a lifetime. 1 Device 0    order for DME Please provide patient with 2  liquid oxygen tanks for portability. Spot check patient on liquid oxygen. Titrate oxygen to maintain saturations above 88%. 2 Device 0    polyethylene glycol (MIRALAX) 17 GM/Dose  "powder Take 1 Capful by mouth daily as needed for constipation      predniSONE (DELTASONE) 10 MG tablet Take prednisone, 10 mg tab - 6 tabs daily for 5 days, then 5 tabs daily for 7 days, then 4 tabs  daily for 7 days, then 3 tab daily for 7 days, then 2 tab daily for 7 days, then continue 2 tabs daily till seen by your lung doctor 150 tablet 0    senna (SENOKOT) 8.6 MG tablet Take 1 tablet by mouth 2 times daily as needed for constipation      sertraline (ZOLOFT) 100 MG tablet Take 100 mg by mouth daily      SUBOXONE 8-2 MG per film Place 1 Film under the tongue 3 times daily      tadalafil, PAH, 20 MG TABS Take 2 tablets (40 mg) by mouth daily 60 tablet 11    CHOLECALCIFEROL 25 MCG (1000 UT) tablet Take 1,000 Units by mouth daily (Patient not taking: Reported on 3/15/2024)      naloxone (NARCAN) 4 MG/0.1ML nasal spray Spray 1 spray (4 mg) into one nostril alternating nostrils as needed for opioid reversal every 2-3 minutes until assistance arrives (Patient not taking: Reported on 3/15/2024) 0.2 mL 0    oxyCODONE (ROXICODONE) 5 MG tablet Take 1 tablet (5 mg) by mouth every 6 hours as needed for severe pain (Patient not taking: Reported on 3/15/2024) 15 tablet 0           Review of Systems  CONSTITUTIONAL: NEGATIVE for fever, chills, change in weight  ENT/MOUTH: NEGATIVE for ear, mouth and throat problems  RESP:POSITIVE for cough-non productive, dyspnea on exertion, Hx ILD with acute on chronic respiratory failure, SOB/dyspnea, and wheezing and NEGATIVE for sputum   CV: NEGATIVE for chest pain, palpitations or peripheral edema  GI: POSITIVE for heartburn or reflux, Hx gastritis, and Hx GERD  ROS otherwise negative      Objective    /76 (BP Location: Left arm, Patient Position: Chair)   Pulse 68   Temp 97.5  F (36.4  C) (Temporal)   Resp 20   Ht 1.537 m (5' 0.5\")   Wt 60.6 kg (133 lb 8 oz)   LMP  (LMP Unknown)   SpO2 96%   BMI 25.64 kg/m    Body mass index is 25.64 kg/m .  Physical Exam   GENERAL: alert " and no distress  NECK: no adenopathy, no asymmetry, masses, or scars  RESP: lungs clear to auscultation - no rales, rhonchi or wheezes and decreased breath sounds bibasilar  CV: regular rate and rhythm, normal S1 S2, no S3 or S4, no murmur, click or rub, no peripheral edema  ABDOMEN: soft, nontender, no hepatosplenomegaly, no masses and bowel sounds normal  MS: no gross musculoskeletal defects noted, no edema    No results displayed because visit has over 200 results.        Results for orders placed or performed in visit on 03/15/24 (from the past 24 hour(s))   Comprehensive metabolic panel (BMP + Alb, Alk Phos, ALT, AST, Total. Bili, TP)   Result Value Ref Range    Sodium 143 135 - 145 mmol/L    Potassium 4.4 3.4 - 5.3 mmol/L    Carbon Dioxide (CO2) 29 22 - 29 mmol/L    Anion Gap 8 7 - 15 mmol/L    Urea Nitrogen 20.4 (H) 6.0 - 20.0 mg/dL    Creatinine 0.65 0.51 - 0.95 mg/dL    GFR Estimate >90 >60 mL/min/1.73m2    Calcium 8.8 8.6 - 10.0 mg/dL    Chloride 106 98 - 107 mmol/L    Glucose 107 (H) 70 - 99 mg/dL    Alkaline Phosphatase 40 40 - 150 U/L    AST 22 0 - 45 U/L    ALT 24 0 - 50 U/L    Protein Total 6.8 6.4 - 8.3 g/dL    Albumin 4.1 3.5 - 5.2 g/dL    Bilirubin Total 0.2 <=1.2 mg/dL   Phosphorus   Result Value Ref Range    Phosphorus 3.5 2.5 - 4.5 mg/dL     *Note: Due to a large number of results and/or encounters for the requested time period, some results have not been displayed. A complete set of results can be found in Results Review.           Signed Electronically by: Cristian Fagan MD    Answers submitted by the patient for this visit:  Patient Health Questionnaire (Submitted on 3/15/2024)  If you checked off any problems, how difficult have these problems made it for you to do your work, take care of things at home, or get along with other people?: Somewhat difficult  PHQ9 TOTAL SCORE: 14

## 2024-03-21 DIAGNOSIS — R93.89 ABNORMAL CT SCAN: ICD-10-CM

## 2024-03-21 DIAGNOSIS — J84.9 ILD (INTERSTITIAL LUNG DISEASE) (H): Primary | ICD-10-CM

## 2024-03-21 DIAGNOSIS — R07.9 CHEST PAIN, UNSPECIFIED: ICD-10-CM

## 2024-03-21 DIAGNOSIS — Z76.82 ORGAN TRANSPLANT CANDIDATE: ICD-10-CM

## 2024-03-21 DIAGNOSIS — Z01.818 ENCOUNTER FOR PRE-TRANSPLANT EVALUATION FOR LUNG TRANSPLANT: ICD-10-CM

## 2024-03-21 RX ORDER — POTASSIUM CHLORIDE 1500 MG/1
40 TABLET, EXTENDED RELEASE ORAL
Status: CANCELLED | OUTPATIENT
Start: 2024-03-21

## 2024-03-21 RX ORDER — LIDOCAINE 40 MG/G
CREAM TOPICAL
Status: CANCELLED | OUTPATIENT
Start: 2024-03-21

## 2024-03-21 RX ORDER — ASPIRIN 325 MG
325 TABLET ORAL ONCE
Status: CANCELLED | OUTPATIENT
Start: 2024-03-21 | End: 2024-03-21

## 2024-03-21 RX ORDER — SODIUM CHLORIDE 9 MG/ML
INJECTION, SOLUTION INTRAVENOUS CONTINUOUS
Status: CANCELLED | OUTPATIENT
Start: 2024-03-21

## 2024-03-21 RX ORDER — ASPIRIN 81 MG/1
243 TABLET, CHEWABLE ORAL ONCE
Status: CANCELLED | OUTPATIENT
Start: 2024-03-21

## 2024-03-21 RX ORDER — POTASSIUM CHLORIDE 1500 MG/1
20 TABLET, EXTENDED RELEASE ORAL
Status: CANCELLED | OUTPATIENT
Start: 2024-03-21

## 2024-03-21 NOTE — PROGRESS NOTES
Evaluation orders placed for patient's preferred weeks of 4/8 and 4/15; patient prefers to spread out appointments to avoid fatigue or exacerbation of symptoms. Visits to coincide with next RTC with Dr. Marinelli 4/16/24.

## 2024-03-25 ENCOUNTER — TELEPHONE (OUTPATIENT)
Dept: GASTROENTEROLOGY | Facility: CLINIC | Age: 49
End: 2024-03-25
Payer: COMMERCIAL

## 2024-03-25 NOTE — TELEPHONE ENCOUNTER
Non-Invasive GI Procedure Scheduling Questionnaire    Have you had a positive Covid test in the last 14 days?  No    Are you active on MyChart?   Yes    What insurance is in the chart?  Other:  [facility]    Ordering/Referring Provider:     ANAM HAND      (If ordering provider performs procedure, schedule with ordering provider unless otherwise instructed. )    (Females) Are you currently pregnant? No - Okay to continue scheduling.    Have you ever had or are you awaiting a heart or lung transplant? Yes - Schedule within 4 weeks or escalate to Parul Crowell and Elisa Grace for specific dates.    Do you need assistance transferring? No - Continue scheduling.    Do you take acid reflux medication or proton-pump inhibitors (PPI)? No - Continue scheduling.    Patient Reminders:  Please inform patients to review instructions sent via [employer] or letter two weeks before procedure.  The Manometry exam may take up to 90 minutes.  The Breath Test exam may take up to 4 hours.

## 2024-03-27 ENCOUNTER — HOSPITAL ENCOUNTER (OUTPATIENT)
Dept: MAMMOGRAPHY | Facility: CLINIC | Age: 49
Discharge: HOME | End: 2024-03-27
Attending: FAMILY MEDICINE | Admitting: FAMILY MEDICINE
Payer: COMMERCIAL

## 2024-03-27 ENCOUNTER — LAB (OUTPATIENT)
Dept: LAB | Facility: CLINIC | Age: 49
End: 2024-03-27
Payer: COMMERCIAL

## 2024-03-27 DIAGNOSIS — J84.10 PULMONARY FIBROSIS (H): ICD-10-CM

## 2024-03-27 DIAGNOSIS — R06.09 DOE (DYSPNEA ON EXERTION): ICD-10-CM

## 2024-03-27 DIAGNOSIS — I27.20 PULMONARY HYPERTENSION (H): ICD-10-CM

## 2024-03-27 DIAGNOSIS — Z12.31 VISIT FOR SCREENING MAMMOGRAM: ICD-10-CM

## 2024-03-27 DIAGNOSIS — Z76.82 ORGAN TRANSPLANT CANDIDATE: ICD-10-CM

## 2024-03-27 DIAGNOSIS — Z01.818 ENCOUNTER FOR PRE-TRANSPLANT EVALUATION FOR LUNG TRANSPLANT: ICD-10-CM

## 2024-03-27 LAB
ALBUMIN SERPL BCG-MCNC: 4 G/DL (ref 3.5–5.2)
ALP SERPL-CCNC: 40 U/L (ref 40–150)
ALT SERPL W P-5'-P-CCNC: 20 U/L (ref 0–50)
AST SERPL W P-5'-P-CCNC: 21 U/L (ref 0–45)
BILIRUB DIRECT SERPL-MCNC: <0.2 MG/DL (ref 0–0.3)
BILIRUB SERPL-MCNC: 0.4 MG/DL
HCG SERPL QL: NEGATIVE
PROT SERPL-MCNC: 6.8 G/DL (ref 6.4–8.3)

## 2024-03-27 PROCEDURE — 77063 BREAST TOMOSYNTHESIS BI: CPT

## 2024-03-27 PROCEDURE — 84703 CHORIONIC GONADOTROPIN ASSAY: CPT

## 2024-03-27 PROCEDURE — G0480 DRUG TEST DEF 1-7 CLASSES: HCPCS | Mod: 90

## 2024-03-27 PROCEDURE — 80307 DRUG TEST PRSMV CHEM ANLYZR: CPT | Mod: 90

## 2024-03-27 PROCEDURE — 80076 HEPATIC FUNCTION PANEL: CPT

## 2024-03-27 PROCEDURE — 36415 COLL VENOUS BLD VENIPUNCTURE: CPT | Mod: 90

## 2024-03-27 PROCEDURE — 99000 SPECIMEN HANDLING OFFICE-LAB: CPT

## 2024-03-31 LAB
COTININE SERPL-MCNC: <5 NG/ML
LABORATORY REPORT: NORMAL
NICOTINE SERPL-MCNC: <5 NG/ML
PETH INTERPRETATION: NORMAL
PLPETH BLD-MCNC: <10 NG/ML
POPETH BLD-MCNC: <10 NG/ML

## 2024-04-02 ENCOUNTER — LAB (OUTPATIENT)
Dept: LAB | Facility: CLINIC | Age: 49
End: 2024-04-02
Payer: COMMERCIAL

## 2024-04-02 DIAGNOSIS — Z01.818 PREOP EXAMINATION: ICD-10-CM

## 2024-04-02 DIAGNOSIS — Z76.82 AWAITING ORGAN TRANSPLANT STATUS: ICD-10-CM

## 2024-04-02 LAB
Lab: NORMAL
PERFORMING LABORATORY: NORMAL
TEST NAME: NORMAL

## 2024-04-02 PROCEDURE — 99000 SPECIMEN HANDLING OFFICE-LAB: CPT

## 2024-04-02 PROCEDURE — 80307 DRUG TEST PRSMV CHEM ANLYZR: CPT | Mod: 90

## 2024-04-04 ENCOUNTER — VIRTUAL VISIT (OUTPATIENT)
Dept: TRANSPLANT | Facility: CLINIC | Age: 49
End: 2024-04-04
Attending: INTERNAL MEDICINE
Payer: COMMERCIAL

## 2024-04-04 DIAGNOSIS — Z76.82 ORGAN TRANSPLANT CANDIDATE: ICD-10-CM

## 2024-04-04 DIAGNOSIS — J84.9 ILD (INTERSTITIAL LUNG DISEASE) (H): ICD-10-CM

## 2024-04-04 DIAGNOSIS — Z01.818 ENCOUNTER FOR PRE-TRANSPLANT EVALUATION FOR LUNG TRANSPLANT: ICD-10-CM

## 2024-04-04 NOTE — PROGRESS NOTES
Reviewed instructions with patient as below. All questions answered. Reviewed transplant evaluation upcoming appointments, docusign forms, and youtube videos. Pre-procedure instructions and youtube link    Pre-procedure instructions - Coronary Angiogram and Right Heart Catheterization  Patient Education    Your arrival time is 9:00AM for labs and imaging scans prior to your cath lab testing.  Location is [address] Waiting Room  Please plan on being at the hospital all day.  At any time, emergencies and/or urgent cases may come up which could delay the start of your procedure.    Pre-procedure instructions - Coronary Angiogram  Shower in the evening before or the morning of the procedure  No solid food for 8 hours prior and nothing to drink 2 hours prior to arrival time  You can take your morning medications (except for diabetic and blood thinners) with sips of water.  Take 325 mg of Asprin 24 hours prior to the procedure and the morning of procedure.   You will need to arrange a ride to drop you off and pick you up, as you will be unable to drive home.  Prior to discharge you may be required to lay flat for approximately 2-4 hours in the recovery unit to ensure proper clotting of the artery. You will need a responsible adult to stay with you for 24 hours post-procedure.              Diabetic Medication Instructions  Hold oral diabetic medication in morning of your procedure and for 48 hours after IV contrast is given  Typical instructions for insulin diabetic medication holding are below. However, please reach out to your Primary Care Provider or Endocrinologist for specific instructions  DO NOT take any oral diabetic medication, short-acting diabetes medications/insulin, humalog or regular insulin the morning of your test  Take   dose of long-acting insulin (Lantus, Levemir) the day of your test  Remember to bring your  glucometer and insulin with you to take after your test if needed  GLP-1 Agonists Instructions  DO NOT take injectable GLP-1 agonists semaglutide (Ozempic, Wegovy), dulaglutide (Trulicity), exenatide ER (Bydureon), tirzepatide (Mounjaro), or oral semaglutide (Rybelsus) for 7 days prior your procedure  Hold once daily injectable GLP-1 agonists exenatide (Byetta), liraglutide (Saxenda, Victoza), lixisenatide (Soligua) the day before and day of your procedure                  Anticoagulation Medication Instructions   NA - patient confirmed med list and that she is not currently on any blood thinners.    You will need to follow up with one of our cardiology APPs 1-2 weeks after your procedure. If you need help scheduling or rescheduling your appointment, please call [phone]

## 2024-04-05 NOTE — MR AVS SNAPSHOT
"              After Visit Summary   6/21/2017    Ellen Loya    MRN: [id number]           Patient Information     Date Of Birth          1975        Visit Information        Provider Department      6/21/2017 8:30 AM Dawson Upton MD [facility]        Today's Diagnoses     ILD (interstitial lung disease) (H)    -  1      Care Instructions    Pulmonary Rehab:  Continue pulmonary rehab    Oxygen use:  Using 3-4 at rest, 6-8 with activity      5' 2\" 119 lbs 0 oz Body mass index is 21.77 kg/(m^2).  Goal BMI greater than 18, less than 30 to be eligible for lung transplant    Medication changes: start flovent inhaler, continue Spiriva.  Start Cellcept (mycophenolate mofetil) after lab results are reviewed and confirmed.  Labs should be repeated monthly.  Cellcept dose may be increased after repeat labs are reviewed in 1 month  Try saline rinses for sinus symptoms    Stop nicotine gum on behalf of lung transplant consideration.  Contact your coordinator if you want to attend a lung transplant support group.    Follow-up plans: 3 months.    Thank-you for allowing us to participate in your care.    If your condition should change please contact your transplant coordinator.     Thoracic Transplant Office phone [phone], fax [phone]  Office Hours 8:30 - 5:00               Follow-ups after your visit        Follow-up notes from your care team     Return in about 3 months (around 9/21/2017).      Your next 10 appointments already scheduled     Jun 21, 2017  9:30 AM CDT   Lab with  LAB    [facility] ([facility] [place])    [address]  [address] [facility] [address] [phone]            Jun 22, 2017 11:00 AM CDT   Pulmonary Treatment with ELIZA Gutierrez   [facility] ([facility])    [address] [place] [address] Dr Rosas RINCON [address] [phone]            Jun 27, 2017 11:00 AM CDT   Pulmonary Treatment with " Bed: JNED-36  Expected date:   Expected time:   Means of arrival:   Comments:  RM 9   MARISSA Varner   [facility] ([facility])    [address] [place] [address]   [phone]            Jun 29, 2017 11:00 AM CDT   Pulmonary Treatment with ELIZA Gutierrez   [facility] ([facility])    [address] [place] [address]   [phone]            Jul 17, 2017  9:00 AM CDT   Lab with  LAB   [facility] ([facility])    [address] [place] [address]   [phone]            Jul 17, 2017  9:30 AM CDT   (Arrive by 9:15 AM)   RETURN PRIMARY PULMONARY with Callie Ledesma MD   [facility] ([facility])    [address] [place] [address]  [address]rd [facility] [address]   [phone]            Aug 25, 2017 10:30 AM CDT   (Arrive by 10:15 AM)   Return Visit with Clarita Martínez MD   [facility] ([facility])    [address] [place] [address]  [address]rd [facility] [address]   [phone]            Sep 20, 2017 10:30 AM CDT   PFT VISIT with  PFL C   [facility] Pulmonary Function Testing ([facility])    [address] [place] [address]   [phone]            Sep 20, 2017 11:00 AM CDT   (Arrive by 10:45 AM)   Return Interstitial Lung with Dawson Upton MD   [facility] ([facility])    [address] [place] [address]   [phone]              Future tests that were ordered for you today     Open Standing Orders        Priority Remaining Interval Expires Ordered    Comprehensive metabolic panel Routine 12/12 month 6/21/2018 6/21/2017          Open Future Orders        Priority Expected Expires Ordered    CBC with platelets differential Routine  7/21/2017 6/21/2017    General PFT Lab (Please always keep checked) Routine  6/21/2018 6/21/2017    Pulmonary  "Function Test Routine  6/21/2018 6/21/2017            Who to contact     If you have questions or need follow up information about today's clinic visit or your schedule please contact [facility] directly at [phone].  Normal or non-critical lab and imaging results will be communicated to you by Videoflothart, letter or phone within 4 business days after the clinic has received the results. If you do not hear from us within 7 days, please contact the clinic through [employer] or phone. If you have a critical or abnormal lab result, we will notify you by phone as soon as possible.  Submit refill requests through [employer] or call your pharmacy and they will forward the refill request to us. Please allow 3 business days for your refill to be completed.          Additional Information About Your Visit        VideoflotharCollexpo Information     [employer] gives you secure access to your electronic health record. If you see a primary care provider, you can also send messages to your care team and make appointments. If you have questions, please call your primary care clinic.  If you do not have a primary care provider, please call [phone] and they will assist you.        Care EveryWhere ID     This is your Care EveryWhere ID. This could be used by other organizations to access your [facility] medical records  [id number]        Your Vitals Were     Pulse Temperature Respirations Height Pulse Oximetry BMI (Body Mass Index)    52 97.9  F (36.6  C) (Oral) 18 1.575 m (5' 2\") 93% 21.77 kg/m2       Blood Pressure from Last 3 Encounters:   06/21/17 122/71   05/22/17 104/56   04/26/17 108/59    Weight from Last 3 Encounters:   06/21/17 54 kg (119 lb)   06/13/17 54 kg (119 lb)   06/06/17 53.7 kg (118 lb 6.4 oz)                 Today's Medication Changes          These changes are accurate as of: 6/21/17  9:11 AM.  If you have any questions, ask your nurse or doctor.               Start taking these medicines.        " Dose/Directions    fluticasone 220 MCG/ACT Inhaler   Commonly known as:  FLOVENT HFA   Used for:  ILD (interstitial lung disease) (H)   Started by:  Dawson Upton MD        Dose:  2 puff   Inhale 2 puffs into the lungs 2 times daily   Quantity:  1 Inhaler   Refills:  3       mycophenolate capsule   Used for:  ILD (interstitial lung disease) (H)   Started by:  Dawson Upton MD        Dose:  500 mg   Take 2 capsules (500 mg) by mouth 2 times daily   Quantity:  60 capsule   Refills:  3         Stop taking these medicines if you haven't already. Please contact your care team if you have questions.     TYVASO STARTER 0.6 MG/ML Soln neb solution   Generic drug:  treprostinil   Stopped by:  Dawson Upton MD                Where to get your medicines      These medications were sent to [facility] [address]   [address] [place] [address] , [facility] [address]     Phone:  [phone]     fluticasone 220 MCG/ACT Inhaler    mycophenolate capsule                Primary Care Provider Office Phone # Fax #    Ignacio Loya [phone]       [place] [address] [facility]   [facility] [address]        Equal Access to Services     GIRISH SPENCER : Hadii gilma ku hadasho Soomaali, waaxda luqadaha, qaybta kaalmada adeegyada, waxay sandrain hayfeliciano hannah. So [facility] [phone].    ATENCIÓN: Si habla español, tiene a bassett disposición servicios gratuitos de asistencia lingüística. Llame al [phone].    We comply with applicable federal civil rights laws and [place] laws. We do not discriminate on the basis of race, color, national origin, age, disability sex, sexual orientation or gender identity.            Thank you!     Thank you for choosing [facility]  for your care. Our goal is always to provide you with excellent care. Hearing back from our patients is one way we can continue to improve our services. Please take a few minutes to  complete the written survey that you may receive in the mail after your visit with us. Thank you!             Your Updated Medication List - Protect others around you: Learn how to safely use, store and throw away your medicines at www.disposemymeds.org.          This list is accurate as of: 6/21/17  9:11 AM.  Always use your most recent med list.                   Brand Name Dispense Instructions for use Diagnosis    * albuterol (2.5 MG/3ML) 0.083% neb solution     60 vial    Use every 4-6 hours as needed for shortness of breath    Pleuritic chest pain, Acute bronchitis, unspecified organism       * albuterol 108 (90 BASE) MCG/ACT Inhaler    albuterol    1 Inhaler    Inhale 1-2 puffs into the lungs every 4 hours as needed for shortness of breath / dyspnea    SOB (shortness of breath)       ALPRAZolam 0.5 MG tablet    XANAX    40 tablet    TAKE 1 TABLET BY MOUTH EVERY SIX HOURS AS NEEDED FOR ANXIETY.    Disturbance in sleep behavior       digoxin 125 MCG tablet    LANOXIN    90 tablet    Take 1 tablet (125 mcg) by mouth daily    Pulmonary hypertension (H)       fluticasone 220 MCG/ACT Inhaler    FLOVENT HFA    1 Inhaler    Inhale 2 puffs into the lungs 2 times daily    ILD (interstitial lung disease) (H)       fluticasone 50 MCG/ACT spray    FLONASE    1 Bottle    Spray 2 sprays into both nostrils daily    Nasal congestion       furosemide 20 MG tablet    LASIX    180 tablet    Take 2 tablets (40 mg) by mouth daily    Pulmonary hypertension (H)       morphine 15 MG 12 hr tablet    MS CONTIN    60 tablet    Take 1 tablet (15 mg) by mouth 2 times daily    Inflammatory arthritis       mycophenolate capsule     60 capsule    Take 2 capsules (500 mg) by mouth 2 times daily    ILD (interstitial lung disease) (H)       NEXIUM PO      Take 40 mg by mouth every morning (before breakfast)    Pulmonary hypertension (H)       * order for DME     1 Device    Equipment being ordered: personal O2 oximeter.    Hypoxia       * order  for DME     1 Device    Equipment being ordered: Oxygen  Please provide patient with a home-fill system for portability.    ILD (interstitial lung disease) (H), Hypoxia       * order for DME     2 Device    Please provide patient with 2  liquid oxygen tanks for portability. Spot check patient on liquid oxygen. Titrate oxygen to maintain saturations above 88%.    ILD (interstitial lung disease) (H)       * order for DME     1 Device    Equipment being ordered: Oxygen oximeter and mask    ILD (interstitial lung disease) (H), Lung nodule, Fibrosis of lung (H), Pulmonary hypertension (H)       * order for DME     1 Device    Equipment being ordered: Oximeter    ILD (interstitial lung disease) (H)       * order for DME     1 Device    Equipment being ordered: Nebulizer. Verbal order from     Acute bronchitis, unspecified organism       * order for DME     1 Device    Oxygen: Patient requires supplemental Oxygen 4 LPM via nasal canula at rest and 6 LPM with activity. Please provide patient with portability capability. Okay to spot check patient on oxygen device, to keep sats above 90%. Please provider with home concentrator that can go up to 10 LPM. Oxygen will be for a lifetime.    Hypoxia       * order for DME     1 Device    Equipment being ordered: Oxygen 8 liters continuous at rest and 8 liters continuous with activity. Needs portability.  Please provide concentrator that goes to 10 liters.    ILD (interstitial lung disease) (H), Hypoxia       * order for DME     1 Device    Please provide patient with updated oxygen equipment.  Patient requires 3 to 4 LPM continuous flow while driving or sitting and 8 LPM continuous flow with all other activity.    ILD (interstitial lung disease) (H)       oxyCODONE-acetaminophen  MG per tablet    PERCOCET    60 tablet    Take 1 tablet by mouth every 6 hours as needed for moderate to severe pain    Pleuritic chest pain       tadalafil (PAH) 20 MG Tabs    ADCIRCA     60 tablet    Take 2 tablets (40 mg) by mouth daily    Pulmonary hypertension (H)       tiotropium 18 MCG capsule    SPIRIVA HANDIHALER    30 capsule    Inhale contents of one capsule daily.    ILD (interstitial lung disease) (H)       * Notice:  This list has 11 medication(s) that are the same as other medications prescribed for you. Read the directions carefully, and ask your doctor or other care provider to review them with you.

## 2024-04-07 LAB — LABCORP INTERFACED MISCELLANEOUS TEST RESULT: NORMAL

## 2024-04-08 ENCOUNTER — MYC MEDICAL ADVICE (OUTPATIENT)
Dept: PULMONOLOGY | Facility: CLINIC | Age: 49
End: 2024-04-08
Payer: COMMERCIAL

## 2024-04-08 DIAGNOSIS — J96.02 ACUTE RESPIRATORY FAILURE WITH HYPERCAPNIA (H): ICD-10-CM

## 2024-04-08 DIAGNOSIS — J84.9 ILD (INTERSTITIAL LUNG DISEASE) (H): Primary | ICD-10-CM

## 2024-04-08 LAB
LABCORP INTERFACED MISCELLANEOUS TEST RESULT: NORMAL
Lab: NORMAL
PERFORMING LABORATORY: NORMAL
SPECIMEN STATUS: NORMAL
TEST NAME: NORMAL

## 2024-04-08 RX ORDER — PREDNISONE 20 MG/1
20 TABLET ORAL DAILY
Qty: 10 TABLET | Refills: 0 | Status: SHIPPED | OUTPATIENT
Start: 2024-04-08 | End: 2024-06-03

## 2024-04-10 ENCOUNTER — HOSPITAL ENCOUNTER (OUTPATIENT)
Dept: GENERAL RADIOLOGY | Facility: CLINIC | Age: 49
Discharge: HOME | End: 2024-04-10
Attending: INTERNAL MEDICINE | Admitting: INTERNAL MEDICINE
Payer: COMMERCIAL

## 2024-04-10 ENCOUNTER — APPOINTMENT (OUTPATIENT)
Dept: MEDSURG UNIT | Facility: CLINIC | Age: 49
End: 2024-04-10
Attending: INTERNAL MEDICINE
Payer: COMMERCIAL

## 2024-04-10 ENCOUNTER — HOSPITAL ENCOUNTER (OUTPATIENT)
Dept: NUCLEAR MEDICINE | Facility: CLINIC | Age: 49
Setting detail: NUCLEAR MEDICINE
Discharge: HOME | End: 2024-04-10
Attending: INTERNAL MEDICINE | Admitting: INTERNAL MEDICINE
Payer: COMMERCIAL

## 2024-04-10 ENCOUNTER — HOSPITAL ENCOUNTER (OUTPATIENT)
Facility: CLINIC | Age: 49
Discharge: HOME | End: 2024-04-10
Attending: INTERNAL MEDICINE | Admitting: STUDENT IN AN ORGANIZED HEALTH CARE EDUCATION/TRAINING PROGRAM
Payer: COMMERCIAL

## 2024-04-10 ENCOUNTER — LAB (OUTPATIENT)
Dept: LAB | Facility: CLINIC | Age: 49
End: 2024-04-10
Attending: INTERNAL MEDICINE
Payer: COMMERCIAL

## 2024-04-10 VITALS
HEART RATE: 58 BPM | RESPIRATION RATE: 16 BRPM | OXYGEN SATURATION: 98 % | HEIGHT: 61 IN | DIASTOLIC BLOOD PRESSURE: 65 MMHG | SYSTOLIC BLOOD PRESSURE: 122 MMHG | TEMPERATURE: 97.7 F | BODY MASS INDEX: 24.73 KG/M2 | WEIGHT: 131 LBS

## 2024-04-10 DIAGNOSIS — Z01.818 ENCOUNTER FOR PRE-TRANSPLANT EVALUATION FOR LUNG TRANSPLANT: ICD-10-CM

## 2024-04-10 DIAGNOSIS — J84.9 ILD (INTERSTITIAL LUNG DISEASE) (H): ICD-10-CM

## 2024-04-10 DIAGNOSIS — Z76.82 ORGAN TRANSPLANT CANDIDATE: ICD-10-CM

## 2024-04-10 DIAGNOSIS — R07.9 CHEST PAIN, UNSPECIFIED: ICD-10-CM

## 2024-04-10 DIAGNOSIS — R93.89 ABNORMAL CT SCAN: ICD-10-CM

## 2024-04-10 DIAGNOSIS — I50.812 CHRONIC RIGHT HEART FAILURE (H): Primary | ICD-10-CM

## 2024-04-10 DIAGNOSIS — I50.812 CHRONIC RIGHT HEART FAILURE (H): ICD-10-CM

## 2024-04-10 PROBLEM — Z98.890 STATUS POST CORONARY ANGIOGRAM: Status: ACTIVE | Noted: 2024-04-10

## 2024-04-10 LAB
ABO/RH(D): NORMAL
AMYLASE SERPL-CCNC: 40 U/L (ref 28–100)
ANION GAP SERPL CALCULATED.3IONS-SCNC: 10 MMOL/L (ref 7–15)
ANTIBODY SCREEN: NEGATIVE
ANTIBODY TITER IGM SCREEN: NEGATIVE
APTT PPP: 25 SECONDS (ref 22–38)
B IGG TITR SERPL: 32 {TITER}
B IGM TITR SERPL: 32 {TITER}
BUN SERPL-MCNC: 16.6 MG/DL (ref 6–20)
CALCIUM SERPL-MCNC: 9.3 MG/DL (ref 8.6–10)
CHLORIDE SERPL-SCNC: 105 MMOL/L (ref 98–107)
CHOLEST SERPL-MCNC: 294 MG/DL
CMV IGG SERPL IA-ACNC: >10 U/ML
CMV IGG SERPL IA-ACNC: ABNORMAL
CREAT SERPL-MCNC: 0.72 MG/DL (ref 0.51–0.95)
DEPRECATED HCO3 PLAS-SCNC: 29 MMOL/L (ref 22–29)
EBV VCA IGG SER IA-ACNC: >750 U/ML
EBV VCA IGG SER IA-ACNC: POSITIVE
EGFRCR SERPLBLD CKD-EPI 2021: >90 ML/MIN/1.73M2
ERYTHROCYTE [DISTWIDTH] IN BLOOD BY AUTOMATED COUNT: 15.3 % (ref 10–15)
FASTING STATUS PATIENT QL REPORTED: YES
GLUCOSE SERPL-MCNC: 74 MG/DL (ref 70–99)
HAV AB SER QL IA: NONREACTIVE
HBA1C MFR BLD: 5.8 %
HBV CORE AB SERPL QL IA: NONREACTIVE
HBV SURFACE AB SERPL IA-ACNC: <3.5 M[IU]/ML
HBV SURFACE AB SERPL IA-ACNC: NONREACTIVE M[IU]/ML
HBV SURFACE AG SERPL QL IA: NONREACTIVE
HCG INTACT+B SERPL-ACNC: 1 MIU/ML
HCT VFR BLD AUTO: 38.2 % (ref 35–47)
HCV AB SERPL QL IA: NONREACTIVE
HDLC SERPL-MCNC: 136 MG/DL
HGB BLD-MCNC: 11.7 G/DL (ref 11.7–15.7)
HGB BLD-MCNC: 12.2 G/DL (ref 11.7–15.7)
HIV 1+2 AB+HIV1 P24 AG SERPL QL IA: NONREACTIVE
HSV1 IGG SERPL QL IA: 11.3 INDEX
HSV1 IGG SERPL QL IA: ABNORMAL
HSV2 IGG SERPL QL IA: 1.03 INDEX
HSV2 IGG SERPL QL IA: ABNORMAL
INR PPP: 0.95 (ref 0.85–1.15)
LDLC SERPL CALC-MCNC: 136 MG/DL
MAGNESIUM SERPL-MCNC: 2.2 MG/DL (ref 1.7–2.3)
MCH RBC QN AUTO: 31.2 PG (ref 26.5–33)
MCHC RBC AUTO-ENTMCNC: 31.9 G/DL (ref 31.5–36.5)
MCV RBC AUTO: 98 FL (ref 78–100)
NONHDLC SERPL-MCNC: 158 MG/DL
PHOSPHATE SERPL-MCNC: 3 MG/DL (ref 2.5–4.5)
PLATELET # BLD AUTO: 189 10E3/UL (ref 150–450)
POTASSIUM SERPL-SCNC: 3.5 MMOL/L (ref 3.4–5.3)
PREALB SERPL-MCNC: 27.9 MG/DL (ref 20–40)
RBC # BLD AUTO: 3.91 10E6/UL (ref 3.8–5.2)
SODIUM SERPL-SCNC: 144 MMOL/L (ref 135–145)
SPECIMEN EXPIRATION DATE: NORMAL
SPECIMEN EXPIRATION DATE: NORMAL
T GONDII IGG SER QL: <3 IU/ML (ref 0–7.1)
TRANSFERRIN SERPL-MCNC: 214 MG/DL (ref 200–360)
TRIGL SERPL-MCNC: 109 MG/DL
TSH SERPL DL<=0.005 MIU/L-ACNC: 1.24 UIU/ML (ref 0.3–4.2)
VIT D+METAB SERPL-MCNC: 15 NG/ML (ref 20–50)
VZV IGG SER QL IA: 1807 INDEX
VZV IGG SER QL IA: POSITIVE
WBC # BLD AUTO: 7.5 10E3/UL (ref 4–11)

## 2024-04-10 PROCEDURE — 84100 ASSAY OF PHOSPHORUS: CPT | Performed by: INTERNAL MEDICINE

## 2024-04-10 PROCEDURE — 84466 ASSAY OF TRANSFERRIN: CPT | Performed by: INTERNAL MEDICINE

## 2024-04-10 PROCEDURE — 258N000003 HC RX IP 258 OP 636: Performed by: INTERNAL MEDICINE

## 2024-04-10 PROCEDURE — 71046 X-RAY EXAM CHEST 2 VIEWS: CPT | Mod: 26 | Performed by: RADIOLOGY

## 2024-04-10 PROCEDURE — C1751 CATH, INF, PER/CENT/MIDLINE: HCPCS | Performed by: INTERNAL MEDICINE

## 2024-04-10 PROCEDURE — 80323 ALKALOIDS NOS: CPT | Performed by: INTERNAL MEDICINE

## 2024-04-10 PROCEDURE — 99152 MOD SED SAME PHYS/QHP 5/>YRS: CPT | Performed by: INTERNAL MEDICINE

## 2024-04-10 PROCEDURE — 250N000011 HC RX IP 250 OP 636: Mod: JZ | Performed by: INTERNAL MEDICINE

## 2024-04-10 PROCEDURE — 85018 HEMOGLOBIN: CPT

## 2024-04-10 PROCEDURE — 999N000054 HC STATISTIC EKG NON-CHARGEABLE

## 2024-04-10 PROCEDURE — 71045 X-RAY EXAM CHEST 1 VIEW: CPT | Mod: 26 | Performed by: RADIOLOGY

## 2024-04-10 PROCEDURE — 86777 TOXOPLASMA ANTIBODY: CPT | Performed by: INTERNAL MEDICINE

## 2024-04-10 PROCEDURE — 96365 THER/PROPH/DIAG IV INF INIT: CPT

## 2024-04-10 PROCEDURE — 86704 HEP B CORE ANTIBODY TOTAL: CPT | Performed by: INTERNAL MEDICINE

## 2024-04-10 PROCEDURE — 83735 ASSAY OF MAGNESIUM: CPT | Performed by: INTERNAL MEDICINE

## 2024-04-10 PROCEDURE — 84702 CHORIONIC GONADOTROPIN TEST: CPT | Performed by: INTERNAL MEDICINE

## 2024-04-10 PROCEDURE — 81378 HLA I & II TYPING HR: CPT | Performed by: INTERNAL MEDICINE

## 2024-04-10 PROCEDURE — 82784 ASSAY IGA/IGD/IGG/IGM EACH: CPT | Performed by: INTERNAL MEDICINE

## 2024-04-10 PROCEDURE — 71046 X-RAY EXAM CHEST 2 VIEWS: CPT

## 2024-04-10 PROCEDURE — 87340 HEPATITIS B SURFACE AG IA: CPT | Performed by: INTERNAL MEDICINE

## 2024-04-10 PROCEDURE — 86833 HLA CLASS II HIGH DEFIN QUAL: CPT | Performed by: INTERNAL MEDICINE

## 2024-04-10 PROCEDURE — 999N000134 HC STATISTIC PP CARE STAGE 3

## 2024-04-10 PROCEDURE — 85730 THROMBOPLASTIN TIME PARTIAL: CPT | Performed by: INTERNAL MEDICINE

## 2024-04-10 PROCEDURE — 258N000003 HC RX IP 258 OP 636: Performed by: STUDENT IN AN ORGANIZED HEALTH CARE EDUCATION/TRAINING PROGRAM

## 2024-04-10 PROCEDURE — 93456 R HRT CORONARY ARTERY ANGIO: CPT | Performed by: INTERNAL MEDICINE

## 2024-04-10 PROCEDURE — 84134 ASSAY OF PREALBUMIN: CPT | Performed by: INTERNAL MEDICINE

## 2024-04-10 PROCEDURE — 86803 HEPATITIS C AB TEST: CPT | Performed by: INTERNAL MEDICINE

## 2024-04-10 PROCEDURE — 80061 LIPID PANEL: CPT | Performed by: INTERNAL MEDICINE

## 2024-04-10 PROCEDURE — 86708 HEPATITIS A ANTIBODY: CPT | Performed by: INTERNAL MEDICINE

## 2024-04-10 PROCEDURE — 84443 ASSAY THYROID STIM HORMONE: CPT | Performed by: INTERNAL MEDICINE

## 2024-04-10 PROCEDURE — 84433 ASY THIOPURIN S-MTHYLTRNSFRS: CPT | Performed by: INTERNAL MEDICINE

## 2024-04-10 PROCEDURE — 272N000001 HC OR GENERAL SUPPLY STERILE: Performed by: INTERNAL MEDICINE

## 2024-04-10 PROCEDURE — 93456 R HRT CORONARY ARTERY ANGIO: CPT | Mod: 26 | Performed by: INTERNAL MEDICINE

## 2024-04-10 PROCEDURE — 93005 ELECTROCARDIOGRAM TRACING: CPT

## 2024-04-10 PROCEDURE — 86644 CMV ANTIBODY: CPT | Performed by: INTERNAL MEDICINE

## 2024-04-10 PROCEDURE — 250N000013 HC RX MED GY IP 250 OP 250 PS 637: Performed by: INTERNAL MEDICINE

## 2024-04-10 PROCEDURE — 80048 BASIC METABOLIC PNL TOTAL CA: CPT | Performed by: INTERNAL MEDICINE

## 2024-04-10 PROCEDURE — 250N000011 HC RX IP 250 OP 636: Performed by: INTERNAL MEDICINE

## 2024-04-10 PROCEDURE — 86787 VARICELLA-ZOSTER ANTIBODY: CPT | Performed by: INTERNAL MEDICINE

## 2024-04-10 PROCEDURE — 86706 HEP B SURFACE ANTIBODY: CPT | Performed by: INTERNAL MEDICINE

## 2024-04-10 PROCEDURE — 85610 PROTHROMBIN TIME: CPT | Performed by: INTERNAL MEDICINE

## 2024-04-10 PROCEDURE — 86695 HERPES SIMPLEX TYPE 1 TEST: CPT | Performed by: INTERNAL MEDICINE

## 2024-04-10 PROCEDURE — 82787 IGG 1 2 3 OR 4 EACH: CPT | Performed by: INTERNAL MEDICINE

## 2024-04-10 PROCEDURE — 86665 EPSTEIN-BARR CAPSID VCA: CPT | Performed by: INTERNAL MEDICINE

## 2024-04-10 PROCEDURE — 82955 ASSAY OF G6PD ENZYME: CPT | Performed by: INTERNAL MEDICINE

## 2024-04-10 PROCEDURE — 99152 MOD SED SAME PHYS/QHP 5/>YRS: CPT | Mod: GC | Performed by: INTERNAL MEDICINE

## 2024-04-10 PROCEDURE — 80321 ALCOHOLS BIOMARKERS 1OR 2: CPT | Performed by: INTERNAL MEDICINE

## 2024-04-10 PROCEDURE — C1887 CATHETER, GUIDING: HCPCS | Performed by: INTERNAL MEDICINE

## 2024-04-10 PROCEDURE — 78580 LUNG PERFUSION IMAGING: CPT

## 2024-04-10 PROCEDURE — 83036 HEMOGLOBIN GLYCOSYLATED A1C: CPT | Performed by: INTERNAL MEDICINE

## 2024-04-10 PROCEDURE — 82785 ASSAY OF IGE: CPT | Performed by: INTERNAL MEDICINE

## 2024-04-10 PROCEDURE — A9540 TC99M MAA: HCPCS | Performed by: INTERNAL MEDICINE

## 2024-04-10 PROCEDURE — 999N000142 HC STATISTIC PROCEDURE PREP ONLY

## 2024-04-10 PROCEDURE — 250N000009 HC RX 250: Performed by: INTERNAL MEDICINE

## 2024-04-10 PROCEDURE — 86481 TB AG RESPONSE T-CELL SUSP: CPT | Performed by: INTERNAL MEDICINE

## 2024-04-10 PROCEDURE — 96360 HYDRATION IV INFUSION INIT: CPT | Mod: 59

## 2024-04-10 PROCEDURE — 86900 BLOOD TYPING SEROLOGIC ABO: CPT | Performed by: INTERNAL MEDICINE

## 2024-04-10 PROCEDURE — 86832 HLA CLASS I HIGH DEFIN QUAL: CPT | Performed by: INTERNAL MEDICINE

## 2024-04-10 PROCEDURE — 86696 HERPES SIMPLEX TYPE 2 TEST: CPT | Performed by: INTERNAL MEDICINE

## 2024-04-10 PROCEDURE — 82150 ASSAY OF AMYLASE: CPT | Performed by: INTERNAL MEDICINE

## 2024-04-10 PROCEDURE — 343N000001 HC RX 343: Performed by: INTERNAL MEDICINE

## 2024-04-10 PROCEDURE — C1894 INTRO/SHEATH, NON-LASER: HCPCS | Performed by: INTERNAL MEDICINE

## 2024-04-10 PROCEDURE — 36415 COLL VENOUS BLD VENIPUNCTURE: CPT | Performed by: INTERNAL MEDICINE

## 2024-04-10 PROCEDURE — 71045 X-RAY EXAM CHEST 1 VIEW: CPT | Mod: XU

## 2024-04-10 PROCEDURE — 78580 LUNG PERFUSION IMAGING: CPT | Mod: 26 | Performed by: RADIOLOGY

## 2024-04-10 PROCEDURE — 82306 VITAMIN D 25 HYDROXY: CPT | Performed by: INTERNAL MEDICINE

## 2024-04-10 PROCEDURE — 85027 COMPLETE CBC AUTOMATED: CPT | Performed by: INTERNAL MEDICINE

## 2024-04-10 PROCEDURE — 86886 COOMBS TEST INDIRECT TITER: CPT | Performed by: INTERNAL MEDICINE

## 2024-04-10 RX ORDER — IOPAMIDOL 755 MG/ML
INJECTION, SOLUTION INTRAVASCULAR
Status: DISCONTINUED | OUTPATIENT
Start: 2024-04-10 | End: 2024-04-10 | Stop reason: HOSPADM

## 2024-04-10 RX ORDER — NALOXONE HYDROCHLORIDE 0.4 MG/ML
0.4 INJECTION, SOLUTION INTRAMUSCULAR; INTRAVENOUS; SUBCUTANEOUS
Status: DISCONTINUED | OUTPATIENT
Start: 2024-04-10 | End: 2024-04-10 | Stop reason: HOSPADM

## 2024-04-10 RX ORDER — DIPHENHYDRAMINE HYDROCHLORIDE 50 MG/ML
INJECTION INTRAMUSCULAR; INTRAVENOUS
Status: DISCONTINUED | OUTPATIENT
Start: 2024-04-10 | End: 2024-04-10 | Stop reason: HOSPADM

## 2024-04-10 RX ORDER — POTASSIUM CHLORIDE 750 MG/1
20 TABLET, EXTENDED RELEASE ORAL
Status: COMPLETED | OUTPATIENT
Start: 2024-04-10 | End: 2024-04-10

## 2024-04-10 RX ORDER — ONDANSETRON 4 MG/1
4 TABLET, ORALLY DISINTEGRATING ORAL EVERY 6 HOURS PRN
Status: DISCONTINUED | OUTPATIENT
Start: 2024-04-10 | End: 2024-04-10 | Stop reason: HOSPADM

## 2024-04-10 RX ORDER — SODIUM CHLORIDE 9 MG/ML
INJECTION, SOLUTION INTRAVENOUS CONTINUOUS
Status: DISCONTINUED | OUTPATIENT
Start: 2024-04-10 | End: 2024-04-10 | Stop reason: HOSPADM

## 2024-04-10 RX ORDER — ASPIRIN 81 MG/1
243 TABLET, CHEWABLE ORAL ONCE
Status: COMPLETED | OUTPATIENT
Start: 2024-04-10 | End: 2024-04-10

## 2024-04-10 RX ORDER — ASPIRIN 325 MG
325 TABLET, DELAYED RELEASE (ENTERIC COATED) ORAL DAILY
COMMUNITY
End: 2024-04-22

## 2024-04-10 RX ORDER — ASPIRIN 325 MG
325 TABLET ORAL ONCE
Status: COMPLETED | OUTPATIENT
Start: 2024-04-10 | End: 2024-04-10

## 2024-04-10 RX ORDER — FLUMAZENIL 0.1 MG/ML
0.2 INJECTION, SOLUTION INTRAVENOUS
Status: DISCONTINUED | OUTPATIENT
Start: 2024-04-10 | End: 2024-04-10 | Stop reason: HOSPADM

## 2024-04-10 RX ORDER — ONDANSETRON 2 MG/ML
4 INJECTION INTRAMUSCULAR; INTRAVENOUS EVERY 6 HOURS PRN
Status: DISCONTINUED | OUTPATIENT
Start: 2024-04-10 | End: 2024-04-10 | Stop reason: HOSPADM

## 2024-04-10 RX ORDER — NALOXONE HYDROCHLORIDE 0.4 MG/ML
0.2 INJECTION, SOLUTION INTRAMUSCULAR; INTRAVENOUS; SUBCUTANEOUS
Status: DISCONTINUED | OUTPATIENT
Start: 2024-04-10 | End: 2024-04-10 | Stop reason: HOSPADM

## 2024-04-10 RX ORDER — POTASSIUM CHLORIDE 750 MG/1
40 TABLET, EXTENDED RELEASE ORAL
Status: DISCONTINUED | OUTPATIENT
Start: 2024-04-10 | End: 2024-04-10 | Stop reason: HOSPADM

## 2024-04-10 RX ORDER — LIDOCAINE 40 MG/G
CREAM TOPICAL
Status: DISCONTINUED | OUTPATIENT
Start: 2024-04-10 | End: 2024-04-10 | Stop reason: HOSPADM

## 2024-04-10 RX ORDER — OXYCODONE HYDROCHLORIDE 5 MG/1
5 TABLET ORAL EVERY 4 HOURS PRN
Status: DISCONTINUED | OUTPATIENT
Start: 2024-04-10 | End: 2024-04-10 | Stop reason: HOSPADM

## 2024-04-10 RX ORDER — HEPARIN SODIUM 1000 [USP'U]/ML
INJECTION, SOLUTION INTRAVENOUS; SUBCUTANEOUS
Status: DISCONTINUED | OUTPATIENT
Start: 2024-04-10 | End: 2024-04-10 | Stop reason: HOSPADM

## 2024-04-10 RX ORDER — OXYCODONE HYDROCHLORIDE 10 MG/1
10 TABLET ORAL EVERY 4 HOURS PRN
Status: DISCONTINUED | OUTPATIENT
Start: 2024-04-10 | End: 2024-04-10 | Stop reason: HOSPADM

## 2024-04-10 RX ORDER — FENTANYL CITRATE 50 UG/ML
25 INJECTION, SOLUTION INTRAMUSCULAR; INTRAVENOUS
Status: DISCONTINUED | OUTPATIENT
Start: 2024-04-10 | End: 2024-04-10 | Stop reason: HOSPADM

## 2024-04-10 RX ADMIN — KIT FOR THE PREPARATION OF TECHNETIUM TC 99M ALBUMIN AGGREGATED 6.6 MILLICURIE: 2.5 INJECTION, POWDER, FOR SOLUTION INTRAVENOUS at 10:23

## 2024-04-10 RX ADMIN — POTASSIUM CHLORIDE 20 MEQ: 750 TABLET, EXTENDED RELEASE ORAL at 12:46

## 2024-04-10 RX ADMIN — SODIUM CHLORIDE, POTASSIUM CHLORIDE, SODIUM LACTATE AND CALCIUM CHLORIDE 500 ML: 600; 310; 30; 20 INJECTION, SOLUTION INTRAVENOUS at 19:38

## 2024-04-10 RX ADMIN — SODIUM CHLORIDE: 9 INJECTION, SOLUTION INTRAVENOUS at 15:33

## 2024-04-10 ASSESSMENT — ACTIVITIES OF DAILY LIVING (ADL)
ADLS_ACUITY_SCORE: 40

## 2024-04-10 NOTE — PROGRESS NOTES
Returned from cardiac cath lab s/p right heart catheterization and coronary angiogram.  VSS.  Right neck swollen but soft; per MD a lot of lidocaine was used.  Denies pain or discomfort at neck or at right wrist access site.  Right wrist with TR band at 15cc.  Right wrist site clean and dry.  Resting in bed.

## 2024-04-10 NOTE — Clinical Note
dry, intact, no bleeding and no hematoma. TR band on right radial wrist with 15 ml air noted-arm board on

## 2024-04-10 NOTE — PRE-PROCEDURE
GENERAL PRE-PROCEDURE:     Written consent obtained?: Yes    Risks and benefits: Risks, benefits and alternatives were discussed    Consent given by:  Patient  Patient states understanding of procedure being performed: Yes    Patient's understanding of procedure matches consent: Yes    Procedure consent matches procedure scheduled: Yes    Expected level of sedation:  Moderate  Appropriately NPO:  Yes  ASA Class:  2  Mallampati  :  Grade 1- soft palate, uvula, tonsillar pillars, and posterior pharyngeal wall visible  History & Physical reviewed:  History and physical reviewed and no updates needed  Statement of review:  I have reviewed the lab findings, diagnostic data, medications, and the plan for sedation

## 2024-04-10 NOTE — DISCHARGE INSTRUCTIONS
Going Home after Coronary Angiogram  For 24 hours:        Have an adult stay with you for 24 hours.        Relax and take it easy.        Drink plenty of fluids.        You may eat your normal diet, unless your doctor tells you otherwise.        Do NOT make any important or legal decisions.        Do NOT drive or operate machines at home or at work.        Do NOT drink alcohol.   Do NOT smoke.     Medicines:        If you have begun Plavix (clopidogrel), Effient (prasugrel), or Brilinta (ticagrelor), do not stop taking it until you talk to your heart doctor (cardiologist).        If you have stopped any other medicines, check with your nurse or provider about when to restart them.    Care of wrist or arm site:        It is normal to have soreness at the puncture site and mild tingling in your hand for up to 3 days.        Remove the Band-Aid after 24 hours. If there is minor oozing, apply another Band-aid and remove it after 12 hours.         Do NOT take a bath, or use a hot tub or pool for the next 48 hours. You may shower tomorrow evening.        It is normal to have a small bruise.  There should not be a lump at the site.        Do not scrub the site.        Do not use lotion or powder near the puncture site for 3 days.        For 2 days, do not use your hand or arm to support your weight (such as rising from a chair) or bend your wrist (such as lifting a garage door).        For 2 days, do not lift more than 5 pounds or exercise your arm (tennis, golf or bowling).    If you start bleeding from the site in your arm: Sit down and press firmly on the site with your fingers for 10 minutes. Call your doctor as soon as you can.    Call 911 right away if you have bleeding that is heavy or does not stop.     Call your doctor if:        You have a large or growing hard lump around the site.        The site is red, swollen, hot or tender.        Blood or fluid is draining from the site.        You have chills or a fever  greater than 101 F (38 C).        Your arm turns bluish, feels numb or cool.        You have hives, a rash or unusual itching.   Discharge Instructions Post Right Heart Cath and/or Heart Biopsy      AFTER YOU GO HOME:  DO drink plenty of fluids  DO resume your regular diet and medications unless otherwise instructed by your Primary Physician  Do Not scrub the procedure site vigorously  No lotion or powder to the puncture site for 3 days    CALL YOUR PRIMARY PHYSICIAN IF: You may resume all normal activity.  Monitor neck site for bleeding, swelling, or voice changes. If you notice bleeding or swelling immediately apply pressure to the site and call number below to speak with Cardiology Fellow.  If you experience any changes in your breathing you should call your doctor immediately or come to the closest Emergency Department.  Do not drive yourself.    ADDITIONAL INSTRUCTIONS: Medications: You are to resume all home medications including anticoagulation therapy unless otherwise advised by your primary cardiologist or nurse coordinator.    Follow Up: Per your primary cardiology team                                                       Telephone Numbers     [facility] [phone] Monday-Friday 8:00 am to 4:30 pm   [facility] [phone] After 4:30 pm Monday-Friday, Weekends & Holidays  Ask for Interventional Cardiologist on call. Someone is on call 24 hours/day

## 2024-04-10 NOTE — PROGRESS NOTES
Arrived from home for a CORS and RHC as a work up for lung transplant.  VSS.  On 6 liters Nasal Canula per her home dose.  Denies pain.  H&P current.  Consent obtained.  Ready for procedure.

## 2024-04-11 ENCOUNTER — TELEPHONE (OUTPATIENT)
Dept: CARDIOLOGY | Facility: CLINIC | Age: 49
End: 2024-04-11
Payer: COMMERCIAL

## 2024-04-11 LAB
ATRIAL RATE - MUSE: 56 BPM
ATRIAL RATE - MUSE: 64 BPM
CHOLEST SERPL-MCNC: 294 MG/DL
DIASTOLIC BLOOD PRESSURE - MUSE: NORMAL MMHG
DIASTOLIC BLOOD PRESSURE - MUSE: NORMAL MMHG
G6PD RBC-CCNT: 12.1 U/G HB
GAMMA INTERFERON BACKGROUND BLD IA-ACNC: 0.08 IU/ML
HDLC SERPL-MCNC: 135 MG/DL
IGA SERPL-MCNC: 178 MG/DL (ref 84–499)
IGE SERPL-ACNC: 11 KU/L (ref 0–114)
IGG SERPL-MCNC: 764 MG/DL (ref 610–1616)
IGM SERPL-MCNC: 139 MG/DL (ref 35–242)
INTERPRETATION ECG - MUSE: NORMAL
INTERPRETATION ECG - MUSE: NORMAL
LDLC SERPL CALC-MCNC: 137 MG/DL
M TB IFN-G BLD-IMP: NEGATIVE
M TB IFN-G CD4+ BCKGRND COR BLD-ACNC: 9.92 IU/ML
MITOGEN IGNF BCKGRD COR BLD-ACNC: 0 IU/ML
MITOGEN IGNF BCKGRD COR BLD-ACNC: 0.02 IU/ML
NONHDLC SERPL-MCNC: 159 MG/DL
P AXIS - MUSE: 58 DEGREES
P AXIS - MUSE: 58 DEGREES
PR INTERVAL - MUSE: 106 MS
PR INTERVAL - MUSE: 136 MS
QRS DURATION - MUSE: 78 MS
QRS DURATION - MUSE: 92 MS
QT - MUSE: 422 MS
QT - MUSE: 426 MS
QTC - MUSE: 407 MS
QTC - MUSE: 439 MS
QUANTIFERON MITOGEN: 10 IU/ML
QUANTIFERON NIL TUBE: 0.08 IU/ML
QUANTIFERON TB1 TUBE: 0.08 IU/ML
QUANTIFERON TB2 TUBE: 0.1
R AXIS - MUSE: 45 DEGREES
R AXIS - MUSE: 52 DEGREES
SYSTOLIC BLOOD PRESSURE - MUSE: NORMAL MMHG
SYSTOLIC BLOOD PRESSURE - MUSE: NORMAL MMHG
T AXIS - MUSE: 18 DEGREES
T AXIS - MUSE: 32 DEGREES
TRIGL SERPL-MCNC: 109 MG/DL
VENTRICULAR RATE- MUSE: 56 BPM
VENTRICULAR RATE- MUSE: 64 BPM

## 2024-04-11 NOTE — PROVIDER NOTIFICATION
Cardiology after hours text paged at #[phone] to inform them that patient's BP is 86/55, which is lower than her baseline, she is asymptomatic. Waiting for reply to see if there are any new orders.     1915: Cardiology called back, plan is to give LR fluids and obtain an EKG. Provider will touch back when EKG is completed.     2020: Spoke with Cardiology again, plan is to finish LR bolus and have patient walk, if SBP is not less than 100, and patient feels good, she may discharge, but will update provider post ambulation and BP check.

## 2024-04-11 NOTE — PROGRESS NOTES
"/65 (BP Location: Left arm)   Pulse 58   Temp 97.7  F (36.5  C)   Resp 16   Ht 1.537 m (5' 0.51\")   Wt 59.4 kg (131 lb)   LMP  (LMP Unknown)   SpO2 98%   BMI 25.15 kg/m    She has ambulated a couple of times in the hallway, she has voided, and she has tolerated a regular diet. She denies pain or discomfort. She is alert and oriented x 4 and able to make needs known. She has verbalized understanding of all discharge instructions, all questions answered. She was assisted to main entrance with all belongings to get car from [facility] for discharge home with her mom. She has her own oxygen tank that she picked up from the security check in desk. PIV removed prior to discharge.   "

## 2024-04-11 NOTE — TELEPHONE ENCOUNTER
Spoke with pt follow-up from her procedure yesterday.  She indicated she was just a little sore, but pleased she didn't need any stents placed.  Pt had no questions.    Tristan Tovar RN on 4/11/2024 at 10:05 AM

## 2024-04-12 ENCOUNTER — ANCILLARY PROCEDURE (OUTPATIENT)
Dept: GENERAL RADIOLOGY | Facility: CLINIC | Age: 49
End: 2024-04-12
Attending: INTERNAL MEDICINE
Payer: COMMERCIAL

## 2024-04-12 ENCOUNTER — ANCILLARY PROCEDURE (OUTPATIENT)
Dept: CT IMAGING | Facility: CLINIC | Age: 49
End: 2024-04-12
Attending: INTERNAL MEDICINE
Payer: COMMERCIAL

## 2024-04-12 ENCOUNTER — ANCILLARY PROCEDURE (OUTPATIENT)
Dept: CARDIOLOGY | Facility: CLINIC | Age: 49
End: 2024-04-12
Attending: INTERNAL MEDICINE
Payer: COMMERCIAL

## 2024-04-12 DIAGNOSIS — Z01.818 ENCOUNTER FOR PRE-TRANSPLANT EVALUATION FOR LUNG TRANSPLANT: ICD-10-CM

## 2024-04-12 DIAGNOSIS — Z76.82 ORGAN TRANSPLANT CANDIDATE: ICD-10-CM

## 2024-04-12 DIAGNOSIS — J84.9 ILD (INTERSTITIAL LUNG DISEASE) (H): ICD-10-CM

## 2024-04-12 LAB
A*: NORMAL
A*LOCUS SEROLOGIC EQUIVALENT: 3
A*LOCUS: NORMAL
A*SEROLOGIC EQUIVALENT: 32
ABTEST METHOD: NORMAL
B*: NORMAL
B*LOCUS SEROLOGIC EQUIVALENT: 7
B*LOCUS: NORMAL
B*SEROLOGIC EQUIVALENT: 27
BW-1: NORMAL
BW-2: NORMAL
C*: NORMAL
C*LOCUS SEROLOGIC EQUIVALENT: 2
C*LOCUS: NORMAL
C*SEROLOGIC EQUIVALENT: 7
DPA1*: NORMAL
DPB1*: NORMAL
DQA1*: NORMAL
DQA1*LOCUS: NORMAL
DQB1*: NORMAL
DQB1*LOCUS SEROLOGIC EQUIVALENT: 6
DQB1*LOCUS: NORMAL
DQB1*SEROLOGIC EQUIVALENT: 6
DRB1*: NORMAL
DRB1*LOCUS SEROLOGIC EQUIVALENT: 13
DRB1*LOCUS: NORMAL
DRB1*SEROLOGIC EQUIVALENT: 15
DRB3*LOCUS SEROLOGIC EQUIVALENT: 52
DRB3*LOCUS: NORMAL
DRB5*: NORMAL
DRB5*SEROLOGIC EQUIVALENT: 51
DRSSO TEST METHOD: NORMAL
IGG SERPL-MCNC: 764 MG/DL (ref 610–1616)
IGG1 SER-MCNC: 477 MG/DL (ref 382–929)
IGG2 SER-MCNC: 165 MG/DL (ref 242–700)
IGG3 SER-MCNC: 24 MG/DL (ref 22–176)
IGG4 SER-MCNC: 24 MG/DL (ref 4–86)
LABORATORY REPORT: NORMAL
LVEF ECHO: NORMAL
PETH INTERPRETATION: NORMAL
PLPETH BLD-MCNC: <10 NG/ML
POPETH BLD-MCNC: <10 NG/ML
SUBCLASSES, PERCENT: 90 %
TPMT BLD-CCNC: 24.7 U/ML

## 2024-04-12 PROCEDURE — 93306 TTE W/DOPPLER COMPLETE: CPT | Performed by: INTERNAL MEDICINE

## 2024-04-12 PROCEDURE — 71260 CT THORAX DX C+: CPT | Performed by: STUDENT IN AN ORGANIZED HEALTH CARE EDUCATION/TRAINING PROGRAM

## 2024-04-12 PROCEDURE — 72100 X-RAY EXAM L-S SPINE 2/3 VWS: CPT | Performed by: STUDENT IN AN ORGANIZED HEALTH CARE EDUCATION/TRAINING PROGRAM

## 2024-04-12 PROCEDURE — 99207 PR STATISTIC IV PUSH SINGLE INITIAL SUBSTANCE: CPT | Performed by: INTERNAL MEDICINE

## 2024-04-12 PROCEDURE — 73522 X-RAY EXAM HIPS BI 3-4 VIEWS: CPT | Mod: GC | Performed by: RADIOLOGY

## 2024-04-12 PROCEDURE — 74177 CT ABD & PELVIS W/CONTRAST: CPT | Performed by: STUDENT IN AN ORGANIZED HEALTH CARE EDUCATION/TRAINING PROGRAM

## 2024-04-12 PROCEDURE — 72070 X-RAY EXAM THORAC SPINE 2VWS: CPT | Performed by: STUDENT IN AN ORGANIZED HEALTH CARE EDUCATION/TRAINING PROGRAM

## 2024-04-12 PROCEDURE — 76000 FLUOROSCOPY <1 HR PHYS/QHP: CPT | Mod: GC | Performed by: RADIOLOGY

## 2024-04-12 RX ORDER — IOPAMIDOL 755 MG/ML
75 INJECTION, SOLUTION INTRAVASCULAR ONCE
Status: COMPLETED | OUTPATIENT
Start: 2024-04-12 | End: 2024-04-12

## 2024-04-12 RX ADMIN — IOPAMIDOL 75 ML: 755 INJECTION, SOLUTION INTRAVASCULAR at 09:58

## 2024-04-12 RX ADMIN — Medication 5 ML: at 09:22

## 2024-04-12 NOTE — DISCHARGE INSTRUCTIONS

## 2024-04-13 LAB
COTININE SERPL-MCNC: 9 NG/ML
NICOTINE SERPL-MCNC: <5 NG/ML

## 2024-04-14 NOTE — TELEPHONE ENCOUNTER
RECORDS STATUS - ALL OTHER DIAGNOSIS      RECORDS RECEIVED FROM: Epic   DATE RECEIVED:    NOTES STATUS DETAILS   OFFICE NOTE from referring provider Epic Dr. Mango Marinelli   DISCHARGE SUMMARY from hospital [facility] 04/10/24, 11/08/23, 04/17/19: [facility] (more in [facility])    03/02/24, 09/10/23, 08/15/23: [facility]   DISCHARGE REPORT from the ER [facility] 02/10/23: [facility] ED   OPERATIVE REPORT Epic 02/13/24, 12/12/23, 09/18/23: PFT   MEDICATION LIST [facility]    LABS     PATHOLOGY REPORTS     ANYTHING RELATED TO DIAGNOSIS Epic Most recent 04/12/23   IMAGING (NEED IMAGES & REPORT)     CT SCANS PACS 04/12/24: CT CAP  03/02/24-05/16/11: CT Chest   XRAYS PACS 04/12/24-05/09/11: XR Chest   PET PACS 10/23/14: PET Onc
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
## 2024-04-16 ENCOUNTER — OFFICE VISIT (OUTPATIENT)
Dept: PULMONOLOGY | Facility: CLINIC | Age: 49
End: 2024-04-16
Attending: INTERNAL MEDICINE
Payer: COMMERCIAL

## 2024-04-16 ENCOUNTER — ALLIED HEALTH/NURSE VISIT (OUTPATIENT)
Dept: TRANSPLANT | Facility: CLINIC | Age: 49
End: 2024-04-16
Attending: INTERNAL MEDICINE
Payer: COMMERCIAL

## 2024-04-16 VITALS
BODY MASS INDEX: 24.96 KG/M2 | WEIGHT: 130 LBS | DIASTOLIC BLOOD PRESSURE: 75 MMHG | OXYGEN SATURATION: 94 % | SYSTOLIC BLOOD PRESSURE: 133 MMHG | HEART RATE: 74 BPM

## 2024-04-16 DIAGNOSIS — Z72.0 TOBACCO ABUSE: ICD-10-CM

## 2024-04-16 DIAGNOSIS — J84.9 ILD (INTERSTITIAL LUNG DISEASE) (H): ICD-10-CM

## 2024-04-16 DIAGNOSIS — J96.11 CHRONIC HYPOXEMIC RESPIRATORY FAILURE (H): ICD-10-CM

## 2024-04-16 DIAGNOSIS — J84.10 PULMONARY FIBROSIS, UNSPECIFIED (H): Primary | ICD-10-CM

## 2024-04-16 DIAGNOSIS — Z12.11 SCREENING FOR COLON CANCER: ICD-10-CM

## 2024-04-16 DIAGNOSIS — Z01.818 ENCOUNTER FOR PRE-TRANSPLANT EVALUATION FOR LUNG TRANSPLANT: ICD-10-CM

## 2024-04-16 DIAGNOSIS — Z76.82 ORGAN TRANSPLANT CANDIDATE: ICD-10-CM

## 2024-04-16 LAB
6 MIN WALK (FT): 1105 FT
6 MIN WALK (M): 337 M

## 2024-04-16 PROCEDURE — 99214 OFFICE O/P EST MOD 30 MIN: CPT | Mod: 25 | Performed by: INTERNAL MEDICINE

## 2024-04-16 PROCEDURE — 94375 RESPIRATORY FLOW VOLUME LOOP: CPT | Performed by: INTERNAL MEDICINE

## 2024-04-16 PROCEDURE — 94729 DIFFUSING CAPACITY: CPT | Performed by: INTERNAL MEDICINE

## 2024-04-16 PROCEDURE — G0463 HOSPITAL OUTPT CLINIC VISIT: HCPCS | Performed by: INTERNAL MEDICINE

## 2024-04-16 RX ORDER — PREDNISONE 10 MG/1
10 TABLET ORAL DAILY
Qty: 30 TABLET | Refills: 3 | Status: SHIPPED | OUTPATIENT
Start: 2024-05-14 | End: 2024-09-23

## 2024-04-16 RX ORDER — PREDNISONE 5 MG/1
TABLET ORAL
Qty: 98 TABLET | Refills: 0 | Status: SHIPPED | OUTPATIENT
Start: 2024-04-16 | End: 2024-05-14

## 2024-04-16 ASSESSMENT — PAIN SCALES - GENERAL: PAINLEVEL: NO PAIN (0)

## 2024-04-16 NOTE — PATIENT INSTRUCTIONS
Your new prednisone dosing:    Dr. Marinelli is tapering your prednisone dose. You prescription for these new dosages has been sent to [facility]'s. Please take the prednisone as follows:    Starting today, take 20 mg once per day for 2 weeks.  Starting 4/30, take 15 mg once per day for 2 weeks.  Starting 5/14, take 10 mg once per day. This will be your new daily dose.     Please message in [employer] or call the pulmonary clinic with any medication questions.

## 2024-04-16 NOTE — NURSING NOTE
Chief Complaint   Patient presents with    Interstitial Lung Disease (ILD)     2 month follow up     Vitals were taken and medications were reconciled.     Alisson Atkinson RMA  11:32 AM

## 2024-04-16 NOTE — PROGRESS NOTES
"Patient and daughter Argenis came to the pre lung class, content per Pre-Lung Transplant Education videos. They were attentive, stated understanding, and asked good questions. They were given all relevant handouts.   Verified that patient has received the following items:    \"Questions and Answers for Transplant Candidates and Families about Multiple Listing Waiting Time Transfer\"     One-Year Survival Rates for Heart and Lung Transplant  for [facility].      Reviewed the following documents with the patient:    \"Guidelines for Being on the Transplant List\".    \"What You Need to Know about a Lung and Heart-Lung Transplant .        Required signatures will be obtained via docusign and forms scanned to EMR.  Addressed patient questions and concerns regarding transplant.    Discussed donor offers including donors who meet risk behavior criteria, hepatitis C-exposed donors, and DCD donors.     Discussed physician and coordinator management pre to post lung transplant.     HLA results pending.  Discussed PRA monitoring with patient.     Will review with Lung Transplant Team for transplant candidacy when evaluation complete.      Patient and family were encouraged to use the YouTube playlist to continue to view videos to reinforce education.      Learning Assessment  Primary language: English   required: no  Preferred learning style: Listening, Reading, Demonstration, Pictures/video  Barriers to learning: Emotional - some overwhelm identified      "

## 2024-04-16 NOTE — NURSING NOTE
Patient accompanied by: daughter Argenis  Current activity level: ADLs,   Pulmonary Rehab: currently attending; on pause due to frequent appointments  Recommendations: cease use of nicotine lozenges  Patient status assessment updated.    Current oxygen use:  rest 6, activity 8, sleep 6   Assisted Ventilation: none as outpatient  Diabetic status: not diabetic  Data Unavailable 130 lbs 0 oz Body mass index is 24.96 kg/m .    Prednisone: 20 mg daily. Will start taper to 10 mg.   Substance use: tox screens all negative, however reports using nicotine lozenges rarely.     Medication changes: Prednisone taper:  20 mg for 2 weeks  15 mg for 2 weeks  10 mg indefinitely    Plan: continue with evaluation. Colonoscopy referral placed for [facility] d/t oxygen needs.    Note:  Azathioprine will be drug of preference due to GI side effects.

## 2024-04-17 ENCOUNTER — ONCOLOGY VISIT (OUTPATIENT)
Dept: SURGERY | Facility: CLINIC | Age: 49
End: 2024-04-17
Attending: INTERNAL MEDICINE
Payer: COMMERCIAL

## 2024-04-17 ENCOUNTER — PRE VISIT (OUTPATIENT)
Dept: SURGERY | Facility: CLINIC | Age: 49
End: 2024-04-17
Payer: COMMERCIAL

## 2024-04-17 ENCOUNTER — ALLIED HEALTH/NURSE VISIT (OUTPATIENT)
Dept: TRANSPLANT | Facility: CLINIC | Age: 49
End: 2024-04-17
Attending: INTERNAL MEDICINE
Payer: COMMERCIAL

## 2024-04-17 VITALS
TEMPERATURE: 98.7 F | BODY MASS INDEX: 24.79 KG/M2 | SYSTOLIC BLOOD PRESSURE: 138 MMHG | WEIGHT: 131.3 LBS | DIASTOLIC BLOOD PRESSURE: 79 MMHG | HEIGHT: 61 IN | OXYGEN SATURATION: 97 % | RESPIRATION RATE: 16 BRPM | HEART RATE: 68 BPM

## 2024-04-17 DIAGNOSIS — Z76.82 ORGAN TRANSPLANT CANDIDATE: ICD-10-CM

## 2024-04-17 DIAGNOSIS — J84.9 ILD (INTERSTITIAL LUNG DISEASE) (H): ICD-10-CM

## 2024-04-17 DIAGNOSIS — Z01.818 ENCOUNTER FOR PRE-TRANSPLANT EVALUATION FOR LUNG TRANSPLANT: ICD-10-CM

## 2024-04-17 LAB
DLCOCOR-%PRED-PRE: 24 %
DLCOCOR-PRE: 4.73 ML/MIN/MMHG
DLCOUNC-%PRED-PRE: 23 %
DLCOUNC-PRE: 4.55 ML/MIN/MMHG
DLCOUNC-PRED: 19.21 ML/MIN/MMHG
ERV-%PRED-PRE: 44 %
ERV-PRE: 0.47 L
ERV-PRED: 1.06 L
EXPTIME-PRE: 5.55 SEC
FEF2575-%PRED-PRE: 34 %
FEF2575-PRE: 0.86 L/SEC
FEF2575-PRED: 2.49 L/SEC
FEFMAX-%PRED-PRE: 80 %
FEFMAX-PRE: 5.11 L/SEC
FEFMAX-PRED: 6.34 L/SEC
FEV1-%PRED-PRE: 60 %
FEV1-PRE: 1.42 L
FEV1FEV6-PRE: 70 %
FEV1FEV6-PRED: 83 %
FEV1FVC-PRE: 70 %
FEV1FVC-PRED: 82 %
FEV1SVC-PRE: 66 %
FEV1SVC-PRED: 77 %
FIFMAX-PRE: 4.46 L/SEC
FVC-%PRED-PRE: 70 %
FVC-PRE: 2.02 L
FVC-PRED: 2.88 L
IC-%PRED-PRE: 78 %
IC-PRE: 1.67 L
IC-PRED: 2.12 L
PROTOCOL CUTOFF: NORMAL
SA 1 CELL: NORMAL
SA 1 TEST METHOD: NORMAL
SA 2 CELL: NORMAL
SA 2 TEST METHOD: NORMAL
SA1 HI RISK ABY: NORMAL
SA1 MOD RISK ABY: NORMAL
SA2 HI RISK ABY: NORMAL
SA2 MOD RISK ABY: NORMAL
UNACCEPTABLE ANTIGENS: NORMAL
UNOS CPRA: 94
VA-%PRED-PRE: 79 %
VA-PRE: 3.47 L
VC-%PRED-PRE: 69 %
VC-PRE: 2.14 L
VC-PRED: 3.08 L
ZZZSA 1  COMMENTS: NORMAL
ZZZSA 2 COMMENTS: NORMAL

## 2024-04-17 PROCEDURE — 99205 OFFICE O/P NEW HI 60 MIN: CPT | Performed by: THORACIC SURGERY (CARDIOTHORACIC VASCULAR SURGERY)

## 2024-04-17 PROCEDURE — G0463 HOSPITAL OUTPT CLINIC VISIT: HCPCS | Performed by: THORACIC SURGERY (CARDIOTHORACIC VASCULAR SURGERY)

## 2024-04-17 ASSESSMENT — PAIN SCALES - GENERAL: PAINLEVEL: NO PAIN (0)
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
## 2024-04-17 NOTE — NURSING NOTE
"Oncology Rooming Note    April 17, 2024 4:00 PM   Anali Loya is a 48 year old female who presents for:    Chief Complaint   Patient presents with    Oncology Clinic Visit     Pre Lung Tr     Initial Vitals: /79   Pulse 68   Temp 98.7  F (37.1  C) (Oral)   Resp 16   Ht 1.537 m (5' 0.5\")   Wt 59.6 kg (131 lb 4.8 oz)   LMP  (LMP Unknown)   SpO2 97%   BMI 25.22 kg/m   Estimated body mass index is 25.22 kg/m  as calculated from the following:    Height as of this encounter: 1.537 m (5' 0.5\").    Weight as of this encounter: 59.6 kg (131 lb 4.8 oz). Body surface area is 1.6 meters squared.  No Pain (0) Comment: Data Unavailable   No LMP recorded (lmp unknown). Patient is postmenopausal.  Allergies reviewed: Yes  Medications reviewed: Yes    Medications: Medication refills not needed today.  Pharmacy name entered into EPIC:    [facility] - [facility] [place], [place] - [address] [place] [address] [facility] - [place], [place] - [address]  [facility] - [place], [place] - [address]  [facility] - [place], [place] - [address]    Frailty Screening:   Is the patient here for a new oncology consult visit in cancer care? 1. Yes. Over the past month, have you experienced difficulty or required a caregiver to assist with:   1. Balance, walking or general mobility (including any falls)? NO  2. Completion of self-care tasks such as bathing, dressing, toileting, grooming/hygiene?  NO  3. Concentration or memory that affects your daily life?  NO       Clinical concerns: Rick herbert.       Leda Altamirano [facility]              "

## 2024-04-17 NOTE — PROGRESS NOTES
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), pulm HTN (on 8-10L O2 at rest, 15L on exertion)  - lung transplant candidate - would need to be abstinent from all nicotine products for 6 months (last tob 12/2023, has used nicotine gum since)    Social -   - 2 sons - lives with Butch / Reji in [place], dtr Argenis (in [facility] [place] [facility]"), lives with parents  - grandkids  - likes nature / flowers, hiking, collecting agates, annual camping trips  - spending time with family is important    Psychospiritual - gets strength from:   - spirituality  - therapist    Care Planning -   - working on HCD (just received this yesterday)    Opioid safety -  - hx opioid use (on suboxone)    History:  History gathered today from: patient and son Butch    I discussed with them the role of palliative care in seeing patients who are being evaluated for lung transplantation.  We talked about preparedness planning.      Transplantation preparedness plan    Patient s legally designated health care agent:   - Argenis (dtr) then Reji / Butch then parents as alternates  - Working on HCD  - They will be having a family meeting shortly to plan out the post-operative plan, and she plans to talk with her entire family about the things we discussed below.    Patient s description of how they make decisions (by themselves, in consultation with certain close loved ones, based on advice from medical professionals, etc):    Talks with family and doctors then makes decision on own  If not able to make decisions on own, trusts family to make decisions    Patient s worries/concerns when considering receiving the transplant:   \"There's always a concern about the transplant not taking or rejection, but it's worth trying since I can't do anything now.\"    Patient s hopes for the transplant:  Having more time with family, and able to spend time in nature & hiking again    Anxiety / coping techniques:   - on medication for " anxiety  - uses breathing techniques for anxiety (we reviewed a few additional ones today)  - meditation    We did discuss patients can have a lot of mucus, and constriction, around the time of transplant causing anxiety that can be quite problematic.  This can cause a lot of anxiety coughing and dyspnea which can complicate their recovery course.      Patient s thoughts about what health conditions would be unacceptable (situations the patient would want her/his doctors and loved ones to know that she/he would not want life prolonged)?   - Would be okay with physical debility if still mentally intact  - If she had physical debility and needed 24/7 care including toileting needs, she would want to be in a care facility so that her family doesn't have to do that for her    Lung transplantation carries a small risk of perioperative stroke/brain injury, which however can be devastating. After a stroke, what sort of functional outcomes would be acceptable vs unacceptable to the patient?   - Wouldn't want to live in a condition where she was stuck in a bed and couldn't recognize or communicate with family    - Would be okay with physical debility if still mentally intact    Chronic mechanical ventilation via a tracheostomy tube is a risk. What are the circumstances the patient would want her/his physicians and family to keep them alive with long-term mechanical ventilation vs allow them to die?  She would want tracheostomy for months to see if she can recover, but would not want long-term ventilation forever.      I had a discussion about what the complications can look like sometimes after transplantation.  This included chronic ventilator dependence and needing tracheostomy and long-term ventilation including sometimes LTAC placement, other serious comorbidities including becoming severely debilitated after transplant, often including multiple infections as well, spending time in facilities, in and out of the hospital  and going back and forth between hospital and rehab facilities, and this going on for many months to years.         Pertinent Medications:      PE: LMP  (LMP Unknown)    Wt Readings from Last 3 Encounters:   04/16/24 59 kg (130 lb)   04/10/24 59.4 kg (131 lb)   03/15/24 60.6 kg (133 lb 8 oz)     Gen: alert, awake, NAD  Eyes: no scleral icterus  Pulm: no resp distress at rest, on O2  Ext: no gross deformities      Data reviewed:  Na 144  C4 0.7    CT images personally reviewed; bilateral lung fibrotic and honeycombing changes.    Pulmonary and outside psychiatry notes reviewed       database reviewed: no      Impression & Recommendations:  This is a 48 year old female here for pre-lung transplant evaluation.    We discussed ACP items above and Anali plans to talk with her family about her ACP wishes pre-operatively as well.  She is planning to fill out the HCD shortly as well.    Follow up PRN     This has been discussed with Dr. Tellez.    Tram Leung MD  Fellow, Palliative Care      Attending Note:  Patient seen and evaluated with Dr Leung and I agree with/confirm their findings/recs in this note.      Thank you for involving us in the patient's care.   Gilbert Tellez MD / Palliative Medicine / Text me via [facility].

## 2024-04-18 ENCOUNTER — ALLIED HEALTH/NURSE VISIT (OUTPATIENT)
Dept: TRANSPLANT | Facility: CLINIC | Age: 49
End: 2024-04-18
Attending: INTERNAL MEDICINE
Payer: COMMERCIAL

## 2024-04-18 ENCOUNTER — OFFICE VISIT (OUTPATIENT)
Dept: PALLIATIVE CARE | Facility: CLINIC | Age: 49
End: 2024-04-18
Attending: INTERNAL MEDICINE
Payer: COMMERCIAL

## 2024-04-18 ENCOUNTER — TELEPHONE (OUTPATIENT)
Dept: GASTROENTEROLOGY | Facility: CLINIC | Age: 49
End: 2024-04-18

## 2024-04-18 ENCOUNTER — TELEPHONE (OUTPATIENT)
Dept: GASTROENTEROLOGY | Facility: CLINIC | Age: 49
End: 2024-04-18
Payer: COMMERCIAL

## 2024-04-18 VITALS
TEMPERATURE: 98.6 F | WEIGHT: 129.9 LBS | SYSTOLIC BLOOD PRESSURE: 127 MMHG | OXYGEN SATURATION: 97 % | HEART RATE: 78 BPM | RESPIRATION RATE: 16 BRPM | BODY MASS INDEX: 24.52 KG/M2 | HEIGHT: 61 IN | DIASTOLIC BLOOD PRESSURE: 77 MMHG

## 2024-04-18 DIAGNOSIS — Z01.818 ENCOUNTER FOR PRE-TRANSPLANT EVALUATION FOR LUNG TRANSPLANT: ICD-10-CM

## 2024-04-18 DIAGNOSIS — J84.9 ILD (INTERSTITIAL LUNG DISEASE) (H): ICD-10-CM

## 2024-04-18 DIAGNOSIS — Z76.82 ORGAN TRANSPLANT CANDIDATE: ICD-10-CM

## 2024-04-18 DIAGNOSIS — Z12.11 SPECIAL SCREENING FOR MALIGNANT NEOPLASMS, COLON: Primary | ICD-10-CM

## 2024-04-18 PROCEDURE — 99205 OFFICE O/P NEW HI 60 MIN: CPT | Mod: GC | Performed by: EMERGENCY MEDICINE

## 2024-04-18 PROCEDURE — G0463 HOSPITAL OUTPT CLINIC VISIT: HCPCS | Performed by: EMERGENCY MEDICINE

## 2024-04-18 RX ORDER — BISACODYL 5 MG/1
TABLET, DELAYED RELEASE ORAL
Qty: 4 TABLET | Refills: 0 | Status: SHIPPED | OUTPATIENT
Start: 2024-04-18 | End: 2024-06-05

## 2024-04-18 ASSESSMENT — PAIN SCALES - GENERAL: PAINLEVEL: SEVERE PAIN (6)

## 2024-04-18 NOTE — TELEPHONE ENCOUNTER
FUTURE VISIT INFORMATION      SURGERY INFORMATION:  Date: 24  Location:  gi  Surgeon:  Elle Marti MD   Anesthesia Type:  MAC  Procedure: Colonoscopy     RECORDS REQUESTED FROM:       Primary Care Provider: Cristian Fagan MD - [facility]    Pertinent Medical History: hypertension, chronic right heart failure     Most recent EKG+ Tracin/10/24    Most recent ECHO: 24    Most recent Coronary Angiogram: 4/10/24    Most recent PFT's:24

## 2024-04-18 NOTE — TELEPHONE ENCOUNTER
"Endoscopy Scheduling Screen    Have you had a positive Covid test in the last 14 days?  No    What is your communication preference for Instructions and/or Bowel Prep?   MyChart    What insurance is in the chart?  Other:  [facility]    Ordering/Referring Provider: ANAM HAND   (If ordering provider performs procedure, schedule with ordering provider unless otherwise instructed. )    BMI: Estimated body mass index is 25.22 kg/m  as calculated from the following:    Height as of 4/17/24: 1.537 m (5' 0.5\").    Weight as of 4/17/24: 59.6 kg (131 lb 4.8 oz).     Sedation Ordered  moderate sedation.   If patient BMI > 50 do not schedule in [facility].    If patient BMI > 45 do not schedule at [facility].    Are you taking methadone or Suboxone?  Yes   Patient must be scheduled with MAC sedation.    Have you had difficulties, pain, or discomfort during past endoscopy procedures?  No    Are you taking any prescription medications for pain 3 or more times per week?   NO, No RN review required.    Do you have a history of malignant hyperthermia?  No    (Females) Are you currently pregnant?   No     Have you been diagnosed or told you have pulmonary hypertension?   Yes MAC required in hospital setting only. PAC evaluation required if scheduled at [facility].    Do you have an LVAD?  No    Have you been told you have moderate to severe sleep apnea?  No    Have you been told you have COPD, asthma, or any other lung disease?  Yes     What breathing problems do you have?  ILD      Do you use home oxygen?  Yes (Hospital Only)    Have your breathing problems required an ED visit or hospitalization in the last year?  Yes (RN Review required for scheduling unless scheduling in Hospital.)    Do you have any heart conditions?  Yes     In the past year, have you had any hospitalizations for heart related issues including cardiomyopathy, heart attack, or stent placement?  No    Do you have any implantable devices in your body (pacemaker, ICD)?  No    Do " "you take nitroglycerine?  No    Have you ever had or are you waiting for an organ transplant?  Yes. Have you had or are on on the wait list for a heart/lung transplant? Yes, Hospital Only    Have you had a stroke or transient ischemic attack (TIA aka \"mini stroke\" in the last 6 months?   No    Have you been diagnosed with or been told you have cirrhosis of the liver?   No    Are you currently on dialysis?   No    Do you need assistance transferring?   No    BMI: Estimated body mass index is 25.22 kg/m  as calculated from the following:    Height as of 4/17/24: 1.537 m (5' 0.5\").    Weight as of 4/17/24: 59.6 kg (131 lb 4.8 oz).     Is patients BMI > 40 and scheduling location [facility]?  No    Do you take an injectable medication for weight loss or diabetes (excluding insulin)?  No    Do you take the medication Naltrexone?  No    Do you take blood thinners?  No       Prep   Are you currently on dialysis or do you have chronic kidney disease?  No    Do you have a diagnosis of diabetes?  No    Do you have a diagnosis of cystic fibrosis (CF)?  No    On a regular basis do you go 3 -5 days between bowel movements?  No    BMI > 40?  No    Preferred Pharmacy:    [facility] [address] [place] [address] - [address] [facility]  [address] [facility] [address]  Phone: [phone] Fax: [phone]      Final Scheduling Details     Procedure scheduled  Colonoscopy    Surgeon:  JOSH     Date of procedure:  4/25/2024     Pre-OP / PAC:   Yes - PAC clinic evaluation scheduled. 4/25    Location  [facility] - Per order.    Sedation   MAC/Deep Sedation - Per exclusion criteria.      Patient Reminders:   You will receive a call from a Nurse to review instructions and health history.  This assessment must be completed prior to your procedure.  Failure to complete the Nurse assessment may result in the procedure being cancelled.      On the day of your procedure, please designate an adult(s) who can drive you home stay with you for the next 24 hours. The " medicines used in the exam will make you sleepy. You will not be able to drive.      You cannot take public transportation, ride share services, or non-medical taxi service without a responsible caregiver.  Medical transport services are allowed with the requirement that a responsible caregiver will receive you at your destination.  We require that drivers and caregivers are confirmed prior to your procedure.

## 2024-04-18 NOTE — LETTER
4/18/2024       RE: Anali Loya  [address]  [facility] [address]       Dear Colleague,    Thank you for referring your patient, Anali Loya, to the [facility] at [facility]. Please see a copy of my visit note below.    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), pulm HTN (on 8-10L O2 at rest, 15L on exertion)  - lung transplant candidate - would need to be abstinent from all nicotine products for 6 months (last tob 12/2023, has used nicotine gum since)    Social -   - 2 sons - lives with Butch / Reji in [place], dtr Argenis (in [facility]), lives with parents  - grandkids  - likes nature / flowers, hiking, collecting agates, annual camping trips  - spending time with family is important    Psychospiritual - gets strength from:   - spirituality  - therapist    Care Planning -   - working on HCD (just received this yesterday)    Opioid safety -  - hx opioid use (on suboxone)    History:  History gathered today from: patient and son Butch    I discussed with them the role of palliative care in seeing patients who are being evaluated for lung transplantation.  We talked about preparedness planning.      Transplantation preparedness plan    Patient s legally designated health care agent:   - Argenis (dtr) then Reji / Butch then parents as alternates  - Working on HCD  - They will be having a family meeting shortly to plan out the post-operative plan, and she plans to talk with her entire family about the things we discussed below.    Patient s description of how they make decisions (by themselves, in consultation with certain close loved ones, based on advice from medical professionals, etc):    Talks with family and doctors then makes decision on own  If not able to make decisions on own, trusts family to make decisions    Patient s worries/concerns when considering receiving the  "transplant:   \"There's always a concern about the transplant not taking or rejection, but it's worth trying since I can't do anything now.\"    Patient s hopes for the transplant:  Having more time with family, and able to spend time in nature & hiking again    Anxiety / coping techniques:   - on medication for anxiety  - uses breathing techniques for anxiety (we reviewed a few additional ones today)  - meditation    We did discuss patients can have a lot of mucus, and constriction, around the time of transplant causing anxiety that can be quite problematic.  This can cause a lot of anxiety coughing and dyspnea which can complicate their recovery course.      Patient s thoughts about what health conditions would be unacceptable (situations the patient would want her/his doctors and loved ones to know that she/he would not want life prolonged)?   - Would be okay with physical debility if still mentally intact  - If she had physical debility and needed 24/7 care including toileting needs, she would want to be in a care facility so that her family doesn't have to do that for her    Lung transplantation carries a small risk of perioperative stroke/brain injury, which however can be devastating. After a stroke, what sort of functional outcomes would be acceptable vs unacceptable to the patient?   - Wouldn't want to live in a condition where she was stuck in a bed and couldn't recognize or communicate with family    - Would be okay with physical debility if still mentally intact    Chronic mechanical ventilation via a tracheostomy tube is a risk. What are the circumstances the patient would want her/his physicians and family to keep them alive with long-term mechanical ventilation vs allow them to die?  She would want tracheostomy for months to see if she can recover, but would not want long-term ventilation forever.    I had a discussion about what the complications can look like sometimes after transplantation.  This " included chronic ventilator dependence and needing tracheostomy and long-term ventilation including sometimes LTAC placement, other serious comorbidities including becoming severely debilitated after transplant, often including multiple infections as well, spending time in facilities, in and out of the hospital and going back and forth between hospital and rehab facilities, and this going on for many months to years.    Pertinent Medications:    PE: LMP  (LMP Unknown)    Wt Readings from Last 3 Encounters:   04/16/24 59 kg (130 lb)   04/10/24 59.4 kg (131 lb)   03/15/24 60.6 kg (133 lb 8 oz)     Gen: alert, awake, NAD  Eyes: no scleral icterus  Pulm: no resp distress at rest, on O2  Ext: no gross deformities    Data reviewed:  Na 144  C4 0.7    CT images personally reviewed; bilateral lung fibrotic and honeycombing changes.    Pulmonary and outside psychiatry notes reviewed     database reviewed: no    Impression & Recommendations:  This is a 48 year old female here for pre-lung transplant evaluation.    We discussed ACP items above and Anali plans to talk with her family about her ACP wishes pre-operatively as well.  She is planning to fill out the HCD shortly as well.    Follow up PRN     This has been discussed with Dr. Tellez.    Attending Note:  Patient seen and evaluated with Dr Leung and I agree with/confirm their findings/recs in this note.      Thank you for involving us in the patient's care.   Gilbert Tellez MD / Palliative Medicine / Text me via [facility].    Again, thank you for allowing me to participate in the care of your patient.      Sincerely,    Tram Leung MD

## 2024-04-18 NOTE — PROGRESS NOTES
"Patient of  *** seen in clinic for psychosocial assessment.   *** minutes spent with patient. 100% of visit consisted of counseling related to issues surrounding *** and ***.    Psychosocial Assessment   Name: Anali Loya     MRN:  [id number]        Patient Name / Age / Race: Anali Loya 48 year old {RACE:[id number]}   Source of Information: {PATIENT, FAMILY, CAREGIVER:223411}   : KOBE Hale   Interview Date: April 17, 2024   Reason for Referral: {[facility] SOT SW REFERRAL TYPE:314542}     Current Living Situation   Location ([facility] [address] [place] [id number]   With Whom: Living arrangements - the patient {LIVES WITH:5711::\"lives with their family\"}.   Type of Home: {[facility] TRANSPLANT SW TYPE OF HOME:071086328}   Working Phone? {YES:389117}   Three Pronged Outlet? {YES:764383}   Distance from Hospital: ***   Need to Relocate Post Surgery? {YES:666132}   Discussed? {YES:560986}     Vocational/Employment/Financial   Employment: {EMPLOYMENT STATUS:680457}   Occupation: ***   Education: ***   ? {YES:921339}   Income: {P TRANSPLANT  INCOME:266635295}   Insurance: {P TRANSPLANT SW INSURANCE:[id number]}   Name of Private Insurance: ***   Medication Coverage: ***    In current situation, pt. can afford medication and supply costs:  {YES:481335}   Other Financial Concerns/Issues: ***     Family/Social Support   Marital Status: {MARITAL STATUS:913580}   Partner Name: ***   Length of Time : ***   Partner s Employment: ***   Relationship: {P TRANSPLANT  RELATIONSHIP:201139556}   Children: ***   Parents: ***   Siblings: ***   Other Family or Friends Close by: ***   Support System: {P TRANSPLANT  SUPPORT:131529315}   Primary Support Person: ***   Issues: ***     Activities/Functional Ability   Current Level: {P TRANSPLANT  CURRENT LEVEL:088262247}   Daily Activities: ***   Transportation: {P TRANSPLANT  SELF:293360980}     Medical Status   Patient s " understanding of need for surgical intervention: ***   Advanced Directive? {YES:860645}    Details: ***   Adherence History: ***   Ability to Adhere to Complex Medical Regime: ***     Behavioral   Chemical Dependency Issues: {YES:041356}   Smoker? {YES:245051}   Psychiatric impairment: {YES:230724}   Coping Style/Strategies: ***   Recent Legal History: {YES:028129}   Teaching Completed During Assessment Related To {[facility] TRANSPLANT  TEACHING TYPES:071093}: Housing and relocation needs post surgery.  Caregiver needs post surgery.  Financial issues related to surgery.  Risks of alcohol use post surgery.  Common psychosocial stressors pre/post surgery.  Support group availability.   Psychosocial Risks Reviewed Related To {P TRANSPLANT SW TEACHING TYPES:708110}: Increased stress related to your emotional, family, social, employment, or financial situation.  Affect on work and/or disability benefits.  Affect on future health and life insurance.  Outcome expectations may not be met.  Mental Health risks: anxiety, depression, PTSD, guilt, grief and chronic fatigue.     Observed Behavior   Well informed? {YES:336020} Angry? {YES:451306}   Process info well? {YES:382496} Hostile? {YES:178126}   Evasive? {YES:676259} Oriented X3? {YES:642492}   Cautious/Suspicious? {YES:043535} Motivated? {YES:458412}   Appropriate Behavior? {YES:322794} Depressed? {YES:716322}   Appropriate Affect? {YES:753175} Anxious? {YES:863236}   Interview Observations: ***     Selection Criteria Met   Plan for Support {YES:961991}   Chemical Dependence {YES:443124}   Smoking {YES:283990}   Mental Health {YES:238720}   Adequate Finances {YES:438603}     Risk Issues:    ***    Final Evaluation/Assessment:     ***    Patient understands risks and benefits of {[facility] SOT SW REFERRAL TYPE:853443}: {YES:959495}    Recommendation:    {P TRANSPLANT  RECOMMEND:903426556}    Signature: KOBE Hale     Title: Licensed Independent Clinical Social  Worker               Interview Date: April 18, 2024

## 2024-04-18 NOTE — TELEPHONE ENCOUNTER
Add on colonoscopy    Attempted to contact patient in order to complete pre assessment questions.     Patient answered the phone however unable to talk at this time. Provided number to return call to [phone] option 4    Callback required communication sent via [facility].      Procedure details:    Patient scheduled for Colonoscopy  on 4.25.24.     Arrival time: 0800. Procedure time 0930    Facility location: [facility]; [address] [place] [address], [address] Floor, [place] [address]. Check in location: Main entrance at registration desk.    Sedation type: MAC    Pre op exam needed? Yes. Patient is scheduled with PAC on 4.22.24    Indication for procedure:   ILD (interstitial lung disease) (H) [J84.9]      Screening for colon cancer [Z12.11]        Chart review:     Electronic implanted devices? No    Recent diagnosis of diverticulitis within the last 6 weeks? No    Diabetic? No      Medication review:    Anticoagulants? No    NSAIDS? No    Other medication HOLDING recommendations:  N/A      Prep for procedure:     Bowel prep recommendation: Extended Golytely. Bowel prep prescription sent to      [facility] - [place], [place] - [address]    Due to: chronic pain medication noted in chart.     Prep instructions sent via [facility].       Carine Romero RN  Endoscopy Procedure Pre Assessment RN

## 2024-04-18 NOTE — PROGRESS NOTES
[facility] SOLID ORGAN TRANSPLANT  OUTPATIENT MNT: LUNG TRANSPLANT EVALUATION    Current BMI: 24.6 (HT 60.8 in,  lbs/59 kg)  BMI guideline for lung transplant up to a BMI of 35    Frailty Assessment-- Not Frail (2/5 points)- low activity, exhaustion  Handgrip Strength: 25    Reference:  Score of 0-2 = Not Frail  Score of 3-5 = Frail      TIME SPENT: 15 minutes  VISIT TYPE: Initial   REFERRING PHYSICIAN: Yves   PT ACCOMPANIED BY: her son, Butch    History of previous txp: none     NUTRITION ASSESSMENT  A1c 5.8  Vit D 15 (L)- historically reports taking Rx vit D    - Meal prep & grocery shopping: pt's mom   - Previous RD education: not asked   - Appetite: improved over time - on prednisone   - Food allergies/intolerances: no  - Issues chewing or swallowing: some issues chewing - sometimes wears dentures on bottom   - N/V/D/C: some N/V/D d/t meds   - Acid reflux/symptoms: sx well controlled on nexium (2017)  - Food access concerns: not asked     Vitamins, Supplements, Pertinent Meds: vit D  Herbal Medicines/Supplements: none   Protein Supplements: had Ensure Rx- not needing anymore as much given she has gained weight     Weight hx: poor appetite so was down 96 lbs (1 yr ago), wt stable now   Wt Readings from Last 10 Encounters:   04/18/24 58.9 kg (129 lb 14.4 oz)   04/17/24 59.6 kg (131 lb 4.8 oz)   04/16/24 59 kg (130 lb)   04/10/24 59.4 kg (131 lb)   03/15/24 60.6 kg (133 lb 8 oz)   03/06/24 57.9 kg (127 lb 10.3 oz)   02/15/24 60.8 kg (134 lb)   02/13/24 59.9 kg (132 lb)   12/12/23 59.4 kg (131 lb)   11/13/23 54.9 kg (121 lb)     PHYSICAL ACTIVITY   Pulmonary rehab 1x/week (started a few months ago)   No planned activity outside of rehab    ADLs- does most independently, but her mom does most cooking    Recent Labs   Lab Test 04/10/24  0935 05/15/23  0857   CHOL 294*  294* 179     136 75   *  136* 86   TRIG 109  109 91     DIET RECALL  Breakfast Toast with ofev    Lunch May have  something small- raspberries, bananas, crackers    Dinner Stroganoff w/ green beans; roast; burgers (will eat a full burger portion)/pork chops   Snacks Crackers    Beverages 1 soda/day, water, coffee (cream & sugar), occasional sugar free lemonade or Body Armor/vitamin water   Alcohol None    Dining out A few times/week      NUTRITION DIAGNOSIS  No nutrition diagnosis identified at this time.     NUTRITION INTERVENTION   Nutrition education provided:  Reviewed current diet. Pt has gained weight to a healthy weight, yet cholesterol has increased (likely d/t weight gain + food choices). Did discuss how sugary/processed foods combined with fatty/fried foods do impact cholesterol. Would recommend addition of more fiber (ie veggies) into diet, aiming for a veggie every night with dinner.     Reviewed functional status and why this is important to work on and improve prior to possible transplant operation.     Reviewed post txp diet guidelines in brief (will review in further detail post txp):  (1) Review of proper food safety measures d/t immunosuppressant therapy post-op and increased risk for food-borne illness    (2) Avoid the following post txp d/t risk for rejection, unknown effects on the organs, and/or potential interactions with immunosuppressants:  - Herbal, Chinese, holistic, chiropractic, natural, alternative medicines and supplements  - Detoxes and cleanses  - Weight loss pills  - Protein powders or other products with extracts or herbs (ie green tea extract)    (3) Med regimen and possible side effects    Patient Understanding: Pt verbalized understanding of education provided.  Expected Engagement: Good  Follow-Up Plans: PRN     NUTRITION GOALS  No nutrition goals identified at this time     Carley Ma, RD, LD, CCTD

## 2024-04-18 NOTE — NURSING NOTE
"Oncology Rooming Note    April 18, 2024 12:27 PM   Anali Loya is a 48 year old female who presents for:    Chief Complaint   Patient presents with    Oncology Clinic Visit     ILD (interstitial lung disease) with concerns for acute exacerbation. Pre-lung transplant     Initial Vitals: /77 (BP Location: Right arm, Patient Position: Sitting, Cuff Size: Adult Regular)   Pulse 78   Temp 98.6  F (37  C) (Oral)   Resp 16   Ht 1.545 m (5' 0.83\")   Wt 58.9 kg (129 lb 14.4 oz)   LMP  (LMP Unknown)   SpO2 97%   BMI 24.68 kg/m   Estimated body mass index is 24.68 kg/m  as calculated from the following:    Height as of this encounter: 1.545 m (5' 0.83\").    Weight as of this encounter: 58.9 kg (129 lb 14.4 oz). Body surface area is 1.59 meters squared.  Severe Pain (6) Comment: Data Unavailable   No LMP recorded (lmp unknown). Patient is postmenopausal.  Allergies reviewed: Yes  Medications reviewed: Yes    Medications: Medication refills not needed today.  Pharmacy name entered into EPIC:    [facility] - [facility] [place], [place] - [address] [place] - [address] [facility] - [place], [place] - [address]  [facility] - [place], [place] - [address]  [facility] - [place], [place] - [address]    Frailty Screening:   Is the patient here for a new oncology consult visit in cancer care? 1. Yes. Over the past month, have you experienced difficulty or required a caregiver to assist with:   1. Balance, walking or general mobility (including any falls)? NO  2. Completion of self-care tasks such as bathing, dressing, toileting, grooming/hygiene?  NO  3. Concentration or memory that affects your daily life?  NO       Clinical concerns: none      Kristina Cancino, EMT  4/18/2024              "

## 2024-04-19 ENCOUNTER — MYC MEDICAL ADVICE (OUTPATIENT)
Dept: PULMONOLOGY | Facility: CLINIC | Age: 49
End: 2024-04-19

## 2024-04-19 ENCOUNTER — OFFICE VISIT (OUTPATIENT)
Dept: CARDIAC REHAB | Facility: CLINIC | Age: 49
End: 2024-04-19
Attending: INTERNAL MEDICINE
Payer: COMMERCIAL

## 2024-04-19 DIAGNOSIS — J84.9 ILD (INTERSTITIAL LUNG DISEASE) (H): ICD-10-CM

## 2024-04-19 DIAGNOSIS — Z01.818 ENCOUNTER FOR PRE-TRANSPLANT EVALUATION FOR LUNG TRANSPLANT: ICD-10-CM

## 2024-04-19 DIAGNOSIS — Z76.82 ORGAN TRANSPLANT CANDIDATE: ICD-10-CM

## 2024-04-19 PROCEDURE — G0238 OTH RESP PROC, INDIV: HCPCS

## 2024-04-19 NOTE — PROGRESS NOTES
OUTPATIENT PRE-LUNG TRANSPLANT EXERCISE EVALUATION???????????????????????????????????????????   ?   Medical Diagnosis: Pulmonary Fibrosis, Pulmonary Hypertension  Pulmonologist: Mango Marinelli MD  Current Signs and Symptoms:?SOB, cough, chest congestion, hypoxemia    ?   Living Environment:   Living Arrangement: House   Number of stairs to enter home: 2 without railing   Comment: 8 stairs inside home with railing    ADL Limitations: independent?      Occupation: Unknown   Social Status: Single    ?   Current Home Exercise:   Type of Exercise:?Patient currently attends pulmonary rehab 1x/week  Comment: Pt is not currently performing home exercise outside of rehab       Oxygen Usage:   Supplemental Oxygen Needed: Yes    Oxygen at Rest: 6 LPM   Oxygen with Activity:?8-10 LPM  Oxygen at night: 6 LPM    Comment: Pt reports O2 was titrated up to 15 LPM with recent 6MWT, discussed the importance of titrating O2 as needed to maintain spO2 >88% with activity.     ?   Modalities Performed:?LE strengthening, UE muscle conditioning, Stretching    ?   Exercise Prescription (Aerobic Exercise):   Mode: Walking?   Duration/Time: 5-10 min bouts; goal 20 min/day    Comment: Discussed increasing duration of exercise before intensity.   Frequency: 2-3x/week outside of rehab   Progression of Exercise:?0.1-0.5 METs/week   Oxygen Titration with Exercise:?>88%      Exercise Prescription (Muscle Conditioning):   Mode:?Upper and lower body muscle conditioning exercises   Frequency (days per week): 1-2x/week outside of rehab   Weights: Pt is unsure if she still has weights at home. Discussed performing exercises with bodyweight or can use water bottles, soup cans, etc.   Reps: 10-15   Sets: 1-3   Progress to:?Increase by 1-2 pounds when 2-3 sets of 10-15 repetitions are no longer a challenge   ?   Patient Education:?PLB, Work Simplification/Energy Conservation techniques, Home Exercise Program,?Aerobika use and cleaning, Use of PLB with ADLs,  Use of Inhaler with Spacer       Outpatient Pulmonary Rehab/Respiratory Care Services:   Pt recommended to attend: Yes; pt is currently enrolled  Location:[facility]    Comments:?Patient has not been performing home exercise. Pt is recommended to attend rehab program to assist in implementing exercise routine and assist with oxygen titration with progressive exercise in preparation for possible lung transplant. Pt states she does not have home exercise equipment. Pt/staff discussed attendance to Cardiac/Pulmonary rehab program to assist pt in progressing exercise in monitored setting. Pt/staff reviewed recommendations of aerobic exercise, muscle conditioning, and stretching. Patient would benefit from skilled therapy and rehab program to monitor cardiopulmonary response to exercise, assist in implementing continued breathing techniques, and establishment of home exercise program. She is motivated to attend a rehab program for continued guidance and monitoring with her exercise progression.       30 min spent 1:1 with patient instructing breathing techniques with proper feedback, providing re-education on inhaler use & aerobika, exercise principles and progression, and assessing tolerance and performance of ADLs.

## 2024-04-19 NOTE — TELEPHONE ENCOUNTER
Pre assessment completed for upcoming procedure.   (Please see previous telephone encounter notes for complete details)    Patient  returned call.       Procedure details:    Arrival time and facility location reviewed.    Pre op exam needed? Yes. Scheduled with PAC on 4/22/24.     Designated  policy reviewed. Instructed to have someone stay 24  hours post procedure.       Medication review:    Medications reviewed. Please see supporting documentation below. Holding recommendations discussed (if applicable).       Prep for procedure:     Procedure prep instructions reviewed.        Any additional information needed:  N/A      Patient  verbalized understanding and had no questions or concerns at this time.      Sofia Ortiz RN  Endoscopy Procedure Pre Assessment RN  [phone] option 4

## 2024-04-22 ENCOUNTER — ANESTHESIA EVENT (OUTPATIENT)
Dept: GASTROENTEROLOGY | Facility: CLINIC | Age: 49
End: 2024-04-22
Payer: COMMERCIAL

## 2024-04-22 ENCOUNTER — VIRTUAL VISIT (OUTPATIENT)
Dept: SURGERY | Facility: CLINIC | Age: 49
End: 2024-04-22
Payer: COMMERCIAL

## 2024-04-22 ENCOUNTER — PRE VISIT (OUTPATIENT)
Dept: SURGERY | Facility: CLINIC | Age: 49
End: 2024-04-22

## 2024-04-22 VITALS — HEIGHT: 61 IN | BODY MASS INDEX: 24.55 KG/M2 | WEIGHT: 130 LBS

## 2024-04-22 DIAGNOSIS — Z01.818 PRE-OP EVALUATION: Primary | ICD-10-CM

## 2024-04-22 PROCEDURE — 99203 OFFICE O/P NEW LOW 30 MIN: CPT | Mod: 95 | Performed by: PHYSICIAN ASSISTANT

## 2024-04-22 ASSESSMENT — COPD QUESTIONNAIRES: COPD: 1

## 2024-04-22 ASSESSMENT — PAIN SCALES - GENERAL: PAINLEVEL: NO PAIN (0)

## 2024-04-22 ASSESSMENT — LIFESTYLE VARIABLES: TOBACCO_USE: 1

## 2024-04-22 ASSESSMENT — ENCOUNTER SYMPTOMS: SEIZURES: 0
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
## 2024-04-22 NOTE — PATIENT INSTRUCTIONS
Name:  Anali Loya   MRN:  [id number]   :  1975   Today's Date:  2024     GI Lab procedures:    A representative from the GI Lab will contact you regarding arrival date and time.      You were seen today in the PAC Clinic.   (Pre-operative Anesthesia Assessment Center)  [facility] [place] [address]   phone [phone]    You had a pre-operative assessment done.    Anesthesia recommendations for medications:    Hold Aspirin for 2 days before procedure.  Hold Multivitamins for 7 days before procedure.   Hold Herbal medications and Supplements for 7 days before procedure.  Hold Ibuprofen for 2 days before procedure.   Hold Naproxen for 2 days before procedure.     No alcohol or cannabis products for 24 hours before your procedure      Please hold the following medications the day of procedure:    Furosemide (Lasix)  Senokot      Please take these medications the day of procedure:    Suboxone - please notify your prescriber that you are having surgery  Tylenol if needed  Albuterol inhaler if needed and bring this with you day of procedure  Azithromycin (Zithromax) if currently taking  Clonazepam (Klonopin) if needed  Esomeprazole (Nexium)  Duoneb if needed  Macitentan (Opsumit)  Nintedanib (Ofev)  Prednisone if currently taking  Sertraline (Zoloft)  Tadalafil            For questions or appointments, call:  [facility] Endoscopy: [phone], option 2.  Monday through Friday, 8 a.m. to 4:30 p.m.  (If it is after hours, please reach out to the clinic or provider that scheduled your appointment)

## 2024-04-22 NOTE — PROGRESS NOTES
Anali is a 48 year old who is being evaluated via a billable video visit.    How would you like to obtain your AVS? MyChart  If the video visit is dropped, the invitation should be resent by: Text to cell phone: [phone]  HPI           Physical Exam

## 2024-04-23 ENCOUNTER — VIRTUAL VISIT (OUTPATIENT)
Dept: TRANSPLANT | Facility: CLINIC | Age: 49
End: 2024-04-23
Attending: INTERNAL MEDICINE
Payer: COMMERCIAL

## 2024-04-23 DIAGNOSIS — J84.9 ILD (INTERSTITIAL LUNG DISEASE) (H): ICD-10-CM

## 2024-04-23 DIAGNOSIS — Z12.11 SCREENING FOR COLON CANCER: Primary | ICD-10-CM

## 2024-04-23 DIAGNOSIS — Z76.82 ORGAN TRANSPLANT CANDIDATE: ICD-10-CM

## 2024-04-23 DIAGNOSIS — Z01.818 ENCOUNTER FOR PRE-TRANSPLANT EVALUATION FOR LUNG TRANSPLANT: ICD-10-CM

## 2024-04-23 NOTE — PROGRESS NOTES
Colonoscopy order placed with updated diagnostic codes. GI scheduling updated and will associate order with procedure scheduled 4/25.

## 2024-04-23 NOTE — TELEPHONE ENCOUNTER
Received incoming call from patient.    Patient concerned about note that stated she could not have IV or oral contrast, barium studies, or nuclear imaging studies prior to the colonoscopy. Writer was able to locate this information and it was related to the DEXA scan. Patient stated that they had testing last week with contrast and writer suggested that patient contact PCP or schedulers for DEXA scan as 4/15 would be exactly 3 weeks to the DEXA scan so it may be too close proximity.    Patient verbalized understanding and had no further questions.    Leticia Hendricks RN  Endoscopy Procedure Pre Assessment RN  [phone] option 4

## 2024-04-23 NOTE — PROGRESS NOTES
CLOSURE VISIT    Met with Anali at completion of the evaluation testing and reviewed labs, imagining, and procedure results.     Noted high cPRA; positive cotinine, patient reported nicotine use approx. 4/14 (lozenge) and approx. 3/17 (cigarette). Reviewed substance use requirements and six month abstinence period.  DEXA is pending.     PRA:  PRA results show:  UNOS cPRA 94%. Auto crossmatch ordered today and discussed with patient, HS protocol requested.  Will continue to monitor PRA results every 3 months when listed for lung transplant.   - Requested Lab kits be sent to patient for local lab draws: n/a, local patient    Confirmed that pH study results will be reviewed when available.    Right Heart Catheterization/ Angiogram: 4/10/24  RA 10   RV 53/10  PA 53/23/33   PCW 18   [place] CO 4.3   [place] CI 2.75   PA sat 74%   Hgb 11.7 g/dL   Angiographically normal coronary arteries.    Per CAD policy for lung transplant, patient WILL NOT require carotid/LE ultrasounds.    Sputum culture collected: no - patient to reattempt   FIT test:  n/a, colonoscopy upcoming  Diabetic status: not diabetic    Updated Patient Status Tab with following Information:     Physical Status:   Karnofsky Score: 70%  Physical Capacity: limited mobility    Demographics/Socioeconomic Status:   Highest Education: some college  Working for Income: no, SSI  Primary Insurance: [facility]  [place] Citizen: yes  Patient ethnicity: White  Patient Ethnicity/Race UNOS:  descent    Primary Care: Reminded patient it their responsibility to stay current with all primary cares.   BMI: 24.70  Colonoscopy: due, scheduled 4/25  Mammogram: up to date, 3/27/24 BI-RADS 1 Negative  PAP: up to date, 5/11/23 NILM and HR HPV negative  Dental: has full dentures, no recent exam  Immunizations: NEEDS influenza, COVID, TDAP, Shingrix, PPSV23, Hep A/B    Oxygen use: Rest 6  Activity 8-10  Sleep 6   Current oxygen equipment: 10L concentrator. Tall green cylinders for  portability.   Prednisone use: 20 mg currently, tapering over next month to 10 mg  Ventilation status: none  Prior chest surgery?: none    ETOH intake: none    Pulmonary rehab: currently paused mid-course, plans to resume sessions now that evaluation is wrapping up  My Chart: active                   Care Everywhere: linked    OTC Medications/Herbal Supplements: none  Your  Reinforced need for staying locally with full time caregiver for 3 months after transplant.  Encouraged patient and family to review [employer] for education reinforcement.      Will contact Anali with transplant team recommendation following committee review.

## 2024-04-24 ENCOUNTER — LAB REQUISITION (OUTPATIENT)
Dept: LAB | Facility: CLINIC | Age: 49
End: 2024-04-24
Payer: COMMERCIAL

## 2024-04-25 ENCOUNTER — ANESTHESIA (OUTPATIENT)
Dept: GASTROENTEROLOGY | Facility: CLINIC | Age: 49
End: 2024-04-25
Payer: COMMERCIAL

## 2024-04-25 ENCOUNTER — HOSPITAL ENCOUNTER (OUTPATIENT)
Facility: CLINIC | Age: 49
Discharge: HOME | End: 2024-04-25
Attending: INTERNAL MEDICINE | Admitting: INTERNAL MEDICINE
Payer: COMMERCIAL

## 2024-04-25 VITALS
RESPIRATION RATE: 18 BRPM | DIASTOLIC BLOOD PRESSURE: 86 MMHG | OXYGEN SATURATION: 100 % | SYSTOLIC BLOOD PRESSURE: 139 MMHG | TEMPERATURE: 98.3 F

## 2024-04-25 DIAGNOSIS — Z76.82 ORGAN TRANSPLANT CANDIDATE: Primary | ICD-10-CM

## 2024-04-25 LAB — COLONOSCOPY: NORMAL

## 2024-04-25 PROCEDURE — 45380 COLONOSCOPY AND BIOPSY: CPT | Mod: PT | Performed by: INTERNAL MEDICINE

## 2024-04-25 PROCEDURE — 45380 COLONOSCOPY AND BIOPSY: CPT | Performed by: ANESTHESIOLOGY

## 2024-04-25 PROCEDURE — 88305 TISSUE EXAM BY PATHOLOGIST: CPT | Mod: TC | Performed by: INTERNAL MEDICINE

## 2024-04-25 PROCEDURE — 250N000009 HC RX 250: Performed by: NURSE ANESTHETIST, CERTIFIED REGISTERED

## 2024-04-25 PROCEDURE — 258N000003 HC RX IP 258 OP 636: Performed by: NURSE ANESTHETIST, CERTIFIED REGISTERED

## 2024-04-25 PROCEDURE — 370N000017 HC ANESTHESIA TECHNICAL FEE, PER MIN: Performed by: INTERNAL MEDICINE

## 2024-04-25 PROCEDURE — 250N000011 HC RX IP 250 OP 636: Performed by: NURSE ANESTHETIST, CERTIFIED REGISTERED

## 2024-04-25 PROCEDURE — 45380 COLONOSCOPY AND BIOPSY: CPT | Performed by: NURSE ANESTHETIST, CERTIFIED REGISTERED

## 2024-04-25 PROCEDURE — 88305 TISSUE EXAM BY PATHOLOGIST: CPT | Mod: 26 | Performed by: PATHOLOGY

## 2024-04-25 RX ORDER — ONDANSETRON 2 MG/ML
4 INJECTION INTRAMUSCULAR; INTRAVENOUS
Status: DISCONTINUED | OUTPATIENT
Start: 2024-04-25 | End: 2024-04-25 | Stop reason: HOSPADM

## 2024-04-25 RX ORDER — LIDOCAINE HYDROCHLORIDE 20 MG/ML
INJECTION, SOLUTION INFILTRATION; PERINEURAL PRN
Status: DISCONTINUED | OUTPATIENT
Start: 2024-04-25 | End: 2024-04-25

## 2024-04-25 RX ORDER — SODIUM CHLORIDE, SODIUM LACTATE, POTASSIUM CHLORIDE, CALCIUM CHLORIDE 600; 310; 30; 20 MG/100ML; MG/100ML; MG/100ML; MG/100ML
INJECTION, SOLUTION INTRAVENOUS CONTINUOUS PRN
Status: DISCONTINUED | OUTPATIENT
Start: 2024-04-25 | End: 2024-04-25

## 2024-04-25 RX ORDER — PROPOFOL 10 MG/ML
INJECTION, EMULSION INTRAVENOUS PRN
Status: DISCONTINUED | OUTPATIENT
Start: 2024-04-25 | End: 2024-04-25

## 2024-04-25 RX ORDER — LIDOCAINE 40 MG/G
CREAM TOPICAL
Status: DISCONTINUED | OUTPATIENT
Start: 2024-04-25 | End: 2024-04-25 | Stop reason: HOSPADM

## 2024-04-25 RX ADMIN — PROPOFOL 40 MG: 10 INJECTION, EMULSION INTRAVENOUS at 09:23

## 2024-04-25 RX ADMIN — PROPOFOL 40 MG: 10 INJECTION, EMULSION INTRAVENOUS at 09:08

## 2024-04-25 RX ADMIN — PROPOFOL 30 MG: 10 INJECTION, EMULSION INTRAVENOUS at 09:16

## 2024-04-25 RX ADMIN — PROPOFOL 30 MG: 10 INJECTION, EMULSION INTRAVENOUS at 09:27

## 2024-04-25 RX ADMIN — PROPOFOL 20 MG: 10 INJECTION, EMULSION INTRAVENOUS at 09:07

## 2024-04-25 RX ADMIN — LIDOCAINE HYDROCHLORIDE 25 MG: 20 INJECTION, SOLUTION INFILTRATION; PERINEURAL at 09:19

## 2024-04-25 RX ADMIN — PROPOFOL 30 MG: 10 INJECTION, EMULSION INTRAVENOUS at 09:06

## 2024-04-25 RX ADMIN — PROPOFOL 30 MG: 10 INJECTION, EMULSION INTRAVENOUS at 09:29

## 2024-04-25 RX ADMIN — SODIUM CHLORIDE, POTASSIUM CHLORIDE, SODIUM LACTATE AND CALCIUM CHLORIDE: 600; 310; 30; 20 INJECTION, SOLUTION INTRAVENOUS at 09:01

## 2024-04-25 RX ADMIN — PROPOFOL 30 MG: 10 INJECTION, EMULSION INTRAVENOUS at 09:19

## 2024-04-25 RX ADMIN — LIDOCAINE HYDROCHLORIDE 25 MG: 20 INJECTION, SOLUTION INFILTRATION; PERINEURAL at 09:06

## 2024-04-25 ASSESSMENT — ACTIVITIES OF DAILY LIVING (ADL)
ADLS_ACUITY_SCORE: 40
ADLS_ACUITY_SCORE: 40
ADLS_ACUITY_SCORE: 38

## 2024-04-25 NOTE — ANESTHESIA POSTPROCEDURE EVALUATION
Patient: Anali Loya    Procedure: Procedure(s):  COLONOSCOPY, WITH POLYPECTOMY AND BIOPSY       Anesthesia Type:  MAC    Note:  Disposition: Outpatient   Postop Pain Control: Uneventful            Sign Out: Well controlled pain   PONV: No   Neuro/Psych: Uneventful            Sign Out: Acceptable/Baseline neuro status   Airway/Respiratory: Uneventful            Sign Out: Acceptable/Baseline resp. status   CV/Hemodynamics: Uneventful            Sign Out: Acceptable CV status; No obvious hypovolemia; No obvious fluid overload   Other NRE: NONE   DID A NON-ROUTINE EVENT OCCUR? No           Last vitals:  Vitals Value Taken Time   /86 04/25/24 1000   Temp     Pulse     Resp     SpO2 97 % 04/25/24 1002   Vitals shown include unfiled device data.    Electronically Signed By: NAHUM BO MD  April 25, 2024  1:05 PM
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
## 2024-04-25 NOTE — ANESTHESIA CARE TRANSFER NOTE
Patient: Anali Loya    Procedure: Procedure(s):  COLONOSCOPY, WITH POLYPECTOMY AND BIOPSY       Diagnosis: Screening for colon cancer [Z12.11]  Diagnosis Additional Information: No value filed.    Anesthesia Type:   MAC     Note:    Oropharynx: oropharynx clear of all foreign objects  Level of Consciousness: awake  Oxygen Supplementation: nasal cannula  Level of Supplemental Oxygen (L/min / FiO2): 6  Independent Airway: airway patency satisfactory and stable  Dentition: dentition unchanged  Vital Signs Stable: post-procedure vital signs reviewed and stable  Report to RN Given: handoff report given  Patient transferred to: Phase II    Handoff Report: Identifed the Patient, Identified the Reponsible Provider, Reviewed the pertinent medical history, Discussed the surgical course, Reviewed Intra-OP anesthesia mangement and issues during anesthesia, Set expectations for post-procedure period and Allowed opportunity for questions and acknowledgement of understanding      Vitals:  Vitals Value Taken Time   BP     Temp     Pulse     Resp     SpO2 100 % 04/25/24 0936   Vitals shown include unfiled device data.    Electronically Signed By: PURA Cooley CRNA  April 25, 2024  9:37 AM

## 2024-04-26 DIAGNOSIS — Z76.82 ORGAN TRANSPLANT CANDIDATE: ICD-10-CM

## 2024-04-26 DIAGNOSIS — Z01.818 ENCOUNTER FOR PRE-TRANSPLANT EVALUATION FOR LUNG TRANSPLANT: Primary | ICD-10-CM

## 2024-04-26 LAB
PATH REPORT.COMMENTS IMP SPEC: NORMAL
PATH REPORT.COMMENTS IMP SPEC: NORMAL
PATH REPORT.FINAL DX SPEC: NORMAL
PATH REPORT.GROSS SPEC: NORMAL
PATH REPORT.MICROSCOPIC SPEC OTHER STN: NORMAL
PATH REPORT.RELEVANT HX SPEC: NORMAL
PHOTO IMAGE: NORMAL

## 2024-04-28 LAB
PROTOCOL CUTOFF: NORMAL
UNACCEPTABLE ANTIGENS: NORMAL
UNOS CPRA: 89

## 2024-04-29 ENCOUNTER — E-VISIT (OUTPATIENT)
Dept: FAMILY MEDICINE | Facility: CLINIC | Age: 49
End: 2024-04-29
Payer: COMMERCIAL

## 2024-04-29 DIAGNOSIS — F11.90 CHRONIC, CONTINUOUS USE OF OPIOIDS: Primary | ICD-10-CM

## 2024-04-29 PROCEDURE — 99207 PR NON-BILLABLE SERV PER CHARTING: CPT | Performed by: FAMILY MEDICINE

## 2024-04-29 NOTE — TELEPHONE ENCOUNTER
Please contact patient to schedule virtual video visit on Wednesday or Thursday. OK to use ESHA or virtual visit type.  Cristian Fagan MD

## 2024-04-29 NOTE — PATIENT INSTRUCTIONS
Thank you for choosing us for your care. I think an in-clinic visit would be best next steps based on your symptoms. Please schedule a clinic appointment; you won t be charged for this eVisit.      You can schedule an appointment right here in [facility], or call [phone]    Anali,  Due to the complexity of your medical history and narcotic medication use, I would like to have a virtual video visit with you this week.  I will have my contact you to schedule that appointment either on Wednesday or Thursday of this week.  Cristian Fagan MD

## 2024-05-01 ASSESSMENT — PATIENT HEALTH QUESTIONNAIRE - PHQ9
10. IF YOU CHECKED OFF ANY PROBLEMS, HOW DIFFICULT HAVE THESE PROBLEMS MADE IT FOR YOU TO DO YOUR WORK, TAKE CARE OF THINGS AT HOME, OR GET ALONG WITH OTHER PEOPLE: NOT DIFFICULT AT ALL
SUM OF ALL RESPONSES TO PHQ QUESTIONS 1-9: 8
SUM OF ALL RESPONSES TO PHQ QUESTIONS 1-9: 8

## 2024-05-02 ENCOUNTER — VIRTUAL VISIT (OUTPATIENT)
Dept: FAMILY MEDICINE | Facility: CLINIC | Age: 49
End: 2024-05-02
Payer: COMMERCIAL

## 2024-05-02 DIAGNOSIS — F11.90 CHRONIC, CONTINUOUS USE OF OPIOIDS: ICD-10-CM

## 2024-05-02 DIAGNOSIS — M19.90 INFLAMMATORY ARTHRITIS: Primary | ICD-10-CM

## 2024-05-02 PROCEDURE — 99213 OFFICE O/P EST LOW 20 MIN: CPT | Mod: 95 | Performed by: FAMILY MEDICINE

## 2024-05-02 ASSESSMENT — ENCOUNTER SYMPTOMS: BACK PAIN: 1

## 2024-05-02 NOTE — DISCHARGE SUMMARY
[facility]  Hospitalist Discharge Summary      Date of Admission:  3/2/2024  Date of Discharge:  3/6/2024 12:55 PM  Discharging Provider: Justin Sarkar MD  Discharge Service: Hospitalist Service    Discharge Diagnoses       Patient is a 48-year-old female with severe interstitial lung disease and primary pulmonary hypertension on 6 L needed cannula at baseline as well as suppressive prednisone and prophylactic azithromycin, GERD, history of tobacco abuse with sobriety since December 2023, chronic pain on Suboxone management, depression/anxiety/PTSD who presented with worsening respiratory failure and has subsequently been diagnosed with influenza a requiring critical care management into the ICU with AVAPS/BiPAP necessity as well as IV Levophed for vasopressor support.  Patient was started on IV steroids, IV fentanyl drip and IV Lasix and Tamiflu and over the past few days has had daily improvement and is on HFNC weaning down over the past 24 hours on pressure support needs.  Patient will be transitioned to Med/Surg status, transitioned to PO prednisone, weaned down on oxygen to home regimen of 6L as able.  Will have physical therapy and occupational therapy assess patient today.  Possible discharge home in next 1-2 days if patient continues to rapidly improve.          3/6 :        Medically stable  Discharge today        A/p :        Principal Problem:    Acute on chronic respiratory failure with hypoxia and hypercapnia (H)    Acute encephalopathy    Influenza A    Assessment:  Influenza A causing acute respiratory failure with severe hypercapnia causing acute encephalopathy.  Patient initially required AVAPS non-invasive ventilator support, IV steroids, IV Lasix, and IV fentanyl drip with notable improvement in air function and has been transitioned to HFNC and PO medications including Tamiflu with patient remaining off AVAPS and Bipap for over 24 hours and now weaning down on  HFNC needs in the past 24 hours.      Plan:   -transition to Med/Surg floor today  -Continue to wean HFNC as able and attempt transition to NC and wean back to patient's home regimen as able  -Continue Tamiflu 75 mg twice daily  -Discussed with patient's pulmonologist, Dr. Marinelli, from [facility] and will transition patient to 60 mg prednisone daily with plans to taper by 10 mg every 7 days until patient is down to 20 mg daily.  Patient should stay at 20 mg daily until follow up appointment with him in upcoming weeks.    Discharge  on  levaquin for possible pneumonia,  tamiflu, steroid taper.       Active Problems:    Acute upper GI bleed    Assessment: Patient had large emesis shortly after admission with gastric occult positive.  Patient currently denies any epigastric abdominal pain and hemoglobin has been stable    Plan:   -Will continue twice daily PPI  -continue advancement of diet as patient can tolerate       Bigeminy    Assessment: Noted early in this hospital stay with patient in acute respiratory distress.  No sinus dysrhythmias noted since respiratory status is improved    Plan:   -Continue continuous cardiac monitoring to ensure ongoing resolution as respiratory status continues to improve       ILD (interstitial lung disease) with concerns for acute exacerbation    Assessment: Patient on 6 L nasal cannula at baseline, currently on suppressive prednisone therapy and has been slowly tapering down most recently at 7.5 mg daily.  Patient also is on azithromycin 3 times weekly for prophylaxis against bacterial infection.  Patient's goal is to get on the transplant list as soon as she is able, which would be May 2023 if she continues to remain tobacco free    Plan:   -will stop solumedrol and transition to Prednisone 60 mg daily dosing with outpatient tapering of 10 mg every 7 days down to 20 mg as recommended by patient's pulmonologist as above  -continue three times weekly azithromycin  -outpatient follow up with  Dr. Marinelli, ILD specialist        Primary pulmonary hypertension (H)    Assessment: Patient is on nintedanib and macitentan at baseline which were initially held when patient was NPO but have been subsequently restarted.     Plan:   -Continue home regimen without change   -Patient will need outpatient follow-up with her cardiology team following discharge       Chronic pain syndrome    Assessment:  patient in normally maintained on Suboxone 3 films a day with adequate symptom control.  Due to initial critical illness Suboxone was held and patient was placed on Fentanyl drip with improved tolerance of AVAPS noted and reduction in body aches, muscle soreness and headaches.  Since improving patient was transitioned transiently to PO oxycodone given acute pain but patient is aware that she will need to transition back to Suboxone in the next 1-2 days as her activity tolerance improves and acute pain lessens     Plan:    -continue oxycodone 5 mg every 4 hours as needed for pain  -anticipate restarting Suboxone 8mg/2mg films three times a day in the next 1-2 days as patient's headache, body aches, soreness continues to improve  -continue with flexeril 10 mg every 8 hours  -ongoing tylenol as needed  -avoid NSAID due to GI bleed as above        Recurrent major depressive disorder (H24)    MOLLY (generalized anxiety disorder)    PTSD (post-traumatic stress disorder)    Assessment: Patient is on sertraline 100 mg daily    Plan:   -Restart home regimen at this time       Gastroesophageal reflux disease without esophagitis    Assessment: Patient is normally on Nexium 20 mg daily.  Does have acute GI bleed as outlined above    Plan:   -Protonix 40 mg twice daily as per hospital formulary with increased dosing secondary to acute GI bleed       Personal history of tobacco use, presenting hazards to health    Assessment: Patient has been tobacco free since December 2023 and is very motivated to continue with cessation as her goal is  to get on lung transplant list as soon as possible    Plan:   -Encouragement was given to continue ongoing abstinence       Clinically Significant Risk Factors          Follow-ups Needed After Discharge   Follow-up Appointments     Follow-up and recommended labs and tests       Follow up with primary care provider, Cristian Fagan, within 7 days for   hospital follow- up.  No follow up labs or test are needed.    Follow up with Lung physician       As advised        {Additional follow-up instructions/to-do's for PCP    : none    Unresulted Labs Ordered in the Past 30 Days of this Admission       No orders found from 2/1/2024 to 3/3/2024.        These results will be followed up by PCP    Discharge Disposition   Discharged to home  Condition at discharge: Stable        Consultations This Hospital Stay   PHYSICAL THERAPY ADULT IP CONSULT  OCCUPATIONAL THERAPY ADULT IP CONSULT  RESPIRATORY CARE IP CONSULT  VASCULAR ACCESS ADULT IP CONSULT  CARE MANAGEMENT / SOCIAL WORK IP CONSULT    Code Status   Prior    Time Spent on this Encounter   Justin RAMSAY MD, personally saw the patient today and spent greater than 30 minutes discharging this patient.       Justin Sarkar MD  [address] SURGICAL  [address] [place] [address]  Phone: [phone]  ______________________________________________________________________    Physical Exam   Vital Signs:                    Weight: 127 lbs 10.34 oz       GENERAL: The patient is not in any acute distressed. Awake and alert.  HEENT: Nonicteric sclerae, PERRLA, EOMI. Oropharynx clear. Moist mucous membranes. Conjunctivae appear well perfused.  HEART: Regular rate and rhythm without murmurs.  LUNGS: Clear to auscultation bilaterally. No wheezing or crackles.  ABDOMEN: Soft, positive bowel sounds, nontender.  SKIN: No rash, no excessive bruising, petechiae, or purpura.  EXTREMITIES : no rashes, no swelling in legs.  NEUROLOGIC: conscious and  oriented, follows commands, no obvious focal deficits.  ROS: All other systems negative          Primary Care Physician   Cristian Fagan    Discharge Orders      Home Care Referral      Primary Care - Care Coordination Referral      Reason for your hospital stay    sob     Follow-up and recommended labs and tests     Follow up with primary care provider, Cristian Fagan, within 7 days for hospital follow- up.  No follow up labs or test are needed.    Follow up with Lung physician       As advised     Activity    Your activity upon discharge: activity as tolerated     Oxygen Adult/Peds    Oxygen Documentation  I certify that this patient, Anali Loya has been under my care (or a nurse practitioner or physician's assistant working with me). This is the face-to-face encounter for oxygen medical necessity.      At the time of this encounter, I have reviewed the qualifying testing and have determined that supplemental oxygen is reasonable and necessary and is expected to improve the patient's condition in a home setting.       Patient has continued oxygen desaturation due to Chronic Respiratory Failure with Hypoxia J96.11.    If portability is ordered, is the patient mobile within the home? yes     Diet    Follow this diet upon discharge: Orders Placed This Encounter      Snacks/Supplements Adult: Ensure Enlive; With Meals      Advance Diet as Tolerated: Regular Diet Adult; Regular Diet Adult       Significant Results and Procedures   Most Recent 3 CBC's:  Recent Labs   Lab Test 04/10/24  1632 04/10/24  0935 03/05/24  0511 03/04/24  0544   WBC  --  7.5 7.5 6.4   HGB 11.7 12.2 11.8 12.9   MCV  --  98 94 92   PLT  --  189 180 216     Most Recent 3 BMP's:  Recent Labs   Lab Test 04/10/24  0935 03/15/24  1633 03/06/24  0527    143 141   POTASSIUM 3.5 4.4 3.6   CHLORIDE 105 106 100   CO2 29 29 34*   BUN 16.6 20.4* 21.7*   CR 0.72 0.65 0.66   ANIONGAP 10 8 7   MARCIN 9.3 8.8 9.0   GLC 74 107* 102*     Most Recent 2  LFT's:  Recent Labs   Lab Test 03/27/24  1122 03/15/24  1633   AST 21 22   ALT 20 24   ALKPHOS 40 40   BILITOTAL 0.4 0.2     Most Recent 3 INR's:  Recent Labs   Lab Test 04/10/24  0935 11/08/23  1003 08/15/23  1350   INR 0.95 0.99 1.22*       Discharge Medications   Discharge Medication List as of 3/6/2024 11:51 AM        START taking these medications    Details   levofloxacin (LEVAQUIN) 750 MG tablet Take 1 tablet (750 mg) by mouth daily for 7 days, Disp-7 tablet, R-0, E-Prescribe      oseltamivir (TAMIFLU) 75 MG capsule Take 1 capsule (75 mg) by mouth 2 times daily for 5 doses Start this evening, Disp-5 capsule, R-0, E-Prescribe      oxyCODONE (ROXICODONE) 5 MG tablet Take 1 tablet (5 mg) by mouth every 6 hours as needed for severe pain, Disp-15 tablet, R-0, Local Print           CONTINUE these medications which have CHANGED    Details   predniSONE (DELTASONE) 10 MG tablet Take prednisone, 10 mg tab - 6 tabs daily for 5 days, then 5 tabs daily for 7 days, then 4 tabs  daily for 7 days, then 3 tab daily for 7 days, then 2 tab daily for 7 days, then continue 2 tabs daily till seen by your lung doctor, Disp-150 tablet, R-0, E -Prescribe           CONTINUE these medications which have NOT CHANGED    Details   albuterol (VENTOLIN HFA) 108 (90 Base) MCG/ACT inhaler Inhale 1-2 puffs into the lungs every 4 hours as needed for shortness of breath or wheezing, Disp-18 g, R-3, E-PrescribePharmacy may dispense brand covered by insurance (Proair, or proventil or ventolin or generic albuterol inhaler)      CHOLECALCIFEROL 25 MCG (1000 UT) tablet Take 1,000 Units by mouth daily, RENA, Historical      clonazePAM (KLONOPIN) 0.5 MG tablet Take 0.25 mg by mouth 3 times daily as needed for anxiety, Historical      furosemide (LASIX) 20 MG tablet Take 1 tablet (20 mg) by mouth as needed (swelling), Disp-90 tablet, R-3, E-Prescribe      macitentan (OPSUMIT) 10 MG tablet Take 1 tablet (10 mg) by mouth daily Make sure you are completing  your monthly mandatory labs for pregnancy., Disp-30 tablet, R-11, E-Prescribe      nintedanib (OFEV) 150 MG capsule Take 1 capsule (150 mg) by mouth 2 times daily, Disp-60 capsule, R-11, E-Prescribe      !! order for DME Oxygen: Patient requires supplemental Oxygen 4 LPM via nasal canula at rest and 6 LPM with activity. Please provide patient with portability capability. Okay to spot check patient on oxygen device, to keep sats above 90%. Please provider with home concen trator that can go up to 10 LPM. Oxygen will be for a lifetime.Disp-1 Device, R-0, Local Print      senna (SENOKOT) 8.6 MG tablet Take 1 tablet by mouth 2 times daily as needed for constipation, Historical      sertraline (ZOLOFT) 100 MG tablet Take 100 mg by mouth daily, Historical      SUBOXONE 8-2 MG per film Place 1 Film under the tongue 3 times daily, RENA, Historical      tadalafil, PAH, 20 MG TABS Take 2 tablets (40 mg) by mouth daily, Disp-60 tablet, R-11, E-Prescribe      esomeprazole (NEXIUM) 20 MG DR capsule Take 20 mg by mouth every morning (before breakfast) Take 30-60 minutes before eating., Historical      acetaminophen (TYLENOL) 325 MG tablet Historical      azithromycin (ZITHROMAX) 250 MG tablet TAKE ONE TABLET BY MOUTH THREE TIMES A WEEK **START AFTER YOU'RE FINISHED WITH YOUR LEVOFLOXACIN ANTIBIOTIC**, Disp-30 tablet, R-0, E-Prescribe      ipratropium - albuterol 0.5 mg/2.5 mg/3 mL (DUONEB) 0.5-2.5 (3) MG/3ML neb solution Take 1 vial (3 mLs) by nebulization 4 times daily, Disp-360 mL, R-0, E-Prescribe      naloxone (NARCAN) 4 MG/0.1ML nasal spray Spray 1 spray (4 mg) into one nostril alternating nostrils as needed for opioid reversal every 2-3 minutes until assistance arrives, Disp-0.2 mL, R-0, E-Prescribe      !! order for DME Please provide patient with 2  liquid oxygen tanks for portability. Spot check patient on liquid oxygen. Titrate oxygen to maintain saturations above 88%.Disp-2 Device, R-0, Local Print      polyethylene  glycol (MIRALAX) 17 GM/Dose powder Take 1 Capful by mouth daily as needed for constipation, Historical      Nutritional Supplements (ENSURE ENLIVE) LIQD Take 8 fluid ounces by mouth 3 times daily, Disp-720 mL, R-0, E-Prescribe       !! - Potential duplicate medications found. Please discuss with provider.        Allergies   Allergies   Allergen Reactions    Cellcept [Mycophenolate] GI Disturbance    Formoterol Other (See Comments)     syncope    Imuran [Azathioprine] GI Disturbance    Methotrexate Other (See Comments)     Lung toxicity

## 2024-05-02 NOTE — PROGRESS NOTES
"    Instructions Relayed to Patient by Virtual Roomer:     Patient is active on [employer]:   Relayed following to patient: \"It looks like you are active on [employer], are you able to join the visit this way? If not, do you need us to send you a link now or would you like your provider to send a link via text or email when they are ready to initiate the visit?\"      Patient Confirmed they will join visit via: [employer]  Reminded patient to ensure they were logged on to virtual visit by arrival time listed.   Asked if patient has flexibility to initiate visit sooner than arrival time: patient is unable to initiate visit earlier than arrival time     If pediatric virtual visit, ensured pediatric patient along with parent/guardian will be present for video visit.     Patient offered the website [email or web address] and/or phone number to [employer] Help line: [phone]    Anali is a 48 year old who is being evaluated via a billable video visit.    How would you like to obtain your AVS? Arcarios  If the video visit is dropped, the invitation should be resent by: Text to cell phone: [phone]  Will anyone else be joining your video visit? No      Assessment & Plan     Inflammatory arthritis  Jaylene Wiseman is a 48-year-old female with a history of inflammatory arthritis, rheumatoid arthritis with end-stage interstitial lung disease secondary to previous therapy for rheumatoid arthritis.  She is currently on lung transplant list.  She has a history of chronic continuous opioid use but has now been transition to Suboxone therapy.  This seems to work well for her and is being prescribed by her psychiatrist but she does have episodes of breakthrough pain.  She discussed additional pain medication with her psychiatrist who recommended that she discuss this with me.  Besides her Suboxone she also has prednisone 20 mg daily.  She takes Tylenol for breakthrough pain.  In the past she has been on MS Contin and oxycodone in " accelerating doses.    We discussed the risks and benefits of Suboxone therapy with additional narcotic therapy.  The 2 medication types antagonize each other but have increased risk for respiratory suppression.  She is also taking Klonopin 0.25 mg 3 times daily.  After discussing risks and benefits she is willing to try Voltaren 1% topical gel to affected areas 3-4 times daily.  She has a follow-up appointment with me in 1 month at which time we will review the outcomes.  If we do choose to use additional narcotic supplement pain medication with Suboxone it would be on a very rare occasion.      - diclofenac (VOLTAREN) 1 % topical gel; Apply 1 g topically 3 times daily as needed for moderate pain    Chronic, continuous use of opioids  Jaylene Wiseman is a 48-year-old female with a history of inflammatory arthritis, rheumatoid arthritis with end-stage interstitial lung disease secondary to previous therapy for rheumatoid arthritis.  She is currently on lung transplant list.  She has a history of chronic continuous opioid use but has now been transition to Suboxone therapy.  This seems to work well for her and is being prescribed by her psychiatrist but she does have episodes of breakthrough pain.  She discussed additional pain medication with her psychiatrist who recommended that she discuss this with me.  Besides her Suboxone she also has prednisone 20 mg daily.  She takes Tylenol for breakthrough pain.  In the past she has been on MS Contin and oxycodone in accelerating doses.    We discussed the risks and benefits of Suboxone therapy with additional narcotic therapy.  The 2 medication types antagonize each other but have increased risk for respiratory suppression.  She is also taking Klonopin 0.25 mg 3 times daily.  After discussing risks and benefits she is willing to try Voltaren 1% topical gel to affected areas 3-4 times daily.  She has a follow-up appointment with me in 1 month at which time we will review the  outcomes.  If we do choose to use additional narcotic supplement pain medication with Suboxone it would be on a very rare occasion.  - diclofenac (VOLTAREN) 1 % topical gel; Apply 1 g topically 3 times daily as needed for moderate pain    Prescription drug management          FUTURE APPOINTMENTS:       - Follow-up visit in 1 month as scheduled    Subjective   Anali is a 48 year old, presenting for the following health issues:  Back Pain      5/2/2024     9:07 AM   Additional Questions   Roomed by oli   Accompanied by self     Video Start Time: 11:02 AM    Back Pain     History of Present Illness       Back Pain:  She presents for follow up of back pain. Patient's back pain is a chronic problem.  Location of back pain:  Right lower back, left lower back, left side of neck and right hip  Description of back pain: burning, cramping and dull ache  Back pain spreads: nowhere    Since patient first noticed back pain, pain is: always present, but gets better and worse  Does back pain interfere with her job:  Not applicable       She eats 0-1 servings of fruits and vegetables daily.She consumes 1 sweetened beverage(s) daily.She exercises with enough effort to increase her heart rate 10 to 19 minutes per day.  She exercises with enough effort to increase her heart rate 3 or less days per week.   She is taking medications regularly.         11/13/2023     2:37 PM 3/15/2024     3:28 PM 5/1/2024     3:13 PM   PHQ   PHQ-9 Total Score 9 14 8   Q9: Thoughts of better off dead/self-harm past 2 weeks Not at all Not at all Not at all      Patient Active Problem List   Diagnosis    Personal history of tobacco use, presenting hazards to health    Abnormal blood chemistry    Abnormal maternal glucose tolerance, antepartum    Inflammatory arthritis    Hyperlipidemia LDL goal <130    SOB (shortness of breath)    Fibrosis of lung (H)    Anxiety    Tobacco abuse    S/P bunionectomy    ILD (interstitial lung disease) with concerns for acute  exacerbation    Lung nodule    Pneumothorax of left lung after biopsy    Pneumothorax after biopsy    Hypoxia    Pulmonary hypertension (H)    ASCUS of cervix with negative high risk HPV    Primary pulmonary hypertension (H)    Chronic, continuous use of opioids    Iron deficiency    Anemia    Spondylosis of lumbosacral region without myelopathy or radiculopathy    Polymyositis, organ involvement unspecified (H)    Hallucinations    Prolonged grief disorder    Abnormal CT of the chest    Elevated brain natriuretic peptide (BNP) level    Acute on chronic respiratory failure with hypoxia and hypercapnia (H)    Pneumonia of both lungs due to infectious organism, unspecified part of lung    Recurrent major depressive disorder (H24)    MOLLY (generalized anxiety disorder)    PTSD (post-traumatic stress disorder)    Gastroesophageal reflux disease without esophagitis    Chronic pain    Acute pulmonary edema (H)    Chronic right heart failure (H)    Seronegative arthritis    Current tobacco use    Opioid use disorder in remission    Acute hypoxemic respiratory failure (H)    Moderate benzodiazepine use disorder (H)    Severe episode of recurrent major depressive disorder, with psychotic features (H)    Influenza A    Encephalopathy    Somnolence    Bigeminy    Acute upper GI bleed    Panlobular emphysema (H)    Chest pain, unspecified    Abnormal CT scan    Status post coronary angiogram    Organ transplant candidate    Encounter for pre-transplant evaluation for lung transplant     Current Outpatient Medications   Medication Sig Dispense Refill    acetaminophen (TYLENOL) 325 MG tablet 325-650 mg every 4 hours as needed      albuterol (VENTOLIN HFA) 108 (90 Base) MCG/ACT inhaler Inhale 1-2 puffs into the lungs every 4 hours as needed for shortness of breath or wheezing 18 g 3    azithromycin (ZITHROMAX) 250 MG tablet TAKE ONE TABLET BY MOUTH THREE TIMES A WEEK **START AFTER YOU'RE FINISHED WITH YOUR LEVOFLOXACIN ANTIBIOTIC**  30 tablet 0    CHOLECALCIFEROL 25 MCG (1000 UT) tablet Take 1,000 Units by mouth daily      clonazePAM (KLONOPIN) 0.5 MG tablet Take 0.25 mg by mouth 3 times daily as needed for anxiety      esomeprazole (NEXIUM) 20 MG DR capsule Take 1 capsule (20 mg) by mouth two times daily 180 capsule 1    furosemide (LASIX) 20 MG tablet Take 1 tablet (20 mg) by mouth as needed (swelling) 90 tablet 3    ipratropium - albuterol 0.5 mg/2.5 mg/3 mL (DUONEB) 0.5-2.5 (3) MG/3ML neb solution Take 1 vial (3 mLs) by nebulization 4 times daily (Patient taking differently: Take 3 mLs by nebulization every 6 hours as needed) 360 mL 0    macitentan (OPSUMIT) 10 MG tablet Take 1 tablet (10 mg) by mouth daily Make sure you are completing your monthly mandatory labs for pregnancy. (Patient taking differently: Take 10 mg by mouth every morning Make sure you are completing your monthly mandatory labs for pregnancy.) 30 tablet 11    nintedanib (OFEV) 150 MG capsule Take 1 capsule (150 mg) by mouth 2 times daily (Patient taking differently: Take 150 mg by mouth every morning) 60 capsule 11    order for DME Oxygen: Patient requires supplemental Oxygen 4 LPM via nasal canula at rest and 6 LPM with activity. Please provide patient with portability capability. Okay to spot check patient on oxygen device, to keep sats above 90%. Please provider with home concentrator that can go up to 10 LPM. Oxygen will be for a lifetime. 1 Device 0    order for DME Please provide patient with 2  liquid oxygen tanks for portability. Spot check patient on liquid oxygen. Titrate oxygen to maintain saturations above 88%. 2 Device 0    polyethylene glycol (MIRALAX) 17 GM/Dose powder Take 1 Capful by mouth daily as needed for constipation      predniSONE (DELTASONE) 20 MG tablet Take 1 tablet (20 mg) by mouth daily 10 tablet 0    predniSONE (DELTASONE) 5 MG tablet Take 4 tablets (20 mg) by mouth daily for 14 days, THEN 3 tablets (15 mg) daily for 14 days. 98 tablet 0     senna (SENOKOT) 8.6 MG tablet Take 1 tablet by mouth 2 times daily as needed for constipation      sertraline (ZOLOFT) 100 MG tablet Take 150 mg by mouth every morning      SUBOXONE 8-2 MG per film Place 1 Film under the tongue 3 times daily      tadalafil, PAH, 20 MG TABS Take 2 tablets (40 mg) by mouth daily (Patient taking differently: Take 40 mg by mouth every morning) 60 tablet 11    bisacodyl (DULCOLAX) 5 MG EC tablet Two days prior to exam take two (2) tablets at 4pm. One day prior to exam take two (2) tablets at 4pm 4 tablet 0    naloxone (NARCAN) 4 MG/0.1ML nasal spray Spray 1 spray (4 mg) into one nostril alternating nostrils as needed for opioid reversal every 2-3 minutes until assistance arrives (Patient not taking: Reported on 3/15/2024) 0.2 mL 0    polyethylene glycol (GOLYTELY) 236 g suspension Take as directed. Two days before your exam fill the first container with water. Cover and shake until mixed well. At 5:00pm drink one 8oz glass every 10-15 minutes until half (1/2) of the first container is empty. Store the remainder in the refrigerator. One day before your exam at 5:00pm drink the second half of the first container until it is gone. Before you go to bed mix the second container with water and put in refrigerator. Six hours before your check in time drink one 8oz glass every 10-15 minutes until half of container is empty. Discard the remainder of solution. 8000 mL 0    [START ON 5/14/2024] predniSONE (DELTASONE) 10 MG tablet Take 1 tablet (10 mg) by mouth daily (Patient not taking: Reported on 4/22/2024) 30 tablet 3    predniSONE (DELTASONE) 10 MG tablet Take prednisone, 10 mg tab - 6 tabs daily for 5 days, then 5 tabs daily for 7 days, then 4 tabs  daily for 7 days, then 3 tab daily for 7 days, then 2 tab daily for 7 days, then continue 2 tabs daily till seen by your lung doctor (Patient not taking: Reported on 4/22/2024) 150 tablet 0           Pain History:  When did you first notice your  pain? >10 years   Have you seen this provider for your pain in the past? Yes   Where in your body do you have pain? Diffuse arthralgias due to RA  Are you seeing anyone else for your pain? Yes - Psychiatry, Transplant team, pulmonology, cardiology        11/13/2023     2:37 PM 3/15/2024     3:28 PM 5/1/2024     3:13 PM   PHQ-9 SCORE   PHQ-9 Total Score MyChart 9 (Mild depression) 14 (Moderate depression) 8 (Mild depression)   PHQ-9 Total Score 9 14 8           9/22/2023     2:02 PM 10/9/2023     4:17 PM 11/13/2023     2:43 PM   MOLLY-7 SCORE   Total Score 13 (moderate anxiety) 11 (moderate anxiety) 7 (mild anxiety)   Total Score 13 11 7               Chronic Pain Follow Up:    Location of pain: diffuse arthralgias  Analgesia/pain control:    - Recent changes:  none    - Overall control: Tolerable with discomfort    - Current treatments: Suboxone, prednisone, Tylenol   Adherence:     - Do you ever take more pain medicine than prescribed? No    - When did you take your last dose of pain medicine?  today   Adverse effects: No   PDMP Review         Value Time User    State PDMP site checked  Yes 11/13/2023  3:12 PM Cristian Fagan MD          Last CSA Agreement:   CSA -- Patient Level:     [Media Unavailable] Controlled Substance Agreement - Opioid - Scan on 11/26/2022  4:41 PM   [Media Unavailable] Controlled Substance Agreement - Opioid - Scan on 6/13/2021 12:36 PM   [Media Unavailable] Controlled Substance Agreement - Opioid - Scan on 3/13/2019 11:16 AM       Last UDS: 6/13/2023                  Review of Systems  CONSTITUTIONAL: NEGATIVE for fever, chills, change in weight  ENT/MOUTH: NEGATIVE for ear, mouth and throat problems  RESP:POSITIVE for dyspnea on exertion, wheezing, and ILD  CV: NEGATIVE for chest pain, palpitations or peripheral edema  MUSCULOSKELETAL: POSITIVE  for arthralgias and back pain due to RA  ROS otherwise negative      Objective           Vitals:  No vitals were obtained today due to virtual  visit.    Physical Exam   GENERAL: alert and no distress  EYES: Eyes grossly normal to inspection.  No discharge or erythema, or obvious scleral/conjunctival abnormalities.  RESP: No audible wheeze, cough, or visible cyanosis.    SKIN: Visible skin clear. No significant rash, abnormal pigmentation or lesions.  NEURO: Cranial nerves grossly intact.  Mentation and speech appropriate for age.  PSYCH: Appropriate affect, tone, and pace of words    Admission on 04/25/2024, Discharged on 04/25/2024   Component Date Value Ref Range Status    Case Report 04/25/2024    Final                    Value:Surgical Pathology Report                         Case: [id number]                                  Authorizing Provider:  Elle Marti        Collected:           04/25/2024 09:22 AM                                 MD Bacilio                                                                  Ordering Location:     [facility]          Received:            04/25/2024 09:38 AM                                 Endoscopy                                                                    Pathologist:           Mario Alberto Orr DO                                                            Specimen:    Other, sigmoid polyp x2                                                                    Final Diagnosis 04/25/2024    Final                    Value:This result contains rich text formatting which cannot be displayed here.    Clinical Information 04/25/2024    Final                    Value:This result contains rich text formatting which cannot be displayed here.    Gross Description 04/25/2024    Final                    Value:This result contains rich text formatting which cannot be displayed here.    Microscopic Description 04/25/2024    Final                    Value:This result contains rich text formatting which cannot be displayed here.    Performing Labs 04/25/2024    Final                    Value:This result  contains rich text formatting which cannot be displayed here.    COLONOSCOPY 04/25/2024    Final                    Value:[address] [place] [address] [phone]     Endoscopy Department  _______________________________________________________________________________  Patient Name: Ellen Loya           Procedure Date: 4/25/2024 8:54 AM  MRN: [id number]                       Account Number: [id number]  YOB: 1975              Admit Type: Outpatient  Age: 48                               Room: [place] [address]  Gender: Female                        Note Status: Finalized  Attending MD: MICHELLE MORENO MD,   Total Sedation Time:   _______________________________________________________________________________     Procedure:             Colonoscopy  Indications:           Screening for colorectal malignant neoplasm  Providers:             MICHELLE MORENO MD, Faisla Carey RN  Referring MD:          ANAM HAND MD  Medicines:             Monitored Anesthesia Care  Complications:         No immediate complications.  ___                          ____________________________________________________________________________  Procedure:             Pre-Anesthesia Assessment:                         - Prior to the procedure, a History and Physical was                          performed, and patient medications and allergies were                          reviewed. The patient is competent. The risks and                          benefits of the procedure and the sedation options and                          risks were discussed with the patient. All questions                          were answered and informed consent was obtained.                          Patient identification and proposed procedure were                          verified by the physician in the procedure room.                          Mental Status Examination: alert  and oriented. Airway                          Examination: normal oropharyngeal airway and neck                          mobility. Prophylactic Antibiotics: The patient does                          no                          t require prophylactic antibiotics. Prior                          Anticoagulants: The patient has taken no anticoagulant                          or antiplatelet agents. ASA Grade Assessment: III - A                          patient with severe systemic disease. After reviewing                          the risks and benefits, the patient was deemed in                          satisfactory condition to undergo the procedure. The                          anesthesia plan was to use monitored anesthesia care                          (MAC). Immediately prior to administration of                          medications, the patient was re-assessed for adequacy                          to receive sedatives. The heart rate, respiratory                          rate, oxygen saturations, blood pressure, adequacy of                          pulmonary ventilation, and response to care were                          monitored throughout the procedure. The physical                          status of th                          e patient was re-assessed after the                          procedure.                         After obtaining informed consent, the colonoscope was                          passed under direct vision. Throughout the procedure,                          the patient's blood pressure, pulse, and oxygen                          saturations were monitored continuously. The                          Colonoscope was introduced through the anus and                          advanced to the terminal ileum. The colonoscopy was                          performed without difficulty. The patient tolerated                          the procedure well. The quality of the bowel                           preparation was good.                                                                                   Findings:       The terminal ileum appeared normal.       Two sessile polyps were found in the sigmoid colon. The polyps were        small in size. These polyps were removed with a cold biopsy forceps.                                  Resection and retrieval were complete.                                                                                   Impression:            - The examined portion of the ileum was normal.                         - Two small polyps in the sigmoid colon, removed with                          a cold biopsy forceps. Resected and retrieved.  Recommendation:        - Await pathology results.                                                                                     electronically signed by Michelle Moreno  ______________________________  MICHELLE MORENO MD  4/25/2024 9:38:30 AM  I was physically present for the entire viewing portion of the exam.  __________________________  Signature of teaching physician  [id number] MORENO MD  Number of Addenda: 0    Note Initiated On: 4/25/2024 8:54 AM  Scope In: 9:19:02 AM  Scope Out: 9:33:46 AM           Video-Visit Details    Type of service:  Video Visit   Video End Time:11:15 AM  Originating Location (pt. Location): Home    Distant Location (provider location):  On-site  Platform used for Video Visit: Karla  Signed Electronically by: Cristian Fagan MD

## 2024-05-09 ENCOUNTER — MYC MEDICAL ADVICE (OUTPATIENT)
Dept: FAMILY MEDICINE | Facility: CLINIC | Age: 49
End: 2024-05-09
Payer: COMMERCIAL

## 2024-05-09 DIAGNOSIS — M19.90 INFLAMMATORY ARTHRITIS: ICD-10-CM

## 2024-05-09 DIAGNOSIS — F11.90 CHRONIC, CONTINUOUS USE OF OPIOIDS: Primary | ICD-10-CM

## 2024-05-10 DIAGNOSIS — M19.90 INFLAMMATORY ARTHRITIS: Primary | ICD-10-CM

## 2024-05-10 DIAGNOSIS — F11.90 CHRONIC, CONTINUOUS USE OF OPIOIDS: ICD-10-CM

## 2024-05-10 RX ORDER — OXYCODONE HYDROCHLORIDE 5 MG/1
5 TABLET ORAL 3 TIMES DAILY PRN
Qty: 21 TABLET | Refills: 0 | Status: SHIPPED | OUTPATIENT
Start: 2024-05-10 | End: 2024-06-03

## 2024-05-17 RX ORDER — OXYCODONE HYDROCHLORIDE 5 MG/1
5 TABLET ORAL 3 TIMES DAILY PRN
Qty: 90 TABLET | Refills: 0 | Status: SHIPPED | OUTPATIENT
Start: 2024-05-17 | End: 2024-06-13

## 2024-05-18 DIAGNOSIS — R06.02 SOB (SHORTNESS OF BREATH): ICD-10-CM

## 2024-05-20 RX ORDER — ALBUTEROL SULFATE 90 UG/1
AEROSOL, METERED RESPIRATORY (INHALATION)
Qty: 18 G | Refills: 3 | Status: SHIPPED | OUTPATIENT
Start: 2024-05-20

## 2024-05-29 ENCOUNTER — HOSPITAL ENCOUNTER (OUTPATIENT)
Dept: BONE DENSITY | Facility: CLINIC | Age: 49
Discharge: HOME | End: 2024-05-29
Attending: INTERNAL MEDICINE | Admitting: INTERNAL MEDICINE
Payer: COMMERCIAL

## 2024-05-29 ENCOUNTER — TELEPHONE (OUTPATIENT)
Dept: FAMILY MEDICINE | Facility: CLINIC | Age: 49
End: 2024-05-29

## 2024-05-29 DIAGNOSIS — Z01.818 ENCOUNTER FOR PRE-TRANSPLANT EVALUATION FOR LUNG TRANSPLANT: ICD-10-CM

## 2024-05-29 DIAGNOSIS — Z76.82 ORGAN TRANSPLANT CANDIDATE: ICD-10-CM

## 2024-05-29 DIAGNOSIS — J84.9 ILD (INTERSTITIAL LUNG DISEASE) (H): ICD-10-CM

## 2024-05-29 PROCEDURE — 77080 DXA BONE DENSITY AXIAL: CPT

## 2024-05-29 NOTE — TELEPHONE ENCOUNTER
MA called patient and she is already scheduled next week. MA offered this Friday but she said she will wait till next week.      Emmy Chino MA

## 2024-05-29 NOTE — TELEPHONE ENCOUNTER
Order/Referral Request    Who is requesting: PT    Orders being requested: Xray     Reason service is needed/diagnosis: tore soemthing on left shoulder blade     When are orders needed by: ASAP    Has this been discussed with Provider: No    Does patient have a preference on a Group/Provider/Facility? [facility]    Does patient have an appointment scheduled?: No    Where to send orders: Place orders within Epic    Could we send this information to you in [facility] or would you prefer to receive a phone call?:   Patient would prefer a phone call   Okay to leave a detailed message?: Yes at Cell number on file:    Telephone Information:   Mobile [phone]

## 2024-05-29 NOTE — TELEPHONE ENCOUNTER
OK for workin next available ESHA or virtual slot for in person visit. Please call patient  to schedule time.  Electronically signed by Cristian Fagan MD

## 2024-06-03 ENCOUNTER — OFFICE VISIT (OUTPATIENT)
Dept: FAMILY MEDICINE | Facility: CLINIC | Age: 49
End: 2024-06-03
Payer: COMMERCIAL

## 2024-06-03 ENCOUNTER — TELEPHONE (OUTPATIENT)
Dept: CARDIOLOGY | Facility: CLINIC | Age: 49
End: 2024-06-03

## 2024-06-03 VITALS
HEIGHT: 61 IN | RESPIRATION RATE: 16 BRPM | BODY MASS INDEX: 24.92 KG/M2 | DIASTOLIC BLOOD PRESSURE: 74 MMHG | HEART RATE: 84 BPM | SYSTOLIC BLOOD PRESSURE: 108 MMHG | TEMPERATURE: 98 F | WEIGHT: 132 LBS | OXYGEN SATURATION: 90 %

## 2024-06-03 DIAGNOSIS — F13.20 MODERATE BENZODIAZEPINE USE DISORDER (H): ICD-10-CM

## 2024-06-03 DIAGNOSIS — I27.20 PULMONARY HYPERTENSION (H): Primary | ICD-10-CM

## 2024-06-03 DIAGNOSIS — F11.90 CHRONIC, CONTINUOUS USE OF OPIOIDS: ICD-10-CM

## 2024-06-03 DIAGNOSIS — M13.80 SERONEGATIVE ARTHRITIS: ICD-10-CM

## 2024-06-03 DIAGNOSIS — Z00.00 ROUTINE GENERAL MEDICAL EXAMINATION AT A HEALTH CARE FACILITY: Primary | ICD-10-CM

## 2024-06-03 DIAGNOSIS — I27.20 PULMONARY HYPERTENSION (H): ICD-10-CM

## 2024-06-03 DIAGNOSIS — R06.02 SOB (SHORTNESS OF BREATH): ICD-10-CM

## 2024-06-03 DIAGNOSIS — Z23 NEED FOR PROPHYLACTIC VACCINATION AND INOCULATION AGAINST INFLUENZA: ICD-10-CM

## 2024-06-03 DIAGNOSIS — S46.912A LEFT SHOULDER STRAIN, INITIAL ENCOUNTER: ICD-10-CM

## 2024-06-03 LAB
CREAT UR-MCNC: 281 MG/DL
HCG UR QL: NEGATIVE

## 2024-06-03 PROCEDURE — 90471 IMMUNIZATION ADMIN: CPT | Performed by: FAMILY MEDICINE

## 2024-06-03 PROCEDURE — 90472 IMMUNIZATION ADMIN EACH ADD: CPT | Performed by: FAMILY MEDICINE

## 2024-06-03 PROCEDURE — 99214 OFFICE O/P EST MOD 30 MIN: CPT | Mod: 25 | Performed by: FAMILY MEDICINE

## 2024-06-03 PROCEDURE — 90715 TDAP VACCINE 7 YRS/> IM: CPT | Performed by: FAMILY MEDICINE

## 2024-06-03 PROCEDURE — 81025 URINE PREGNANCY TEST: CPT | Performed by: FAMILY MEDICINE

## 2024-06-03 PROCEDURE — 99396 PREV VISIT EST AGE 40-64: CPT | Mod: 25 | Performed by: FAMILY MEDICINE

## 2024-06-03 PROCEDURE — 96127 BRIEF EMOTIONAL/BEHAV ASSMT: CPT | Performed by: FAMILY MEDICINE

## 2024-06-03 PROCEDURE — 90677 PCV20 VACCINE IM: CPT | Performed by: FAMILY MEDICINE

## 2024-06-03 RX ORDER — GINGER ROOT/GINGER ROOT EXT 262.5 MG
1 CAPSULE ORAL 2 TIMES DAILY
Qty: 180 TABLET | Refills: 3 | Status: SHIPPED | OUTPATIENT
Start: 2024-06-03

## 2024-06-03 SDOH — HEALTH STABILITY: PHYSICAL HEALTH: ON AVERAGE, HOW MANY DAYS PER WEEK DO YOU ENGAGE IN MODERATE TO STRENUOUS EXERCISE (LIKE A BRISK WALK)?: 0 DAYS

## 2024-06-03 SDOH — HEALTH STABILITY: PHYSICAL HEALTH: ON AVERAGE, HOW MANY MINUTES DO YOU ENGAGE IN EXERCISE AT THIS LEVEL?: 0 MIN

## 2024-06-03 ASSESSMENT — SOCIAL DETERMINANTS OF HEALTH (SDOH): HOW OFTEN DO YOU GET TOGETHER WITH FRIENDS OR RELATIVES?: ONCE A WEEK

## 2024-06-03 NOTE — PATIENT INSTRUCTIONS
"Preventive Care Advice   This is general advice we often give to help people stay healthy. Your care team may have specific advice just for you. Please talk to your care team about your own preventive care needs.  Lifestyle  Exercise at least 150 minutes each week (30 minutes a day, 5 days a week).  Do muscle strengthening activities 2 days a week. These help control your weight and prevent disease.  No smoking.  Wear sunscreen to prevent skin cancer.  Have your home tested for radon every 2 to 5 years. Radon is a colorless, odorless gas that can harm your lungs. To learn more, go to www.[email or web address].[email or web address]. and search for \"Radon in Homes.\"  Keep guns unloaded and locked up in a safe place like a safe or gun vault, or, use a gun lock and hide the keys. Always lock away bullets separately. To learn more, visit [employer].[place].gov and search for \"safe gun storage.\"  Nutrition  Eat 5 or more servings of fruits and vegetables each day.  Try wheat bread, brown rice and whole grain pasta (instead of white bread, rice, and pasta).  Get enough calcium and vitamin D. Check the label on foods and aim for 100% of the RDA (recommended daily allowance).  Regular exams  Have a dental exam and cleaning every 6 months.  See your health care team every year to talk about:  Any changes in your health.  Any medicines your care team has prescribed.  Preventive care, family planning, and ways to prevent chronic diseases.  Shots (vaccines)   HPV shots (up to age 26), if you've never had them before.  Hepatitis B shots (up to age 59), if you've never had them before.  COVID-19 shot: Get this shot when it's due.  Flu shot: Get a flu shot every year.  Tetanus shot: Get a tetanus shot every 10 years.  Pneumococcal, hepatitis A, and RSV shots: Ask your care team if you need these based on your risk.  Shingles shot (for age 50 and up).  General health tests  Diabetes screening:  Starting at age 35, Get screened for diabetes at least every 3 years.  If " you are younger than age 35, ask your care team if you should be screened for diabetes.  Cholesterol test: At age 39, start having a cholesterol test every 5 years, or more often if advised.  Bone density scan (DEXA): At age 50, ask your care team if you should have this scan for osteoporosis (brittle bones).  Hepatitis C: Get tested at least once in your life.  Abdominal aortic aneurysm screening: Talk to your doctor about having this screening if you:  Have ever smoked; and  Are biologically male; and  Are between the ages of 65 and 75.  STIs (sexually transmitted infections)  Before age 24: Ask your care team if you should be screened for STIs.  After age 24: Get screened for STIs if you're at risk. You are at risk for STIs (including HIV) if:  You are sexually active with more than one person.  You don't use condoms every time.  You or a partner was diagnosed with a sexually transmitted infection.  If you are at risk for HIV, ask about PrEP medicine to prevent HIV.  Get tested for HIV at least once in your life, whether you are at risk for HIV or not.  Cancer screening tests  Cervical cancer screening: If you have a cervix, begin getting regular cervical cancer screening tests at age 21. Most people who have regular screenings with normal results can stop after age 65. Talk about this with your provider.  Breast cancer scan (mammogram): If you've ever had breasts, begin having regular mammograms starting at age 40. This is a scan to check for breast cancer.  Colon cancer screening: It is important to start screening for colon cancer at age 45.  Have a colonoscopy test every 10 years (or more often if you're at risk) Or, ask your provider about stool tests like a FIT test every year or Cologuard test every 3 years.  To learn more about your testing options, visit: www.[employer]/154821.pdf.  For help making a decision, visit: dieudonne/vw52542.  Prostate cancer screening test: If you have a prostate and are age 55  to 69, ask your provider if you would benefit from a yearly prostate cancer screening test.  Lung cancer screening: If you are a current or former smoker age 50 to 80, ask your care team if ongoing lung cancer screenings are right for you.  For informational purposes only. Not to replace the advice of your health care provider. Copyright   2023 [employer]. All rights reserved. Clinically reviewed by the [facility] Transitions Program. [employer] SL 349275 - REV 04/24.    Your Health Risk Assessment indicates you feel you are not in good health    A healthy lifestyle helps keep the body fit and the mind alert. It helps protect you from disease, helps you fight disease, and helps prevent chronic disease (disease that doesn't go away) from getting worse. This is important as you get older and begin to notice twinges in muscles and joints and a decline in the strength and stamina you once took for granted. A healthy lifestyle includes good healthcare, good nutrition, weight control, recreation, and regular exercise. Avoid harmful substances and do what you can to keep safe. Another part of a healthy lifestyle is stay mentally active and socially involved.    Good healthcare   Have a wellness visit every year.   If you have new symptoms, let us know right away. Don't wait until the next checkup.   Take medicines exactly as prescribed and keep your medicines in a safe place. Tell us if your medicine causes problems.   Healthy diet and weight control   Eat 3 or 4 small, nutritious, low-fat, high-fiber meals a day. Include a variety of fruits, vegetables, and whole-grain foods.   Make sure you get enough calcium in your diet. Calcium, vitamin D, and exercise help prevent osteoporosis (bone thinning).   If you live alone, try eating with others when you can. That way you get a good meal and have company while you eat it.   Try to keep a healthy weight. If you eat more calories than your body uses for  energy, it will be stored as fat and you will gain weight.     Recreation   Recreation is not limited to sports and team events. It includes any activity that provides relaxation, interest, enjoyment, and exercise. Recreation provides an outlet for physical, mental, and social energy. It can give a sense of worth and achievement. It can help you stay healthy.    Mental Exercise and Social Involvement  Mental and emotional health is as important as physical health. Keep in touch with friends and family. Stay as active as possible. Continue to learn and challenge yourself.   Things you can do to stay mentally active are:  Learn something new, like a foreign language or musical instrument.   Play SCRABBLE or do crossword puzzles. If you cannot find people to play these games with you at home, you can play them with others on your computer through the Internet.   Join a games club--anything from card games to chess or checkers or lawn bowling.   Start a new hobby.   Go back to school.   Volunteer.   Read.   Keep up with world events.  Learning About Stress  What is stress?     Stress is your body's response to a hard situation. Your body can have a physical, emotional, or mental response. Stress is a fact of life for most people, and it affects everyone differently. What causes stress for you may not be stressful for someone else.  A lot of things can cause stress. You may feel stress when you go on a job interview, take a test, or run a race. This kind of short-term stress is normal and even useful. It can help you if you need to work hard or react quickly. For example, stress can help you finish an important job on time.  Long-term stress is caused by ongoing stressful situations or events. Examples of long-term stress include long-term health problems, ongoing problems at work, or conflicts in your family. Long-term stress can harm your health.  How does stress affect your health?  When you are stressed, your body  responds as though you are in danger. It makes hormones that speed up your heart, make you breathe faster, and give you a burst of energy. This is called the fight-or-flight stress response. If the stress is over quickly, your body goes back to normal and no harm is done.  But if stress happens too often or lasts too long, it can have bad effects. Long-term stress can make you more likely to get sick, and it can make symptoms of some diseases worse. If you tense up when you are stressed, you may develop neck, shoulder, or low back pain. Stress is linked to high blood pressure and heart disease.  Stress also harms your emotional health. It can make you ascencio, tense, or depressed. Your relationships may suffer, and you may not do well at work or school.  What can you do to manage stress?  You can try these things to help manage stress:   Do something active. Exercise or activity can help reduce stress. Walking is a great way to get started. Even everyday activities such as housecleaning or yard work can help.  Try yoga or dorie chi. These techniques combine exercise and meditation. You may need some training at first to learn them.  Do something you enjoy. For example, listen to music or go to a movie. Practice your hobby or do volunteer work.  Meditate. This can help you relax, because you are not worrying about what happened before or what may happen in the future.  Do guided imagery. Imagine yourself in any setting that helps you feel calm. You can use online videos, books, or a teacher to guide you.  Do breathing exercises. For example:  From a standing position, bend forward from the waist with your knees slightly bent. Let your arms dangle close to the floor.  Breathe in slowly and deeply as you return to a standing position. Roll up slowly and lift your head last.  Hold your breath for just a few seconds in the standing position.  Breathe out slowly and bend forward from the waist.  Let your feelings out. Talk,  "laugh, cry, and express anger when you need to. Talking with supportive friends or family, a counselor, or a kylah leader about your feelings is a healthy way to relieve stress. Avoid discussing your feelings with people who make you feel worse.  Write. It may help to write about things that are bothering you. This helps you find out how much stress you feel and what is causing it. When you know this, you can find better ways to cope.  What can you do to prevent stress?  You might try some of these things to help prevent stress:  Manage your time. This helps you find time to do the things you want and need to do.  Get enough sleep. Your body recovers from the stresses of the day while you are sleeping.  Get support. Your family, friends, and community can make a difference in how you experience stress.  Limit your news feed. Avoid or limit time on social media or news that may make you feel stressed.  Do something active. Exercise or activity can help reduce stress. Walking is a great way to get started.  Where can you learn more?  Go to https://www.[employer].net/patiented  Enter N032 in the search box to learn more about \"Learning About Stress.\"  Current as of: October 24, 2023               Content Version: 14.0    1140-5100 [employer].   Care instructions adapted under license by your healthcare professional. If you have questions about a medical condition or this instruction, always ask your healthcare professional. [employer] disclaims any warranty or liability for your use of this information.      "

## 2024-06-03 NOTE — PROGRESS NOTES
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 3.5  --  4.4   < > 3.9   < > 4.1   < >  --    < > 3.5 3.4   TSH 1.24  --   --   --   --   --  0.25*  --   --   --  0.33*  --     < > = values in this interval not displayed.          Review of Systems  CONSTITUTIONAL: NEGATIVE for fever, chills, change in weight  ENT/MOUTH: NEGATIVE for ear, mouth and throat problems  RESP:POSITIVE for cough-non productive, dyspnea on exertion, SOB/dyspnea, wheezing, and history of pulmonary fibrosis and NEGATIVE for hemoptysis, pleurisy, and sputum   CV: NEGATIVE for chest pain, palpitations or peripheral edema  MUSCULOSKELETAL: POSITIVE  for injury to left shoulder while lifting arm without weight 1 week ago.  ROS otherwise negative     Objective    Exam  /74   Pulse 84   Temp 98  F (36.7  C) (Temporal)   Resp 16   Ht 1.537 m (5' 0.5\")   Wt 59.9 kg (132 lb)   LMP  (LMP Unknown)   SpO2 90%   BMI 25.36 kg/m     Estimated body mass index is 25.36 kg/m  as calculated from the following:    Height as of this encounter: 1.537 m (5' 0.5\").    Weight as of this encounter: 59.9 kg (132 lb).    Physical Exam  GENERAL: alert and no distress  NECK: no adenopathy, no asymmetry, masses, or " scars  RESP: no rales , no rhonchi, expiratory wheezes bilateral, and decreased breath sounds bilateral  CV: regular rate and rhythm, normal S1 S2, no S3 or S4, no murmur, click or rub, no peripheral edema  ABDOMEN: soft, nontender, no hepatosplenomegaly, no masses and bowel sounds normal  MS: Shoulder exam shows no impingement signs are present with pain at high arc of abduction and forward flexion on left. There is tenderness of the periscapular muscles.    NEURO: Normal strength and tone, mentation intact and speech normal  PSYCH: mentation appears normal, affect flat, judgement and insight intact, and appearance well groomed  LYMPH: no cervical, supraclavicular, axillary, or inguinal adenopathy        Signed Electronically by: Cristian Fagan MD

## 2024-06-03 NOTE — LETTER

## 2024-06-03 NOTE — COMMUNITY RESOURCES LIST (ENGLISH)
Florinda 3, 2024           YOUR PERSONALIZED LIST OF SERVICES & PROGRAMS               Bill Payment Assistance      [facility] [place] [facility] [employer] [facility] ([employer])  [address] [place] [address] (Distance: 13.1 miles)  Phone: [phone]  Website: [email or web address]/ivxwiica-[email or web address]  Language: English  Fee: Free      of the [address] [place] [address] (Distance: 18.1 miles)  Language: English  Fee: Free      - Dislocated Worker/Adult [employer] Employment Program  Phone: [phone]) [phone]  Email: chen@[employer]  Website: https://[employer]/services/employment-services/dislocated-worker-program/  Language: English, [place]an  Hours: Mon 8:00 AM - 4:30 PM Tue 8:00 AM - 4:30 PM Wed 8:00 AM - 4:30 PM Thu 8:00 AM - 4:30 PM Fri 8:00 AM - 4:30 PM  Fee: Free  Accessibility: Ada accessible               IMPORTANT NUMBERS & WEBSITES        Emergency Services  911  .   [facility]  211 http://211unitedway.org  .   Poison Control  (884) 721-3242 http://[place]poison.org http://wis[place]poison.org  .     Suicide and Crisis Lifeline  988 http://988lifeline.org  .   Childhelp National Child Abuse Hotline  717.679.4042 http://Childhelphotline.org   .   National Sexual Assault Hotline  (654) 908-6047 (HOPE) http://Rainn.org   .     National Runaway Safeline  (941) 988-6368 (RUNAWAY) http://1800runaway.org  .   Pregnancy & Postpartum Support  Call/text 125-199-7173  [place]: http://ppsupportmn.org  [place]: http://[employer].com/wi  .   Substance Abuse National Helpline ([facility])  940-957-HELP (8951) http://Findtreatment.gov   .                DISCLAIMER: These resources have been generated via the [employer]. [employer] does not endorse any service providers mentioned in this resource list. [employer] does not guarantee that the services mentioned in this resource list will be available to you or will improve your health or wellness.    [facility]

## 2024-06-08 LAB
BUPRENORPHINE UR CFM-MCNC: 2280 NG/ML
BUPRENORPHINE/CREAT UR: 811 NG/MG {CREAT}
NALOXONE UR CFM-MCNC: 9540 NG/ML
NALOXONE: 3395 NG/MG {CREAT}
NORBUPRENORPHINE UR CFM-MCNC: 6340 NG/ML
NORBUPRENORPHINE/CREAT UR: 2256 NG/MG {CREAT}

## 2024-06-10 ENCOUNTER — MYC MEDICAL ADVICE (OUTPATIENT)
Dept: PULMONOLOGY | Facility: CLINIC | Age: 49
End: 2024-06-10
Payer: COMMERCIAL

## 2024-06-10 DIAGNOSIS — J84.9 ILD (INTERSTITIAL LUNG DISEASE) (H): ICD-10-CM

## 2024-06-11 RX ORDER — AZITHROMYCIN 250 MG/1
250 TABLET, FILM COATED ORAL DAILY
Qty: 90 TABLET | Refills: 1 | Status: SHIPPED | OUTPATIENT
Start: 2024-06-11

## 2024-06-13 DIAGNOSIS — F11.90 CHRONIC, CONTINUOUS USE OF OPIOIDS: ICD-10-CM

## 2024-06-13 DIAGNOSIS — M19.90 INFLAMMATORY ARTHRITIS: ICD-10-CM

## 2024-06-13 RX ORDER — OXYCODONE HYDROCHLORIDE 5 MG/1
5 TABLET ORAL 3 TIMES DAILY PRN
Qty: 90 TABLET | Refills: 0 | Status: SHIPPED | OUTPATIENT
Start: 2024-06-15 | End: 2024-07-11

## 2024-07-11 DIAGNOSIS — M19.90 INFLAMMATORY ARTHRITIS: ICD-10-CM

## 2024-07-11 DIAGNOSIS — F11.90 CHRONIC, CONTINUOUS USE OF OPIOIDS: ICD-10-CM

## 2024-07-11 RX ORDER — OXYCODONE HYDROCHLORIDE 5 MG/1
5 TABLET ORAL 3 TIMES DAILY PRN
Qty: 90 TABLET | Refills: 0 | Status: SHIPPED | OUTPATIENT
Start: 2024-07-15 | End: 2024-08-13

## 2024-07-12 ENCOUNTER — MYC MEDICAL ADVICE (OUTPATIENT)
Dept: FAMILY MEDICINE | Facility: CLINIC | Age: 49
End: 2024-07-12
Payer: COMMERCIAL

## 2024-07-31 ENCOUNTER — LAB (OUTPATIENT)
Dept: LAB | Facility: CLINIC | Age: 49
End: 2024-07-31
Payer: COMMERCIAL

## 2024-07-31 DIAGNOSIS — R06.09 DOE (DYSPNEA ON EXERTION): ICD-10-CM

## 2024-07-31 DIAGNOSIS — I27.20 PULMONARY HYPERTENSION (H): ICD-10-CM

## 2024-07-31 LAB — HCG UR QL: NEGATIVE

## 2024-07-31 PROCEDURE — 81025 URINE PREGNANCY TEST: CPT

## 2024-08-08 NOTE — TELEPHONE ENCOUNTER
Requested Prescriptions   Pending Prescriptions Disp Refills     ALPRAZolam (XANAX) 0.5 MG tablet 30 tablet 0     Sig: Take 1 tablet (0.5 mg) by mouth nightly as needed for anxiety or sleep - INTENTIONAL DOSE REDUCTION (must last 30 days)     Last Written Prescription Date:  12/29/2021  Last Fill Quantity: 30,   # refills: 0  Last Office Visit: 01/13/2022  Future Office visit:       Routing refill request to provider for review/approval because:  Drug not on the [facility], P or [facility] refill protocol or controlled substance     Improving

## 2024-08-12 ENCOUNTER — MYC MEDICAL ADVICE (OUTPATIENT)
Dept: FAMILY MEDICINE | Facility: CLINIC | Age: 49
End: 2024-08-12
Payer: COMMERCIAL

## 2024-08-12 DIAGNOSIS — M19.90 INFLAMMATORY ARTHRITIS: ICD-10-CM

## 2024-08-12 DIAGNOSIS — F11.90 CHRONIC, CONTINUOUS USE OF OPIOIDS: ICD-10-CM

## 2024-08-13 DIAGNOSIS — M19.90 INFLAMMATORY ARTHRITIS: ICD-10-CM

## 2024-08-13 DIAGNOSIS — F11.90 CHRONIC, CONTINUOUS USE OF OPIOIDS: ICD-10-CM

## 2024-08-13 RX ORDER — OXYCODONE HYDROCHLORIDE 5 MG/1
TABLET ORAL
Qty: 90 TABLET | Refills: 0 | OUTPATIENT
Start: 2024-08-13

## 2024-08-14 ENCOUNTER — TELEPHONE (OUTPATIENT)
Dept: FAMILY MEDICINE | Facility: CLINIC | Age: 49
End: 2024-08-14
Payer: COMMERCIAL

## 2024-08-14 NOTE — TELEPHONE ENCOUNTER
Patient requesting refill on Oxycodone. Let her know we are awaiting response from Dr. Fagan.    Tyron Payne,ILIRN, RN

## 2024-08-15 ENCOUNTER — MYC MEDICAL ADVICE (OUTPATIENT)
Dept: FAMILY MEDICINE | Facility: CLINIC | Age: 49
End: 2024-08-15
Payer: COMMERCIAL

## 2024-08-15 RX ORDER — OXYCODONE HYDROCHLORIDE 5 MG/1
5 TABLET ORAL 3 TIMES DAILY PRN
Qty: 90 TABLET | Refills: 0 | Status: SHIPPED | OUTPATIENT
Start: 2024-08-15 | End: 2024-09-04

## 2024-08-15 NOTE — TELEPHONE ENCOUNTER
Contacted patient, relayed to patient that the medication was refilled at original dosage and increasing dosage would be discussed at the next scheduled appointment. Patient verbalized understanding.    Sheila Karimi, VF

## 2024-08-15 NOTE — TELEPHONE ENCOUNTER
Patient calling in regards to this refill request, she is OUT of medication today and is calling to see if this can be addressed today.    Zoë Hatfield XRO/

## 2024-08-29 LAB
PREG. TEST: NEGATIVE
SP GR UR STRIP: NORMAL

## 2024-08-30 ENCOUNTER — EXTERNAL ORDER RESULTS (OUTPATIENT)
Dept: CARDIOLOGY | Facility: CLINIC | Age: 49
End: 2024-08-30
Payer: COMMERCIAL

## 2024-08-30 NOTE — PLAN OF CARE
Assumed cares from [id number]. A/O x4, ambulatory and independent. Pleasant and cooperates with cares. Denied dizziness, light-headedness, SOB, nausea, headache, hallucinations and suicidal ideation. VS WNL. She is on regular diet and appetite was good this shift. Coarse LS noted on auscultation. O2 maintainrd at 4L in [place] with O2sat at %.Hotpack applied to lower back for chronic pain and explained that she can have Oxycodone at 2330.          Fasting labs ordered, declines any health maintenance exams or vaccines today

## 2024-09-03 ASSESSMENT — ANXIETY QUESTIONNAIRES
GAD7 TOTAL SCORE: 14
7. FEELING AFRAID AS IF SOMETHING AWFUL MIGHT HAPPEN: NEARLY EVERY DAY
8. IF YOU CHECKED OFF ANY PROBLEMS, HOW DIFFICULT HAVE THESE MADE IT FOR YOU TO DO YOUR WORK, TAKE CARE OF THINGS AT HOME, OR GET ALONG WITH OTHER PEOPLE?: EXTREMELY DIFFICULT

## 2024-09-03 ASSESSMENT — PATIENT HEALTH QUESTIONNAIRE - PHQ9
10. IF YOU CHECKED OFF ANY PROBLEMS, HOW DIFFICULT HAVE THESE PROBLEMS MADE IT FOR YOU TO DO YOUR WORK, TAKE CARE OF THINGS AT HOME, OR GET ALONG WITH OTHER PEOPLE: SOMEWHAT DIFFICULT
SUM OF ALL RESPONSES TO PHQ QUESTIONS 1-9: 7
SUM OF ALL RESPONSES TO PHQ QUESTIONS 1-9: 7

## 2024-09-04 ENCOUNTER — OFFICE VISIT (OUTPATIENT)
Dept: FAMILY MEDICINE | Facility: CLINIC | Age: 49
End: 2024-09-04
Payer: COMMERCIAL

## 2024-09-04 VITALS
TEMPERATURE: 97.8 F | BODY MASS INDEX: 27.56 KG/M2 | OXYGEN SATURATION: 92 % | RESPIRATION RATE: 14 BRPM | WEIGHT: 143.5 LBS | SYSTOLIC BLOOD PRESSURE: 110 MMHG | DIASTOLIC BLOOD PRESSURE: 60 MMHG | HEART RATE: 74 BPM

## 2024-09-04 DIAGNOSIS — F11.90 CHRONIC, CONTINUOUS USE OF OPIOIDS: ICD-10-CM

## 2024-09-04 DIAGNOSIS — M19.90 INFLAMMATORY ARTHRITIS: ICD-10-CM

## 2024-09-04 PROCEDURE — 99214 OFFICE O/P EST MOD 30 MIN: CPT | Performed by: FAMILY MEDICINE

## 2024-09-04 PROCEDURE — G2211 COMPLEX E/M VISIT ADD ON: HCPCS | Performed by: FAMILY MEDICINE

## 2024-09-04 RX ORDER — OXYCODONE HYDROCHLORIDE 10 MG/1
10 TABLET ORAL 3 TIMES DAILY PRN
Qty: 90 TABLET | Refills: 0 | Status: SHIPPED | OUTPATIENT
Start: 2024-09-04 | End: 2024-10-01

## 2024-09-04 NOTE — PROGRESS NOTES
Assessment & Plan     Chronic, continuous use of opioids  Anali Loya is a 48-year-old female with a history of end-stage lung disease secondary to interstitial lung disease due to treatment for rheumatoid arthritis who presents to clinic today in follow-up of her chronic narcotic use.  She is currently followed by addiction medicine for her Suboxone and is also on Klonopin 0.25 mg 3 times daily.  Her only therapy for her rheumatoid arthritis is her prednisone which is causing increasing weight gain and really not helpful for her chronic pain.  She also has increasing dyspnea from her interstitial lung disease.  She is currently on a transplantation wait for double lung transplant.  She meets regularly with her transplant team.    Several months ago we discussed additional narcotic pain medication in combination with the Suboxone.  It was discussed with her addiction medicine specialist who is supportive of this given the context of her terminal lung disease.  We started her on 5 mg of oxycodone 3 times daily.  This has helped but she continues to have disabling pain on a daily basis.  She would like to increase the oxycodone to allow for more pain control and function.    She recently moved into a new apartment in [place].  Unfortunately it is on the second floor without an elevator but it is a 2 room apartment that allows more space for all of her oxygen's therapy supply needs.  She has more access to local services and in general she feels more comfortable in this space.  She is no longer a smoker has been doing a great job staying abstinent of tobacco.  We discussed the possibility of her getting a lung transplant and she states that based on the statistics shared with her from her transplant team she has a less than 6% chance of receiving a compatible lung transplant.    We reviewed her PDMP today.  She currently has a unintentional overdose risk score of 560.  This is due to the combination of the  Klonopin, buprenorphine, and oxycodone.  She is currently on 22.5 morphine milliequivalents of oxycodone, 3.1 lorazepam milligram equivalents and 6.4 buprenorphine milligrams per day.  We discussed the interaction between buprenorphine and oxycodone.  Buprenorphine being a mixed agonist will block the effect of oxycodone but the risk for respiratory depression continues to rise.  The combination of the 2 medications may slightly increase her pain control but was significantly increased her risk of respiratory depression.  That in the combination of her chronic hypoxia secondary to her interstitial lung disease puts her at increased risk for respiratory depression and death.  She fully understands these risk but also that her current quality of life with the pain that she has a limited function because of her lung disease that she is accepting of the risk.  Her narcotic use would constitute end-stage disease management.    On exam she is pleasantly in good mood.  Her blood pressure is 110/60.  Pulse is 70 and regular she is afebrile.  Respiratory rate is 14 and oxygen saturations are 92% on 4 L of nasal cannula oxygen.  Lungs demonstrate diminished breath sounds throughout with finding and rales in all lung fields.    Assessment: 48-year-old female with end-stage interstitial lung disease awaiting transplantation.  Underlying dyspnea secondary to interstitial lung disease as well as underlying chronic pain secondary to rheumatoid arthritis.  Through shared decision making with the agreed to increase her oxycodone to 10 mg 3 times daily along with her stable dose of Klonopin and buprenorphine.  She will follow-up with her addiction medicine provider and will follow-up with me in 3 months.  - oxyCODONE (ROXICODONE) 10 MG tablet; Take 1 tablet (10 mg) by mouth 3 times daily as needed for breakthrough pain or severe pain.    Inflammatory arthritis  Anali Loya is a 48-year-old female with a history of end-stage  lung disease secondary to interstitial lung disease due to treatment for rheumatoid arthritis who presents to clinic today in follow-up of her chronic narcotic use.  She is currently followed by addiction medicine for her Suboxone and is also on Klonopin 0.25 mg 3 times daily.  Her only therapy for her rheumatoid arthritis is her prednisone which is causing increasing weight gain and really not helpful for her chronic pain.  She also has increasing dyspnea from her interstitial lung disease.  She is currently on a transplantation wait for double lung transplant.  She meets regularly with her transplant team.    Several months ago we discussed additional narcotic pain medication in combination with the Suboxone.  It was discussed with her addiction medicine specialist who is supportive of this given the context of her terminal lung disease.  We started her on 5 mg of oxycodone 3 times daily.  This has helped but she continues to have disabling pain on a daily basis.  She would like to increase the oxycodone to allow for more pain control and function.    She recently moved into a new apartment in [place].  Unfortunately it is on the second floor without an elevator but it is a 2 room apartment that allows more space for all of her oxygen's therapy supply needs.  She has more access to local services and in general she feels more comfortable in this space.  She is no longer a smoker has been doing a great job staying abstinent of tobacco.  We discussed the possibility of her getting a lung transplant and she states that based on the statistics shared with her from her transplant team she has a less than 6% chance of receiving a compatible lung transplant.    We reviewed her PDMP today.  She currently has a unintentional overdose risk score of 560.  This is due to the combination of the Klonopin, buprenorphine, and oxycodone.  She is currently on 22.5 morphine milliequivalents of oxycodone, 3.1 lorazepam milligram  "equivalents and 6.4 buprenorphine milligrams per day.  We discussed the interaction between buprenorphine and oxycodone.  Buprenorphine being a mixed agonist will block the effect of oxycodone but the risk for respiratory depression continues to rise.  The combination of the 2 medications may slightly increase her pain control but was significantly increased her risk of respiratory depression.  That in the combination of her chronic hypoxia secondary to her interstitial lung disease puts her at increased risk for respiratory depression and death.  She fully understands these risk but also that her current quality of life with the pain that she has a limited function because of her lung disease that she is accepting of the risk.  Her narcotic use would constitute end-stage disease management.    On exam she is pleasantly in good mood.  Her blood pressure is 110/60.  Pulse is 70 and regular she is afebrile.  Respiratory rate is 14 and oxygen saturations are 92% on 4 L of nasal cannula oxygen.  Lungs demonstrate diminished breath sounds throughout with finding and rales in all lung fields.    Assessment: 48-year-old female with end-stage interstitial lung disease awaiting transplantation.  Underlying dyspnea secondary to interstitial lung disease as well as underlying chronic pain secondary to rheumatoid arthritis.  Through shared decision making with the agreed to increase her oxycodone to 10 mg 3 times daily along with her stable dose of Klonopin and buprenorphine.  She will follow-up with her addiction medicine provider and will follow-up with me in 3 months.  - oxyCODONE (ROXICODONE) 10 MG tablet; Take 1 tablet (10 mg) by mouth 3 times daily as needed for breakthrough pain or severe pain.          BMI  Estimated body mass index is 27.56 kg/m  as calculated from the following:    Height as of 6/3/24: 1.537 m (5' 0.5\").    Weight as of this encounter: 65.1 kg (143 lb 8 oz).          Follow-up Visit   Expected date:  " Dec 04, 2024 (Approximate)      Follow Up Appointment Details:     Follow-up with whom?: Me    Follow-Up for what?: Chronic Disease f/u    Chronic Disease f/u: General (Other)    Additional Details: Chronic pain    How?: In Person                       Thierno Briones is a 48 year old, presenting for the following health issues:  Recheck Medication        9/4/2024     1:47 PM   Additional Questions   Roomed by Ivis lincoln     History of Present Illness       Back Pain:  She presents for follow up of back pain. Patient's back pain is a chronic problem.  Location of back pain:  Right lower back, left lower back, right middle of back, left middle of back, left side of neck, right hip and other  Description of back pain: burning, cramping, dull ache and sharp  Back pain spreads: right buttocks, left buttocks and right knee    Since patient first noticed back pain, pain is: unchanged  Does back pain interfere with her job:  Not applicable       Reason for visit:  Discuss changing my medication and a follow-up    She eats 0-1 servings of fruits and vegetables daily.She consumes 2 sweetened beverage(s) daily.She exercises with enough effort to increase her heart rate 10 to 19 minutes per day.  She exercises with enough effort to increase her heart rate 4 days per week.   She is taking medications regularly.         Pain History:  When did you first notice your pain? 10 years   Have you seen this provider for your pain in the past? Yes   Where in your body do you have pain? diffuse  Are you seeing anyone else for your pain? Yes -addiction medicine        3/15/2024     3:28 PM 5/1/2024     3:13 PM 9/3/2024     2:51 PM   PHQ-9 SCORE   PHQ-9 Total Score MyChart 14 (Moderate depression) 8 (Mild depression) 7 (Mild depression)   PHQ-9 Total Score 14 8 7           10/9/2023     4:17 PM 11/13/2023     2:43 PM 9/3/2024     2:53 PM   MOLLY-7 SCORE   Total Score 11 (moderate anxiety) 7 (mild anxiety) 14 (moderate anxiety)   Total Score  11 7 14               Chronic Pain Follow Up:    Location of pain: Diffuse rheumatoid arthritis  Analgesia/pain control:    - Recent changes: Slight improvement with brief increase of oxycodone to 10 mg 3 times daily   - Overall control: Inadequate pain control    - Current treatments: Oxycodone 5 mg 3 times daily, prednisone, buprenorphine, Klonopin  Adherence:     - Do you ever take more pain medicine than prescribed? Yes: Recently increased oxycodone to 10 mg on occasion.   - When did you take your last dose of pain medicine?  Today  Adverse effects: No   PDMP Review         Value Time User    State PDMP site checked  Yes 7/11/2024  5:28 PM Cristian Fagan MD          Last CSA Agreement:   CSA -- Patient Level:     [Media Unavailable] Controlled Substance Agreement - Opioid - Scan on 6/3/2024  1:30 PM   [Media Unavailable] Controlled Substance Agreement - Opioid - Scan on 11/26/2022  4:41 PM   [Media Unavailable] Controlled Substance Agreement - Opioid - Scan on 6/13/2021 12:36 PM   [Media Unavailable] Controlled Substance Agreement - Opioid - Scan on 3/13/2019 11:16 AM       Last UDS: 6/8/2024                Patient Active Problem List   Diagnosis    Personal history of tobacco use, presenting hazards to health    Abnormal blood chemistry    Abnormal maternal glucose tolerance, antepartum    Inflammatory arthritis    Hyperlipidemia LDL goal <130    SOB (shortness of breath)    Fibrosis of lung (H)    Anxiety    Tobacco abuse    S/P bunionectomy    ILD (interstitial lung disease) with concerns for acute exacerbation    Lung nodule    Pneumothorax of left lung after biopsy    Pneumothorax after biopsy    Hypoxia    Pulmonary hypertension (H)    ASCUS of cervix with negative high risk HPV    Primary pulmonary hypertension (H)    Chronic, continuous use of opioids    Iron deficiency    Anemia    Spondylosis of lumbosacral region without myelopathy or radiculopathy    Polymyositis, organ involvement unspecified (H)     Hallucinations    Prolonged grief disorder    Abnormal CT of the chest    Elevated brain natriuretic peptide (BNP) level    Acute on chronic respiratory failure with hypoxia and hypercapnia (H)    Pneumonia of both lungs due to infectious organism, unspecified part of lung    Recurrent major depressive disorder (H24)    MOLLY (generalized anxiety disorder)    PTSD (post-traumatic stress disorder)    Gastroesophageal reflux disease without esophagitis    Chronic pain    Acute pulmonary edema (H)    Chronic right heart failure (H)    Seronegative arthritis    Current tobacco use    Opioid use disorder in remission    Acute hypoxemic respiratory failure (H)    Moderate benzodiazepine use disorder (H)    Severe episode of recurrent major depressive disorder, with psychotic features (H)    Influenza A    Encephalopathy    Somnolence    Bigeminy    Acute upper GI bleed    Panlobular emphysema (H)    Chest pain, unspecified    Abnormal CT scan    Status post coronary angiogram    Organ transplant candidate    Encounter for pre-transplant evaluation for lung transplant     Current Outpatient Medications   Medication Sig Dispense Refill    acetaminophen (TYLENOL) 325 MG tablet 325-650 mg every 4 hours as needed      azithromycin (ZITHROMAX) 250 MG tablet Take 1 tablet (250 mg) by mouth daily 90 tablet 1    Calcium Carb-Cholecalciferol (CALTRATE 600+D3) 600-20 MG-MCG TABS Take 1 tablet by mouth 2 times daily 180 tablet 3    CHOLECALCIFEROL 25 MCG (1000 UT) tablet Take 1,000 Units by mouth daily      clonazePAM (KLONOPIN) 0.5 MG tablet Take 0.25 mg by mouth 3 times daily as needed for anxiety      diclofenac (VOLTAREN) 1 % topical gel Apply 1 g topically 3 times daily as needed for moderate pain 350 g 2    esomeprazole (NEXIUM) 20 MG DR capsule Take 1 capsule (20 mg) by mouth two times daily 180 capsule 1    furosemide (LASIX) 20 MG tablet Take 1 tablet (20 mg) by mouth as needed (swelling) 90 tablet 3    ipratropium -  albuterol 0.5 mg/2.5 mg/3 mL (DUONEB) 0.5-2.5 (3) MG/3ML neb solution Take 1 vial (3 mLs) by nebulization 4 times daily (Patient taking differently: Take 3 mLs by nebulization every 6 hours as needed) 360 mL 0    macitentan (OPSUMIT) 10 MG tablet Take 1 tablet (10 mg) by mouth daily Make sure you are completing your monthly mandatory labs for pregnancy. (Patient taking differently: Take 10 mg by mouth every morning Make sure you are completing your monthly mandatory labs for pregnancy.) 30 tablet 11    naloxone (NARCAN) 4 MG/0.1ML nasal spray Spray 1 spray (4 mg) into one nostril alternating nostrils as needed for opioid reversal every 2-3 minutes until assistance arrives 0.2 mL 0    nintedanib (OFEV) 150 MG capsule Take 1 capsule (150 mg) by mouth 2 times daily (Patient taking differently: Take 150 mg by mouth every morning) 60 capsule 11    order for DME Oxygen: Patient requires supplemental Oxygen 4 LPM via nasal canula at rest and 6 LPM with activity. Please provide patient with portability capability. Okay to spot check patient on oxygen device, to keep sats above 90%. Please provider with home concentrator that can go up to 10 LPM. Oxygen will be for a lifetime. 1 Device 0    order for DME Please provide patient with 2  liquid oxygen tanks for portability. Spot check patient on liquid oxygen. Titrate oxygen to maintain saturations above 88%. 2 Device 0    oxyCODONE (ROXICODONE) 5 MG tablet Take 1 tablet (5 mg) by mouth 3 times daily as needed for breakthrough pain or severe pain 90 tablet 0    polyethylene glycol (MIRALAX) 17 GM/Dose powder Take 1 Capful by mouth daily as needed for constipation      predniSONE (DELTASONE) 10 MG tablet Take 1 tablet (10 mg) by mouth daily 30 tablet 3    predniSONE (DELTASONE) 10 MG tablet Take prednisone, 10 mg tab - 6 tabs daily for 5 days, then 5 tabs daily for 7 days, then 4 tabs  daily for 7 days, then 3 tab daily for 7 days, then 2 tab daily for 7 days, then continue 2  tabs daily till seen by your lung doctor 150 tablet 0    senna (SENOKOT) 8.6 MG tablet Take 1 tablet by mouth 2 times daily as needed for constipation      sertraline (ZOLOFT) 100 MG tablet Take 150 mg by mouth every morning      SUBOXONE 8-2 MG per film Place 1 Film under the tongue 3 times daily      tadalafil, PAH, 20 MG TABS Take 2 tablets (40 mg) by mouth daily (Patient taking differently: Take 40 mg by mouth every morning) 60 tablet 11    VENTOLIN  (90 Base) MCG/ACT inhaler INHALE ONE TO TWO PUFFS INTO THE LUNGS EVERY 4 HOURS AS NEEDED FOR SHORTNESS OF BREATH / DYSPNEA 18 g 3           Review of Systems  CONSTITUTIONAL: NEGATIVE for fever, chills, change in weight  ENT/MOUTH: NEGATIVE for ear, mouth and throat problems  RESP:POSITIVE for dyspnea on exertion, SOB/dyspnea, wheezing, and chronic interstitial lung disease  CV: NEGATIVE for chest pain, palpitations or peripheral edema  MUSCULOSKELETAL: POSITIVE  for arthralgias diffuse secondary to rheumatoid arthritis  ROS otherwise negative      Objective    /60   Pulse 74   Temp 97.8  F (36.6  C)   Resp 14   Wt 65.1 kg (143 lb 8 oz)   SpO2 92%   BMI 27.56 kg/m    Body mass index is 27.56 kg/m .  Physical Exam   GENERAL: alert and no distress  NECK: no adenopathy, no asymmetry, masses, or scars  RESP: no rhonchi, rales throughout, and decreased breath sounds throughout  CV: regular rate and rhythm, normal S1 S2, no S3 or S4, no murmur, click or rub, no peripheral edema  ABDOMEN: soft, nontender, no hepatosplenomegaly, no masses and bowel sounds normal            Signed Electronically by: Cristian Fagan MD

## 2024-09-05 ENCOUNTER — MYC MEDICAL ADVICE (OUTPATIENT)
Dept: FAMILY MEDICINE | Facility: CLINIC | Age: 49
End: 2024-09-05
Payer: COMMERCIAL

## 2024-09-05 NOTE — LETTER
2024    INSURER: Payor: DUSTIN / Plan: [employer]AP / Product Type: HMO /   ATTN:   Re: Prior Authorization Request  Patient: Anali Loya  Policy ID#:  [id number]  : 1975      To Whom it May Concern:    I am writing to formally support an emotional support animal for Anali Loya.  Patient has a well-established history of depression and anxiety exacerbated by chronic interstitial lung disease which limits her ability to interact in the public.  She is homebound because of her chronic oxygen requirements.    I believe she would do well with an emotional support animal.  The animal would provide her companionship and stress release.      The benefits of the therapy include companionship, emotional support, stress relief..    I have treated Anali Loya since 2019 and I have determined that it is medically appropriate for  this patient to receive be treated with emotional support animal.    I firmly believe that this therapy is clinically appropriate and that Anali Loya would benefit from improved quality of life if allowed the opportunity to receive this treatment.  Please contact me at Dept: [phone] if you require additional information to ensure the prompt approval for coverage.    Please send your written decision to me at this address:  [address] [address] [phone]  Dept: [phone]    Cristian Fagan MD

## 2024-09-17 ENCOUNTER — OFFICE VISIT (OUTPATIENT)
Dept: FAMILY MEDICINE | Facility: CLINIC | Age: 49
End: 2024-09-17
Payer: COMMERCIAL

## 2024-09-17 ENCOUNTER — HOSPITAL ENCOUNTER (OUTPATIENT)
Dept: GENERAL RADIOLOGY | Facility: CLINIC | Age: 49
Discharge: HOME | End: 2024-09-17
Attending: FAMILY MEDICINE | Admitting: FAMILY MEDICINE
Payer: COMMERCIAL

## 2024-09-17 VITALS
HEIGHT: 61 IN | RESPIRATION RATE: 16 BRPM | BODY MASS INDEX: 27.56 KG/M2 | DIASTOLIC BLOOD PRESSURE: 68 MMHG | OXYGEN SATURATION: 94 % | HEART RATE: 82 BPM | TEMPERATURE: 97.1 F | WEIGHT: 146 LBS | SYSTOLIC BLOOD PRESSURE: 107 MMHG

## 2024-09-17 DIAGNOSIS — R07.1 CHEST PAIN ON BREATHING: ICD-10-CM

## 2024-09-17 DIAGNOSIS — R07.1 CHEST PAIN ON BREATHING: Primary | ICD-10-CM

## 2024-09-17 DIAGNOSIS — M19.90 INFLAMMATORY ARTHRITIS: ICD-10-CM

## 2024-09-17 DIAGNOSIS — I27.20 PULMONARY HYPERTENSION (H): ICD-10-CM

## 2024-09-17 DIAGNOSIS — F11.90 CHRONIC, CONTINUOUS USE OF OPIOIDS: ICD-10-CM

## 2024-09-17 PROCEDURE — 99214 OFFICE O/P EST MOD 30 MIN: CPT | Performed by: FAMILY MEDICINE

## 2024-09-17 PROCEDURE — 71046 X-RAY EXAM CHEST 2 VIEWS: CPT

## 2024-09-17 RX ORDER — FUROSEMIDE 20 MG
20 TABLET ORAL 2 TIMES DAILY
Qty: 180 TABLET | Refills: 3 | Status: SHIPPED | OUTPATIENT
Start: 2024-09-17

## 2024-09-17 RX ORDER — CYCLOBENZAPRINE HCL 5 MG
5 TABLET ORAL 3 TIMES DAILY PRN
Qty: 60 TABLET | Refills: 1 | Status: SHIPPED | OUTPATIENT
Start: 2024-09-17

## 2024-09-17 ASSESSMENT — PAIN SCALES - GENERAL: PAINLEVEL: SEVERE PAIN (6)

## 2024-09-17 NOTE — PROGRESS NOTES
Assessment & Plan     (R07.1) Chest pain on breathing  (primary encounter diagnosis)  Comment: Back and anterior chest wall pain. Pt also with stigmata of CHF O2 at baseline per her report. Discussed increase in diuresis and pt amenable refills done. Last echo showed preserved EF. We discussed ER precautions at length.   CXR c/w interstitial lung disease although slightly increased prominence from prior. Given her symptoms of PND/Orthopnea will trial at home diuresis but strict return precautions advised.   Plan: XR Chest 2 Views, cyclobenzaprine (FLEXERIL) 5         MG tablet    (I27.20) Pulmonary hypertension (H)  Comment: hx of same prior with liely rt sided heart failure related to interstitial lung disease.   Plan: furosemide (LASIX) 20 MG tablet    (M19.90) Inflammatory arthritis  Comment: maintain votlaren gel. Will trial on back and shoulder as this has been somewhat helpful as well.   Plan: diclofenac (VOLTAREN) 1 % topical gel    (F11.90) Chronic, continuous use of opioids  Comment: limited options for pain control trial topical NSAID and cyclobenzaprine.   Plan: diclofenac (VOLTAREN) 1 % topical gel                  Thierno Briones is a 48 year old, presenting for the following health issues:  Back Pain, Shoulder Pain, and Numbness (Right hand )        9/17/2024     1:14 PM   Additional Questions   Roomed by Roderick     History of Present Illness       Back Pain:  She presents for follow up of back pain. Patient's back pain is a chronic problem.  Location of back pain:  Right lower back, left lower back, right middle of back, left middle of back, left side of neck, right hip and other  Description of back pain: burning, cramping, dull ache and sharp  Back pain spreads: right buttocks, left buttocks and right knee    Since patient first noticed back pain, pain is: unchanged  Does back pain interfere with her job:  Not applicable       Reason for visit:  Discuss changing my medication and a  "follow-up    She eats 0-1 servings of fruits and vegetables daily.She consumes 2 sweetened beverage(s) daily.She exercises with enough effort to increase her heart rate 10 to 19 minutes per day.  She exercises with enough effort to increase her heart rate 4 days per week.   She is taking medications regularly.    Pt with back pain on the right radiating to front of chest. Worried her lung may be having an issues. Hx of interstitial lung disease with oxygen supplementation on O2: 8L normally. Also on diuretics for HF.  Pt reports some PND with orthopnea and swelling in legs. We discussed increased dosing of furosemide and pt is amenable to trial no fevers/chills. No cough. Amenable to CXR today. Pt sent to Xray and returned for eval after.           Objective    /68   Pulse 82   Temp 97.1  F (36.2  C) (Temporal)   Resp 16   Ht 1.556 m (5' 1.26\")   Wt 66.2 kg (146 lb)   SpO2 94%   BMI 27.35 kg/m    Body mass index is 27.35 kg/m .  Physical Exam  Constitutional:       General: She is not in acute distress.     Appearance: She is not ill-appearing, toxic-appearing or diaphoretic.   HENT:      Head: Normocephalic.   Cardiovascular:      Rate and Rhythm: Normal rate and regular rhythm.      Pulses: Normal pulses.      Heart sounds: No murmur heard.  Pulmonary:      Effort: Pulmonary effort is normal. No respiratory distress.      Breath sounds: No stridor. Rales (in all lung fields) present. No wheezing or rhonchi.   Chest:      Chest wall: No tenderness.   Skin:     General: Skin is warm and dry.      Capillary Refill: Capillary refill takes less than 2 seconds.   Neurological:      Mental Status: She is alert.   Psychiatric:         Mood and Affect: Mood normal.         Behavior: Behavior normal.         Thought Content: Thought content normal.         Judgment: Judgment normal.        Results for orders placed or performed during the hospital encounter of 09/17/24   XR Chest 2 Views     Status: None    " Narrative    XR CHEST 2 VIEWS 9/17/2024 1:56 PM    HISTORY: Chest pain on breathing.    COMPARISON: Multiple, most recent CT and chest x-ray 4/12/2024      Impression    IMPRESSION: Diffuse interstitial prominence, increased compared with  prior exams. While much of this may represent underlying fibrosis,  cannot exclude additional component of diffuse edema or pneumonitis.  Opacity in the left mid lung field is stable. Normal cardiomediastinal  silhouette. No pneumothorax or pleural effusion.    LUCY TEJEDA MD         SYSTEM ID:  [id number]   Results for orders placed or performed in visit on 09/17/24   HCG urine ([facility])     Status: None   Result Value Ref Range    HCG Qual Urine Negative Negative         Signed Electronically by: Frannie Cueto MD

## 2024-09-25 ENCOUNTER — TRANSFERRED RECORDS (OUTPATIENT)
Dept: HEALTH INFORMATION MANAGEMENT | Facility: CLINIC | Age: 49
End: 2024-09-25
Payer: COMMERCIAL

## 2024-10-01 DIAGNOSIS — Z76.82 ORGAN TRANSPLANT CANDIDATE: Primary | ICD-10-CM

## 2024-10-01 DIAGNOSIS — M19.90 INFLAMMATORY ARTHRITIS: ICD-10-CM

## 2024-10-01 DIAGNOSIS — F11.90 CHRONIC, CONTINUOUS USE OF OPIOIDS: ICD-10-CM

## 2024-10-01 DIAGNOSIS — Z01.818 ENCOUNTER FOR PRE-TRANSPLANT EVALUATION FOR LUNG TRANSPLANT: ICD-10-CM

## 2024-10-01 RX ORDER — OXYCODONE HYDROCHLORIDE 10 MG/1
10 TABLET ORAL EVERY 8 HOURS PRN
Qty: 90 TABLET | Refills: 0 | Status: SHIPPED | OUTPATIENT
Start: 2024-10-01

## 2024-10-18 DIAGNOSIS — R06.02 SOB (SHORTNESS OF BREATH): ICD-10-CM

## 2024-10-18 DIAGNOSIS — I27.20 PULMONARY HYPERTENSION (H): ICD-10-CM

## 2024-10-21 ENCOUNTER — EXTERNAL ORDER RESULTS (OUTPATIENT)
Dept: CARDIOLOGY | Facility: CLINIC | Age: 49
End: 2024-10-21
Payer: COMMERCIAL

## 2024-10-22 ENCOUNTER — OFFICE VISIT (OUTPATIENT)
Dept: PULMONOLOGY | Facility: CLINIC | Age: 49
End: 2024-10-22
Payer: COMMERCIAL

## 2024-10-22 ENCOUNTER — LAB (OUTPATIENT)
Dept: LAB | Facility: CLINIC | Age: 49
End: 2024-10-22
Payer: COMMERCIAL

## 2024-10-22 ENCOUNTER — OFFICE VISIT (OUTPATIENT)
Dept: PULMONOLOGY | Facility: CLINIC | Age: 49
End: 2024-10-22
Attending: INTERNAL MEDICINE
Payer: COMMERCIAL

## 2024-10-22 VITALS
RESPIRATION RATE: 18 BRPM | OXYGEN SATURATION: 99 % | SYSTOLIC BLOOD PRESSURE: 125 MMHG | HEIGHT: 61 IN | WEIGHT: 155 LBS | HEART RATE: 75 BPM | BODY MASS INDEX: 29.27 KG/M2 | DIASTOLIC BLOOD PRESSURE: 85 MMHG

## 2024-10-22 DIAGNOSIS — J84.9 ILD (INTERSTITIAL LUNG DISEASE) (H): Primary | ICD-10-CM

## 2024-10-22 DIAGNOSIS — J84.9 ILD (INTERSTITIAL LUNG DISEASE) (H): ICD-10-CM

## 2024-10-22 DIAGNOSIS — Z76.82 ORGAN TRANSPLANT CANDIDATE: ICD-10-CM

## 2024-10-22 DIAGNOSIS — Z79.60 LONG-TERM USE OF IMMUNOSUPPRESSANT MEDICATION: ICD-10-CM

## 2024-10-22 DIAGNOSIS — J96.11 CHRONIC HYPOXEMIC RESPIRATORY FAILURE (H): ICD-10-CM

## 2024-10-22 DIAGNOSIS — I27.20 PULMONARY HYPERTENSION (H): ICD-10-CM

## 2024-10-22 DIAGNOSIS — Z01.818 ENCOUNTER FOR PRE-TRANSPLANT EVALUATION FOR LUNG TRANSPLANT: Primary | ICD-10-CM

## 2024-10-22 DIAGNOSIS — R06.09 DOE (DYSPNEA ON EXERTION): ICD-10-CM

## 2024-10-22 DIAGNOSIS — Z01.818 ENCOUNTER FOR PRE-TRANSPLANT EVALUATION FOR LUNG TRANSPLANT: ICD-10-CM

## 2024-10-22 DIAGNOSIS — Z76.82 ORGAN TRANSPLANT CANDIDATE: Primary | ICD-10-CM

## 2024-10-22 LAB
6 MIN WALK (FT): 750 FT
6 MIN WALK (M): 229 M
ABO/RH(D): NORMAL
DLCOUNC-%PRED-PRE: 20 %
DLCOUNC-PRE: 4 ML/MIN/MMHG
DLCOUNC-PRED: 19.17 ML/MIN/MMHG
ERV-%PRED-PRE: 23 %
ERV-PRE: 0.25 L
ERV-PRED: 1.06 L
EXPTIME-PRE: 7.69 SEC
FEF2575-%PRED-PRE: 51 %
FEF2575-PRE: 1.28 L/SEC
FEF2575-PRED: 2.47 L/SEC
FEFMAX-%PRED-PRE: 83 %
FEFMAX-PRE: 5.27 L/SEC
FEFMAX-PRED: 6.32 L/SEC
FEV1-%PRED-PRE: 63 %
FEV1-PRE: 1.49 L
FEV1FEV6-PRE: 79 %
FEV1FEV6-PRED: 82 %
FEV1FVC-PRE: 78 %
FEV1FVC-PRED: 82 %
FEV1SVC-PRE: 69 %
FEV1SVC-PRED: 76 %
FIFMAX-PRE: 3.26 L/SEC
FVC-%PRED-PRE: 66 %
FVC-PRE: 1.91 L
FVC-PRED: 2.87 L
IC-%PRED-PRE: 91 %
IC-PRE: 1.93 L
IC-PRED: 2.11 L
Lab: NORMAL
PERFORMING LABORATORY: NORMAL
SPECIMEN EXPIRATION DATE: NORMAL
SPECIMEN STATUS: NORMAL
TEST NAME: NORMAL
VA-%PRED-PRE: 81 %
VA-PRE: 3.53 L
VC-%PRED-PRE: 70 %
VC-PRE: 2.18 L
VC-PRED: 3.08 L

## 2024-10-22 PROCEDURE — G0480 DRUG TEST DEF 1-7 CLASSES: HCPCS | Mod: 90 | Performed by: PATHOLOGY

## 2024-10-22 PROCEDURE — 86825 HLA X-MATH NON-CYTOTOXIC: CPT | Performed by: INTERNAL MEDICINE

## 2024-10-22 PROCEDURE — 94375 RESPIRATORY FLOW VOLUME LOOP: CPT | Performed by: INTERNAL MEDICINE

## 2024-10-22 PROCEDURE — 94729 DIFFUSING CAPACITY: CPT | Performed by: INTERNAL MEDICINE

## 2024-10-22 PROCEDURE — 86901 BLOOD TYPING SEROLOGIC RH(D): CPT

## 2024-10-22 PROCEDURE — 99000 SPECIMEN HANDLING OFFICE-LAB: CPT | Performed by: PATHOLOGY

## 2024-10-22 PROCEDURE — 99207 PR NO CHARGE LOS: CPT

## 2024-10-22 PROCEDURE — G0463 HOSPITAL OUTPT CLINIC VISIT: HCPCS | Performed by: INTERNAL MEDICINE

## 2024-10-22 PROCEDURE — 94618 PULMONARY STRESS TESTING: CPT | Performed by: INTERNAL MEDICINE

## 2024-10-22 PROCEDURE — 36415 COLL VENOUS BLD VENIPUNCTURE: CPT | Performed by: PATHOLOGY

## 2024-10-22 PROCEDURE — 99207 PR NO CHARGE LOS: CPT | Performed by: INTERNAL MEDICINE

## 2024-10-22 PROCEDURE — 86832 HLA CLASS I HIGH DEFIN QUAL: CPT | Performed by: INTERNAL MEDICINE

## 2024-10-22 PROCEDURE — 86900 BLOOD TYPING SEROLOGIC ABO: CPT

## 2024-10-22 PROCEDURE — 86833 HLA CLASS II HIGH DEFIN QUAL: CPT | Performed by: INTERNAL MEDICINE

## 2024-10-22 PROCEDURE — 99215 OFFICE O/P EST HI 40 MIN: CPT | Mod: 25 | Performed by: INTERNAL MEDICINE

## 2024-10-22 PROCEDURE — 80307 DRUG TEST PRSMV CHEM ANLYZR: CPT | Performed by: PATHOLOGY

## 2024-10-22 ASSESSMENT — PAIN SCALES - GENERAL: PAINLEVEL: SEVERE PAIN (7)

## 2024-10-22 ASSESSMENT — PATIENT HEALTH QUESTIONNAIRE - PHQ9: SUM OF ALL RESPONSES TO PHQ QUESTIONS 1-9: 12

## 2024-10-22 NOTE — NURSING NOTE
Chief Complaint   Patient presents with    Interstitial Lung Disease (ILD)     3 month follow     Stan Le, RODY CMA at 9:59 AM on 10/22/2024

## 2024-10-22 NOTE — LETTER
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 Diffuse inspiratory crackles and rhonchi. No wheezes  Cardiac: RRR, normal S1, S2. No murmurs. No JVD  Abdomen: Soft, NT/ND  Musculoskeletal: Clubbing +  Skin: No rash on limited exam  Neuro: Normal mentation. Normal speech.  Psych:Normal affect    RESULTS:  Serologies:Reviwed.  PFTs:     I personally reviewed and interpreted the PFTs-showed presence of reduced FEV1 and FVC, suspicious for restriction.   Echocardiogram:    Right heart catheterization 11/8/2023:    Mean PA pressure 31  Pulmonary capillary wedge pressure 15  Hernán cardiac output 3.3  Hernán cardiac index 2.2  PVR 4.8    Mild pulmonary hypertension  Chest imaging:    I personally reviewed and interpreted the chest radiographs.            The longitudinal plan of care for post lung transplant and complications of post-transplant was addressed during this visit. Due to the added complexity in care, I will continue to support this patient in the subsequent management of this condition(s) and with the ongoing continuity of care of this condition          Again, thank you for  allowing me to participate in the care of your patient.        Sincerely,        Mango Marinelli MD

## 2024-10-22 NOTE — TELEPHONE ENCOUNTER
macitentan (OPSUMIT) 10 MG tablet         Last Written Prescription Date:  11/22/23  Last Fill Quantity: 30,   # refills: 11  Last Office Visit : 9/18/23, Heart cath 11/8/23  Future Office visit:  None    Routing refill request to provider for review/approval because:       macitentan (OPSUMIT) 10 MG tablet   Not on protocol

## 2024-10-22 NOTE — NURSING NOTE
"Patient accompanied by: daughterArgenis  Current activity level: active around home in day to day activities, likes to go outside by the river.   Pulmonary Rehab: not currently attending; transportation and pain are issues with this  Recommendations: increase activity as able with focus on strengthening exercises.   Patient status assessment updated. Karnofsky: 60%    Current oxygen use:  Rest 6  Activity 8-10  Sleep 6   Assisted Ventilation: none  Diabetic status: not diabetic  5' 1\" 155 lbs 0 oz Body mass index is 29.29 kg/m .    Medication changes: none today. Pain consult to address pain with alternatives to opioids.  Substance use: screenings drawn today. Reports no smoking since spring as previously stated; uses lozenges rarely when stressed, endorses having used lozenge since move to apartment in August.   Plan:     "

## 2024-10-22 NOTE — PROGRESS NOTES
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 FVC, suspicious for restriction.   Echocardiogram:    Right heart catheterization 11/8/2023:    Mean PA pressure 31  Pulmonary capillary wedge pressure 15  Hernán cardiac output 3.3  H[place]án cardiac index 2.2  PVR 4.8    Mild pulmonary hypertension  Chest imaging:    I personally reviewed and interpreted the chest radiographs.            The longitudinal plan of care for post lung transplant and complications of post-transplant was addressed during this visit. Due to the added complexity in care, I will continue to support this patient in the subsequent management of this condition(s) and with the ongoing continuity of care of this condition

## 2024-10-22 NOTE — PATIENT INSTRUCTIONS
"Your \"to do\" list following today's appointment:  -- Work on stopping nicotine lozenges completely.  -- Work with your psychiatrist for ongoing mental health stability and medications.   -- Work on transitioning off oxycodone. We have placed a pain clinic referral to help with this.   -- Start using the videos (link below, and Nikia will send in [employer]) to do pulmonary rehab at home.  -- Get your flu and COVID vaccines.    Pulmonary Rehab: Please remember to stay active.  Continue exercises learned in pulmonary rehab or continue participating in pulmonary rehab, if able. We encourage you to try and be active on days that you are not in pulmonary rehab as well. Walking is a great form of exercise. We encourage you to continue light weights as well to maintain muscle mass.    Pasted below is the website for some pulmonary rehab exercise and education videos produced by the pulmonary fibrosis foundation we highly recommend as a tool for some helpful exercises in improving and sustaining your physical conditioning.     [email or web address]/[email or web address]-[email or web address]-[email or web address]-rehabilitation-toolkit    Weight Management:   Body mass index is 29.29 kg/m .  Goal BMI greater than 18, less than 35 for lung transplant.     Medication changes: None today. Work on getting off oxycodone with pain clinic referral    Follow Up/Next appointment: Return to clinic in 4 months to see Dr. Marinelli with 6 minute walk test.    Please remember to stay up to date with your primary care requirements including:                Annual check-ups with primary care physician   Mammogram   Pap smear (as appropriate for women)    PSA (for men over 50)   Dental visits   Annual flu shots/ immunizations as needed   Colonoscopy (patients over 50).     Thank-you for allowing us to participate in your care.        If assistance is needed with access to your [employer] account, please contact the [employer] help line at " [phone]

## 2024-10-23 DIAGNOSIS — R07.1 CHEST PAIN ON BREATHING: ICD-10-CM

## 2024-10-23 RX ORDER — CYCLOBENZAPRINE HCL 5 MG
5 TABLET ORAL 3 TIMES DAILY PRN
Qty: 60 TABLET | Refills: 1 | Status: SHIPPED | OUTPATIENT
Start: 2024-10-23

## 2024-10-24 ENCOUNTER — TELEPHONE (OUTPATIENT)
Dept: PULMONOLOGY | Facility: CLINIC | Age: 49
End: 2024-10-24
Payer: COMMERCIAL

## 2024-10-24 LAB
CELL TYPE AUTO: NORMAL
CHANNELSHIFTAUTOB1: -44
CHANNELSHIFTAUTOT1: -47
CROSSMATCHDATEAUTO: NORMAL
DONOR AUTO: NORMAL
DONORCELLDATE AUTO: NORMAL
POS CUT OFF AUTO B: >69
POS CUT OFF AUTO T: >53
RESULT AUTO B1: NORMAL
RESULT AUTO T1: NORMAL
SERUM DATE AUTO B1: NORMAL
SERUM DATE AUTO T1: NORMAL
TESTMETHODAUTO: NORMAL
TREATMENT AUTO B1: NORMAL
TREATMENT AUTO T1: NORMAL

## 2024-10-24 NOTE — TELEPHONE ENCOUNTER
Left Voicemail (1st Attempt) and Sent Mychart (1st Attempt) for the patient to call back and schedule the following:    Appointment type: Return ILD  Provider: Yves  Return date: 2/22/2025  Specialty phone number: [phone]  Additional appointment(s) needed: lab and 6mwt  Additonal Notes: na    Tell patient I faxed this. Medication coming from [place].  They will call her with cost, etc.

## 2024-10-25 LAB
COTININE SERPL-MCNC: 10 NG/ML
LABORATORY REPORT: NORMAL
NICOTINE SERPL-MCNC: <5 NG/ML
PETH INTERPRETATION: NORMAL
PLPETH BLD-MCNC: <10 NG/ML
POPETH BLD-MCNC: <10 NG/ML

## 2024-10-29 DIAGNOSIS — F11.90 CHRONIC, CONTINUOUS USE OF OPIOIDS: ICD-10-CM

## 2024-10-29 DIAGNOSIS — R07.1 CHEST PAIN ON BREATHING: ICD-10-CM

## 2024-10-29 DIAGNOSIS — M19.90 INFLAMMATORY ARTHRITIS: ICD-10-CM

## 2024-10-29 RX ORDER — CYCLOBENZAPRINE HCL 5 MG
5 TABLET ORAL 3 TIMES DAILY PRN
Qty: 60 TABLET | Refills: 1 | OUTPATIENT
Start: 2024-10-29

## 2024-10-30 LAB — LABCORP INTERFACED MISCELLANEOUS TEST RESULT: NORMAL

## 2024-10-30 RX ORDER — OXYCODONE HYDROCHLORIDE 10 MG/1
10 TABLET ORAL EVERY 8 HOURS PRN
Qty: 90 TABLET | Refills: 0 | Status: SHIPPED | OUTPATIENT
Start: 2024-10-30

## 2024-11-02 LAB
PROTOCOL CUTOFF: NORMAL
SA 1  COMMENTS: NORMAL
SA 1 CELL: NORMAL
SA 1 TEST METHOD: NORMAL
SA 2 CELL: NORMAL
SA 2 COMMENTS: NORMAL
SA 2 TEST METHOD: NORMAL
SA1 HI RISK ABY: NORMAL
SA1 MOD RISK ABY: NORMAL
SA2 HI RISK ABY: NORMAL
SA2 MOD RISK ABY: NORMAL
UNACCEPTABLE ANTIGENS: NORMAL
UNOS CPRA: 89

## 2024-11-06 NOTE — NURSING NOTE
Chief Complaint   Patient presents with     Follow Up     Return for PH F/U with labs, 6 muinute walk test (pt needs a Right heart catheterization)     Vitals were taken and medications were reconciled.     LEONEL Strong  10:16 AM     Mildly elevated ESR increase methotrexate as discussed  CBC normal white blood cell count no anemia normal platelets  Normal CRP  CMP normal sugar kidney and liver

## 2024-11-12 ENCOUNTER — MYC MEDICAL ADVICE (OUTPATIENT)
Dept: PULMONOLOGY | Facility: CLINIC | Age: 49
End: 2024-11-12
Payer: COMMERCIAL

## 2024-11-12 DIAGNOSIS — R06.02 SOB (SHORTNESS OF BREATH): ICD-10-CM

## 2024-11-12 RX ORDER — ALBUTEROL SULFATE 90 UG/1
AEROSOL, METERED RESPIRATORY (INHALATION)
Qty: 18 G | Refills: 0 | Status: SHIPPED | OUTPATIENT
Start: 2024-11-12

## 2024-11-17 LAB
PREG. TEST: NEGATIVE
SP GR UR STRIP: NORMAL

## 2024-11-18 ENCOUNTER — EXTERNAL ORDER RESULTS (OUTPATIENT)
Dept: CARDIOLOGY | Facility: CLINIC | Age: 49
End: 2024-11-18
Payer: COMMERCIAL

## 2024-11-24 DIAGNOSIS — I27.20 PULMONARY HYPERTENSION (H): Primary | ICD-10-CM

## 2024-11-24 DIAGNOSIS — R06.02 SOB (SHORTNESS OF BREATH): ICD-10-CM

## 2024-11-25 ENCOUNTER — TELEPHONE (OUTPATIENT)
Dept: CARDIOLOGY | Facility: CLINIC | Age: 49
End: 2024-11-25
Payer: COMMERCIAL

## 2024-11-25 NOTE — TELEPHONE ENCOUNTER
Patient Contacted for the patient to call back and schedule the following:    Appointment type: LABS  Provider: ISABELLE  Return date: 11/29/2024  Specialty phone number: [phone] OPT 1  Additional appointment(s) needed: N/A  Additonal Notes: LABS IN [place]

## 2024-11-27 DIAGNOSIS — M19.90 INFLAMMATORY ARTHRITIS: ICD-10-CM

## 2024-11-27 DIAGNOSIS — F11.90 CHRONIC, CONTINUOUS USE OF OPIOIDS: ICD-10-CM

## 2024-11-27 RX ORDER — OXYCODONE HYDROCHLORIDE 10 MG/1
10 TABLET ORAL EVERY 8 HOURS PRN
Qty: 90 TABLET | Refills: 0 | Status: SHIPPED | OUTPATIENT
Start: 2024-11-27

## 2024-12-02 DIAGNOSIS — R07.1 CHEST PAIN ON BREATHING: ICD-10-CM

## 2024-12-02 DIAGNOSIS — J84.10 PULMONARY FIBROSIS (H): ICD-10-CM

## 2024-12-02 RX ORDER — CYCLOBENZAPRINE HCL 5 MG
5 TABLET ORAL 3 TIMES DAILY PRN
Qty: 60 TABLET | Refills: 1 | Status: SHIPPED | OUTPATIENT
Start: 2024-12-02

## 2024-12-11 DIAGNOSIS — I27.20 PULMONARY HYPERTENSION (H): ICD-10-CM

## 2024-12-11 RX ORDER — TADALAFIL 20 MG/1
40 TABLET ORAL EVERY MORNING
Qty: 60 TABLET | Refills: 0 | Status: SHIPPED | OUTPATIENT
Start: 2024-12-11

## 2024-12-11 NOTE — TELEPHONE ENCOUNTER
Medication Refill double check:    Last 2A was on 11/8/23 with .    Follow up was recommended for 6 months. (OVERDUE)    Any additional encounters with changes to requested med? no    Authorizing provider is: Dr. Ledesma    Limited refill was approved.     Additional orders/notes: Has upcoming appt this month      Jada Mcallister RN on 12/11/2024 at 2:34 PM

## 2024-12-20 ENCOUNTER — MYC REFILL (OUTPATIENT)
Dept: PULMONOLOGY | Facility: CLINIC | Age: 49
End: 2024-12-20
Payer: COMMERCIAL

## 2024-12-20 DIAGNOSIS — J84.9 ILD (INTERSTITIAL LUNG DISEASE) (H): ICD-10-CM

## 2024-12-23 RX ORDER — AZITHROMYCIN 250 MG/1
250 TABLET, FILM COATED ORAL DAILY
Qty: 90 TABLET | Refills: 3 | Status: SHIPPED | OUTPATIENT
Start: 2024-12-23

## 2024-12-24 NOTE — TELEPHONE ENCOUNTER
Rx's placed at the [facility].   
morphine (MS CONTIN) 15 MG 12 hr tablet      Last Written Prescription Date:  6/1/2018  Last Fill Quantity: 60,   # refills: 0  Last Office Visit: 5/16/2018  Future Office visit:       Routing refill request to provider for review/approval because:  Drug not on the [facility], [facility] or [facility] refill protocol or controlled substance    
none

## 2024-12-26 ENCOUNTER — MYC REFILL (OUTPATIENT)
Dept: FAMILY MEDICINE | Facility: CLINIC | Age: 49
End: 2024-12-26

## 2024-12-26 DIAGNOSIS — F11.90 CHRONIC, CONTINUOUS USE OF OPIOIDS: ICD-10-CM

## 2024-12-26 DIAGNOSIS — M19.90 INFLAMMATORY ARTHRITIS: ICD-10-CM

## 2024-12-26 RX ORDER — OXYCODONE HYDROCHLORIDE 10 MG/1
10 TABLET ORAL EVERY 8 HOURS PRN
Qty: 90 TABLET | Refills: 0 | Status: CANCELLED | OUTPATIENT
Start: 2024-12-26

## 2024-12-26 ASSESSMENT — PATIENT HEALTH QUESTIONNAIRE - PHQ9
SUM OF ALL RESPONSES TO PHQ QUESTIONS 1-9: 8
SUM OF ALL RESPONSES TO PHQ QUESTIONS 1-9: 8
10. IF YOU CHECKED OFF ANY PROBLEMS, HOW DIFFICULT HAVE THESE PROBLEMS MADE IT FOR YOU TO DO YOUR WORK, TAKE CARE OF THINGS AT HOME, OR GET ALONG WITH OTHER PEOPLE: SOMEWHAT DIFFICULT

## 2024-12-27 ENCOUNTER — OFFICE VISIT (OUTPATIENT)
Dept: FAMILY MEDICINE | Facility: CLINIC | Age: 49
End: 2024-12-27
Payer: COMMERCIAL

## 2024-12-27 VITALS
HEIGHT: 60 IN | OXYGEN SATURATION: 100 % | WEIGHT: 158.5 LBS | RESPIRATION RATE: 20 BRPM | DIASTOLIC BLOOD PRESSURE: 70 MMHG | TEMPERATURE: 97.7 F | BODY MASS INDEX: 31.12 KG/M2 | SYSTOLIC BLOOD PRESSURE: 118 MMHG | HEART RATE: 88 BPM

## 2024-12-27 DIAGNOSIS — F11.90 CHRONIC, CONTINUOUS USE OF OPIOIDS: Primary | ICD-10-CM

## 2024-12-27 DIAGNOSIS — M19.90 INFLAMMATORY ARTHRITIS: ICD-10-CM

## 2024-12-27 DIAGNOSIS — K21.01 GASTROESOPHAGEAL REFLUX DISEASE WITH ESOPHAGITIS AND HEMORRHAGE: ICD-10-CM

## 2024-12-27 DIAGNOSIS — I27.0 PRIMARY PULMONARY HYPERTENSION (H): ICD-10-CM

## 2024-12-27 DIAGNOSIS — J84.9 ILD (INTERSTITIAL LUNG DISEASE) (H): ICD-10-CM

## 2024-12-27 PROCEDURE — 99214 OFFICE O/P EST MOD 30 MIN: CPT | Mod: 25 | Performed by: FAMILY MEDICINE

## 2024-12-27 PROCEDURE — 90656 IIV3 VACC NO PRSV 0.5 ML IM: CPT | Performed by: FAMILY MEDICINE

## 2024-12-27 PROCEDURE — 90471 IMMUNIZATION ADMIN: CPT | Performed by: FAMILY MEDICINE

## 2024-12-27 RX ORDER — OXYCODONE HYDROCHLORIDE 10 MG/1
10 TABLET ORAL EVERY 8 HOURS PRN
Qty: 90 TABLET | Refills: 0 | Status: SHIPPED | OUTPATIENT
Start: 2024-12-27

## 2024-12-27 ASSESSMENT — PAIN SCALES - GENERAL: PAINLEVEL_OUTOF10: MODERATE PAIN (5)

## 2024-12-27 NOTE — PROGRESS NOTES
Assessment & Plan     Chronic, continuous use of opioids  Anali Loya is a 49 year-old female with a history of end-stage lung disease secondary to interstitial lung disease due to treatment for rheumatoid arthritis who presents to clinic today in follow-up of her chronic narcotic use.  She is currently followed by addiction medicine for her Suboxone and is also on Klonopin 0.25 mg 3 times daily.  Her only therapy for her rheumatoid arthritis is her prednisone which is causing increasing weight gain and really not helpful for her chronic pain.  She also has increasing dyspnea from her interstitial lung disease.  She is currently on a transplantation wait for double lung transplant.  She meets regularly with her transplant team.    Several months ago we discussed additional narcotic pain medication in combination with the Suboxone.  It was discussed with her addiction medicine specialist who is supportive of this given the context of her terminal lung disease.  We started her on 5 mg of oxycodone 3 times daily.  This has helped but she continues to have disabling pain on a daily basis.  She would like to increase the oxycodone to allow for more pain control and function.    She recently moved into a new apartment in [place].  Unfortunately it is on the second floor without an elevator but it is a 2 room apartment that allows more space for all of her oxygen's therapy supply needs.  She has more access to local services and in general she feels more comfortable in this space.  She is no longer a smoker has been doing a great job staying abstinent of tobacco.  We discussed the possibility of her getting a lung transplant and she states that based on the statistics shared with her from her transplant team she has a less than 6% chance of receiving a compatible lung transplant.    She recently met with her pulmonologist Dr. Marinelli for review of her consideration for lung transplantation.  She reviewed her  tobacco abstinence history and noted that she has been abstinent of tobacco since April 2024 but did use nicotine replacement products.  She needs to be 6 months without smoking or nicotine supplements before she will be considered for transplantation.  She was recommended to continue with pulmonary rehab via virtual rehab.  This has not been initiated.    She also recommended referral to pain management to review and taper her narcotic utilization as this is a contraindication to transplantation.  She has a initial appointment scheduled with pain management in January.    At the end of the most recent visit they discussed that all of the requirements for transplantation will continue to be monitored and if she does not qualify for lung transplantation then adopting palliative measures would be reasonable.    We reviewed her PDMP today.  She currently has a unintentional overdose risk score of 560.  This is due to the combination of the Klonopin, buprenorphine, and oxycodone.  She is currently on 22.5 morphine milliequivalents of oxycodone, 3.1 lorazepam milligram equivalents and 6.4 buprenorphine milligrams per day.  We discussed the interaction between buprenorphine and oxycodone.  Buprenorphine being a mixed agonist will block the effect of oxycodone but the risk for respiratory depression continues to rise.  The combination of the 2 medications may slightly increase her pain control but was significantly increased her risk of respiratory depression.  That in the combination of her chronic hypoxia secondary to her interstitial lung disease puts her at increased risk for respiratory depression and death.  She fully understands these risk but also that her current quality of life with the pain that she has a limited function because of her lung disease that she is accepting of the risk.  Her narcotic use would constitute end-stage disease management.    On exam today she is well-developed middle-age female in no  acute distress.  Her blood pressure is 118/70.  Her pulse is 88 and regular.  She is afebrile.  Her respiratory rate is 20 with oxygen saturations of 100% on 6 l of nasal cannula oxygen.    HEENT is unremarkable.  Neck is supple without adenopathy.  Lungs show diminished breath sounds in all fields with diffuse inspiratory crackles and rhonchi.  No wheezing was heard.  Cardiac exam is regular with no murmur rub or gallop.  Abdomen is nontender.  Extremities demonstrate clubbing of the extremities.  Neurologic exam is nonfocal.  She is alert and oriented x 3.  Mood and affect are appropriate.    Assessment: 49-year-old female with end-stage severe fibrotic lung disease secondary to RA therapy.  Severe pulmonary hypertension and chronic narcotic utilization for rheumatoid arthritis.  She is currently stable but working towards lung transplantation.  She will be following up with pain management to begin an attempt to try to wean her narcotic pain medications and consider other alternatives.  She will continue to follow with pulmonology for monitoring of her nicotine exposure.  She does require ongoing continuous oxygen.    Plan: Her oxycodone was refilled today with the understanding that this would be managed going forward through pain management and her psychiatrist.  She will continue to follow-up with pulmonology, Dr. Marinelli and cardiology Dr. Ledesma.  I have recommended that she schedule an appointment in 3 to 6 months to establish primary care with one of my colleagues after my correction in February.  We reviewed her vaccination gaps and did update her flu vaccine today.  She declines COVID vaccination at this time.  - oxyCODONE IR (ROXICODONE) 10 MG tablet; Take 1 tablet (10 mg) by mouth every 8 hours as needed for severe pain.    Inflammatory arthritis  Assessment: 49-year-old female with end-stage severe fibrotic lung disease secondary to RA therapy.  Severe pulmonary hypertension and chronic narcotic  utilization for rheumatoid arthritis.  She is currently stable but working towards lung transplantation.  She will be following up with pain management to begin an attempt to try to wean her narcotic pain medications and consider other alternatives.  She will continue to follow with pulmonology for monitoring of her nicotine exposure.  She does require ongoing continuous oxygen.    Plan: Her oxycodone was refilled today with the understanding that this would be managed going forward through pain management and her psychiatrist.  She will continue to follow-up with pulmonology, Dr. Marinelli and cardiology Dr. Ledesma.  I have recommended that she schedule an appointment in 3 to 6 months to establish primary care with one of my colleagues after my longterm in February.  We reviewed her vaccination gaps and did update her flu vaccine today.  She declines COVID vaccination at this time.  - oxyCODONE IR (ROXICODONE) 10 MG tablet; Take 1 tablet (10 mg) by mouth every 8 hours as needed for severe pain.    Gastroesophageal reflux disease with esophagitis and hemorrhage  Chronic, stable.  Continue current medication and plan.  - esomeprazole (NEXIUM) 20 MG DR capsule; Take 1 capsule (20 mg) by mouth two times daily.    ILD (interstitial lung disease) with concerns for acute exacerbation  Assessment: 49-year-old female with end-stage severe fibrotic lung disease secondary to RA therapy.  Severe pulmonary hypertension and chronic narcotic utilization for rheumatoid arthritis.  She is currently stable but working towards lung transplantation.  She will be following up with pain management to begin an attempt to try to wean her narcotic pain medications and consider other alternatives.  She will continue to follow with pulmonology for monitoring of her nicotine exposure.  She does require ongoing continuous oxygen.    Plan: Her oxycodone was refilled today with the understanding that this would be managed going forward through  pain management and her psychiatrist.  She will continue to follow-up with pulmonology, Dr. Marinelli and cardiology Dr. Ledesma.  I have recommended that she schedule an appointment in 3 to 6 months to establish primary care with one of my colleagues after my longterm in February.  We reviewed her vaccination gaps and did update her flu vaccine today.  She declines COVID vaccination at this time.    Primary pulmonary hypertension (H)  Assessment: 49-year-old female with end-stage severe fibrotic lung disease secondary to RA therapy.  Severe pulmonary hypertension and chronic narcotic utilization for rheumatoid arthritis.  She is currently stable but working towards lung transplantation.  She will be following up with pain management to begin an attempt to try to wean her narcotic pain medications and consider other alternatives.  She will continue to follow with pulmonology for monitoring of her nicotine exposure.  She does require ongoing continuous oxygen.    Plan: Her oxycodone was refilled today with the understanding that this would be managed going forward through pain management and her psychiatrist.  She will continue to follow-up with pulmonology, Dr. Marinelli and cardiology Dr. Ledesma.  I have recommended that she schedule an appointment in 3 to 6 months to establish primary care with one of my colleagues after my longterm in February.  We reviewed her vaccination gaps and did update her flu vaccine today.  She declines COVID vaccination at this time.            FUTURE APPOINTMENTS:       - Follow-up visit in 6 months to establish primary care after my longterm       - Make appointment with pulmonology, psychiatry, cardiology, pain management specialist    Thierno Briones is a 49 year old, presenting for the following health issues:  Recheck Medication (oxycodone)      12/27/2024     4:28 PM   Additional Questions   Roomed by Mercy BAXTER         12/27/2024     4:28 PM   Patient Reported Additional  Medications   Patient reports taking the following new medications clonazapam1 mg,  1 tablet twice daily     History of Present Illness       COPD:  She presents for follow up of COPD.  Overall, COPD symptoms are stable since last visit.  She has more than usual fatigue or shortness of breath with exertion and no shortness of breath at rest.  She sometimes coughs and does not have change in sputum. No recent fever. She can walk less than 1 block without stopping to rest. She can walk 1 flights of stairs without resting. The patient has had no ED, urgent care, or hospital admissions because of COPD since the last visit.     Reason for visit:  Follow up    She eats 0-1 servings of fruits and vegetables daily.She consumes 1 sweetened beverage(s) daily.She exercises with enough effort to increase her heart rate 9 or less minutes per day.  She exercises with enough effort to increase her heart rate 3 or less days per week. She is missing 1 dose(s) of medications per week.  She is not taking prescribed medications regularly due to remembering to take.         Patient Active Problem List   Diagnosis    Personal history of tobacco use, presenting hazards to health    Abnormal blood chemistry    Abnormal maternal glucose tolerance, antepartum    Inflammatory arthritis    Hyperlipidemia LDL goal <130    SOB (shortness of breath)    Fibrosis of lung (H)    Anxiety    Tobacco abuse    S/P bunionectomy    ILD (interstitial lung disease) with concerns for acute exacerbation    Lung nodule    Pneumothorax of left lung after biopsy    Pneumothorax after biopsy    Hypoxia    Pulmonary hypertension (H)    ASCUS of cervix with negative high risk HPV    Primary pulmonary hypertension (H)    Chronic, continuous use of opioids    Iron deficiency    Anemia    Spondylosis of lumbosacral region without myelopathy or radiculopathy    Polymyositis, organ involvement unspecified (H)    Hallucinations    Prolonged grief disorder    Abnormal CT  of the chest    Elevated brain natriuretic peptide (BNP) level    Acute on chronic respiratory failure with hypoxia and hypercapnia (H)    Pneumonia of both lungs due to infectious organism, unspecified part of lung    Recurrent major depressive disorder (H)    MOLLY (generalized anxiety disorder)    PTSD (post-traumatic stress disorder)    Gastroesophageal reflux disease without esophagitis    Chronic pain    Acute pulmonary edema (H)    Chronic right heart failure (H)    Seronegative arthritis    Current tobacco use    Opioid use disorder in remission    Acute hypoxemic respiratory failure (H)    Moderate benzodiazepine use disorder (H)    Severe episode of recurrent major depressive disorder, with psychotic features (H)    Influenza A    Encephalopathy    Somnolence    Bigeminy    Acute upper GI bleed    Panlobular emphysema (H)    Chest pain, unspecified    Abnormal CT scan    Status post coronary angiogram    Organ transplant candidate    Encounter for pre-transplant evaluation for lung transplant     Current Outpatient Medications   Medication Sig Dispense Refill    albuterol (VENTOLIN HFA) 108 (90 Base) MCG/ACT inhaler INHALE ONE TO TWO PUFFS INTO THE LUNGS EVERY 4 HOURS AS NEEDED FOR SHORTNESS OF BREATH / DYSPNEA 18 g 0    azithromycin (ZITHROMAX) 250 MG tablet Take 1 tablet (250 mg) by mouth daily. 90 tablet 3    Calcium Carb-Cholecalciferol (CALTRATE 600+D3) 600-20 MG-MCG TABS Take 1 tablet by mouth 2 times daily 180 tablet 3    cyclobenzaprine (FLEXERIL) 5 MG tablet Take 1 tablet (5 mg) by mouth 3 times daily as needed for muscle spasms. 60 tablet 1    diclofenac (VOLTAREN) 1 % topical gel Apply 1 g topically 3 times daily as needed for moderate pain. 350 g 2    esomeprazole (NEXIUM) 20 MG DR capsule Take 1 capsule (20 mg) by mouth two times daily 180 capsule 1    furosemide (LASIX) 20 MG tablet Take 1 tablet (20 mg) by mouth 2 times daily. 180 tablet 3    ipratropium - albuterol 0.5 mg/2.5 mg/3 mL (DUONEB)  0.5-2.5 (3) MG/3ML neb solution Take 1 vial (3 mLs) by nebulization 4 times daily 360 mL 0    macitentan (OPSUMIT) 10 MG tablet Take 1 tablet (10 mg) by mouth daily. Make sure you are completing your monthly mandatory labs for pregnancy. 30 tablet 3    nintedanib (OFEV) 150 MG capsule Take 1 capsule (150 mg) by mouth 2 times daily. 60 capsule 11    order for DME Oxygen: Patient requires supplemental Oxygen 4 LPM via nasal canula at rest and 6 LPM with activity. Please provide patient with portability capability. Okay to spot check patient on oxygen device, to keep sats above 90%. Please provider with home concentrator that can go up to 10 LPM. Oxygen will be for a lifetime. 1 Device 0    order for DME Please provide patient with 2  liquid oxygen tanks for portability. Spot check patient on liquid oxygen. Titrate oxygen to maintain saturations above 88%. 2 Device 0    oxyCODONE IR (ROXICODONE) 10 MG tablet Take 1 tablet (10 mg) by mouth every 8 hours as needed for severe pain. 90 tablet 0    polyethylene glycol (MIRALAX) 17 GM/Dose powder Take 1 Capful by mouth daily as needed for constipation      predniSONE (DELTASONE) 10 MG tablet Take 1 tablet (10 mg) by mouth daily. 30 tablet 3    sertraline (ZOLOFT) 100 MG tablet Take 150 mg by mouth every morning      SUBOXONE 8-2 MG per film Place 1 Film under the tongue 3 times daily      tadalafil, PAH, 20 MG TABS Take 2 tablets (40 mg) by mouth every morning. More refills after office visit 60 tablet 0    CHOLECALCIFEROL 25 MCG (1000 UT) tablet Take 1,000 Units by mouth daily. (Patient not taking: Reported on 12/27/2024)      clonazePAM (KLONOPIN) 0.5 MG tablet Take 0.25 mg by mouth 3 times daily as needed for anxiety (Patient not taking: Reported on 12/2/2024)      naloxone (NARCAN) 4 MG/0.1ML nasal spray Spray 1 spray (4 mg) into one nostril alternating nostrils as needed for opioid reversal every 2-3 minutes until assistance arrives (Patient not taking: Reported on  "12/27/2024) 0.2 mL 0    predniSONE (DELTASONE) 10 MG tablet Take prednisone, 10 mg tab - 6 tabs daily for 5 days, then 5 tabs daily for 7 days, then 4 tabs  daily for 7 days, then 3 tab daily for 7 days, then 2 tab daily for 7 days, then continue 2 tabs daily till seen by your lung doctor (Patient not taking: Reported on 12/27/2024) 150 tablet 0    senna (SENOKOT) 8.6 MG tablet Take 1 tablet by mouth 2 times daily as needed for constipation. (Patient not taking: Reported on 12/27/2024)               Review of Systems        Objective    /70 (BP Location: Left arm, Patient Position: Chair)   Pulse 88   Temp 97.7  F (36.5  C) (Temporal)   Resp 20   Ht 1.53 m (5' 0.25\")   Wt 71.9 kg (158 lb 8 oz)   SpO2 100%   BMI 30.70 kg/m    Body mass index is 30.7 kg/m .  Physical Exam               Signed Electronically by: Cristian Fagan MD    "

## 2024-12-31 ENCOUNTER — MYC MEDICAL ADVICE (OUTPATIENT)
Dept: CARDIOLOGY | Facility: CLINIC | Age: 49
End: 2024-12-31

## 2025-01-06 ENCOUNTER — TELEPHONE (OUTPATIENT)
Dept: CARDIOLOGY | Facility: CLINIC | Age: 50
End: 2025-01-06
Payer: COMMERCIAL

## 2025-01-06 DIAGNOSIS — I27.20 PULMONARY HYPERTENSION (H): Primary | ICD-10-CM

## 2025-01-06 DIAGNOSIS — R06.02 SOB (SHORTNESS OF BREATH): ICD-10-CM

## 2025-01-06 ASSESSMENT — PAIN SCALES - PAIN ENJOYMENT GENERAL ACTIVITY SCALE (PEG)
AVG_PAIN_PASTWEEK: 6
INTERFERED_GENERAL_ACTIVITY: 5
INTERFERED_GENERAL_ACTIVITY: 5
PEG_TOTALSCORE: 6.33
INTERFERED_ENJOYMENT_LIFE: 8
AVG_PAIN_PASTWEEK: 6
INTERFERED_ENJOYMENT_LIFE: 8
PEG_TOTALSCORE: 6.33

## 2025-01-06 ASSESSMENT — ANXIETY QUESTIONNAIRES
GAD7 TOTAL SCORE: 7
8. IF YOU CHECKED OFF ANY PROBLEMS, HOW DIFFICULT HAVE THESE MADE IT FOR YOU TO DO YOUR WORK, TAKE CARE OF THINGS AT HOME, OR GET ALONG WITH OTHER PEOPLE?: SOMEWHAT DIFFICULT
6. BECOMING EASILY ANNOYED OR IRRITABLE: SEVERAL DAYS
5. BEING SO RESTLESS THAT IT IS HARD TO SIT STILL: NOT AT ALL
2. NOT BEING ABLE TO STOP OR CONTROL WORRYING: SEVERAL DAYS
4. TROUBLE RELAXING: SEVERAL DAYS
3. WORRYING TOO MUCH ABOUT DIFFERENT THINGS: MORE THAN HALF THE DAYS
7. FEELING AFRAID AS IF SOMETHING AWFUL MIGHT HAPPEN: SEVERAL DAYS
GAD7 TOTAL SCORE: 7
7. FEELING AFRAID AS IF SOMETHING AWFUL MIGHT HAPPEN: SEVERAL DAYS
GAD7 TOTAL SCORE: 7
IF YOU CHECKED OFF ANY PROBLEMS ON THIS QUESTIONNAIRE, HOW DIFFICULT HAVE THESE PROBLEMS MADE IT FOR YOU TO DO YOUR WORK, TAKE CARE OF THINGS AT HOME, OR GET ALONG WITH OTHER PEOPLE: SOMEWHAT DIFFICULT
1. FEELING NERVOUS, ANXIOUS, OR ON EDGE: SEVERAL DAYS

## 2025-01-06 NOTE — TELEPHONE ENCOUNTER
Patient Contacted for the patient to call back and schedule the following:    Appointment type: Return Pulm Hyper  Provider: Jania Woo  Return date: 02/25/2025  Specialty phone number: [phone] opt 1  Additional appointment(s) needed: N/A  Additonal Notes: N/A

## 2025-01-06 NOTE — CONFIDENTIAL NOTE
Patient is very overdue to see us.  Placed follow up orders & sent staff msg to Coordinators to call patient & help schedule appt on 2/25/25 when she will already be here seeing Pulm.  Jada Mcallister RN on 1/6/2025 at 10:31 AM

## 2025-01-07 ENCOUNTER — EXTERNAL ORDER RESULTS (OUTPATIENT)
Dept: CARDIOLOGY | Facility: CLINIC | Age: 50
End: 2025-01-07
Payer: COMMERCIAL

## 2025-01-07 LAB — HCG UR QL: NEGATIVE

## 2025-01-08 ENCOUNTER — OFFICE VISIT (OUTPATIENT)
Dept: PALLIATIVE MEDICINE | Facility: CLINIC | Age: 50
End: 2025-01-08
Attending: INTERNAL MEDICINE
Payer: COMMERCIAL

## 2025-01-08 ENCOUNTER — MYC MEDICAL ADVICE (OUTPATIENT)
Dept: FAMILY MEDICINE | Facility: CLINIC | Age: 50
End: 2025-01-08

## 2025-01-08 VITALS — DIASTOLIC BLOOD PRESSURE: 80 MMHG | HEART RATE: 106 BPM | SYSTOLIC BLOOD PRESSURE: 129 MMHG

## 2025-01-08 DIAGNOSIS — M54.2 CHRONIC NECK PAIN: ICD-10-CM

## 2025-01-08 DIAGNOSIS — G89.29 CHRONIC PAIN OF BOTH KNEES: Primary | ICD-10-CM

## 2025-01-08 DIAGNOSIS — M47.817 LUMBOSACRAL SPONDYLOSIS WITHOUT MYELOPATHY: ICD-10-CM

## 2025-01-08 DIAGNOSIS — M54.16 LUMBAR RADICULAR PAIN: ICD-10-CM

## 2025-01-08 DIAGNOSIS — M25.562 CHRONIC PAIN OF BOTH KNEES: Primary | ICD-10-CM

## 2025-01-08 DIAGNOSIS — Z01.818 ENCOUNTER FOR PRE-TRANSPLANT EVALUATION FOR LUNG TRANSPLANT: ICD-10-CM

## 2025-01-08 DIAGNOSIS — F11.90 CHRONIC, CONTINUOUS USE OF OPIOIDS: ICD-10-CM

## 2025-01-08 DIAGNOSIS — G89.29 CHRONIC NECK PAIN: ICD-10-CM

## 2025-01-08 DIAGNOSIS — M19.90 INFLAMMATORY ARTHRITIS: ICD-10-CM

## 2025-01-08 DIAGNOSIS — G89.29 CHRONIC BILATERAL LOW BACK PAIN WITH LEFT-SIDED SCIATICA: ICD-10-CM

## 2025-01-08 DIAGNOSIS — M54.42 CHRONIC BILATERAL LOW BACK PAIN WITH LEFT-SIDED SCIATICA: ICD-10-CM

## 2025-01-08 DIAGNOSIS — M25.561 CHRONIC PAIN OF BOTH KNEES: Primary | ICD-10-CM

## 2025-01-08 DIAGNOSIS — G89.4 CHRONIC PAIN SYNDROME: ICD-10-CM

## 2025-01-08 PROCEDURE — G2211 COMPLEX E/M VISIT ADD ON: HCPCS | Performed by: NURSE PRACTITIONER

## 2025-01-08 PROCEDURE — 99205 OFFICE O/P NEW HI 60 MIN: CPT | Performed by: NURSE PRACTITIONER

## 2025-01-08 RX ORDER — CLONAZEPAM 1 MG/1
1 TABLET ORAL 2 TIMES DAILY PRN
COMMUNITY

## 2025-01-08 RX ORDER — GABAPENTIN 300 MG/1
CAPSULE ORAL
Qty: 120 CAPSULE | Refills: 0 | Status: SHIPPED | OUTPATIENT
Start: 2025-01-08

## 2025-01-08 ASSESSMENT — PAIN SCALES - GENERAL: PAINLEVEL_OUTOF10: MODERATE PAIN (5)
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
## 2025-01-09 ENCOUNTER — PATIENT OUTREACH (OUTPATIENT)
Dept: CARE COORDINATION | Facility: CLINIC | Age: 50
End: 2025-01-09
Payer: COMMERCIAL

## 2025-01-09 ENCOUNTER — MYC MEDICAL ADVICE (OUTPATIENT)
Dept: PALLIATIVE MEDICINE | Facility: CLINIC | Age: 50
End: 2025-01-09
Payer: COMMERCIAL

## 2025-01-09 DIAGNOSIS — F11.90 CHRONIC, CONTINUOUS USE OF OPIOIDS: ICD-10-CM

## 2025-01-09 DIAGNOSIS — R06.02 SOB (SHORTNESS OF BREATH): ICD-10-CM

## 2025-01-09 DIAGNOSIS — M19.90 INFLAMMATORY ARTHRITIS: ICD-10-CM

## 2025-01-09 RX ORDER — ALBUTEROL SULFATE 90 UG/1
AEROSOL, METERED RESPIRATORY (INHALATION)
Qty: 18 G | Refills: 1 | Status: SHIPPED | OUTPATIENT
Start: 2025-01-09
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
## 2025-01-14 ENCOUNTER — MYC REFILL (OUTPATIENT)
Dept: FAMILY MEDICINE | Facility: CLINIC | Age: 50
End: 2025-01-14
Payer: COMMERCIAL

## 2025-01-14 DIAGNOSIS — R07.1 CHEST PAIN ON BREATHING: ICD-10-CM

## 2025-01-14 RX ORDER — CYCLOBENZAPRINE HCL 5 MG
5 TABLET ORAL 3 TIMES DAILY PRN
Qty: 60 TABLET | Refills: 1 | Status: SHIPPED | OUTPATIENT
Start: 2025-01-14

## 2025-01-16 ENCOUNTER — OFFICE VISIT (OUTPATIENT)
Dept: ORTHOPEDICS | Facility: CLINIC | Age: 50
End: 2025-01-16
Attending: NURSE PRACTITIONER
Payer: COMMERCIAL

## 2025-01-16 DIAGNOSIS — M25.561 CHRONIC PAIN OF BOTH KNEES: Primary | ICD-10-CM

## 2025-01-16 DIAGNOSIS — G89.29 CHRONIC PAIN OF BOTH KNEES: Primary | ICD-10-CM

## 2025-01-16 DIAGNOSIS — M25.562 CHRONIC PAIN OF BOTH KNEES: Primary | ICD-10-CM
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
## 2025-01-19 RX ORDER — OXYCODONE HYDROCHLORIDE 10 MG/1
TABLET ORAL
Qty: 75 TABLET | Refills: 0 | Status: CANCELLED | OUTPATIENT
Start: 2025-01-26

## 2025-01-28 ENCOUNTER — TELEPHONE (OUTPATIENT)
Dept: PULMONOLOGY | Facility: CLINIC | Age: 50
End: 2025-01-28
Payer: COMMERCIAL

## 2025-01-28 DIAGNOSIS — J84.10 PULMONARY FIBROSIS (H): ICD-10-CM

## 2025-01-28 NOTE — TELEPHONE ENCOUNTER
Health Call Center    Phone Message    May a detailed message be left on voicemail: yes     Reason for Call: Medication Question or concern regarding medication   Prescription Clarification    Name of Medication:   nintedanib (OFEV) 150 MG capsule     Prescribing Provider: Mango Marinelli MD     Pharmacy:   [facility] - [place], [place] - [address]     What on the order needs clarification? Per Debra with  Specialty Pharmacy, patient has only been taking 1 of these capsules per day instead of the 2 due to the side effects. Pharmacy is wanting to know if that is OK to just be taking the 1 and if so, if a new prescription could be sent to reflect that. The direct call back for the pharmacy is [phone] for any questions.    Action Taken: Message routed to:  [facility] ([facility] PULM    Travel Screening: Not Applicable     Date of Service:

## 2025-01-30 ENCOUNTER — MYC REFILL (OUTPATIENT)
Dept: PULMONOLOGY | Facility: CLINIC | Age: 50
End: 2025-01-30
Payer: COMMERCIAL

## 2025-01-30 DIAGNOSIS — J84.9 ILD (INTERSTITIAL LUNG DISEASE) (H): ICD-10-CM

## 2025-01-30 RX ORDER — PREDNISONE 10 MG/1
10 TABLET ORAL DAILY
Qty: 30 TABLET | Refills: 3 | Status: SHIPPED | OUTPATIENT
Start: 2025-01-30

## 2025-02-25 ENCOUNTER — PATIENT OUTREACH (OUTPATIENT)
Dept: CARE COORDINATION | Facility: CLINIC | Age: 50
End: 2025-02-25

## 2025-04-09 ENCOUNTER — POST MORTEM DOCUMENTATION (OUTPATIENT)
Dept: TRANSPLANT | Facility: CLINIC | Age: 50
End: 2025-04-09
Payer: COMMERCIAL

## 2025-04-09 NOTE — PROGRESS NOTES
Noted via Epic report that patient is .  Place of death was reported as patient's home.  Graft status at the time of death was reported as: n/a, not transplanted.  Additional information: none  TIS verification is: n/a  The Transplant Office has been notified that patient is . The Post Mortem Encounter has been completed. Notifications have been sent to: none.   Instructions have been sent to: none.

## 2025-04-16 NOTE — TELEPHONE ENCOUNTER
Signed original RX delivered to [facility]. Mariah,          Please follow-up:     Outpatient Follow-up: Patient should follow-up with his/her ophthalmologist or with [facility] Department of Ophthalmology within 1 week of after discharge at:    [address]  [place] [address]  [phone]    Recommendation: - follow up with neurosurgery doctor as outpatient with Dr. Jaime in 1 week upon discharge    Please follow- up with Oral surgery clinic within 1 week [phone] Dr Mcmahan for re evaluation of fractures    - Avoid blowing your nose forcefully:  This can put pressure on the sinus and potentially reopen a surgical site or cause bleeding.   Refrain from sneezing or try to sneeze with your mouth open:  Sneezing can create pressure that can damage the healing area.   Avoid using straws:  Sucking through a straw can also create pressure that could be detrimental to the healing process.   Avoid forceful spitting:  This can also put pressure on the sinus and potentially reopen a surgical site.   Limit strenuous activities:  Exercise, bending over, and heavy lifting should be avoided as they can increase pressure.   Avoid smoke and irritants:  Smoking and exposure to smoke and other irritants can delay healing and increase the risk of infection.   Maintain good oral hygiene:  Brush teeth gently around the surgical site.   Take prescribed medications as directed:  This includes antibiotics and other medications to prevent infection and manage pain.

## (undated) DEVICE — PACK HEART LEFT CUSTOM

## (undated) DEVICE — MANIFOLD KIT ANGIO AUTOMATED 014613

## (undated) DEVICE — UMMC CONVENIENCE KIT FORMALLY H9656021017160 NEW# 602101716

## (undated) DEVICE — PACK HEART RIGHT CUSTOM SAN32RHF18

## (undated) DEVICE — INTRO GLIDESHEATH SLENDER 6FR 10X45CM 60-1060

## (undated) DEVICE — INTRO SHEATH 7FRX10CM PINNACLE RSS702

## (undated) DEVICE — SLEEVE TR BAND RADIAL COMPRESSION DEVICE 24CM TRB24-REG

## (undated) DEVICE — Device

## (undated) DEVICE — KIT HAND CONTROL ACIST 014644 AR-P54

## (undated) DEVICE — KIT MICROINTRODUCER VASCULAR  4FRX21GAX4CM

## (undated) DEVICE — WIRE GUIDE 0.035"X150CM EMERALD J TIP 502521

## (undated) DEVICE — TUBING PRESSURE 30"

## (undated) DEVICE — KIT RIGHT HEART CATH H965601307191

## (undated) RX ORDER — LIDOCAINE 40 MG/G
CREAM TOPICAL
Status: DISPENSED
Start: 2019-04-17

## (undated) RX ORDER — NITROGLYCERIN 5 MG/ML
VIAL (ML) INTRAVENOUS
Status: DISPENSED
Start: 2024-04-10

## (undated) RX ORDER — SODIUM CHLORIDE, SODIUM LACTATE, POTASSIUM CHLORIDE, CALCIUM CHLORIDE 600; 310; 30; 20 MG/100ML; MG/100ML; MG/100ML; MG/100ML
INJECTION, SOLUTION INTRAVENOUS
Status: DISPENSED
Start: 2024-04-10

## (undated) RX ORDER — SODIUM CHLORIDE 9 MG/ML
INJECTION, SOLUTION INTRAVENOUS
Status: DISPENSED
Start: 2024-04-10

## (undated) RX ORDER — LIDOCAINE 40 MG/G
CREAM TOPICAL
Status: DISPENSED
Start: 2024-04-10

## (undated) RX ORDER — HEPARIN SODIUM 1000 [USP'U]/ML
INJECTION, SOLUTION INTRAVENOUS; SUBCUTANEOUS
Status: DISPENSED
Start: 2024-04-10

## (undated) RX ORDER — LIDOCAINE 40 MG/G
CREAM TOPICAL
Status: DISPENSED
Start: 2023-11-08

## (undated) RX ORDER — LIDOCAINE 40 MG/G
CREAM TOPICAL
Status: DISPENSED
Start: 2017-04-26

## (undated) RX ORDER — POTASSIUM CHLORIDE 750 MG/1
TABLET, EXTENDED RELEASE ORAL
Status: DISPENSED
Start: 2024-04-10

## (undated) RX ORDER — NICARDIPINE HCL-0.9% SOD CHLOR 1 MG/10 ML
SYRINGE (ML) INTRAVENOUS
Status: DISPENSED
Start: 2024-04-10

## (undated) RX ORDER — DIPHENHYDRAMINE HYDROCHLORIDE 50 MG/ML
INJECTION INTRAMUSCULAR; INTRAVENOUS
Status: DISPENSED
Start: 2024-04-10